# Patient Record
Sex: MALE | Race: OTHER | HISPANIC OR LATINO | URBAN - METROPOLITAN AREA
[De-identification: names, ages, dates, MRNs, and addresses within clinical notes are randomized per-mention and may not be internally consistent; named-entity substitution may affect disease eponyms.]

---

## 2020-04-18 ENCOUNTER — INPATIENT (INPATIENT)
Facility: HOSPITAL | Age: 65
LOS: 71 days | Discharge: NURSING, REHAB OR LONG-TERM CARE | DRG: 4 | End: 2020-06-29
Attending: HOSPITALIST | Admitting: INTERNAL MEDICINE
Payer: MEDICAID

## 2020-04-18 VITALS
OXYGEN SATURATION: 65 % | TEMPERATURE: 98 F | WEIGHT: 154.98 LBS | DIASTOLIC BLOOD PRESSURE: 79 MMHG | RESPIRATION RATE: 29 BRPM | HEIGHT: 63 IN | HEART RATE: 135 BPM | SYSTOLIC BLOOD PRESSURE: 111 MMHG

## 2020-04-18 DIAGNOSIS — J18.9 PNEUMONIA, UNSPECIFIED ORGANISM: ICD-10-CM

## 2020-04-18 LAB
ALBUMIN SERPL ELPH-MCNC: 2.4 G/DL — LOW (ref 3.3–5.2)
ALP SERPL-CCNC: 104 U/L — SIGNIFICANT CHANGE UP (ref 40–120)
ALT FLD-CCNC: 30 U/L — SIGNIFICANT CHANGE UP
ANION GAP SERPL CALC-SCNC: 14 MMOL/L — SIGNIFICANT CHANGE UP (ref 5–17)
ANION GAP SERPL CALC-SCNC: 25 MMOL/L — HIGH (ref 5–17)
ANISOCYTOSIS BLD QL: SLIGHT — SIGNIFICANT CHANGE UP
APTT BLD: 34 SEC — SIGNIFICANT CHANGE UP (ref 27.5–36.3)
AST SERPL-CCNC: 66 U/L — HIGH
BASOPHILS # BLD AUTO: 0 K/UL — SIGNIFICANT CHANGE UP (ref 0–0.2)
BASOPHILS NFR BLD AUTO: 0 % — SIGNIFICANT CHANGE UP (ref 0–2)
BILIRUB SERPL-MCNC: 1 MG/DL — SIGNIFICANT CHANGE UP (ref 0.4–2)
BUN SERPL-MCNC: 22 MG/DL — HIGH (ref 8–20)
BUN SERPL-MCNC: 22 MG/DL — HIGH (ref 8–20)
CALCIUM SERPL-MCNC: 8 MG/DL — LOW (ref 8.6–10.2)
CALCIUM SERPL-MCNC: 9.2 MG/DL — SIGNIFICANT CHANGE UP (ref 8.6–10.2)
CHLORIDE SERPL-SCNC: 91 MMOL/L — LOW (ref 98–107)
CHLORIDE SERPL-SCNC: 97 MMOL/L — LOW (ref 98–107)
CO2 SERPL-SCNC: 18 MMOL/L — LOW (ref 22–29)
CO2 SERPL-SCNC: 24 MMOL/L — SIGNIFICANT CHANGE UP (ref 22–29)
CREAT SERPL-MCNC: 0.81 MG/DL — SIGNIFICANT CHANGE UP (ref 0.5–1.3)
CREAT SERPL-MCNC: 1.46 MG/DL — HIGH (ref 0.5–1.3)
CRP SERPL-MCNC: 31.47 MG/DL — HIGH (ref 0–0.4)
EOSINOPHIL # BLD AUTO: 0.17 K/UL — SIGNIFICANT CHANGE UP (ref 0–0.5)
EOSINOPHIL NFR BLD AUTO: 0.9 % — SIGNIFICANT CHANGE UP (ref 0–6)
FERRITIN SERPL-MCNC: 2889 NG/ML — HIGH (ref 30–400)
FIBRINOGEN PPP-MCNC: 1142 MG/DL — CRITICAL HIGH (ref 300–520)
GIANT PLATELETS BLD QL SMEAR: PRESENT — SIGNIFICANT CHANGE UP
GLUCOSE BLDC GLUCOMTR-MCNC: 112 MG/DL — HIGH (ref 70–99)
GLUCOSE SERPL-MCNC: 145 MG/DL — HIGH (ref 70–99)
GLUCOSE SERPL-MCNC: 224 MG/DL — HIGH (ref 70–99)
HCT VFR BLD CALC: 54.7 % — HIGH (ref 39–50)
HGB BLD-MCNC: 18 G/DL — HIGH (ref 13–17)
INR BLD: 1.8 RATIO — HIGH (ref 0.88–1.16)
LACTATE BLDV-MCNC: 2.6 MMOL/L — HIGH (ref 0.5–2)
LDH SERPL L TO P-CCNC: 792 U/L — HIGH (ref 98–192)
LYMPHOCYTES # BLD AUTO: 0.67 K/UL — LOW (ref 1–3.3)
LYMPHOCYTES # BLD AUTO: 3.5 % — LOW (ref 13–44)
MANUAL SMEAR VERIFICATION: SIGNIFICANT CHANGE UP
MCHC RBC-ENTMCNC: 30.4 PG — SIGNIFICANT CHANGE UP (ref 27–34)
MCHC RBC-ENTMCNC: 32.9 GM/DL — SIGNIFICANT CHANGE UP (ref 32–36)
MCV RBC AUTO: 92.4 FL — SIGNIFICANT CHANGE UP (ref 80–100)
MONOCYTES # BLD AUTO: 1.36 K/UL — HIGH (ref 0–0.9)
MONOCYTES NFR BLD AUTO: 7.1 % — SIGNIFICANT CHANGE UP (ref 2–14)
NEUTROPHILS # BLD AUTO: 17 K/UL — HIGH (ref 1.8–7.4)
NEUTROPHILS NFR BLD AUTO: 85.8 % — HIGH (ref 43–77)
NEUTS BAND # BLD: 2.7 % — SIGNIFICANT CHANGE UP (ref 0–8)
NRBC # BLD: 2 /100 — HIGH (ref 0–0)
PLAT MORPH BLD: NORMAL — SIGNIFICANT CHANGE UP
PLATELET # BLD AUTO: 532 K/UL — HIGH (ref 150–400)
POIKILOCYTOSIS BLD QL AUTO: SLIGHT — SIGNIFICANT CHANGE UP
POTASSIUM SERPL-MCNC: 4.1 MMOL/L — SIGNIFICANT CHANGE UP (ref 3.5–5.3)
POTASSIUM SERPL-MCNC: 5.4 MMOL/L — HIGH (ref 3.5–5.3)
POTASSIUM SERPL-SCNC: 4.1 MMOL/L — SIGNIFICANT CHANGE UP (ref 3.5–5.3)
POTASSIUM SERPL-SCNC: 5.4 MMOL/L — HIGH (ref 3.5–5.3)
PROCALCITONIN SERPL-MCNC: 1.11 NG/ML — HIGH (ref 0.02–0.1)
PROT SERPL-MCNC: 9.2 G/DL — HIGH (ref 6.6–8.7)
PROTHROM AB SERPL-ACNC: 20.7 SEC — HIGH (ref 10–12.9)
RBC # BLD: 5.92 M/UL — HIGH (ref 4.2–5.8)
RBC # FLD: 13.9 % — SIGNIFICANT CHANGE UP (ref 10.3–14.5)
RBC BLD AUTO: SIGNIFICANT CHANGE UP
SARS-COV-2 RNA SPEC QL NAA+PROBE: DETECTED
SODIUM SERPL-SCNC: 134 MMOL/L — LOW (ref 135–145)
SODIUM SERPL-SCNC: 135 MMOL/L — SIGNIFICANT CHANGE UP (ref 135–145)
WBC # BLD: 19.21 K/UL — HIGH (ref 3.8–10.5)
WBC # FLD AUTO: 19.21 K/UL — HIGH (ref 3.8–10.5)

## 2020-04-18 PROCEDURE — 99291 CRITICAL CARE FIRST HOUR: CPT

## 2020-04-18 PROCEDURE — 71045 X-RAY EXAM CHEST 1 VIEW: CPT | Mod: 26

## 2020-04-18 PROCEDURE — 93010 ELECTROCARDIOGRAM REPORT: CPT

## 2020-04-18 PROCEDURE — 93010 ELECTROCARDIOGRAM REPORT: CPT | Mod: 77

## 2020-04-18 PROCEDURE — 99223 1ST HOSP IP/OBS HIGH 75: CPT

## 2020-04-18 RX ORDER — DEXTROSE 50 % IN WATER 50 %
25 SYRINGE (ML) INTRAVENOUS ONCE
Refills: 0 | Status: DISCONTINUED | OUTPATIENT
Start: 2020-04-18 | End: 2020-04-27

## 2020-04-18 RX ORDER — DEXTROSE 50 % IN WATER 50 %
15 SYRINGE (ML) INTRAVENOUS ONCE
Refills: 0 | Status: DISCONTINUED | OUTPATIENT
Start: 2020-04-18 | End: 2020-04-27

## 2020-04-18 RX ORDER — INSULIN LISPRO 100/ML
VIAL (ML) SUBCUTANEOUS
Refills: 0 | Status: DISCONTINUED | OUTPATIENT
Start: 2020-04-18 | End: 2020-04-27

## 2020-04-18 RX ORDER — CEFTRIAXONE 500 MG/1
1000 INJECTION, POWDER, FOR SOLUTION INTRAMUSCULAR; INTRAVENOUS ONCE
Refills: 0 | Status: COMPLETED | OUTPATIENT
Start: 2020-04-18 | End: 2020-04-18

## 2020-04-18 RX ORDER — DEXTROSE 50 % IN WATER 50 %
12.5 SYRINGE (ML) INTRAVENOUS ONCE
Refills: 0 | Status: DISCONTINUED | OUTPATIENT
Start: 2020-04-18 | End: 2020-04-27

## 2020-04-18 RX ORDER — SODIUM CHLORIDE 9 MG/ML
1000 INJECTION, SOLUTION INTRAVENOUS
Refills: 0 | Status: DISCONTINUED | OUTPATIENT
Start: 2020-04-18 | End: 2020-04-27

## 2020-04-18 RX ORDER — ENOXAPARIN SODIUM 100 MG/ML
40 INJECTION SUBCUTANEOUS DAILY
Refills: 0 | Status: DISCONTINUED | OUTPATIENT
Start: 2020-04-18 | End: 2020-04-21

## 2020-04-18 RX ORDER — HYDROXYCHLOROQUINE SULFATE 200 MG
800 TABLET ORAL EVERY 24 HOURS
Refills: 0 | Status: COMPLETED | OUTPATIENT
Start: 2020-04-18 | End: 2020-04-18

## 2020-04-18 RX ORDER — HYDROXYCHLOROQUINE SULFATE 200 MG
TABLET ORAL
Refills: 0 | Status: COMPLETED | OUTPATIENT
Start: 2020-04-18 | End: 2020-04-22

## 2020-04-18 RX ORDER — GLUCAGON INJECTION, SOLUTION 0.5 MG/.1ML
1 INJECTION, SOLUTION SUBCUTANEOUS ONCE
Refills: 0 | Status: DISCONTINUED | OUTPATIENT
Start: 2020-04-18 | End: 2020-04-27

## 2020-04-18 RX ORDER — SODIUM CHLORIDE 9 MG/ML
1000 INJECTION INTRAMUSCULAR; INTRAVENOUS; SUBCUTANEOUS
Refills: 0 | Status: DISCONTINUED | OUTPATIENT
Start: 2020-04-18 | End: 2020-04-20

## 2020-04-18 RX ORDER — HYDROXYCHLOROQUINE SULFATE 200 MG
400 TABLET ORAL EVERY 24 HOURS
Refills: 0 | Status: COMPLETED | OUTPATIENT
Start: 2020-04-19 | End: 2020-04-22

## 2020-04-18 RX ADMIN — Medication 70 MILLIGRAM(S): at 11:00

## 2020-04-18 RX ADMIN — Medication 70 MILLIGRAM(S): at 20:43

## 2020-04-18 RX ADMIN — SODIUM CHLORIDE 100 MILLILITER(S): 9 INJECTION INTRAMUSCULAR; INTRAVENOUS; SUBCUTANEOUS at 21:12

## 2020-04-18 RX ADMIN — ENOXAPARIN SODIUM 40 MILLIGRAM(S): 100 INJECTION SUBCUTANEOUS at 11:11

## 2020-04-18 RX ADMIN — Medication 70 MILLIGRAM(S): at 11:12

## 2020-04-18 RX ADMIN — Medication 800 MILLIGRAM(S): at 11:12

## 2020-04-18 RX ADMIN — SODIUM CHLORIDE 100 MILLILITER(S): 9 INJECTION INTRAMUSCULAR; INTRAVENOUS; SUBCUTANEOUS at 10:53

## 2020-04-18 RX ADMIN — CEFTRIAXONE 100 MILLIGRAM(S): 500 INJECTION, POWDER, FOR SOLUTION INTRAMUSCULAR; INTRAVENOUS at 11:11

## 2020-04-18 NOTE — ED ADULT NURSE REASSESSMENT NOTE - NS ED NURSE REASSESS COMMENT FT1
Assumed care of patient at this time. Patient breathing status is poor. On 6L NC and 15LNRB, saturations 99% with current interventions. Patient attempted to lie in prone position but reports spasming in upper chest. Patient appears comfortable sitting up in bed. Will continue to reassess patients needs.

## 2020-04-18 NOTE — ED PROVIDER NOTE - ATTENDING CONTRIBUTION TO CARE
HPI/ROS/PE by me. Mile Epstein DO     I performed a history and physical exam of the patient and discussed their management with the resident. I reviewed the resident's note and agree with the documented findings and plan of care. My medical decision making and observations are found above. HPI/ROS/PE/MDM by me. Mile Epstein DO     I performed a history and physical exam of the patient and discussed their management with the resident. I reviewed the resident's note and agree with the documented findings and plan of care. My medical decision making and observations are found above.

## 2020-04-18 NOTE — ED ADULT NURSE REASSESSMENT NOTE - NS ED NURSE REASSESS COMMENT FT1
Normal saline infusing as prescribed. Patient medicated 70mg Solumedrol IVP. Medicated 40mg Lovenox administered to LLQ. Rocephin 1G Infusing as ordered. Medicated with 800mg Plaquenil. Patient tolerating fluids without issues. Will continue to monitor.

## 2020-04-18 NOTE — ED ADULT NURSE NOTE - OBJECTIVE STATEMENT
Pt with 5-6 days of feeling flu like symptoms and throat pain, today pt states when he woke he was having trouble breathing with dry cough. Pt noted to have sp02 in the 60's on RA, brought to  ER and MD Epstein called to eval. Pt sp02 up to 94% on NRB and pt reports improvement.

## 2020-04-18 NOTE — CHART NOTE - NSCHARTNOTEFT_GEN_A_CORE
RRT called for hypoxia patient desaturating to high 80's despite being on %  Improved with repositioning to low 90s  CCPGUSTAVO Dugan present - recommends continuing same for now.  May transfer to [facility]  Discussed with Dr Neumann

## 2020-04-18 NOTE — ED PROVIDER NOTE - OBJECTIVE STATEMENT
65yo male no PMH p/w subjective fevers, nonproductive cough, shortness of breath x 10 days. Patient states that the owner of the house he lives in saw him and sent him to ED to be evaluated. No sick contacts.

## 2020-04-18 NOTE — H&P ADULT - CONSTITUTIONAL
[facility]     Department of Emergency Medicine   ED  Provider Note  Admit Date/RoomTime: 6/10/2019  7:40 AM  ED Room: 34/   Chief Complaint   Foot Pain (stepped on a nail with left foot. Pulled nail out)    History of Present Illness   Source of history provided by:  patient and spouse/SO. History/Exam Limitations: none. Schuyler Monreal is a 41 y.o. old male presenting to the emergency department by private vehicle, for Left foot pain which occured 1 hour(s) prior to arrival.  Cause of complaint: stepped on nail while at work. There has been a history of no prior problems with this area in the past.  Since onset the symptoms have been moderate in degree with ability to bear weight, but with some pain. His pain is aggraveated by nothing and relieved by nothing. Tetanus Status: unknown. ROS    Pertinent positives and negatives are stated within HPI, all other systems reviewed and are negative. History reviewed. No pertinent surgical history. Social History:  reports that he has never smoked. He has never used smokeless tobacco. He reports that he drank alcohol. He reports that he does not use drugs. Family History: family history is not on file. Allergies: Patient has no known allergies. Physical Exam           ED Triage Vitals   BP Temp Temp Source Pulse Resp SpO2 Height Weight   06/10/19 0740 06/10/19 0740 06/10/19 0740 06/10/19 0740 06/10/19 0740 06/10/19 0740 06/10/19 0805 06/10/19 0805   (!) 160/94 98.2 °F (36.8 °C) Oral 89 16 98 % 5' 5\" (1.651 m) 190 lb (86.2 kg)      Oxygen Saturation Interpretation: Normal.    Constitutional:  Alert, development consistent with age. Neck:  Normal ROM. Supple. Foot: Left plantar metatarsal(s). Tenderness:  moderate. Swelling: None. Deformity: no.              ROM: full range of motion. Skin:  tenderness and no erythema, rash or wounds noted. Neurovascular: Motor deficit: none. Sensory deficit:   none. Pulse deficit: none. Capillary refill: normal.  Ankle:               Tenderness:  none. Swelling: None. Deformity: no.             ROM: full range of motion. Skin:  no erythema, rash or wounds noted. Gait:  normal.  Lymphatics: No lymphangitis or adenopathy noted. Neurological:  Oriented. Motor functions intact. Lab / Imaging Results   (All laboratory and radiology results have been personally reviewed by myself)  Labs:  No results found for this visit on 06/10/19. Imaging: All Radiology results interpreted by Radiologist unless otherwise noted. No orders to display     ED Course / Medical Decision Making     Medications   Tetanus-Diphth-Acell Pertussis (BOOSTRIX) injection 0.5 mL (has no administration in time range)   ibuprofen (ADVIL;MOTRIN) tablet 800 mg (has no administration in time range)   lidocaine-prilocaine (EMLA) cream (has no administration in time range)        Consult(s):   none. Procedure(s):   none    Medical Decision Making:    Films were not obtained based on low  suspicion for bony injury as per history/physical findings . Plan is subsequently for symptom control, limited use and  immobilization with appropriate outpatient follow-up. Neha Barrett Counseling: The emergency provider has spoken with the patient and spouse/SO and discussed todays results, in addition to providing specific details for the plan of care and counseling regarding the diagnosis and prognosis. Questions are answered at this time and they are agreeable with the plan. Assessment      1. Puncture wound    2.  Left foot pain      Plan   Discharge to home  Patient condition is stable    New Medications     New Prescriptions    IBUPROFEN (ADVIL;MOTRIN) 800 MG TABLET    Take 1 tablet by mouth every 6 hours as needed for Pain     Electronically signed by Ella Gilford, APRN - CNP   DD: 6/10/19  **This report was transcribed using voice recognition software. Every effort was made to ensure accuracy; however, inadvertent computerized transcription errors may be present.   END OF ED PROVIDER NOTE      FORTINO Gunter CNP  06/10/19 0360 detailed exam

## 2020-04-18 NOTE — ED ADULT NURSE REASSESSMENT NOTE - NS ED NURSE REASSESS COMMENT FT1
Rapid response called due to patient desaturating in prone position and being unable to lie flat on stomach. Saturations 81% while lying prone. Patient sat up and repositioned in bed. Saturations to 94% on 6LNC and 15LNRB, ICU DAVID Whitten,and Dr Senior  aware no interventions at this time. instructed to transfer patient to [address] [place] [address] at this time.

## 2020-04-18 NOTE — ED ADULT TRIAGE NOTE - CHIEF COMPLAINT QUOTE
patient c/o short of breath for 5 days, patient in respiratory distress and moved to critical care. MD called.

## 2020-04-18 NOTE — H&P ADULT - HISTORY OF PRESENT ILLNESS
65 yo M w/ no reported PMHx presents to ER for worsening shortness of breath, subjective fevers and dry cough for 10 days. poor po intake.  did not take any meds at home. no sick contacts, travel.    in ER, tachycardia/hypoxic with leukocytosis, ARF and evidence of dehydration. improved with NRB mask.

## 2020-04-18 NOTE — ED PROVIDER NOTE - PHYSICAL EXAMINATION
Gen: tachypneic, breathing in 20s on NRB and NC, moderate resp distress, speaking in 4-5 word setnences  Head: NCAT  HEENT:  oral mucosa moist, normal conjunctiva, oropharynx clear without exudate or erythema  Lung: CTAB, no respiratory distress, no wheezing, rales, rhonchi  CV: normal s1/s2, tachycardic, regular rhythm, no murmurs, Normal perfusion  Abd: soft, NTND  MSK: No edema, no visible deformities, full range of motion in all 4 extremities  Neuro: No focal neurologic deficits  Skin: No rash   Psych: normal affect Gen: tachypneic, breathing in 20s on NRB and NC, moderate resp distress, speaking in 4-5 word setnences  Head: NCAT  HEENT:  oral mucosa moist, normal conjunctiva, oropharynx clear without exudate or erythema  Lung: bibasilar rales, moderate respiratory distress, tachypneic to 20s  CV: normal s1/s2, tachycardic, regular rhythm, no murmurs, Normal perfusion  Abd: soft, NTND  MSK: No edema, no visible deformities, full range of motion in all 4 extremities  Neuro: No focal neurologic deficits  Skin: No rash   Psych: normal affect

## 2020-04-18 NOTE — ED PROVIDER NOTE - CLINICAL SUMMARY MEDICAL DECISION MAKING FREE TEXT BOX
63yo male with hypoxic resp failure likely 2/2 COVID as patient with flu-like symptoms over past 10 days, satting 96% on NRB and NC, will attempt to prone, obtain labs, CXR, admission. Mile Epstein DO

## 2020-04-18 NOTE — H&P ADULT - ASSESSMENT
65 yo M w/ no reported PMHx presents to ER for worsening shortness of breath, subjective fevers and dry cough for 10 days. poor po intake.  in ER, tachycardia/hypoxic with leukocytosis, ARF and evidence of dehydration. improved with NRB mask.  COVID +    acute hypoxic resp failure due to viral pneumonia from COVID    plaquenil/steroids    02 as needed    leukocytosis/elevated procalcitonin    cover for pneumonia with ceftriaxone    JANIE with hyponatremia   IVF, trend    hyperglycemia: sliding scale, check hga1c.     dvt ppx Lovenox.

## 2020-04-19 LAB
A1C WITH ESTIMATED AVERAGE GLUCOSE RESULT: 6.7 % — HIGH (ref 4–5.6)
ANION GAP SERPL CALC-SCNC: 12 MMOL/L — SIGNIFICANT CHANGE UP (ref 5–17)
BASOPHILS # BLD AUTO: 0.02 K/UL — SIGNIFICANT CHANGE UP (ref 0–0.2)
BASOPHILS NFR BLD AUTO: 0.1 % — SIGNIFICANT CHANGE UP (ref 0–2)
BUN SERPL-MCNC: 20 MG/DL — SIGNIFICANT CHANGE UP (ref 8–20)
CALCIUM SERPL-MCNC: 8.3 MG/DL — LOW (ref 8.6–10.2)
CHLORIDE SERPL-SCNC: 102 MMOL/L — SIGNIFICANT CHANGE UP (ref 98–107)
CO2 SERPL-SCNC: 27 MMOL/L — SIGNIFICANT CHANGE UP (ref 22–29)
CREAT SERPL-MCNC: 0.7 MG/DL — SIGNIFICANT CHANGE UP (ref 0.5–1.3)
CRP SERPL-MCNC: 15.01 MG/DL — HIGH (ref 0–0.4)
EOSINOPHIL # BLD AUTO: 0 K/UL — SIGNIFICANT CHANGE UP (ref 0–0.5)
EOSINOPHIL NFR BLD AUTO: 0 % — SIGNIFICANT CHANGE UP (ref 0–6)
ESTIMATED AVERAGE GLUCOSE: 146 MG/DL — HIGH (ref 68–114)
FERRITIN SERPL-MCNC: 2231 NG/ML — HIGH (ref 30–400)
GLUCOSE BLDC GLUCOMTR-MCNC: 122 MG/DL — HIGH (ref 70–99)
GLUCOSE BLDC GLUCOMTR-MCNC: 128 MG/DL — HIGH (ref 70–99)
GLUCOSE BLDC GLUCOMTR-MCNC: 134 MG/DL — HIGH (ref 70–99)
GLUCOSE BLDC GLUCOMTR-MCNC: 152 MG/DL — HIGH (ref 70–99)
GLUCOSE SERPL-MCNC: 116 MG/DL — HIGH (ref 70–99)
HCT VFR BLD CALC: 43.1 % — SIGNIFICANT CHANGE UP (ref 39–50)
HCV AB S/CO SERPL IA: 0.89 S/CO — SIGNIFICANT CHANGE UP (ref 0–0.99)
HCV AB SERPL-IMP: SIGNIFICANT CHANGE UP
HGB BLD-MCNC: 14.1 G/DL — SIGNIFICANT CHANGE UP (ref 13–17)
IMM GRANULOCYTES NFR BLD AUTO: 1.6 % — HIGH (ref 0–1.5)
LDH SERPL L TO P-CCNC: 436 U/L — HIGH (ref 98–192)
LYMPHOCYTES # BLD AUTO: 0.8 K/UL — LOW (ref 1–3.3)
LYMPHOCYTES # BLD AUTO: 5.1 % — LOW (ref 13–44)
MCHC RBC-ENTMCNC: 30.5 PG — SIGNIFICANT CHANGE UP (ref 27–34)
MCHC RBC-ENTMCNC: 32.7 GM/DL — SIGNIFICANT CHANGE UP (ref 32–36)
MCV RBC AUTO: 93.3 FL — SIGNIFICANT CHANGE UP (ref 80–100)
MONOCYTES # BLD AUTO: 0.68 K/UL — SIGNIFICANT CHANGE UP (ref 0–0.9)
MONOCYTES NFR BLD AUTO: 4.3 % — SIGNIFICANT CHANGE UP (ref 2–14)
NEUTROPHILS # BLD AUTO: 14.03 K/UL — HIGH (ref 1.8–7.4)
NEUTROPHILS NFR BLD AUTO: 88.9 % — HIGH (ref 43–77)
PLATELET # BLD AUTO: 438 K/UL — HIGH (ref 150–400)
POTASSIUM SERPL-MCNC: 4.5 MMOL/L — SIGNIFICANT CHANGE UP (ref 3.5–5.3)
POTASSIUM SERPL-SCNC: 4.5 MMOL/L — SIGNIFICANT CHANGE UP (ref 3.5–5.3)
PROCALCITONIN SERPL-MCNC: 0.99 NG/ML — HIGH (ref 0.02–0.1)
RBC # BLD: 4.62 M/UL — SIGNIFICANT CHANGE UP (ref 4.2–5.8)
RBC # FLD: 14.1 % — SIGNIFICANT CHANGE UP (ref 10.3–14.5)
SODIUM SERPL-SCNC: 141 MMOL/L — SIGNIFICANT CHANGE UP (ref 135–145)
WBC # BLD: 15.78 K/UL — HIGH (ref 3.8–10.5)
WBC # FLD AUTO: 15.78 K/UL — HIGH (ref 3.8–10.5)

## 2020-04-19 PROCEDURE — 99232 SBSQ HOSP IP/OBS MODERATE 35: CPT

## 2020-04-19 RX ADMIN — Medication 70 MILLIGRAM(S): at 19:58

## 2020-04-19 RX ADMIN — Medication 400 MILLIGRAM(S): at 11:45

## 2020-04-19 RX ADMIN — Medication 2: at 11:45

## 2020-04-19 RX ADMIN — Medication 70 MILLIGRAM(S): at 11:45

## 2020-04-19 RX ADMIN — ENOXAPARIN SODIUM 40 MILLIGRAM(S): 100 INJECTION SUBCUTANEOUS at 11:44

## 2020-04-19 NOTE — PROGRESS NOTE ADULT - SUBJECTIVE AND OBJECTIVE BOX
seen for COVID, resp failure    complaining of diabetic diet. explained rationale  no chest pain. on NRB  ros otherwise negative     MEDICATIONS  (STANDING):  dextrose 5%. 1000 milliLiter(s) (50 mL/Hr) IV Continuous <Continuous>  dextrose 50% Injectable 12.5 Gram(s) IV Push once  dextrose 50% Injectable 25 Gram(s) IV Push once  dextrose 50% Injectable 25 Gram(s) IV Push once  enoxaparin Injectable 40 milliGRAM(s) SubCutaneous daily  hydroxychloroquine 400 milliGRAM(s) Oral every 24 hours  hydroxychloroquine   Oral   insulin lispro (HumaLOG) corrective regimen sliding scale   SubCutaneous three times a day before meals  methylPREDNISolone sodium succinate Injectable 70 milliGRAM(s) IV Push two times a day  sodium chloride 0.9%. 1000 milliLiter(s) (100 mL/Hr) IV Continuous <Continuous>    MEDICATIONS  (PRN):  dextrose 40% Gel 15 Gram(s) Oral once PRN Blood Glucose LESS THAN 70 milliGRAM(s)/deciliter  glucagon  Injectable 1 milliGRAM(s) IntraMuscular once PRN Glucose LESS THAN 70 milligrams/deciliter      Allergies    No Known Allergies    Vital Signs Last 24 Hrs  T(C): 36.4 (19 Apr 2020 08:25), Max: 36.9 (19 Apr 2020 05:49)  T(F): 97.6 (19 Apr 2020 08:25), Max: 98.4 (19 Apr 2020 05:49)  HR: 73 (19 Apr 2020 08:25) (72 - 88)  BP: 119/74 (19 Apr 2020 08:25) (106/71 - 119/74)  BP(mean): --  RR: 19 (19 Apr 2020 08:25) (19 - 34)  SpO2: 91% (19 Apr 2020 08:25) (91% - 97%)    PHYSICAL EXAM:    GENERAL: NAD  CHEST/LUNG: Clear to percussion bilaterally  HEART: Regular rate and rhythm; S1 S2  ABDOMEN: Soft, Nontender, Bowel sounds present  EXTREMITIES:  no edema   NERVOUS SYSTEM:  Alert & Oriented X3, 5/5 B/L upper and lower extremities      LABS:                        14.1   15.78 )-----------( 438      ( 19 Apr 2020 07:30 )             43.1     04-19    141  |  102  |  20.0  ----------------------------<  116<H>  4.5   |  27.0  |  0.70    Ca    8.3<L>      19 Apr 2020 07:30    TPro  9.2<H>  /  Alb  2.4<L>  /  TBili  1.0  /  DBili  x   /  AST  66<H>  /  ALT  30  /  AlkPhos  104  04-18    PT/INR - ( 18 Apr 2020 10:04 )   PT: 20.7 sec;   INR: 1.80 ratio         PTT - ( 18 Apr 2020 10:04 )  PTT:34.0 sec      CAPILLARY BLOOD GLUCOSE  126 (18 Apr 2020 17:59)      POCT Blood Glucose.: 122 mg/dL (19 Apr 2020 08:11)  POCT Blood Glucose.: 112 mg/dL (18 Apr 2020 20:29)        RADIOLOGY & ADDITIONAL TESTS:

## 2020-04-19 NOTE — PROGRESS NOTE ADULT - ASSESSMENT
65 yo M w/ no reported PMHx presents to ER for worsening shortness of breath, subjective fevers and dry cough for 10 days. poor po intake.  in ER, tachycardia/hypoxic with leukocytosis, ARF and evidence of dehydration. improved with NRB mask.  COVID +    acute hypoxic resp failure due to viral pneumonia from COVID    plaquenil/steroids    02 as needed    leukocytosis/elevated procalcitonin    cover for pneumonia with ceftriaxone    JANIE with hyponatremia--resolved post IVF     hyperglycemia: sliding scale, check hga1c- 6.7    dvt ppx Lovenox.

## 2020-04-20 LAB
ALBUMIN SERPL ELPH-MCNC: 2.5 G/DL — LOW (ref 3.3–5.2)
ALP SERPL-CCNC: 70 U/L — SIGNIFICANT CHANGE UP (ref 40–120)
ALT FLD-CCNC: 24 U/L — SIGNIFICANT CHANGE UP
ANION GAP SERPL CALC-SCNC: 11 MMOL/L — SIGNIFICANT CHANGE UP (ref 5–17)
AST SERPL-CCNC: 34 U/L — SIGNIFICANT CHANGE UP
BASOPHILS # BLD AUTO: 0.02 K/UL — SIGNIFICANT CHANGE UP (ref 0–0.2)
BASOPHILS NFR BLD AUTO: 0.1 % — SIGNIFICANT CHANGE UP (ref 0–2)
BILIRUB SERPL-MCNC: 0.4 MG/DL — SIGNIFICANT CHANGE UP (ref 0.4–2)
BUN SERPL-MCNC: 16 MG/DL — SIGNIFICANT CHANGE UP (ref 8–20)
CALCIUM SERPL-MCNC: 8.2 MG/DL — LOW (ref 8.6–10.2)
CHLORIDE SERPL-SCNC: 107 MMOL/L — SIGNIFICANT CHANGE UP (ref 98–107)
CO2 SERPL-SCNC: 26 MMOL/L — SIGNIFICANT CHANGE UP (ref 22–29)
CREAT SERPL-MCNC: 0.56 MG/DL — SIGNIFICANT CHANGE UP (ref 0.5–1.3)
CRP SERPL-MCNC: 6.75 MG/DL — HIGH (ref 0–0.4)
EOSINOPHIL # BLD AUTO: 0 K/UL — SIGNIFICANT CHANGE UP (ref 0–0.5)
EOSINOPHIL NFR BLD AUTO: 0 % — SIGNIFICANT CHANGE UP (ref 0–6)
GLUCOSE BLDC GLUCOMTR-MCNC: 123 MG/DL — HIGH (ref 70–99)
GLUCOSE BLDC GLUCOMTR-MCNC: 132 MG/DL — HIGH (ref 70–99)
GLUCOSE BLDC GLUCOMTR-MCNC: 135 MG/DL — HIGH (ref 70–99)
GLUCOSE BLDC GLUCOMTR-MCNC: 162 MG/DL — HIGH (ref 70–99)
GLUCOSE BLDC GLUCOMTR-MCNC: 181 MG/DL — HIGH (ref 70–99)
GLUCOSE SERPL-MCNC: 109 MG/DL — HIGH (ref 70–99)
HCT VFR BLD CALC: 43.9 % — SIGNIFICANT CHANGE UP (ref 39–50)
HGB BLD-MCNC: 14.2 G/DL — SIGNIFICANT CHANGE UP (ref 13–17)
IMM GRANULOCYTES NFR BLD AUTO: 1.2 % — SIGNIFICANT CHANGE UP (ref 0–1.5)
LACTATE BLDV-MCNC: 1.4 MMOL/L — SIGNIFICANT CHANGE UP (ref 0.5–2)
LYMPHOCYTES # BLD AUTO: 0.86 K/UL — LOW (ref 1–3.3)
LYMPHOCYTES # BLD AUTO: 4.6 % — LOW (ref 13–44)
MCHC RBC-ENTMCNC: 30.3 PG — SIGNIFICANT CHANGE UP (ref 27–34)
MCHC RBC-ENTMCNC: 32.3 GM/DL — SIGNIFICANT CHANGE UP (ref 32–36)
MCV RBC AUTO: 93.6 FL — SIGNIFICANT CHANGE UP (ref 80–100)
MONOCYTES # BLD AUTO: 1.16 K/UL — HIGH (ref 0–0.9)
MONOCYTES NFR BLD AUTO: 6.2 % — SIGNIFICANT CHANGE UP (ref 2–14)
NEUTROPHILS # BLD AUTO: 16.57 K/UL — HIGH (ref 1.8–7.4)
NEUTROPHILS NFR BLD AUTO: 87.9 % — HIGH (ref 43–77)
PLATELET # BLD AUTO: 427 K/UL — HIGH (ref 150–400)
POTASSIUM SERPL-MCNC: 4.5 MMOL/L — SIGNIFICANT CHANGE UP (ref 3.5–5.3)
POTASSIUM SERPL-SCNC: 4.5 MMOL/L — SIGNIFICANT CHANGE UP (ref 3.5–5.3)
PROCALCITONIN SERPL-MCNC: 0.63 NG/ML — HIGH (ref 0.02–0.1)
PROT SERPL-MCNC: 6.7 G/DL — SIGNIFICANT CHANGE UP (ref 6.6–8.7)
RBC # BLD: 4.69 M/UL — SIGNIFICANT CHANGE UP (ref 4.2–5.8)
RBC # FLD: 14.3 % — SIGNIFICANT CHANGE UP (ref 10.3–14.5)
SODIUM SERPL-SCNC: 144 MMOL/L — SIGNIFICANT CHANGE UP (ref 135–145)
WBC # BLD: 18.83 K/UL — HIGH (ref 3.8–10.5)
WBC # FLD AUTO: 18.83 K/UL — HIGH (ref 3.8–10.5)

## 2020-04-20 PROCEDURE — 99232 SBSQ HOSP IP/OBS MODERATE 35: CPT

## 2020-04-20 RX ORDER — PANTOPRAZOLE SODIUM 20 MG/1
40 TABLET, DELAYED RELEASE ORAL
Refills: 0 | Status: DISCONTINUED | OUTPATIENT
Start: 2020-04-20 | End: 2020-05-08

## 2020-04-20 RX ORDER — ACETAMINOPHEN 500 MG
650 TABLET ORAL EVERY 6 HOURS
Refills: 0 | Status: DISCONTINUED | OUTPATIENT
Start: 2020-04-20 | End: 2020-05-23

## 2020-04-20 RX ADMIN — Medication 400 MILLIGRAM(S): at 12:12

## 2020-04-20 RX ADMIN — ENOXAPARIN SODIUM 40 MILLIGRAM(S): 100 INJECTION SUBCUTANEOUS at 12:12

## 2020-04-20 RX ADMIN — Medication 70 MILLIGRAM(S): at 21:55

## 2020-04-20 RX ADMIN — Medication 70 MILLIGRAM(S): at 12:13

## 2020-04-20 NOTE — PROGRESS NOTE ADULT - ASSESSMENT
65 yo M w/ no reported PMHx presents to ER for worsening shortness of breath, subjective fevers and dry cough for 10 days. poor po intake.  in ER, tachycardia/hypoxic with leukocytosis, ARF and evidence of dehydration. improved with NRB mask.  COVID +    acute hypoxic resp failure due to viral pneumonia from COVID    plaquenil/steroids    02 as needed   dc abx as low likelihood of pneumonia.    JANIE with hyponatremia--resolved post IVF     hyperglycemia: sliding scale, check hga1c- 6.7    dvt ppx Lovenox.

## 2020-04-20 NOTE — PROGRESS NOTE ADULT - SUBJECTIVE AND OBJECTIVE BOX
seen for COVID, resp failure    easily desaturates off NRB  no acute complaints.  ros negative    MEDICATIONS  (STANDING):  dextrose 5%. 1000 milliLiter(s) (50 mL/Hr) IV Continuous <Continuous>  dextrose 50% Injectable 12.5 Gram(s) IV Push once  dextrose 50% Injectable 25 Gram(s) IV Push once  dextrose 50% Injectable 25 Gram(s) IV Push once  enoxaparin Injectable 40 milliGRAM(s) SubCutaneous daily  hydroxychloroquine 400 milliGRAM(s) Oral every 24 hours  hydroxychloroquine   Oral   insulin lispro (HumaLOG) corrective regimen sliding scale   SubCutaneous three times a day before meals  methylPREDNISolone sodium succinate Injectable 70 milliGRAM(s) IV Push two times a day    MEDICATIONS  (PRN):  dextrose 40% Gel 15 Gram(s) Oral once PRN Blood Glucose LESS THAN 70 milliGRAM(s)/deciliter  glucagon  Injectable 1 milliGRAM(s) IntraMuscular once PRN Glucose LESS THAN 70 milligrams/deciliter      Allergies    No Known Allergies    Vital Signs Last 24 Hrs  T(C): 36.4 (20 Apr 2020 04:31), Max: 36.8 (19 Apr 2020 20:01)  T(F): 97.5 (20 Apr 2020 04:31), Max: 98.2 (19 Apr 2020 20:01)  HR: 72 (20 Apr 2020 07:45) (65 - 78)  BP: 117/70 (20 Apr 2020 04:31) (117/65 - 117/70)  BP(mean): --  RR: 18 (20 Apr 2020 07:45) (18 - 19)  SpO2: 97% (20 Apr 2020 07:45) (93% - 98%)    PHYSICAL EXAM:    GENERAL: NAD  CHEST/LUNG: Clear to percussion bilaterally  HEART: Regular rate and rhythm; S1 S2  ABDOMEN: Soft, Bowel sounds present  EXTREMITIES: no edema  NERVOUS SYSTEM:  Alert & Oriented X3, Motor Strength 5/5 B/L upper and lower extremities      LABS:                        14.2   18.83 )-----------( 427      ( 20 Apr 2020 07:46 )             43.9     04-20    144  |  107  |  16.0  ----------------------------<  109<H>  4.5   |  26.0  |  0.56    Ca    8.2<L>      20 Apr 2020 07:46    TPro  6.7  /  Alb  2.5<L>  /  TBili  0.4  /  DBili  x   /  AST  34  /  ALT  24  /  AlkPhos  70  04-20          CAPILLARY BLOOD GLUCOSE      POCT Blood Glucose.: 123 mg/dL (20 Apr 2020 08:28)  POCT Blood Glucose.: 134 mg/dL (19 Apr 2020 19:58)  POCT Blood Glucose.: 128 mg/dL (19 Apr 2020 16:17)  POCT Blood Glucose.: 152 mg/dL (19 Apr 2020 11:13)        RADIOLOGY & ADDITIONAL TESTS:

## 2020-04-21 LAB
ALBUMIN SERPL ELPH-MCNC: 2.6 G/DL — LOW (ref 3.3–5.2)
ALP SERPL-CCNC: 74 U/L — SIGNIFICANT CHANGE UP (ref 40–120)
ALT FLD-CCNC: 27 U/L — SIGNIFICANT CHANGE UP
ANION GAP SERPL CALC-SCNC: 14 MMOL/L — SIGNIFICANT CHANGE UP (ref 5–17)
AST SERPL-CCNC: 33 U/L — SIGNIFICANT CHANGE UP
BASOPHILS # BLD AUTO: 0.02 K/UL — SIGNIFICANT CHANGE UP (ref 0–0.2)
BASOPHILS NFR BLD AUTO: 0.1 % — SIGNIFICANT CHANGE UP (ref 0–2)
BILIRUB SERPL-MCNC: 0.5 MG/DL — SIGNIFICANT CHANGE UP (ref 0.4–2)
BUN SERPL-MCNC: 14 MG/DL — SIGNIFICANT CHANGE UP (ref 8–20)
CALCIUM SERPL-MCNC: 8.5 MG/DL — LOW (ref 8.6–10.2)
CHLORIDE SERPL-SCNC: 101 MMOL/L — SIGNIFICANT CHANGE UP (ref 98–107)
CO2 SERPL-SCNC: 27 MMOL/L — SIGNIFICANT CHANGE UP (ref 22–29)
CREAT SERPL-MCNC: 0.51 MG/DL — SIGNIFICANT CHANGE UP (ref 0.5–1.3)
CRP SERPL-MCNC: 10.42 MG/DL — HIGH (ref 0–0.4)
EOSINOPHIL # BLD AUTO: 0 K/UL — SIGNIFICANT CHANGE UP (ref 0–0.5)
EOSINOPHIL NFR BLD AUTO: 0 % — SIGNIFICANT CHANGE UP (ref 0–6)
GLUCOSE BLDC GLUCOMTR-MCNC: 113 MG/DL — HIGH (ref 70–99)
GLUCOSE BLDC GLUCOMTR-MCNC: 122 MG/DL — HIGH (ref 70–99)
GLUCOSE BLDC GLUCOMTR-MCNC: 124 MG/DL — HIGH (ref 70–99)
GLUCOSE BLDC GLUCOMTR-MCNC: 135 MG/DL — HIGH (ref 70–99)
GLUCOSE SERPL-MCNC: 125 MG/DL — HIGH (ref 70–99)
HCT VFR BLD CALC: 45.1 % — SIGNIFICANT CHANGE UP (ref 39–50)
HGB BLD-MCNC: 14.5 G/DL — SIGNIFICANT CHANGE UP (ref 13–17)
IMM GRANULOCYTES NFR BLD AUTO: 1.1 % — SIGNIFICANT CHANGE UP (ref 0–1.5)
LYMPHOCYTES # BLD AUTO: 0.7 K/UL — LOW (ref 1–3.3)
LYMPHOCYTES # BLD AUTO: 3.5 % — LOW (ref 13–44)
MCHC RBC-ENTMCNC: 29.8 PG — SIGNIFICANT CHANGE UP (ref 27–34)
MCHC RBC-ENTMCNC: 32.2 GM/DL — SIGNIFICANT CHANGE UP (ref 32–36)
MCV RBC AUTO: 92.6 FL — SIGNIFICANT CHANGE UP (ref 80–100)
MONOCYTES # BLD AUTO: 1 K/UL — HIGH (ref 0–0.9)
MONOCYTES NFR BLD AUTO: 5 % — SIGNIFICANT CHANGE UP (ref 2–14)
NEUTROPHILS # BLD AUTO: 18.07 K/UL — HIGH (ref 1.8–7.4)
NEUTROPHILS NFR BLD AUTO: 90.3 % — HIGH (ref 43–77)
PLATELET # BLD AUTO: 428 K/UL — HIGH (ref 150–400)
POTASSIUM SERPL-MCNC: 4.5 MMOL/L — SIGNIFICANT CHANGE UP (ref 3.5–5.3)
POTASSIUM SERPL-SCNC: 4.5 MMOL/L — SIGNIFICANT CHANGE UP (ref 3.5–5.3)
PROCALCITONIN SERPL-MCNC: 0.34 NG/ML — HIGH (ref 0.02–0.1)
PROT SERPL-MCNC: 7 G/DL — SIGNIFICANT CHANGE UP (ref 6.6–8.7)
RBC # BLD: 4.87 M/UL — SIGNIFICANT CHANGE UP (ref 4.2–5.8)
RBC # FLD: 14.2 % — SIGNIFICANT CHANGE UP (ref 10.3–14.5)
SODIUM SERPL-SCNC: 141 MMOL/L — SIGNIFICANT CHANGE UP (ref 135–145)
WBC # BLD: 20.01 K/UL — HIGH (ref 3.8–10.5)
WBC # FLD AUTO: 20.01 K/UL — HIGH (ref 3.8–10.5)

## 2020-04-21 PROCEDURE — 93970 EXTREMITY STUDY: CPT | Mod: 26

## 2020-04-21 PROCEDURE — 71275 CT ANGIOGRAPHY CHEST: CPT | Mod: 26

## 2020-04-21 PROCEDURE — 99233 SBSQ HOSP IP/OBS HIGH 50: CPT

## 2020-04-21 RX ORDER — CHOLECALCIFEROL (VITAMIN D3) 125 MCG
1000 CAPSULE ORAL DAILY
Refills: 0 | Status: DISCONTINUED | OUTPATIENT
Start: 2020-04-21 | End: 2020-05-01

## 2020-04-21 RX ORDER — MULTIVIT-MIN/FERROUS GLUCONATE 9 MG/15 ML
1 LIQUID (ML) ORAL DAILY
Refills: 0 | Status: DISCONTINUED | OUTPATIENT
Start: 2020-04-21 | End: 2020-06-29

## 2020-04-21 RX ORDER — THIAMINE MONONITRATE (VIT B1) 100 MG
100 TABLET ORAL DAILY
Refills: 0 | Status: DISCONTINUED | OUTPATIENT
Start: 2020-04-21 | End: 2020-04-27

## 2020-04-21 RX ORDER — PSYLLIUM SEED (WITH DEXTROSE)
1 POWDER (GRAM) ORAL
Refills: 0 | Status: DISCONTINUED | OUTPATIENT
Start: 2020-04-21 | End: 2020-06-08

## 2020-04-21 RX ORDER — ASCORBIC ACID 60 MG
500 TABLET,CHEWABLE ORAL DAILY
Refills: 0 | Status: DISCONTINUED | OUTPATIENT
Start: 2020-04-21 | End: 2020-04-27

## 2020-04-21 RX ORDER — ENOXAPARIN SODIUM 100 MG/ML
70 INJECTION SUBCUTANEOUS EVERY 12 HOURS
Refills: 0 | Status: DISCONTINUED | OUTPATIENT
Start: 2020-04-21 | End: 2020-04-24

## 2020-04-21 RX ORDER — ZINC SULFATE TAB 220 MG (50 MG ZINC EQUIVALENT) 220 (50 ZN) MG
220 TAB ORAL DAILY
Refills: 0 | Status: DISCONTINUED | OUTPATIENT
Start: 2020-04-21 | End: 2020-04-27

## 2020-04-21 RX ADMIN — Medication 1 TABLET(S): at 12:52

## 2020-04-21 RX ADMIN — Medication 100 MILLIGRAM(S): at 12:52

## 2020-04-21 RX ADMIN — ENOXAPARIN SODIUM 70 MILLIGRAM(S): 100 INJECTION SUBCUTANEOUS at 17:36

## 2020-04-21 RX ADMIN — Medication 70 MILLIGRAM(S): at 10:49

## 2020-04-21 RX ADMIN — Medication 1 PACKET(S): at 17:28

## 2020-04-21 RX ADMIN — PANTOPRAZOLE SODIUM 40 MILLIGRAM(S): 20 TABLET, DELAYED RELEASE ORAL at 04:38

## 2020-04-21 RX ADMIN — Medication 500 MILLIGRAM(S): at 12:52

## 2020-04-21 RX ADMIN — ENOXAPARIN SODIUM 40 MILLIGRAM(S): 100 INJECTION SUBCUTANEOUS at 10:50

## 2020-04-21 RX ADMIN — Medication 1000 UNIT(S): at 12:52

## 2020-04-21 RX ADMIN — ZINC SULFATE TAB 220 MG (50 MG ZINC EQUIVALENT) 220 MILLIGRAM(S): 220 (50 ZN) TAB at 12:52

## 2020-04-21 RX ADMIN — Medication 70 MILLIGRAM(S): at 21:19

## 2020-04-21 RX ADMIN — Medication 400 MILLIGRAM(S): at 10:49

## 2020-04-21 NOTE — DIETITIAN INITIAL EVALUATION ADULT. - ADD RECOMMEND
If loose stool persists, consider Metamucil 1 pkt BID to add bulk to stool; Rx MVI, thiamine and vit C daily

## 2020-04-21 NOTE — PROGRESS NOTE ADULT - ASSESSMENT
65 yo M w/ no reported PMHx presents to ER for worsening shortness of breath, subjective fevers and dry cough for 10 days. poor po intake.  in ER, tachycardia/hypoxic with leukocytosis, ARF and evidence of dehydration. improved with NRB mask.  COVID +    #acute resp failure w/ hypoxia sec to COVID19 pna, guarded - supplemental o2, titrate to RA as tolerated, not o2 dependent @ baseline - on nonrebreather + desat if minimal activity to 86%, takes time for improvement. supportive care for symptoms. hfa only for sob. vitamin supplementation. prone positioning at least 2hrs per session x2 sessions daily, longer if tolerated. trend inflammatory markers. hydroxychloroquine - last day 4/22. iv steroid trial until 4/22. LE doppler neg, cta chest pending to r/o PE as compounding cause of severe SOB. ID cs in am to assess for additional/alternative therapies if still on nonrebreahter in am. low threshold for MICU cs if impending resp compromise suspected.     #JANIE with hyponatremia, resolved    #new onset dm2 complicated by hyperglycemia: iss  + fs achs. a1c 6.7. diet + lifestyle modification as management on dc, outpt fu w/ pmd.     dvt ppx Lovenox.    dispo. pending improvement/resolution of resp failure

## 2020-04-21 NOTE — DIETITIAN INITIAL EVALUATION ADULT. - PERTINENT LABORATORY DATA
04-21 Na141 mmol/L Glu 125 mg/dL<H> K+ 4.5 mmol/L Cr  0.51 mg/dL BUN 14.0 mg/dL Phos n/a   Alb 2.6 g/dL<L> PAB n/a

## 2020-04-21 NOTE — PROGRESS NOTE ADULT - SUBJECTIVE AND OBJECTIVE BOX
CC: resp failure    Patient seen and examined at the bedside. No acute overnight events. no events yesterday. Complaining of sob + decreased exercise tolerance today. Denies fever/chills, headache, lightheadedness, dizziness, chest pain, palpitations,abd pain, nausea/vomiting/diarrhea, muscle pain.      =========================================================================================  T(C): 36.8 (20 @ 08:52), Max: 37.1 (20 @ 17:01)  HR: 97 (20 @ 08:52) (69 - 97)  BP: 150/79 (20 @ 08:52) (118/67 - 150/79)  RR: 20 (20 @ 08:52) (17 - 20)  SpO2: 96% (20 @ 10:24) (92% - 96%)    PHYSICAL EXAM.    GEN - appears age appropriate. well nourished. pleasant. no distress.   HEENT - NCAT, EOMI, LAW  RESP - CTA BL, no wheeze/stridor/rhonchi/crackles. nonrebreather as supplemental O2. able to speak in full sentences without distress. no accessory muscle use however desat noted on nonrebreather w/ minimal activity  CARDIO - NS1S2, RRR.   ABD - Soft/Non tender/Non distended. Normal BS x4 quadrants. no guarding/rebound tenderness.  Ext - No CROW.  MSK - BL 5/5 strength on upper and lower extremities.   Neuro - AAOx3. no gross neuro deficits noted  Psych - normal affect  Skin - c/d/i. no rashes/lesions      I&O's Summary    2020 07:01  -  2020 07:00  --------------------------------------------------------  IN: 240 mL / OUT: 600 mL / NET: -360 mL      Daily     Daily Weight in k.2 (2020 10:28)    =========================================================================================  LABS.        141  |  101  |  14.0  ----------------------------<  125<H>  4.5   |  27.0  |  0.51    Ca    8.5<L>      2020 08:47    TPro  7.0  /  Alb  2.6<L>  /  TBili  0.5  /  DBili  x   /  AST  33  /  ALT  27  /  AlkPhos  74  04-21                          14.5   20.01 )-----------( 428      ( 2020 08:47 )             45.1     LIVER FUNCTIONS - ( 2020 08:47 )  Alb: 2.6 g/dL / Pro: 7.0 g/dL / ALK PHOS: 74 U/L / ALT: 27 U/L / AST: 33 U/L / GGT: x                       =========================================================================================  IMAGING.     =========================================================================================    DIET.    Diet, Regular:   Consistent Carbohydrate Evening Snacks (20 @ 10:32)      =========================================================================================    HOME MEDS.    Home Medications:      =========================================================================================    HOSPITAL MEDS.    MEDICATIONS  (STANDING):  ascorbic acid 500 milliGRAM(s) Oral daily  cholecalciferol 1000 Unit(s) Oral daily  dextrose 5%. 1000 milliLiter(s) (50 mL/Hr) IV Continuous <Continuous>  dextrose 50% Injectable 12.5 Gram(s) IV Push once  dextrose 50% Injectable 25 Gram(s) IV Push once  dextrose 50% Injectable 25 Gram(s) IV Push once  enoxaparin Injectable 40 milliGRAM(s) SubCutaneous daily  hydroxychloroquine 400 milliGRAM(s) Oral every 24 hours  hydroxychloroquine   Oral   insulin lispro (HumaLOG) corrective regimen sliding scale   SubCutaneous three times a day before meals  methylPREDNISolone sodium succinate Injectable 70 milliGRAM(s) IV Push two times a day  multivitamin/minerals 1 Tablet(s) Oral daily  pantoprazole    Tablet 40 milliGRAM(s) Oral before breakfast  psyllium Powder 1 Packet(s) Oral two times a day  thiamine 100 milliGRAM(s) Oral daily  zinc sulfate 220 milliGRAM(s) Oral daily    MEDICATIONS  (PRN):  acetaminophen   Tablet .. 650 milliGRAM(s) Oral every 6 hours PRN Temp greater or equal to 38C (100.4F), Mild Pain (1 - 3), Moderate Pain (4 - 6)  dextrose 40% Gel 15 Gram(s) Oral once PRN Blood Glucose LESS THAN 70 milliGRAM(s)/deciliter  glucagon  Injectable 1 milliGRAM(s) IntraMuscular once PRN Glucose LESS THAN 70 milligrams/deciliter

## 2020-04-21 NOTE — DIETITIAN INITIAL EVALUATION ADULT. - OTHER INFO
63yo male admitted with SOB and dry cough x 10 days, poor PO PTA 2/2 COVID-19, JANIE with hyponatremia and hyperglycemia. Noted Pt with diarrhea 4/20, no PO documented.

## 2020-04-22 LAB
APTT BLD: 41.4 SEC — HIGH (ref 27.5–36.3)
GLUCOSE BLDC GLUCOMTR-MCNC: 101 MG/DL — HIGH (ref 70–99)
GLUCOSE BLDC GLUCOMTR-MCNC: 112 MG/DL — HIGH (ref 70–99)
GLUCOSE BLDC GLUCOMTR-MCNC: 128 MG/DL — HIGH (ref 70–99)
GLUCOSE BLDC GLUCOMTR-MCNC: 147 MG/DL — HIGH (ref 70–99)
INR BLD: 1.37 RATIO — HIGH (ref 0.88–1.16)
PROTHROM AB SERPL-ACNC: 15.6 SEC — HIGH (ref 10–12.9)
TROPONIN T SERPL-MCNC: <0.01 NG/ML — SIGNIFICANT CHANGE UP (ref 0–0.06)

## 2020-04-22 PROCEDURE — 99233 SBSQ HOSP IP/OBS HIGH 50: CPT

## 2020-04-22 RX ORDER — WARFARIN SODIUM 2.5 MG/1
7.5 TABLET ORAL ONCE
Refills: 0 | Status: COMPLETED | OUTPATIENT
Start: 2020-04-22 | End: 2020-04-22

## 2020-04-22 RX ADMIN — Medication 500 MILLIGRAM(S): at 12:10

## 2020-04-22 RX ADMIN — Medication 100 MILLIGRAM(S): at 12:12

## 2020-04-22 RX ADMIN — ZINC SULFATE TAB 220 MG (50 MG ZINC EQUIVALENT) 220 MILLIGRAM(S): 220 (50 ZN) TAB at 12:12

## 2020-04-22 RX ADMIN — WARFARIN SODIUM 7.5 MILLIGRAM(S): 2.5 TABLET ORAL at 21:11

## 2020-04-22 RX ADMIN — ENOXAPARIN SODIUM 70 MILLIGRAM(S): 100 INJECTION SUBCUTANEOUS at 04:39

## 2020-04-22 RX ADMIN — Medication 1000 UNIT(S): at 12:11

## 2020-04-22 RX ADMIN — PANTOPRAZOLE SODIUM 40 MILLIGRAM(S): 20 TABLET, DELAYED RELEASE ORAL at 04:39

## 2020-04-22 RX ADMIN — Medication 400 MILLIGRAM(S): at 12:11

## 2020-04-22 RX ADMIN — Medication 1 PACKET(S): at 18:36

## 2020-04-22 RX ADMIN — Medication 1 PACKET(S): at 04:39

## 2020-04-22 RX ADMIN — Medication 1 TABLET(S): at 12:12

## 2020-04-22 NOTE — PROGRESS NOTE ADULT - ASSESSMENT
65 yo M w/ no reported PMHx presents to ER for worsening shortness of breath, subjective fevers and dry cough for 10 days. poor po intake.  in ER, tachycardia/hypoxic with leukocytosis, ARF and evidence of dehydration. improved with NRB mask.  COVID +    course complicated by PE.    #acute resp failure w/ hypoxia sec to COVID19 pna, guarded - supplemental o2, titrate to RA as tolerated, not o2 dependent @ baseline - on nonrebreather + desat if minimal activity to 86%, takes time for improvement. supportive care for symptoms. hfa only for sob. vitamin supplementation. continually reinforced to pt to lay in prone positione at least 2hrs per session x2 sessions daily, longer if tolerated. trend inflammatory markers - next 4/24. hydroxychloroquine - last day 4/22. iv steroid trial until 4/22. ID cs to assess for additional/alternative therapies if still on nonrebreahter in am despite 48hrs of effective AC for PE. low threshold for MICU cs if impending resp compromise suspected.     #RT segmental PE, acute - dx during admit. likely due to covid assoc hypercoag state + immobility. lovenox bid. pt uninsured, transition to coumadin - 7.5mg x1 tonight, goal inr 2-3. bridge until inr therapeutic at least 48-72hrs. LE doppler neg.     #JANIE with hyponatremia, resolved    #new onset dm2 complicated by hyperglycemia: iss  + fs achs. a1c 6.7. diet + lifestyle modification as management on dc, outpt fu w/ pmd.     dvt ppx Lovenox wt based, transitioning to coumadin    dispo. pending improvement/resolution of resp failure. guarded.

## 2020-04-22 NOTE — PROGRESS NOTE ADULT - SUBJECTIVE AND OBJECTIVE BOX
CC: resp failure    Patient seen and examined at the bedside. No acute overnight events. diagnosed w/ PE yesterday. Complaining of mild sob + cough today. Denies fever/chills, headache, lightheadedness, dizziness, chest pain, palpitations, abd pain, nausea/vomiting/diarrhea, muscle pain.      =========================================================================================    PHYSICAL EXAM.    GEN - appears age appropriate. well nourished. pleasant. no distress.   HEENT - NCAT, EOMI, LAW  RESP - CTA BL, no wheeze/stridor/rhonchi/crackles. nonrebreather as supplemental O2. able to speak in full sentences without distress. no accessory muscle use however sat 90-92% on nonrebreather when supine  CARDIO - NS1S2, RRR.   ABD - Soft/Non tender/Non distended. Normal BS x4 quadrants. no guarding/rebound tenderness.  Ext - No CROW.  MSK - BL 5/5 strength on upper and lower extremities.   Neuro - AAOx3. no gross neuro deficits noted  Psych - normal affect  Skin - c/d/i. no rashes/lesions      VITAL SIGNS.    Vital Signs Last 24 Hrs  T(C): 36.4 (22 Apr 2020 08:01), Max: 36.9 (21 Apr 2020 15:28)  T(F): 97.5 (22 Apr 2020 08:01), Max: 98.5 (21 Apr 2020 15:28)  HR: 73 (22 Apr 2020 08:01) (73 - 98)  BP: 123/74 (22 Apr 2020 08:01) (118/76 - 128/74)  BP(mean): --  RR: 19 (22 Apr 2020 08:01) (18 - 19)  SpO2: 93% (22 Apr 2020 08:01) (91% - 94%)        =================================================    LABS.                          14.5   20.01 )-----------( 428      ( 21 Apr 2020 08:47 )             45.1     04-21    141  |  101  |  14.0  ----------------------------<  125<H>  4.5   |  27.0  |  0.51    Ca    8.5<L>      21 Apr 2020 08:47    TPro  7.0  /  Alb  2.6<L>  /  TBili  0.5  /  DBili  x   /  AST  33  /  ALT  27  /  AlkPhos  74  04-21    LIVER FUNCTIONS - ( 21 Apr 2020 08:47 )  Alb: 2.6 g/dL / Pro: 7.0 g/dL / ALK PHOS: 74 U/L / ALT: 27 U/L / AST: 33 U/L / GGT: x           PT/INR - ( 22 Apr 2020 11:05 )   PT: 15.6 sec;   INR: 1.37 ratio         PTT - ( 22 Apr 2020 11:05 )  PTT:41.4 sec  I&O's Summary        COVID-19 PCR: Detected (18 Apr 2020 08:55)      ================================================< from: CT Angio Chest w/ IV Cont (04.21.20 @ 16:58) >    LUNGS AND AIRWAYS: Patent central airways.  Lungs are remarkable for extensive confluent infiltrates with areas of groundglass infiltrate as well.    PLEURA: Minimal left pleural effusion.    MEDIASTINUM AND LUIZ: No lymphadenopathy.    VESSELS: Segmental filling defect is seen to the right middle lobe.    HEART: Heart size is normal. No pericardial effusion.    CHEST WALL AND LOWER NECK: Within normal limits.    VISUALIZED UPPER ABDOMEN: Cholelithiasis. There is interposition of colon between the right diaphragm and the liver.    BONES: Within normal limits.    IMPRESSION:     Segmental right middle lobe pulmonary embolism.  Extensive bilateral infiltrates  Results were discussed with Dr. Jack at 5:10 PM on 4/21/2020.    < end of copied text >      IMAGING.      ================================================    DIET.    Diet, Regular:   Consistent Carbohydrate Evening Snacks (04-18-20 @ 10:32)    ================================================    HOME MEDS.    Home Medications:      ================================================    HOSPITAL MEDS.    MEDICATIONS  (STANDING):  ascorbic acid 500 milliGRAM(s) Oral daily  cholecalciferol 1000 Unit(s) Oral daily  dextrose 5%. 1000 milliLiter(s) (50 mL/Hr) IV Continuous <Continuous>  dextrose 50% Injectable 12.5 Gram(s) IV Push once  dextrose 50% Injectable 25 Gram(s) IV Push once  dextrose 50% Injectable 25 Gram(s) IV Push once  enoxaparin Injectable 70 milliGRAM(s) SubCutaneous every 12 hours  insulin lispro (HumaLOG) corrective regimen sliding scale   SubCutaneous three times a day before meals  multivitamin/minerals 1 Tablet(s) Oral daily  pantoprazole    Tablet 40 milliGRAM(s) Oral before breakfast  psyllium Powder 1 Packet(s) Oral two times a day  thiamine 100 milliGRAM(s) Oral daily  zinc sulfate 220 milliGRAM(s) Oral daily    MEDICATIONS  (PRN):  acetaminophen   Tablet .. 650 milliGRAM(s) Oral every 6 hours PRN Temp greater or equal to 38C (100.4F), Mild Pain (1 - 3), Moderate Pain (4 - 6)  dextrose 40% Gel 15 Gram(s) Oral once PRN Blood Glucose LESS THAN 70 milliGRAM(s)/deciliter  glucagon  Injectable 1 milliGRAM(s) IntraMuscular once PRN Glucose LESS THAN 70 milligrams/deciliter

## 2020-04-23 LAB
FIBRINOGEN PPP-MCNC: 1114 MG/DL — CRITICAL HIGH (ref 300–520)
GLUCOSE BLDC GLUCOMTR-MCNC: 100 MG/DL — HIGH (ref 70–99)
GLUCOSE BLDC GLUCOMTR-MCNC: 90 MG/DL — SIGNIFICANT CHANGE UP (ref 70–99)
GLUCOSE BLDC GLUCOMTR-MCNC: 91 MG/DL — SIGNIFICANT CHANGE UP (ref 70–99)
GLUCOSE BLDC GLUCOMTR-MCNC: 94 MG/DL — SIGNIFICANT CHANGE UP (ref 70–99)
GLUCOSE BLDC GLUCOMTR-MCNC: 96 MG/DL — SIGNIFICANT CHANGE UP (ref 70–99)
INR BLD: 2.44 RATIO — HIGH (ref 0.88–1.16)
PROTHROM AB SERPL-ACNC: 28.4 SEC — HIGH (ref 10–12.9)

## 2020-04-23 PROCEDURE — 99233 SBSQ HOSP IP/OBS HIGH 50: CPT

## 2020-04-23 RX ORDER — WARFARIN SODIUM 2.5 MG/1
7.5 TABLET ORAL ONCE
Refills: 0 | Status: COMPLETED | OUTPATIENT
Start: 2020-04-23 | End: 2020-04-23

## 2020-04-23 RX ADMIN — PANTOPRAZOLE SODIUM 40 MILLIGRAM(S): 20 TABLET, DELAYED RELEASE ORAL at 05:17

## 2020-04-23 RX ADMIN — ENOXAPARIN SODIUM 70 MILLIGRAM(S): 100 INJECTION SUBCUTANEOUS at 05:16

## 2020-04-23 RX ADMIN — ZINC SULFATE TAB 220 MG (50 MG ZINC EQUIVALENT) 220 MILLIGRAM(S): 220 (50 ZN) TAB at 11:45

## 2020-04-23 RX ADMIN — Medication 500 MILLIGRAM(S): at 11:45

## 2020-04-23 RX ADMIN — Medication 100 MILLIGRAM(S): at 11:45

## 2020-04-23 RX ADMIN — Medication 1000 UNIT(S): at 11:45

## 2020-04-23 RX ADMIN — Medication 1 TABLET(S): at 11:45

## 2020-04-23 RX ADMIN — WARFARIN SODIUM 7.5 MILLIGRAM(S): 2.5 TABLET ORAL at 22:13

## 2020-04-23 RX ADMIN — ENOXAPARIN SODIUM 70 MILLIGRAM(S): 100 INJECTION SUBCUTANEOUS at 16:57

## 2020-04-23 NOTE — PROGRESS NOTE ADULT - ASSESSMENT
65 yo M w/ no reported PMHx presents to ER for worsening shortness of breath, subjective fevers and dry cough for 10 days. poor po intake.  in ER, tachycardia/hypoxic with leukocytosis, ARF and evidence of dehydration. improved with NRB mask.  COVID +  CT Chest showed segmental PE.      # Viral Pneumonia,   Covid-19 infection - Airborne precautions.  Symptom management.  Tylenol prn fever.  Supplemental O2.  Monitor SaO2.  Trial of prone positioning.  Avoid nebulizers / high flow O2.  In this unprecedented setting of extremely limited ICU availability, observe hypoxia on continuous SaO2.  If pt develops SaO2 < 90% even on 100% FIO2, would consider ICU evaluation.   - Discussed with patient to refer close contacts and family to CDC guidelines regarding self-quarantine and isolation precautions.  - Plaquenil  # Acute Hypoxic Respiratory Failure - see above.   # Segmental PE - I contacted radiology to review CT.  No indication of RV strain.  The extent of pt's hypoxia better explained by b/l infiltrates.  Continue Anticoagulation, transition to Coumadin.  #JANIE with hyponatremia, resolved  #New onset dm2 complicated by hyperglycemia: iss  + fs achs. a1c 6.7. diet + lifestyle modification as management on dc, outpt fu w/ pmd.   dvt ppx Lovenox wt based, transitioning to coumadin

## 2020-04-23 NOTE — PROGRESS NOTE ADULT - SUBJECTIVE AND OBJECTIVE BOX
Patient: LISSA MALLOY [id number] 64y Male                           Internal Medicine Hospitalist Progress Note    Initial HPI:  Reports some improvement in SOB.  Remains on NRB.  SaO2 94-96% on NRB per RN.   No chest pain / palpitations.  No leg pain.  No additional complaints.   ____________________PHYSICAL EXAM:  GENERAL:  NAD Alert and Oriented x 3   HEENT: NCAT  CARDIOVASCULAR:  S1, S2  LUNGS: coarse BS b/l.   ABDOMEN:  soft, (-) tenderness, (-) distension, (+) bowel sounds, (-) guarding, (-) rebound (-) rigidity  EXTREMITIES:  no cyanosis / clubbing / edema.   ____________________     VITALS:  Vital Signs Last 24 Hrs  T(C): 37.2 (23 Apr 2020 08:16), Max: 37.7 (22 Apr 2020 23:39)  T(F): 99 (23 Apr 2020 08:16), Max: 99.9 (22 Apr 2020 23:39)  HR: 82 (23 Apr 2020 08:16) (82 - 108)  BP: 125/78 (23 Apr 2020 08:16) (109/72 - 158/84)  BP(mean): --  RR: 19 (23 Apr 2020 08:16) (19 - 22)  SpO2: 93% (23 Apr 2020 08:16) (90% - 93%) Daily     Daily   CAPILLARY BLOOD GLUCOSE      POCT Blood Glucose.: 90 mg/dL (23 Apr 2020 11:16)  POCT Blood Glucose.: 91 mg/dL (23 Apr 2020 07:58)  POCT Blood Glucose.: 96 mg/dL (23 Apr 2020 07:26)  POCT Blood Glucose.: 128 mg/dL (22 Apr 2020 23:40)  POCT Blood Glucose.: 147 mg/dL (22 Apr 2020 16:31)    I&O's Summary        BACKGROUND:  HEALTH ISSUES - PROBLEM Dx:        Allergies    No Known Allergies    Intolerances      PAST MEDICAL & SURGICAL HISTORY:  No pertinent past medical history  No significant past surgical history        LABS:          PT/INR - ( 22 Apr 2020 11:05 )   PT: 15.6 sec;   INR: 1.37 ratio         PTT - ( 22 Apr 2020 11:05 )  PTT:41.4 sec      CARDIAC MARKERS ( 22 Apr 2020 11:05 )  x     / <0.01 ng/mL / x     / x     / x              MEDICATIONS:  MEDICATIONS  (STANDING):  ascorbic acid 500 milliGRAM(s) Oral daily  cholecalciferol 1000 Unit(s) Oral daily  dextrose 5%. 1000 milliLiter(s) (50 mL/Hr) IV Continuous <Continuous>  dextrose 50% Injectable 12.5 Gram(s) IV Push once  dextrose 50% Injectable 25 Gram(s) IV Push once  dextrose 50% Injectable 25 Gram(s) IV Push once  enoxaparin Injectable 70 milliGRAM(s) SubCutaneous every 12 hours  insulin lispro (HumaLOG) corrective regimen sliding scale   SubCutaneous three times a day before meals  multivitamin/minerals 1 Tablet(s) Oral daily  pantoprazole    Tablet 40 milliGRAM(s) Oral before breakfast  psyllium Powder 1 Packet(s) Oral two times a day  thiamine 100 milliGRAM(s) Oral daily  zinc sulfate 220 milliGRAM(s) Oral daily    MEDICATIONS  (PRN):  acetaminophen   Tablet .. 650 milliGRAM(s) Oral every 6 hours PRN Temp greater or equal to 38C (100.4F), Mild Pain (1 - 3), Moderate Pain (4 - 6)  dextrose 40% Gel 15 Gram(s) Oral once PRN Blood Glucose LESS THAN 70 milliGRAM(s)/deciliter  glucagon  Injectable 1 milliGRAM(s) IntraMuscular once PRN Glucose LESS THAN 70 milligrams/deciliter

## 2020-04-24 LAB
ANION GAP SERPL CALC-SCNC: 14 MMOL/L — SIGNIFICANT CHANGE UP (ref 5–17)
APTT BLD: 54.9 SEC — HIGH (ref 27.5–36.3)
BASOPHILS # BLD AUTO: 0.02 K/UL — SIGNIFICANT CHANGE UP (ref 0–0.2)
BASOPHILS NFR BLD AUTO: 0.1 % — SIGNIFICANT CHANGE UP (ref 0–2)
BUN SERPL-MCNC: 17 MG/DL — SIGNIFICANT CHANGE UP (ref 8–20)
CALCIUM SERPL-MCNC: 8.9 MG/DL — SIGNIFICANT CHANGE UP (ref 8.6–10.2)
CHLORIDE SERPL-SCNC: 99 MMOL/L — SIGNIFICANT CHANGE UP (ref 98–107)
CO2 SERPL-SCNC: 29 MMOL/L — SIGNIFICANT CHANGE UP (ref 22–29)
CREAT SERPL-MCNC: 0.64 MG/DL — SIGNIFICANT CHANGE UP (ref 0.5–1.3)
CRP SERPL-MCNC: 20.6 MG/DL — HIGH (ref 0–0.4)
EOSINOPHIL # BLD AUTO: 0.39 K/UL — SIGNIFICANT CHANGE UP (ref 0–0.5)
EOSINOPHIL NFR BLD AUTO: 2.9 % — SIGNIFICANT CHANGE UP (ref 0–6)
FERRITIN SERPL-MCNC: 3112 NG/ML — HIGH (ref 30–400)
FIBRINOGEN PPP-MCNC: >1400 MG/DL — CRITICAL HIGH (ref 300–520)
GLUCOSE BLDC GLUCOMTR-MCNC: 91 MG/DL — SIGNIFICANT CHANGE UP (ref 70–99)
GLUCOSE BLDC GLUCOMTR-MCNC: 93 MG/DL — SIGNIFICANT CHANGE UP (ref 70–99)
GLUCOSE BLDC GLUCOMTR-MCNC: 97 MG/DL — SIGNIFICANT CHANGE UP (ref 70–99)
GLUCOSE SERPL-MCNC: 91 MG/DL — SIGNIFICANT CHANGE UP (ref 70–99)
HCT VFR BLD CALC: 47.7 % — SIGNIFICANT CHANGE UP (ref 39–50)
HGB BLD-MCNC: 15.4 G/DL — SIGNIFICANT CHANGE UP (ref 13–17)
IL6 FLD-MCNC: 79.5 PG/ML — HIGH (ref 0–15.5)
IMM GRANULOCYTES NFR BLD AUTO: 1.2 % — SIGNIFICANT CHANGE UP (ref 0–1.5)
INR BLD: 3.48 RATIO — HIGH (ref 0.88–1.16)
LYMPHOCYTES # BLD AUTO: 0.88 K/UL — LOW (ref 1–3.3)
LYMPHOCYTES # BLD AUTO: 6.5 % — LOW (ref 13–44)
MCHC RBC-ENTMCNC: 30.2 PG — SIGNIFICANT CHANGE UP (ref 27–34)
MCHC RBC-ENTMCNC: 32.3 GM/DL — SIGNIFICANT CHANGE UP (ref 32–36)
MCV RBC AUTO: 93.5 FL — SIGNIFICANT CHANGE UP (ref 80–100)
MONOCYTES # BLD AUTO: 0.7 K/UL — SIGNIFICANT CHANGE UP (ref 0–0.9)
MONOCYTES NFR BLD AUTO: 5.2 % — SIGNIFICANT CHANGE UP (ref 2–14)
NEUTROPHILS # BLD AUTO: 11.44 K/UL — HIGH (ref 1.8–7.4)
NEUTROPHILS NFR BLD AUTO: 84.1 % — HIGH (ref 43–77)
PLATELET # BLD AUTO: 345 K/UL — SIGNIFICANT CHANGE UP (ref 150–400)
POTASSIUM SERPL-MCNC: 4.9 MMOL/L — SIGNIFICANT CHANGE UP (ref 3.5–5.3)
POTASSIUM SERPL-SCNC: 4.9 MMOL/L — SIGNIFICANT CHANGE UP (ref 3.5–5.3)
PROCALCITONIN SERPL-MCNC: 0.25 NG/ML — HIGH (ref 0.02–0.1)
PROTHROM AB SERPL-ACNC: 40.9 SEC — HIGH (ref 10–12.9)
RBC # BLD: 5.1 M/UL — SIGNIFICANT CHANGE UP (ref 4.2–5.8)
RBC # FLD: 14.3 % — SIGNIFICANT CHANGE UP (ref 10.3–14.5)
SODIUM SERPL-SCNC: 142 MMOL/L — SIGNIFICANT CHANGE UP (ref 135–145)
TRIGL SERPL-MCNC: 85 MG/DL — SIGNIFICANT CHANGE UP (ref 10–200)
WBC # BLD: 13.59 K/UL — HIGH (ref 3.8–10.5)
WBC # FLD AUTO: 13.59 K/UL — HIGH (ref 3.8–10.5)

## 2020-04-24 PROCEDURE — 99233 SBSQ HOSP IP/OBS HIGH 50: CPT

## 2020-04-24 RX ORDER — TOCILIZUMAB 20 MG/ML
400 INJECTION, SOLUTION, CONCENTRATE INTRAVENOUS ONCE
Refills: 0 | Status: COMPLETED | OUTPATIENT
Start: 2020-04-24 | End: 2020-04-25

## 2020-04-24 RX ORDER — ENOXAPARIN SODIUM 100 MG/ML
70 INJECTION SUBCUTANEOUS EVERY 12 HOURS
Refills: 0 | Status: DISCONTINUED | OUTPATIENT
Start: 2020-04-25 | End: 2020-04-25

## 2020-04-24 RX ADMIN — ENOXAPARIN SODIUM 70 MILLIGRAM(S): 100 INJECTION SUBCUTANEOUS at 05:22

## 2020-04-24 RX ADMIN — PANTOPRAZOLE SODIUM 40 MILLIGRAM(S): 20 TABLET, DELAYED RELEASE ORAL at 05:22

## 2020-04-24 NOTE — CHART NOTE - NSCHARTNOTEFT_GEN_A_CORE
transfer to medicine service on , sign out given to Dr Hannah. Patient satting 98% throughout the day on NRB supine and respiratory status has significantly improved.

## 2020-04-24 NOTE — CONSULT NOTE ADULT - SUBJECTIVE AND OBJECTIVE BOX
Patient is a 64y old  Male who presents with a chief complaint of shortness of breath (23 Apr 2020 13:37)      BRIEF HOSPITAL COURSE: 63 y/o male with no pmhx admitted on 4/18 with shortness of breath fevers and cough x 10 days found to be positive for COVID-19. Also found to have right segmental PE. Admitted to floors on NRB. Patient has been on NRB + 6 liters x 48 hours. Becomes hypoxic to 60s when supine. ICU consult/RRT called for hypoxia while proned to 83%. Upon arrival patient is satting between 87 and 91% with minimal respiratory distress. Will hold off on intubating for now but transfer to ICU given tenuous respiratory status.    Events last 24 hours: as above    PAST MEDICAL & SURGICAL HISTORY:  No pertinent past medical history  No significant past surgical history    Allergies    No Known Allergies    Intolerances      FAMILY HISTORY:  FH: hypertension      Family history otherwise noncontributory.      Review of Systems:  limited due to critical illness      Social History: denies smoking, etoh, illicit drug use      Medications:        acetaminophen   Tablet .. 650 milliGRAM(s) Oral every 6 hours PRN      enoxaparin Injectable 70 milliGRAM(s) SubCutaneous every 12 hours    pantoprazole    Tablet 40 milliGRAM(s) Oral before breakfast  psyllium Powder 1 Packet(s) Oral two times a day      dextrose 40% Gel 15 Gram(s) Oral once PRN  dextrose 50% Injectable 12.5 Gram(s) IV Push once  dextrose 50% Injectable 25 Gram(s) IV Push once  dextrose 50% Injectable 25 Gram(s) IV Push once  glucagon  Injectable 1 milliGRAM(s) IntraMuscular once PRN  insulin lispro (HumaLOG) corrective regimen sliding scale   SubCutaneous three times a day before meals    ascorbic acid 500 milliGRAM(s) Oral daily  cholecalciferol 1000 Unit(s) Oral daily  dextrose 5%. 1000 milliLiter(s) IV Continuous <Continuous>  multivitamin/minerals 1 Tablet(s) Oral daily  thiamine 100 milliGRAM(s) Oral daily  zinc sulfate 220 milliGRAM(s) Oral daily    tocilizumab IVPB 400 milliGRAM(s) IV Intermittent once      ICU Vital Signs Last 24 Hrs  T(C): 36.5 (24 Apr 2020 07:55), Max: 37.3 (23 Apr 2020 23:40)  T(F): 97.7 (24 Apr 2020 07:55), Max: 99.2 (23 Apr 2020 23:40)  HR: 88 (24 Apr 2020 07:55) (81 - 89)  BP: 116/72 (24 Apr 2020 07:55) (116/72 - 120/76)  BP(mean): --  ABP: --  ABP(mean): --  RR: 18 (24 Apr 2020 07:55) (18 - 18)  SpO2: 87% (24 Apr 2020 07:55) (87% - 95%)    Vital Signs Last 24 Hrs  T(C): 36.5 (24 Apr 2020 07:55), Max: 37.3 (23 Apr 2020 23:40)  T(F): 97.7 (24 Apr 2020 07:55), Max: 99.2 (23 Apr 2020 23:40)  HR: 88 (24 Apr 2020 07:55) (81 - 89)  BP: 116/72 (24 Apr 2020 07:55) (116/72 - 120/76)  BP(mean): --  RR: 18 (24 Apr 2020 07:55) (18 - 18)  SpO2: 87% (24 Apr 2020 07:55) (87% - 95%)        I&O's Detail        LABS:                        15.4   13.59 )-----------( 345      ( 24 Apr 2020 07:03 )             47.7     04-24    142  |  99  |  17.0  ----------------------------<  91  4.9   |  29.0  |  0.64    Ca    8.9      24 Apr 2020 07:03        CARDIAC MARKERS ( 22 Apr 2020 11:05 )  x     / <0.01 ng/mL / x     / x     / x          CAPILLARY BLOOD GLUCOSE      POCT Blood Glucose.: 91 mg/dL (24 Apr 2020 08:08)    PT/INR - ( 24 Apr 2020 07:03 )   PT: 40.9 sec;   INR: 3.48 ratio         PTT - ( 24 Apr 2020 07:03 )  PTT:54.9 sec    CULTURES:      Physical Examination:    physical exam performed where clinically necessary based on current COVID-19 standards.    DEVICES: none    RADIOLOGY: reviewed.    CRITICAL CARE TIME SPENT: 38 minutes of critical care time spent providing medical care for patient's acute illness/conditions that impairs at least one vital organ system and/or poses a high risk of imminent or life threatening deterioration in the patient's condition. It includes time spent evaluating and treating the patient's acute illness as well as time spent reviewing labs, radiology, discussing goals of care with patient and/or patient's family, and discussing the case with a multidisciplinary team in an effort to prevent further life threatening deterioration or end organ damage. This time is independent of any procedures performed.

## 2020-04-24 NOTE — CONSULT NOTE ADULT - ASSESSMENT
65 y/o [place]ian speaking male with no pmhx now with acute hypoxic respiratory failure secondary to severe ARDS and COVID-19 pneumonia as well as right segmental PE.    Hospital Day: 7    NEURO: no active issues    CVS:   Pressors: maintain MAP > 65  monitor QTc.    PULM:         GI:   Diet:   Bowel Regimen as needed    ID:   COVID 19 PCR status:   Cultures:   Hydroxychlorquine:  Biologics:  trend crp, ldh, ferritin. monitor procalcitonin for possible superimposed bacterial infection.    RENAL:   Possible net neg balance   Avoid nephrotoxic agents  Strict I&O with Cr monitoring   Medications adjusted for   Close Electrolyte monitoring and correction as needed     ENDO:       HEME:     PROPH: 40 mg IV protonix while intubated. chlorhexidine wash.    Code status:   Family Discussion: 65 y/o Citizen of [place] speaking male with no pmhx now with acute hypoxic respiratory failure secondary to severe ARDS and COVID-19 pneumonia as well as right segmental PE.    Hospital Day: 7    NEURO: no active issues    CVS:   Pressors: maintain MAP > 65  monitor QTc.    PULM: continue NRB + 6 liters in prone position as tolerated. tenuous respiratory status may require intubation within next few hours if hypoxia worsens. already completed course of steroids.    GI:   Diet: NPO for now due to respiratory status.    ID:   COVID 19 PCR status: positive  Cultures: none  Hydroxychlorquine: completed 5 day course  Biologics: ferritin > 700 with > 15 change in CRP. ordered dose of tocilizumab.  trend crp, ldh, ferritin. monitor procalcitonin for possible superimposed bacterial infection.    RENAL:   Possible net neg balance   Avoid nephrotoxic agents  Strict I&O with Cr monitoring   Medications adjusted for   Close Electrolyte monitoring and correction as needed     ENDO: accuechecks q 6 hours.    HEME: received 7.5 mg dose of warfarin last night. supratherapeutic INR this morning. continue lovenox 1 mg/kg bid for now.    PROPH: 40 mg IV protonix while intubated. chlorhexidine wash.    Code status: full code  Family Discussion:

## 2020-04-24 NOTE — PROGRESS NOTE ADULT - SUBJECTIVE AND OBJECTIVE BOX
Patient: LISSA MALLOY [id number] 64y Male                           Internal Medicine Hospitalist Progress Note    Pt seen and examined at bedside early this morning  At time of exam pt desating to mid 80s on NRB and 6L NC while prone. Discussed GOC with pt for which pt verbalized he would want to be intubated if need be. RN at bedside to confrim pts wishes in American (primary language). As per pt, no family that he wanted me to contact. MICU consulted after 30-45 mins of proning/chest PT and sats still in mid to high 80s (88% max). MICU accepted pt to ICU.     PHYSICAL EXAM:  GENERAL: Mod resp distress, proned  HEENT: NCAT  CARDIOVASCULAR:  S1, S2  LUNGS: coarse BS b/l.   ABDOMEN:  soft, (-) tenderness, (-) distension, (+) bowel sounds, (-) guarding, (-) rebound (-) rigidity  EXTREMITIES:  no cyanosis / clubbing / edema.     Vital Signs Last 24 Hrs  T(C): 36.5 (24 Apr 2020 07:55), Max: 37.3 (23 Apr 2020 23:40)  T(F): 97.7 (24 Apr 2020 07:55), Max: 99.2 (23 Apr 2020 23:40)  HR: 88 (24 Apr 2020 07:55) (81 - 89)  BP: 116/72 (24 Apr 2020 07:55) (116/72 - 120/76)  BP(mean): --  RR: 18 (24 Apr 2020 07:55) (18 - 18)  SpO2: 87% (24 Apr 2020 07:55) (87% - 95%) on NRB and 6L NC

## 2020-04-24 NOTE — CHART NOTE - NSCHARTNOTEFT_GEN_A_CORE
65 y/o male with no pmhx admitted on 4/18 with shortness of breath fevers and cough x 10 days found to be positive for COVID-19. Also found to have right segmental PE on full dose Lovenox. Pt has been NRB on NRB + 6 liters since admission then RRT was called this morning for hypoxia 60s, saturation improved with prone to 83% 87 and 91%. Pt was upgraded to MICU for close monitoring, not requiring intubation. Pt is now for downgraded, spoke with ICU PA, pt has been stable on NRB satting 98%. Pt seen at bedside, denies acute complaints. Satting well on NRB, no acute respiratory distress. progress note was completed by primary attending in the am.

## 2020-04-24 NOTE — PROGRESS NOTE ADULT - ATTENDING COMMENTS
I have personally seen and examined patient.  Above note reviewed and discussed with PA, modified where appropriate.  Pt was seen with PA and CC at bedside.  SaO2 had dropped to 70s-80s while prone, on O2 via NC and NRB.  He c/o mildly worsening SOB.  PHYSICAL EXAMINATION:  As above, tachypneic, coarse BS b/l.    A/p   Hypoxic respiratory failure / COVID infection with worsening hypoxia - Transferred care to ICU team.  With Czech speaking RN,  discussed advanced directives with the patient - made aware of limited prognosis if patient were to be intubated or resuscitated, in consideration of age and comorbid conditions.  He wished for no advanced directives - that all interventions be pursued.  He did not have any family that he wished to be contacted.

## 2020-04-24 NOTE — PROGRESS NOTE ADULT - ASSESSMENT
63 yo M w/ no reported PMHx presents to ER for worsening shortness of breath. COVID + and CT Chest showed segmental PE.      1. Viral Pneumonia  - Secondary to COVID 19  - Decompensating on NRB and 6L sating mid 80s this morning. ICU consulted, accepted to ICU  - Inflammatory markers rising   - Completed course of plaquenil and steroids   - Further care per ICU team    2. Acute Hypoxic Respiratory Failure   - See above    3. Segmental PE   - CT reviewed  - Full dose AC with lonvenox   - LE duplex neg for DVT    4. JANIE with hyponatremia  - Resolved    5. New onset dm2 complicated by hyperglycemia  - iss  + fs achs. a1c 6.7. diet + lifestyle modification as management on dc, outpt fu w/ pmd    DVT PPX Lovenox wt based, transitioning to coumadin 63 yo M w/ no reported PMHx presents to ER for worsening shortness of breath. COVID + and CT Chest showed segmental PE.      1. Viral Pneumonia  - Secondary to COVID 19  - Decompensating on NRB and 6L sating mid 80s this morning. ICU consulted, accepted to ICU  - Inflammatory markers rising   - Completed course of plaquenil and steroids   - Further care per ICU team    2. Acute Hypoxic Respiratory Failure   - See above    3. Segmental PE   - CT reviewed  - LE duplex neg for DVT  - Received dose of coumadin last night. Now supratherapeutic INR. Continue with lovenox BID at this time     4. JANIE with hyponatremia  - Resolved    5. New onset dm2 complicated by hyperglycemia  - iss  + fs achs. a1c 6.7. diet + lifestyle modification as management on dc, outpt fu w/ pmd    DVT PPX Lovenox wt based, transitioning to coumadin

## 2020-04-25 LAB
ALBUMIN SERPL ELPH-MCNC: 2.3 G/DL — LOW (ref 3.3–5.2)
ALP SERPL-CCNC: 104 U/L — SIGNIFICANT CHANGE UP (ref 40–120)
ALT FLD-CCNC: 78 U/L — HIGH
ANION GAP SERPL CALC-SCNC: 12 MMOL/L — SIGNIFICANT CHANGE UP (ref 5–17)
APTT BLD: 60.4 SEC — HIGH (ref 27.5–36.3)
APTT BLD: 70 SEC — HIGH (ref 27.5–36.3)
AST SERPL-CCNC: 64 U/L — HIGH
BASOPHILS # BLD AUTO: 0.01 K/UL — SIGNIFICANT CHANGE UP (ref 0–0.2)
BASOPHILS NFR BLD AUTO: 0.1 % — SIGNIFICANT CHANGE UP (ref 0–2)
BILIRUB SERPL-MCNC: 0.4 MG/DL — SIGNIFICANT CHANGE UP (ref 0.4–2)
BUN SERPL-MCNC: 18 MG/DL — SIGNIFICANT CHANGE UP (ref 8–20)
CALCIUM SERPL-MCNC: 8.8 MG/DL — SIGNIFICANT CHANGE UP (ref 8.6–10.2)
CHLORIDE SERPL-SCNC: 99 MMOL/L — SIGNIFICANT CHANGE UP (ref 98–107)
CO2 SERPL-SCNC: 28 MMOL/L — SIGNIFICANT CHANGE UP (ref 22–29)
CREAT SERPL-MCNC: 0.53 MG/DL — SIGNIFICANT CHANGE UP (ref 0.5–1.3)
CRP SERPL-MCNC: 17.5 MG/DL — HIGH (ref 0–0.4)
D DIMER BLD IA.RAPID-MCNC: 785 NG/ML DDU — HIGH
EOSINOPHIL # BLD AUTO: 0.2 K/UL — SIGNIFICANT CHANGE UP (ref 0–0.5)
EOSINOPHIL NFR BLD AUTO: 1.4 % — SIGNIFICANT CHANGE UP (ref 0–6)
FIBRINOGEN PPP-MCNC: 1256 MG/DL — CRITICAL HIGH (ref 300–520)
GLUCOSE BLDC GLUCOMTR-MCNC: 119 MG/DL — HIGH (ref 70–99)
GLUCOSE BLDC GLUCOMTR-MCNC: 160 MG/DL — HIGH (ref 70–99)
GLUCOSE BLDC GLUCOMTR-MCNC: 82 MG/DL — SIGNIFICANT CHANGE UP (ref 70–99)
GLUCOSE SERPL-MCNC: 164 MG/DL — HIGH (ref 70–99)
HCT VFR BLD CALC: 46.3 % — SIGNIFICANT CHANGE UP (ref 39–50)
HGB BLD-MCNC: 15 G/DL — SIGNIFICANT CHANGE UP (ref 13–17)
IMM GRANULOCYTES NFR BLD AUTO: 0.7 % — SIGNIFICANT CHANGE UP (ref 0–1.5)
INR BLD: 6.05 RATIO — CRITICAL HIGH (ref 0.88–1.16)
INR BLD: 7.11 RATIO — CRITICAL HIGH (ref 0.88–1.16)
LYMPHOCYTES # BLD AUTO: 0.7 K/UL — LOW (ref 1–3.3)
LYMPHOCYTES # BLD AUTO: 4.8 % — LOW (ref 13–44)
MAGNESIUM SERPL-MCNC: 2.1 MG/DL — SIGNIFICANT CHANGE UP (ref 1.6–2.6)
MCHC RBC-ENTMCNC: 30 PG — SIGNIFICANT CHANGE UP (ref 27–34)
MCHC RBC-ENTMCNC: 32.4 GM/DL — SIGNIFICANT CHANGE UP (ref 32–36)
MCV RBC AUTO: 92.6 FL — SIGNIFICANT CHANGE UP (ref 80–100)
MONOCYTES # BLD AUTO: 0.8 K/UL — SIGNIFICANT CHANGE UP (ref 0–0.9)
MONOCYTES NFR BLD AUTO: 5.5 % — SIGNIFICANT CHANGE UP (ref 2–14)
NEUTROPHILS # BLD AUTO: 12.75 K/UL — HIGH (ref 1.8–7.4)
NEUTROPHILS NFR BLD AUTO: 87.5 % — HIGH (ref 43–77)
PHOSPHATE SERPL-MCNC: 2.4 MG/DL — SIGNIFICANT CHANGE UP (ref 2.4–4.7)
PLATELET # BLD AUTO: 336 K/UL — SIGNIFICANT CHANGE UP (ref 150–400)
POTASSIUM SERPL-MCNC: 4.3 MMOL/L — SIGNIFICANT CHANGE UP (ref 3.5–5.3)
POTASSIUM SERPL-SCNC: 4.3 MMOL/L — SIGNIFICANT CHANGE UP (ref 3.5–5.3)
PROCALCITONIN SERPL-MCNC: 0.3 NG/ML — HIGH (ref 0.02–0.1)
PROT SERPL-MCNC: 7.1 G/DL — SIGNIFICANT CHANGE UP (ref 6.6–8.7)
PROTHROM AB SERPL-ACNC: 72.3 SEC — HIGH (ref 10–12.9)
PROTHROM AB SERPL-ACNC: 85.4 SEC — HIGH (ref 10–12.9)
RBC # BLD: 5 M/UL — SIGNIFICANT CHANGE UP (ref 4.2–5.8)
RBC # FLD: 14.3 % — SIGNIFICANT CHANGE UP (ref 10.3–14.5)
SODIUM SERPL-SCNC: 139 MMOL/L — SIGNIFICANT CHANGE UP (ref 135–145)
WBC # BLD: 14.56 K/UL — HIGH (ref 3.8–10.5)
WBC # FLD AUTO: 14.56 K/UL — HIGH (ref 3.8–10.5)

## 2020-04-25 PROCEDURE — 99233 SBSQ HOSP IP/OBS HIGH 50: CPT

## 2020-04-25 RX ADMIN — PANTOPRAZOLE SODIUM 40 MILLIGRAM(S): 20 TABLET, DELAYED RELEASE ORAL at 06:41

## 2020-04-25 RX ADMIN — TOCILIZUMAB 100 MILLIGRAM(S): 20 INJECTION, SOLUTION, CONCENTRATE INTRAVENOUS at 09:24

## 2020-04-25 RX ADMIN — ENOXAPARIN SODIUM 70 MILLIGRAM(S): 100 INJECTION SUBCUTANEOUS at 06:00

## 2020-04-25 RX ADMIN — Medication 1 PACKET(S): at 06:41

## 2020-04-25 RX ADMIN — Medication 1 TABLET(S): at 10:30

## 2020-04-25 RX ADMIN — Medication 2: at 11:20

## 2020-04-25 RX ADMIN — ZINC SULFATE TAB 220 MG (50 MG ZINC EQUIVALENT) 220 MILLIGRAM(S): 220 (50 ZN) TAB at 10:29

## 2020-04-25 RX ADMIN — Medication 100 MILLIGRAM(S): at 10:30

## 2020-04-25 RX ADMIN — Medication 500 MILLIGRAM(S): at 10:29

## 2020-04-25 RX ADMIN — Medication 1 PACKET(S): at 17:28

## 2020-04-25 RX ADMIN — Medication 1000 UNIT(S): at 10:29

## 2020-04-25 NOTE — PROGRESS NOTE ADULT - SUBJECTIVE AND OBJECTIVE BOX
Patient: LISSA MALLOY [id number] 64y Male                           Internal Medicine Hospitalist Progress Note    Initial HPI:  No new complaints.  Remains on NRB.  SaO2 94-96%.  States feeling better.   No chest pain / palpitations.  No leg pain.  No additional complaints.   No bleeding.   ____________________PHYSICAL EXAM:  GENERAL:  NAD Alert and Oriented x 3   HEENT: NCAT  CARDIOVASCULAR:  S1, S2  LUNGS: coarse BS b/l.   ABDOMEN:  soft, (-) tenderness, (-) distension, (+) bowel sounds, (-) guarding, (-) rebound (-) rigidity  EXTREMITIES:  no cyanosis / clubbing / edema.   ____________________    VITALS:  Vital Signs Last 24 Hrs  T(C): 37.4 (25 Apr 2020 07:30), Max: 37.7 (24 Apr 2020 16:11)  T(F): 99.3 (25 Apr 2020 07:30), Max: 99.9 (24 Apr 2020 16:11)  HR: 95 (25 Apr 2020 08:44) (86 - 95)  BP: 121/72 (25 Apr 2020 08:44) (110/65 - 128/60)  BP(mean): 84 (25 Apr 2020 08:44) (76 - 85)  RR: 24 (25 Apr 2020 08:44) (16 - 25)  SpO2: 93% (25 Apr 2020 08:44) (92% - 97%) Daily     Daily   CAPILLARY BLOOD GLUCOSE      POCT Blood Glucose.: 82 mg/dL (25 Apr 2020 05:33)  POCT Blood Glucose.: 93 mg/dL (24 Apr 2020 23:34)  POCT Blood Glucose.: 97 mg/dL (24 Apr 2020 17:08)    I&O's Summary    24 Apr 2020 07:01  -  25 Apr 2020 07:00  --------------------------------------------------------  IN: 240 mL / OUT: 600 mL / NET: -360 mL        LABS:                        15.4   13.59 )-----------( 345      ( 24 Apr 2020 07:03 )             47.7     04-24    142  |  99  |  17.0  ----------------------------<  91  4.9   |  29.0  |  0.64    Ca    8.9      24 Apr 2020 07:03      PT/INR - ( 25 Apr 2020 06:48 )   PT: 85.4 sec;   INR: 7.11 ratio         PTT - ( 25 Apr 2020 06:48 )  PTT:60.4 sec              MEDICATIONS:  acetaminophen   Tablet .. 650 milliGRAM(s) Oral every 6 hours PRN  ascorbic acid 500 milliGRAM(s) Oral daily  cholecalciferol 1000 Unit(s) Oral daily  dextrose 40% Gel 15 Gram(s) Oral once PRN  dextrose 5%. 1000 milliLiter(s) IV Continuous <Continuous>  dextrose 50% Injectable 12.5 Gram(s) IV Push once  dextrose 50% Injectable 25 Gram(s) IV Push once  dextrose 50% Injectable 25 Gram(s) IV Push once  enoxaparin Injectable 70 milliGRAM(s) SubCutaneous every 12 hours  glucagon  Injectable 1 milliGRAM(s) IntraMuscular once PRN  insulin lispro (HumaLOG) corrective regimen sliding scale   SubCutaneous three times a day before meals  multivitamin/minerals 1 Tablet(s) Oral daily  pantoprazole    Tablet 40 milliGRAM(s) Oral before breakfast  psyllium Powder 1 Packet(s) Oral two times a day  thiamine 100 milliGRAM(s) Oral daily  zinc sulfate 220 milliGRAM(s) Oral daily

## 2020-04-25 NOTE — PROGRESS NOTE ADULT - ASSESSMENT
63 yo M w/ no reported PMHx presents to ER for worsening shortness of breath, subjective fevers and dry cough for 10 days. poor po intake.  in ER, tachycardia/hypoxic with leukocytosis, ARF and evidence of dehydration. improved with NRB mask.  COVID +  CT Chest showed segmental PE.  Per radiology, CT at the time did not show signs of RV strain.  Briefly observe in MICU due to hypoxia, however, SaO2 has remained stable and pt returned to Hospitalist service.     # Viral Pneumonia,   Covid-19 infection - Airborne precautions.  Symptom management.  Prone positioning. s/p Plaquenil.   # Acute Hypoxic Respiratory Failure - see above.   # Segmental PE - No indication of RV strain on initial CT.  Continue Anticoagulation.  INR now noted to be 7, s/p Coumadin 7.5mg on 4/22 and 4/23.  Hol Coumadin, Lovenox.  INR has been therapeutic / supratherapeutic x 3 days, however, INR may not accurately reflect degree of anticoagulation.    # JANIE with hyponatremia, resolved  # Hyperglycemia - FS are stable.  Diet modification.  Monitor FS, insulin coverage prn.  dvt ppx  - anticoagulated.

## 2020-04-26 LAB
ALBUMIN SERPL ELPH-MCNC: 2.3 G/DL — LOW (ref 3.3–5.2)
ALP SERPL-CCNC: 99 U/L — SIGNIFICANT CHANGE UP (ref 40–120)
ALT FLD-CCNC: 69 U/L — HIGH
ANION GAP SERPL CALC-SCNC: 11 MMOL/L — SIGNIFICANT CHANGE UP (ref 5–17)
APTT BLD: 62.5 SEC — HIGH (ref 27.5–36.3)
AST SERPL-CCNC: 53 U/L — HIGH
BASOPHILS # BLD AUTO: 0.01 K/UL — SIGNIFICANT CHANGE UP (ref 0–0.2)
BASOPHILS NFR BLD AUTO: 0.1 % — SIGNIFICANT CHANGE UP (ref 0–2)
BILIRUB SERPL-MCNC: 0.4 MG/DL — SIGNIFICANT CHANGE UP (ref 0.4–2)
BUN SERPL-MCNC: 16 MG/DL — SIGNIFICANT CHANGE UP (ref 8–20)
CALCIUM SERPL-MCNC: 8.4 MG/DL — LOW (ref 8.6–10.2)
CHLORIDE SERPL-SCNC: 98 MMOL/L — SIGNIFICANT CHANGE UP (ref 98–107)
CO2 SERPL-SCNC: 29 MMOL/L — SIGNIFICANT CHANGE UP (ref 22–29)
CREAT SERPL-MCNC: 0.6 MG/DL — SIGNIFICANT CHANGE UP (ref 0.5–1.3)
EOSINOPHIL # BLD AUTO: 0.82 K/UL — HIGH (ref 0–0.5)
EOSINOPHIL NFR BLD AUTO: 8.3 % — HIGH (ref 0–6)
FIBRINOGEN PPP-MCNC: 1200 MG/DL — CRITICAL HIGH (ref 300–520)
GLUCOSE BLDC GLUCOMTR-MCNC: 110 MG/DL — HIGH (ref 70–99)
GLUCOSE BLDC GLUCOMTR-MCNC: 113 MG/DL — HIGH (ref 70–99)
GLUCOSE BLDC GLUCOMTR-MCNC: 95 MG/DL — SIGNIFICANT CHANGE UP (ref 70–99)
GLUCOSE BLDC GLUCOMTR-MCNC: 98 MG/DL — SIGNIFICANT CHANGE UP (ref 70–99)
GLUCOSE SERPL-MCNC: 108 MG/DL — HIGH (ref 70–99)
HCT VFR BLD CALC: 46 % — SIGNIFICANT CHANGE UP (ref 39–50)
HGB BLD-MCNC: 14.5 G/DL — SIGNIFICANT CHANGE UP (ref 13–17)
IMM GRANULOCYTES NFR BLD AUTO: 0.5 % — SIGNIFICANT CHANGE UP (ref 0–1.5)
INR BLD: 5.24 RATIO — CRITICAL HIGH (ref 0.88–1.16)
LYMPHOCYTES # BLD AUTO: 0.95 K/UL — LOW (ref 1–3.3)
LYMPHOCYTES # BLD AUTO: 9.7 % — LOW (ref 13–44)
MAGNESIUM SERPL-MCNC: 2.1 MG/DL — SIGNIFICANT CHANGE UP (ref 1.6–2.6)
MCHC RBC-ENTMCNC: 29.6 PG — SIGNIFICANT CHANGE UP (ref 27–34)
MCHC RBC-ENTMCNC: 31.5 GM/DL — LOW (ref 32–36)
MCV RBC AUTO: 93.9 FL — SIGNIFICANT CHANGE UP (ref 80–100)
MONOCYTES # BLD AUTO: 0.52 K/UL — SIGNIFICANT CHANGE UP (ref 0–0.9)
MONOCYTES NFR BLD AUTO: 5.3 % — SIGNIFICANT CHANGE UP (ref 2–14)
NEUTROPHILS # BLD AUTO: 7.48 K/UL — HIGH (ref 1.8–7.4)
NEUTROPHILS NFR BLD AUTO: 76.1 % — SIGNIFICANT CHANGE UP (ref 43–77)
PHOSPHATE SERPL-MCNC: 2.6 MG/DL — SIGNIFICANT CHANGE UP (ref 2.4–4.7)
PLATELET # BLD AUTO: 315 K/UL — SIGNIFICANT CHANGE UP (ref 150–400)
POTASSIUM SERPL-MCNC: 4.6 MMOL/L — SIGNIFICANT CHANGE UP (ref 3.5–5.3)
POTASSIUM SERPL-SCNC: 4.6 MMOL/L — SIGNIFICANT CHANGE UP (ref 3.5–5.3)
PROT SERPL-MCNC: 6.5 G/DL — LOW (ref 6.6–8.7)
PROTHROM AB SERPL-ACNC: 62.3 SEC — HIGH (ref 10–12.9)
RBC # BLD: 4.9 M/UL — SIGNIFICANT CHANGE UP (ref 4.2–5.8)
RBC # FLD: 14.2 % — SIGNIFICANT CHANGE UP (ref 10.3–14.5)
SODIUM SERPL-SCNC: 138 MMOL/L — SIGNIFICANT CHANGE UP (ref 135–145)
WBC # BLD: 9.83 K/UL — SIGNIFICANT CHANGE UP (ref 3.8–10.5)
WBC # FLD AUTO: 9.83 K/UL — SIGNIFICANT CHANGE UP (ref 3.8–10.5)

## 2020-04-26 PROCEDURE — 99233 SBSQ HOSP IP/OBS HIGH 50: CPT

## 2020-04-26 RX ADMIN — Medication 1 PACKET(S): at 05:02

## 2020-04-26 RX ADMIN — Medication 100 MILLIGRAM(S): at 12:53

## 2020-04-26 RX ADMIN — Medication 1 TABLET(S): at 12:52

## 2020-04-26 RX ADMIN — PANTOPRAZOLE SODIUM 40 MILLIGRAM(S): 20 TABLET, DELAYED RELEASE ORAL at 05:02

## 2020-04-26 RX ADMIN — Medication 1 PACKET(S): at 16:27

## 2020-04-26 RX ADMIN — Medication 500 MILLIGRAM(S): at 12:53

## 2020-04-26 RX ADMIN — Medication 1000 UNIT(S): at 12:53

## 2020-04-26 RX ADMIN — ZINC SULFATE TAB 220 MG (50 MG ZINC EQUIVALENT) 220 MILLIGRAM(S): 220 (50 ZN) TAB at 12:53

## 2020-04-26 NOTE — PROGRESS NOTE ADULT - ASSESSMENT
63 yo M w/ no reported PMHx presents to ER for worsening shortness of breath, subjective fevers and dry cough for 10 days. poor po intake.  in ER, tachycardia/hypoxic with leukocytosis, ARF and evidence of dehydration. improved with NRB mask.  COVID +  CT Chest showed segmental PE.  Per radiology, CT at the time did not show signs of RV strain.  Briefly observe in MICU due to hypoxia, however, SaO2 has remained stable and pt returned to Hospitalist service.     Viral Pneumonia,   Covid-19 infection - Airborne precautions.  Symptom management.  Prone positioning. s/p Plaquenil.     Acute Hypoxic Respiratory Failure - Supplementary oxygen, NRB to wean down as tolerated.     Segmental PE - No indication of RV strain on initial CT.  Continue Anticoagulation.    INR 5.3 on 4/26 , Holding coumadin for supratherapeutic values.       JANIE with hyponatremia, resolved    Hyperglycemia - FS are stable.   Monitor FS, insulin coverage prn.    Disp: wean down supplementary oxygen  Supportive care.

## 2020-04-26 NOTE — PROGRESS NOTE ADULT - SUBJECTIVE AND OBJECTIVE BOX
CC: Covid pneumonia in hypoxic resp failure. Right pulm embolism .  NRB.  Afebrile.   HPI:  65 yo M w/ no reported PMHx presents to ER for worsening shortness of breath, subjective fevers and dry cough for 10 days. poor po intake.  did not take any meds at home. no sick contacts, travel.    in ER, tachycardia/hypoxic with leukocytosis, ARF and evidence of dehydration. improved with NRB mask. (18 Apr 2020 11:33)    REVIEW OF SYSTEMS:    Patient denied fever, chills, abdominal pain, nausea, vomiting, cough, shortness of breath, chest pain or palpitations    Vital Signs Last 24 Hrs  T(C): 36.7 (26 Apr 2020 08:02), Max: 36.7 (26 Apr 2020 08:02)  T(F): 98 (26 Apr 2020 08:02), Max: 98 (26 Apr 2020 08:02)  HR: 80 (26 Apr 2020 08:02) (77 - 85)  BP: 122/69 (26 Apr 2020 08:02) (114/71 - 123/72)  BP(mean): 76 (25 Apr 2020 19:52) (76 - 84)  RR: 19 (26 Apr 2020 08:02) (18 - 22)  SpO2: 94% (26 Apr 2020 08:02) (92% - 98%)I&O's Summary    PHYSICAL EXAM:  GENERAL: NAD,   HEENT: PERRL, +EOMI, anicteric, no [place]  NECK: Supple, No JVD   CHEST/LUNG: CTA bilaterally; Normal effort  HEART: S1S2 Normal intensity, no murmurs, gallops or rubs noted  ABDOMEN: Soft, BS Normoactive, NT, ND, no HSM noted  EXTREMITIES:  2+ radial and DP pulses noted, no clubbing, cyanosis, or edema noted, FROM x 4  SKIN: No rashes or lesions noted  NEURO: A&Ox3, no focal deficits noted, CN II-XII intact  PSYCH: Anxious mood and affect; insight/judgement appropriate  LABS:                        14.5   9.83  )-----------( 315      ( 26 Apr 2020 07:51 )             46.0     04-26    138  |  98  |  16.0  ----------------------------<  108<H>  4.6   |  29.0  |  0.60    Ca    8.4<L>      26 Apr 2020 07:51  Phos  2.6     04-26  Mg     2.1     04-26    TPro  6.5<L>  /  Alb  2.3<L>  /  TBili  0.4  /  DBili  x   /  AST  53<H>  /  ALT  69<H>  /  AlkPhos  99  04-26    PT/INR - ( 26 Apr 2020 07:51 )   PT: 62.3 sec;   INR: 5.24 ratio         PTT - ( 26 Apr 2020 07:51 )  PTT:62.5 sec    RADIOLOGY & ADDITIONAL TESTS:    MEDICATIONS:  MEDICATIONS  (STANDING):  ascorbic acid 500 milliGRAM(s) Oral daily  cholecalciferol 1000 Unit(s) Oral daily  dextrose 5%. 1000 milliLiter(s) (50 mL/Hr) IV Continuous <Continuous>  dextrose 50% Injectable 12.5 Gram(s) IV Push once  dextrose 50% Injectable 25 Gram(s) IV Push once  dextrose 50% Injectable 25 Gram(s) IV Push once  insulin lispro (HumaLOG) corrective regimen sliding scale   SubCutaneous three times a day before meals  multivitamin/minerals 1 Tablet(s) Oral daily  pantoprazole    Tablet 40 milliGRAM(s) Oral before breakfast  psyllium Powder 1 Packet(s) Oral two times a day  thiamine 100 milliGRAM(s) Oral daily  zinc sulfate 220 milliGRAM(s) Oral daily    MEDICATIONS  (PRN):  acetaminophen   Tablet .. 650 milliGRAM(s) Oral every 6 hours PRN Temp greater or equal to 38C (100.4F), Mild Pain (1 - 3), Moderate Pain (4 - 6)  dextrose 40% Gel 15 Gram(s) Oral once PRN Blood Glucose LESS THAN 70 milliGRAM(s)/deciliter  glucagon  Injectable 1 milliGRAM(s) IntraMuscular once PRN Glucose LESS THAN 70 milligrams/deciliter

## 2020-04-26 NOTE — CHART NOTE - NSCHARTNOTEFT_GEN_A_CORE
Source: Patient [ ]  Family [ ]   other [X] EMR    Current Diet: Diet, Consistent Carbohydrate w/Evening Snack     PO intake:  PO intake not documented at this time.    Source for PO intake [ ] Patient [ ] family [X] chart [ ] staff [ ] other    Current Weight:   4/18/2020: 154.9#  No new wt documented. No edema noted. Obtain daily weights to monitor trends.    Pertinent Medications: MEDICATIONS  (STANDING):  ascorbic acid 500 milliGRAM(s) Oral daily  cholecalciferol 1000 Unit(s) Oral daily  dextrose 5%. 1000 milliLiter(s) (50 mL/Hr) IV Continuous <Continuous>  dextrose 50% Injectable 12.5 Gram(s) IV Push once  dextrose 50% Injectable 25 Gram(s) IV Push once  dextrose 50% Injectable 25 Gram(s) IV Push once  insulin lispro (HumaLOG) corrective regimen sliding scale   SubCutaneous three times a day before meals  multivitamin/minerals 1 Tablet(s) Oral daily  pantoprazole    Tablet 40 milliGRAM(s) Oral before breakfast  psyllium Powder 1 Packet(s) Oral two times a day  thiamine 100 milliGRAM(s) Oral daily  zinc sulfate 220 milliGRAM(s) Oral daily    MEDICATIONS  (PRN):  acetaminophen   Tablet .. 650 milliGRAM(s) Oral every 6 hours PRN Temp greater or equal to 38C (100.4F), Mild Pain (1 - 3), Moderate Pain (4 - 6)  dextrose 40% Gel 15 Gram(s) Oral once PRN Blood Glucose LESS THAN 70 milliGRAM(s)/deciliter  glucagon  Injectable 1 milliGRAM(s) IntraMuscular once PRN Glucose LESS THAN 70 milligrams/deciliter    Pertinent Labs: CBC Full  -  ( 26 Apr 2020 07:51 )  WBC Count : 9.83 K/uL  RBC Count : 4.90 M/uL  Hemoglobin : 14.5 g/dL  Hematocrit : 46.0 %  Platelet Count - Automated : 315 K/uL  Mean Cell Volume : 93.9 fl  Mean Cell Hemoglobin : 29.6 pg  Mean Cell Hemoglobin Concentration : 31.5 gm/dL  Auto Neutrophil # : 7.48 K/uL  Auto Lymphocyte # : 0.95 K/uL  Auto Monocyte # : 0.52 K/uL  Auto Eosinophil # : 0.82 K/uL  Auto Basophil # : 0.01 K/uL  Auto Neutrophil % : 76.1 %  Auto Lymphocyte % : 9.7 %  Auto Monocyte % : 5.3 %  Auto Eosinophil % : 8.3 %  Auto Basophil % : 0.1 %    Labs: 04-26 Na138 mmol/L Glu 108 mg/dL<H> K+ 4.6 mmol/L Cr  0.60 mg/dL BUN 16.0 mg/dL Phos 2.6 mg/dL Alb 2.3 g/dL<L> PAB n/a       Skin: As per EMR, WDL except ecchymotic.    Nutrition focused physical exam not conducted - found signs of malnutrition [ ]absent [ ]present    Subcutaneous fat loss: [ ] Orbital fat pads region, [ ]Buccal fat region, [ ]Triceps region,  [ ]Ribs region    Muscle wasting: [ ]Temples region, [ ]Clavicle region, [ ]Shoulder region, [ ]Scapula region, [ ]Interosseous region,  [ ]thigh region, [ ]Calf region    Estimated Needs:   [X] no change since previous assessment  [ ] recalculated:     Current Nutrition Diagnosis: Pt remains at nutrition risk secondary to increased nutrient needs related to increased physiological demand to maintain nutrition status as evidenced by acute resp. failure 2/2 COVID PNA. Aware per documentation, pt had poor PO intake PTA, encourage PO intake and continue to monitor. Aware JANIE resolved per EMR. RD to remain available.    Recommendations:   1. Continue diet as tolerated.  2. If poor PO, recommend Glucerna BID to optimize PO intake and provide an additional 220kcal and 10g protein per serving.   3. Continue MVI, Vitamin C and thiamine daily.  4. Obtain daily wts and monitor labs.     Monitoring and Evaluation:   [X] PO intake [X] Tolerance to diet prescription [X] Weights  [X] Follow up per protocol [X] Labs Source: Patient [ ]  Family [ ]   other [X] EMR    Current Diet: Diet, Consistent Carbohydrate w/Evening Snack     PO intake:  PO intake not documented at this time.    Source for PO intake [ ] Patient [ ] family [X] chart [ ] staff [ ] other    Current Weight:   4/18/2020: 154.9#  No new wt documented. No edema noted. Obtain daily weights to monitor trends.    Pertinent Medications: MEDICATIONS  (STANDING):  ascorbic acid 500 milliGRAM(s) Oral daily  cholecalciferol 1000 Unit(s) Oral daily  dextrose 5%. 1000 milliLiter(s) (50 mL/Hr) IV Continuous <Continuous>  dextrose 50% Injectable 12.5 Gram(s) IV Push once  dextrose 50% Injectable 25 Gram(s) IV Push once  dextrose 50% Injectable 25 Gram(s) IV Push once  insulin lispro (HumaLOG) corrective regimen sliding scale   SubCutaneous three times a day before meals  multivitamin/minerals 1 Tablet(s) Oral daily  pantoprazole    Tablet 40 milliGRAM(s) Oral before breakfast  psyllium Powder 1 Packet(s) Oral two times a day  thiamine 100 milliGRAM(s) Oral daily  zinc sulfate 220 milliGRAM(s) Oral daily    MEDICATIONS  (PRN):  acetaminophen   Tablet .. 650 milliGRAM(s) Oral every 6 hours PRN Temp greater or equal to 38C (100.4F), Mild Pain (1 - 3), Moderate Pain (4 - 6)  dextrose 40% Gel 15 Gram(s) Oral once PRN Blood Glucose LESS THAN 70 milliGRAM(s)/deciliter  glucagon  Injectable 1 milliGRAM(s) IntraMuscular once PRN Glucose LESS THAN 70 milligrams/deciliter    Pertinent Labs: CBC Full  -  ( 26 Apr 2020 07:51 )  WBC Count : 9.83 K/uL  RBC Count : 4.90 M/uL  Hemoglobin : 14.5 g/dL  Hematocrit : 46.0 %  Platelet Count - Automated : 315 K/uL  Mean Cell Volume : 93.9 fl  Mean Cell Hemoglobin : 29.6 pg  Mean Cell Hemoglobin Concentration : 31.5 gm/dL  Auto Neutrophil # : 7.48 K/uL  Auto Lymphocyte # : 0.95 K/uL  Auto Monocyte # : 0.52 K/uL  Auto Eosinophil # : 0.82 K/uL  Auto Basophil # : 0.01 K/uL  Auto Neutrophil % : 76.1 %  Auto Lymphocyte % : 9.7 %  Auto Monocyte % : 5.3 %  Auto Eosinophil % : 8.3 %  Auto Basophil % : 0.1 %    Labs: 04-26 Na138 mmol/L Glu 108 mg/dL<H> K+ 4.6 mmol/L Cr  0.60 mg/dL BUN 16.0 mg/dL Phos 2.6 mg/dL Alb 2.3 g/dL<L> PAB n/a       Skin: As per EMR, WDL except ecchymotic.    Nutrition focused physical exam not conducted - found signs of malnutrition [ ]absent [ ]present    Subcutaneous fat loss: [ ] Orbital fat pads region, [ ]Buccal fat region, [ ]Triceps region,  [ ]Ribs region    Muscle wasting: [ ]Temples region, [ ]Clavicle region, [ ]Shoulder region, [ ]Scapula region, [ ]Interosseous region,  [ ]thigh region, [ ]Calf region    Estimated Needs:   [X] no change since previous assessment  [ ] recalculated:     Current Nutrition Diagnosis: Pt remains at nutrition risk secondary to increased nutrient needs related to increased physiological demand to maintain nutrition status as evidenced by acute resp. failure 2/2 COVID PNA. Aware per documentation, pt had poor PO intake PTA, encourage PO intake and continue to monitor. Aware JANIE resolved per EMR. RD to remain available.    Recommendations:   1. Continue diet as tolerated.  2. If poor PO, recommend Glucerna TID to optimize PO intake and provide an additional 220kcal and 10g protein per serving.   3. Continue MVI, Vitamin C and thiamine daily.  4. Obtain daily wts and monitor labs.     Monitoring and Evaluation:   [X] PO intake [X] Tolerance to diet prescription [X] Weights  [X] Follow up per protocol [X] Labs

## 2020-04-27 LAB
APTT BLD: 81.2 SEC — HIGH (ref 27.5–36.3)
GLUCOSE BLDC GLUCOMTR-MCNC: 109 MG/DL — HIGH (ref 70–99)
GLUCOSE BLDC GLUCOMTR-MCNC: 86 MG/DL — SIGNIFICANT CHANGE UP (ref 70–99)
GLUCOSE BLDC GLUCOMTR-MCNC: 95 MG/DL — SIGNIFICANT CHANGE UP (ref 70–99)
INR BLD: 4.55 RATIO — HIGH (ref 0.88–1.16)
PROTHROM AB SERPL-ACNC: 53.9 SEC — HIGH (ref 10–12.9)

## 2020-04-27 PROCEDURE — 99222 1ST HOSP IP/OBS MODERATE 55: CPT

## 2020-04-27 PROCEDURE — 99232 SBSQ HOSP IP/OBS MODERATE 35: CPT

## 2020-04-27 RX ADMIN — Medication 1 TABLET(S): at 17:36

## 2020-04-27 RX ADMIN — Medication 1000 UNIT(S): at 12:46

## 2020-04-27 RX ADMIN — PANTOPRAZOLE SODIUM 40 MILLIGRAM(S): 20 TABLET, DELAYED RELEASE ORAL at 04:45

## 2020-04-27 NOTE — CONSULT NOTE ADULT - SUBJECTIVE AND OBJECTIVE BOX
[facility]  INFECTIOUS DISEASES AND INTERNAL MEDICINE at [place]  =======================================================  Fadi Sanchez MD  Diplomates American Board of Internal Medicine and Infectious Diseases  =======================================================    [id number]  [facility]     [place]  # [id number]    CC: shortness of breath     HPI:  63 y/o man with no significant PMH was admitted on 4/18 with worsening shortness of breath, fevers and dry cough for 10 days PTA. He was tested positive for COVID19 and placed on supplemental O2.   Since then he is on NRB with acceptable So2 but since they were not able to wean him off NRB, ID was called for evaluation.   Today he is awake and alert answering questions. Feels improved, denies SOB or cough. No diarrhea or any other symptoms.     PAST MEDICAL & SURGICAL HISTORY:  No pertinent past medical history  No significant past surgical history    Social Hx: No smoking or ETOH.     FAMILY HISTORY:  FH: hypertension    Allergies  No Known Allergies    Antibiotics:  None      REVIEW OF SYSTEMS:  CONSTITUTIONAL:  No Fever or chills  HEENT:  No diplopia or blurred vision.  No sore throat or runny nose.  CARDIOVASCULAR:  No chest pain or SOB.  RESPIRATORY:  +cough, +shortness of breath which have improved.   GASTROINTESTINAL:  No nausea, vomiting or diarrhea.  GENITOURINARY:  No dysuria, frequency or urgency. No Blood in urine  MUSCULOSKELETAL:  no joint aches, no muscle pain  SKIN:  No change in skin, hair or nails.  NEUROLOGIC:  No paresthesias, fasciculations, seizures or weakness.  PSYCHIATRIC:  No disorder of thought or mood.  ENDOCRINE:  No heat or cold intolerance, polyuria or polydipsia.  HEMATOLOGICAL:  No easy bruising or bleeding.     Physical Exam:  Vital Signs Last 24 Hrs  T(C): 36.4 (27 Apr 2020 08:51), Max: 37.1 (26 Apr 2020 16:10)  T(F): 97.6 (27 Apr 2020 08:51), Max: 98.8 (26 Apr 2020 23:39)  HR: 93 (27 Apr 2020 08:51) (77 - 93)  BP: 112/73 (27 Apr 2020 08:51) (112/73 - 131/77)  RR: 19 (27 Apr 2020 08:51) (19 - 20)  SpO2: 94% (27 Apr 2020 08:04) (86% - 97%)  GEN: NAD, on NRB  HEENT: normocephalic and atraumatic.  NECK: Supple.  No lymphadenopathy   LUNGS: Clear to auscultation.  HEART: Regular rate and rhythm   ABDOMEN: Soft, nontender, and nondistended.  Positive bowel sounds.    EXTREMITIES: Without edema.  NEUROLOGIC: grossly intact.  PSYCHIATRIC: Appropriate affect .  SKIN: No rash    Labs:  04-26    138  |  98  |  16.0  ----------------------------<  108<H>  4.6   |  29.0  |  0.60    Ca    8.4<L>      26 Apr 2020 07:51  Phos  2.6     04-26  Mg     2.1     04-26    TPro  6.5<L>  /  Alb  2.3<L>  /  TBili  0.4  /  DBili  x   /  AST  53<H>  /  ALT  69<H>  /  AlkPhos  99  04-26                        14.5   9.83  )-----------( 315      ( 26 Apr 2020 07:51 )             46.0     PT/INR - ( 27 Apr 2020 08:32 )   PT: 53.9 sec;   INR: 4.55 ratio    PTT - ( 27 Apr 2020 08:32 )  PTT:81.2 sec    LIVER FUNCTIONS - ( 26 Apr 2020 07:51 )  Alb: 2.3 g/dL / Pro: 6.5 g/dL / ALK PHOS: 99 U/L / ALT: 69 U/L / AST: 53 U/L / GGT: x           RECENT CULTURES:  COVID19 +     All imaging and other data have been reviewed.  < from: CT Angio Chest w/ IV Cont (04.21.20 @ 16:58) >   EXAM:  CT ANGIO CHEST (W)AW IC                        PROCEDURE DATE:  04/21/2020    INTERPRETATION:  CLINICAL INFORMATION: COVID positive, hypoxia, elevated d-dimer, short of breath  COMPARISON: Chest x-ray of 4/18/2020  PROCEDURE:   CT Angiography of the Chest.  As before ml of Omnipaque 350 was injected intravenously. 46 ml were discarded.  Sagittal and coronal reformats were performed as well as 3D (MIP) reconstructions.  FINDINGS:  LUNGS AND AIRWAYS: Patent central airways.  Lungs are remarkable for extensive confluent infiltrates with areas of groundglass infiltrate as well.  PLEURA: Minimal left pleural effusion.  MEDIASTINUM AND LUIZ: No lymphadenopathy.  VESSELS: Segmental filling defect is seen to the right middle lobe.  HEART: Heart size is normal. No pericardial effusion.  CHEST WALL AND LOWER NECK: Within normal limits.  VISUALIZED UPPER ABDOMEN: Cholelithiasis. There is interposition of colon between the right diaphragm and the liver.  BONES: Within normal limits.  IMPRESSION:   Segmental right middle lobe pulmonary embolism.  Extensive bilateral infiltrates  Results were discussed with Dr. Jack at 5:10 PM on 4/21/2020.    Assessment and Plan:   63 y/o man with no significant PMH was admitted on 4/18 with worsening shortness of breath, fevers and dry cough for 10 days PTA. He was tested positive for COVID19 and placed on supplemental O2.   Since then he is on NRB with acceptable So2 but since they were not able to wean him off NRB, ID was called for evaluation.   CTA also showed PE that is contributing in his hypoxia. At this point interleukin inhibitors will not be very helpful since he has passed the interleukin storm phase. Inflammatory markers are trending down. No sign of secondary bacterial infection.     COVID 19 + infection  Viral pneumonia  Acute hypoxic respiratory failure   Segmental PE    - Airborne and contact isolation   - COVID 19 PCR positive   - CXR with multifocal opacities   - Procalcitonin 0.3  - CRP=31-->17.50, Ferritin and LDH are high.   - Completed Hydroxychloroquine   - Check CBC with diff, CMP with LFT's, Inflammatory markers (D-dimer, CRP, Ferritin, LDH,  procalcitonin)  q48h.    - Avoid antibiotics unless there is a concern for a bacterial infection  - s/p steroid  - Encourage self proning q2h   - Taper off O2 as tolerated- once tolerating room air would ideally monitor for 24h prior to discharge. Continue self quarantine at home x 14 days from date of positive COVID 19 PCR  - No indication for Actemra at this time.     Will follow.

## 2020-04-27 NOTE — PROGRESS NOTE ADULT - SUBJECTIVE AND OBJECTIVE BOX
CC: Covid pneumonia in hypoxic resp failure. Right pulm embolism .  NRB.  Afebrile.     No overnight events  Pt seen and examined at bedside  Pt sitting in bed, NRB & 6L NC present  Looks more comfortable today  No specific complaints  VSS    REVIEW OF SYSTEMS:    Patient denied fever, chills, abdominal pain, nausea, vomiting, cough, shortness of breath, chest pain or palpitations    Vital Signs Last 24 Hrs  T(C): 36.4 (27 Apr 2020 08:51), Max: 37.1 (26 Apr 2020 16:10)  T(F): 97.6 (27 Apr 2020 08:51), Max: 98.8 (26 Apr 2020 23:39)  HR: 93 (27 Apr 2020 08:51) (77 - 93)  BP: 112/73 (27 Apr 2020 08:51) (112/73 - 131/77)  BP(mean): --  RR: 19 (27 Apr 2020 08:51) (19 - 20)  SpO2: 94% (27 Apr 2020 08:04) (86% - 97%) on NRB and 6L NC    PHYSICAL EXAM:  GENERAL: NAD,   HEENT: PERRL, +EOMI, anicteric, no [place]  NECK: Supple, No JVD   CHEST/LUNG: CTA bilaterally; Normal effort  HEART: S1S2 Normal intensity, no murmurs, gallops or rubs noted  ABDOMEN: Soft, BS Normoactive, NT, ND, no HSM noted  EXTREMITIES:  2+ radial and DP pulses noted, no clubbing, cyanosis, or edema noted, FROM x 4  SKIN: No rashes or lesions noted  NEURO: A&Ox3, no focal deficits noted, CN II-XII intact  PSYCH: Anxious mood and affect; insight/judgement appropriate    LAB/IMAGING: REVIEWED

## 2020-04-27 NOTE — PROGRESS NOTE ADULT - ATTENDING COMMENTS
seen and agree with PA    comfortable but with high o2 requirements  denies any complaints.    s/p plaquenil/steroids  ID consult if candidate for further treatment.  hold coumadin as supratherapeutic  trend markers

## 2020-04-27 NOTE — PROGRESS NOTE ADULT - ASSESSMENT
65 yo M w/ no reported PMHx presents to ER for worsening shortness of breath, subjective fevers and dry cough for 10 days. poor po intake.  in ER, tachycardia/hypoxic with leukocytosis, ARF and evidence of dehydration. improved with NRB mask.  COVID +  CT Chest showed segmental PE.  Per radiology, CT at the time did not show signs of RV strain.  Briefly observe in MICU due to hypoxia, however, SaO2 has remained stable and pt returned to Hospitalist service.     1. Viral Pneumonia  - Secondary to Covid-19 infection   - Completed course of Plaquenil and steroids  - C/w supplemental O2. Currently requiring NRB and 6L NC, wean as able  - Supportive care, vitamin supplementation, proning    - Trend inflammatory markers  - Will consult ID    2. Acute Hypoxic Respiratory Failure   - Supplementary oxygen, C/w NRB and 6L NC, to wean down as tolerated    3. Segmental PE  - CT imaging reviewed   - No indication of RV strain on initial CT  - Continue Anticoagulation.  Was previously on lovenox and started bridge to coumadin. AC/coumadin currently on hold now due to consecutive supratherapeutic values. Continue to repeat INR daily. INR today 4.55      4. JANIE with hyponatremia  - Resolved    5. Elevated Hga1c  - Hga1c 6.7  - FS and and accu checks stable  - Will DC ISS  - O/p follow up on DC    Disp: Wean down supplementary oxygen

## 2020-04-28 LAB
ALBUMIN SERPL ELPH-MCNC: 2.6 G/DL — LOW (ref 3.3–5.2)
ALP SERPL-CCNC: 111 U/L — SIGNIFICANT CHANGE UP (ref 40–120)
ALT FLD-CCNC: 114 U/L — HIGH
ANION GAP SERPL CALC-SCNC: 15 MMOL/L — SIGNIFICANT CHANGE UP (ref 5–17)
APTT BLD: 77.9 SEC — HIGH (ref 27.5–36.3)
AST SERPL-CCNC: 130 U/L — HIGH
BILIRUB SERPL-MCNC: 0.4 MG/DL — SIGNIFICANT CHANGE UP (ref 0.4–2)
BLD GP AB SCN SERPL QL: SIGNIFICANT CHANGE UP
BUN SERPL-MCNC: 12 MG/DL — SIGNIFICANT CHANGE UP (ref 8–20)
CALCIUM SERPL-MCNC: 8.6 MG/DL — SIGNIFICANT CHANGE UP (ref 8.6–10.2)
CHLORIDE SERPL-SCNC: 93 MMOL/L — LOW (ref 98–107)
CO2 SERPL-SCNC: 27 MMOL/L — SIGNIFICANT CHANGE UP (ref 22–29)
CREAT SERPL-MCNC: 0.46 MG/DL — LOW (ref 0.5–1.3)
CRP SERPL-MCNC: 2.39 MG/DL — HIGH (ref 0–0.4)
GLUCOSE SERPL-MCNC: 106 MG/DL — HIGH (ref 70–99)
HCT VFR BLD CALC: 52.1 % — HIGH (ref 39–50)
HGB BLD-MCNC: 16.8 G/DL — SIGNIFICANT CHANGE UP (ref 13–17)
INR BLD: 3.39 RATIO — HIGH (ref 0.88–1.16)
MCHC RBC-ENTMCNC: 29.6 PG — SIGNIFICANT CHANGE UP (ref 27–34)
MCHC RBC-ENTMCNC: 32.2 GM/DL — SIGNIFICANT CHANGE UP (ref 32–36)
MCV RBC AUTO: 91.9 FL — SIGNIFICANT CHANGE UP (ref 80–100)
PLATELET # BLD AUTO: 281 K/UL — SIGNIFICANT CHANGE UP (ref 150–400)
POTASSIUM SERPL-MCNC: 4.6 MMOL/L — SIGNIFICANT CHANGE UP (ref 3.5–5.3)
POTASSIUM SERPL-SCNC: 4.6 MMOL/L — SIGNIFICANT CHANGE UP (ref 3.5–5.3)
PROCALCITONIN SERPL-MCNC: 0.14 NG/ML — HIGH (ref 0.02–0.1)
PROT SERPL-MCNC: 6.6 G/DL — SIGNIFICANT CHANGE UP (ref 6.6–8.7)
PROTHROM AB SERPL-ACNC: 39.8 SEC — HIGH (ref 10–12.9)
RBC # BLD: 5.67 M/UL — SIGNIFICANT CHANGE UP (ref 4.2–5.8)
RBC # FLD: 13.4 % — SIGNIFICANT CHANGE UP (ref 10.3–14.5)
SODIUM SERPL-SCNC: 135 MMOL/L — SIGNIFICANT CHANGE UP (ref 135–145)
WBC # BLD: 7.82 K/UL — SIGNIFICANT CHANGE UP (ref 3.8–10.5)
WBC # FLD AUTO: 7.82 K/UL — SIGNIFICANT CHANGE UP (ref 3.8–10.5)

## 2020-04-28 PROCEDURE — 99232 SBSQ HOSP IP/OBS MODERATE 35: CPT

## 2020-04-28 PROCEDURE — 99497 ADVNCD CARE PLAN 30 MIN: CPT

## 2020-04-28 PROCEDURE — 99233 SBSQ HOSP IP/OBS HIGH 50: CPT

## 2020-04-28 RX ORDER — SENNA PLUS 8.6 MG/1
2 TABLET ORAL AT BEDTIME
Refills: 0 | Status: DISCONTINUED | OUTPATIENT
Start: 2020-04-28 | End: 2020-05-01

## 2020-04-28 RX ORDER — WARFARIN SODIUM 2.5 MG/1
1 TABLET ORAL ONCE
Refills: 0 | Status: COMPLETED | OUTPATIENT
Start: 2020-04-28 | End: 2020-04-28

## 2020-04-28 RX ORDER — POLYETHYLENE GLYCOL 3350 17 G/17G
17 POWDER, FOR SOLUTION ORAL DAILY
Refills: 0 | Status: DISCONTINUED | OUTPATIENT
Start: 2020-04-28 | End: 2020-05-01

## 2020-04-28 RX ADMIN — Medication 1000 UNIT(S): at 10:15

## 2020-04-28 RX ADMIN — POLYETHYLENE GLYCOL 3350 17 GRAM(S): 17 POWDER, FOR SOLUTION ORAL at 12:17

## 2020-04-28 RX ADMIN — Medication 1 PACKET(S): at 05:38

## 2020-04-28 RX ADMIN — WARFARIN SODIUM 1 MILLIGRAM(S): 2.5 TABLET ORAL at 20:26

## 2020-04-28 RX ADMIN — PANTOPRAZOLE SODIUM 40 MILLIGRAM(S): 20 TABLET, DELAYED RELEASE ORAL at 05:38

## 2020-04-28 RX ADMIN — Medication 1 TABLET(S): at 10:15

## 2020-04-28 RX ADMIN — Medication 1 PACKET(S): at 16:42

## 2020-04-28 RX ADMIN — SENNA PLUS 2 TABLET(S): 8.6 TABLET ORAL at 20:26

## 2020-04-28 NOTE — PROGRESS NOTE ADULT - ASSESSMENT
65 yo M w/ no reported PMHx presents to ER for worsening shortness of breath, subjective fevers and dry cough for 10 days. poor po intake.  in ER, tachycardia/hypoxic with leukocytosis, ARF and evidence of dehydration. improved with NRB mask.  COVID +  CT Chest showed segmental PE.  Per radiology, CT at the time did not show signs of RV strain.  Briefly observe in MICU due to hypoxia, however, SaO2 has remained stable and pt returned to Hospitalist service.     1. Viral Pneumonia  - Secondary to Covid-19 infection   - Completed course of Plaquenil and steroids  - C/w supplemental O2. Currently requiring NRB and 6L NC sating 93%, wean as able  - Supportive care, vitamin supplementation, proning    - Trend inflammatory markers  - ID consult appreciated     2. Acute Hypoxic Respiratory Failure   - Supplementary oxygen, C/w NRB and 6L NC, to wean down as tolerated    3. Segmental PE  - CT imaging reviewed   - No indication of RV strain on initial CT  - Was previously on lovenox and started bridge to coumadin however supratherapeutic INR and therefore AC was put on hold. INR downtrending, today 3.39. Will order coumadin 1mg for tonight and repeat INR in am.       4. JANIE with hyponatremia  - Resolved    5. Elevated Hga1c  - Hga1c 6.7  - FS and and accu checks stable  - Will DC ISS  - O/p follow up on DC    Disp: Wean down supplementary oxygen 63 yo M w/ no reported PMHx presents to ER for worsening shortness of breath, subjective fevers and dry cough for 10 days. poor po intake.  in ER, tachycardia/hypoxic with leukocytosis, ARF and evidence of dehydration. improved with NRB mask.  COVID +  CT Chest showed segmental PE.  Per radiology, CT at the time did not show signs of RV strain.  Briefly observe in MICU due to hypoxia, however, SaO2 has remained stable and pt returned to Hospitalist service.     1. Viral Pneumonia  - Secondary to Covid-19 infection   - Completed course of Plaquenil and steroids  - C/w supplemental O2. Currently requiring NRB and 6L NC sating 93%, wean as able  - Supportive care, vitamin supplementation, proning    - Trend inflammatory markers, downtrending   - ID consult appreciated     2. Acute Hypoxic Respiratory Failure   - Supplementary oxygen, C/w NRB and 6L NC, to wean down as tolerated    3. Segmental PE  - CT imaging reviewed   - No indication of RV strain on initial CT  - Was previously on lovenox and started bridge to coumadin however supratherapeutic INR and therefore AC was put on hold. INR downtrending, today 3.39. Will order coumadin 1mg for tonight and repeat INR in am.       4. JANIE with hyponatremia  - Resolved    5. Elevated Hga1c  - Hga1c 6.7  - FS and and accu checks stable  - Will DC ISS  - O/p follow up on DC    Disp: Wean down supplementary oxygen

## 2020-04-28 NOTE — PROGRESS NOTE ADULT - SUBJECTIVE AND OBJECTIVE BOX
[facility]  INFECTIOUS DISEASES AND INTERNAL MEDICINE at [place]  =======================================================  Fadi Sanchez MD  Diplomates American Board of Internal Medicine and Infectious Diseases  =======================================================    [facility]-[id number]  LISSA GAMA     [place]  # [id number]    Follow up; COVID19, respiratory failure      No complaint, still on NRB, O2 sat around 93.   No fever     PAST MEDICAL & SURGICAL HISTORY:  No pertinent past medical history  No significant past surgical history    Social Hx: No smoking or ETOH.     FAMILY HISTORY:  FH: hypertension    Allergies  No Known Allergies    Antibiotics:  None      REVIEW OF SYSTEMS:  CONSTITUTIONAL:  No Fever or chills  HEENT:  No diplopia or blurred vision.  No sore throat or runny nose.  CARDIOVASCULAR:  No chest pain or SOB.  RESPIRATORY:  +cough, +shortness of breath which have improved.   GASTROINTESTINAL:  No nausea, vomiting or diarrhea.  GENITOURINARY:  No dysuria, frequency or urgency. No Blood in urine  MUSCULOSKELETAL:  no joint aches, no muscle pain  SKIN:  No change in skin, hair or nails.  NEUROLOGIC:  No paresthesias, fasciculations, seizures or weakness.  PSYCHIATRIC:  No disorder of thought or mood.  ENDOCRINE:  No heat or cold intolerance, polyuria or polydipsia.  HEMATOLOGICAL:  No easy bruising or bleeding.     Physical Exam:  Vital Signs Last 24 Hrs  T(C): 37.1 (28 Apr 2020 08:47), Max: 37.1 (27 Apr 2020 23:18)  T(F): 98.8 (28 Apr 2020 08:47), Max: 98.8 (28 Apr 2020 08:47)  HR: 98 (28 Apr 2020 08:47) (89 - 98)  BP: 113/73 (28 Apr 2020 08:47) (113/73 - 118/78)  BP(mean): --  RR: 20 (28 Apr 2020 08:47) (20 - 20)  SpO2: 91% (28 Apr 2020 12:07) (90% - 94%)  GEN: NAD, on NRB  HEENT: normocephalic and atraumatic.  NECK: Supple.  No lymphadenopathy   LUNGS: Clear to auscultation.  HEART: Regular rate and rhythm   ABDOMEN: Soft, nontender, and nondistended.  Positive bowel sounds.    EXTREMITIES: Without edema.  NEUROLOGIC: grossly intact.  PSYCHIATRIC: Appropriate affect .  SKIN: No rash    04-28    135  |  93<L>  |  12.0  ----------------------------<  106<H>  4.6   |  27.0  |  0.46<L>    Ca    8.6      28 Apr 2020 07:41    TPro  6.6  /  Alb  2.6<L>  /  TBili  0.4  /  DBili  x   /  AST  130<H>  /  ALT  114<H>  /  AlkPhos  111  04-28                        16.8   7.82  )-----------( 281      ( 28 Apr 2020 07:41 )             52.1     PT/INR - ( 28 Apr 2020 07:41 )   PT: 39.8 sec;   INR: 3.39 ratio    PTT - ( 28 Apr 2020 07:41 )  PTT:77.9 sec    LIVER FUNCTIONS - ( 28 Apr 2020 07:41 )  Alb: 2.6 g/dL / Pro: 6.6 g/dL / ALK PHOS: 111 U/L / ALT: 114 U/L / AST: 130 U/L / GGT: x           All imaging and other data have been reviewed.  < from: CT Angio Chest w/ IV Cont (04.21.20 @ 16:58) >   EXAM:  CT ANGIO CHEST (W)AW IC                        PROCEDURE DATE:  04/21/2020    INTERPRETATION:  CLINICAL INFORMATION: COVID positive, hypoxia, elevated d-dimer, short of breath  COMPARISON: Chest x-ray of 4/18/2020  PROCEDURE:   CT Angiography of the Chest.  As before ml of Omnipaque 350 was injected intravenously. 46 ml were discarded.  Sagittal and coronal reformats were performed as well as 3D (MIP) reconstructions.  FINDINGS:  LUNGS AND AIRWAYS: Patent central airways.  Lungs are remarkable for extensive confluent infiltrates with areas of groundglass infiltrate as well.  PLEURA: Minimal left pleural effusion.  MEDIASTINUM AND LUIZ: No lymphadenopathy.  VESSELS: Segmental filling defect is seen to the right middle lobe.  HEART: Heart size is normal. No pericardial effusion.  CHEST WALL AND LOWER NECK: Within normal limits.  VISUALIZED UPPER ABDOMEN: Cholelithiasis. There is interposition of colon between the right diaphragm and the liver.  BONES: Within normal limits.  IMPRESSION:   Segmental right middle lobe pulmonary embolism.  Extensive bilateral infiltrates  Results were discussed with Dr. Jack at 5:10 PM on 4/21/2020.    Assessment and Plan:   63 y/o man with no significant PMH was admitted on 4/18 with worsening shortness of breath, fevers and dry cough for 10 days PTA. He was tested positive for COVID19 and placed on supplemental O2.   Since then he is on NRB with acceptable So2 but since they were not able to wean him off NRB, ID was called for evaluation.   CTA also showed PE that is contributing in his hypoxia. At this point interleukin inhibitors will not be very helpful since he has passed the interleukin storm phase. Inflammatory markers are trending down. No sign of secondary bacterial infection.     COVID 19 + infection  Viral pneumonia  Acute hypoxic respiratory failure   Segmental PE    - Airborne and contact isolation   - COVID 19 PCR positive   - CXR with multifocal opacities   - Procalcitonin 0.3  - CRP=31-->17.50, Ferritin and LDH are high.   - Completed Hydroxychloroquine   - Check CBC with diff, CMP with LFT's, Inflammatory markers (D-dimer, CRP, Ferritin, LDH,  procalcitonin)  q48h.    - Avoid antibiotics unless there is a concern for a bacterial infection  - s/p steroid  - Encourage self proning q2h   - Taper off O2 as tolerated- once tolerating room air would ideally monitor for 24h prior to discharge. Continue self quarantine at home x 14 days from date of positive COVID 19 PCR  - No indication for Actemra at this time.   - Will send type and screen to try for plasma study.     Will follow.

## 2020-04-28 NOTE — PROGRESS NOTE ADULT - ATTENDING COMMENTS
seen and agree with PA    still tachypneic, easily desaturates.  on 100%NRB and 6 L NC, o2 sat 88%  unable to prone due to diffuse body aches.  inflammatory markers improved but resp status tenuous.    c/w current regimen.  patient declines intubation, aware that he will die if his resp status deteriorates. he does not want CPR as well.  explained that if clinical status worsens, we will provide supportive care with morphine/ativan for air hunger which he understands as well and accepting.  spoke to patient in detail with Palestinian speaking RN ~15 minutes  MOLST to be filled out. seen and agree with PA    still tachypneic, easily desaturates.  on 100%NRB and 6 L NC, o2 sat 88%  unable to prone due to diffuse body aches.  inflammatory markers improved but resp status tenuous.    c/w current regimen.  patient declines intubation, aware that he will die if his resp status deteriorates. he does not want CPR as well.  explained that if clinical status worsens, we will provide supportive care with morphine/ativan for air hunger which he understands as well and accepting.  spoke to patient in detail with Zimbabwean speaking RN ~15 minutes    he currently has not officially made up his mind.  MOLST not filled.  palliative consulted

## 2020-04-29 DIAGNOSIS — Z51.5 ENCOUNTER FOR PALLIATIVE CARE: ICD-10-CM

## 2020-04-29 DIAGNOSIS — U07.1 COVID-19: ICD-10-CM

## 2020-04-29 DIAGNOSIS — R53.81 OTHER MALAISE: ICD-10-CM

## 2020-04-29 DIAGNOSIS — R06.00 DYSPNEA, UNSPECIFIED: ICD-10-CM

## 2020-04-29 LAB
ABO RH CONFIRMATION: SIGNIFICANT CHANGE UP
APTT BLD: 52.3 SEC — HIGH (ref 27.5–36.3)
GLUCOSE BLDC GLUCOMTR-MCNC: 103 MG/DL — HIGH (ref 70–99)
GLUCOSE BLDC GLUCOMTR-MCNC: 110 MG/DL — HIGH (ref 70–99)
GLUCOSE BLDC GLUCOMTR-MCNC: 115 MG/DL — HIGH (ref 70–99)
INR BLD: 2.57 RATIO — HIGH (ref 0.88–1.16)
PROTHROM AB SERPL-ACNC: 29.9 SEC — HIGH (ref 10–12.9)

## 2020-04-29 PROCEDURE — 99233 SBSQ HOSP IP/OBS HIGH 50: CPT

## 2020-04-29 PROCEDURE — 99497 ADVNCD CARE PLAN 30 MIN: CPT

## 2020-04-29 PROCEDURE — 99497 ADVNCD CARE PLAN 30 MIN: CPT | Mod: 25

## 2020-04-29 PROCEDURE — 99221 1ST HOSP IP/OBS SF/LOW 40: CPT

## 2020-04-29 RX ORDER — MORPHINE SULFATE 50 MG/1
2 CAPSULE, EXTENDED RELEASE ORAL ONCE
Refills: 0 | Status: DISCONTINUED | OUTPATIENT
Start: 2020-04-29 | End: 2020-04-29

## 2020-04-29 RX ORDER — MORPHINE SULFATE 50 MG/1
2 CAPSULE, EXTENDED RELEASE ORAL EVERY 4 HOURS
Refills: 0 | Status: DISCONTINUED | OUTPATIENT
Start: 2020-04-29 | End: 2020-05-01

## 2020-04-29 RX ORDER — WARFARIN SODIUM 2.5 MG/1
2 TABLET ORAL AT BEDTIME
Refills: 0 | Status: COMPLETED | OUTPATIENT
Start: 2020-04-29 | End: 2020-04-29

## 2020-04-29 RX ADMIN — PANTOPRAZOLE SODIUM 40 MILLIGRAM(S): 20 TABLET, DELAYED RELEASE ORAL at 04:45

## 2020-04-29 RX ADMIN — POLYETHYLENE GLYCOL 3350 17 GRAM(S): 17 POWDER, FOR SOLUTION ORAL at 11:36

## 2020-04-29 RX ADMIN — SENNA PLUS 2 TABLET(S): 8.6 TABLET ORAL at 22:34

## 2020-04-29 RX ADMIN — Medication 1 PACKET(S): at 18:23

## 2020-04-29 RX ADMIN — Medication 1 TABLET(S): at 11:35

## 2020-04-29 RX ADMIN — Medication 1 PACKET(S): at 04:45

## 2020-04-29 RX ADMIN — MORPHINE SULFATE 2 MILLIGRAM(S): 50 CAPSULE, EXTENDED RELEASE ORAL at 11:36

## 2020-04-29 RX ADMIN — WARFARIN SODIUM 2 MILLIGRAM(S): 2.5 TABLET ORAL at 22:36

## 2020-04-29 RX ADMIN — Medication 1000 UNIT(S): at 11:35

## 2020-04-29 NOTE — CHART NOTE - NSCHARTNOTEFT_GEN_A_CORE
Patient completed Plasma transfusion. This is the 4hr Post transfusion check  No reported reactions.

## 2020-04-29 NOTE — PROGRESS NOTE ADULT - ASSESSMENT
65 yo M w/ no reported PMHx presents to ER for worsening shortness of breath, subjective fevers and dry cough for 10 days. poor po intake.  in ER, tachycardia/hypoxic with leukocytosis, ARF and evidence of dehydration. improved with NRB mask.  COVID +  CT Chest showed segmental PE.  Per radiology, CT at the time did not show signs of RV strain.  Briefly observe in MICU due to hypoxia, however, SaO2 has remained stable and pt returned to Hospitalist service.     acute hypoxic resp failure due to COVID, PE    no improvement post plaquenil and steroids    ID reconsulted and planned for plasma infusion.        Segmental PE  - CT imaging reviewed   - No indication of RV strain on initial CT  - Was previously on lovenox and started bridge to coumadin however supratherapeutic INR and therefore AC was put on hold.   restart coumadin.      JANIE with hyponatremia  - Resolved    Elevated Hga1c  - Hga1c 6.7  - FS and and accu checks stable  - Will DC ISS  - O/p follow up on DC    ICU re evaluation  Goals of care discussed with patient in detail with bedside  and palliative care.  advised persistent and worsening resp failure....intubation vs comfort care with morphine.  patient would like FULL CODE status 65 yo M w/ no reported PMHx presents to ER for worsening shortness of breath, subjective fevers and dry cough for 10 days. poor po intake.  in ER, tachycardia/hypoxic with leukocytosis, ARF and evidence of dehydration. improved with NRB mask.  COVID +  CT Chest showed segmental PE.  Per radiology, CT at the time did not show signs of RV strain.  Briefly observe in MICU due to hypoxia, however, SaO2 has remained stable and pt returned to Hospitalist service.     acute hypoxic resp failure due to COVID, PE    no improvement post plaquenil and steroids    ID reconsulted and planned for plasma infusion.   ICU reconsulted       Segmental PE  - CT imaging reviewed   - No indication of RV strain on initial CT  - Was previously on lovenox and started bridge to coumadin however supratherapeutic INR and therefore AC was put on hold.   restart coumadin.      JANIE with hyponatremia  - Resolved    Elevated Hga1c  - Hga1c 6.7  - FS and and accu checks stable  - Will DC ISS  - O/p follow up on DC    ICU re evaluation  Goals of care discussed with patient in detail with bedside  and palliative care.  advised persistent and worsening resp failure....intubation vs comfort care with morphine.  patient would like FULL CODE status    2mg IVP morphine given x1 for resp distress

## 2020-04-29 NOTE — PROGRESS NOTE ADULT - ASSESSMENT
65 y/o man with no significant PMH was admitted on 4/18 with worsening shortness of breath, fevers and dry cough for 10 days PTA. He was tested positive for COVID19 and placed on supplemental O2.   Since then he is on NRB with acceptable So2 but since they were not able to wean him off NRB, ID was called for evaluation.   CTA also showed PE that is contributing in his hypoxia. At this point interleukin inhibitors will not be very helpful since he has passed the interleukin storm phase. Inflammatory markers are trending down. No sign of secondary bacterial infection.     COVID 19 + infection  Viral pneumonia  Acute hypoxic respiratory failure   Segmental PE    - Airborne and contact isolation   - COVID 19 PCR positive 4/18/20  - CT Angio reporting PE   - CXR with multifocal opacities   - Procalcitonin 0.14  - CRP=31-->17.50 --> 2.39  - Ferritin 3112  - Completed Hydroxychloroquine   - Check CBC with diff, CMP with LFT's, Inflammatory markers (D-dimer, CRP, Ferritin, LDH,  procalcitonin)  q48h.    - Avoid antibiotics unless there is a concern for a bacterial infection  - s/p steroid  - Encourage self proning q2h   - No indication for Actemra at this time.   - Will send type and screen to try for plasma study.   - Patient consented fro Plasma study,  was present. all information for study provided and all questions answered. ([id number])      Will follow

## 2020-04-29 NOTE — CONSULT NOTE ADULT - SUBJECTIVE AND OBJECTIVE BOX
Patient is a 64y old  Male who presents with a chief complaint of shortness of breath (29 Apr 2020 11:36)      BRIEF HOSPITAL COURSE:  Pt is a 65 y/o male with no significant PMHx presented to [facility] w/ c/o SOB, fevers and cough x 10 days. Admitted to medicine with acute hypoxic respiratory failure secondary to COVID 19 Viral PNA. Course c/b worsening hypoxia requiring NRB and +CTA chest showing rt segmental middle lobe PE w/o heart strain/biomarker elevation. Pt was initially briefly observed in MICU but was eventually downgraded as improved from respiratory standpoint.    Events last 24 hours: ICU reconsulted for worsening hypoxia, tachypnea (was up in chair), remained hypoxic to high 80s on NRB. S/p 1mg Morphine by hospitalist.     On arrival to bedside, pt resting comfortably in bed, sleeping arousable, offered no complaints, says "he feels good". Denies SOB or any other acute complaints. Pt appeared non labored w/ no accessory muscle use or tachypnea. Saturation on bedside monitor 94% on NRB.     PAST MEDICAL & SURGICAL HISTORY:  No pertinent past medical history  No significant past surgical history    Allergies    No Known Allergies    Intolerances    Review of Systems:  CONSTITUTIONAL: No fever, chills, or fatigue  EYES: No eye pain, visual disturbances, or discharge  ENMT:  No difficulty hearing, tinnitus, vertigo; No sinus or throat pain  NECK: No pain or stiffness  RESPIRATORY: No cough, wheezing, chills or hemoptysis; No shortness of breath  CARDIOVASCULAR: No chest pain, palpitations, dizziness, or leg swelling  GASTROINTESTINAL: No abdominal or epigastric pain. No nausea, vomiting, or hematemesis; No diarrhea or constipation. No melena or hematochezia.  GENITOURINARY: No dysuria, frequency, hematuria, or incontinence  NEUROLOGICAL: No headaches, memory loss, loss of strength, numbness, or tremors  SKIN: No itching, burning, rashes, or lesions   MUSCULOSKELETAL: No joint pain or swelling; No muscle, back, or extremity pain  PSYCHIATRIC: No depression, anxiety, mood swings, or difficulty sleeping      Medications:  acetaminophen   Tablet .. 650 milliGRAM(s) Oral every 6 hours PRN  bisacodyl 5 milliGRAM(s) Oral every 12 hours PRN  pantoprazole    Tablet 40 milliGRAM(s) Oral before breakfast  polyethylene glycol 3350 17 Gram(s) Oral daily  psyllium Powder 1 Packet(s) Oral two times a day  senna 2 Tablet(s) Oral at bedtime  cholecalciferol 1000 Unit(s) Oral daily  multivitamin/minerals 1 Tablet(s) Oral daily      ICU Vital Signs Last 24 Hrs  T(C): 36.9 (29 Apr 2020 08:35), Max: 37.1 (28 Apr 2020 23:25)  T(F): 98.4 (29 Apr 2020 08:35), Max: 98.8 (28 Apr 2020 23:25)  HR: 110 (29 Apr 2020 08:35) (101 - 110)  BP: 127/87 (29 Apr 2020 08:35) (115/79 - 127/87)  BP(mean): --  ABP: --  ABP(mean): --  RR: 89 (29 Apr 2020 09:17) (18 - 89)  SpO2: 93% (29 Apr 2020 04:42) (91% - 93%)    Vital Signs Last 24 Hrs  T(C): 36.9 (29 Apr 2020 08:35), Max: 37.1 (28 Apr 2020 23:25)  T(F): 98.4 (29 Apr 2020 08:35), Max: 98.8 (28 Apr 2020 23:25)  HR: 110 (29 Apr 2020 08:35) (101 - 110)  BP: 127/87 (29 Apr 2020 08:35) (115/79 - 127/87)  BP(mean): --  RR: 89 (29 Apr 2020 09:17) (18 - 89)  SpO2: 93% (29 Apr 2020 04:42) (91% - 93%)    I&O's Detail        LABS:                        16.8   7.82  )-----------( 281      ( 28 Apr 2020 07:41 )             52.1     04-28    135  |  93<L>  |  12.0  ----------------------------<  106<H>  4.6   |  27.0  |  0.46<L>    Ca    8.6      28 Apr 2020 07:41    TPro  6.6  /  Alb  2.6<L>  /  TBili  0.4  /  DBili  x   /  AST  130<H>  /  ALT  114<H>  /  AlkPhos  111  04-28          CAPILLARY BLOOD GLUCOSE      POCT Blood Glucose.: 103 mg/dL (29 Apr 2020 11:23)    PT/INR - ( 29 Apr 2020 07:48 )   PT: 29.9 sec;   INR: 2.57 ratio         PTT - ( 29 Apr 2020 07:48 )  PTT:52.3 sec      Physical Examination:    VITALS AT TIME OF EXAM:  HR: 95 NSR  BP: 110/72  RR: 18  SPO2: 93% NRB    General: Well appearing, lying in bed in NAD, was sleeping but easily arousable and appropriate, pleasant    HEENT: Pupils equal, reactive to light.  Symmetric. No scleral icterus or injection    PULM: some scattered diminishment, otherwise clear, no wheezes, rales or rhonchi, no significant sputum production or increased respiratory effort    NECK: Supple, no lymphadenopathy, trachea midline    CVS: Regular rate and rhythm, no murmurs, +s1/s2    ABD: Soft, nondistended, nontender, normoactive bowel sounds    EXT: No edema, nontender    SKIN: Warm and well perfused, no rashes noted.    NEURO: Alert, oriented, interactive, nonfocal    DEVICES: N  LINES: N  CASAS: N    RADIOLOGY: < from: CT Angio Chest w/ IV Cont (04.21.20 @ 16:58) >  FINDINGS:    LUNGS AND AIRWAYS: Patent central airways.  Lungs are remarkable for extensive confluent infiltrates with areas of groundglass infiltrate as well.    PLEURA: Minimal left pleural effusion.    MEDIASTINUM AND LUIZ: No lymphadenopathy.    VESSELS: Segmental filling defect is seen to the right middle lobe.    HEART: Heart size is normal. No pericardial effusion.    CHEST WALL AND LOWER NECK: Within normal limits.    VISUALIZED UPPER ABDOMEN: Cholelithiasis. There is interposition of colon between the right diaphragm and the liver.    BONES: Within normal limits.    IMPRESSION:     Segmental right middle lobe pulmonary embolism.  Extensive bilateral infiltrates    < end of copied text >    < from: US Duplex Venous Lower Ext Complete, Bilateral (04.21.20 @ 12:18) >    IMPRESSION:     No evidence of deep venous thrombosis in either lower extremity.    < end of copied text >

## 2020-04-29 NOTE — CHART NOTE - NSCHARTNOTEFT_GEN_A_CORE
The trial, [id number]: Expanded Access to Convalescent Plasma for the Treatment of Patients with COVID-19, was discussed with [patient, LAR or patient & LAR including their name]. Consent was witnessed by Polly Malik.     During the consent process, the following was discussed:     •	The fact that the study involves research  •	The study schedule and procedures involved  •	The main risks of the study, and the fact that all risks may not be known at this time  •	New information that may affect the subject’s willingness to continue on the study will be presented as soon as it is available  •	Benefits of participating  •	Alternatives to participating  •	Confidentiality   •	Compensation for research-related injury  •	Contacts for questions about the study or their rights while on the study  •	The fact that the subject’s participation is voluntary – they can refuse or withdraw at any time without penalty or loss of benefits.    The Patient  was given ample time to ask questions. All questions were answered to their satisfaction.      Informed consent was obtained prior to any study procedures being performed. A copy of the signed consent and supplemental documentation was given to the subject.    Eligibility Criteria:   Patient has laboratory confirmed diagnosis of infection with SARS-CoV-2 on _04__/_18__/__20___ and admitted to an acute care facility for treatment of COVID-19 complications.     Patient has severe  COVID-19 with the following symptoms:    [please only include patient actual symptoms]   - Dyspnea  - Respiratory frequency = 30/ min  - Lung infiltrates >50% within 24 to 48 hours  - Respiratory failure       Patient had the following medication administered prior to enrollment:     - Antibiotic therapy  - Steroids  - Hydroxychloroquine       ABO was performed on __04_/_28____/_20_____ and patient blood type confirmed as 0+.     I have confirmed all eligibility are met for this patient to participate.     Convalescent plasma order has been completed, pending plasma from blood bank. The trial, [id number]: Expanded Access to Convalescent Plasma for the Treatment of Patients with COVID-19, was discussed with [patient, LAR or patient & LAR including their name]. Consent was witnessed by Polly Malik.     During the consent process, the following was discussed:     •	The fact that the study involves research  •	The study schedule and procedures involved  •	The main risks of the study, and the fact that all risks may not be known at this time  •	New information that may affect the subject’s willingness to continue on the study will be presented as soon as it is available  •	Benefits of participating  •	Alternatives to participating  •	Confidentiality   •	Compensation for research-related injury  •	Contacts for questions about the study or their rights while on the study  •	The fact that the subject’s participation is voluntary – they can refuse or withdraw at any time without penalty or loss of benefits.    The Patient  was given ample time to ask questions. All questions were answered to their satisfaction.      Informed consent was obtained prior to any study procedures being performed. A copy of the signed consent and supplemental documentation was given to the subject.    Eligibility Criteria:   Patient has laboratory confirmed diagnosis of infection with SARS-CoV-2 on _04__/_18__/__20___ and admitted to an acute care facility for treatment of COVID-19 complications.     Patient has severe  COVID-19 with the following symptoms:    [please only include patient actual symptoms]   - Dyspnea  - Respiratory frequency = 30/ min  - Lung infiltrates >50% within 24 to 48 hours  - Respiratory failure       Patient had the following medication administered prior to enrollment:     - Antibiotic therapy  - Steroids  - Hydroxychloroquine       ABO was performed on __04_/_28____/_20_____ and patient blood type confirmed as 0+.     I have confirmed all eligibility are met for this patient to participate.     Convalescent plasma order has been completed, pending plasma from blood bank.    Subject ID  [id number] The trial, [id number]: Expanded Access to Convalescent Plasma for the Treatment of Patients with COVID-19, was discussed with [patient]. Consent was witnessed by Polly Malik.   [place] interpretation by Polly Malik Employee ID #[id number]    During the consent process, the following was discussed:     •	The fact that the study involves research  •	The study schedule and procedures involved  •	The main risks of the study, and the fact that all risks may not be known at this time  •	New information that may affect the subject’s willingness to continue on the study will be presented as soon as it is available  •	Benefits of participating  •	Alternatives to participating  •	Confidentiality   •	Compensation for research-related injury  •	Contacts for questions about the study or their rights while on the study  •	The fact that the subject’s participation is voluntary – they can refuse or withdraw at any time without penalty or loss of benefits.    The Patient  was given ample time to ask questions. All questions were answered to their satisfaction.      Informed consent was obtained prior to any study procedures being performed. A copy of the signed consent and supplemental documentation was given to the subject.    Eligibility Criteria:   Patient has laboratory confirmed diagnosis of infection with SARS-CoV-2 on _04__/_18__/__20___ and admitted to an acute care facility for treatment of COVID-19 complications.     Patient has severe  COVID-19 with the following symptoms:    [please only include patient actual symptoms]   - Dyspnea  - Respiratory frequency = 30/ min  - Lung infiltrates >50% within 24 to 48 hours  - Respiratory failure       Patient had the following medication administered prior to enrollment:     - Antibiotic therapy  - Steroids  - Hydroxychloroquine       ABO was performed on __04_/_28____/_20_____ and patient blood type confirmed as 0+.     I have confirmed all eligibility are met for this patient to participate.     Convalescent plasma order has been completed, pending plasma from blood bank.    Subject ID  [id number]

## 2020-04-29 NOTE — CONSULT NOTE ADULT - PROBLEM SELECTOR RECOMMENDATION 2
Morphine to be initiated for dyspnea symptom management, patient agreeable to plan  Continue with NRB - discussed high risk for intubation, patient aware and agreeable Patient is tachypneic  On nonrebreather desaturating into the 70's-low 80's on exertion, mid to upper 80's in bed/rest.   Morphine to be initiated for dyspnea symptom management, patient agreeable to plan  Discussed high risk for intubation, patient aware and agreeable

## 2020-04-29 NOTE — GOALS OF CARE CONVERSATION - ADVANCED CARE PLANNING - CONVERSATION DETAILS
Discussed care/physical with Dr. Neumann  Meeting held at bedside with patient, myself, Dr. Neumann, and   Discussed goals/advance directives. Established patient desire for Full Code including CPR and intubation in the event of cardio/pulmonary compromise/arrest  Patient was explained that he is high risk for intubation given his poor pulmonary status - he is also aware he is at risk for cardiac arrest- patient agreeable to starting plasma, signed consents at bedside with Dr. Romero  - Patient provided us with a contact number for his Niece who lives in [place] her phone number is [phone] but that is not who he wants as his HCP   Patient states his Surrogate is his friend Yovani Walden telephone number [phone]  Call placed to Yovani who is agreeable to be patient's surrogate / health care decision person- he is aware patient is decompensating  At this time Morphine was ordered by Dr. Neumann for symptom management of dyspnea  Will continue to support and monitor    Total time spent 30 minutes  COUNSELING:  Face to face meeting to discuss Advanced Care Planning - Time Spent __20____Minutes.     Thank you for the opportunity to assist with the care of this patient.   [facility] Consult [facility] [phone].

## 2020-04-29 NOTE — CONSULT NOTE ADULT - ASSESSMENT
This is a 64 year old male no significant medical history admitted to [facility] for SOB, fevers, dry cough x 10 days, poor PO intake. In ED patient with symptoms of tachycardia, hypoxia, ARF, now on non rebreather mask continuing to desaturate in the 80's. No sick contacts, no reports of chest pain, n/v/d in ED. Patient found to be COVID-19 positive and right pulmonary embolism. Called for palliative care consult to discuss GOC as patient is high risk for intubation. Patient is Andorran speaking,  used to discuss goals at bedside. See palliative care encounter below for details.

## 2020-04-29 NOTE — PROGRESS NOTE ADULT - SUBJECTIVE AND OBJECTIVE BOX
seen for covid, resp failure, pe    tachypneic and dyspneic  02 sat 70s on NRB when in chair, sat 87-89% at rest in bed  minimal cough, no chest pain.  ros negative     MEDICATIONS  (STANDING):  cholecalciferol 1000 Unit(s) Oral daily  morphine  - Injectable 2 milliGRAM(s) IV Push once  multivitamin/minerals 1 Tablet(s) Oral daily  pantoprazole    Tablet 40 milliGRAM(s) Oral before breakfast  polyethylene glycol 3350 17 Gram(s) Oral daily  psyllium Powder 1 Packet(s) Oral two times a day  senna 2 Tablet(s) Oral at bedtime    MEDICATIONS  (PRN):  acetaminophen   Tablet .. 650 milliGRAM(s) Oral every 6 hours PRN Temp greater or equal to 38C (100.4F), Mild Pain (1 - 3), Moderate Pain (4 - 6)  bisacodyl 5 milliGRAM(s) Oral every 12 hours PRN Constipation      Allergies    No Known Allergies      Vital Signs Last 24 Hrs  T(C): 36.9 (29 Apr 2020 08:35), Max: 37.1 (28 Apr 2020 23:25)  T(F): 98.4 (29 Apr 2020 08:35), Max: 98.8 (28 Apr 2020 23:25)  HR: 110 (29 Apr 2020 08:35) (101 - 110)  BP: 127/87 (29 Apr 2020 08:35) (115/79 - 127/87)  BP(mean): --  RR: 89 (29 Apr 2020 09:17) (18 - 89)  SpO2: 93% (29 Apr 2020 04:42) (91% - 93%)    PHYSICAL EXAM:    GENERAL: resp distress  CHEST/LUNG: Clear to percussion bilaterally  HEART: Regular rate and rhythm; S1 S2  ABDOMEN: Soft,  Bowel sounds present  EXTREMITIES:  no edema  NERVOUS SYSTEM:  Alert & Oriented X3, Motor Strength 5/5 B/L upper and lower extremities      LABS:                        16.8   7.82  )-----------( 281      ( 28 Apr 2020 07:41 )             52.1     04-28    135  |  93<L>  |  12.0  ----------------------------<  106<H>  4.6   |  27.0  |  0.46<L>    Ca    8.6      28 Apr 2020 07:41    TPro  6.6  /  Alb  2.6<L>  /  TBili  0.4  /  DBili  x   /  AST  130<H>  /  ALT  114<H>  /  AlkPhos  111  04-28    PT/INR - ( 29 Apr 2020 07:48 )   PT: 29.9 sec;   INR: 2.57 ratio         PTT - ( 29 Apr 2020 07:48 )  PTT:52.3 sec      CAPILLARY BLOOD GLUCOSE      POCT Blood Glucose.: 103 mg/dL (29 Apr 2020 11:23)  POCT Blood Glucose.: 115 mg/dL (29 Apr 2020 08:16)        RADIOLOGY & ADDITIONAL TESTS:

## 2020-04-29 NOTE — CONSULT NOTE ADULT - PROBLEM SELECTOR RECOMMENDATION 9
Infectious Disease following  Now starting plasma for management Infectious Disease following  Now starting plasma for management  Prior to plasma patient was on   - Antibiotic therapy  - Steroids  - Hydroxychloroquine Infectious Disease following  Plan to start infusion of plasma for management by ID  Patient has been on:  - Antibiotic therapy  - Steroids  - Hydroxychloroquine

## 2020-04-29 NOTE — PROGRESS NOTE ADULT - SUBJECTIVE AND OBJECTIVE BOX
[facility]  INFECTIOUS DISEASES AND INTERNAL MEDICINE at [place]  =======================================================  Fadi Sanchez MD  Diplomates American Board of Internal Medicine and Infectious Diseases  =======================================================    [id number]  LISSA MALLOY     Seen with      Follow up: COVID19, respiratory failure      No complaint, still on NRB, O2 sat around 93.   No fever       Allergies  No Known Allergies       REVIEW OF SYSTEMS:  CONSTITUTIONAL:  No Fever or chills  HEENT:  No diplopia or blurred vision.  No sore throat or runny nose.  CARDIOVASCULAR:  No chest pain or SOB.  RESPIRATORY:  +cough, +shortness of breath   GASTROINTESTINAL:  No nausea, vomiting or diarrhea.  GENITOURINARY:  No dysuria, frequency or urgency.  MUSCULOSKELETAL:  no joint aches, no muscle pain  SKIN:  No change in skin, hair or nails.  NEUROLOGIC:  No paresthesias, fasciculations, seizures  PSYCHIATRIC:  No disorder of thought or mood.  ENDOCRINE:  No heat or cold intolerance  HEMATOLOGICAL:  No easy bruising or bleeding.       Physical Exam:  GEN: NAD, on NRB  HEENT: normocephalic and atraumatic.  NECK: Supple.  No lymphadenopathy   LUNGS: Clear to auscultation.  HEART: Regular rate and rhythm   ABDOMEN: Soft, nontender, and nondistended.  Positive bowel sounds.    EXTREMITIES: Without edema.  NEUROLOGIC: grossly intact.  PSYCHIATRIC: Appropriate affect .  SKIN: No rash      Vitals:  T(F): 98.4 (29 Apr 2020 08:35), Max: 98.8 (28 Apr 2020 23:25)  HR: 110 (29 Apr 2020 08:35)  BP: 127/87 (29 Apr 2020 08:35)  RR: 89 (29 Apr 2020 09:17)  SpO2: 93% (29 Apr 2020 04:42) (91% - 93%)  temp max in last 48H T(F): , Max: 98.8 (04-28-20 @ 08:47)      Current Antibiotics:      Other medications:  cholecalciferol 1000 Unit(s) Oral daily  multivitamin/minerals 1 Tablet(s) Oral daily  pantoprazole    Tablet 40 milliGRAM(s) Oral before breakfast  polyethylene glycol 3350 17 Gram(s) Oral daily  psyllium Powder 1 Packet(s) Oral two times a day  senna 2 Tablet(s) Oral at bedtime      Labs:                        16.8   7.82  )-----------( 281      ( 28 Apr 2020 07:41 )             52.1      04-28    135  |  93<L>  |  12.0  ----------------------------<  106<H>  4.6   |  27.0  |  0.46<L>    Ca    8.6      28 Apr 2020 07:41    TPro  6.6  /  Alb  2.6<L>  /  TBili  0.4  /  DBili  x   /  AST  130<H>  /  ALT  114<H>  /  AlkPhos  111  04-28      WBC Count: 7.82 K/uL (04-28-20 @ 07:41)  WBC Count: 9.83 K/uL (04-26-20 @ 07:51)  WBC Count: 14.56 K/uL (04-25-20 @ 12:05)    Creatinine, Serum: 0.46 mg/dL (04-28-20 @ 07:41)  Creatinine, Serum: 0.60 mg/dL (04-26-20 @ 07:51)  Creatinine, Serum: 0.53 mg/dL (04-25-20 @ 12:05)    C-Reactive Protein, Serum: 2.39 mg/dL (04-28-20 @ 07:41)  C-Reactive Protein, Serum: 17.50 mg/dL (04-25-20 @ 23:01)  C-Reactive Protein, Serum: 20.60 mg/dL (04-24-20 @ 07:03)  C-Reactive Protein, Serum: 10.42 mg/dL (04-21-20 @ 08:47)  C-Reactive Protein, Serum: 6.75 mg/dL (04-20-20 @ 07:46)  C-Reactive Protein, Serum: 15.01 mg/dL (04-19-20 @ 07:30)  C-Reactive Protein, Serum: 31.47 mg/dL (04-18-20 @ 08:44)    Ferritin, Serum: 3112 ng/mL (04-24-20 @ 07:03)  Ferritin, Serum: 2231 ng/mL (04-19-20 @ 07:30)  Ferritin, Serum: 2889 ng/mL (04-18-20 @ 08:44)    Procalcitonin, Serum: 0.14 ng/mL (04-28-20 @ 07:41)  Procalcitonin, Serum: 0.30 ng/mL (04-25-20 @ 23:01)  Procalcitonin, Serum: 0.25 ng/mL (04-24-20 @ 07:03)  Procalcitonin, Serum: 0.34 ng/mL (04-21-20 @ 08:47)  Procalcitonin, Serum: 0.63 ng/mL (04-20-20 @ 07:46)  Procalcitonin, Serum: 0.99 ng/mL (04-19-20 @ 07:30)  Procalcitonin, Serum: 1.11 ng/mL (04-18-20 @ 08:44)    COVID-19 PCR: Detected (04-18-20 @ 08:55)        < from: CT Angio Chest w/ IV Cont (04.21.20 @ 16:58) >  EXAM:  CT ANGIO CHEST (W)AW IC                          PROCEDURE DATE:  04/21/2020      INTERPRETATION:  CLINICAL INFORMATION: COVID positive, hypoxia, elevated d-dimer, short of breath    COMPARISON: Chest x-ray of 4/18/2020    PROCEDURE:   CT Angiography of the Chest.  As before ml of Omnipaque 350 was injected intravenously. 46 ml were discarded.  Sagittal and coronal reformats were performed as well as 3D (MIP) reconstructions.      FINDINGS:    LUNGS AND AIRWAYS: Patent central airways.  Lungs are remarkable for extensive confluent infiltrates with areas of groundglass infiltrate as well.    PLEURA: Minimal left pleural effusion.    MEDIASTINUM AND LUIZ: No lymphadenopathy.    VESSELS: Segmental filling defect is seen to the right middle lobe.    HEART: Heart size is normal. No pericardial effusion.    CHEST WALL AND LOWER NECK: Within normal limits.    VISUALIZED UPPER ABDOMEN: Cholelithiasis. There is interposition of colon between the right diaphragm and the liver.    BONES: Within normal limits.    IMPRESSION:     Segmental right middle lobe pulmonary embolism.  Extensive bilateral infiltrates    < end of copied text >

## 2020-04-29 NOTE — CONSULT NOTE ADULT - PROBLEM SELECTOR RECOMMENDATION 4
Discussed care/physical with Dr. Neumann  Meeting held at bedside with patient, myself, Dr. Neumann, and   Discussed goals/advance directives. Established patient desire for Full Code including CPR and intubation in the event of cardio/pulmonary compromise/arrest  Patient was explained that he is high risk for intubation given his poor pulmonary status - he is also aware he is at risk for cardiac arrest- patient agreeable to starting plasma, signed consents at bedside with Dr. Romero  - Patient provided us with a contact number for his Niece who lives in [place] her phone number is [phone] but that is not who he wants as his HCP   Patient wants his HCP to be Yovani Walden telephone number [phone]  Call placed to Yovani who is agreeable to be patient's HCP - he is aware patient is decompensating  At this time Morphine was ordered by Dr. Neumann for symptom management of dyspnea  Will continue to support and monitor    Total time spent 30 minutes  COUNSELING:  Face to face meeting to discuss Advanced Care Planning - Time Spent __20____Minutes.  See goals of care note.      Thank you for the opportunity to assist with the care of this patient.   [facility] [phone]. Discussed care/physical with Dr. Neumann  Meeting held at bedside with patient, myself, Dr. Neumann, and   Discussed goals/advance directives. Established patient desire for Full Code including CPR and intubation in the event of cardio/pulmonary compromise/arrest  Patient was explained that he is high risk for intubation given his poor pulmonary status - he is also aware he is at risk for cardiac arrest- patient agreeable to starting plasma, signed consents at bedside with Dr. Romero  - Patient provided us with a contact number for his Niece who lives in [place] her phone number is [phone] but that is not who he wants as his HCP   Patient states his Surrogate is his friend Yovani Walden telephone number [phone]  Call placed to Yovani who is agreeable to be patient's surrogate / health care decision person- he is aware patient is decompensating  At this time Morphine was ordered by Dr. Neumann for symptom management of dyspnea  Will continue to support and monitor    Total time spent 30 minutes  COUNSELING:  Face to face meeting to discuss Advanced Care Planning - Time Spent __20____Minutes.  See goals of care note.      Thank you for the opportunity to assist with the care of this patient.   [facility] Consult [facility] [phone].

## 2020-04-29 NOTE — CONSULT NOTE ADULT - ASSESSMENT
Pt is a 65 y/o male with no PMHx here with:    Assessment:  1. Acute hypoxic respiratory failure  2. COVID19  3. Rt middle lobe segmental PE- no RH strain, no biomarkers    At this time, pt comfortable, in NAD, w/ improved o2 now lying in bed. Pt offers no complaints and is stable from a respiratory standpoint. Pt does not need ICU. Given high degree of hypoxia w/ movement, pt is at high risk of decompensation. If worsens/deteriorates please consult ICU for further evaluation. See full recommendations below:    Plan:  - Continue on NRB, can trial NC under NRB as well, keep sats >90%, can likely tolerate high 80s if mentating well  - PRN proning  - Given impressive improvement w/ morphine, now saturating better with across the board improvement in hemodynamics, likely a component of anxiety  - PRN Benzo/Morphine (caution w/ depressing respiratory drive w/ opioids).   - Recheck BIO markers, would get official TTE (noted no RH strain on CTA).  - LE DVT study neg, SCDs  - Restart A/C, trend INR (noted supratherapeutic period)  - COVID 19 surveillance labs improving, trend    DISPO: As will all COVID19 pts, pt at high risk for deterioration. Please reconsult PRN. Will check up on PT later this evening. Full code. Okay to stay on medicine service.     Critical Care time: 35 mins assessing presenting problems of acute illness that poses high probability of life threatening deterioration or end organ damage/dysfunction.  Medical decision making including initiating plan of care, reviewing data, reviewing radiology, direct patient bedside evaluation and interpretation of vital signs, any necessary ventilator management, discussion with multidisciplinary team, discussing goals of care with patient/family, all non inclusive of procedures Pt is a 63 y/o male with no PMHx here with:    Assessment:  1. Acute hypoxic respiratory failure  2. COVID19  3. Rt middle lobe segmental PE- no RH strain, no biomarkers    At this time, pt comfortable, in NAD, w/ improved o2 now lying in bed. Pt offers no complaints and is stable from a respiratory standpoint. Pt does not need ICU. Given high degree of hypoxia w/ movement, pt is at high risk of decompensation. If worsens/deteriorates please consult ICU for further evaluation. See full recommendations below:    Plan:  - Continue on NRB, can trial NC under NRB as well, keep sats >90%, can likely tolerate high 80s if mentating well  - PRN proning  - Given impressive improvement w/ morphine, now saturating better with across the board improvement in hemodynamics, likely a component of anxiety  - PRN Benzo/Morphine (caution w/ depressing respiratory drive w/ opioids).   - Recheck BIO markers, would get official TTE (noted no RH strain on CTA).  - LE DVT study neg, SCDs  - Restart A/C, trend INR (noted supratherapeutic period)  - COVID 19 surveillance labs improving, trend    DISPO: Given COVID19, pt at high risk for deterioration. Please reconsult PRN. Will check up on PT later this evening. Full code. Okay to stay on medicine service.

## 2020-04-29 NOTE — CONSULT NOTE ADULT - SUBJECTIVE AND OBJECTIVE BOX
This is a Palliative Care Consult  HPI: This is a 64 year old male no significant medical history admitted to [facility] for SOB, fevers, dry cough x 10 days, poor PO intake. In ED patient with symptoms of tachycardia, hypoxia, ARF, now on non rebreather mask continuing to desaturate in the 80's. No sick contacts, no reports of chest pain, n/v/d in ED. Patient found to be COVID-19 positive and right pulmonary embolism. Called for palliative care consult to discuss GOC as patient is high risk for intubation. Patient is Portuguese speaking,  used to discuss goals at bedside. See palliative care encounter below for details.     <HPI:  65 yo M w/ no reported PMHx presents to ER for worsening shortness of breath, subjective fevers and dry cough for 10 days. poor po intake.  did not take any meds at home. no sick contacts, travel.  in ER, tachycardia/hypoxic with leukocytosis, ARF and evidence of dehydration. improved with NRB mask. (18 Apr 2020 11:33)> end of copied text    PERTINENT PMH REVIEWED: Yes     PAST MEDICAL & SURGICAL HISTORY:  No pertinent past medical history  No significant past surgical history      SOCIAL HISTORY:                                     Admitted from:  home     Surrogate/HCP/Guardian: Phone#: Yovani Walden (friend) [phone]    FAMILY HISTORY:  FH: hypertension    Baseline ADLs (prior to admission):  Independent    Allergies  No Known Allergies      Present Symptoms:   Dyspnea:  2   Nausea/Vomiting: No  Anxiety:  Yes   Depression: No  Fatigue: No  Loss of appetite: Yes     Pain: Generalized pain breathing            Character-            Duration-            Effect-            Factors-            Frequency-            Location-            Severity-    Review of Systems: Reviewed  Per HPI, all other ROS negative    MEDICATIONS  (STANDING):  cholecalciferol 1000 Unit(s) Oral daily  morphine  - Injectable 2 milliGRAM(s) IV Push once  multivitamin/minerals 1 Tablet(s) Oral daily  pantoprazole    Tablet 40 milliGRAM(s) Oral before breakfast  polyethylene glycol 3350 17 Gram(s) Oral daily  psyllium Powder 1 Packet(s) Oral two times a day  senna 2 Tablet(s) Oral at bedtime    MEDICATIONS  (PRN):  acetaminophen   Tablet .. 650 milliGRAM(s) Oral every 6 hours PRN Temp greater or equal to 38C (100.4F), Mild Pain (1 - 3), Moderate Pain (4 - 6)  bisacodyl 5 milliGRAM(s) Oral every 12 hours PRN Constipation      PHYSICAL EXAM:  Physical Exam - limited physical exam due to COVID - 19 isolation status  No Physical Exam at bedside completed in attempt to limit COVID-19   Reviewed H&P physical exam and most recent Hospitalist Physical Exam   See H&P physical exam and most recent Hospitalist Document physical exam for details    Vital Signs Last 24 Hrs  T(C): 36.9 (29 Apr 2020 08:35), Max: 37.1 (28 Apr 2020 23:25)  T(F): 98.4 (29 Apr 2020 08:35), Max: 98.8 (28 Apr 2020 23:25)  HR: 110 (29 Apr 2020 08:35) (101 - 110)  BP: 127/87 (29 Apr 2020 08:35) (115/79 - 127/87)  BP(mean): --  RR: 89 (29 Apr 2020 09:17) (18 - 89)  SpO2: 93% (29 Apr 2020 04:42) (91% - 93%)    LABS:                        16.8   7.82  )-----------( 281      ( 28 Apr 2020 07:41 )             52.1     04-28    135  |  93<L>  |  12.0  ----------------------------<  106<H>  4.6   |  27.0  |  0.46<L>    Ca    8.6      28 Apr 2020 07:41    TPro  6.6  /  Alb  2.6<L>  /  TBili  0.4  /  DBili  x   /  AST  130<H>  /  ALT  114<H>  /  AlkPhos  111  04-28    PT/INR - ( 29 Apr 2020 07:48 )   PT: 29.9 sec;   INR: 2.57 ratio         PTT - ( 29 Apr 2020 07:48 )  PTT:52.3 sec    I&O's Summary      RADIOLOGY & ADDITIONAL STUDIES:  CTA chest 04.21.20  IMPRESSION:   Segmental right middle lobe pulmonary embolism.  Extensive bilateral infiltrates  Results were discussed with Dr. Jack at 5:10 PM on 4/21/2020.    ADVANCE DIRECTIVES:   Full Code

## 2020-04-30 LAB
ALBUMIN SERPL ELPH-MCNC: 3.3 G/DL — SIGNIFICANT CHANGE UP (ref 3.3–5.2)
ALP SERPL-CCNC: 129 U/L — HIGH (ref 40–120)
ALT FLD-CCNC: 120 U/L — HIGH
ANION GAP SERPL CALC-SCNC: 16 MMOL/L — SIGNIFICANT CHANGE UP (ref 5–17)
APTT BLD: 46 SEC — HIGH (ref 27.5–36.3)
AST SERPL-CCNC: 85 U/L — HIGH
BASOPHILS # BLD AUTO: 0.04 K/UL — SIGNIFICANT CHANGE UP (ref 0–0.2)
BASOPHILS NFR BLD AUTO: 0.3 % — SIGNIFICANT CHANGE UP (ref 0–2)
BILIRUB SERPL-MCNC: 0.4 MG/DL — SIGNIFICANT CHANGE UP (ref 0.4–2)
BUN SERPL-MCNC: 20 MG/DL — SIGNIFICANT CHANGE UP (ref 8–20)
CALCIUM SERPL-MCNC: 9.1 MG/DL — SIGNIFICANT CHANGE UP (ref 8.6–10.2)
CHLORIDE SERPL-SCNC: 93 MMOL/L — LOW (ref 98–107)
CO2 SERPL-SCNC: 30 MMOL/L — HIGH (ref 22–29)
CREAT SERPL-MCNC: 0.57 MG/DL — SIGNIFICANT CHANGE UP (ref 0.5–1.3)
CRP SERPL-MCNC: 0.65 MG/DL — HIGH (ref 0–0.4)
EOSINOPHIL # BLD AUTO: 0.47 K/UL — SIGNIFICANT CHANGE UP (ref 0–0.5)
EOSINOPHIL NFR BLD AUTO: 3.8 % — SIGNIFICANT CHANGE UP (ref 0–6)
FERRITIN SERPL-MCNC: 2287 NG/ML — HIGH (ref 30–400)
GLUCOSE SERPL-MCNC: 105 MG/DL — HIGH (ref 70–99)
HCT VFR BLD CALC: 51.7 % — HIGH (ref 39–50)
HGB BLD-MCNC: 16.9 G/DL — SIGNIFICANT CHANGE UP (ref 13–17)
IMM GRANULOCYTES NFR BLD AUTO: 0.7 % — SIGNIFICANT CHANGE UP (ref 0–1.5)
INR BLD: 2.07 RATIO — HIGH (ref 0.88–1.16)
LDH SERPL L TO P-CCNC: 689 U/L — HIGH (ref 98–192)
LYMPHOCYTES # BLD AUTO: 1.36 K/UL — SIGNIFICANT CHANGE UP (ref 1–3.3)
LYMPHOCYTES # BLD AUTO: 11.1 % — LOW (ref 13–44)
MCHC RBC-ENTMCNC: 29.7 PG — SIGNIFICANT CHANGE UP (ref 27–34)
MCHC RBC-ENTMCNC: 32.7 GM/DL — SIGNIFICANT CHANGE UP (ref 32–36)
MCV RBC AUTO: 90.9 FL — SIGNIFICANT CHANGE UP (ref 80–100)
MONOCYTES # BLD AUTO: 0.81 K/UL — SIGNIFICANT CHANGE UP (ref 0–0.9)
MONOCYTES NFR BLD AUTO: 6.6 % — SIGNIFICANT CHANGE UP (ref 2–14)
NEUTROPHILS # BLD AUTO: 9.52 K/UL — HIGH (ref 1.8–7.4)
NEUTROPHILS NFR BLD AUTO: 77.5 % — HIGH (ref 43–77)
PLATELET # BLD AUTO: 250 K/UL — SIGNIFICANT CHANGE UP (ref 150–400)
POTASSIUM SERPL-MCNC: 5.1 MMOL/L — SIGNIFICANT CHANGE UP (ref 3.5–5.3)
POTASSIUM SERPL-SCNC: 5.1 MMOL/L — SIGNIFICANT CHANGE UP (ref 3.5–5.3)
PROCALCITONIN SERPL-MCNC: 0.22 NG/ML — HIGH (ref 0.02–0.1)
PROT SERPL-MCNC: 7.1 G/DL — SIGNIFICANT CHANGE UP (ref 6.6–8.7)
PROTHROM AB SERPL-ACNC: 23.9 SEC — HIGH (ref 10–12.9)
RBC # BLD: 5.69 M/UL — SIGNIFICANT CHANGE UP (ref 4.2–5.8)
RBC # FLD: 13.6 % — SIGNIFICANT CHANGE UP (ref 10.3–14.5)
SODIUM SERPL-SCNC: 138 MMOL/L — SIGNIFICANT CHANGE UP (ref 135–145)
WBC # BLD: 12.28 K/UL — HIGH (ref 3.8–10.5)
WBC # FLD AUTO: 12.28 K/UL — HIGH (ref 3.8–10.5)

## 2020-04-30 PROCEDURE — 99233 SBSQ HOSP IP/OBS HIGH 50: CPT

## 2020-04-30 RX ORDER — WARFARIN SODIUM 2.5 MG/1
2 TABLET ORAL ONCE
Refills: 0 | Status: COMPLETED | OUTPATIENT
Start: 2020-04-30 | End: 2020-04-30

## 2020-04-30 RX ADMIN — Medication 1 TABLET(S): at 11:28

## 2020-04-30 RX ADMIN — Medication 1 PACKET(S): at 17:30

## 2020-04-30 RX ADMIN — PANTOPRAZOLE SODIUM 40 MILLIGRAM(S): 20 TABLET, DELAYED RELEASE ORAL at 04:40

## 2020-04-30 RX ADMIN — Medication 1 PACKET(S): at 04:40

## 2020-04-30 RX ADMIN — Medication 1000 UNIT(S): at 11:28

## 2020-04-30 RX ADMIN — WARFARIN SODIUM 2 MILLIGRAM(S): 2.5 TABLET ORAL at 21:32

## 2020-04-30 RX ADMIN — SENNA PLUS 2 TABLET(S): 8.6 TABLET ORAL at 21:32

## 2020-04-30 RX ADMIN — POLYETHYLENE GLYCOL 3350 17 GRAM(S): 17 POWDER, FOR SOLUTION ORAL at 11:27

## 2020-04-30 NOTE — PROGRESS NOTE ADULT - ASSESSMENT
63 y/o man with no significant PMH was admitted on 4/18 with worsening shortness of breath, fevers and dry cough for 10 days PTA. He was tested positive for COVID19 and placed on supplemental O2.   Since then he is on NRB with acceptable So2 but since they were not able to wean him off NRB, ID was called for evaluation.   CTA also showed PE that is contributing in his hypoxia. At this point interleukin inhibitors will not be very helpful since he has passed the interleukin storm phase. Inflammatory markers are trending down. No sign of secondary bacterial infection.       COVID 19 + infection  Viral pneumonia  Acute hypoxic respiratory failure  Segmental PE      - Airborne and contact isolation   - COVID 19 PCR positive 4/18/20  - CT Angio reporting PE   - CXR with multifocal opacities   - Procalcitonin 0.22  - CRP=31-->17.50 --> 2.39 --> 0.65  - Ferritin 3112 --> 2287  - Completed Hydroxychloroquine   - Check CBC with diff, CMP with LFT's, Inflammatory markers (D-dimer, CRP, Ferritin, LDH,  procalcitonin)  q48h.    - Avoid antibiotics unless there is a concern for a bacterial infection  - s/p steroid  - Encourage self proning q2h   - No indication for Actemra at this time.   - Patient consented for Plasma study, and is s/p convalescent plasma 4/29/20 (6815)      Will follow

## 2020-04-30 NOTE — PROGRESS NOTE ADULT - SUBJECTIVE AND OBJECTIVE BOX
[facility]  INFECTIOUS DISEASES AND INTERNAL MEDICINE at [place]  =======================================================  Fadi Sanchez MD  Diplomates American Board of Internal Medicine and Infectious Diseases  =======================================================    [id number]  LISSA MALLOY        Follow up: COVID19, respiratory failure      No complaint, still on NRB   No fever       Allergies  No Known Allergies       REVIEW OF SYSTEMS:  CONSTITUTIONAL:  No Fever or chills  HEENT:  No diplopia or blurred vision.  No sore throat or runny nose.  CARDIOVASCULAR:  No chest pain or SOB.  RESPIRATORY:  +cough, +shortness of breath   GASTROINTESTINAL:  No nausea, vomiting or diarrhea.  GENITOURINARY:  No dysuria, frequency or urgency.  MUSCULOSKELETAL:  no joint aches, no muscle pain  SKIN:  No change in skin, hair or nails.  NEUROLOGIC:  No paresthesias, fasciculations, seizures  PSYCHIATRIC:  No disorder of thought or mood.  ENDOCRINE:  No heat or cold intolerance  HEMATOLOGICAL:  No easy bruising or bleeding.       Physical Exam:  GEN: NAD, on NRB  HEENT: normocephalic and atraumatic.  NECK: Supple.  No lymphadenopathy   LUNGS: Clear to auscultation.  HEART: Regular rate and rhythm   ABDOMEN: Soft, nontender, and nondistended.  Positive bowel sounds.    EXTREMITIES: Without edema.  NEUROLOGIC: grossly intact.  PSYCHIATRIC: Appropriate affect .  SKIN: No rash        Vitals:  T(F): 97.9 (30 Apr 2020 15:28), Max: 97.9 (30 Apr 2020 15:28)  HR: 93 (30 Apr 2020 15:28)  BP: 151/75 (30 Apr 2020 15:28)  RR: 20 (30 Apr 2020 15:28)  SpO2: 91% (30 Apr 2020 15:28) (90% - 91%)  temp max in last 48H T(F): , Max: 98.9 (04-29-20 @ 15:22)      Current Antibiotics:    Other medications:  cholecalciferol 1000 Unit(s) Oral daily  multivitamin/minerals 1 Tablet(s) Oral daily  pantoprazole    Tablet 40 milliGRAM(s) Oral before breakfast  polyethylene glycol 3350 17 Gram(s) Oral daily  psyllium Powder 1 Packet(s) Oral two times a day  senna 2 Tablet(s) Oral at bedtime  warfarin 2 milliGRAM(s) Oral once      Labs:                        16.9   12.28 )-----------( 250      ( 30 Apr 2020 08:23 )             51.7      04-30    138  |  93<L>  |  20.0  ----------------------------<  105<H>  5.1   |  30.0<H>  |  0.57    Ca    9.1      30 Apr 2020 08:23    TPro  7.1  /  Alb  3.3  /  TBili  0.4  /  DBili  x   /  AST  85<H>  /  ALT  120<H>  /  AlkPhos  129<H>  04-30      WBC Count: 12.28 K/uL (04-30-20 @ 08:23)  WBC Count: 7.82 K/uL (04-28-20 @ 07:41)  WBC Count: 9.83 K/uL (04-26-20 @ 07:51)    Creatinine, Serum: 0.57 mg/dL (04-30-20 @ 08:23)  Creatinine, Serum: 0.46 mg/dL (04-28-20 @ 07:41)  Creatinine, Serum: 0.60 mg/dL (04-26-20 @ 07:51)    C-Reactive Protein, Serum: 0.65 mg/dL (04-30-20 @ 08:23)  C-Reactive Protein, Serum: 2.39 mg/dL (04-28-20 @ 07:41)  C-Reactive Protein, Serum: 17.50 mg/dL (04-25-20 @ 23:01)  C-Reactive Protein, Serum: 20.60 mg/dL (04-24-20 @ 07:03)  C-Reactive Protein, Serum: 10.42 mg/dL (04-21-20 @ 08:47)    Ferritin, Serum: 2287 ng/mL (04-30-20 @ 08:23)  Ferritin, Serum: 3112 ng/mL (04-24-20 @ 07:03)  Ferritin, Serum: 2231 ng/mL (04-19-20 @ 07:30)  Ferritin, Serum: 2889 ng/mL (04-18-20 @ 08:44)    Procalcitonin, Serum: 0.22 ng/mL (04-30-20 @ 08:23)  Procalcitonin, Serum: 0.14 ng/mL (04-28-20 @ 07:41)  Procalcitonin, Serum: 0.30 ng/mL (04-25-20 @ 23:01)  Procalcitonin, Serum: 0.25 ng/mL (04-24-20 @ 07:03)  Procalcitonin, Serum: 0.34 ng/mL (04-21-20 @ 08:47)     COVID-19 PCR: Detected (04-18-20 @ 08:55)        < from: CT Angio Chest w/ IV Cont (04.21.20 @ 16:58) >   EXAM:  CT ANGIO CHEST (W)AW IC                          PROCEDURE DATE:  04/21/2020      INTERPRETATION:  CLINICAL INFORMATION: COVID positive, hypoxia, elevated d-dimer, short of breath    COMPARISON: Chest x-ray of 4/18/2020    PROCEDURE:   CT Angiography of the Chest.  As before ml of Omnipaque 350 was injected intravenously. 46 ml were discarded.  Sagittal and coronal reformats were performed as well as 3D (MIP) reconstructions.    FINDINGS:    LUNGS AND AIRWAYS: Patent central airways.  Lungs are remarkable for extensive confluent infiltrates with areas of groundglass infiltrate as well.    PLEURA: Minimal left pleural effusion.    MEDIASTINUM AND LUIZ: No lymphadenopathy.    VESSELS: Segmental filling defect is seen to the right middle lobe.    HEART: Heart size is normal. No pericardial effusion.    CHEST WALL AND LOWER NECK: Within normal limits.    VISUALIZED UPPER ABDOMEN: Cholelithiasis. There is interposition of colon between the right diaphragm and the liver.    BONES: Within normal limits.    IMPRESSION:     Segmental right middle lobe pulmonary embolism.  Extensive bilateral infiltrates    < end of copied text >

## 2020-04-30 NOTE — PROGRESS NOTE ADULT - SUBJECTIVE AND OBJECTIVE BOX
LISSA MALLOY    [id number]    64y      Male    INTERVAL HPI/OVERNIGHT EVENTS: patient being seen for COVID. patient seen at bedside and states feeling ok    patient is s.p plasma yesterday     REVIEW OF SYSTEMS:    CONSTITUTIONAL: fatigue  RESPIRATORY: sob  CARDIOVASCULAR: No chest pain, palpitations  GASTROINTESTINAL: No abdominal or epigastric pain. No nausea, vomiting  NEUROLOGICAL: No headaches, memory loss, loss of strength.  MISCELLANEOUS:      Vital Signs Last 24 Hrs  T(C): 36.5 (30 Apr 2020 07:55), Max: 37.2 (29 Apr 2020 15:22)  T(F): 97.7 (30 Apr 2020 07:55), Max: 98.9 (29 Apr 2020 15:22)  HR: 97 (30 Apr 2020 07:55) (68 - 109)  BP: 114/78 (30 Apr 2020 07:55) (113/78 - 165/79)  BP(mean): --  RR: 20 (30 Apr 2020 07:55) (20 - 22)  SpO2: 91% (30 Apr 2020 07:55) (90% - 92%)    PHYSICAL EXAM:    GENERAL: resp distress  CHEST/LUNG: Clear to percussion bilaterally  HEART: Regular rate and rhythm; S1 S2  ABDOMEN: Soft,  Bowel sounds present  EXTREMITIES:  no edema  NERVOUS SYSTEM:  Alert & Oriented X3, Motor Strength 5/5 B/L upper and lower extremities      LABS:                        16.9   12.28 )-----------( 250      ( 30 Apr 2020 08:23 )             51.7     04-30    138  |  93<L>  |  20.0  ----------------------------<  105<H>  5.1   |  30.0<H>  |  0.57    Ca    9.1      30 Apr 2020 08:23    TPro  7.1  /  Alb  3.3  /  TBili  0.4  /  DBili  x   /  AST  85<H>  /  ALT  120<H>  /  AlkPhos  129<H>  04-30    PT/INR - ( 30 Apr 2020 08:23 )   PT: 23.9 sec;   INR: 2.07 ratio         PTT - ( 30 Apr 2020 08:23 )  PTT:46.0 sec        MEDICATIONS  (STANDING):  cholecalciferol 1000 Unit(s) Oral daily  multivitamin/minerals 1 Tablet(s) Oral daily  pantoprazole    Tablet 40 milliGRAM(s) Oral before breakfast  polyethylene glycol 3350 17 Gram(s) Oral daily  psyllium Powder 1 Packet(s) Oral two times a day  senna 2 Tablet(s) Oral at bedtime  warfarin 2 milliGRAM(s) Oral once    MEDICATIONS  (PRN):  acetaminophen   Tablet .. 650 milliGRAM(s) Oral every 6 hours PRN Temp greater or equal to 38C (100.4F), Mild Pain (1 - 3), Moderate Pain (4 - 6)  bisacodyl 5 milliGRAM(s) Oral every 12 hours PRN Constipation  morphine  - Injectable 2 milliGRAM(s) IV Push every 4 hours PRN RR>24 or dyspnea      RADIOLOGY & ADDITIONAL TESTS:

## 2020-04-30 NOTE — PROGRESS NOTE ADULT - ASSESSMENT
65 yo M w/ no reported PMHx presents to ER for worsening shortness of breath, subjective fevers and dry cough for 10 days. poor po intake.  in ER, tachycardia/hypoxic with leukocytosis, ARF and evidence of dehydration. improved with NRB mask.  COVID +  CT Chest showed segmental PE.  Per radiology, CT at the time did not show signs of RV strain.  Briefly observe in MICU due to hypoxia, however, SaO2 has remained stable and pt returned to Hospitalist service. Patient is s.p plasma     acute hypoxic resp failure due to COVID, PE    no improvement post plaquenil and steroids    ID reconsulted  --> s./p plasma     Segmental PE  - CT imaging reviewed   - No indication of RV strain on initial CT  - inr is 2.07  --> coumadin tonight       JANIE with hyponatremia  - Resolved    Elevated Hga1c  - Hga1c 6.7  - FS and and accu checks stable    ICU reeval appreciated  -->: full code for now  --> prognosis guarded

## 2020-05-01 LAB
ALBUMIN SERPL ELPH-MCNC: 3.6 G/DL — SIGNIFICANT CHANGE UP (ref 3.3–5.2)
ALP SERPL-CCNC: 131 U/L — HIGH (ref 40–120)
ALT FLD-CCNC: 103 U/L — HIGH
ANION GAP SERPL CALC-SCNC: 16 MMOL/L — SIGNIFICANT CHANGE UP (ref 5–17)
APTT BLD: 42.8 SEC — HIGH (ref 27.5–36.3)
APTT BLD: >200 SEC — CRITICAL HIGH (ref 27.5–36.3)
APTT BLD: >200 SEC — CRITICAL HIGH (ref 27.5–36.3)
AST SERPL-CCNC: 72 U/L — HIGH
BASE EXCESS BLDA CALC-SCNC: 1.8 MMOL/L — SIGNIFICANT CHANGE UP (ref -3–3)
BASE EXCESS BLDA CALC-SCNC: 2.9 MMOL/L — SIGNIFICANT CHANGE UP (ref -3–3)
BASOPHILS # BLD AUTO: 0.04 K/UL — SIGNIFICANT CHANGE UP (ref 0–0.2)
BASOPHILS NFR BLD AUTO: 0.3 % — SIGNIFICANT CHANGE UP (ref 0–2)
BILIRUB SERPL-MCNC: 0.5 MG/DL — SIGNIFICANT CHANGE UP (ref 0.4–2)
BLOOD GAS COMMENTS ARTERIAL: SIGNIFICANT CHANGE UP
BLOOD GAS COMMENTS ARTERIAL: SIGNIFICANT CHANGE UP
BUN SERPL-MCNC: 18 MG/DL — SIGNIFICANT CHANGE UP (ref 8–20)
CALCIUM SERPL-MCNC: 9.2 MG/DL — SIGNIFICANT CHANGE UP (ref 8.6–10.2)
CHLORIDE SERPL-SCNC: 92 MMOL/L — LOW (ref 98–107)
CO2 SERPL-SCNC: 31 MMOL/L — HIGH (ref 22–29)
CREAT SERPL-MCNC: 0.5 MG/DL — SIGNIFICANT CHANGE UP (ref 0.5–1.3)
EOSINOPHIL # BLD AUTO: 0.81 K/UL — HIGH (ref 0–0.5)
EOSINOPHIL NFR BLD AUTO: 5.8 % — SIGNIFICANT CHANGE UP (ref 0–6)
GAS PNL BLDA: SIGNIFICANT CHANGE UP
GLUCOSE BLDC GLUCOMTR-MCNC: 151 MG/DL — HIGH (ref 70–99)
GLUCOSE SERPL-MCNC: 105 MG/DL — HIGH (ref 70–99)
HCO3 BLDA-SCNC: 26 MMOL/L — SIGNIFICANT CHANGE UP (ref 20–26)
HCO3 BLDA-SCNC: 27 MMOL/L — HIGH (ref 20–26)
HCT VFR BLD CALC: 47.5 % — SIGNIFICANT CHANGE UP (ref 39–50)
HCT VFR BLD CALC: 54.4 % — HIGH (ref 39–50)
HGB BLD-MCNC: 15 G/DL — SIGNIFICANT CHANGE UP (ref 13–17)
HGB BLD-MCNC: 17.6 G/DL — HIGH (ref 13–17)
HOROWITZ INDEX BLDA+IHG-RTO: 60 — SIGNIFICANT CHANGE UP
HOROWITZ INDEX BLDA+IHG-RTO: 90 — SIGNIFICANT CHANGE UP
IMM GRANULOCYTES NFR BLD AUTO: 0.6 % — SIGNIFICANT CHANGE UP (ref 0–1.5)
INR BLD: 2.2 RATIO — HIGH (ref 0.88–1.16)
LYMPHOCYTES # BLD AUTO: 1.92 K/UL — SIGNIFICANT CHANGE UP (ref 1–3.3)
LYMPHOCYTES # BLD AUTO: 13.8 % — SIGNIFICANT CHANGE UP (ref 13–44)
MCHC RBC-ENTMCNC: 30 PG — SIGNIFICANT CHANGE UP (ref 27–34)
MCHC RBC-ENTMCNC: 30 PG — SIGNIFICANT CHANGE UP (ref 27–34)
MCHC RBC-ENTMCNC: 31.6 GM/DL — LOW (ref 32–36)
MCHC RBC-ENTMCNC: 32.4 GM/DL — SIGNIFICANT CHANGE UP (ref 32–36)
MCV RBC AUTO: 92.7 FL — SIGNIFICANT CHANGE UP (ref 80–100)
MCV RBC AUTO: 95 FL — SIGNIFICANT CHANGE UP (ref 80–100)
MONOCYTES # BLD AUTO: 0.89 K/UL — SIGNIFICANT CHANGE UP (ref 0–0.9)
MONOCYTES NFR BLD AUTO: 6.4 % — SIGNIFICANT CHANGE UP (ref 2–14)
NEUTROPHILS # BLD AUTO: 10.16 K/UL — HIGH (ref 1.8–7.4)
NEUTROPHILS NFR BLD AUTO: 73.1 % — SIGNIFICANT CHANGE UP (ref 43–77)
PCO2 BLDA: 83 MMHG — CRITICAL HIGH (ref 35–45)
PCO2 BLDA: 97 MMHG — CRITICAL HIGH (ref 35–45)
PH BLDA: 7.16 — CRITICAL LOW (ref 7.35–7.45)
PH BLDA: 7.22 — LOW (ref 7.35–7.45)
PLATELET # BLD AUTO: 257 K/UL — SIGNIFICANT CHANGE UP (ref 150–400)
PLATELET # BLD AUTO: 281 K/UL — SIGNIFICANT CHANGE UP (ref 150–400)
PO2 BLDA: 129 MMHG — HIGH (ref 83–108)
PO2 BLDA: 84 MMHG — SIGNIFICANT CHANGE UP (ref 83–108)
POTASSIUM SERPL-MCNC: 5 MMOL/L — SIGNIFICANT CHANGE UP (ref 3.5–5.3)
POTASSIUM SERPL-SCNC: 5 MMOL/L — SIGNIFICANT CHANGE UP (ref 3.5–5.3)
PROT SERPL-MCNC: 7.3 G/DL — SIGNIFICANT CHANGE UP (ref 6.6–8.7)
PROTHROM AB SERPL-ACNC: 25.5 SEC — HIGH (ref 10–12.9)
RBC # BLD: 5 M/UL — SIGNIFICANT CHANGE UP (ref 4.2–5.8)
RBC # BLD: 5.87 M/UL — HIGH (ref 4.2–5.8)
RBC # FLD: 13.6 % — SIGNIFICANT CHANGE UP (ref 10.3–14.5)
RBC # FLD: 13.7 % — SIGNIFICANT CHANGE UP (ref 10.3–14.5)
SAO2 % BLDA: 96 % — SIGNIFICANT CHANGE UP (ref 95–99)
SAO2 % BLDA: 98 % — SIGNIFICANT CHANGE UP (ref 95–99)
SODIUM SERPL-SCNC: 139 MMOL/L — SIGNIFICANT CHANGE UP (ref 135–145)
WBC # BLD: 13.9 K/UL — HIGH (ref 3.8–10.5)
WBC # BLD: 22.38 K/UL — HIGH (ref 3.8–10.5)
WBC # FLD AUTO: 13.9 K/UL — HIGH (ref 3.8–10.5)
WBC # FLD AUTO: 22.38 K/UL — HIGH (ref 3.8–10.5)

## 2020-05-01 PROCEDURE — 99232 SBSQ HOSP IP/OBS MODERATE 35: CPT

## 2020-05-01 PROCEDURE — 71045 X-RAY EXAM CHEST 1 VIEW: CPT | Mod: 26,77

## 2020-05-01 PROCEDURE — 71045 X-RAY EXAM CHEST 1 VIEW: CPT | Mod: 26

## 2020-05-01 RX ORDER — SODIUM CHLORIDE 9 MG/ML
1000 INJECTION, SOLUTION INTRAVENOUS ONCE
Refills: 0 | Status: COMPLETED | OUTPATIENT
Start: 2020-05-01 | End: 2020-05-01

## 2020-05-01 RX ORDER — HYDROMORPHONE HYDROCHLORIDE 2 MG/ML
2 INJECTION INTRAMUSCULAR; INTRAVENOUS; SUBCUTANEOUS ONCE
Refills: 0 | Status: DISCONTINUED | OUTPATIENT
Start: 2020-05-01 | End: 2020-05-01

## 2020-05-01 RX ORDER — WARFARIN SODIUM 2.5 MG/1
2 TABLET ORAL ONCE
Refills: 0 | Status: DISCONTINUED | OUTPATIENT
Start: 2020-05-01 | End: 2020-05-01

## 2020-05-01 RX ORDER — ROCURONIUM BROMIDE 10 MG/ML
50 VIAL (ML) INTRAVENOUS ONCE
Refills: 0 | Status: COMPLETED | OUTPATIENT
Start: 2020-05-01 | End: 2020-05-01

## 2020-05-01 RX ORDER — CHLORHEXIDINE GLUCONATE 213 G/1000ML
15 SOLUTION TOPICAL EVERY 12 HOURS
Refills: 0 | Status: DISCONTINUED | OUTPATIENT
Start: 2020-05-01 | End: 2020-05-06

## 2020-05-01 RX ORDER — ROCURONIUM BROMIDE 10 MG/ML
8 VIAL (ML) INTRAVENOUS
Qty: 500 | Refills: 0 | Status: DISCONTINUED | OUTPATIENT
Start: 2020-05-01 | End: 2020-05-16

## 2020-05-01 RX ORDER — CHLORHEXIDINE GLUCONATE 213 G/1000ML
15 SOLUTION TOPICAL
Refills: 0 | Status: DISCONTINUED | OUTPATIENT
Start: 2020-05-01 | End: 2020-05-01

## 2020-05-01 RX ORDER — SODIUM CHLORIDE 9 MG/ML
1000 INJECTION, SOLUTION INTRAVENOUS
Refills: 0 | Status: DISCONTINUED | OUTPATIENT
Start: 2020-05-01 | End: 2020-05-07

## 2020-05-01 RX ORDER — HEPARIN SODIUM 5000 [USP'U]/ML
5500 INJECTION INTRAVENOUS; SUBCUTANEOUS EVERY 6 HOURS
Refills: 0 | Status: DISCONTINUED | OUTPATIENT
Start: 2020-05-01 | End: 2020-05-13

## 2020-05-01 RX ORDER — HEPARIN SODIUM 5000 [USP'U]/ML
INJECTION INTRAVENOUS; SUBCUTANEOUS
Qty: 25000 | Refills: 0 | Status: DISCONTINUED | OUTPATIENT
Start: 2020-05-01 | End: 2020-05-13

## 2020-05-01 RX ORDER — HEPARIN SODIUM 5000 [USP'U]/ML
2500 INJECTION INTRAVENOUS; SUBCUTANEOUS EVERY 6 HOURS
Refills: 0 | Status: DISCONTINUED | OUTPATIENT
Start: 2020-05-01 | End: 2020-05-13

## 2020-05-01 RX ORDER — HYDROMORPHONE HYDROCHLORIDE 2 MG/ML
0.5 INJECTION INTRAMUSCULAR; INTRAVENOUS; SUBCUTANEOUS
Qty: 100 | Refills: 0 | Status: DISCONTINUED | OUTPATIENT
Start: 2020-05-01 | End: 2020-05-07

## 2020-05-01 RX ORDER — PROPOFOL 10 MG/ML
20 INJECTION, EMULSION INTRAVENOUS
Qty: 1000 | Refills: 0 | Status: DISCONTINUED | OUTPATIENT
Start: 2020-05-01 | End: 2020-05-01

## 2020-05-01 RX ORDER — ROCURONIUM BROMIDE 10 MG/ML
100 VIAL (ML) INTRAVENOUS ONCE
Refills: 0 | Status: COMPLETED | OUTPATIENT
Start: 2020-05-01 | End: 2020-05-01

## 2020-05-01 RX ORDER — KETAMINE HYDROCHLORIDE 100 MG/ML
0.25 INJECTION INTRAMUSCULAR; INTRAVENOUS
Qty: 1000 | Refills: 0 | Status: DISCONTINUED | OUTPATIENT
Start: 2020-05-01 | End: 2020-05-08

## 2020-05-01 RX ORDER — NOREPINEPHRINE BITARTRATE/D5W 8 MG/250ML
0.05 PLASTIC BAG, INJECTION (ML) INTRAVENOUS
Qty: 8 | Refills: 0 | Status: DISCONTINUED | OUTPATIENT
Start: 2020-05-01 | End: 2020-05-05

## 2020-05-01 RX ORDER — HEPARIN SODIUM 5000 [USP'U]/ML
5500 INJECTION INTRAVENOUS; SUBCUTANEOUS ONCE
Refills: 0 | Status: DISCONTINUED | OUTPATIENT
Start: 2020-05-01 | End: 2020-05-01

## 2020-05-01 RX ADMIN — HEPARIN SODIUM 800 UNIT(S)/HR: 5000 INJECTION INTRAVENOUS; SUBCUTANEOUS at 23:30

## 2020-05-01 RX ADMIN — Medication 50 MILLIGRAM(S): at 11:55

## 2020-05-01 RX ADMIN — SODIUM CHLORIDE 1000 MILLILITER(S): 9 INJECTION, SOLUTION INTRAVENOUS at 23:05

## 2020-05-01 RX ADMIN — CHLORHEXIDINE GLUCONATE 15 MILLILITER(S): 213 SOLUTION TOPICAL at 16:50

## 2020-05-01 RX ADMIN — HEPARIN SODIUM 1200 UNIT(S)/HR: 5000 INJECTION INTRAVENOUS; SUBCUTANEOUS at 13:55

## 2020-05-01 RX ADMIN — HYDROMORPHONE HYDROCHLORIDE 2 MILLIGRAM(S): 2 INJECTION INTRAMUSCULAR; INTRAVENOUS; SUBCUTANEOUS at 13:54

## 2020-05-01 RX ADMIN — PANTOPRAZOLE SODIUM 40 MILLIGRAM(S): 20 TABLET, DELAYED RELEASE ORAL at 04:45

## 2020-05-01 RX ADMIN — CHLORHEXIDINE GLUCONATE 15 MILLILITER(S): 213 SOLUTION TOPICAL at 21:17

## 2020-05-01 RX ADMIN — HYDROMORPHONE HYDROCHLORIDE 0.5 MG/HR: 2 INJECTION INTRAMUSCULAR; INTRAVENOUS; SUBCUTANEOUS at 13:56

## 2020-05-01 RX ADMIN — Medication 1 PACKET(S): at 04:45

## 2020-05-01 RX ADMIN — SODIUM CHLORIDE 1000 MILLILITER(S): 9 INJECTION, SOLUTION INTRAVENOUS at 21:49

## 2020-05-01 RX ADMIN — Medication 6.75 MICROGRAM(S)/KG/MIN: at 13:38

## 2020-05-01 RX ADMIN — HEPARIN SODIUM 0 UNIT(S)/HR: 5000 INJECTION INTRAVENOUS; SUBCUTANEOUS at 18:30

## 2020-05-01 RX ADMIN — HEPARIN SODIUM 0 UNIT(S)/HR: 5000 INJECTION INTRAVENOUS; SUBCUTANEOUS at 22:30

## 2020-05-01 RX ADMIN — HEPARIN SODIUM 1000 UNIT(S)/HR: 5000 INJECTION INTRAVENOUS; SUBCUTANEOUS at 19:30

## 2020-05-01 RX ADMIN — Medication 1 PACKET(S): at 16:51

## 2020-05-01 RX ADMIN — PROPOFOL 8.44 MICROGRAM(S)/KG/MIN: 10 INJECTION, EMULSION INTRAVENOUS at 11:54

## 2020-05-01 NOTE — PROGRESS NOTE ADULT - ASSESSMENT
63 y/o man with no significant PMH was admitted on 4/18 with worsening shortness of breath, fevers and dry cough for 10 days PTA. He was tested positive for COVID19 and placed on supplemental O2.   Since then he is on NRB with acceptable So2 but since they were not able to wean him off NRB, ID was called for evaluation.   CTA also showed PE that is contributing in his hypoxia. At this point interleukin inhibitors will not be very helpful since he has passed the interleukin storm phase. Inflammatory markers are trending down. No sign of secondary bacterial infection.       COVID 19 + infection  Viral pneumonia  Acute hypoxic respiratory failure  Segmental PE      - Airborne and contact isolation   - COVID 19 PCR positive 4/18/20  - CT Angio reporting PE   - CXR with multifocal opacities   - Procalcitonin 0.22  - CRP=31-->17.50 --> 2.39 --> 0.65  - Ferritin 3112 --> 2287  - Completed Hydroxychloroquine   - Check CBC with diff, CMP with LFT's, Inflammatory markers (D-dimer, CRP, Ferritin, LDH,  procalcitonin)  q48h.    - Avoid antibiotics unless there is a concern for a bacterial infection  - s/p steroid  - Encourage self proning q2h   - No indication for Actemra at this time.   - Patient consented for Plasma study, and is s/p convalescent plasma 4/29/20 ([id number])      Will follow

## 2020-05-01 NOTE — CHART NOTE - NSCHARTNOTEFT_GEN_A_CORE
Source: Patient [ ]  Family [ ]   other [X]    Current Diet: Diet, Consistent Carbohydrate w/Evening Snack     PO intake:  < 50% [ ]   50-75%  [ ]   %  [ ]  other : 0-100%    Source for PO intake [ ] Patient [ ] family [X] chart [ ] staff [ ] other    Current Weight:   4/18/2020: 154.9#  No new wt. No edema noted. Obtain daily wts to monitor trends.    Pertinent Medications: MEDICATIONS  (STANDING):  cholecalciferol 1000 Unit(s) Oral daily  multivitamin/minerals 1 Tablet(s) Oral daily  pantoprazole    Tablet 40 milliGRAM(s) Oral before breakfast  polyethylene glycol 3350 17 Gram(s) Oral daily  psyllium Powder 1 Packet(s) Oral two times a day  senna 2 Tablet(s) Oral at bedtime  warfarin 2 milliGRAM(s) Oral once    MEDICATIONS  (PRN):  acetaminophen   Tablet .. 650 milliGRAM(s) Oral every 6 hours PRN Temp greater or equal to 38C (100.4F), Mild Pain (1 - 3), Moderate Pain (4 - 6)  bisacodyl 5 milliGRAM(s) Oral every 12 hours PRN Constipation  morphine  - Injectable 2 milliGRAM(s) IV Push every 4 hours PRN RR>24 or dyspnea    Pertinent Labs: CBC Full  -  ( 01 May 2020 06:33 )  WBC Count : 13.90 K/uL  RBC Count : 5.87 M/uL  Hemoglobin : 17.6 g/dL  Hematocrit : 54.4 %  Platelet Count - Automated : 281 K/uL  Mean Cell Volume : 92.7 fl  Mean Cell Hemoglobin : 30.0 pg  Mean Cell Hemoglobin Concentration : 32.4 gm/dL  Auto Neutrophil # : 10.16 K/uL  Auto Lymphocyte # : 1.92 K/uL  Auto Monocyte # : 0.89 K/uL  Auto Eosinophil # : 0.81 K/uL  Auto Basophil # : 0.04 K/uL  Auto Neutrophil % : 73.1 %  Auto Lymphocyte % : 13.8 %  Auto Monocyte % : 6.4 %  Auto Eosinophil % : 5.8 %  Auto Basophil % : 0.3 %    Labs: 05-01 Na139 mmol/L Glu 105 mg/dL<H> K+ 5.0 mmol/L Cr  0.50 mg/dL BUN 18.0 mg/dL Phos n/a   Alb 3.6 g/dL PAB n/a       Skin: As per EMR, WDL except ecchymotic.    Nutrition focused physical exam not conducted at this time- found signs of malnutrition [ ]absent [ ]present    Subcutaneous fat loss: [ ] Orbital fat pads region, [ ]Buccal fat region, [ ]Triceps region,  [ ]Ribs region    Muscle wasting: [ ]Temples region, [ ]Clavicle region, [ ]Shoulder region, [ ]Scapula region, [ ]Interosseous region,  [ ]thigh region, [ ]Calf region    Estimated Needs:   [X] no change since previous assessment  [ ] recalculated:     Current Nutrition Diagnosis: Pt remains at nutrition risk secondary to increased nutrient needs related to increased physiological demand to maintain nutrition status as evidenced by acute resp. failure 2/2 COVID PNA. Per documentation, pt having poor PO intake, encourage PO intake and continue to monitor. Pt experiencing constipation per documentation. Aware pt receiving senna. Pt on warfarin, recommend consistent Vitamin K. RD to remain available.    Recommendations:   1. Continue diet as tolerated.  2. Recommend Glucerna TID to optimize PO intake and provide an additional 220kcal and 10g protein per serving.   3. Continue MVI, Vitamin C and thiamine daily.  4. Obtain daily wts and monitor labs.   5. Continue bowel regimen PRN.      Monitoring and Evaluation:   [X] PO intake [X] Tolerance to diet prescription [X] Weights  [X] Follow up per protocol [X] Labs: Source: Patient [ ]  Family [ ]   other [X]    Current Diet: Diet, Consistent Carbohydrate w/Evening Snack     PO intake:  < 50% [ ]   50-75%  [ ]   %  [ ]  other : 0-100%    Source for PO intake [ ] Patient [ ] family [X] chart [ ] staff [ ] other    Current Weight:   4/18/2020: 154.9#  No new wt. No edema noted. Obtain daily wts to monitor trends.    Pertinent Medications: MEDICATIONS  (STANDING):  cholecalciferol 1000 Unit(s) Oral daily  multivitamin/minerals 1 Tablet(s) Oral daily  pantoprazole    Tablet 40 milliGRAM(s) Oral before breakfast  polyethylene glycol 3350 17 Gram(s) Oral daily  psyllium Powder 1 Packet(s) Oral two times a day  senna 2 Tablet(s) Oral at bedtime  warfarin 2 milliGRAM(s) Oral once    MEDICATIONS  (PRN):  acetaminophen   Tablet .. 650 milliGRAM(s) Oral every 6 hours PRN Temp greater or equal to 38C (100.4F), Mild Pain (1 - 3), Moderate Pain (4 - 6)  bisacodyl 5 milliGRAM(s) Oral every 12 hours PRN Constipation  morphine  - Injectable 2 milliGRAM(s) IV Push every 4 hours PRN RR>24 or dyspnea    Pertinent Labs: CBC Full  -  ( 01 May 2020 06:33 )  WBC Count : 13.90 K/uL  RBC Count : 5.87 M/uL  Hemoglobin : 17.6 g/dL  Hematocrit : 54.4 %  Platelet Count - Automated : 281 K/uL  Mean Cell Volume : 92.7 fl  Mean Cell Hemoglobin : 30.0 pg  Mean Cell Hemoglobin Concentration : 32.4 gm/dL  Auto Neutrophil # : 10.16 K/uL  Auto Lymphocyte # : 1.92 K/uL  Auto Monocyte # : 0.89 K/uL  Auto Eosinophil # : 0.81 K/uL  Auto Basophil # : 0.04 K/uL  Auto Neutrophil % : 73.1 %  Auto Lymphocyte % : 13.8 %  Auto Monocyte % : 6.4 %  Auto Eosinophil % : 5.8 %  Auto Basophil % : 0.3 %    Labs: 05-01 Na139 mmol/L Glu 105 mg/dL<H> K+ 5.0 mmol/L Cr  0.50 mg/dL BUN 18.0 mg/dL Phos n/a   Alb 3.6 g/dL PAB n/a       Skin: As per EMR, WDL except ecchymotic.    Nutrition focused physical exam not conducted at this time- found signs of malnutrition [ ]absent [ ]present    Subcutaneous fat loss: [ ] Orbital fat pads region, [ ]Buccal fat region, [ ]Triceps region,  [ ]Ribs region    Muscle wasting: [ ]Temples region, [ ]Clavicle region, [ ]Shoulder region, [ ]Scapula region, [ ]Interosseous region,  [ ]thigh region, [ ]Calf region    Estimated Needs:   [X] no change since previous assessment  [ ] recalculated:     Current Nutrition Diagnosis: Pt remains at nutrition risk secondary to increased nutrient needs related to increased physiological demand to maintain nutrition status as evidenced by acute resp. failure 2/2 COVID PNA. Per documentation, pt having poor PO intake, encourage PO intake and continue to monitor. Pt experiencing constipation per documentation. Aware pt receiving senna. Pt on warfarin, recommend consistent Vitamin K. RD to remain available.    Recommendations:   1. Continue diet as tolerated.  2. Recommend Glucerna TID to optimize PO intake and provide an additional 220kcal and 10g protein per serving.   3. Obtain daily wts and monitor labs.   4. Continue bowel regimen PRN.    Monitoring and Evaluation:   [X] PO intake [X] Tolerance to diet prescription [X] Weights  [X] Follow up per protocol [X] Labs: Source: Patient [ ]  Family [ ]   other [X]    Current Diet: Diet, Consistent Carbohydrate w/Evening Snack     PO intake:  < 50% [ ]   50-75%  [ ]   %  [ ]  other : 0-100%    Source for PO intake [ ] Patient [ ] family [X] chart [ ] staff [ ] other    Current Weight:   4/18/2020: 154.9#  No new wt. No edema noted. Obtain daily wts to monitor trends.    Pertinent Medications: MEDICATIONS  (STANDING):  cholecalciferol 1000 Unit(s) Oral daily  multivitamin/minerals 1 Tablet(s) Oral daily  pantoprazole    Tablet 40 milliGRAM(s) Oral before breakfast  polyethylene glycol 3350 17 Gram(s) Oral daily  psyllium Powder 1 Packet(s) Oral two times a day  senna 2 Tablet(s) Oral at bedtime  warfarin 2 milliGRAM(s) Oral once    MEDICATIONS  (PRN):  acetaminophen   Tablet .. 650 milliGRAM(s) Oral every 6 hours PRN Temp greater or equal to 38C (100.4F), Mild Pain (1 - 3), Moderate Pain (4 - 6)  bisacodyl 5 milliGRAM(s) Oral every 12 hours PRN Constipation  morphine  - Injectable 2 milliGRAM(s) IV Push every 4 hours PRN RR>24 or dyspnea    Pertinent Labs: CBC Full  -  ( 01 May 2020 06:33 )  WBC Count : 13.90 K/uL  RBC Count : 5.87 M/uL  Hemoglobin : 17.6 g/dL  Hematocrit : 54.4 %  Platelet Count - Automated : 281 K/uL  Mean Cell Volume : 92.7 fl  Mean Cell Hemoglobin : 30.0 pg  Mean Cell Hemoglobin Concentration : 32.4 gm/dL  Auto Neutrophil # : 10.16 K/uL  Auto Lymphocyte # : 1.92 K/uL  Auto Monocyte # : 0.89 K/uL  Auto Eosinophil # : 0.81 K/uL  Auto Basophil # : 0.04 K/uL  Auto Neutrophil % : 73.1 %  Auto Lymphocyte % : 13.8 %  Auto Monocyte % : 6.4 %  Auto Eosinophil % : 5.8 %  Auto Basophil % : 0.3 %    Labs: 05-01 Na139 mmol/L Glu 105 mg/dL<H> K+ 5.0 mmol/L Cr  0.50 mg/dL BUN 18.0 mg/dL Phos n/a   Alb 3.6 g/dL PAB n/a       Skin: As per EMR, WDL except ecchymotic.    Nutrition focused physical exam not conducted at this time- found signs of malnutrition [ ]absent [ ]present    Subcutaneous fat loss: [ ] Orbital fat pads region, [ ]Buccal fat region, [ ]Triceps region,  [ ]Ribs region    Muscle wasting: [ ]Temples region, [ ]Clavicle region, [ ]Shoulder region, [ ]Scapula region, [ ]Interosseous region,  [ ]thigh region, [ ]Calf region    Estimated Needs:   [X] no change since previous assessment  [ ] recalculated:     Current Nutrition Diagnosis: Pt remains at nutrition risk secondary to increased nutrient needs related to increased physiological demand to maintain nutrition status as evidenced by acute resp. failure 2/2 COVID PNA. Per documentation, pt having poor PO intake, encourage PO intake and continue to monitor. Pt experiencing constipation per documentation. Aware pt receiving senna. Pt on warfarin, recommend consistent Vitamin K. Aware palliative care following. [place] pt/family wishes. RD to remain available.    Recommendations:   1. Continue diet as tolerated.  2. Recommend Glucerna TID to optimize PO intake and provide an additional 220kcal and 10g protein per serving.   3. Obtain daily wts and monitor labs.   4. Continue bowel regimen PRN.    Monitoring and Evaluation:   [X] PO intake [X] Tolerance to diet prescription [X] Weights  [X] Follow up per protocol [X] Labs:

## 2020-05-01 NOTE — RAPID RESPONSE TEAM SUMMARY - NSOTHERINTERVENTIONSRRT_GEN_ALL_CORE
ICU at bedside. Patient intubated d/t rapidly declining respiratory status.   Patient transferred to CTICU service.

## 2020-05-01 NOTE — PROGRESS NOTE ADULT - ASSESSMENT
65 yo M w/ no reported PMHx presents to ER for worsening shortness of breath, subjective fevers and dry cough for 10 days. poor po intake.  in ER, tachycardia/hypoxic with leukocytosis, ARF and evidence of dehydration. improved with NRB mask.  COVID +  CT Chest showed segmental PE.  Per radiology, CT at the time did not show signs of RV strain.  Briefly observe in MICU due to hypoxia, however, SaO2 has remained stable and pt returned to Hospitalist service. Patient is s.p plasma     1) Acute hypoxic resp failure due to COVID, PE  - S/p intubation 5/1/2020  - S/p plasma     2) Segmental PE  - CT imaging reviewed   - No indication of RV strain on initial CT  - S/p 6 doses coumadin       3) JANIE with hyponatremia  - Resolved    4) Elevated Hga1c  - Hga1c 6.7  - FS and accu checks stable    Patient intubated and transferred to CTICU.

## 2020-05-01 NOTE — CHART NOTE - NSCHARTNOTEFT_GEN_A_CORE
Spoke w/ pt's friend Yovani, designated HCP via  [phone], updated on pt's intubation status and transfer to ICU.  Updated on medical status for the day.  Questions answered.  Support provided.

## 2020-05-01 NOTE — PROGRESS NOTE ADULT - ASSESSMENT
Patient's respiratory status declined, RRT called , patient intubated, sent to ICU  now on ventilator, propofol , zemuron , heparin drip

## 2020-05-01 NOTE — PROGRESS NOTE ADULT - SUBJECTIVE AND OBJECTIVE BOX
[facility]  INFECTIOUS DISEASES AND INTERNAL MEDICINE at [place]  =======================================================  Fadi Sanchez MD  Diplomates American Board of Internal Medicine and Infectious Diseases  =======================================================    [id number]  LISSA MALLOY        Follow up: COVID19, respiratory failure      No complaint, still on NRB   No fever       Allergies  No Known Allergies       REVIEW OF SYSTEMS:  CONSTITUTIONAL:  No Fever or chills  HEENT:  No diplopia or blurred vision.  No sore throat or runny nose.  CARDIOVASCULAR:  No chest pain or SOB.  RESPIRATORY:  +cough, +shortness of breath   GASTROINTESTINAL:  No nausea, vomiting or diarrhea.  GENITOURINARY:  No dysuria, frequency or urgency.  MUSCULOSKELETAL:  no joint aches, no muscle pain  SKIN:  No change in skin, hair or nails.  NEUROLOGIC:  No paresthesias, fasciculations, seizures  PSYCHIATRIC:  No disorder of thought or mood.  ENDOCRINE:  No heat or cold intolerance  HEMATOLOGICAL:  No easy bruising or bleeding.       Physical Exam:  GEN: NAD, on NRB  HEENT: normocephalic and atraumatic.  NECK: Supple.  No lymphadenopathy   LUNGS: Clear to auscultation.  HEART: Regular rate and rhythm   ABDOMEN: Soft, nontender, and nondistended.  Positive bowel sounds.    EXTREMITIES: Without edema.  NEUROLOGIC: grossly intact.  PSYCHIATRIC: Appropriate affect .  SKIN: No rash      Vitals:    T(F): 97.9 (01 May 2020 07:58), Max: 97.9 (30 Apr 2020 15:28)  HR: 89 (01 May 2020 07:58)  BP: 114/74 (01 May 2020 07:58)  RR: 20 (01 May 2020 07:58)  SpO2: 96% (01 May 2020 07:58) (91% - 96%)  temp max in last 48H T(F): , Max: 98.9 (04-29-20 @ 15:22)      Current Antibiotics:    Other medications:  cholecalciferol 1000 Unit(s) Oral daily  multivitamin/minerals 1 Tablet(s) Oral daily  pantoprazole    Tablet 40 milliGRAM(s) Oral before breakfast  polyethylene glycol 3350 17 Gram(s) Oral daily  psyllium Powder 1 Packet(s) Oral two times a day  senna 2 Tablet(s) Oral at bedtime  warfarin 2 milliGRAM(s) Oral once        Labs:                        17.6   13.90 )-----------( 281      ( 01 May 2020 06:33 )             54.4      05-01    139  |  92<L>  |  18.0  ----------------------------<  105<H>  5.0   |  31.0<H>  |  0.50    Ca    9.2      01 May 2020 06:33    TPro  7.3  /  Alb  3.6  /  TBili  0.5  /  DBili  x   /  AST  72<H>  /  ALT  103<H>  /  AlkPhos  131<H>  05-01      WBC Count: 13.90 K/uL (05-01-20 @ 06:33)  WBC Count: 12.28 K/uL (04-30-20 @ 08:23)  WBC Count: 7.82 K/uL (04-28-20 @ 07:41)    Creatinine, Serum: 0.50 mg/dL (05-01-20 @ 06:33)  Creatinine, Serum: 0.57 mg/dL (04-30-20 @ 08:23)  Creatinine, Serum: 0.46 mg/dL (04-28-20 @ 07:41)    C-Reactive Protein, Serum: 0.65 mg/dL (04-30-20 @ 08:23)  C-Reactive Protein, Serum: 2.39 mg/dL (04-28-20 @ 07:41)  C-Reactive Protein, Serum: 17.50 mg/dL (04-25-20 @ 23:01)  C-Reactive Protein, Serum: 20.60 mg/dL (04-24-20 @ 07:03)  C-Reactive Protein, Serum: 10.42 mg/dL (04-21-20 @ 08:47)  C-Reactive Protein, Serum: 6.75 mg/dL (04-20-20 @ 07:46)  C-Reactive Protein, Serum: 15.01 mg/dL (04-19-20 @ 07:30)  C-Reactive Protein, Serum: 31.47 mg/dL (04-18-20 @ 08:44)    Ferritin, Serum: 2287 ng/mL (04-30-20 @ 08:23)  Ferritin, Serum: 3112 ng/mL (04-24-20 @ 07:03)  Ferritin, Serum: 2231 ng/mL (04-19-20 @ 07:30)  Ferritin, Serum: 2889 ng/mL (04-18-20 @ 08:44)    Procalcitonin, Serum: 0.22 ng/mL (04-30-20 @ 08:23)  Procalcitonin, Serum: 0.14 ng/mL (04-28-20 @ 07:41)  Procalcitonin, Serum: 0.30 ng/mL (04-25-20 @ 23:01)  Procalcitonin, Serum: 0.25 ng/mL (04-24-20 @ 07:03)  Procalcitonin, Serum: 0.34 ng/mL (04-21-20 @ 08:47)  Procalcitonin, Serum: 0.63 ng/mL (04-20-20 @ 07:46)  Procalcitonin, Serum: 0.99 ng/mL (04-19-20 @ 07:30)  Procalcitonin, Serum: 1.11 ng/mL (04-18-20 @ 08:44)    COVID-19 PCR: Detected (04-18-20 @ 08:55)        < from: CT Angio Chest w/ IV Cont (04.21.20 @ 16:58) >   EXAM:  CT ANGIO CHEST (W)AW IC                          PROCEDURE DATE:  04/21/2020      INTERPRETATION:  CLINICAL INFORMATION: COVID positive, hypoxia, elevated d-dimer, short of breath    COMPARISON: Chest x-ray of 4/18/2020    PROCEDURE:   CT Angiography of the Chest.  As before ml of Omnipaque 350 was injected intravenously. 46 ml were discarded.  Sagittal and coronal reformats were performed as well as 3D (MIP) reconstructions.    FINDINGS:    LUNGS AND AIRWAYS: Patent central airways.  Lungs are remarkable for extensive confluent infiltrates with areas of groundglass infiltrate as well.    PLEURA: Minimal left pleural effusion.    MEDIASTINUM AND LUIZ: No lymphadenopathy.    VESSELS: Segmental filling defect is seen to the right middle lobe.    HEART: Heart size is normal. No pericardial effusion.    CHEST WALL AND LOWER NECK: Within normal limits.    VISUALIZED UPPER ABDOMEN: Cholelithiasis. There is interposition of colon between the right diaphragm and the liver.    BONES: Within normal limits.    IMPRESSION:     Segmental right middle lobe pulmonary embolism.  Extensive bilateral infiltrates    < end of copied text >

## 2020-05-01 NOTE — RAPID RESPONSE TEAM SUMMARY - NSSITUATIONBACKGROUNDRRT_GEN_ALL_CORE
65 yo M w/ no reported PMHx presents to ER for worsening SOB, subjective fevers and dry cough x10 days. COVID+, CT Chest showed segmental PE. Patient is s/p plasma.    Rapid response called for hypoxia. Patient was weaned this AM from NRB to VM. Patient then was unable to tolerate O2 saturation and was placed back on NRB and 6L NC and not maintaining SpO2. Upon arrival to bedside patient on NRB, 6L NC, and in prone position. Chest PT performed and patient still unable to maintain O2 saturation. Patient also noted with increased WOB and tachypnea.

## 2020-05-01 NOTE — CONSULT NOTE ADULT - SUBJECTIVE AND OBJECTIVE BOX
65 y/o male with no pmhx admitted on 4/18 with acute hypoxic respiatory failure secondary to COVID-19 pna with hospital course complicated by right segmental middle lobe PE. Initally admitted to MICU for observation but was eventually downgraded. RRT called for worsening hypoxia and respiratory distress. Upon arrival patient is satting 81% in prone position on 100% NRB and 6 liters NC with use of abdominal muscles and unable to speak in full sentences. Emergently intubated. Post intubation with high peak and plateau pressures up to 54/48 respectively. Decreased PEEP to 8 and tidal volume to 350 with improvement to 43/37. Sedated on propofol infusion. transferred to [facility] for further care with CXR pending.       CCT: 32 minutes of critical care time spent evaluating and managing this patients acute illnesses that pose high risk to end organ damage.

## 2020-05-01 NOTE — PROGRESS NOTE ADULT - SUBJECTIVE AND OBJECTIVE BOX
CC: COVID    INTERVAL HPI/OVERNIGHT EVENTS:  Patient seen and examined at bedside this AM. At time of examination patient was on NRB and 6L NC with SpO2 94% and without complaints. Patient was then weaned to VM and subsequently unable to maintain O2 saturation. Rapid response was later called for hypoxia and patient was intubated due to rapidly declining respiratory status.    Vital Signs Last 24 Hrs  T(C): 36.6 (01 May 2020 07:58), Max: 36.6 (30 Apr 2020 15:28)  T(F): 97.9 (01 May 2020 07:58), Max: 97.9 (30 Apr 2020 15:28)  HR: 89 (01 May 2020 07:58) (89 - 95)  BP: 114/74 (01 May 2020 07:58) (114/74 - 151/75)  BP(mean): --  RR: 20 (01 May 2020 07:58) (20 - 20)  SpO2: 96% (01 May 2020 07:58) (91% - 96%)    PHYSICAL EXAM (at time of initial examination):  GENERAL: NAD, lying comfortably in bed  HEAD: Atraumatic, Normocephalic  EYES: EOMI, PERRLA, conjunctiva and sclera clear  ENMT: Moist mucous membranes  NECK: Supple, No JVD  NERVOUS SYSTEM: A&OX3, Good concentration  CHEST/LUNG: Clear to auscultation b/l; unlabored respirations  HEART: S1S2+, Regular rate and rhythm  ABDOMEN: Soft, Nontender, Nondistended; BS+  EXTREMITIES:  2+ Peripheral Pulses, No LE edema  SKIN: Warm and dry      LABS:                        17.6   13.90 )-----------( 281      ( 01 May 2020 06:33 )             54.4     05-01    139  |  92<L>  |  18.0  ----------------------------<  105<H>  5.0   |  31.0<H>  |  0.50    Ca    9.2      01 May 2020 06:33    TPro  7.3  /  Alb  3.6  /  TBili  0.5  /  DBili  x   /  AST  72<H>  /  ALT  103<H>  /  AlkPhos  131<H>  05-01    PT/INR - ( 01 May 2020 06:33 )   PT: 25.5 sec;   INR: 2.20 ratio

## 2020-05-01 NOTE — PROGRESS NOTE ADULT - PROBLEM SELECTOR PLAN 4
Discussed care with Dr. Tinajero earlier today as well as PA on 6 tower and RN caring for patient  Patient went into respiratory distress, RRT called and patient was intubated - vented - sent to ICU for monitoring  Patient in ICU on ventilator and sedated - appears comfortable - benjamin APARICIO  Discussed during previous encounter with  code status - at that time patient made clear he would remain FULL CODE including CPR and intubation - patient also was explained his high risk of intubation and cardiac decline   At that time patient also designated a surrogate as his friend Yovani Walden telephone number [phone]  - Patient provided us with a contact number for his Niece who lives in [place] her phone number is [phone] but that is not who he wants as his surrogate   - At this time will continue to support and monitor    Total time spent 25 minutes    Thank you for the opportunity to assist with the care of this patient.   [facility] [phone].

## 2020-05-01 NOTE — RAPID RESPONSE TEAM SUMMARY - NSADDTLFINDINGSRRT_GEN_ALL_CORE
Vital Signs Last 24 Hrs  T(C): 36.6 (01 May 2020 07:58), Max: 36.6 (30 Apr 2020 15:28)  T(F): 97.9 (01 May 2020 07:58), Max: 97.9 (30 Apr 2020 15:28)  HR: 89 (01 May 2020 07:58) (89 - 95)  BP: 114/74 (01 May 2020 07:58) (114/74 - 151/75)  BP(mean): --  RR: 20 (01 May 2020 07:58) (20 - 20)  SpO2: 96% (01 May 2020 07:58) (91% - 96%)    PHYSICAL EXAM:  GENERAL: Fatigued, increased WOB, prone  HEAD: Atraumatic, Normocephalic  EYES: EOMI, PERRLA, conjunctiva and sclera clear  ENMT: Moist mucous membranes  NECK: Supple, No JVD  NERVOUS SYSTEM: A&OX3  CHEST/LUNG: Clear to auscultation b/l; increased WOB  HEART: S1S2+, Regular rate and rhythm  ABDOMEN: Soft, Nontender, Nondistended; BS+  EXTREMITIES: 2+ Peripheral Pulses, No LE edema  SKIN: Diaphoretic

## 2020-05-02 LAB
ANION GAP SERPL CALC-SCNC: 11 MMOL/L — SIGNIFICANT CHANGE UP (ref 5–17)
APTT BLD: 120.4 SEC — CRITICAL HIGH (ref 27.5–36.3)
APTT BLD: 70.2 SEC — HIGH (ref 27.5–36.3)
APTT BLD: 85.7 SEC — HIGH (ref 27.5–36.3)
APTT BLD: 89.9 SEC — HIGH (ref 27.5–36.3)
BASE EXCESS BLDA CALC-SCNC: 2.7 MMOL/L — SIGNIFICANT CHANGE UP (ref -3–3)
BLOOD GAS COMMENTS ARTERIAL: SIGNIFICANT CHANGE UP
BUN SERPL-MCNC: 15 MG/DL — SIGNIFICANT CHANGE UP (ref 8–20)
CALCIUM SERPL-MCNC: 7.9 MG/DL — LOW (ref 8.6–10.2)
CHLORIDE SERPL-SCNC: 98 MMOL/L — SIGNIFICANT CHANGE UP (ref 98–107)
CK SERPL-CCNC: 139 U/L — SIGNIFICANT CHANGE UP (ref 30–200)
CO2 SERPL-SCNC: 31 MMOL/L — HIGH (ref 22–29)
CREAT SERPL-MCNC: 0.6 MG/DL — SIGNIFICANT CHANGE UP (ref 0.5–1.3)
CRP SERPL-MCNC: <0.4 MG/DL — SIGNIFICANT CHANGE UP (ref 0–0.4)
D DIMER BLD IA.RAPID-MCNC: 2398 NG/ML DDU — HIGH
FERRITIN SERPL-MCNC: 1736 NG/ML — HIGH (ref 30–400)
GAS PNL BLDA: SIGNIFICANT CHANGE UP
GAS PNL BLDA: SIGNIFICANT CHANGE UP
GLUCOSE BLDC GLUCOMTR-MCNC: 110 MG/DL — HIGH (ref 70–99)
GLUCOSE BLDC GLUCOMTR-MCNC: 91 MG/DL — SIGNIFICANT CHANGE UP (ref 70–99)
GLUCOSE SERPL-MCNC: 122 MG/DL — HIGH (ref 70–99)
HCO3 BLDA-SCNC: 27 MMOL/L — HIGH (ref 20–26)
HCT VFR BLD CALC: 45.3 % — SIGNIFICANT CHANGE UP (ref 39–50)
HGB BLD-MCNC: 14.5 G/DL — SIGNIFICANT CHANGE UP (ref 13–17)
HOROWITZ INDEX BLDA+IHG-RTO: 60 — SIGNIFICANT CHANGE UP
INR BLD: 2.44 RATIO — HIGH (ref 0.88–1.16)
LDH SERPL L TO P-CCNC: 487 U/L — HIGH (ref 98–192)
MCHC RBC-ENTMCNC: 30.3 PG — SIGNIFICANT CHANGE UP (ref 27–34)
MCHC RBC-ENTMCNC: 32 GM/DL — SIGNIFICANT CHANGE UP (ref 32–36)
MCV RBC AUTO: 94.8 FL — SIGNIFICANT CHANGE UP (ref 80–100)
NT-PROBNP SERPL-SCNC: 914 PG/ML — HIGH (ref 0–300)
PCO2 BLDA: 83 MMHG — CRITICAL HIGH (ref 35–45)
PH BLDA: 7.21 — LOW (ref 7.35–7.45)
PLATELET # BLD AUTO: 232 K/UL — SIGNIFICANT CHANGE UP (ref 150–400)
PO2 BLDA: 82 MMHG — LOW (ref 83–108)
POTASSIUM SERPL-MCNC: 4.6 MMOL/L — SIGNIFICANT CHANGE UP (ref 3.5–5.3)
POTASSIUM SERPL-SCNC: 4.6 MMOL/L — SIGNIFICANT CHANGE UP (ref 3.5–5.3)
PROCALCITONIN SERPL-MCNC: 0.27 NG/ML — HIGH (ref 0.02–0.1)
PROTHROM AB SERPL-ACNC: 28.4 SEC — HIGH (ref 10–12.9)
RBC # BLD: 4.78 M/UL — SIGNIFICANT CHANGE UP (ref 4.2–5.8)
RBC # FLD: 13.8 % — SIGNIFICANT CHANGE UP (ref 10.3–14.5)
SAO2 % BLDA: 96 % — SIGNIFICANT CHANGE UP (ref 95–99)
SODIUM SERPL-SCNC: 140 MMOL/L — SIGNIFICANT CHANGE UP (ref 135–145)
TROPONIN T SERPL-MCNC: 0.02 NG/ML — SIGNIFICANT CHANGE UP (ref 0–0.06)
TROPONIN T SERPL-MCNC: <0.01 NG/ML — SIGNIFICANT CHANGE UP (ref 0–0.06)
WBC # BLD: 19.3 K/UL — HIGH (ref 3.8–10.5)
WBC # FLD AUTO: 19.3 K/UL — HIGH (ref 3.8–10.5)

## 2020-05-02 PROCEDURE — 99233 SBSQ HOSP IP/OBS HIGH 50: CPT

## 2020-05-02 PROCEDURE — 99232 SBSQ HOSP IP/OBS MODERATE 35: CPT

## 2020-05-02 RX ORDER — SODIUM CHLORIDE 9 MG/ML
1000 INJECTION, SOLUTION INTRAVENOUS ONCE
Refills: 0 | Status: COMPLETED | OUTPATIENT
Start: 2020-05-02 | End: 2020-05-02

## 2020-05-02 RX ADMIN — Medication 6.75 MICROGRAM(S)/KG/MIN: at 05:57

## 2020-05-02 RX ADMIN — HEPARIN SODIUM 700 UNIT(S)/HR: 5000 INJECTION INTRAVENOUS; SUBCUTANEOUS at 12:54

## 2020-05-02 RX ADMIN — KETAMINE HYDROCHLORIDE 1.76 MG/KG/HR: 100 INJECTION INTRAMUSCULAR; INTRAVENOUS at 22:31

## 2020-05-02 RX ADMIN — Medication 6.75 MICROGRAM(S)/KG/MIN: at 05:55

## 2020-05-02 RX ADMIN — SODIUM CHLORIDE 1000 MILLILITER(S): 9 INJECTION, SOLUTION INTRAVENOUS at 22:30

## 2020-05-02 RX ADMIN — HEPARIN SODIUM 800 UNIT(S)/HR: 5000 INJECTION INTRAVENOUS; SUBCUTANEOUS at 05:54

## 2020-05-02 RX ADMIN — HEPARIN SODIUM 700 UNIT(S)/HR: 5000 INJECTION INTRAVENOUS; SUBCUTANEOUS at 19:19

## 2020-05-02 RX ADMIN — HYDROMORPHONE HYDROCHLORIDE 0.5 MG/HR: 2 INJECTION INTRAMUSCULAR; INTRAVENOUS; SUBCUTANEOUS at 22:30

## 2020-05-02 RX ADMIN — CHLORHEXIDINE GLUCONATE 15 MILLILITER(S): 213 SOLUTION TOPICAL at 17:53

## 2020-05-02 RX ADMIN — KETAMINE HYDROCHLORIDE 1.76 MG/KG/HR: 100 INJECTION INTRAMUSCULAR; INTRAVENOUS at 05:57

## 2020-05-02 RX ADMIN — CHLORHEXIDINE GLUCONATE 15 MILLILITER(S): 213 SOLUTION TOPICAL at 05:58

## 2020-05-02 NOTE — PROGRESS NOTE ADULT - ATTENDING COMMENTS
I have seen and examined the patient during rounds 8-10 hrs  Sedated, paralyzed vented, on the prone decubitus  ACPC 34/32/8/.5  Plan:  Neuro: sedoanalgesia, goal rass -2  CV: cont volume resuscitation, perfusion adequate  Pulm: Prone decubiti p:F 136, cont supportive care  GI: TEN to start  : function preserved  ID: no signs of superinfection  Hem: hep drip

## 2020-05-02 NOTE — PROGRESS NOTE ADULT - SUBJECTIVE AND OBJECTIVE BOX
[facility]  INFECTIOUS DISEASES AND INTERNAL MEDICINE at [place]  =======================================================  Fadi Sanchez MD  Diplomates American Board of Internal Medicine and Infectious Diseases  =======================================================    [facility]-[id number]  LISSA MALLOY        Follow up: COVID19, respiratory failure    s/p intubation 5/1    Sedated on vent     Allergies  No Known Allergies       REVIEW OF SYSTEMS:  Unable to obtain due to medical condition      Physical Exam:  GEN: sedated  HEENT: normocephalic and atraumatic.  Anicteric   NECK: Supple.   LUNGS: diffuse rhonchi   HEART: Regular rate and rhythm   ABDOMEN: Soft, nontender, and nondistended.  Positive bowel sounds.    : [place]   EXTREMITIES: trace edema.  MSK: no joint swelling  NEUROLOGIC: Sedated   PSYCHIATRIC: sedated   SKIN: No Rash      Vitals:  T(F): 97.7 (02 May 2020 12:00), Max: 99 (02 May 2020 01:00)  HR: 88 (02 May 2020 08:30)  BP: 94/55 (02 May 2020 01:00)  RR: 34 (02 May 2020 08:00)  SpO2: 95% (02 May 2020 08:30) (93% - 99%)  temp max in last 48H T(F): , Max: 99 (05-02-20 @ 01:00)      Current Antibiotics:    Other medications:  chlorhexidine 0.12% Liquid 15 milliLiter(s) Oral Mucosa every 12 hours  heparin  Infusion.  Unit(s)/Hr IV Continuous <Continuous>  HYDROmorphone Infusion. 0.5 mG/Hr IV Continuous <Continuous>  ketamine Infusion. 0.25 mG/kG/Hr IV Continuous <Continuous>  multiple electrolytes Injection Type 1 1000 milliLiter(s) IV Continuous <Continuous>  multivitamin/minerals 1 Tablet(s) Oral daily  norepinephrine Infusion 0.05 MICROgram(s)/kG/Min IV Continuous <Continuous>  pantoprazole    Tablet 40 milliGRAM(s) Oral before breakfast  psyllium Powder 1 Packet(s) Oral two times a day  rocuronium Infusion 8 MICROgram(s)/kG/Min IV Continuous <Continuous>  rocuronium Injectable 100 milliGRAM(s) IV Push once        Labs:             14.5   19.30 )-----------( 232      ( 02 May 2020 05:22 )             45.3      05-02    140  |  98  |  15.0  ----------------------------<  122<H>  4.6   |  31.0<H>  |  0.60    Ca    7.9<L>      02 May 2020 11:52    TPro  7.3  /  Alb  3.6  /  TBili  0.5  /  DBili  x   /  AST  72<H>  /  ALT  103<H>  /  AlkPhos  131<H>  05-01      WBC Count: 19.30 K/uL (05-02-20 @ 05:22)  WBC Count: 22.38 K/uL (05-01-20 @ 21:29)  WBC Count: 13.90 K/uL (05-01-20 @ 06:33)  WBC Count: 12.28 K/uL (04-30-20 @ 08:23)  WBC Count: 7.82 K/uL (04-28-20 @ 07:41)    Creatinine, Serum: 0.60 mg/dL (05-02-20 @ 11:52)  Creatinine, Serum: 0.50 mg/dL (05-01-20 @ 06:33)  Creatinine, Serum: 0.57 mg/dL (04-30-20 @ 08:23)  Creatinine, Serum: 0.46 mg/dL (04-28-20 @ 07:41)    C-Reactive Protein, Serum: <0.40 mg/dL (05-02-20 @ 05:22)  C-Reactive Protein, Serum: 0.65 mg/dL (04-30-20 @ 08:23)  C-Reactive Protein, Serum: 2.39 mg/dL (04-28-20 @ 07:41)  C-Reactive Protein, Serum: 17.50 mg/dL (04-25-20 @ 23:01)  C-Reactive Protein, Serum: 20.60 mg/dL (04-24-20 @ 07:03)  C-Reactive Protein, Serum: 10.42 mg/dL (04-21-20 @ 08:47)  C-Reactive Protein, Serum: 6.75 mg/dL (04-20-20 @ 07:46)  C-Reactive Protein, Serum: 15.01 mg/dL (04-19-20 @ 07:30)    Ferritin, Serum: 1736 ng/mL (05-02-20 @ 05:22)  Ferritin, Serum: 2287 ng/mL (04-30-20 @ 08:23)  Ferritin, Serum: 3112 ng/mL (04-24-20 @ 07:03)  Ferritin, Serum: 2231 ng/mL (04-19-20 @ 07:30)  Ferritin, Serum: 2889 ng/mL (04-18-20 @ 08:44)    Procalcitonin, Serum: 0.27 ng/mL (05-02-20 @ 05:22)  Procalcitonin, Serum: 0.22 ng/mL (04-30-20 @ 08:23)  Procalcitonin, Serum: 0.14 ng/mL (04-28-20 @ 07:41)  Procalcitonin, Serum: 0.30 ng/mL (04-25-20 @ 23:01)  Procalcitonin, Serum: 0.25 ng/mL (04-24-20 @ 07:03)  Procalcitonin, Serum: 0.34 ng/mL (04-21-20 @ 08:47)  Procalcitonin, Serum: 0.63 ng/mL (04-20-20 @ 07:46)  Procalcitonin, Serum: 0.99 ng/mL (04-19-20 @ 07:30)     COVID-19 PCR: Detected (04-18-20 @ 08:55)        < from: Xray Chest 1 View- PORTABLE-Urgent (05.01.20 @ 17:50) >  EXAM:  XR CHEST PORTABLE URGENT 1V                          PROCEDURE DATE:  05/01/2020        INTERPRETATION:    Examination: XR CHEST URGENT    History: ADMDIAG1: J18.9 PNEUMONIA, UNSPECIFIED ORGANISM/, ett adjustment patient is COVID positive.    Findings:  The ET tube has been repositioned. The tip is now located at the inferior margin of the clavicular heads. No change in position of right IJ central line or NG tube. The heart is not enlarged. No hilar or mediastinal abnormality. Diffuse bilateral interstitial infiltrates, unchanged since earlier the same day.    Impression:  1.  Repositioning of ET tube as described, otherwise stable exam.      DISCRETE X-RAY DATA:  Percent of LEFT lung opacification: %  Percent of RIGHT lungopacification: %  Change in lung opacification from most recent x-ray (<=3 days): Stable  Change from prior dated 3 or more days (same admission): Stable    < end of copied text >

## 2020-05-02 NOTE — PROGRESS NOTE ADULT - SUBJECTIVE AND OBJECTIVE BOX
INTERVAL HPI/OVERNIGHT EVENTS/SUBJECTIVE: ETT Adjusted multiple times yesterday.  Increased PS and RR several times last night in order to improve ventilation and oxygenation.  Able to wean FiO2 to 60%.  Mild worsening PF. Some hypotension last night.  Responded temporarily to 2L IVF.  Started on low dose Levo.    Bedside US:  IVC poorly visulized  Heart: No effusion.  Good gross systolic fxn.  No large effusion.  No R Sided strain.  Lung: + Blines throughout.  + Lung sliding.      ICU Vital Signs Last 24 Hrs  T(C): 37.2 (02 May 2020 01:00), Max: 37.2 (02 May 2020 01:00)  T(F): 99 (02 May 2020 01:00), Max: 99 (02 May 2020 01:00)  HR: 103 (02 May 2020 01:00) (83 - 113)  BP: 94/55 (02 May 2020 01:00) (68/46 - 119/69)  BP(mean): 70 (02 May 2020 01:00) (53 - 89)  ABP: 97/51 (02 May 2020 01:00) (82/46 - 107/52)  ABP(mean): 67 (02 May 2020 01:00) (59 - 70)  RR: 34 (02 May 2020 01:00) (0 - 39)  SpO2: 93% (02 May 2020 01:00) (93% - 99%)      I&O's Detail    01 May 2020 07:01  -  02 May 2020 02:56  --------------------------------------------------------  IN:    heparin  Infusion.: 134 mL    HYDROmorphone Infusion.: 10.5 mL    ketamine Infusion.: 35 mL    multiple electrolytes Injection Type 1: 600 mL    multiple electrolytes Injection Type 1 Bolus: 2000 mL    norepinephrine Infusion: 39 mL    propofol Infusion: 73.5 mL    rocuronium Infusion: 94.1 mL  Total IN: 2986.1 mL    OUT:    Indwelling Catheter - Urethral: 575 mL  Total OUT: 575 mL    Total NET: 2411.1 mL          Mode: AC/ CMV (Assist Control/ Continuous Mandatory Ventilation)  RR (machine): 34  TV (machine): 350  FiO2: 60  PEEP: 6  ITime: 0.6  MAP: 17  PC: 32  PIP: 34    ABG - ( 01 May 2020 23:16 )  pH, Arterial: 7.22  pH, Blood: x     /  pCO2: 83    /  pO2: 84    / HCO3: 27    / Base Excess: 2.9   /  SaO2: 96                  MEDICATIONS  (STANDING):  chlorhexidine 0.12% Liquid 15 milliLiter(s) Oral Mucosa every 12 hours  heparin  Infusion.  Unit(s)/Hr (12 mL/Hr) IV Continuous <Continuous>  HYDROmorphone Infusion. 0.5 mG/Hr (0.5 mL/Hr) IV Continuous <Continuous>  ketamine Infusion. 0.25 mG/kG/Hr (1.76 mL/Hr) IV Continuous <Continuous>  multiple electrolytes Injection Type 1 1000 milliLiter(s) (100 mL/Hr) IV Continuous <Continuous>  multivitamin/minerals 1 Tablet(s) Oral daily  norepinephrine Infusion 0.05 MICROgram(s)/kG/Min (6.59 mL/Hr) IV Continuous <Continuous>  pantoprazole    Tablet 40 milliGRAM(s) Oral before breakfast  psyllium Powder 1 Packet(s) Oral two times a day  rocuronium Infusion 8 MICROgram(s)/kG/Min (6.75 mL/Hr) IV Continuous <Continuous>  rocuronium Injectable 100 milliGRAM(s) IV Push once    MEDICATIONS  (PRN):  acetaminophen   Tablet .. 650 milliGRAM(s) Oral every 6 hours PRN Temp greater or equal to 38C (100.4F), Mild Pain (1 - 3), Moderate Pain (4 - 6)  heparin   Injectable 5500 Unit(s) IV Push every 6 hours PRN For aPTT less than 40  heparin   Injectable 2500 Unit(s) IV Push every 6 hours PRN For aPTT between 40 - 57      NUTRITION/IVF: NPO/ P-lyte @ 100    PHYSICAL EXAM:     Gen: NAD, ill appearing, No cyanosis, Pallor.    Eyes: PERRL ~ 3mm,      Neurological: RASS -4.     Neck: Supple. NT AT, FROM no pain.  No JVD. No meningeal signs    Pulmonary: NAD, CTA, = BL .      Cardiovascular: RRR, S1, S2, No Murmurs, rubs or gallops noted.    Gastrointestinal:ND, Soft, NT.    Extremities: NT, AT, no edema, erythema or palpable cord noted.  FROM, = 2+ pulses throughout.    LABS:  CBC Full  -  ( 01 May 2020 21:29 )  WBC Count : 22.38 K/uL  RBC Count : 5.00 M/uL  Hemoglobin : 15.0 g/dL  Hematocrit : 47.5 %  Platelet Count - Automated : 257 K/uL  Mean Cell Volume : 95.0 fl  Mean Cell Hemoglobin : 30.0 pg  Mean Cell Hemoglobin Concentration : 31.6 gm/dL  Auto Neutrophil # : x  Auto Lymphocyte # : x  Auto Monocyte # : x  Auto Eosinophil # : x  Auto Basophil # : x  Auto Neutrophil % : x  Auto Lymphocyte % : x  Auto Monocyte % : x  Auto Eosinophil % : x  Auto Basophil % : x    05-01    139  |  92<L>  |  18.0  ----------------------------<  105<H>  5.0   |  31.0<H>  |  0.50    Ca    9.2      01 May 2020 06:33    TPro  7.3  /  Alb  3.6  /  TBili  0.5  /  DBili  x   /  AST  72<H>  /  ALT  103<H>  /  AlkPhos  131<H>  05-01    PT/INR - ( 01 May 2020 06:33 )   PT: 25.5 sec;   INR: 2.20 ratio         PTT - ( 01 May 2020 21:29 )  PTT:>200.0 sec    RECENT CULTURES:      LIVER FUNCTIONS - ( 01 May 2020 06:33 )  Alb: 3.6 g/dL / Pro: 7.3 g/dL / ALK PHOS: 131 U/L / ALT: 103 U/L / AST: 72 U/L / GGT: x               CAPILLARY BLOOD GLUCOSE      RADIOLOGY & ADDITIONAL STUDIES:    ASSESSMENT/PLAN:  64yMale presenting with: Acute hypoxic resp failure from COVID-19.     Neurological: Transitioned off of Propofol and onto Ketamine due to hypotension.  Given elevated peak and Pplat and vent dysynchrony will keep Deeply sedated and Paralyzed for ~ 24-48 hr.      Pulmonary: Will keep on AC/PC and attempt to keep PH >7.2.  Pplat currently 30.  Will attempt to keep here or <30.  FiO2 at 60%.  Could not increase PEEP last night without sacrificing Vt.  Will keep same if Sats >91%.  If can not maintain >91 then would try to increase PEEP again.  May benefit from Prone or Nitrous oxide if can not oxygenate.     Cardiovascular: Given 2L overnight by me. Low dose levo keeping MAPs >65.  If increasing Levo, would consider additional IVF but would try and keep away from much more fluid at this time    Gastrointestinal:     Genitourinary:     Heme: Hold Heparin and repeat PTT Until measurable number. Try to keep therapeutic.  Does not appear to be massive PE Based on bedside Echo, no DVTs.  However, will check official BNP and Troponins to stratify if massive PE may be contributing to hypotension. If Abnormal, will get TTE.    ID: Will follow trend of WBC.  No fever, NL PCT.  Would not start Abx just for leukocytosis.  FU ID Recs.     Lines/ Tubes: Keep tubes and lines since acutely critically ill.     Dispo: CC As above.            CRITICAL CARE TIME SPENT: Critical care time spent: 60 minutes of critical care time spent providing medical care for patient's acute illness/conditions that impairs at least one vital organ system and/or poses a high risk of imminent or life threatening deterioration in the patient's condition. It includes time spent evaluating and treating the patient's acute illness as well as time spent reviewing labs, radiology, discussing goals of care with patient and/or patient's family, and discussing the case with a multidisciplinary team in an effort to prevent further life threatening deterioration or end organ damage. This time is independent of any procedures performed. INTERVAL HPI/OVERNIGHT EVENTS/SUBJECTIVE: ETT Adjusted multiple times yesterday.  Increased PS and RR several times last night in order to improve ventilation and oxygenation.  Able to wean FiO2 to 60%.  Mild worsening PF. Some hypotension last night.  Responded temporarily to 2L IVF.  Started on low dose Levo.    Bedside US:  IVC poorly visulized  Heart: No effusion.  Good gross systolic fxn.  No large effusion.  No R Sided strain.  Lung: + Blines throughout.  + Lung sliding.      ICU Vital Signs Last 24 Hrs  T(C): 37.2 (02 May 2020 01:00), Max: 37.2 (02 May 2020 01:00)  T(F): 99 (02 May 2020 01:00), Max: 99 (02 May 2020 01:00)  HR: 103 (02 May 2020 01:00) (83 - 113)  BP: 94/55 (02 May 2020 01:00) (68/46 - 119/69)  BP(mean): 70 (02 May 2020 01:00) (53 - 89)  ABP: 97/51 (02 May 2020 01:00) (82/46 - 107/52)  ABP(mean): 67 (02 May 2020 01:00) (59 - 70)  RR: 34 (02 May 2020 01:00) (0 - 39)  SpO2: 93% (02 May 2020 01:00) (93% - 99%)      I&O's Detail    01 May 2020 07:01  -  02 May 2020 02:56  --------------------------------------------------------  IN:    heparin  Infusion.: 134 mL    HYDROmorphone Infusion.: 10.5 mL    ketamine Infusion.: 35 mL    multiple electrolytes Injection Type 1: 600 mL    multiple electrolytes Injection Type 1 Bolus: 2000 mL    norepinephrine Infusion: 39 mL    propofol Infusion: 73.5 mL    rocuronium Infusion: 94.1 mL  Total IN: 2986.1 mL    OUT:    Indwelling Catheter - Urethral: 575 mL  Total OUT: 575 mL    Total NET: 2411.1 mL          Mode: AC/ CMV (Assist Control/ Continuous Mandatory Ventilation)  RR (machine): 34  TV (machine): 350  FiO2: 60  PEEP: 6  ITime: 0.6  MAP: 17  PC: 32  PIP: 34    ABG - ( 01 May 2020 23:16 )  pH, Arterial: 7.22  pH, Blood: x     /  pCO2: 83    /  pO2: 84    / HCO3: 27    / Base Excess: 2.9   /  SaO2: 96                  MEDICATIONS  (STANDING):  chlorhexidine 0.12% Liquid 15 milliLiter(s) Oral Mucosa every 12 hours  heparin  Infusion.  Unit(s)/Hr (12 mL/Hr) IV Continuous <Continuous>  HYDROmorphone Infusion. 0.5 mG/Hr (0.5 mL/Hr) IV Continuous <Continuous>  ketamine Infusion. 0.25 mG/kG/Hr (1.76 mL/Hr) IV Continuous <Continuous>  multiple electrolytes Injection Type 1 1000 milliLiter(s) (100 mL/Hr) IV Continuous <Continuous>  multivitamin/minerals 1 Tablet(s) Oral daily  norepinephrine Infusion 0.05 MICROgram(s)/kG/Min (6.59 mL/Hr) IV Continuous <Continuous>  pantoprazole    Tablet 40 milliGRAM(s) Oral before breakfast  psyllium Powder 1 Packet(s) Oral two times a day  rocuronium Infusion 8 MICROgram(s)/kG/Min (6.75 mL/Hr) IV Continuous <Continuous>  rocuronium Injectable 100 milliGRAM(s) IV Push once    MEDICATIONS  (PRN):  acetaminophen   Tablet .. 650 milliGRAM(s) Oral every 6 hours PRN Temp greater or equal to 38C (100.4F), Mild Pain (1 - 3), Moderate Pain (4 - 6)  heparin   Injectable 5500 Unit(s) IV Push every 6 hours PRN For aPTT less than 40  heparin   Injectable 2500 Unit(s) IV Push every 6 hours PRN For aPTT between 40 - 57      NUTRITION/IVF: NPO/ P-lyte @ 100    PHYSICAL EXAM:     Gen: NAD, ill appearing, No cyanosis, Pallor.    Eyes: PERRL ~ 3mm,      Neurological: RASS -4.     Neck: Supple. NT AT, FROM no pain.  No JVD. No meningeal signs    Pulmonary: NAD, CTA, = BL .      Cardiovascular: RRR, S1, S2, No Murmurs, rubs or gallops noted.    Gastrointestinal:ND, Soft, NT.    Extremities: NT, AT, no edema, erythema or palpable cord noted.  FROM, = 2+ pulses throughout.    LABS:  CBC Full  -  ( 01 May 2020 21:29 )  WBC Count : 22.38 K/uL  RBC Count : 5.00 M/uL  Hemoglobin : 15.0 g/dL  Hematocrit : 47.5 %  Platelet Count - Automated : 257 K/uL  Mean Cell Volume : 95.0 fl  Mean Cell Hemoglobin : 30.0 pg  Mean Cell Hemoglobin Concentration : 31.6 gm/dL  Auto Neutrophil # : x  Auto Lymphocyte # : x  Auto Monocyte # : x  Auto Eosinophil # : x  Auto Basophil # : x  Auto Neutrophil % : x  Auto Lymphocyte % : x  Auto Monocyte % : x  Auto Eosinophil % : x  Auto Basophil % : x    05-01    139  |  92<L>  |  18.0  ----------------------------<  105<H>  5.0   |  31.0<H>  |  0.50    Ca    9.2      01 May 2020 06:33    TPro  7.3  /  Alb  3.6  /  TBili  0.5  /  DBili  x   /  AST  72<H>  /  ALT  103<H>  /  AlkPhos  131<H>  05-01    PT/INR - ( 01 May 2020 06:33 )   PT: 25.5 sec;   INR: 2.20 ratio         PTT - ( 01 May 2020 21:29 )  PTT:>200.0 sec    RECENT CULTURES:      LIVER FUNCTIONS - ( 01 May 2020 06:33 )  Alb: 3.6 g/dL / Pro: 7.3 g/dL / ALK PHOS: 131 U/L / ALT: 103 U/L / AST: 72 U/L / GGT: x               CAPILLARY BLOOD GLUCOSE      RADIOLOGY & ADDITIONAL STUDIES:    ASSESSMENT/PLAN:  64yMale presenting with: Acute hypoxic resp failure from COVID-19.     Neurological: Transitioned off of Propofol and onto Ketamine due to hypotension.  Given elevated peak and Pplat and vent dysynchrony will keep Deeply sedated and Paralyzed for ~ 24-48 hr.      Pulmonary: Will keep on AC/PC and attempt to keep PH >7.2.  Pplat currently 30.  Will attempt to keep here or <30.  FiO2 at 60%.  Could not increase PEEP last night without sacrificing Vt.  Will keep same if Sats >91%.  If can not maintain >91 then would try to increase PEEP again.  May benefit from Prone or Nitrous oxide if can not oxygenate.     Cardiovascular: Given 2L overnight by me. Low dose levo keeping MAPs >65.  If increasing Levo, would consider additional IVF but would try and keep away from much more fluid at this time    Gastrointestinal: Will restart TF if La cleared and Levo stable    Genitourinary: FU Cr and U/O    Heme: Hold Heparin and repeat PTT Until measurable number. Try to keep therapeutic.  Does not appear to be massive PE Based on bedside Echo, no DVTs.  However, will check official BNP and Troponins to stratify if massive PE may be contributing to hypotension. If Abnormal, will get TTE.    ID: Will follow trend of WBC.  No fever, NL PCT.  Would not start Abx just for leukocytosis.  FU ID Recs.     Lines/ Tubes: Keep tubes and lines since acutely critically ill.     Dispo: CC As above.            CRITICAL CARE TIME SPENT: Critical care time spent: 60 minutes of critical care time spent providing medical care for patient's acute illness/conditions that impairs at least one vital organ system and/or poses a high risk of imminent or life threatening deterioration in the patient's condition. It includes time spent evaluating and treating the patient's acute illness as well as time spent reviewing labs, radiology, discussing goals of care with patient and/or patient's family, and discussing the case with a multidisciplinary team in an effort to prevent further life threatening deterioration or end organ damage. This time is independent of any procedures performed.

## 2020-05-02 NOTE — PROGRESS NOTE ADULT - ASSESSMENT
65 y/o man with no significant PMH was admitted on 4/18 with worsening shortness of breath, fevers and dry cough for 10 days PTA. He was tested positive for COVID19 and placed on supplemental O2.   Since then he is on NRB with acceptable So2 but since they were not able to wean him off NRB, ID was called for evaluation.   CTA also showed PE that is contributing in his hypoxia. At this point interleukin inhibitors will not be very helpful since he has passed the interleukin storm phase. Inflammatory markers are trending down. No sign of secondary bacterial infection.       COVID 19 + infection  Viral pneumonia  Acute hypoxic respiratory failure  Segmental PE      - Airborne and contact isolation   - COVID 19 PCR positive 4/18/20  - CT Angio reporting PE   - CXR with multifocal opacities   - Procalcitonin 0.27  - CRP=31-->17.50 --> 2.39 --> <0.4  - Ferritin 3112 --> 2287 --> 1736  - Completed Hydroxychloroquine   - Check CBC with diff, CMP with LFT's, Inflammatory markers (D-dimer, CRP, Ferritin, LDH,  procalcitonin)  q48h.    - Avoid antibiotics unless there is a concern for a bacterial infection  - s/p steroid  - Encourage proning   - No indication for Actemra at this time.   - Patient consented for Plasma study, and is s/p convalescent plasma 4/29/20 (8053)      Will sign off. Please call PRN.

## 2020-05-03 LAB
ANION GAP SERPL CALC-SCNC: 14 MMOL/L — SIGNIFICANT CHANGE UP (ref 5–17)
ANISOCYTOSIS BLD QL: SLIGHT — SIGNIFICANT CHANGE UP
APPEARANCE UR: CLEAR — SIGNIFICANT CHANGE UP
APTT BLD: 90.5 SEC — HIGH (ref 27.5–36.3)
BACTERIA # UR AUTO: NEGATIVE — SIGNIFICANT CHANGE UP
BASE EXCESS BLDA CALC-SCNC: 10.1 MMOL/L — HIGH (ref -2–2)
BASOPHILS # BLD AUTO: 0 K/UL — SIGNIFICANT CHANGE UP (ref 0–0.2)
BASOPHILS NFR BLD AUTO: 0 % — SIGNIFICANT CHANGE UP (ref 0–2)
BILIRUB UR-MCNC: NEGATIVE — SIGNIFICANT CHANGE UP
BUN SERPL-MCNC: 11 MG/DL — SIGNIFICANT CHANGE UP (ref 8–20)
CALCIUM SERPL-MCNC: 7.7 MG/DL — LOW (ref 8.6–10.2)
CHLORIDE SERPL-SCNC: 94 MMOL/L — LOW (ref 98–107)
CO2 SERPL-SCNC: 32 MMOL/L — HIGH (ref 22–29)
COLOR SPEC: YELLOW — SIGNIFICANT CHANGE UP
CREAT SERPL-MCNC: 0.53 MG/DL — SIGNIFICANT CHANGE UP (ref 0.5–1.3)
DIFF PNL FLD: ABNORMAL
EOSINOPHIL # BLD AUTO: 0 K/UL — SIGNIFICANT CHANGE UP (ref 0–0.5)
EOSINOPHIL NFR BLD AUTO: 0 % — SIGNIFICANT CHANGE UP (ref 0–6)
EPI CELLS # UR: NEGATIVE — SIGNIFICANT CHANGE UP
GAS PNL BLDA: SIGNIFICANT CHANGE UP
GLUCOSE SERPL-MCNC: 80 MG/DL — SIGNIFICANT CHANGE UP (ref 70–99)
GLUCOSE UR QL: NEGATIVE MG/DL — SIGNIFICANT CHANGE UP
HCO3 BLDA-SCNC: 34 MMOL/L — HIGH (ref 20–26)
HCT VFR BLD CALC: 46.1 % — SIGNIFICANT CHANGE UP (ref 39–50)
HGB BLD-MCNC: 14 G/DL — SIGNIFICANT CHANGE UP (ref 13–17)
INR BLD: 2.03 RATIO — HIGH (ref 0.88–1.16)
KETONES UR-MCNC: ABNORMAL
LEUKOCYTE ESTERASE UR-ACNC: NEGATIVE — SIGNIFICANT CHANGE UP
LYMPHOCYTES # BLD AUTO: 0 % — LOW (ref 13–44)
LYMPHOCYTES # BLD AUTO: 0 K/UL — LOW (ref 1–3.3)
MAGNESIUM SERPL-MCNC: 2.2 MG/DL — SIGNIFICANT CHANGE UP (ref 1.6–2.6)
MANUAL SMEAR VERIFICATION: SIGNIFICANT CHANGE UP
MCHC RBC-ENTMCNC: 29.7 PG — SIGNIFICANT CHANGE UP (ref 27–34)
MCHC RBC-ENTMCNC: 30.4 GM/DL — LOW (ref 32–36)
MCV RBC AUTO: 97.7 FL — SIGNIFICANT CHANGE UP (ref 80–100)
MONOCYTES # BLD AUTO: 0.36 K/UL — SIGNIFICANT CHANGE UP (ref 0–0.9)
MONOCYTES NFR BLD AUTO: 0.9 % — LOW (ref 2–14)
MYELOCYTES NFR BLD: 3.4 % — HIGH (ref 0–0)
NEUTROPHILS # BLD AUTO: 37.49 K/UL — HIGH (ref 1.8–7.4)
NEUTROPHILS NFR BLD AUTO: 69.5 % — SIGNIFICANT CHANGE UP (ref 43–77)
NEUTS BAND # BLD: 25.4 % — HIGH (ref 0–8)
NITRITE UR-MCNC: NEGATIVE — SIGNIFICANT CHANGE UP
PCO2 BLDA: 62 MMHG — HIGH (ref 35–45)
PH BLDA: 7.39 — SIGNIFICANT CHANGE UP (ref 7.35–7.45)
PH UR: 5 — SIGNIFICANT CHANGE UP (ref 5–8)
PHOSPHATE SERPL-MCNC: 2.6 MG/DL — SIGNIFICANT CHANGE UP (ref 2.4–4.7)
PLAT MORPH BLD: NORMAL — SIGNIFICANT CHANGE UP
PLATELET # BLD AUTO: 223 K/UL — SIGNIFICANT CHANGE UP (ref 150–400)
PO2 BLDA: 86 MMHG — SIGNIFICANT CHANGE UP (ref 83–108)
POIKILOCYTOSIS BLD QL AUTO: SLIGHT — SIGNIFICANT CHANGE UP
POLYCHROMASIA BLD QL SMEAR: SLIGHT — SIGNIFICANT CHANGE UP
POTASSIUM SERPL-MCNC: 4 MMOL/L — SIGNIFICANT CHANGE UP (ref 3.5–5.3)
POTASSIUM SERPL-SCNC: 4 MMOL/L — SIGNIFICANT CHANGE UP (ref 3.5–5.3)
PROT UR-MCNC: 15 MG/DL
PROTHROM AB SERPL-ACNC: 23.4 SEC — HIGH (ref 10–12.9)
RBC # BLD: 4.72 M/UL — SIGNIFICANT CHANGE UP (ref 4.2–5.8)
RBC # FLD: 14 % — SIGNIFICANT CHANGE UP (ref 10.3–14.5)
RBC BLD AUTO: SIGNIFICANT CHANGE UP
RBC CASTS # UR COMP ASSIST: SIGNIFICANT CHANGE UP /HPF (ref 0–4)
SAO2 % BLDA: 98 % — SIGNIFICANT CHANGE UP (ref 95–99)
SCHISTOCYTES BLD QL AUTO: SLIGHT — SIGNIFICANT CHANGE UP
SODIUM SERPL-SCNC: 140 MMOL/L — SIGNIFICANT CHANGE UP (ref 135–145)
SP GR SPEC: 1.02 — SIGNIFICANT CHANGE UP (ref 1.01–1.02)
UROBILINOGEN FLD QL: NEGATIVE MG/DL — SIGNIFICANT CHANGE UP
VARIANT LYMPHS # BLD: 0.8 % — SIGNIFICANT CHANGE UP (ref 0–6)
WBC # BLD: 39.5 K/UL — HIGH (ref 3.8–10.5)
WBC # FLD AUTO: 39.5 K/UL — HIGH (ref 3.8–10.5)
WBC UR QL: SIGNIFICANT CHANGE UP

## 2020-05-03 PROCEDURE — 93306 TTE W/DOPPLER COMPLETE: CPT | Mod: 26

## 2020-05-03 RX ORDER — CEFEPIME 1 G/1
INJECTION, POWDER, FOR SOLUTION INTRAMUSCULAR; INTRAVENOUS
Refills: 0 | Status: DISCONTINUED | OUTPATIENT
Start: 2020-05-03 | End: 2020-05-07

## 2020-05-03 RX ORDER — CEFEPIME 1 G/1
2000 INJECTION, POWDER, FOR SOLUTION INTRAMUSCULAR; INTRAVENOUS EVERY 8 HOURS
Refills: 0 | Status: DISCONTINUED | OUTPATIENT
Start: 2020-05-03 | End: 2020-05-07

## 2020-05-03 RX ORDER — VANCOMYCIN HCL 1 G
1000 VIAL (EA) INTRAVENOUS ONCE
Refills: 0 | Status: COMPLETED | OUTPATIENT
Start: 2020-05-03 | End: 2020-05-03

## 2020-05-03 RX ORDER — VANCOMYCIN HCL 1 G
1000 VIAL (EA) INTRAVENOUS EVERY 12 HOURS
Refills: 0 | Status: DISCONTINUED | OUTPATIENT
Start: 2020-05-03 | End: 2020-05-06

## 2020-05-03 RX ORDER — CEFEPIME 1 G/1
2000 INJECTION, POWDER, FOR SOLUTION INTRAMUSCULAR; INTRAVENOUS ONCE
Refills: 0 | Status: COMPLETED | OUTPATIENT
Start: 2020-05-03 | End: 2020-05-03

## 2020-05-03 RX ORDER — VANCOMYCIN HCL 1 G
VIAL (EA) INTRAVENOUS
Refills: 0 | Status: DISCONTINUED | OUTPATIENT
Start: 2020-05-03 | End: 2020-05-06

## 2020-05-03 RX ADMIN — CEFEPIME 100 MILLIGRAM(S): 1 INJECTION, POWDER, FOR SOLUTION INTRAMUSCULAR; INTRAVENOUS at 07:36

## 2020-05-03 RX ADMIN — Medication 1 TABLET(S): at 12:59

## 2020-05-03 RX ADMIN — Medication 250 MILLIGRAM(S): at 19:05

## 2020-05-03 RX ADMIN — CEFEPIME 100 MILLIGRAM(S): 1 INJECTION, POWDER, FOR SOLUTION INTRAMUSCULAR; INTRAVENOUS at 15:00

## 2020-05-03 RX ADMIN — PANTOPRAZOLE SODIUM 40 MILLIGRAM(S): 20 TABLET, DELAYED RELEASE ORAL at 05:43

## 2020-05-03 RX ADMIN — CHLORHEXIDINE GLUCONATE 15 MILLILITER(S): 213 SOLUTION TOPICAL at 16:55

## 2020-05-03 RX ADMIN — Medication 6.59 MICROGRAM(S)/KG/MIN: at 05:42

## 2020-05-03 RX ADMIN — Medication 250 MILLIGRAM(S): at 08:03

## 2020-05-03 RX ADMIN — Medication 1 PACKET(S): at 16:55

## 2020-05-03 RX ADMIN — HEPARIN SODIUM 700 UNIT(S)/HR: 5000 INJECTION INTRAVENOUS; SUBCUTANEOUS at 05:39

## 2020-05-03 RX ADMIN — CHLORHEXIDINE GLUCONATE 15 MILLILITER(S): 213 SOLUTION TOPICAL at 05:47

## 2020-05-03 NOTE — PROVIDER CONTACT NOTE (OTHER) - ACTION/TREATMENT ORDERED:
EKG & troponin done  Nitric oxide started by RT  1 liter Plasmalyte bolus administered  aPTT therapeutic, now checked daily

## 2020-05-03 NOTE — PROGRESS NOTE ADULT - SUBJECTIVE AND OBJECTIVE BOX
24h Events:  rising leukocytosis, started on cefepime and vanco  Proned yesterday, now supine. was started on NOS yesterday. minimal improvemnt in resp status with added NOS and proning. p/f 148 from 135      ICU Vital Signs Last 24 Hrs  T(C): 36.7 (03 May 2020 03:54), Max: 36.9 (02 May 2020 20:00)  T(F): 98.1 (03 May 2020 03:54), Max: 98.4 (02 May 2020 20:00)  HR: 84 (03 May 2020 05:35) (70 - 102)  BP: --  BP(mean): --  ABP: 128/59 (03 May 2020 05:00) (87/47 - 130/59)  ABP(mean): 81 (03 May 2020 05:00) (62 - 84)  RR: 14 (03 May 2020 05:00) (11 - 34)  SpO2: 95% (03 May 2020 05:35) (92% - 97%)      I&O's Detail    02 May 2020 07:01  -  03 May 2020 07:00  --------------------------------------------------------  IN:    heparin  Infusion.: 89 mL    HYDROmorphone Infusion.: 12 mL    ketamine Infusion.: 84 mL    multiple electrolytes Injection Type 1: 1200 mL    norepinephrine Infusion: 42.8 mL    rocuronium Infusion: 80.4 mL  Total IN: 1508.2 mL    OUT:    Indwelling Catheter - Urethral: 1060 mL  Total OUT: 1060 mL    Total NET: 448.2 mL          ABG - ( 03 May 2020 04:24 )  pH, Arterial: 7.23  pH, Blood: x     /  pCO2: 88    /  pO2: 81    / HCO3: 29    / Base Excess: 5.5   /  SaO2: 96                  MEDICATIONS  (STANDING):  cefepime   IVPB 2000 milliGRAM(s) IV Intermittent once  cefepime   IVPB 2000 milliGRAM(s) IV Intermittent every 8 hours  cefepime   IVPB      chlorhexidine 0.12% Liquid 15 milliLiter(s) Oral Mucosa every 12 hours  heparin  Infusion.  Unit(s)/Hr (12 mL/Hr) IV Continuous <Continuous>  HYDROmorphone Infusion. 0.5 mG/Hr (0.5 mL/Hr) IV Continuous <Continuous>  ketamine Infusion. 0.25 mG/kG/Hr (1.76 mL/Hr) IV Continuous <Continuous>  multiple electrolytes Injection Type 1 1000 milliLiter(s) (100 mL/Hr) IV Continuous <Continuous>  multivitamin/minerals 1 Tablet(s) Oral daily  norepinephrine Infusion 0.05 MICROgram(s)/kG/Min (6.59 mL/Hr) IV Continuous <Continuous>  pantoprazole    Tablet 40 milliGRAM(s) Oral before breakfast  psyllium Powder 1 Packet(s) Oral two times a day  rocuronium Infusion 8 MICROgram(s)/kG/Min (6.75 mL/Hr) IV Continuous <Continuous>  rocuronium Injectable 100 milliGRAM(s) IV Push once  vancomycin  IVPB      vancomycin  IVPB 1000 milliGRAM(s) IV Intermittent every 12 hours  vancomycin  IVPB 1000 milliGRAM(s) IV Intermittent once    MEDICATIONS  (PRN):  acetaminophen   Tablet .. 650 milliGRAM(s) Oral every 6 hours PRN Temp greater or equal to 38C (100.4F), Mild Pain (1 - 3), Moderate Pain (4 - 6)  heparin   Injectable 5500 Unit(s) IV Push every 6 hours PRN For aPTT less than 40  heparin   Injectable 2500 Unit(s) IV Push every 6 hours PRN For aPTT between 40 - 57        Physical Exam:    Neurological: damari and ketamine gtt for sedation / analgesia. RASS -4    Pulmonary: mechanical ventilation; 34/30/+8/60%    Cardiovascular: S1, S2, regular rate & rhythm    Gastrointestinal: soft, non-tender, non-distended, no rebound / guarding    : alexander in place with outputs as recorded    Skin: warm, dry, no diaphoresis, no pallor, cyanosis, or jaundice    LABS:  CBC Full  -  ( 03 May 2020 04:45 )  WBC Count : 39.50 K/uL  RBC Count : 4.72 M/uL  Hemoglobin : 14.0 g/dL  Hematocrit : 46.1 %  Platelet Count - Automated : 223 K/uL  Mean Cell Volume : 97.7 fl  Mean Cell Hemoglobin : 29.7 pg  Mean Cell Hemoglobin Concentration : 30.4 gm/dL  Auto Neutrophil # : 37.49 K/uL  Auto Lymphocyte # : 0.00 K/uL  Auto Monocyte # : 0.36 K/uL  Auto Eosinophil # : 0.00 K/uL  Auto Basophil # : 0.00 K/uL  Auto Neutrophil % : 69.5 %  Auto Lymphocyte % : 0.0 %  Auto Monocyte % : 0.9 %  Auto Eosinophil % : 0.0 %  Auto Basophil % : 0.0 %    05-03    140  |  94<L>  |  11.0  ----------------------------<  80  4.0   |  32.0<H>  |  0.53    Ca    7.7<L>      03 May 2020 04:45  Phos  2.6     05-03  Mg     2.2     05-03      PT/INR - ( 03 May 2020 04:45 )   PT: 23.4 sec;   INR: 2.03 ratio         PTT - ( 03 May 2020 04:45 )  PTT:90.5 sec    RECENT CULTURES:        CARDIAC MARKERS ( 02 May 2020 22:47 )  x     / <0.01 ng/mL / 139 U/L / x     / x      CARDIAC MARKERS ( 02 May 2020 05:22 )  x     / 0.02 ng/mL / x     / x     / x              ASSESSMENT/PLAN:  64y Male with acute respiratory failure 2/2 COVID-19    Neuro: sedation / analgesia with damari, ketamine, delirium precautions, daily sedation holiday    CV: continue invasive monitoring with arterial line    Pulm: wean FiO2 / PEEP aas tolerated. wean nitric when able     GI/Nutrition: NPO with tube feeds     /Renal: alexander for strict I&O, monitor kidney fxn    ID: cefepime and vanco for rising WBC.remains afebrile and procal not impressive     Endo: monitor blood glucose    Skin: repositioning for DTI prevention while in bed    Heme/DVT Prophylaxis: SCDs, hep gtt - monitor gtt

## 2020-05-03 NOTE — PROVIDER CONTACT NOTE (OTHER) - ASSESSMENT
#8 ETT 26 at [facility]. OGT. Escalera adequate urine output. Patient placed supine 5/2/2020 at 2200, HR dropped to 30 sustaining, Atropine pushed per PA at bedside. Nitric oxide started.  Gtts:  Heparin at 700 units/hr  Dilaudid at 1mg/hr  Ketamine at 1mg/hr  Plasmalyte at 100ml/hr  Levophed titrated to maintain MAP >65  Rocuronium at 8mg/hr

## 2020-05-04 LAB
ANION GAP SERPL CALC-SCNC: 8 MMOL/L — SIGNIFICANT CHANGE UP (ref 5–17)
APTT BLD: 71.5 SEC — HIGH (ref 27.5–36.3)
BUN SERPL-MCNC: 11 MG/DL — SIGNIFICANT CHANGE UP (ref 8–20)
CALCIUM SERPL-MCNC: 6.9 MG/DL — LOW (ref 8.6–10.2)
CHLORIDE SERPL-SCNC: 95 MMOL/L — LOW (ref 98–107)
CO2 SERPL-SCNC: 35 MMOL/L — HIGH (ref 22–29)
CREAT SERPL-MCNC: 0.35 MG/DL — LOW (ref 0.5–1.3)
D DIMER BLD IA.RAPID-MCNC: 1009 NG/ML DDU — HIGH
GAS PNL BLDA: SIGNIFICANT CHANGE UP
GAS PNL BLDA: SIGNIFICANT CHANGE UP
GLUCOSE BLDC GLUCOMTR-MCNC: 181 MG/DL — HIGH (ref 70–99)
GLUCOSE SERPL-MCNC: 158 MG/DL — HIGH (ref 70–99)
GRAM STN FLD: SIGNIFICANT CHANGE UP
HCT VFR BLD CALC: 38.6 % — LOW (ref 39–50)
HGB BLD-MCNC: 11.9 G/DL — LOW (ref 13–17)
INR BLD: 1.61 RATIO — HIGH (ref 0.88–1.16)
MAGNESIUM SERPL-MCNC: 2.1 MG/DL — SIGNIFICANT CHANGE UP (ref 1.8–2.6)
MCHC RBC-ENTMCNC: 29.9 PG — SIGNIFICANT CHANGE UP (ref 27–34)
MCHC RBC-ENTMCNC: 30.8 GM/DL — LOW (ref 32–36)
MCV RBC AUTO: 97 FL — SIGNIFICANT CHANGE UP (ref 80–100)
PHOSPHATE SERPL-MCNC: 2.1 MG/DL — LOW (ref 2.4–4.7)
PLATELET # BLD AUTO: 203 K/UL — SIGNIFICANT CHANGE UP (ref 150–400)
POTASSIUM SERPL-MCNC: 3.5 MMOL/L — SIGNIFICANT CHANGE UP (ref 3.5–5.3)
POTASSIUM SERPL-SCNC: 3.5 MMOL/L — SIGNIFICANT CHANGE UP (ref 3.5–5.3)
PROTHROM AB SERPL-ACNC: 18.5 SEC — HIGH (ref 10–12.9)
RBC # BLD: 3.98 M/UL — LOW (ref 4.2–5.8)
RBC # FLD: 14.2 % — SIGNIFICANT CHANGE UP (ref 10.3–14.5)
SODIUM SERPL-SCNC: 138 MMOL/L — SIGNIFICANT CHANGE UP (ref 135–145)
SPECIMEN SOURCE: SIGNIFICANT CHANGE UP
VANCOMYCIN TROUGH SERPL-MCNC: 11 UG/ML — SIGNIFICANT CHANGE UP (ref 10–20)
WBC # BLD: 26.01 K/UL — HIGH (ref 3.8–10.5)
WBC # FLD AUTO: 26.01 K/UL — HIGH (ref 3.8–10.5)

## 2020-05-04 PROCEDURE — 99233 SBSQ HOSP IP/OBS HIGH 50: CPT

## 2020-05-04 PROCEDURE — 93010 ELECTROCARDIOGRAM REPORT: CPT

## 2020-05-04 RX ORDER — POTASSIUM CHLORIDE 20 MEQ
40 PACKET (EA) ORAL ONCE
Refills: 0 | Status: COMPLETED | OUTPATIENT
Start: 2020-05-04 | End: 2020-05-04

## 2020-05-04 RX ADMIN — PANTOPRAZOLE SODIUM 40 MILLIGRAM(S): 20 TABLET, DELAYED RELEASE ORAL at 06:23

## 2020-05-04 RX ADMIN — SODIUM CHLORIDE 100 MILLILITER(S): 9 INJECTION, SOLUTION INTRAVENOUS at 21:41

## 2020-05-04 RX ADMIN — CEFEPIME 100 MILLIGRAM(S): 1 INJECTION, POWDER, FOR SOLUTION INTRAMUSCULAR; INTRAVENOUS at 06:19

## 2020-05-04 RX ADMIN — HEPARIN SODIUM 700 UNIT(S)/HR: 5000 INJECTION INTRAVENOUS; SUBCUTANEOUS at 06:23

## 2020-05-04 RX ADMIN — CHLORHEXIDINE GLUCONATE 15 MILLILITER(S): 213 SOLUTION TOPICAL at 06:19

## 2020-05-04 RX ADMIN — CEFEPIME 100 MILLIGRAM(S): 1 INJECTION, POWDER, FOR SOLUTION INTRAMUSCULAR; INTRAVENOUS at 21:41

## 2020-05-04 RX ADMIN — KETAMINE HYDROCHLORIDE 1.76 MG/KG/HR: 100 INJECTION INTRAMUSCULAR; INTRAVENOUS at 21:41

## 2020-05-04 RX ADMIN — Medication 250 MILLIGRAM(S): at 19:14

## 2020-05-04 RX ADMIN — CEFEPIME 100 MILLIGRAM(S): 1 INJECTION, POWDER, FOR SOLUTION INTRAMUSCULAR; INTRAVENOUS at 01:07

## 2020-05-04 RX ADMIN — Medication 250 MILLIGRAM(S): at 07:08

## 2020-05-04 RX ADMIN — CHLORHEXIDINE GLUCONATE 15 MILLILITER(S): 213 SOLUTION TOPICAL at 16:29

## 2020-05-04 RX ADMIN — Medication 40 MILLIEQUIVALENT(S): at 07:12

## 2020-05-04 RX ADMIN — CEFEPIME 100 MILLIGRAM(S): 1 INJECTION, POWDER, FOR SOLUTION INTRAMUSCULAR; INTRAVENOUS at 14:55

## 2020-05-04 RX ADMIN — Medication 1 TABLET(S): at 11:28

## 2020-05-04 RX ADMIN — KETAMINE HYDROCHLORIDE 1.76 MG/KG/HR: 100 INJECTION INTRAMUSCULAR; INTRAVENOUS at 06:23

## 2020-05-04 NOTE — PROGRESS NOTE ADULT - SUBJECTIVE AND OBJECTIVE BOX
INTERVAL HPI/OVERNIGHT EVENTS/SUBJECTIVE: Pt having very elevated Peak and Pplat pressures.  Able to wean Inspiratory pressure to bring down Pplat to under 30.  Increased PEEP to 9.      ICU Vital Signs Last 24 Hrs  T(C): 35.9 (04 May 2020 00:13), Max: 36.9 (03 May 2020 16:03)  T(F): 96.6 (04 May 2020 00:13), Max: 98.4 (03 May 2020 16:03)  HR: 83 (04 May 2020 04:32) (69 - 89)  BP: 113/56 (04 May 2020 01:00) (103/53 - 124/61)  BP(mean): 79 (04 May 2020 01:00) (73 - 86)  ABP: 137/54 (04 May 2020 01:00) (101/48 - 144/60)  ABP(mean): 77 (04 May 2020 01:00) (65 - 81)  RR: 12 (04 May 2020 01:00) (5 - 34)  SpO2: 98% (04 May 2020 04:32) (93% - 98%)      I&O's Detail    02 May 2020 07:01  -  03 May 2020 07:00  --------------------------------------------------------  IN:    heparin  Infusion.: 138 mL    HYDROmorphone Infusion.: 19 mL    ketamine Infusion.: 133 mL    multiple electrolytes Injection Type 1: 1900 mL    norepinephrine Infusion: 61.7 mL    rocuronium Infusion: 127.3 mL  Total IN: 2379 mL    OUT:    Indwelling Catheter - Urethral: 1060 mL  Total OUT: 1060 mL    Total NET: 1319 mL      03 May 2020 07:01  -  04 May 2020 06:09  --------------------------------------------------------  IN:    Free Water: 500 mL    heparin  Infusion.: 126 mL    HYDROmorphone Infusion.: 18 mL    ketamine Infusion.: 126 mL    multiple electrolytes Injection Type 1: 1900 mL    norepinephrine Infusion: 104 mL    Pivot 1.5: 358 mL    rocuronium Infusion: 127.3 mL    Solution: 500 mL    Solution: 150 mL  Total IN: 3909.3 mL    OUT:    Indwelling Catheter - Urethral: 1420 mL  Total OUT: 1420 mL    Total NET: 2489.3 mL          Mode: AC/ CMV (Assist Control/ Continuous Mandatory Ventilation)  RR (machine): 34  FiO2: 60  PEEP: 8  ITime: 0.55  MAP: 17  PC: 26  PIP: 39    ABG - ( 04 May 2020 04:38 )  pH, Arterial: 7.30  pH, Blood: x     /  pCO2: 78    /  pO2: 244   / HCO3: 33    / Base Excess: 9.3   /  SaO2: 100                 MEDICATIONS  (STANDING):  cefepime   IVPB 2000 milliGRAM(s) IV Intermittent every 8 hours  cefepime   IVPB      chlorhexidine 0.12% Liquid 15 milliLiter(s) Oral Mucosa every 12 hours  heparin  Infusion.  Unit(s)/Hr (12 mL/Hr) IV Continuous <Continuous>  HYDROmorphone Infusion. 0.5 mG/Hr (0.5 mL/Hr) IV Continuous <Continuous>  ketamine Infusion. 0.25 mG/kG/Hr (1.76 mL/Hr) IV Continuous <Continuous>  multiple electrolytes Injection Type 1 1000 milliLiter(s) (100 mL/Hr) IV Continuous <Continuous>  multivitamin/minerals 1 Tablet(s) Oral daily  norepinephrine Infusion 0.05 MICROgram(s)/kG/Min (6.59 mL/Hr) IV Continuous <Continuous>  pantoprazole    Tablet 40 milliGRAM(s) Oral before breakfast  psyllium Powder 1 Packet(s) Oral two times a day  rocuronium Infusion 8 MICROgram(s)/kG/Min (6.75 mL/Hr) IV Continuous <Continuous>  vancomycin  IVPB      vancomycin  IVPB 1000 milliGRAM(s) IV Intermittent every 12 hours    MEDICATIONS  (PRN):  acetaminophen   Tablet .. 650 milliGRAM(s) Oral every 6 hours PRN Temp greater or equal to 38C (100.4F), Mild Pain (1 - 3), Moderate Pain (4 - 6)  heparin   Injectable 5500 Unit(s) IV Push every 6 hours PRN For aPTT less than 40  heparin   Injectable 2500 Unit(s) IV Push every 6 hours PRN For aPTT between 40 - 57      NUTRITION/IVF: Pivot @ 24./ IVL    PHYSICAL EXAM:     Gen: NAD, Intubated. No cyanosis, Pallor.    Eyes: PERRL ~ 3mm, EOMI,     Neurological: RASS -4 GCS 3,      Pulmonary: NAD, CTA, = BL .      Cardiovascular: RRR, S1, S2, No Murmurs, rubs or gallops noted.    Gastrointestinal: ND, Soft, NT.    Extremities: NT, AT, no edema, erythema or palpable cord noted.  FROM, = 2+ pulses throughout.      LABS:  CBC Full  -  ( 04 May 2020 04:48 )  WBC Count : 26.01 K/uL  RBC Count : 3.98 M/uL  Hemoglobin : 11.9 g/dL  Hematocrit : 38.6 %  Platelet Count - Automated : 203 K/uL  Mean Cell Volume : 97.0 fl  Mean Cell Hemoglobin : 29.9 pg  Mean Cell Hemoglobin Concentration : 30.8 gm/dL  Auto Neutrophil # : x  Auto Lymphocyte # : x  Auto Monocyte # : x  Auto Eosinophil # : x  Auto Basophil # : x  Auto Neutrophil % : x  Auto Lymphocyte % : x  Auto Monocyte % : x  Auto Eosinophil % : x  Auto Basophil % : x    05-    138  |  95<L>  |  11.0  ----------------------------<  158<H>  3.5   |  35.0<H>  |  0.35<L>    Ca    6.9<L>      04 May 2020 04:48  Phos  2.1     05-04  Mg     2.1     05-04      PT/INR - ( 04 May 2020 04:48 )   PT: 18.5 sec;   INR: 1.61 ratio         PTT - ( 04 May 2020 04:48 )  PTT:71.5 sec  Urinalysis Basic - ( 03 May 2020 11:14 )    Color: Yellow / Appearance: Clear / S.025 / pH: x  Gluc: x / Ketone: Small  / Bili: Negative / Urobili: Negative mg/dL   Blood: x / Protein: 15 mg/dL / Nitrite: Negative   Leuk Esterase: Negative / RBC: 0-2 /HPF / WBC 0-2   Sq Epi: x / Non Sq Epi: Negative / Bacteria: Negative      RECENT CULTURES:   .Sputum Sputum XXXX   Few polymorphonuclear leukocytes per low power field  Few Squamous epithelial cells per low power field  Few Gram Positive Cocci in Clusters seen per oil power field  Few Gram Positive Cocci in Pairs and Chains seen per oil power field  Numerous Gram Variable Rods seen per oil power field XXXX          CARDIAC MARKERS ( 02 May 2020 22:47 )  x     / <0.01 ng/mL / 139 U/L / x     / x          CAPILLARY BLOOD GLUCOSE      RADIOLOGY & ADDITIONAL STUDIES:    ASSESSMENT/PLAN:  64yMale presenting with: Acute hypoxic respiratory failure, R Segmental PE.  Severe ARDS    Neurological: Pt well sedated.  Will keep deeply sedated while poor compliance.  If Pulmonary pressures improve then we will begin to lighten sedation after removal of paralysis.    Pulmonary: AM ABG with significant improvement of PF ratio.  Will keep Current Vent settings.  No need to Prone at this time.  Would Send repeat ABG later in day.  Wean FiO2 to 40 % if Sat stays >90    Cardiovascular: Low dose Levo as needed to Keep MAPs > 65    Gastrointestinal: CW TF at goal.     Genitourinary: Making good urine.  Keep Escalera while critically     Heme: Hep drip for increased thrombotic risk     ID: Vanco Cefepime for suspected PNA.      Lines/ Tubes: Keep Invasive lines and tubes.    Dispo: Critical care as above.      CRITICAL CARE TIME SPENT: Critical care time spent: 60 minutes of critical care time spent providing medical care for patient's acute illness/conditions that impairs at least one vital organ system and/or poses a high risk of imminent or life threatening deterioration in the patient's condition. It includes time spent evaluating and treating the patient's acute illness as well as time spent reviewing labs, radiology, discussing goals of care with patient and/or patient's family, and discussing the case with a multidisciplinary team in an effort to prevent further life threatening deterioration or end organ damage. This time is independent of any procedures performed.

## 2020-05-04 NOTE — PROGRESS NOTE ADULT - ATTENDING COMMENTS
The patient was seen and examined  Events noted  No new problems    Neurologic:  NMB  Respiratory:  PC (1:2) 60%, PEEP+9; NO2=20 ppm; EEC=424  Hemodynamic:  Normal  Renal:  Urine flow okay  Hematologic:  Hgb okay  ID:  Empiric antibiotics with cefepime and vancomycin    Plan:  Nutritional support  Anticoagulation

## 2020-05-04 NOTE — CHART NOTE - NSCHARTNOTEFT_GEN_A_CORE
Source: Patient [ ]  Family [ ]   other [x ] EMR     Enteral /Parenteral Nutrition: Diet, NPO with Tube Feed:   Tube Feeding Modality: Nasogastric  Pivot 1.5 Raj  Total Volume for 24 Hours (mL): 576  Continuous  Starting Tube Feed Rate {mL per Hour}: 10  Increase Tube Feed Rate by (mL): 10     Every hour  Until Goal Tube Feed Rate (mL per Hour): 24  Tube Feed Duration (in Hours): 24  Tube Feed Start Time: 16:45 (05-04-20 @ 04:35)      Current Weight:   4/118: 70.3kg     % Weight Change (N/A) No new wt to assess at this time.     Pertinent Medications: MEDICATIONS  (STANDING):  cefepime   IVPB 2000 milliGRAM(s) IV Intermittent every 8 hours  cefepime   IVPB      chlorhexidine 0.12% Liquid 15 milliLiter(s) Oral Mucosa every 12 hours  heparin  Infusion.  Unit(s)/Hr (12 mL/Hr) IV Continuous <Continuous>  HYDROmorphone Infusion. 0.5 mG/Hr (0.5 mL/Hr) IV Continuous <Continuous>  ketamine Infusion. 0.25 mG/kG/Hr (1.76 mL/Hr) IV Continuous <Continuous>  multiple electrolytes Injection Type 1 1000 milliLiter(s) (100 mL/Hr) IV Continuous <Continuous>  multivitamin/minerals 1 Tablet(s) Oral daily  norepinephrine Infusion 0.05 MICROgram(s)/kG/Min (6.59 mL/Hr) IV Continuous <Continuous>  pantoprazole    Tablet 40 milliGRAM(s) Oral before breakfast  psyllium Powder 1 Packet(s) Oral two times a day  rocuronium Infusion 8 MICROgram(s)/kG/Min (6.75 mL/Hr) IV Continuous <Continuous>  vancomycin  IVPB      vancomycin  IVPB 1000 milliGRAM(s) IV Intermittent every 12 hours    MEDICATIONS  (PRN):  acetaminophen   Tablet .. 650 milliGRAM(s) Oral every 6 hours PRN Temp greater or equal to 38C (100.4F), Mild Pain (1 - 3), Moderate Pain (4 - 6)  heparin   Injectable 5500 Unit(s) IV Push every 6 hours PRN For aPTT less than 40  heparin   Injectable 2500 Unit(s) IV Push every 6 hours PRN For aPTT between 40 - 57    Pertinent Labs: CBC Full  -  ( 04 May 2020 04:48 )  WBC Count : 26.01 K/uL  RBC Count : 3.98 M/uL  Hemoglobin : 11.9 g/dL  Hematocrit : 38.6 %  Platelet Count - Automated : 203 K/uL  Mean Cell Volume : 97.0 fl  Mean Cell Hemoglobin : 29.9 pg  Mean Cell Hemoglobin Concentration : 30.8 gm/dL  Auto Neutrophil # : x  Auto Lymphocyte # : x  Auto Monocyte # : x  Auto Eosinophil # : x  Auto Basophil # : x  Auto Neutrophil % : x  Auto Lymphocyte % : x  Auto Monocyte % : x  Auto Eosinophil % : x  Auto Basophil % : x        Skin: no breakdown noted; Mild generalized edema noted, 2+ edema to B/L hands       Estimated Needs:   [x ] no change since previous assessment  [ ] recalculated:     Current Nutrition Diagnosis:  Pt remains at high nutrition risk secondary moderate-acute protein calorie malnutrition related to inability to meet sufficient protein-energy requirements in setting of Acute hypoxic respiratory failure, R Segmental PE, Severe ARDS secondary to COVID 19, (Rapid response, requiring intubation on 5/1) as evidenced by meeting less then 75% estimated energy requirements > 5 days and + fluid accumulation. Pt has been NPO since intubation, Trickle feeds of Pivot @ 10ml/hr (x20 hours) 200ml/day; 19gm protein) to be initiated today noted, Aware of need for heavy sedation/paralytics noted. Recommendations below:      Recommendations:   1. RX: MVI, Thiamine and Vit. C daily   2. Obtain new wt as feasible   3. Continue with Trickle feeds of Pivot @ 10ml/hr at this time  4. Monitor tolerance closely     Monitoring and Evaluation:   [ ] PO intake [x ] Tolerance to diet prescription [X] Weights  [X] Follow up per protocol [X] Labs:

## 2020-05-05 LAB
ANION GAP SERPL CALC-SCNC: 6 MMOL/L — SIGNIFICANT CHANGE UP (ref 5–17)
APTT BLD: 58.1 SEC — HIGH (ref 27.5–36.3)
BASOPHILS # BLD AUTO: 0.06 K/UL — SIGNIFICANT CHANGE UP (ref 0–0.2)
BASOPHILS NFR BLD AUTO: 0.3 % — SIGNIFICANT CHANGE UP (ref 0–2)
BUN SERPL-MCNC: 8 MG/DL — SIGNIFICANT CHANGE UP (ref 8–20)
CALCIUM SERPL-MCNC: 7 MG/DL — LOW (ref 8.6–10.2)
CHLORIDE SERPL-SCNC: 91 MMOL/L — LOW (ref 98–107)
CHLORIDE UR-SCNC: 90 MMOL/L — SIGNIFICANT CHANGE UP
CO2 SERPL-SCNC: 40 MMOL/L — HIGH (ref 22–29)
CREAT SERPL-MCNC: 0.24 MG/DL — LOW (ref 0.5–1.3)
D DIMER BLD IA.RAPID-MCNC: 957 NG/ML DDU — HIGH
EOSINOPHIL # BLD AUTO: 0.64 K/UL — HIGH (ref 0–0.5)
EOSINOPHIL NFR BLD AUTO: 3.5 % — SIGNIFICANT CHANGE UP (ref 0–6)
GAS PNL BLDA: SIGNIFICANT CHANGE UP
GLUCOSE SERPL-MCNC: 127 MG/DL — HIGH (ref 70–99)
HCT VFR BLD CALC: 37.3 % — LOW (ref 39–50)
HGB BLD-MCNC: 11.4 G/DL — LOW (ref 13–17)
IMM GRANULOCYTES NFR BLD AUTO: 1.6 % — HIGH (ref 0–1.5)
LYMPHOCYTES # BLD AUTO: 1.02 K/UL — SIGNIFICANT CHANGE UP (ref 1–3.3)
LYMPHOCYTES # BLD AUTO: 5.5 % — LOW (ref 13–44)
MAGNESIUM SERPL-MCNC: 2.1 MG/DL — SIGNIFICANT CHANGE UP (ref 1.6–2.6)
MCHC RBC-ENTMCNC: 29.5 PG — SIGNIFICANT CHANGE UP (ref 27–34)
MCHC RBC-ENTMCNC: 30.6 GM/DL — LOW (ref 32–36)
MCV RBC AUTO: 96.6 FL — SIGNIFICANT CHANGE UP (ref 80–100)
MONOCYTES # BLD AUTO: 0.65 K/UL — SIGNIFICANT CHANGE UP (ref 0–0.9)
MONOCYTES NFR BLD AUTO: 3.5 % — SIGNIFICANT CHANGE UP (ref 2–14)
NEUTROPHILS # BLD AUTO: 15.89 K/UL — HIGH (ref 1.8–7.4)
NEUTROPHILS NFR BLD AUTO: 85.6 % — HIGH (ref 43–77)
PHOSPHATE SERPL-MCNC: 1.4 MG/DL — LOW (ref 2.4–4.7)
PLATELET # BLD AUTO: 187 K/UL — SIGNIFICANT CHANGE UP (ref 150–400)
POTASSIUM SERPL-MCNC: 3.3 MMOL/L — LOW (ref 3.5–5.3)
POTASSIUM SERPL-SCNC: 3.3 MMOL/L — LOW (ref 3.5–5.3)
RBC # BLD: 3.86 M/UL — LOW (ref 4.2–5.8)
RBC # FLD: 14.3 % — SIGNIFICANT CHANGE UP (ref 10.3–14.5)
SODIUM SERPL-SCNC: 137 MMOL/L — SIGNIFICANT CHANGE UP (ref 135–145)
WBC # BLD: 18.55 K/UL — HIGH (ref 3.8–10.5)
WBC # FLD AUTO: 18.55 K/UL — HIGH (ref 3.8–10.5)

## 2020-05-05 PROCEDURE — 99232 SBSQ HOSP IP/OBS MODERATE 35: CPT

## 2020-05-05 RX ORDER — POTASSIUM CHLORIDE 20 MEQ
20 PACKET (EA) ORAL
Refills: 0 | Status: COMPLETED | OUTPATIENT
Start: 2020-05-05 | End: 2020-05-05

## 2020-05-05 RX ORDER — POTASSIUM PHOSPHATE, MONOBASIC POTASSIUM PHOSPHATE, DIBASIC 236; 224 MG/ML; MG/ML
30 INJECTION, SOLUTION INTRAVENOUS ONCE
Refills: 0 | Status: COMPLETED | OUTPATIENT
Start: 2020-05-05 | End: 2020-05-05

## 2020-05-05 RX ADMIN — Medication 100 MILLIEQUIVALENT(S): at 09:05

## 2020-05-05 RX ADMIN — Medication 1 PACKET(S): at 06:16

## 2020-05-05 RX ADMIN — CEFEPIME 100 MILLIGRAM(S): 1 INJECTION, POWDER, FOR SOLUTION INTRAMUSCULAR; INTRAVENOUS at 13:14

## 2020-05-05 RX ADMIN — POTASSIUM PHOSPHATE, MONOBASIC POTASSIUM PHOSPHATE, DIBASIC 83.33 MILLIMOLE(S): 236; 224 INJECTION, SOLUTION INTRAVENOUS at 10:03

## 2020-05-05 RX ADMIN — CEFEPIME 100 MILLIGRAM(S): 1 INJECTION, POWDER, FOR SOLUTION INTRAMUSCULAR; INTRAVENOUS at 06:14

## 2020-05-05 RX ADMIN — CHLORHEXIDINE GLUCONATE 15 MILLILITER(S): 213 SOLUTION TOPICAL at 17:20

## 2020-05-05 RX ADMIN — KETAMINE HYDROCHLORIDE 1.76 MG/KG/HR: 100 INJECTION INTRAMUSCULAR; INTRAVENOUS at 09:05

## 2020-05-05 RX ADMIN — Medication 1 TABLET(S): at 11:26

## 2020-05-05 RX ADMIN — PANTOPRAZOLE SODIUM 40 MILLIGRAM(S): 20 TABLET, DELAYED RELEASE ORAL at 06:14

## 2020-05-05 RX ADMIN — Medication 250 MILLIGRAM(S): at 07:57

## 2020-05-05 RX ADMIN — CHLORHEXIDINE GLUCONATE 15 MILLILITER(S): 213 SOLUTION TOPICAL at 06:14

## 2020-05-05 RX ADMIN — Medication 250 MILLIGRAM(S): at 21:30

## 2020-05-05 RX ADMIN — HEPARIN SODIUM 700 UNIT(S)/HR: 5000 INJECTION INTRAVENOUS; SUBCUTANEOUS at 06:32

## 2020-05-05 RX ADMIN — Medication 6.75 MICROGRAM(S)/KG/MIN: at 00:28

## 2020-05-05 RX ADMIN — CEFEPIME 100 MILLIGRAM(S): 1 INJECTION, POWDER, FOR SOLUTION INTRAMUSCULAR; INTRAVENOUS at 22:30

## 2020-05-05 RX ADMIN — Medication 6.75 MICROGRAM(S)/KG/MIN: at 11:53

## 2020-05-05 RX ADMIN — Medication 100 MILLIEQUIVALENT(S): at 08:01

## 2020-05-05 RX ADMIN — Medication 1 PACKET(S): at 17:18

## 2020-05-05 NOTE — PROGRESS NOTE ADULT - SUBJECTIVE AND OBJECTIVE BOX
24h Events:  no overnight events. p/f 188 today  levo off today  weaning NOS    ICU Vital Signs Last 24 Hrs  T(C): 36.4 (05 May 2020 15:00), Max: 36.9 (05 May 2020 00:00)  T(F): 97.5 (05 May 2020 15:00), Max: 98.4 (05 May 2020 00:00)  HR: 66 (05 May 2020 15:00) (63 - 76)  BP: 112/60 (05 May 2020 15:00) (112/60 - 135/71)  BP(mean): 81 (05 May 2020 15:00) (81 - 97)  ABP: 122/49 (05 May 2020 15:00) (122/49 - 151/53)  ABP(mean): 72 (05 May 2020 15:00) (67 - 81)  RR: 34 (05 May 2020 15:00) (14 - 34)  SpO2: 98% (05 May 2020 15:00) (97% - 99%)      I&O's Detail    04 May 2020 07:01  -  05 May 2020 07:00  --------------------------------------------------------  IN:    Enteral Tube Flush: 100 mL    Free Water: 500 mL    heparin  Infusion.: 161 mL    HYDROmorphone Infusion.: 23 mL    ketamine Infusion.: 161 mL    multiple electrolytes Injection Type 1: 2300 mL    Pivot 1.5: 552 mL    rocuronium Infusion: 154.1 mL    Solution: 100 mL    Solution: 500 mL  Total IN: 4551.1 mL    OUT:    Indwelling Catheter - Urethral: 1755 mL  Total OUT: 1755 mL    Total NET: 2796.1 mL      05 May 2020 07:01  -  05 May 2020 15:32  --------------------------------------------------------  IN:    Free Water: 250 mL    heparin  Infusion.: 56 mL    HYDROmorphone Infusion.: 8 mL    ketamine Infusion.: 8 mL    multiple electrolytes Injection Type 1: 800 mL    Pivot 1.5: 192 mL    rocuronium Infusion: 64 mL    Solution: 250 mL    Solution: 200 mL    Solution: 50 mL    Solution: 417.5 mL  Total IN: 2295.5 mL    OUT:    Indwelling Catheter - Urethral: 1350 mL  Total OUT: 1350 mL    Total NET: 945.5 mL          ABG - ( 05 May 2020 03:53 )  pH, Arterial: 7.29  pH, Blood: x     /  pCO2: 92    /  pO2: 94    / HCO3: 38    / Base Excess: 14.1  /  SaO2: 98                  MEDICATIONS  (STANDING):  cefepime   IVPB 2000 milliGRAM(s) IV Intermittent every 8 hours  cefepime   IVPB      chlorhexidine 0.12% Liquid 15 milliLiter(s) Oral Mucosa every 12 hours  heparin  Infusion.  Unit(s)/Hr (12 mL/Hr) IV Continuous <Continuous>  HYDROmorphone Infusion. 0.5 mG/Hr (0.5 mL/Hr) IV Continuous <Continuous>  ketamine Infusion. 0.25 mG/kG/Hr (1.76 mL/Hr) IV Continuous <Continuous>  multiple electrolytes Injection Type 1 1000 milliLiter(s) (100 mL/Hr) IV Continuous <Continuous>  multivitamin/minerals 1 Tablet(s) Oral daily  pantoprazole    Tablet 40 milliGRAM(s) Oral before breakfast  psyllium Powder 1 Packet(s) Oral two times a day  rocuronium Infusion 8 MICROgram(s)/kG/Min (6.75 mL/Hr) IV Continuous <Continuous>  vancomycin  IVPB      vancomycin  IVPB 1000 milliGRAM(s) IV Intermittent every 12 hours    MEDICATIONS  (PRN):  acetaminophen   Tablet .. 650 milliGRAM(s) Oral every 6 hours PRN Temp greater or equal to 38C (100.4F), Mild Pain (1 - 3), Moderate Pain (4 - 6)  heparin   Injectable 5500 Unit(s) IV Push every 6 hours PRN For aPTT less than 40  heparin   Injectable 2500 Unit(s) IV Push every 6 hours PRN For aPTT between 40 - 57        Physical Exam:    Neurological: ketamine, dilaudid, damari gtt for sedation / analgesia    Pulmonary: mechanical ventilation; AC,     Cardiovascular: S1, S2, regular rate & rhythm    Gastrointestinal: soft, non-tender, non-distended, no rebound / guarding    : alexander in place    Skin: warm, dry, no diaphoresis, no pallor, cyanosis, or jaundice  UE fingers, all digits cool, + radial pulses. cap refill < 3 seconds.     LABS:  CBC Full  -  ( 05 May 2020 05:48 )  WBC Count : 18.55 K/uL  RBC Count : 3.86 M/uL  Hemoglobin : 11.4 g/dL  Hematocrit : 37.3 %  Platelet Count - Automated : 187 K/uL  Mean Cell Volume : 96.6 fl  Mean Cell Hemoglobin : 29.5 pg  Mean Cell Hemoglobin Concentration : 30.6 gm/dL  Auto Neutrophil # : 15.89 K/uL  Auto Lymphocyte # : 1.02 K/uL  Auto Monocyte # : 0.65 K/uL  Auto Eosinophil # : 0.64 K/uL  Auto Basophil # : 0.06 K/uL  Auto Neutrophil % : 85.6 %  Auto Lymphocyte % : 5.5 %  Auto Monocyte % : 3.5 %  Auto Eosinophil % : 3.5 %  Auto Basophil % : 0.3 %    05-05    137  |  91<L>  |  8.0  ----------------------------<  127<H>  3.3<L>   |  40.0<H>  |  0.24<L>    Ca    7.0<L>      05 May 2020 05:48  Phos  1.4     05-05  Mg     2.1     05-05      PT/INR - ( 04 May 2020 04:48 )   PT: 18.5 sec;   INR: 1.61 ratio         PTT - ( 05 May 2020 05:48 )  PTT:58.1 sec    RECENT CULTURES:  05-04 .Sputum Sputum XXXX   Few polymorphonuclear leukocytes per low power field  Few Squamous epithelial cells per low power field  Few Gram Positive Cocci in Clusters seen per oil power field  Few Gram Positive Cocci in Pairs and Chains seen per oil power field  Numerous Gram Variable Rods seen per oil power field   Moderate Staphylococcus aureus  Moderate Acinetobacter baumannii nosocomialis group  Normal Respiratory Christine present    05-03 .Blood Blood XXXX XXXX   No growth at 48 hours        ASSESSMENT/PLAN:  64y Male Acute hypoxic respiratory failure, R Segmental PE.  Severe ARDS    Neuro: sedation / analgesia with damari, ketamine, dilaudid, delirium precautions, daily sedation holiday    CV: continue invasive monitoring with arterial line    Pulm: wean FiO2 / PEEP aas tolerated    GI/Nutrition: NPO with tube feeds     /Renal: alexander for strict I&O, monitor kidney fxn    ID: cefepime, vanco    Endo: monitor blood glucose    Skin: repositioning for DTI prevention while in bed    Heme/DVT Prophylaxis: SCDs, hep gtt

## 2020-05-05 NOTE — PROGRESS NOTE ADULT - PROBLEM SELECTOR PLAN 2
RRT called today for worsening respiratory distress 05/01/20  Intubated - vented  Sent to ICU for monitoring    Update 05/05/20  Patient remains on ventilator support closely monitored by ICU team

## 2020-05-05 NOTE — PROGRESS NOTE ADULT - PROBLEM SELECTOR PLAN 4
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   At this time Yovani reported that he is hopeful and anticipating a call from the ICU team for an update - Yovani states that he appreciates all the calls he has been getting from the hospital up to this point  GOC originally established with patient using  on 04.29.20 for FULL code, see GOC on 04.29.20 for details  At this time will continue to support and monitor    Total time spent 20 minutes    Thank you for the opportunity to assist with the care of this patient.   [facility] [phone].

## 2020-05-05 NOTE — PROGRESS NOTE ADULT - SUBJECTIVE AND OBJECTIVE BOX
This is a Palliative Care Followup  24 hour events: Patient remains in ICU closely monitored - Full Code- Reached out to HCP for emotional support see palliative care encounter below for details    HPI: This is a 64 year old male no significant medical history admitted to [facility] for SOB, fevers, dry cough x 10 days, poor PO intake. In ED patient with symptoms of tachycardia, hypoxia, ARF, now on non rebreather mask continuing to desaturate in the 80's. No sick contacts, no reports of chest pain, n/v/d in ED. Patient found to be COVID-19 positive and right pulmonary embolism. Called for palliative care consult to discuss GOC as patient is high risk for intubation. Patient is Kenyan speaking,  used to discuss goals at bedside. On 04.29.20 Meeting held at bedside with patient, myself, Dr. Neumann, and   Discussed goals/advance directives. Established patient desire for Full Code including CPR and intubation in the event of cardio/pulmonary compromise/arrest  Patient was explained that he is high risk for intubation given his poor pulmonary status - he is also aware he is at risk for cardiac arrest- patient was  agreeable to starting plasma, Patient started Plasma was maintaining saturation wiht non rebreather - then today on 05.01.20 patient's respiratory status decline and he was intubated at bedside after a RRT was called. Patient sent to ICU for closer monitoring, vent support.     Present Symptoms: Unable to tell due to poor mental status  Dyspnea:  vented  Nausea/Vomiting: Unable to tell due to poor mental status  Anxiety:  Unable to tell due to poor mental status   Depression: Unable to tell due to poor mental status  Fatigue: yes sedated  Loss of appetite: Unable to tell due to poor mental status    Pain: Unable to tell due to poor mental status - on dilaudid infusion            Character-            Duration-            Effect-            Factors-            Frequency-            Location-            Severity-    Review of Systems: Reviewed  Per HPI, all other ROS negative  Unable to tell due to poor mental status    MEDICATIONS  (STANDING):  cefepime   IVPB 2000 milliGRAM(s) IV Intermittent every 8 hours  cefepime   IVPB      chlorhexidine 0.12% Liquid 15 milliLiter(s) Oral Mucosa every 12 hours  heparin  Infusion.  Unit(s)/Hr (12 mL/Hr) IV Continuous <Continuous>  HYDROmorphone Infusion. 0.5 mG/Hr (0.5 mL/Hr) IV Continuous <Continuous>  ketamine Infusion. 0.25 mG/kG/Hr (1.76 mL/Hr) IV Continuous <Continuous>  multiple electrolytes Injection Type 1 1000 milliLiter(s) (100 mL/Hr) IV Continuous <Continuous>  multivitamin/minerals 1 Tablet(s) Oral daily  pantoprazole    Tablet 40 milliGRAM(s) Oral before breakfast  psyllium Powder 1 Packet(s) Oral two times a day  rocuronium Infusion 8 MICROgram(s)/kG/Min (6.75 mL/Hr) IV Continuous <Continuous>  vancomycin  IVPB      vancomycin  IVPB 1000 milliGRAM(s) IV Intermittent every 12 hours    MEDICATIONS  (PRN):  acetaminophen   Tablet .. 650 milliGRAM(s) Oral every 6 hours PRN Temp greater or equal to 38C (100.4F), Mild Pain (1 - 3), Moderate Pain (4 - 6)  heparin   Injectable 5500 Unit(s) IV Push every 6 hours PRN For aPTT less than 40  heparin   Injectable 2500 Unit(s) IV Push every 6 hours PRN For aPTT between 40 - 57    PHYSICAL EXAM:  Physical Exam - limited physical exam due to COVID - 19 isolation status  No Physical Exam at bedside completed in attempt to limit COVID-19   Reviewed H&P physical exam and most recent Hospitalist Physical Exam   See H&P physical exam and most recent Hospitalist Document /PA exam    ICU Vital Signs Last 24 Hrs  T(C): 36.4 (05 May 2020 14:00), Max: 36.9 (05 May 2020 00:00)  T(F): 97.5 (05 May 2020 14:00), Max: 98.4 (05 May 2020 00:00)  HR: 69 (05 May 2020 14:00) (63 - 76)  BP: 120/65 (05 May 2020 14:00) (115/61 - 135/71)  BP(mean): 88 (05 May 2020 14:00) (82 - 97)  ABP: 134/52 (05 May 2020 14:00) (128/47 - 151/53)  ABP(mean): 77 (05 May 2020 14:00) (67 - 81)  RR: 34 (05 May 2020 14:00) (14 - 34)  SpO2: 98% (05 May 2020 14:00) (97% - 99%)  Basic Metabolic Panel in AM (05.05.20 @ 05:48)    Sodium, Serum: 137 mmol/L    Potassium, Serum: 3.3 mmol/L    Chloride, Serum: 91 mmol/L    Carbon Dioxide, Serum: 40.0 mmol/L    Anion Gap, Serum: 6 mmol/L    Blood Urea Nitrogen, Serum: 8.0 mg/dL    Creatinine, Serum: 0.24 mg/dL    Glucose, Serum: 127: Reference Range for Glucose has been amended as of 1/21/2020 mg/dL    Calcium, Total Serum: 7.0 mg/dL    eGFR if Non : 157: Interpretative comment    eGFR if : 182 mL/min/1.73M2       I&O's Summary      RADIOLOGY & ADDITIONAL STUDIES:  CXR 05/01/20  Findings:  The ET tube has been repositioned. The tip is now located at the inferior margin of the clavicular heads. No change in position of right IJ central line or NG tube. The heart is not enlarged. No hilar or mediastinal abnormality. Diffuse bilateral interstitial infiltrates, unchanged since earlier the same day.  Impression:  1.  Repositioning of ET tube as described, otherwise stable exam.    CTA chest 04.21.20  IMPRESSION:   Segmental right middle lobe pulmonary embolism.  Extensive bilateral infiltrates  Results were discussed with Dr. Jack at 5:10 PM on 4/21/2020.    ADVANCE DIRECTIVES:   Full Code

## 2020-05-06 LAB
-  AMIKACIN: SIGNIFICANT CHANGE UP
-  AMPICILLIN/SULBACTAM: SIGNIFICANT CHANGE UP
-  AMPICILLIN/SULBACTAM: SIGNIFICANT CHANGE UP
-  CEFAZOLIN: SIGNIFICANT CHANGE UP
-  CEFEPIME: SIGNIFICANT CHANGE UP
-  CEFTAZIDIME: SIGNIFICANT CHANGE UP
-  CIPROFLOXACIN: SIGNIFICANT CHANGE UP
-  CLINDAMYCIN: SIGNIFICANT CHANGE UP
-  ERYTHROMYCIN: SIGNIFICANT CHANGE UP
-  GENTAMICIN: SIGNIFICANT CHANGE UP
-  GENTAMICIN: SIGNIFICANT CHANGE UP
-  LEVOFLOXACIN: SIGNIFICANT CHANGE UP
-  MEROPENEM: SIGNIFICANT CHANGE UP
-  OXACILLIN: SIGNIFICANT CHANGE UP
-  RIFAMPIN: SIGNIFICANT CHANGE UP
-  TETRACYCLINE: SIGNIFICANT CHANGE UP
-  TOBRAMYCIN: SIGNIFICANT CHANGE UP
-  TRIMETHOPRIM/SULFAMETHOXAZOLE: SIGNIFICANT CHANGE UP
-  TRIMETHOPRIM/SULFAMETHOXAZOLE: SIGNIFICANT CHANGE UP
-  VANCOMYCIN: SIGNIFICANT CHANGE UP
ALBUMIN SERPL ELPH-MCNC: 2.7 G/DL — LOW (ref 3.3–5.2)
ALP SERPL-CCNC: 110 U/L — SIGNIFICANT CHANGE UP (ref 40–120)
ALT FLD-CCNC: 30 U/L — SIGNIFICANT CHANGE UP
ANION GAP SERPL CALC-SCNC: 6 MMOL/L — SIGNIFICANT CHANGE UP (ref 5–17)
APTT BLD: 54.4 SEC — HIGH (ref 27.5–36.3)
APTT BLD: 93.4 SEC — HIGH (ref 27.5–36.3)
APTT BLD: 94.8 SEC — HIGH (ref 27.5–36.3)
AST SERPL-CCNC: 29 U/L — SIGNIFICANT CHANGE UP
BASOPHILS # BLD AUTO: 0.06 K/UL — SIGNIFICANT CHANGE UP (ref 0–0.2)
BASOPHILS NFR BLD AUTO: 0.3 % — SIGNIFICANT CHANGE UP (ref 0–2)
BILIRUB SERPL-MCNC: <0.2 MG/DL — LOW (ref 0.4–2)
BUN SERPL-MCNC: 9 MG/DL — SIGNIFICANT CHANGE UP (ref 8–20)
CALCIUM SERPL-MCNC: 7.4 MG/DL — LOW (ref 8.6–10.2)
CHLORIDE SERPL-SCNC: 90 MMOL/L — LOW (ref 98–107)
CO2 SERPL-SCNC: 42 MMOL/L — HIGH (ref 22–29)
CREAT SERPL-MCNC: 0.23 MG/DL — LOW (ref 0.5–1.3)
EOSINOPHIL # BLD AUTO: 0.26 K/UL — SIGNIFICANT CHANGE UP (ref 0–0.5)
EOSINOPHIL NFR BLD AUTO: 1.4 % — SIGNIFICANT CHANGE UP (ref 0–6)
GAS PNL BLDA: SIGNIFICANT CHANGE UP
GAS PNL BLDA: SIGNIFICANT CHANGE UP
GLUCOSE SERPL-MCNC: 157 MG/DL — HIGH (ref 70–99)
HCT VFR BLD CALC: 38.3 % — LOW (ref 39–50)
HGB BLD-MCNC: 11.5 G/DL — LOW (ref 13–17)
IMM GRANULOCYTES NFR BLD AUTO: 1.5 % — SIGNIFICANT CHANGE UP (ref 0–1.5)
LYMPHOCYTES # BLD AUTO: 0.95 K/UL — LOW (ref 1–3.3)
LYMPHOCYTES # BLD AUTO: 5.2 % — LOW (ref 13–44)
MAGNESIUM SERPL-MCNC: 1.9 MG/DL — SIGNIFICANT CHANGE UP (ref 1.6–2.6)
MCHC RBC-ENTMCNC: 29.7 PG — SIGNIFICANT CHANGE UP (ref 27–34)
MCHC RBC-ENTMCNC: 30 GM/DL — LOW (ref 32–36)
MCV RBC AUTO: 99 FL — SIGNIFICANT CHANGE UP (ref 80–100)
METHOD TYPE: SIGNIFICANT CHANGE UP
METHOD TYPE: SIGNIFICANT CHANGE UP
MONOCYTES # BLD AUTO: 0.78 K/UL — SIGNIFICANT CHANGE UP (ref 0–0.9)
MONOCYTES NFR BLD AUTO: 4.3 % — SIGNIFICANT CHANGE UP (ref 2–14)
NEUTROPHILS # BLD AUTO: 16 K/UL — HIGH (ref 1.8–7.4)
NEUTROPHILS NFR BLD AUTO: 87.3 % — HIGH (ref 43–77)
PHOSPHATE SERPL-MCNC: 1.7 MG/DL — LOW (ref 2.4–4.7)
PLATELET # BLD AUTO: 221 K/UL — SIGNIFICANT CHANGE UP (ref 150–400)
POTASSIUM SERPL-MCNC: 3.6 MMOL/L — SIGNIFICANT CHANGE UP (ref 3.5–5.3)
POTASSIUM SERPL-SCNC: 3.6 MMOL/L — SIGNIFICANT CHANGE UP (ref 3.5–5.3)
PROT SERPL-MCNC: 5.2 G/DL — LOW (ref 6.6–8.7)
RBC # BLD: 3.87 M/UL — LOW (ref 4.2–5.8)
RBC # FLD: 14.4 % — SIGNIFICANT CHANGE UP (ref 10.3–14.5)
SODIUM SERPL-SCNC: 138 MMOL/L — SIGNIFICANT CHANGE UP (ref 135–145)
WBC # BLD: 18.32 K/UL — HIGH (ref 3.8–10.5)
WBC # FLD AUTO: 18.32 K/UL — HIGH (ref 3.8–10.5)

## 2020-05-06 PROCEDURE — 99231 SBSQ HOSP IP/OBS SF/LOW 25: CPT

## 2020-05-06 PROCEDURE — 99233 SBSQ HOSP IP/OBS HIGH 50: CPT

## 2020-05-06 RX ORDER — CALCIUM GLUCONATE 100 MG/ML
1 VIAL (ML) INTRAVENOUS ONCE
Refills: 0 | Status: COMPLETED | OUTPATIENT
Start: 2020-05-06 | End: 2020-05-06

## 2020-05-06 RX ORDER — CHLORHEXIDINE GLUCONATE 213 G/1000ML
15 SOLUTION TOPICAL EVERY 12 HOURS
Refills: 0 | Status: DISCONTINUED | OUTPATIENT
Start: 2020-05-06 | End: 2020-05-08

## 2020-05-06 RX ADMIN — Medication 250 MILLIGRAM(S): at 09:48

## 2020-05-06 RX ADMIN — Medication 100 GRAM(S): at 12:34

## 2020-05-06 RX ADMIN — CEFEPIME 100 MILLIGRAM(S): 1 INJECTION, POWDER, FOR SOLUTION INTRAMUSCULAR; INTRAVENOUS at 13:33

## 2020-05-06 RX ADMIN — KETAMINE HYDROCHLORIDE 1.76 MG/KG/HR: 100 INJECTION INTRAMUSCULAR; INTRAVENOUS at 06:18

## 2020-05-06 RX ADMIN — CHLORHEXIDINE GLUCONATE 15 MILLILITER(S): 213 SOLUTION TOPICAL at 05:59

## 2020-05-06 RX ADMIN — SODIUM CHLORIDE 100 MILLILITER(S): 9 INJECTION, SOLUTION INTRAVENOUS at 06:18

## 2020-05-06 RX ADMIN — Medication 6.75 MICROGRAM(S)/KG/MIN: at 06:17

## 2020-05-06 RX ADMIN — HEPARIN SODIUM 2500 UNIT(S): 5000 INJECTION INTRAVENOUS; SUBCUTANEOUS at 06:24

## 2020-05-06 RX ADMIN — HEPARIN SODIUM 800 UNIT(S)/HR: 5000 INJECTION INTRAVENOUS; SUBCUTANEOUS at 06:21

## 2020-05-06 RX ADMIN — SODIUM CHLORIDE 100 MILLILITER(S): 9 INJECTION, SOLUTION INTRAVENOUS at 09:48

## 2020-05-06 RX ADMIN — Medication 85 MILLIMOLE(S): at 13:33

## 2020-05-06 RX ADMIN — CEFEPIME 100 MILLIGRAM(S): 1 INJECTION, POWDER, FOR SOLUTION INTRAMUSCULAR; INTRAVENOUS at 23:01

## 2020-05-06 RX ADMIN — Medication 1 PACKET(S): at 17:32

## 2020-05-06 RX ADMIN — PANTOPRAZOLE SODIUM 40 MILLIGRAM(S): 20 TABLET, DELAYED RELEASE ORAL at 06:00

## 2020-05-06 RX ADMIN — CEFEPIME 100 MILLIGRAM(S): 1 INJECTION, POWDER, FOR SOLUTION INTRAMUSCULAR; INTRAVENOUS at 05:59

## 2020-05-06 RX ADMIN — CHLORHEXIDINE GLUCONATE 15 MILLILITER(S): 213 SOLUTION TOPICAL at 17:32

## 2020-05-06 RX ADMIN — Medication 6.75 MICROGRAM(S)/KG/MIN: at 17:43

## 2020-05-06 RX ADMIN — Medication 1 PACKET(S): at 05:59

## 2020-05-06 RX ADMIN — SODIUM CHLORIDE 100 MILLILITER(S): 9 INJECTION, SOLUTION INTRAVENOUS at 18:43

## 2020-05-06 RX ADMIN — Medication 1 TABLET(S): at 12:01

## 2020-05-06 RX ADMIN — KETAMINE HYDROCHLORIDE 1.76 MG/KG/HR: 100 INJECTION INTRAMUSCULAR; INTRAVENOUS at 16:10

## 2020-05-06 RX ADMIN — HEPARIN SODIUM 800 UNIT(S)/HR: 5000 INJECTION INTRAVENOUS; SUBCUTANEOUS at 13:31

## 2020-05-06 NOTE — PROGRESS NOTE ADULT - ASSESSMENT
ASSESSMENT/PLAN:  64y Male Acute hypoxic respiratory failure, R Segmental PE.  Severe ARDS    Neuro: sedation / analgesia with damari, ketamine, dilaudid.  Lift rocurinum     CV: continue invasive monitoring with arterial line, remains hemodynamically well.    Pulm: Severe ARDS secondary to covid. Now with respiratory acidosis and metabolic alkalosis, not ventilating likely not enough I time, will transition to AC and attempt 6cc/kg (330cc tv). Also give diamox to assist with hypercapnia     GI/Nutrition: NPO with tube feeds     /Renal: alexander for strict I&O, monitor kidney fxn    ID: cefepime, vanco    Endo: monitor blood glucose    Skin: repositioning for DTI prevention while in bed    Heme/DVT Prophylaxis: SCDs, hep gtt     Dispo: ICU

## 2020-05-06 NOTE — PROGRESS NOTE ADULT - SUBJECTIVE AND OBJECTIVE BOX
This is a Palliative Care Followup  24 hour events: Patient remains in ICU closely monitored - Full Code- Reached out to HCP for emotional support yesterday, today discussed care with ICU team patient is slowly making improvements    HPI: This is a 64 year old male no significant medical history admitted to [facility] for SOB, fevers, dry cough x 10 days, poor PO intake. In ED patient with symptoms of tachycardia, hypoxia, ARF, now on non rebreather mask continuing to desaturate in the 80's. No sick contacts, no reports of chest pain, n/v/d in ED. Patient found to be COVID-19 positive and right pulmonary embolism. Called for palliative care consult to discuss GOC as patient is high risk for intubation. Patient is Persian speaking,  used to discuss goals at bedside. On 04.29.20 Meeting held at bedside with patient, myself, Dr. Neumann, and   Discussed goals/advance directives. Established patient desire for Full Code including CPR and intubation in the event of cardio/pulmonary compromise/arrest  Patient was explained that he is high risk for intubation given his poor pulmonary status - he is also aware he is at risk for cardiac arrest- patient was  agreeable to starting plasma, Patient started Plasma was maintaining saturation wiht non rebreather - then today on 05.01.20 patient's respiratory status decline and he was intubated at bedside after a RRT was called. Patient sent to ICU for closer monitoring, vent support.     Present Symptoms: Unable to tell due to poor mental status  Dyspnea:  vented  Nausea/Vomiting: Unable to tell due to poor mental status  Anxiety:  Unable to tell due to poor mental status   Depression: Unable to tell due to poor mental status  Fatigue: yes sedated  Loss of appetite: Unable to tell due to poor mental status    Pain: Unable to tell due to poor mental status - on dilaudid infusion            Character-            Duration-            Effect-            Factors-            Frequency-            Location-            Severity-    Review of Systems: Reviewed  Per HPI, all other ROS negative  Unable to tell due to poor mental status    MEDICATIONS  (STANDING):  cefepime   IVPB 2000 milliGRAM(s) IV Intermittent every 8 hours  cefepime   IVPB      chlorhexidine 0.12% Liquid 15 milliLiter(s) Oral Mucosa every 12 hours  heparin  Infusion.  Unit(s)/Hr (12 mL/Hr) IV Continuous <Continuous>  HYDROmorphone Infusion. 0.5 mG/Hr (0.5 mL/Hr) IV Continuous <Continuous>  ketamine Infusion. 0.25 mG/kG/Hr (1.76 mL/Hr) IV Continuous <Continuous>  multiple electrolytes Injection Type 1 1000 milliLiter(s) (100 mL/Hr) IV Continuous <Continuous>  multivitamin/minerals 1 Tablet(s) Oral daily  pantoprazole    Tablet 40 milliGRAM(s) Oral before breakfast  psyllium Powder 1 Packet(s) Oral two times a day  rocuronium Infusion 8 MICROgram(s)/kG/Min (6.75 mL/Hr) IV Continuous <Continuous>  sodium phosphate IVPB 30 milliMole(s) IV Intermittent once  vancomycin  IVPB      vancomycin  IVPB 1000 milliGRAM(s) IV Intermittent every 12 hours    MEDICATIONS  (PRN):  acetaminophen   Tablet .. 650 milliGRAM(s) Oral every 6 hours PRN Temp greater or equal to 38C (100.4F), Mild Pain (1 - 3), Moderate Pain (4 - 6)  heparin   Injectable 5500 Unit(s) IV Push every 6 hours PRN For aPTT less than 40  heparin   Injectable 2500 Unit(s) IV Push every 6 hours PRN For aPTT between 40 - 57    PHYSICAL EXAM:  Physical Exam - limited physical exam due to COVID - 19 isolation status  No Physical Exam at bedside completed in attempt to limit COVID-19   Reviewed H&P physical exam and most recent Hospitalist Physical Exam   See H&P physical exam and most recent Hospitalist Document /PA exam    ICU Vital Signs Last 24 Hrs  T(C): 36.4 (05 May 2020 14:00), Max: 36.9 (05 May 2020 00:00)  T(F): 97.5 (05 May 2020 14:00), Max: 98.4 (05 May 2020 00:00)  HR: 69 (05 May 2020 14:00) (63 - 76)  BP: 120/65 (05 May 2020 14:00) (115/61 - 135/71)  BP(mean): 88 (05 May 2020 14:00) (82 - 97)  ABP: 134/52 (05 May 2020 14:00) (128/47 - 151/53)  ABP(mean): 77 (05 May 2020 14:00) (67 - 81)  RR: 34 (05 May 2020 14:00) (14 - 34)  SpO2: 98% (05 May 2020 14:00) (97% - 99%)  Basic Metabolic Panel in AM (05.05.20 @ 05:48)    Sodium, Serum: 137 mmol/L    Potassium, Serum: 3.3 mmol/L    Chloride, Serum: 91 mmol/L    Carbon Dioxide, Serum: 40.0 mmol/L    Anion Gap, Serum: 6 mmol/L    Blood Urea Nitrogen, Serum: 8.0 mg/dL    Creatinine, Serum: 0.24 mg/dL    Glucose, Serum: 127: Reference Range for Glucose has been amended as of 1/21/2020 mg/dL    Calcium, Total Serum: 7.0 mg/dL    eGFR if Non : 157: Interpretative comment    eGFR if : 182 mL/min/1.73M2       I&O's Summary      RADIOLOGY & ADDITIONAL STUDIES:  CXR 05/01/20  Findings:  The ET tube has been repositioned. The tip is now located at the inferior margin of the clavicular heads. No change in position of right IJ central line or NG tube. The heart is not enlarged. No hilar or mediastinal abnormality. Diffuse bilateral interstitial infiltrates, unchanged since earlier the same day.  Impression:  1.  Repositioning of ET tube as described, otherwise stable exam.    CTA chest 04.21.20  IMPRESSION:   Segmental right middle lobe pulmonary embolism.  Extensive bilateral infiltrates  Results were discussed with Dr. Jack at 5:10 PM on 4/21/2020.    ADVANCE DIRECTIVES:   Full Code

## 2020-05-06 NOTE — PROGRESS NOTE ADULT - ATTENDING COMMENTS
The patient was seen and examined  Events noted  No new problems    Neurologic:  NMB  Respiratory:  VC 50%, PEEP+5; BWN=221; iNOs 10 PPM  Hemodynamic:  Normal  Renal:  urine flow okay  GI:  TEN  Hematologic:  Hgb okay  ID:  WBC 18K, cefepime and vancomycin    Plan:  Wean iNOs  Nutritional support  Anticoagulation

## 2020-05-06 NOTE — PROGRESS NOTE ADULT - PROBLEM SELECTOR PLAN 4
Patient remains in ICU monitored closely by ICU Team  Discussed care with ICU team today - patient is slowly improving  Patient appears comfortable, sedated, on vent support   Call placed to patient's HCP Yovani yesterday to provide emotional support  Patient to remain FULL CODE at this time  Will continue to support and monitor  Total time spent 15 minutes    Thank you for the opportunity to assist with the care of this patient.   [facility] [phone].

## 2020-05-06 NOTE — PROGRESS NOTE ADULT - PROBLEM SELECTOR PLAN 2
RRT called today for worsening respiratory distress 05/01/20  Intubated - vented  Sent to ICU for monitoring    Update 05/06/20  Patient remains on ventilator support closely monitored by ICU team, slowly improving

## 2020-05-07 LAB
-  IMIPENEM: SIGNIFICANT CHANGE UP
-  PIPERACILLIN/TAZOBACTAM: SIGNIFICANT CHANGE UP
ALBUMIN SERPL ELPH-MCNC: 2.7 G/DL — LOW (ref 3.3–5.2)
ALP SERPL-CCNC: 110 U/L — SIGNIFICANT CHANGE UP (ref 40–120)
ALT FLD-CCNC: 58 U/L — HIGH
ANION GAP SERPL CALC-SCNC: 4 MMOL/L — LOW (ref 5–17)
APTT BLD: 79.5 SEC — HIGH (ref 27.5–36.3)
AST SERPL-CCNC: 66 U/L — HIGH
BASOPHILS # BLD AUTO: 0.07 K/UL — SIGNIFICANT CHANGE UP (ref 0–0.2)
BASOPHILS NFR BLD AUTO: 0.5 % — SIGNIFICANT CHANGE UP (ref 0–2)
BILIRUB SERPL-MCNC: <0.2 MG/DL — LOW (ref 0.4–2)
BUN SERPL-MCNC: 11 MG/DL — SIGNIFICANT CHANGE UP (ref 8–20)
CALCIUM SERPL-MCNC: 7.6 MG/DL — LOW (ref 8.6–10.2)
CHLORIDE SERPL-SCNC: 89 MMOL/L — LOW (ref 98–107)
CO2 SERPL-SCNC: 45 MMOL/L — CRITICAL HIGH (ref 22–29)
CREAT SERPL-MCNC: 0.24 MG/DL — LOW (ref 0.5–1.3)
CULTURE RESULTS: SIGNIFICANT CHANGE UP
EOSINOPHIL # BLD AUTO: 0.31 K/UL — SIGNIFICANT CHANGE UP (ref 0–0.5)
EOSINOPHIL NFR BLD AUTO: 2.1 % — SIGNIFICANT CHANGE UP (ref 0–6)
GAS PNL BLDA: SIGNIFICANT CHANGE UP
GLUCOSE SERPL-MCNC: 140 MG/DL — HIGH (ref 70–99)
HCT VFR BLD CALC: 36.9 % — LOW (ref 39–50)
HGB BLD-MCNC: 11.1 G/DL — LOW (ref 13–17)
IMM GRANULOCYTES NFR BLD AUTO: 1.3 % — SIGNIFICANT CHANGE UP (ref 0–1.5)
LYMPHOCYTES # BLD AUTO: 0.87 K/UL — LOW (ref 1–3.3)
LYMPHOCYTES # BLD AUTO: 5.8 % — LOW (ref 13–44)
MAGNESIUM SERPL-MCNC: 1.9 MG/DL — SIGNIFICANT CHANGE UP (ref 1.6–2.6)
MCHC RBC-ENTMCNC: 29.9 PG — SIGNIFICANT CHANGE UP (ref 27–34)
MCHC RBC-ENTMCNC: 30.1 GM/DL — LOW (ref 32–36)
MCV RBC AUTO: 99.5 FL — SIGNIFICANT CHANGE UP (ref 80–100)
METHOD TYPE: SIGNIFICANT CHANGE UP
MONOCYTES # BLD AUTO: 0.73 K/UL — SIGNIFICANT CHANGE UP (ref 0–0.9)
MONOCYTES NFR BLD AUTO: 4.9 % — SIGNIFICANT CHANGE UP (ref 2–14)
NEUTROPHILS # BLD AUTO: 12.81 K/UL — HIGH (ref 1.8–7.4)
NEUTROPHILS NFR BLD AUTO: 85.4 % — HIGH (ref 43–77)
ORGANISM # SPEC MICROSCOPIC CNT: SIGNIFICANT CHANGE UP
PHOSPHATE SERPL-MCNC: 2.2 MG/DL — LOW (ref 2.4–4.7)
PLATELET # BLD AUTO: 229 K/UL — SIGNIFICANT CHANGE UP (ref 150–400)
POTASSIUM SERPL-MCNC: 3.7 MMOL/L — SIGNIFICANT CHANGE UP (ref 3.5–5.3)
POTASSIUM SERPL-SCNC: 3.7 MMOL/L — SIGNIFICANT CHANGE UP (ref 3.5–5.3)
PROT SERPL-MCNC: 5.2 G/DL — LOW (ref 6.6–8.7)
RBC # BLD: 3.71 M/UL — LOW (ref 4.2–5.8)
RBC # FLD: 14.5 % — SIGNIFICANT CHANGE UP (ref 10.3–14.5)
SODIUM SERPL-SCNC: 138 MMOL/L — SIGNIFICANT CHANGE UP (ref 135–145)
SPECIMEN SOURCE: SIGNIFICANT CHANGE UP
WBC # BLD: 14.98 K/UL — HIGH (ref 3.8–10.5)
WBC # FLD AUTO: 14.98 K/UL — HIGH (ref 3.8–10.5)

## 2020-05-07 PROCEDURE — 99233 SBSQ HOSP IP/OBS HIGH 50: CPT

## 2020-05-07 RX ORDER — CEFEPIME 1 G/1
2000 INJECTION, POWDER, FOR SOLUTION INTRAMUSCULAR; INTRAVENOUS EVERY 8 HOURS
Refills: 0 | Status: COMPLETED | OUTPATIENT
Start: 2020-05-07 | End: 2020-05-09

## 2020-05-07 RX ORDER — POTASSIUM PHOSPHATE, MONOBASIC POTASSIUM PHOSPHATE, DIBASIC 236; 224 MG/ML; MG/ML
30 INJECTION, SOLUTION INTRAVENOUS ONCE
Refills: 0 | Status: COMPLETED | OUTPATIENT
Start: 2020-05-07 | End: 2020-05-07

## 2020-05-07 RX ORDER — POTASSIUM CHLORIDE 20 MEQ
40 PACKET (EA) ORAL ONCE
Refills: 0 | Status: COMPLETED | OUTPATIENT
Start: 2020-05-07 | End: 2020-05-07

## 2020-05-07 RX ORDER — CEFEPIME 1 G/1
2000 INJECTION, POWDER, FOR SOLUTION INTRAMUSCULAR; INTRAVENOUS EVERY 8 HOURS
Refills: 0 | Status: DISCONTINUED | OUTPATIENT
Start: 2020-05-07 | End: 2020-05-07

## 2020-05-07 RX ADMIN — PANTOPRAZOLE SODIUM 40 MILLIGRAM(S): 20 TABLET, DELAYED RELEASE ORAL at 06:25

## 2020-05-07 RX ADMIN — Medication 40 MILLIEQUIVALENT(S): at 08:04

## 2020-05-07 RX ADMIN — Medication 1 TABLET(S): at 11:15

## 2020-05-07 RX ADMIN — KETAMINE HYDROCHLORIDE 1.76 MG/KG/HR: 100 INJECTION INTRAMUSCULAR; INTRAVENOUS at 08:03

## 2020-05-07 RX ADMIN — HEPARIN SODIUM 800 UNIT(S)/HR: 5000 INJECTION INTRAVENOUS; SUBCUTANEOUS at 06:51

## 2020-05-07 RX ADMIN — CHLORHEXIDINE GLUCONATE 15 MILLILITER(S): 213 SOLUTION TOPICAL at 06:25

## 2020-05-07 RX ADMIN — CEFEPIME 100 MILLIGRAM(S): 1 INJECTION, POWDER, FOR SOLUTION INTRAMUSCULAR; INTRAVENOUS at 13:27

## 2020-05-07 RX ADMIN — CEFEPIME 100 MILLIGRAM(S): 1 INJECTION, POWDER, FOR SOLUTION INTRAMUSCULAR; INTRAVENOUS at 22:16

## 2020-05-07 RX ADMIN — Medication 6.75 MICROGRAM(S)/KG/MIN: at 23:14

## 2020-05-07 RX ADMIN — CHLORHEXIDINE GLUCONATE 15 MILLILITER(S): 213 SOLUTION TOPICAL at 17:28

## 2020-05-07 RX ADMIN — CEFEPIME 100 MILLIGRAM(S): 1 INJECTION, POWDER, FOR SOLUTION INTRAMUSCULAR; INTRAVENOUS at 06:24

## 2020-05-07 RX ADMIN — HYDROMORPHONE HYDROCHLORIDE 0.5 MG/HR: 2 INJECTION INTRAMUSCULAR; INTRAVENOUS; SUBCUTANEOUS at 16:57

## 2020-05-07 RX ADMIN — Medication 1 PACKET(S): at 06:24

## 2020-05-07 RX ADMIN — Medication 1 PACKET(S): at 17:28

## 2020-05-07 RX ADMIN — Medication 6.75 MICROGRAM(S)/KG/MIN: at 11:02

## 2020-05-07 RX ADMIN — POTASSIUM PHOSPHATE, MONOBASIC POTASSIUM PHOSPHATE, DIBASIC 83.33 MILLIMOLE(S): 236; 224 INJECTION, SOLUTION INTRAVENOUS at 08:04

## 2020-05-07 RX ADMIN — KETAMINE HYDROCHLORIDE 1.76 MG/KG/HR: 100 INJECTION INTRAMUSCULAR; INTRAVENOUS at 16:57

## 2020-05-07 NOTE — CHART NOTE - NSCHARTNOTEFT_GEN_A_CORE
Source: Patient [ ]  Family [ ]   other [x ] EMR     Enteral /Parenteral Nutrition: Diet, NPO with Tube Feed:   Tube Feeding Modality: Nasogastric  Pivot 1.5 Raj  Total Volume for 24 Hours (mL): 576  Continuous  Starting Tube Feed Rate {mL per Hour}: 10  Increase Tube Feed Rate by (mL): 10     Every hour  Until Goal Tube Feed Rate (mL per Hour): 24  Tube Feed Duration (in Hours): 24  Tube Feed Start Time: 16:45 (05-04-20 @ 04:35)      Current Weight:   4/18: 70.3kg     % Weight Change (N/A) No new wt to assess at this time.     Pertinent Medications: MEDICATIONS  (STANDING):  cefepime   IVPB 2000 milliGRAM(s) IV Intermittent every 8 hours  cefepime   IVPB      chlorhexidine 0.12% Liquid 15 milliLiter(s) Oral Mucosa every 12 hours  heparin  Infusion.  Unit(s)/Hr (12 mL/Hr) IV Continuous <Continuous>  HYDROmorphone Infusion. 0.5 mG/Hr (0.5 mL/Hr) IV Continuous <Continuous>  ketamine Infusion. 0.25 mG/kG/Hr (1.76 mL/Hr) IV Continuous <Continuous>  multiple electrolytes Injection Type 1 1000 milliLiter(s) (100 mL/Hr) IV Continuous <Continuous>  multivitamin/minerals 1 Tablet(s) Oral daily  pantoprazole    Tablet 40 milliGRAM(s) Oral before breakfast  potassium chloride   Powder 40 milliEquivalent(s) Oral once  potassium phosphate IVPB 30 milliMole(s) IV Intermittent once  psyllium Powder 1 Packet(s) Oral two times a day  rocuronium Infusion 8 MICROgram(s)/kG/Min (6.75 mL/Hr) IV Continuous <Continuous>    MEDICATIONS  (PRN):  acetaminophen   Tablet .. 650 milliGRAM(s) Oral every 6 hours PRN Temp greater or equal to 38C (100.4F), Mild Pain (1 - 3), Moderate Pain (4 - 6)  heparin   Injectable 5500 Unit(s) IV Push every 6 hours PRN For aPTT less than 40  heparin   Injectable 2500 Unit(s) IV Push every 6 hours PRN For aPTT between 40 - 57    Pertinent Labs: CBC Full  -  ( 07 May 2020 05:17 )  WBC Count : 14.98 K/uL  RBC Count : 3.71 M/uL  Hemoglobin : 11.1 g/dL  Hematocrit : 36.9 %  Platelet Count - Automated : 229 K/uL  Mean Cell Volume : 99.5 fl  Mean Cell Hemoglobin : 29.9 pg  Mean Cell Hemoglobin Concentration : 30.1 gm/dL  Auto Neutrophil # : 12.81 K/uL  Auto Lymphocyte # : 0.87 K/uL  Auto Monocyte # : 0.73 K/uL  Auto Eosinophil # : 0.31 K/uL  Auto Basophil # : 0.07 K/uL  Auto Neutrophil % : 85.4 %  Auto Lymphocyte % : 5.8 %  Auto Monocyte % : 4.9 %  Auto Eosinophil % : 2.1 %  Auto Basophil % : 0.5 %      Skin: No breakdown noted (2+ generalized edema in all 4 extremities)     Estimated Needs:   [x ] no change since previous assessment  [ ] recalculated:     Current Nutrition Diagnosis:  Pt remains at high nutrition risk secondary moderate-acute protein calorie malnutrition related to inability to meet sufficient protein-energy requirements in setting of Acute hypoxic respiratory failure, R Segmental PE, Severe ARDS secondary to COVID 19, (Rapid response, requiring intubation on 5/1), as evidenced by meeting less then 75% estimated energy requirements > 5 days and + fluid accumulation. Pt was prone over night, now supine. Pt receiving enteral feeds of Pivot @24ml/hr (x20 hours) 720 kcal, 45gm protein; not yet meeting estimated nutrition needs at this time. Recommendations below:     Recommendations:    days and + fluid accumulation. Pt has been NPO since intubation, Trickle feeds of Pivot @ 10ml/hr (x20 hours) 200ml/day; 19gm protein) to be initiated today noted, Aware of need for heavy sedation/paralytics noted. Recommendations below:      Recommendations:   1. RX: MVI, Thiamine and Vit. C daily   2. Obtain new wt as feasible   3. Consider increasing enteral feeds of Pivot 1.5 to 40ml/hr (x20 hours) 800ml/day; 1200kcal, 75gm protein to better meet pt's estimated energy requirements.   4. Monitor tolerance closely       Monitoring and Evaluation:   [ ] PO intake [ x] Tolerance to diet prescription [X] Weights  [X] Follow up per protocol [X] Labs:

## 2020-05-07 NOTE — PROGRESS NOTE ADULT - ATTENDING COMMENTS
The patient was seen and examined  Events noted  No new problems    Neurologic:  NMB  Respiratory:  VC 50%, PEEP +8; UHL=616  Hemodynamic:  Normal  Renal:  Urine flow okay  GI:  TEN  Hematologic:  Hgb okay  ID:  Afebrile, WBC 14,9; Cefepime    Plan:  Nutritional support  Anticoagulation  Seven day course abx for PNA

## 2020-05-07 NOTE — PROGRESS NOTE ADULT - SUBJECTIVE AND OBJECTIVE BOX
24h Events: s/p proning, now supine no other major events    ICU Vital Signs Last 24 Hrs  T(C): 37.3 (07 May 2020 04:00), Max: 37.3 (07 May 2020 04:00)  T(F): 99.1 (07 May 2020 04:00), Max: 99.1 (07 May 2020 04:00)  HR: 109 (07 May 2020 04:00) (67 - 109)  BP: 118/64 (07 May 2020 04:00) (118/64 - 118/64)  BP(mean): 85 (07 May 2020 04:00) (85 - 85)  ABP: 107/52 (07 May 2020 04:00) (107/52 - 167/62)  ABP(mean): 69 (07 May 2020 04:00) (69 - 93)  RR: 28 (07 May 2020 04:00) (28 - 28)  SpO2: 90% (07 May 2020 04:00) (90% - 100%)      I&O's Detail    05 May 2020 07:01  -  06 May 2020 07:00  --------------------------------------------------------  IN:    Enteral Tube Flush: 150 mL    Free Water: 500 mL    heparin  Infusion.: 168 mL    HYDROmorphone Infusion.: 24 mL    ketamine Infusion.: 96 mL    multiple electrolytes Injection Type 1: 2400 mL    Pivot 1.5: 576 mL    rocuronium Infusion: 176.4 mL    Solution: 150 mL    Solution: 200 mL    Solution: 501 mL    Solution: 500 mL  Total IN: 5441.4 mL    OUT:    Indwelling Catheter - Urethral: 3175 mL  Total OUT: 3175 mL    Total NET: 2266.4 mL      06 May 2020 07:01  -  07 May 2020 05:56  --------------------------------------------------------  IN:    heparin  Infusion.: 138 mL    HYDROmorphone Infusion.: 17 mL    ketamine Infusion.: 120 mL    multiple electrolytes Injection Type 1: 1720 mL    Pivot 1.5: 412 mL    rocuronium Infusion: 115.1 mL    Solution: 250 mL    Solution: 498 mL    Solution: 100 mL  Total IN: 3370.1 mL    OUT:    Indwelling Catheter - Urethral: 2365 mL  Total OUT: 2365 mL    Total NET: 1005.1 mL          ABG - ( 07 May 2020 03:27 )  pH, Arterial: 7.27  pH, Blood: x     /  pCO2: x     /  pO2: 56    / HCO3: 44    / Base Excess: 19.7  /  SaO2: 90                  MEDICATIONS  (STANDING):  cefepime   IVPB 2000 milliGRAM(s) IV Intermittent every 8 hours  cefepime   IVPB      chlorhexidine 0.12% Liquid 15 milliLiter(s) Oral Mucosa every 12 hours  heparin  Infusion.  Unit(s)/Hr (12 mL/Hr) IV Continuous <Continuous>  HYDROmorphone Infusion. 0.5 mG/Hr (0.5 mL/Hr) IV Continuous <Continuous>  ketamine Infusion. 0.25 mG/kG/Hr (1.76 mL/Hr) IV Continuous <Continuous>  multiple electrolytes Injection Type 1 1000 milliLiter(s) (100 mL/Hr) IV Continuous <Continuous>  multivitamin/minerals 1 Tablet(s) Oral daily  pantoprazole    Tablet 40 milliGRAM(s) Oral before breakfast  psyllium Powder 1 Packet(s) Oral two times a day  rocuronium Infusion 8 MICROgram(s)/kG/Min (6.75 mL/Hr) IV Continuous <Continuous>    MEDICATIONS  (PRN):  acetaminophen   Tablet .. 650 milliGRAM(s) Oral every 6 hours PRN Temp greater or equal to 38C (100.4F), Mild Pain (1 - 3), Moderate Pain (4 - 6)  heparin   Injectable 5500 Unit(s) IV Push every 6 hours PRN For aPTT less than 40  heparin   Injectable 2500 Unit(s) IV Push every 6 hours PRN For aPTT between 40 - 57        Physical Exam:    Neurological: ketamine, dilaudid and rocuronium gtt for sedation / analgesia    Pulmonary: mechanical ventilation; AC 28/300/8/50%    Cardiovascular: S1, S2, regular rate & rhythm no pressors    Gastrointestinal: soft, non-tender, non-distended, no rebound / guarding limited exam due to sedation    : alexander in place draining clear, yellow urine    Skin: warm, dry, no diaphoresis, no pallor, cyanosis, or jaundice    LABS:  CBC Full  -  ( 07 May 2020 05:17 )  WBC Count : 14.98 K/uL  RBC Count : 3.71 M/uL  Hemoglobin : 11.1 g/dL  Hematocrit : 36.9 %  Platelet Count - Automated : 229 K/uL  Mean Cell Volume : 99.5 fl  Mean Cell Hemoglobin : 29.9 pg  Mean Cell Hemoglobin Concentration : 30.1 gm/dL  Auto Neutrophil # : 12.81 K/uL  Auto Lymphocyte # : 0.87 K/uL  Auto Monocyte # : 0.73 K/uL  Auto Eosinophil # : 0.31 K/uL  Auto Basophil # : 0.07 K/uL  Auto Neutrophil % : 85.4 %  Auto Lymphocyte % : 5.8 %  Auto Monocyte % : 4.9 %  Auto Eosinophil % : 2.1 %  Auto Basophil % : 0.5 %    05-06    138  |  90<L>  |  9.0  ----------------------------<  157<H>  3.6   |  42.0<H>  |  0.23<L>    Ca    7.4<L>      06 May 2020 05:31  Phos  1.7     05-06  Mg     1.9     05-06    TPro  5.2<L>  /  Alb  2.7<L>  /  TBili  <0.2<L>  /  DBili  x   /  AST  29  /  ALT  30  /  AlkPhos  110  05-06    PTT - ( 07 May 2020 05:17 )  PTT:79.5 sec    RECENT CULTURES:  05-04 .Sputum Sputum Staphylococcus aureus  Acinetobacter baumannii   Few polymorphonuclear leukocytes per low power field  Few Squamous epithelial cells per low power field  Few Gram Positive Cocci in Clusters seen per oil power field  Few Gram Positive Cocci in Pairs and Chains seen per oil power field  Numerous Gram Variable Rods seen per oil power field   Moderate Staphylococcus aureus  Moderate Acinetobacter baumannii nosocomialis group  Normal Respiratory Christine present    05-03 .Blood Blood XXXX XXXX   No growth at 48 hours        LIVER FUNCTIONS - ( 06 May 2020 05:31 )  Alb: 2.7 g/dL / Pro: 5.2 g/dL / ALK PHOS: 110 U/L / ALT: 30 U/L / AST: 29 U/L / GGT: x                   ASSESSMENT/PLAN:  64M with no PMHx admit with 10 days fevers and SOB, intubated on HD# 13    Neuro: sedation / analgesia with ketamine, dilaudid and rocuronium gtt, delirium precautions, daily sedation holiday    CV: continue invasive monitoring with arterial line    Pulm: wean FiO2 / PEEP as tolerated    GI/Nutrition: NPO with tube feeds     /Renal: alexander for strict I&O, monitor kidney fxn    ID: on Cefepime day#4    Endo: monitor blood glucose    Skin: repositioning for DTI prevention while in bed    Heme/DVT Prophylaxis: Heparin infusion

## 2020-05-08 LAB
ALBUMIN SERPL ELPH-MCNC: 2.7 G/DL — LOW (ref 3.3–5.2)
ALP SERPL-CCNC: 103 U/L — SIGNIFICANT CHANGE UP (ref 40–120)
ALT FLD-CCNC: 93 U/L — HIGH
ANION GAP SERPL CALC-SCNC: 0 MMOL/L — LOW (ref 5–17)
APTT BLD: 73.5 SEC — HIGH (ref 27.5–36.3)
AST SERPL-CCNC: 93 U/L — HIGH
BASOPHILS # BLD AUTO: 0.07 K/UL — SIGNIFICANT CHANGE UP (ref 0–0.2)
BASOPHILS NFR BLD AUTO: 0.5 % — SIGNIFICANT CHANGE UP (ref 0–2)
BILIRUB SERPL-MCNC: <0.2 MG/DL — LOW (ref 0.4–2)
BUN SERPL-MCNC: 15 MG/DL — SIGNIFICANT CHANGE UP (ref 8–20)
CALCIUM SERPL-MCNC: 7.8 MG/DL — LOW (ref 8.6–10.2)
CHLORIDE SERPL-SCNC: 88 MMOL/L — LOW (ref 98–107)
CO2 SERPL-SCNC: 46 MMOL/L — CRITICAL HIGH (ref 22–29)
CREAT SERPL-MCNC: 0.24 MG/DL — LOW (ref 0.5–1.3)
CULTURE RESULTS: SIGNIFICANT CHANGE UP
EOSINOPHIL # BLD AUTO: 0.4 K/UL — SIGNIFICANT CHANGE UP (ref 0–0.5)
EOSINOPHIL NFR BLD AUTO: 2.9 % — SIGNIFICANT CHANGE UP (ref 0–6)
GAS PNL BLDA: SIGNIFICANT CHANGE UP
GLUCOSE SERPL-MCNC: 166 MG/DL — HIGH (ref 70–99)
HCT VFR BLD CALC: 35 % — LOW (ref 39–50)
HGB BLD-MCNC: 10.4 G/DL — LOW (ref 13–17)
IMM GRANULOCYTES NFR BLD AUTO: 2.4 % — HIGH (ref 0–1.5)
LYMPHOCYTES # BLD AUTO: 1.17 K/UL — SIGNIFICANT CHANGE UP (ref 1–3.3)
LYMPHOCYTES # BLD AUTO: 8.4 % — LOW (ref 13–44)
MAGNESIUM SERPL-MCNC: 1.9 MG/DL — SIGNIFICANT CHANGE UP (ref 1.6–2.6)
MCHC RBC-ENTMCNC: 29.7 GM/DL — LOW (ref 32–36)
MCHC RBC-ENTMCNC: 30.1 PG — SIGNIFICANT CHANGE UP (ref 27–34)
MCV RBC AUTO: 101.4 FL — HIGH (ref 80–100)
MONOCYTES # BLD AUTO: 1.13 K/UL — HIGH (ref 0–0.9)
MONOCYTES NFR BLD AUTO: 8.1 % — SIGNIFICANT CHANGE UP (ref 2–14)
NEUTROPHILS # BLD AUTO: 10.86 K/UL — HIGH (ref 1.8–7.4)
NEUTROPHILS NFR BLD AUTO: 77.7 % — HIGH (ref 43–77)
PHOSPHATE SERPL-MCNC: 2.1 MG/DL — LOW (ref 2.4–4.7)
PLATELET # BLD AUTO: 231 K/UL — SIGNIFICANT CHANGE UP (ref 150–400)
POTASSIUM SERPL-MCNC: 4.4 MMOL/L — SIGNIFICANT CHANGE UP (ref 3.5–5.3)
POTASSIUM SERPL-SCNC: 4.4 MMOL/L — SIGNIFICANT CHANGE UP (ref 3.5–5.3)
PROT SERPL-MCNC: 5.1 G/DL — LOW (ref 6.6–8.7)
RBC # BLD: 3.45 M/UL — LOW (ref 4.2–5.8)
RBC # FLD: 14.6 % — HIGH (ref 10.3–14.5)
SODIUM SERPL-SCNC: 134 MMOL/L — LOW (ref 135–145)
SPECIMEN SOURCE: SIGNIFICANT CHANGE UP
WBC # BLD: 13.96 K/UL — HIGH (ref 3.8–10.5)
WBC # FLD AUTO: 13.96 K/UL — HIGH (ref 3.8–10.5)

## 2020-05-08 PROCEDURE — 99291 CRITICAL CARE FIRST HOUR: CPT

## 2020-05-08 RX ORDER — POTASSIUM PHOSPHATE, MONOBASIC POTASSIUM PHOSPHATE, DIBASIC 236; 224 MG/ML; MG/ML
30 INJECTION, SOLUTION INTRAVENOUS ONCE
Refills: 0 | Status: COMPLETED | OUTPATIENT
Start: 2020-05-08 | End: 2020-05-08

## 2020-05-08 RX ORDER — PANTOPRAZOLE SODIUM 20 MG/1
40 TABLET, DELAYED RELEASE ORAL DAILY
Refills: 0 | Status: DISCONTINUED | OUTPATIENT
Start: 2020-05-08 | End: 2020-05-21

## 2020-05-08 RX ORDER — POLYETHYLENE GLYCOL 3350 17 G/17G
17 POWDER, FOR SOLUTION ORAL
Refills: 0 | Status: DISCONTINUED | OUTPATIENT
Start: 2020-05-08 | End: 2020-05-19

## 2020-05-08 RX ORDER — LACTULOSE 10 G/15ML
20 SOLUTION ORAL DAILY
Refills: 0 | Status: DISCONTINUED | OUTPATIENT
Start: 2020-05-08 | End: 2020-05-15

## 2020-05-08 RX ORDER — CHLORHEXIDINE GLUCONATE 213 G/1000ML
15 SOLUTION TOPICAL EVERY 12 HOURS
Refills: 0 | Status: DISCONTINUED | OUTPATIENT
Start: 2020-05-08 | End: 2020-06-09

## 2020-05-08 RX ADMIN — KETAMINE HYDROCHLORIDE 1.76 MG/KG/HR: 100 INJECTION INTRAMUSCULAR; INTRAVENOUS at 06:10

## 2020-05-08 RX ADMIN — Medication 6.75 MICROGRAM(S)/KG/MIN: at 12:28

## 2020-05-08 RX ADMIN — POTASSIUM PHOSPHATE, MONOBASIC POTASSIUM PHOSPHATE, DIBASIC 83.33 MILLIMOLE(S): 236; 224 INJECTION, SOLUTION INTRAVENOUS at 06:40

## 2020-05-08 RX ADMIN — PANTOPRAZOLE SODIUM 40 MILLIGRAM(S): 20 TABLET, DELAYED RELEASE ORAL at 23:04

## 2020-05-08 RX ADMIN — CEFEPIME 100 MILLIGRAM(S): 1 INJECTION, POWDER, FOR SOLUTION INTRAMUSCULAR; INTRAVENOUS at 14:46

## 2020-05-08 RX ADMIN — LACTULOSE 20 GRAM(S): 10 SOLUTION ORAL at 12:03

## 2020-05-08 RX ADMIN — CEFEPIME 100 MILLIGRAM(S): 1 INJECTION, POWDER, FOR SOLUTION INTRAMUSCULAR; INTRAVENOUS at 23:04

## 2020-05-08 RX ADMIN — Medication 1 TABLET(S): at 12:03

## 2020-05-08 RX ADMIN — CHLORHEXIDINE GLUCONATE 15 MILLILITER(S): 213 SOLUTION TOPICAL at 05:22

## 2020-05-08 RX ADMIN — Medication 1 PACKET(S): at 05:22

## 2020-05-08 RX ADMIN — Medication 1 PACKET(S): at 18:53

## 2020-05-08 RX ADMIN — POLYETHYLENE GLYCOL 3350 17 GRAM(S): 17 POWDER, FOR SOLUTION ORAL at 18:53

## 2020-05-08 RX ADMIN — CHLORHEXIDINE GLUCONATE 15 MILLILITER(S): 213 SOLUTION TOPICAL at 18:53

## 2020-05-08 RX ADMIN — CEFEPIME 100 MILLIGRAM(S): 1 INJECTION, POWDER, FOR SOLUTION INTRAMUSCULAR; INTRAVENOUS at 05:22

## 2020-05-08 RX ADMIN — HEPARIN SODIUM 800 UNIT(S)/HR: 5000 INJECTION INTRAVENOUS; SUBCUTANEOUS at 06:22

## 2020-05-08 NOTE — PROGRESS NOTE ADULT - SUBJECTIVE AND OBJECTIVE BOX
24h Events: no new problems, NOS off    ICU Vital Signs Last 24 Hrs  T(C): 36.7 (08 May 2020 00:35), Max: 36.9 (07 May 2020 15:53)  T(F): 98.1 (08 May 2020 00:35), Max: 98.4 (07 May 2020 15:53)  HR: 80 (08 May 2020 03:00) (80 - 106)  BP: --  BP(mean): --  ABP: 112/51 (08 May 2020 03:00) (104/51 - 136/59)  ABP(mean): 72 (08 May 2020 03:00) (68 - 83)  RR: 28 (08 May 2020 03:00) (23 - 34)  SpO2: 94% (08 May 2020 03:00) (89% - 99%)      I&O's Detail    06 May 2020 07:01  -  07 May 2020 07:00  --------------------------------------------------------  IN:    heparin  Infusion.: 184 mL    HYDROmorphone Infusion.: 26 mL    ketamine Infusion.: 165 mL    multiple electrolytes Injection Type 1multiple electrolytes Injection Type 1: 2297 mL    Pivot 1.5: 551 mL    rocuronium Infusion: 154.1 mL    Solution: 150 mL    Solution: 250 mL    Solution: 498 mL  Total IN: 4275.1 mL    OUT:    Indwelling Catheter - Urethral: 2465 mL  Total OUT: 2465 mL    Total NET: 1810.1 mL      07 May 2020 07:01  -  08 May 2020 04:05  --------------------------------------------------------  IN:    heparin  Infusion.: 152 mL    HYDROmorphone Infusion.: 38 mL    ketamine Infusion.: 174 mL    multiple electrolytes Injection Type 1multiple electrolytes Injection Type 1: 325 mL    Pivot 1.5: 456 mL    rocuronium Infusion: 131.8 mL    Solution: 499.8 mL    Solution: 100 mL  Total IN: 1876.6 mL    OUT:    Indwelling Catheter - Urethral: 1895 mL  Total OUT: 1895 mL    Total NET: -18.4 mL          ABG - ( 07 May 2020 03:27 )  pH, Arterial: 7.27  pH, Blood: x     /  pCO2: x     /  pO2: 56    / HCO3: 44    / Base Excess: 19.7  /  SaO2: 90                  MEDICATIONS  (STANDING):  cefepime   IVPB 2000 milliGRAM(s) IV Intermittent every 8 hours  chlorhexidine 0.12% Liquid 15 milliLiter(s) Oral Mucosa every 12 hours  heparin  Infusion.  Unit(s)/Hr (12 mL/Hr) IV Continuous <Continuous>  HYDROmorphone Infusion. 0.5 mG/Hr (0.5 mL/Hr) IV Continuous <Continuous>  ketamine Infusion. 0.25 mG/kG/Hr (1.76 mL/Hr) IV Continuous <Continuous>  multivitamin/minerals 1 Tablet(s) Oral daily  pantoprazole    Tablet 40 milliGRAM(s) Oral before breakfast  psyllium Powder 1 Packet(s) Oral two times a day  rocuronium Infusion 8 MICROgram(s)/kG/Min (6.75 mL/Hr) IV Continuous <Continuous>    MEDICATIONS  (PRN):  acetaminophen   Tablet .. 650 milliGRAM(s) Oral every 6 hours PRN Temp greater or equal to 38C (100.4F), Mild Pain (1 - 3), Moderate Pain (4 - 6)  heparin   Injectable 5500 Unit(s) IV Push every 6 hours PRN For aPTT less than 40  heparin   Injectable 2500 Unit(s) IV Push every 6 hours PRN For aPTT between 40 - 57        Physical Exam:    Neurological: Ketamine/Rocuronium gtt for sedation / analgesia    Pulmonary: mechanical ventilation; AC 28/300/8/50%    Cardiovascular: S1, S2, regular rate & rhythm    Gastrointestinal: soft, non-distended, exam limited due to sedation    : alexander in place draining yellow urine    Skin: warm, dry, no diaphoresis, no pallor, cyanosis, or jaundice    LABS:  CBC Full  -  ( 07 May 2020 05:17 )  WBC Count : 14.98 K/uL  RBC Count : 3.71 M/uL  Hemoglobin : 11.1 g/dL  Hematocrit : 36.9 %  Platelet Count - Automated : 229 K/uL  Mean Cell Volume : 99.5 fl  Mean Cell Hemoglobin : 29.9 pg  Mean Cell Hemoglobin Concentration : 30.1 gm/dL  Auto Neutrophil # : 12.81 K/uL  Auto Lymphocyte # : 0.87 K/uL  Auto Monocyte # : 0.73 K/uL  Auto Eosinophil # : 0.31 K/uL  Auto Basophil # : 0.07 K/uL  Auto Neutrophil % : 85.4 %  Auto Lymphocyte % : 5.8 %  Auto Monocyte % : 4.9 %  Auto Eosinophil % : 2.1 %  Auto Basophil % : 0.5 %    05-07    138  |  89<L>  |  11.0  ----------------------------<  140<H>  3.7   |  45.0<HH>  |  0.24<L>    Ca    7.6<L>      07 May 2020 05:17  Phos  2.2     05-07  Mg     1.9     05-07    TPro  5.2<L>  /  Alb  2.7<L>  /  TBili  <0.2<L>  /  DBili  x   /  AST  66<H>  /  ALT  58<H>  /  AlkPhos  110  05-07    PTT - ( 07 May 2020 05:17 )  PTT:79.5 sec    RECENT CULTURES:  05-04 .Sputum Sputum Staphylococcus aureus  Acinetobacter baumannii   Few polymorphonuclear leukocytes per low power field  Few Squamous epithelial cells per low power field  Few Gram Positive Cocci in Clusters seen per oil power field  Few Gram Positive Cocci in Pairs and Chains seen per oil power field  Numerous Gram Variable Rods seen per oil power field   Moderate Staphylococcus aureus  Moderate Acinetobacter baumannii nosocomialis group  Normal Respiratory Christine present    05-03 .Blood Blood XXXX XXXX   No growth at 48 hours        LIVER FUNCTIONS - ( 07 May 2020 05:17 )  Alb: 2.7 g/dL / Pro: 5.2 g/dL / ALK PHOS: 110 U/L / ALT: 58 U/L / AST: 66 U/L / GGT: x                   ASSESSMENT/PLAN:  64M with no PMHx admit with 10 days fevers and SOB, intubated on HD# 13    Neuro: sedation / analgesia with Ketamine/Rocuronium, delirium precautions, daily sedation holiday    CV: continue invasive monitoring with arterial line    Pulm: wean FiO2 / PEEP as tolerated    GI/Nutrition: NPO with tube feeds     /Renal: alexander for strict I&O, monitor kidney fxn    ID: to complete 7 day course of abx for pneumonia, ends 5/9     Endo: monitor blood glucose    Skin: repositioning for DTI prevention while in bed    Heme/DVT Prophylaxis: heparin gtt

## 2020-05-08 NOTE — PROGRESS NOTE ADULT - ATTENDING COMMENTS
65 yo M with no PMH presented with acute hypoxic respiratory failure and ARDS due to Covid  Intubated, weaned, sedate and not benefiting from proning. Ppeak 47 and Pplat 42.   Continue sedation / analgesia to help with vent weaning  Continue cardiac monitoring   Continue vent weaning as tolerated   Continue to monitor renal function  Increase LFT and will obtain US  Pt on cefepime for pneumonia--WBC 13.9   VTE ppx heparin gtt  Continue ICU for vent management      code [id number]    CCT  40 min

## 2020-05-09 LAB
ANION GAP SERPL CALC-SCNC: 3 MMOL/L — LOW (ref 5–17)
APTT BLD: 66.4 SEC — HIGH (ref 27.5–36.3)
BASOPHILS # BLD AUTO: 0.09 K/UL — SIGNIFICANT CHANGE UP (ref 0–0.2)
BASOPHILS NFR BLD AUTO: 0.6 % — SIGNIFICANT CHANGE UP (ref 0–2)
BUN SERPL-MCNC: 16 MG/DL — SIGNIFICANT CHANGE UP (ref 8–20)
CALCIUM SERPL-MCNC: 7.8 MG/DL — LOW (ref 8.6–10.2)
CHLORIDE SERPL-SCNC: 89 MMOL/L — LOW (ref 98–107)
CO2 SERPL-SCNC: 45 MMOL/L — CRITICAL HIGH (ref 22–29)
CREAT SERPL-MCNC: 0.28 MG/DL — LOW (ref 0.5–1.3)
EOSINOPHIL # BLD AUTO: 0.39 K/UL — SIGNIFICANT CHANGE UP (ref 0–0.5)
EOSINOPHIL NFR BLD AUTO: 2.6 % — SIGNIFICANT CHANGE UP (ref 0–6)
GAS PNL BLDA: SIGNIFICANT CHANGE UP
GLUCOSE SERPL-MCNC: 119 MG/DL — HIGH (ref 70–99)
HCT VFR BLD CALC: 31.9 % — LOW (ref 39–50)
HGB BLD-MCNC: 9.5 G/DL — LOW (ref 13–17)
IMM GRANULOCYTES NFR BLD AUTO: 4.5 % — HIGH (ref 0–1.5)
LYMPHOCYTES # BLD AUTO: 13.8 % — SIGNIFICANT CHANGE UP (ref 13–44)
LYMPHOCYTES # BLD AUTO: 2.04 K/UL — SIGNIFICANT CHANGE UP (ref 1–3.3)
MAGNESIUM SERPL-MCNC: 2 MG/DL — SIGNIFICANT CHANGE UP (ref 1.6–2.6)
MCHC RBC-ENTMCNC: 29.8 GM/DL — LOW (ref 32–36)
MCHC RBC-ENTMCNC: 29.9 PG — SIGNIFICANT CHANGE UP (ref 27–34)
MCV RBC AUTO: 100.3 FL — HIGH (ref 80–100)
MONOCYTES # BLD AUTO: 1.76 K/UL — HIGH (ref 0–0.9)
MONOCYTES NFR BLD AUTO: 11.9 % — SIGNIFICANT CHANGE UP (ref 2–14)
NEUTROPHILS # BLD AUTO: 9.79 K/UL — HIGH (ref 1.8–7.4)
NEUTROPHILS NFR BLD AUTO: 66.6 % — SIGNIFICANT CHANGE UP (ref 43–77)
PHOSPHATE SERPL-MCNC: 1.7 MG/DL — LOW (ref 2.4–4.7)
PHOSPHATE SERPL-MCNC: 2 MG/DL — LOW (ref 2.4–4.7)
PLATELET # BLD AUTO: 232 K/UL — SIGNIFICANT CHANGE UP (ref 150–400)
POTASSIUM SERPL-MCNC: 4.7 MMOL/L — SIGNIFICANT CHANGE UP (ref 3.5–5.3)
POTASSIUM SERPL-SCNC: 4.7 MMOL/L — SIGNIFICANT CHANGE UP (ref 3.5–5.3)
RBC # BLD: 3.18 M/UL — LOW (ref 4.2–5.8)
RBC # FLD: 14.7 % — HIGH (ref 10.3–14.5)
SODIUM SERPL-SCNC: 137 MMOL/L — SIGNIFICANT CHANGE UP (ref 135–145)
WBC # BLD: 14.73 K/UL — HIGH (ref 3.8–10.5)
WBC # FLD AUTO: 14.73 K/UL — HIGH (ref 3.8–10.5)

## 2020-05-09 PROCEDURE — 99233 SBSQ HOSP IP/OBS HIGH 50: CPT

## 2020-05-09 RX ORDER — SODIUM,POTASSIUM PHOSPHATES 278-250MG
2 POWDER IN PACKET (EA) ORAL EVERY 4 HOURS
Refills: 0 | Status: COMPLETED | OUTPATIENT
Start: 2020-05-09 | End: 2020-05-09

## 2020-05-09 RX ORDER — HYDROMORPHONE HYDROCHLORIDE 2 MG/ML
2 INJECTION INTRAMUSCULAR; INTRAVENOUS; SUBCUTANEOUS
Qty: 100 | Refills: 0 | Status: DISCONTINUED | OUTPATIENT
Start: 2020-05-09 | End: 2020-05-14

## 2020-05-09 RX ORDER — KETAMINE HYDROCHLORIDE 100 MG/ML
1.15 INJECTION INTRAMUSCULAR; INTRAVENOUS
Qty: 1000 | Refills: 0 | Status: DISCONTINUED | OUTPATIENT
Start: 2020-05-09 | End: 2020-05-16

## 2020-05-09 RX ORDER — SODIUM,POTASSIUM PHOSPHATES 278-250MG
1 POWDER IN PACKET (EA) ORAL EVERY 4 HOURS
Refills: 0 | Status: COMPLETED | OUTPATIENT
Start: 2020-05-09 | End: 2020-05-09

## 2020-05-09 RX ADMIN — CHLORHEXIDINE GLUCONATE 15 MILLILITER(S): 213 SOLUTION TOPICAL at 15:56

## 2020-05-09 RX ADMIN — Medication 6.75 MICROGRAM(S)/KG/MIN: at 01:18

## 2020-05-09 RX ADMIN — Medication 6.75 MICROGRAM(S)/KG/MIN: at 11:41

## 2020-05-09 RX ADMIN — Medication 1 PACKET(S): at 05:36

## 2020-05-09 RX ADMIN — HEPARIN SODIUM 800 UNIT(S)/HR: 5000 INJECTION INTRAVENOUS; SUBCUTANEOUS at 06:00

## 2020-05-09 RX ADMIN — CHLORHEXIDINE GLUCONATE 15 MILLILITER(S): 213 SOLUTION TOPICAL at 05:08

## 2020-05-09 RX ADMIN — LACTULOSE 20 GRAM(S): 10 SOLUTION ORAL at 10:23

## 2020-05-09 RX ADMIN — CEFEPIME 100 MILLIGRAM(S): 1 INJECTION, POWDER, FOR SOLUTION INTRAMUSCULAR; INTRAVENOUS at 05:08

## 2020-05-09 RX ADMIN — Medication 1 TABLET(S): at 10:23

## 2020-05-09 RX ADMIN — Medication 6.75 MICROGRAM(S)/KG/MIN: at 23:55

## 2020-05-09 RX ADMIN — POLYETHYLENE GLYCOL 3350 17 GRAM(S): 17 POWDER, FOR SOLUTION ORAL at 16:06

## 2020-05-09 RX ADMIN — POLYETHYLENE GLYCOL 3350 17 GRAM(S): 17 POWDER, FOR SOLUTION ORAL at 05:08

## 2020-05-09 RX ADMIN — Medication 62.5 MILLIMOLE(S): at 16:15

## 2020-05-09 RX ADMIN — KETAMINE HYDROCHLORIDE 8.08 MG/KG/HR: 100 INJECTION INTRAMUSCULAR; INTRAVENOUS at 15:21

## 2020-05-09 RX ADMIN — Medication 1 PACKET(S): at 10:23

## 2020-05-09 RX ADMIN — Medication 1 PACKET(S): at 16:06

## 2020-05-09 RX ADMIN — PANTOPRAZOLE SODIUM 40 MILLIGRAM(S): 20 TABLET, DELAYED RELEASE ORAL at 10:23

## 2020-05-09 RX ADMIN — KETAMINE HYDROCHLORIDE 8.08 MG/KG/HR: 100 INJECTION INTRAMUSCULAR; INTRAVENOUS at 04:55

## 2020-05-09 RX ADMIN — Medication 85 MILLIMOLE(S): at 22:05

## 2020-05-09 RX ADMIN — Medication 2 PACKET(S): at 21:31

## 2020-05-09 RX ADMIN — Medication 1 PACKET(S): at 05:09

## 2020-05-09 RX ADMIN — HYDROMORPHONE HYDROCHLORIDE 2 MG/HR: 2 INJECTION INTRAMUSCULAR; INTRAVENOUS; SUBCUTANEOUS at 11:40

## 2020-05-09 NOTE — CHART NOTE - NSCHARTNOTEFT_GEN_A_CORE
Spoke with patient's HCP Yovani with help of [facility]  (ID# [id number]). Updates provided on pt's current status, all questions answered, and emotional support provided.

## 2020-05-09 NOTE — PROGRESS NOTE ADULT - ATTENDING COMMENTS
covid 19 positive developing H type ards but able to oxygenate pf 144 currently on hep drip for pe and f/u d dimer . sedation rass -5 ketamine/dilaudid/damari. feeding ngt tube. cont with current care.

## 2020-05-09 NOTE — PROGRESS NOTE ADULT - SUBJECTIVE AND OBJECTIVE BOX
24h Events: no new events    ICU Vital Signs Last 24 Hrs  T(C): 36.8 (09 May 2020 00:00), Max: 36.9 (08 May 2020 16:08)  T(F): 98.2 (09 May 2020 00:00), Max: 98.4 (08 May 2020 16:08)  HR: 80 (09 May 2020 03:34) (74 - 114)  BP: 143/77 (09 May 2020 03:00) (103/60 - 143/77)  BP(mean): 104 (09 May 2020 03:00) (75 - 104)  ABP: 148/63 (09 May 2020 03:00) (88/41 - 148/63)  ABP(mean): 94 (09 May 2020 03:00) (57 - 94)  RR: 30 (09 May 2020 03:00) (15 - 30)  SpO2: 93% (09 May 2020 03:34) (92% - 97%)      I&O's Detail    07 May 2020 07:01  -  08 May 2020 07:00  --------------------------------------------------------  IN:    heparin  Infusion.: 192 mL    HYDROmorphone Infusion.: 48 mL    ketamine Infusion.: 226.5 mL    multiple electrolytes Injection Type 1multiple electrolytes Injection Type 1: 325 mL    Pivot 1.5: 576 mL    rocuronium Infusion: 169.7 mL    Solution: 499.8 mL    Solution: 100 mL  Total IN: 2137 mL    OUT:    Indwelling Catheter - Urethral: 2090 mL  Total OUT: 2090 mL    Total NET: 47 mL      08 May 2020 07:01  -  09 May 2020 03:51  --------------------------------------------------------  IN:    heparin  Infusion.: 144 mL    HYDROmorphone Infusion.: 36 mL    ketamine Infusion.: 189 mL    Pivot 1.5: 432 mL    rocuronium Infusion: 143.2 mL  Total IN: 944.2 mL    OUT:    Indwelling Catheter - Urethral: 1645 mL  Total OUT: 1645 mL    Total NET: -700.8 mL          ABG - ( 08 May 2020 04:08 )  pH, Arterial: 7.28  pH, Blood: x     /  pCO2: >108  /  pO2: 71    / HCO3: 48    / Base Excess: 23.3  /  SaO2: 96                  MEDICATIONS  (STANDING):  cefepime   IVPB 2000 milliGRAM(s) IV Intermittent every 8 hours  chlorhexidine 0.12% Liquid 15 milliLiter(s) Oral Mucosa every 12 hours  heparin  Infusion.  Unit(s)/Hr (12 mL/Hr) IV Continuous <Continuous>  lactulose Syrup 20 Gram(s) Oral daily  multivitamin/minerals 1 Tablet(s) Oral daily  pantoprazole   Suspension 40 milliGRAM(s) Oral daily  polyethylene glycol 3350 17 Gram(s) Oral two times a day  psyllium Powder 1 Packet(s) Oral two times a day  rocuronium Infusion 8 MICROgram(s)/kG/Min (6.75 mL/Hr) IV Continuous <Continuous>    MEDICATIONS  (PRN):  acetaminophen   Tablet .. 650 milliGRAM(s) Oral every 6 hours PRN Temp greater or equal to 38C (100.4F), Mild Pain (1 - 3), Moderate Pain (4 - 6)  bisacodyl Suppository 10 milliGRAM(s) Rectal daily PRN Constipation  heparin   Injectable 5500 Unit(s) IV Push every 6 hours PRN For aPTT less than 40  heparin   Injectable 2500 Unit(s) IV Push every 6 hours PRN For aPTT between 40 - 57        Physical Exam:    Neurological: Ketamine/Rocuronium/Dilaudid gtt for sedation / analgesia    Pulmonary: mechanical ventilation; AC 28/300/8/50%    Cardiovascular: S1, S2, regular rate & rhythm    Gastrointestinal: soft, non-distended, exam limited by sedation    : alexander in place draining yellow urine    Skin: warm, dry, no diaphoresis, no pallor, cyanosis, or jaundice    LABS:  CBC Full  -  ( 08 May 2020 04:40 )  WBC Count : 13.96 K/uL  RBC Count : 3.45 M/uL  Hemoglobin : 10.4 g/dL  Hematocrit : 35.0 %  Platelet Count - Automated : 231 K/uL  Mean Cell Volume : 101.4 fl  Mean Cell Hemoglobin : 30.1 pg  Mean Cell Hemoglobin Concentration : 29.7 gm/dL  Auto Neutrophil # : 10.86 K/uL  Auto Lymphocyte # : 1.17 K/uL  Auto Monocyte # : 1.13 K/uL  Auto Eosinophil # : 0.40 K/uL  Auto Basophil # : 0.07 K/uL  Auto Neutrophil % : 77.7 %  Auto Lymphocyte % : 8.4 %  Auto Monocyte % : 8.1 %  Auto Eosinophil % : 2.9 %  Auto Basophil % : 0.5 %    05-08    134<L>  |  88<L>  |  15.0  ----------------------------<  166<H>  4.4   |  46.0<HH>  |  0.24<L>    Ca    7.8<L>      08 May 2020 04:40  Phos  2.1     05-08  Mg     1.9     05-08    TPro  5.1<L>  /  Alb  2.7<L>  /  TBili  <0.2<L>  /  DBili  x   /  AST  93<H>  /  ALT  93<H>  /  AlkPhos  103  05-08    PTT - ( 08 May 2020 04:40 )  PTT:73.5 sec    RECENT CULTURES:  05-04 .Sputum Sputum Staphylococcus aureus  Acinetobacter baumannii   Few polymorphonuclear leukocytes per low power field  Few Squamous epithelial cells per low power field  Few Gram Positive Cocci in Clusters seen per oil power field  Few Gram Positive Cocci in Pairs and Chains seen per oil power field  Numerous Gram Variable Rods seen per oil power field   Moderate Staphylococcus aureus  Moderate Acinetobacter baumannii nosocomialis group  Normal Respiratory Christine present    05-03 .Blood Blood XXXX XXXX   No growth at 5 days.        LIVER FUNCTIONS - ( 08 May 2020 04:40 )  Alb: 2.7 g/dL / Pro: 5.1 g/dL / ALK PHOS: 103 U/L / ALT: 93 U/L / AST: 93 U/L / GGT: x                   ASSESSMENT/PLAN:  64M with no PMHx admit with 10 days fevers and SOB, intubated on HD# 13, remains intubated doing poorly    Neuro: sedation / analgesia with Ketamine/Rocuronium/Dilaudid gtt, delirium precautions, daily sedation holiday    CV: continue invasive monitoring with arterial line    Pulm: wean FiO2 / PEEP as tolerated    GI/Nutrition: NPO with tube feeds     /Renal: alexander for strict I&O, monitor kidney fxn    ID: Afeb, leukocytosis continue to improve slowly, to complete 7 day course of abx for pneumonia, ends 5/9    Endo: monitor blood glucose    Skin: repositioning for DTI prevention while in bed    Heme/DVT Prophylaxis: Heparin infusion

## 2020-05-10 LAB
APTT BLD: 60.4 SEC — HIGH (ref 27.5–36.3)
BASOPHILS # BLD AUTO: 0.06 K/UL — SIGNIFICANT CHANGE UP (ref 0–0.2)
BASOPHILS NFR BLD AUTO: 0.4 % — SIGNIFICANT CHANGE UP (ref 0–2)
BUN SERPL-MCNC: 17 MG/DL — SIGNIFICANT CHANGE UP (ref 8–20)
CALCIUM SERPL-MCNC: 7.9 MG/DL — LOW (ref 8.6–10.2)
CHLORIDE SERPL-SCNC: 86 MMOL/L — LOW (ref 98–107)
CO2 SERPL-SCNC: 46 MMOL/L — CRITICAL HIGH (ref 22–29)
CREAT SERPL-MCNC: 0.27 MG/DL — LOW (ref 0.5–1.3)
EOSINOPHIL # BLD AUTO: 0.13 K/UL — SIGNIFICANT CHANGE UP (ref 0–0.5)
EOSINOPHIL NFR BLD AUTO: 1 % — SIGNIFICANT CHANGE UP (ref 0–6)
GAS PNL BLDA: SIGNIFICANT CHANGE UP
GAS PNL BLDA: SIGNIFICANT CHANGE UP
GLUCOSE BLDC GLUCOMTR-MCNC: 166 MG/DL — HIGH (ref 70–99)
GLUCOSE BLDC GLUCOMTR-MCNC: 169 MG/DL — HIGH (ref 70–99)
GLUCOSE SERPL-MCNC: 194 MG/DL — HIGH (ref 70–99)
HCT VFR BLD CALC: 29.5 % — LOW (ref 39–50)
HGB BLD-MCNC: 8.9 G/DL — LOW (ref 13–17)
IMM GRANULOCYTES NFR BLD AUTO: 3 % — HIGH (ref 0–1.5)
LYMPHOCYTES # BLD AUTO: 0.97 K/UL — LOW (ref 1–3.3)
LYMPHOCYTES # BLD AUTO: 7.2 % — LOW (ref 13–44)
MAGNESIUM SERPL-MCNC: 1.9 MG/DL — SIGNIFICANT CHANGE UP (ref 1.6–2.6)
MCHC RBC-ENTMCNC: 30 PG — SIGNIFICANT CHANGE UP (ref 27–34)
MCHC RBC-ENTMCNC: 30.2 GM/DL — LOW (ref 32–36)
MCV RBC AUTO: 99.3 FL — SIGNIFICANT CHANGE UP (ref 80–100)
MONOCYTES # BLD AUTO: 1.27 K/UL — HIGH (ref 0–0.9)
MONOCYTES NFR BLD AUTO: 9.4 % — SIGNIFICANT CHANGE UP (ref 2–14)
NEUTROPHILS # BLD AUTO: 10.66 K/UL — HIGH (ref 1.8–7.4)
NEUTROPHILS NFR BLD AUTO: 79 % — HIGH (ref 43–77)
PHOSPHATE SERPL-MCNC: 4.3 MG/DL — SIGNIFICANT CHANGE UP (ref 2.4–4.7)
PLATELET # BLD AUTO: 233 K/UL — SIGNIFICANT CHANGE UP (ref 150–400)
POTASSIUM SERPL-MCNC: 4.9 MMOL/L — SIGNIFICANT CHANGE UP (ref 3.5–5.3)
POTASSIUM SERPL-SCNC: 4.9 MMOL/L — SIGNIFICANT CHANGE UP (ref 3.5–5.3)
RBC # BLD: 2.97 M/UL — LOW (ref 4.2–5.8)
RBC # FLD: 14.9 % — HIGH (ref 10.3–14.5)
SODIUM SERPL-SCNC: 132 MMOL/L — LOW (ref 135–145)
WBC # BLD: 13.5 K/UL — HIGH (ref 3.8–10.5)
WBC # FLD AUTO: 13.5 K/UL — HIGH (ref 3.8–10.5)

## 2020-05-10 PROCEDURE — 99233 SBSQ HOSP IP/OBS HIGH 50: CPT

## 2020-05-10 RX ORDER — MAGNESIUM SULFATE 500 MG/ML
2 VIAL (ML) INJECTION ONCE
Refills: 0 | Status: COMPLETED | OUTPATIENT
Start: 2020-05-10 | End: 2020-05-10

## 2020-05-10 RX ADMIN — HEPARIN SODIUM 800 UNIT(S)/HR: 5000 INJECTION INTRAVENOUS; SUBCUTANEOUS at 05:09

## 2020-05-10 RX ADMIN — Medication 50 GRAM(S): at 06:19

## 2020-05-10 RX ADMIN — Medication 2 PACKET(S): at 01:40

## 2020-05-10 RX ADMIN — Medication 1 TABLET(S): at 11:35

## 2020-05-10 RX ADMIN — KETAMINE HYDROCHLORIDE 8.08 MG/KG/HR: 100 INJECTION INTRAMUSCULAR; INTRAVENOUS at 04:41

## 2020-05-10 RX ADMIN — PANTOPRAZOLE SODIUM 40 MILLIGRAM(S): 20 TABLET, DELAYED RELEASE ORAL at 11:35

## 2020-05-10 RX ADMIN — Medication 1 PACKET(S): at 18:32

## 2020-05-10 RX ADMIN — LACTULOSE 20 GRAM(S): 10 SOLUTION ORAL at 11:35

## 2020-05-10 RX ADMIN — Medication 1 PACKET(S): at 05:01

## 2020-05-10 RX ADMIN — POLYETHYLENE GLYCOL 3350 17 GRAM(S): 17 POWDER, FOR SOLUTION ORAL at 05:01

## 2020-05-10 RX ADMIN — Medication 6.75 MICROGRAM(S)/KG/MIN: at 11:08

## 2020-05-10 RX ADMIN — KETAMINE HYDROCHLORIDE 8.08 MG/KG/HR: 100 INJECTION INTRAMUSCULAR; INTRAVENOUS at 18:33

## 2020-05-10 RX ADMIN — POLYETHYLENE GLYCOL 3350 17 GRAM(S): 17 POWDER, FOR SOLUTION ORAL at 18:32

## 2020-05-10 RX ADMIN — CHLORHEXIDINE GLUCONATE 15 MILLILITER(S): 213 SOLUTION TOPICAL at 05:01

## 2020-05-10 RX ADMIN — CHLORHEXIDINE GLUCONATE 15 MILLILITER(S): 213 SOLUTION TOPICAL at 18:32

## 2020-05-10 NOTE — PROGRESS NOTE ADULT - SUBJECTIVE AND OBJECTIVE BOX
24h Events: no new events    ICU Vital Signs Last 24 Hrs  T(C): 36.2 (10 May 2020 00:00), Max: 37.2 (09 May 2020 19:28)  T(F): 97.2 (10 May 2020 00:00), Max: 99 (09 May 2020 19:28)  HR: 89 (10 May 2020 00:00) (73 - 90)  BP: 115/60 (10 May 2020 00:00) (93/55 - 143/77)  BP(mean): 82 (10 May 2020 00:00) (68 - 104)  ABP: 126/54 (10 May 2020 00:00) (99/46 - 148/63)  ABP(mean): 78 (10 May 2020 00:00) (64 - 94)  RR: 20 (10 May 2020 00:00) (0 - 35)  SpO2: 94% (10 May 2020 00:00) (92% - 96%)      I&O's Detail    08 May 2020 07:01  -  09 May 2020 07:00  --------------------------------------------------------  IN:    heparin  Infusion.: 192 mL    HYDROmorphone  Infusion: 6 mL    HYDROmorphone Infusion.: 42 mL    ketamine Infusion.: 220.5 mL    ketamine Infusion.: 31.5 mL    Pivot 1.5: 576 mL    rocuronium Infusion: 194.4 mL    Solution: 100 mL  Total IN: 1362.4 mL    OUT:    Indwelling Catheter - Urethral: 2290 mL  Total OUT: 2290 mL    Total NET: -927.6 mL      09 May 2020 07:01  -  10 May 2020 02:24  --------------------------------------------------------  IN:    Enteral Tube Flush: 60 mL    heparin  Infusion.: 112 mL    HYDROmorphone  Infusion: 28 mL    ketamine Infusion.: 147 mL    Pivot 1.5: 288 mL    rocuronium Infusion: 128.8 mL    Solution: 395.7 mL  Total IN: 1159.5 mL    OUT:    Indwelling Catheter - Urethral: 1675 mL  Total OUT: 1675 mL    Total NET: -515.5 mL          ABG - ( 09 May 2020 03:51 )  pH, Arterial: 7.34  pH, Blood: x     /  pCO2: 98    /  pO2: 72    / HCO3: 48    / Base Excess: 23.3  /  SaO2: 96                  MEDICATIONS  (STANDING):  chlorhexidine 0.12% Liquid 15 milliLiter(s) Oral Mucosa every 12 hours  heparin  Infusion.  Unit(s)/Hr (12 mL/Hr) IV Continuous <Continuous>  HYDROmorphone Infusion 2 mG/Hr (2 mL/Hr) IV Continuous <Continuous>  ketamine Infusion. 1.15 mG/kG/Hr (8.08 mL/Hr) IV Continuous <Continuous>  lactulose Syrup 20 Gram(s) Oral daily  multivitamin/minerals 1 Tablet(s) Oral daily  pantoprazole   Suspension 40 milliGRAM(s) Oral daily  polyethylene glycol 3350 17 Gram(s) Oral two times a day  psyllium Powder 1 Packet(s) Oral two times a day  rocuronium Infusion 8 MICROgram(s)/kG/Min (6.75 mL/Hr) IV Continuous <Continuous>    MEDICATIONS  (PRN):  acetaminophen   Tablet .. 650 milliGRAM(s) Oral every 6 hours PRN Temp greater or equal to 38C (100.4F), Mild Pain (1 - 3), Moderate Pain (4 - 6)  bisacodyl Suppository 10 milliGRAM(s) Rectal daily PRN Constipation  heparin   Injectable 5500 Unit(s) IV Push every 6 hours PRN For aPTT less than 40  heparin   Injectable 2500 Unit(s) IV Push every 6 hours PRN For aPTT between 40 - 57        Physical Exam:    Neurological: Dilaudid/Ketamine.Rocuronium gtt for sedation / analgesia    Pulmonary: mechanical ventilation; AC 28/300/+8/50%    Cardiovascular: S1, S2, regular rate & rhythm    Gastrointestinal: soft, non-distended, exam limited by sedation    : alexander in place draining yellow urine    Skin: warm, dry, no diaphoresis, no pallor, cyanosis, or jaundice    LABS:  CBC Full  -  ( 09 May 2020 04:25 )  WBC Count : 14.73 K/uL  RBC Count : 3.18 M/uL  Hemoglobin : 9.5 g/dL  Hematocrit : 31.9 %  Platelet Count - Automated : 232 K/uL  Mean Cell Volume : 100.3 fl  Mean Cell Hemoglobin : 29.9 pg  Mean Cell Hemoglobin Concentration : 29.8 gm/dL  Auto Neutrophil # : 9.79 K/uL  Auto Lymphocyte # : 2.04 K/uL  Auto Monocyte # : 1.76 K/uL  Auto Eosinophil # : 0.39 K/uL  Auto Basophil # : 0.09 K/uL  Auto Neutrophil % : 66.6 %  Auto Lymphocyte % : 13.8 %  Auto Monocyte % : 11.9 %  Auto Eosinophil % : 2.6 %  Auto Basophil % : 0.6 %    05-09    137  |  89<L>  |  16.0  ----------------------------<  119<H>  4.7   |  45.0<HH>  |  0.28<L>    Ca    7.8<L>      09 May 2020 04:25  Phos  2.0     05-09  Mg     2.0     05-09    TPro  5.1<L>  /  Alb  2.7<L>  /  TBili  <0.2<L>  /  DBili  x   /  AST  93<H>  /  ALT  93<H>  /  AlkPhos  103  05-08    PTT - ( 09 May 2020 04:25 )  PTT:66.4 sec    RECENT CULTURES:  05-04 .Sputum Sputum Staphylococcus aureus  Acinetobacter baumannii   Few polymorphonuclear leukocytes per low power field  Few Squamous epithelial cells per low power field  Few Gram Positive Cocci in Clusters seen per oil power field  Few Gram Positive Cocci in Pairs and Chains seen per oil power field  Numerous Gram Variable Rods seen per oil power field   Moderate Staphylococcus aureus  Moderate Acinetobacter baumannii nosocomialis group  Normal Respiratory Christine present    05-03 .Blood Blood XXXX XXXX   No growth at 5 days.        LIVER FUNCTIONS - ( 08 May 2020 04:40 )  Alb: 2.7 g/dL / Pro: 5.1 g/dL / ALK PHOS: 103 U/L / ALT: 93 U/L / AST: 93 U/L / GGT: x                   ASSESSMENT/PLAN:  64M with no PMHx admit with 10 days fevers and SOB, intubated on HD# 13, remains intubated doing poorly    Neuro: sedation / analgesia with Dilaudid/Ketamine.Rocuronium, delirium precautions, daily sedation holiday    CV: continue invasive monitoring with arterial line    Pulm: wean FiO2 / PEEP aas tolerated    GI/Nutrition: NPO with tube feeds - hold feeds if prone    /Renal: alexander for strict I&O, monitor kidney fxn    ID: Afebrile, WBC elevated but steady, no indication for abx at this time    Endo: monitor blood glucose, SSC    Skin: repositioning for DTI prevention while in bed    Heme/DVT Prophylaxis: Heparin infusion

## 2020-05-10 NOTE — PROGRESS NOTE ADULT - ATTENDING COMMENTS
I have seen and examined the patient during rounds 8-10 a  Seated, paralyzed vented.  Worsening gas exchange over 48 hrs  A/P Covid infection resp failure  Neuro: cont sedation ARss_2  Pulm: compliance worsened, resp hypercarbia, being compensated by kidney, P:F 168  CV: perfusion is adequate  GI: TEN  : function preserved  Heme: h/h stable heparin drip   ID: no signs of superinfection at this time  Dispo SICU  poor overall [prognosis

## 2020-05-11 LAB
4/8 RATIO: 1.86 RATIO — SIGNIFICANT CHANGE UP (ref 0.9–3.6)
ABS CD8: 165 /UL — SIGNIFICANT CHANGE UP (ref 142–740)
ALBUMIN SERPL ELPH-MCNC: 3 G/DL — LOW (ref 3.3–5.2)
ALP SERPL-CCNC: 102 U/L — SIGNIFICANT CHANGE UP (ref 40–120)
ALT FLD-CCNC: 72 U/L — HIGH
APTT BLD: 76 SEC — HIGH (ref 27.5–36.3)
AST SERPL-CCNC: 44 U/L — HIGH
BASOPHILS # BLD AUTO: 0.04 K/UL — SIGNIFICANT CHANGE UP (ref 0–0.2)
BASOPHILS NFR BLD AUTO: 0.3 % — SIGNIFICANT CHANGE UP (ref 0–2)
BILIRUB SERPL-MCNC: <0.2 MG/DL — LOW (ref 0.4–2)
BUN SERPL-MCNC: 18 MG/DL — SIGNIFICANT CHANGE UP (ref 8–20)
CALCIUM SERPL-MCNC: 8.1 MG/DL — LOW (ref 8.6–10.2)
CD3 BLASTS SPEC-ACNC: 510 /UL — LOW (ref 672–1870)
CD3 BLASTS SPEC-ACNC: 55 % — LOW (ref 59–83)
CD4 %: 33 % — SIGNIFICANT CHANGE UP (ref 30–62)
CD8 %: 18 % — SIGNIFICANT CHANGE UP (ref 12–36)
CHLORIDE SERPL-SCNC: 88 MMOL/L — LOW (ref 98–107)
CK SERPL-CCNC: 36 U/L — SIGNIFICANT CHANGE UP (ref 30–200)
CO2 SERPL-SCNC: 47 MMOL/L — CRITICAL HIGH (ref 22–29)
CREAT SERPL-MCNC: 0.26 MG/DL — LOW (ref 0.5–1.3)
CRP SERPL-MCNC: 1.72 MG/DL — HIGH (ref 0–0.4)
D DIMER BLD IA.RAPID-MCNC: 432 NG/ML DDU — HIGH
EOSINOPHIL # BLD AUTO: 0.06 K/UL — SIGNIFICANT CHANGE UP (ref 0–0.5)
EOSINOPHIL NFR BLD AUTO: 0.5 % — SIGNIFICANT CHANGE UP (ref 0–6)
ERYTHROCYTE [SEDIMENTATION RATE] IN BLOOD: 55 MM/HR — HIGH (ref 0–20)
FERRITIN SERPL-MCNC: 954 NG/ML — HIGH (ref 30–400)
GAS PNL BLDA: SIGNIFICANT CHANGE UP
GAS PNL BLDA: SIGNIFICANT CHANGE UP
GLUCOSE BLDC GLUCOMTR-MCNC: 134 MG/DL — HIGH (ref 70–99)
GLUCOSE BLDC GLUCOMTR-MCNC: 149 MG/DL — HIGH (ref 70–99)
GLUCOSE BLDC GLUCOMTR-MCNC: 162 MG/DL — HIGH (ref 70–99)
GLUCOSE BLDC GLUCOMTR-MCNC: 173 MG/DL — HIGH (ref 70–99)
GLUCOSE SERPL-MCNC: 139 MG/DL — HIGH (ref 70–99)
HCT VFR BLD CALC: 31.8 % — LOW (ref 39–50)
HGB BLD-MCNC: 9.4 G/DL — LOW (ref 13–17)
IMM GRANULOCYTES NFR BLD AUTO: 2.8 % — HIGH (ref 0–1.5)
INR BLD: 0.97 RATIO — SIGNIFICANT CHANGE UP (ref 0.88–1.16)
LDH SERPL L TO P-CCNC: 316 U/L — HIGH (ref 98–192)
LYMPHOCYTES # BLD AUTO: 0.98 K/UL — LOW (ref 1–3.3)
LYMPHOCYTES # BLD AUTO: 8.2 % — LOW (ref 13–44)
MAGNESIUM SERPL-MCNC: 2.4 MG/DL — SIGNIFICANT CHANGE UP (ref 1.6–2.6)
MCHC RBC-ENTMCNC: 29.6 GM/DL — LOW (ref 32–36)
MCHC RBC-ENTMCNC: 30.2 PG — SIGNIFICANT CHANGE UP (ref 27–34)
MCV RBC AUTO: 102.3 FL — HIGH (ref 80–100)
MONOCYTES # BLD AUTO: 1.23 K/UL — HIGH (ref 0–0.9)
MONOCYTES NFR BLD AUTO: 10.3 % — SIGNIFICANT CHANGE UP (ref 2–14)
NEUTROPHILS # BLD AUTO: 9.28 K/UL — HIGH (ref 1.8–7.4)
NEUTROPHILS NFR BLD AUTO: 77.9 % — HIGH (ref 43–77)
PHOSPHATE SERPL-MCNC: 3.6 MG/DL — SIGNIFICANT CHANGE UP (ref 2.4–4.7)
PLATELET # BLD AUTO: 254 K/UL — SIGNIFICANT CHANGE UP (ref 150–400)
POTASSIUM SERPL-MCNC: 4.7 MMOL/L — SIGNIFICANT CHANGE UP (ref 3.5–5.3)
POTASSIUM SERPL-SCNC: 4.7 MMOL/L — SIGNIFICANT CHANGE UP (ref 3.5–5.3)
PROT SERPL-MCNC: 5.8 G/DL — LOW (ref 6.6–8.7)
PROTHROM AB SERPL-ACNC: 11 SEC — SIGNIFICANT CHANGE UP (ref 10–12.9)
RBC # BLD: 3.11 M/UL — LOW (ref 4.2–5.8)
RBC # FLD: 15.1 % — HIGH (ref 10.3–14.5)
SODIUM SERPL-SCNC: 135 MMOL/L — SIGNIFICANT CHANGE UP (ref 135–145)
T-CELL CD4 SUBSET PNL BLD: 307 /UL — LOW (ref 489–1457)
TROPONIN T SERPL-MCNC: <0.01 NG/ML — SIGNIFICANT CHANGE UP (ref 0–0.06)
WBC # BLD: 11.92 K/UL — HIGH (ref 3.8–10.5)
WBC # FLD AUTO: 11.92 K/UL — HIGH (ref 3.8–10.5)

## 2020-05-11 PROCEDURE — 99233 SBSQ HOSP IP/OBS HIGH 50: CPT

## 2020-05-11 RX ORDER — DEXTROSE 50 % IN WATER 50 %
25 SYRINGE (ML) INTRAVENOUS ONCE
Refills: 0 | Status: DISCONTINUED | OUTPATIENT
Start: 2020-05-11 | End: 2020-05-12

## 2020-05-11 RX ORDER — INSULIN LISPRO 100/ML
VIAL (ML) SUBCUTANEOUS EVERY 6 HOURS
Refills: 0 | Status: DISCONTINUED | OUTPATIENT
Start: 2020-05-11 | End: 2020-05-14

## 2020-05-11 RX ORDER — MINERAL OIL
133 OIL (ML) MISCELLANEOUS DAILY
Refills: 0 | Status: DISCONTINUED | OUTPATIENT
Start: 2020-05-11 | End: 2020-05-19

## 2020-05-11 RX ORDER — GLUCAGON INJECTION, SOLUTION 0.5 MG/.1ML
1 INJECTION, SOLUTION SUBCUTANEOUS ONCE
Refills: 0 | Status: DISCONTINUED | OUTPATIENT
Start: 2020-05-11 | End: 2020-05-24

## 2020-05-11 RX ORDER — DEXTROSE 50 % IN WATER 50 %
25 SYRINGE (ML) INTRAVENOUS ONCE
Refills: 0 | Status: DISCONTINUED | OUTPATIENT
Start: 2020-05-11 | End: 2020-05-24

## 2020-05-11 RX ORDER — DEXTROSE 50 % IN WATER 50 %
15 SYRINGE (ML) INTRAVENOUS ONCE
Refills: 0 | Status: DISCONTINUED | OUTPATIENT
Start: 2020-05-11 | End: 2020-05-24

## 2020-05-11 RX ORDER — SODIUM CHLORIDE 9 MG/ML
1000 INJECTION, SOLUTION INTRAVENOUS
Refills: 0 | Status: DISCONTINUED | OUTPATIENT
Start: 2020-05-11 | End: 2020-05-12

## 2020-05-11 RX ORDER — DEXTROSE 50 % IN WATER 50 %
12.5 SYRINGE (ML) INTRAVENOUS ONCE
Refills: 0 | Status: DISCONTINUED | OUTPATIENT
Start: 2020-05-11 | End: 2020-05-12

## 2020-05-11 RX ADMIN — HYDROMORPHONE HYDROCHLORIDE 2 MG/HR: 2 INJECTION INTRAMUSCULAR; INTRAVENOUS; SUBCUTANEOUS at 10:49

## 2020-05-11 RX ADMIN — Medication 0: at 05:08

## 2020-05-11 RX ADMIN — Medication 35 MILLIGRAM(S): at 17:01

## 2020-05-11 RX ADMIN — PANTOPRAZOLE SODIUM 40 MILLIGRAM(S): 20 TABLET, DELAYED RELEASE ORAL at 10:49

## 2020-05-11 RX ADMIN — KETAMINE HYDROCHLORIDE 8.08 MG/KG/HR: 100 INJECTION INTRAMUSCULAR; INTRAVENOUS at 17:25

## 2020-05-11 RX ADMIN — Medication 1: at 10:59

## 2020-05-11 RX ADMIN — HEPARIN SODIUM 800 UNIT(S)/HR: 5000 INJECTION INTRAVENOUS; SUBCUTANEOUS at 05:55

## 2020-05-11 RX ADMIN — Medication 133 MILLILITER(S): at 14:36

## 2020-05-11 RX ADMIN — Medication 6.75 MICROGRAM(S)/KG/MIN: at 17:02

## 2020-05-11 RX ADMIN — Medication 1 TABLET(S): at 10:49

## 2020-05-11 RX ADMIN — LACTULOSE 20 GRAM(S): 10 SOLUTION ORAL at 10:49

## 2020-05-11 RX ADMIN — CHLORHEXIDINE GLUCONATE 15 MILLILITER(S): 213 SOLUTION TOPICAL at 17:02

## 2020-05-11 RX ADMIN — POLYETHYLENE GLYCOL 3350 17 GRAM(S): 17 POWDER, FOR SOLUTION ORAL at 05:19

## 2020-05-11 RX ADMIN — Medication 1 PACKET(S): at 23:16

## 2020-05-11 RX ADMIN — CHLORHEXIDINE GLUCONATE 15 MILLILITER(S): 213 SOLUTION TOPICAL at 05:08

## 2020-05-11 RX ADMIN — Medication 1 PACKET(S): at 05:19

## 2020-05-11 RX ADMIN — POLYETHYLENE GLYCOL 3350 17 GRAM(S): 17 POWDER, FOR SOLUTION ORAL at 17:02

## 2020-05-11 RX ADMIN — Medication 1: at 23:16

## 2020-05-11 NOTE — PROGRESS NOTE ADULT - ATTENDING COMMENTS
The patient was seen and examined  Events noted  No new problems    Neurologic:  NMB  Respiratory:  VC 50%, PEEP+8; AB.22/>108/79; XLM=154  Hemodynamic:  Normal  Renal:  Urine flow okay  GI:  TEN  Hgb okay  ID:  WBC=11    Plan:  Likely in fibroproliferative phase of ARDS (H type)--will start meduri protocol  Nutritional support  Anticoagulation

## 2020-05-11 NOTE — PROGRESS NOTE ADULT - ASSESSMENT
ASSESSMENT/PLAN: 64M with no PMHx admit with 10 days fevers and SOB, intubated on HD# 13, remains intubated doing poorly.    Neuro: sedation / analgesia with Ketamine and Dilaudid, Paralysis with Rocuronium, delirium precautions    CV: continue invasive monitoring with arterial line    Pulm: Worsening hypercarbia, Titrate vent settings based of ABG findings. Maintain PPlat <30.    GI/Nutrition: NPO with tube feeds    /Renal: Escalera for strict I&O, monitor kidney fxn    ID: Afebrile, Leukocytosis stable, no evidence of superinfection or need for abx.     Endo: monitor blood glucose, ISS    Skin: repositioning for DTI prevention while in bed    Heme/DVT Prophylaxis: SCDs / Heparin gtt. Goal ptt 60-90.

## 2020-05-11 NOTE — PROGRESS NOTE ADULT - SUBJECTIVE AND OBJECTIVE BOX
24h Events: No acute events overnight. Patient continues to be paralyzed. TV decreased from 300 to 240 (4 CC/kg) which lowered PPlat from 39 to 30. Continued to have resp. hypercarbia and RR increased from 28 to 30.     ICU Vital Signs Last 24 Hrs  T(C): 36.9 (10 May 2020 20:00), Max: 37.1 (10 May 2020 04:00)  T(F): 98.4 (10 May 2020 20:00), Max: 98.8 (10 May 2020 04:00)  HR: 82 (11 May 2020 03:03) (71 - 120)  BP: 110/64 (11 May 2020 03:03) (105/56 - 155/79)  BP(mean): 82 (11 May 2020 03:03) (75 - 110)  ABP: 116/52 (11 May 2020 03:03) (107/44 - 161/64)  ABP(mean): 75 (11 May 2020 03:03) (63 - 98)  RR: 30 (11 May 2020 03:03) (28 - 30)  SpO2: 97% (11 May 2020 03:03) (94% - 100%)      I&O's Detail    09 May 2020 07:01  -  10 May 2020 07:00  --------------------------------------------------------  IN:    Enteral Tube Flush: 240 mL    heparin  Infusion.: 160 mL    HYDROmorphone  Infusion: 40 mL    ketamine Infusion.: 210 mL    Pivot 1.5: 432 mL    rocuronium Infusion: 184 mL    Solution: 50 mL    Solution: 562.3 mL  Total IN: 1878.3 mL    OUT:    Indwelling Catheter - Urethral: 2060 mL  Total OUT: 2060 mL    Total NET: -181.7 mL      10 May 2020 07:01  -  11 May 2020 03:57  --------------------------------------------------------  IN:    Free Water: 250 mL    heparin  Infusion.: 168 mL    HYDROmorphone  Infusion: 42 mL    ketamine Infusion.: 199.5 mL    Pivot 1.5: 504 mL    rocuronium Infusion: 147.2 mL  Total IN: 1310.7 mL    OUT:    Indwelling Catheter - Urethral: 1825 mL  Total OUT: 1825 mL    Total NET: -514.3 mL    ABG - ( 10 May 2020 22:19 )  pH, Arterial: 7.23  pH, Blood: x     /  pCO2: >108  /  pO2: 91    / HCO3: 47    / Base Excess: 22.5  /  SaO2: 96          MEDICATIONS  (STANDING):  chlorhexidine 0.12% Liquid 15 milliLiter(s) Oral Mucosa every 12 hours  heparin  Infusion.  Unit(s)/Hr (12 mL/Hr) IV Continuous <Continuous>  HYDROmorphone Infusion 2 mG/Hr (2 mL/Hr) IV Continuous <Continuous>  ketamine Infusion. 1.15 mG/kG/Hr (8.08 mL/Hr) IV Continuous <Continuous>  lactulose Syrup 20 Gram(s) Oral daily  multivitamin/minerals 1 Tablet(s) Oral daily  pantoprazole   Suspension 40 milliGRAM(s) Oral daily  polyethylene glycol 3350 17 Gram(s) Oral two times a day  psyllium Powder 1 Packet(s) Oral two times a day  rocuronium Infusion 8 MICROgram(s)/kG/Min (6.75 mL/Hr) IV Continuous <Continuous>    MEDICATIONS  (PRN):  acetaminophen   Tablet .. 650 milliGRAM(s) Oral every 6 hours PRN Temp greater or equal to 38C (100.4F), Mild Pain (1 - 3), Moderate Pain (4 - 6)  bisacodyl Suppository 10 milliGRAM(s) Rectal daily PRN Constipation  heparin   Injectable 5500 Unit(s) IV Push every 6 hours PRN For aPTT less than 40  heparin   Injectable 2500 Unit(s) IV Push every 6 hours PRN For aPTT between 40 - 57      Physical Exam:    Neurological: Ketamine and Dilaudid gtt for sedation / analgesia. Rocuronium gtt for paralysis.    Pulmonary: mechanical ventilation; AC 30/240/8/50, PPlat 30.    Cardiovascular: S1, S2, regular rate & rhythm    Gastrointestinal: soft, non-distended    : Escalera in place draining clear, yellow urine    Skin: warm, dry, no diaphoresis, no pallor, cyanosis, or jaundice    LABS:  CBC Full  -  ( 10 May 2020 04:42 )  WBC Count : 13.50 K/uL  RBC Count : 2.97 M/uL  Hemoglobin : 8.9 g/dL  Hematocrit : 29.5 %  Platelet Count - Automated : 233 K/uL  Mean Cell Volume : 99.3 fl  Mean Cell Hemoglobin : 30.0 pg  Mean Cell Hemoglobin Concentration : 30.2 gm/dL  Auto Neutrophil # : 10.66 K/uL  Auto Lymphocyte # : 0.97 K/uL  Auto Monocyte # : 1.27 K/uL  Auto Eosinophil # : 0.13 K/uL  Auto Basophil # : 0.06 K/uL  Auto Neutrophil % : 79.0 %  Auto Lymphocyte % : 7.2 %  Auto Monocyte % : 9.4 %  Auto Eosinophil % : 1.0 %  Auto Basophil % : 0.4 %    05-10    132<L>  |  86<L>  |  17.0  ----------------------------<  194<H>  4.9   |  46.0<HH>  |  0.27<L>    Ca    7.9<L>      10 May 2020 04:42  Phos  4.3     05-10  Mg     1.9     05-10      PTT - ( 10 May 2020 04:42 )  PTT:60.4 sec    RECENT CULTURES:

## 2020-05-12 LAB
ALBUMIN SERPL ELPH-MCNC: 3 G/DL — LOW (ref 3.3–5.2)
ALP SERPL-CCNC: 118 U/L — SIGNIFICANT CHANGE UP (ref 40–120)
ALT FLD-CCNC: 67 U/L — HIGH
ANION GAP SERPL CALC-SCNC: 1 MMOL/L — LOW (ref 5–17)
APTT BLD: 68.1 SEC — HIGH (ref 27.5–36.3)
AST SERPL-CCNC: 42 U/L — HIGH
BASOPHILS # BLD AUTO: 0.03 K/UL — SIGNIFICANT CHANGE UP (ref 0–0.2)
BASOPHILS NFR BLD AUTO: 0.2 % — SIGNIFICANT CHANGE UP (ref 0–2)
BILIRUB SERPL-MCNC: <0.2 MG/DL — LOW (ref 0.4–2)
BUN SERPL-MCNC: 21 MG/DL — HIGH (ref 8–20)
CALCIUM SERPL-MCNC: 8.3 MG/DL — LOW (ref 8.6–10.2)
CHLORIDE SERPL-SCNC: 88 MMOL/L — LOW (ref 98–107)
CO2 SERPL-SCNC: 49 MMOL/L — CRITICAL HIGH (ref 22–29)
CREAT SERPL-MCNC: 0.32 MG/DL — LOW (ref 0.5–1.3)
EOSINOPHIL # BLD AUTO: 0 K/UL — SIGNIFICANT CHANGE UP (ref 0–0.5)
EOSINOPHIL NFR BLD AUTO: 0 % — SIGNIFICANT CHANGE UP (ref 0–6)
GAS PNL BLDA: SIGNIFICANT CHANGE UP
GAS PNL BLDA: SIGNIFICANT CHANGE UP
GLUCOSE BLDC GLUCOMTR-MCNC: 172 MG/DL — HIGH (ref 70–99)
GLUCOSE BLDC GLUCOMTR-MCNC: 207 MG/DL — HIGH (ref 70–99)
GLUCOSE SERPL-MCNC: 151 MG/DL — HIGH (ref 70–99)
HCT VFR BLD CALC: 32.4 % — LOW (ref 39–50)
HGB BLD-MCNC: 9.2 G/DL — LOW (ref 13–17)
IMM GRANULOCYTES NFR BLD AUTO: 2.7 % — HIGH (ref 0–1.5)
LYMPHOCYTES # BLD AUTO: 0.85 K/UL — LOW (ref 1–3.3)
LYMPHOCYTES # BLD AUTO: 6.8 % — LOW (ref 13–44)
MAGNESIUM SERPL-MCNC: 2.5 MG/DL — SIGNIFICANT CHANGE UP (ref 1.6–2.6)
MCHC RBC-ENTMCNC: 28.4 GM/DL — LOW (ref 32–36)
MCHC RBC-ENTMCNC: 30 PG — SIGNIFICANT CHANGE UP (ref 27–34)
MCV RBC AUTO: 105.5 FL — HIGH (ref 80–100)
MONOCYTES # BLD AUTO: 1.15 K/UL — HIGH (ref 0–0.9)
MONOCYTES NFR BLD AUTO: 9.1 % — SIGNIFICANT CHANGE UP (ref 2–14)
NEUTROPHILS # BLD AUTO: 10.22 K/UL — HIGH (ref 1.8–7.4)
NEUTROPHILS NFR BLD AUTO: 81.2 % — HIGH (ref 43–77)
NRBC # BLD: 1 /100 WBCS — HIGH (ref 0–0)
PHOSPHATE SERPL-MCNC: 2.9 MG/DL — SIGNIFICANT CHANGE UP (ref 2.4–4.7)
PLATELET # BLD AUTO: 265 K/UL — SIGNIFICANT CHANGE UP (ref 150–400)
POTASSIUM SERPL-MCNC: 5 MMOL/L — SIGNIFICANT CHANGE UP (ref 3.5–5.3)
POTASSIUM SERPL-SCNC: 5 MMOL/L — SIGNIFICANT CHANGE UP (ref 3.5–5.3)
PROT SERPL-MCNC: 6 G/DL — LOW (ref 6.6–8.7)
RBC # BLD: 3.07 M/UL — LOW (ref 4.2–5.8)
RBC # FLD: 15.2 % — HIGH (ref 10.3–14.5)
SODIUM SERPL-SCNC: 137 MMOL/L — SIGNIFICANT CHANGE UP (ref 135–145)
WBC # BLD: 12.59 K/UL — HIGH (ref 3.8–10.5)
WBC # FLD AUTO: 12.59 K/UL — HIGH (ref 3.8–10.5)

## 2020-05-12 PROCEDURE — 99291 CRITICAL CARE FIRST HOUR: CPT

## 2020-05-12 RX ORDER — LANOLIN ALCOHOL/MO/W.PET/CERES
5 CREAM (GRAM) TOPICAL AT BEDTIME
Refills: 0 | Status: DISCONTINUED | OUTPATIENT
Start: 2020-05-12 | End: 2020-06-29

## 2020-05-12 RX ADMIN — CHLORHEXIDINE GLUCONATE 15 MILLILITER(S): 213 SOLUTION TOPICAL at 04:30

## 2020-05-12 RX ADMIN — POLYETHYLENE GLYCOL 3350 17 GRAM(S): 17 POWDER, FOR SOLUTION ORAL at 04:31

## 2020-05-12 RX ADMIN — PANTOPRAZOLE SODIUM 40 MILLIGRAM(S): 20 TABLET, DELAYED RELEASE ORAL at 11:10

## 2020-05-12 RX ADMIN — POLYETHYLENE GLYCOL 3350 17 GRAM(S): 17 POWDER, FOR SOLUTION ORAL at 17:03

## 2020-05-12 RX ADMIN — CHLORHEXIDINE GLUCONATE 15 MILLILITER(S): 213 SOLUTION TOPICAL at 17:03

## 2020-05-12 RX ADMIN — Medication 1 PACKET(S): at 17:03

## 2020-05-12 RX ADMIN — Medication 1: at 05:32

## 2020-05-12 RX ADMIN — Medication 35 MILLIGRAM(S): at 17:03

## 2020-05-12 RX ADMIN — Medication 5 MILLIGRAM(S): at 21:26

## 2020-05-12 RX ADMIN — Medication 35 MILLIGRAM(S): at 04:30

## 2020-05-12 RX ADMIN — Medication 2: at 11:34

## 2020-05-12 RX ADMIN — Medication 1 TABLET(S): at 11:10

## 2020-05-12 RX ADMIN — LACTULOSE 20 GRAM(S): 10 SOLUTION ORAL at 11:02

## 2020-05-12 RX ADMIN — Medication 133 MILLILITER(S): at 11:10

## 2020-05-12 RX ADMIN — Medication 1 PACKET(S): at 11:02

## 2020-05-12 NOTE — PROGRESS NOTE ADULT - ATTENDING COMMENTS
63 yo M with no PMH presented with acute hypoxic respiratory failure and ARDS due to Covid  Intubated, weaned, sedate and not benefiting from proning. Ppeak 36 and Pplat 31.   Continue sedation / analgesia to help with vent weaning  Continue cardiac monitoring   Continue vent weaning as tolerated   Continue to monitor renal function  Continue High Dose Steroid theray [place] 3 (Meduri Protocol)  Pt on cefepime for pneumonia--WBC 13.9   VTE ppx heparin gtt  Continue ICU for vent management      code 21880    CCT  40 min .

## 2020-05-12 NOTE — CHART NOTE - NSCHARTNOTEFT_GEN_A_CORE
Source: Patient [ ]  Family [ ]   other [xx ]    Current Diet: Diet, NPO with Tube Feed:   Tube Feeding Modality: Nasogastric  Pivot 1.5 Raj  Total Volume for 24 Hours (mL): 576  Continuous  Starting Tube Feed Rate {mL per Hour}: 10  Increase Tube Feed Rate by (mL): 10     Every hour  Until Goal Tube Feed Rate (mL per Hour): 24  Tube Feed Duration (in Hours): 24  Tube Feed Start Time: 16:45 (05-04-20 @ 04:35)    Enteral /Parenteral Nutrition: Trickle feeds continue at 24 ml/hr.     Current Weight:   (5/10) 183.4 lbs  (5/8) 182.3 lbs  (4/18) 154.9 lbs  ? accuracy of weights, noted with 1+ generalized and 2+ b/l hand edema, continue to trend and maintain strict Is&Os     Pertinent Medications: MEDICATIONS  (STANDING):  chlorhexidine 0.12% Liquid 15 milliLiter(s) Oral Mucosa every 12 hours  dextrose 50% Injectable 25 Gram(s) IV Push once  heparin  Infusion.  Unit(s)/Hr (12 mL/Hr) IV Continuous <Continuous>  HYDROmorphone Infusion 2 mG/Hr (2 mL/Hr) IV Continuous <Continuous>  insulin lispro (HumaLOG) corrective regimen sliding scale   SubCutaneous every 6 hours  ketamine Infusion. 1.15 mG/kG/Hr (8.08 mL/Hr) IV Continuous <Continuous>  lactulose Syrup 20 Gram(s) Oral daily  melatonin 5 milliGRAM(s) Oral at bedtime  methylPREDNISolone sodium succinate Injectable 35 milliGRAM(s) IV Push every 12 hours  mineral oil enema 133 milliLiter(s) Rectal daily  multivitamin/minerals 1 Tablet(s) Oral daily  pantoprazole   Suspension 40 milliGRAM(s) Oral daily  polyethylene glycol 3350 17 Gram(s) Oral two times a day  psyllium Powder 1 Packet(s) Oral two times a day  rocuronium Infusion 8 MICROgram(s)/kG/Min (6.75 mL/Hr) IV Continuous <Continuous>    MEDICATIONS  (PRN):  acetaminophen   Tablet .. 650 milliGRAM(s) Oral every 6 hours PRN Temp greater or equal to 38C (100.4F), Mild Pain (1 - 3), Moderate Pain (4 - 6)  bisacodyl Suppository 10 milliGRAM(s) Rectal daily PRN Constipation  dextrose 40% Gel 15 Gram(s) Oral once PRN Blood Glucose LESS THAN 70 milliGRAM(s)/deciliter  glucagon  Injectable 1 milliGRAM(s) IntraMuscular once PRN Glucose LESS THAN 70 milligrams/deciliter  heparin   Injectable 5500 Unit(s) IV Push every 6 hours PRN For aPTT less than 40  heparin   Injectable 2500 Unit(s) IV Push every 6 hours PRN For aPTT between 40 - 57    Pertinent Labs: CBC Full  -  ( 12 May 2020 04:49 )  WBC Count : 12.59 K/uL  RBC Count : 3.07 M/uL  Hemoglobin : 9.2 g/dL  Hematocrit : 32.4 %  Platelet Count - Automated : 265 K/uL  Mean Cell Volume : 105.5 fl  Mean Cell Hemoglobin : 30.0 pg  Mean Cell Hemoglobin Concentration : 28.4 gm/dL  Auto Neutrophil # : 10.22 K/uL  Auto Lymphocyte # : 0.85 K/uL  Auto Monocyte # : 1.15 K/uL  Auto Eosinophil # : 0.00 K/uL  Auto Basophil # : 0.03 K/uL  Auto Neutrophil % : 81.2 %  Auto Lymphocyte % : 6.8 %  Auto Monocyte % : 9.1 %  Auto Eosinophil % : 0.0 %  Auto Basophil % : 0.2 %    05-12 Na137 mmol/L Glu 151 mg/dL<H> K+ 5.0 mmol/L Cr  0.32 mg/dL<L> BUN 21.0 mg/dL<H> Phos 2.9 mg/dL Alb 3.0 g/dL<L> PAB n/a       Skin: Intact per documentation     Nutrition focused physical exam not conducted at this time - found signs of malnutrition [ ]absent [ ]present    Subcutaneous fat loss: [ ] Orbital fat pads region, [ ]Buccal fat region, [ ]Triceps region,  [ ]Ribs region    Muscle wasting: [ ]Temples region, [ ]Clavicle region, [ ]Shoulder region, [ ]Scapula region, [ ]Interosseous region,  [ ]thigh region, [ ]Calf region    Estimated Needs:   [x ] no change since previous assessment  [ ] recalculated:     Current Diagnosis: Pt remains at high nutrition risk secondary malnutrition (severe, acute) related to inability to meet sufficient protein-energy requirements in setting of acute hypoxic respiratory failure, R Segmental PE, Severe ARDS secondary to COVID 19, rapid response requiring intubation on 5/1 and remaining intubated, sedated, paralyzed as evidenced by meeting <50% nutrient needs >5 days and +fluid accumulation. Pt remains intubated, sedated, paralyzed. Pivot 1.5 remains ordered for 24 ml/hr, not yet meeting estimated needs.     Recommendations:   1) Increase tube feeds to 40 ml/hr (x20 hrs) at this time to better meet estimated needs: 800 ml/day, 1200 kcal, 75g protein, 607 ml free water. Additional free water per MD discretion.  2) Monitor tube feed tolerance closely.  3) Continue bowel regimen PRN.  4) Continue MVI, rec vit C 500mg daily.  5) Obtain daily weights to monitor trends.       Monitoring and Evaluation:   [ ] PO intake [ x] Tolerance to diet prescription [X] Weights  [X] Follow up per protocol [X] Labs

## 2020-05-12 NOTE — PROGRESS NOTE ADULT - SUBJECTIVE AND OBJECTIVE BOX
HPI: 63 yo M w/ no reported PMHx presented to ER for worsening shortness of breath, fevers and dry cough for 10 days at home, found to be positive for COVID-19 as well as a R segmental midlobe PE. He was admitted to the floor on NRB, and then transferred to the ICU for worsening respiratory distress on 4/18 where he was observed with stable SaO2, and then transferred back to the floor on 4/24. On 5/1 RRT was called for acute hypoxic respiratory failure, requiring intubation. He was then transferred to the ICU for management of hypoxic respiratory failure 2/2 COVID-19.    24h Events: started on Meduri protocol (to be continued for 21 days)    ICU Vital Signs Last 24 Hrs  T(C): 37.1 (12 May 2020 00:00), Max: 37.1 (11 May 2020 20:00)  T(F): 98.8 (12 May 2020 00:00), Max: 98.8 (11 May 2020 20:00)  HR: 91 (12 May 2020 01:07) (79 - 94)  BP: 113/66 (12 May 2020 01:00) (102/60 - 113/67)  BP(mean): 83 (12 May 2020 01:00) (76 - 85)  ABP: 112/52 (12 May 2020 01:00) (110/48 - 136/55)  ABP(mean): 75 (12 May 2020 01:00) (69 - 80)  RR: 30 (12 May 2020 01:00) (29 - 30)  SpO2: 94% (12 May 2020 01:07) (93% - 97%)    I&O's Detail    10 May 2020 07:01  -  11 May 2020 07:00  --------------------------------------------------------  IN:    heparin  Infusion.: 192 mL    HYDROmorphone  Infusion: 48 mL    ketamine Infusion.: 252 mL    Pivot 1.5: 576 mL    rocuronium Infusion: 156.4 mL  Total IN: 1224.4 mL    OUT:    Indwelling Catheter - Urethral: 1965 mL  Total OUT: 1965 mL    Total NET: -740.6 mL    11 May 2020 07:01  -  12 May 2020 02:17  --------------------------------------------------------  IN:    heparin  Infusion.: 144 mL    HYDROmorphone  Infusion: 36 mL    ketamine Infusion.: 189 mL    Pivot 1.5: 432 mL    rocuronium Infusion: 172.8 mL  Total IN: 973.8 mL    OUT:    Indwelling Catheter - Urethral: 1300 mL  Total OUT: 1300 mL    Total NET: -326.2 mL    ABG - ( 11 May 2020 09:13 )  pH, Arterial: 7.22  pH, Blood: x     /  pCO2: >108  /  pO2: 79    / HCO3: 47    / Base Excess: 22.2  /  SaO2: 96        MEDICATIONS  (STANDING):  chlorhexidine 0.12% Liquid 15 milliLiter(s) Oral Mucosa every 12 hours  dextrose 5%. 1000 milliLiter(s) (50 mL/Hr) IV Continuous <Continuous>  dextrose 50% Injectable 12.5 Gram(s) IV Push once  dextrose 50% Injectable 25 Gram(s) IV Push once  dextrose 50% Injectable 25 Gram(s) IV Push once  heparin  Infusion.  Unit(s)/Hr (12 mL/Hr) IV Continuous <Continuous>  HYDROmorphone Infusion 2 mG/Hr (2 mL/Hr) IV Continuous <Continuous>  insulin lispro (HumaLOG) corrective regimen sliding scale   SubCutaneous every 6 hours  ketamine Infusion. 1.15 mG/kG/Hr (8.08 mL/Hr) IV Continuous <Continuous>  lactulose Syrup 20 Gram(s) Oral daily  methylPREDNISolone sodium succinate Injectable 35 milliGRAM(s) IV Push every 12 hours  mineral oil enema 133 milliLiter(s) Rectal daily  multivitamin/minerals 1 Tablet(s) Oral daily  pantoprazole   Suspension 40 milliGRAM(s) Oral daily  polyethylene glycol 3350 17 Gram(s) Oral two times a day  psyllium Powder 1 Packet(s) Oral two times a day  rocuronium Infusion 8 MICROgram(s)/kG/Min (6.75 mL/Hr) IV Continuous <Continuous>    MEDICATIONS  (PRN):  acetaminophen   Tablet .. 650 milliGRAM(s) Oral every 6 hours PRN Temp greater or equal to 38C (100.4F), Mild Pain (1 - 3), Moderate Pain (4 - 6)  bisacodyl Suppository 10 milliGRAM(s) Rectal daily PRN Constipation  dextrose 40% Gel 15 Gram(s) Oral once PRN Blood Glucose LESS THAN 70 milliGRAM(s)/deciliter  glucagon  Injectable 1 milliGRAM(s) IntraMuscular once PRN Glucose LESS THAN 70 milligrams/deciliter  heparin   Injectable 5500 Unit(s) IV Push every 6 hours PRN For aPTT less than 40  heparin   Injectable 2500 Unit(s) IV Push every 6 hours PRN For aPTT between 40 - 57    Physical Exam:    Neurological: sedated with ketamine and Dilaudid    Pulmonary: mechanical ventilation; breath sounds b/l    Cardiovascular: S1, S2, regular rate & rhythm    Gastrointestinal: soft, non-tender, non-distended, no rebound / guarding    : alexander in place draining yellow urine    Skin: warm, dry, no diaphoresis, no pallor, cyanosis, or jaundice    LABS:  CBC Full  -  ( 11 May 2020 04:24 )  WBC Count : 11.92 K/uL  RBC Count : 3.11 M/uL  Hemoglobin : 9.4 g/dL  Hematocrit : 31.8 %  Platelet Count - Automated : 254 K/uL  Mean Cell Volume : 102.3 fl  Mean Cell Hemoglobin : 30.2 pg  Mean Cell Hemoglobin Concentration : 29.6 gm/dL  Auto Neutrophil # : 9.28 K/uL  Auto Lymphocyte # : 0.98 K/uL  Auto Monocyte # : 1.23 K/uL  Auto Eosinophil # : 0.06 K/uL  Auto Basophil # : 0.04 K/uL  Auto Neutrophil % : 77.9 %  Auto Lymphocyte % : 8.2 %  Auto Monocyte % : 10.3 %  Auto Eosinophil % : 0.5 %  Auto Basophil % : 0.3 %    05-11    135  |  88<L>  |  18.0  ----------------------------<  139<H>  4.7   |  47.0<HH>  |  0.26<L>    Ca    8.1<L>      11 May 2020 04:24  Phos  3.6     05-11  Mg     2.4     05-11    TPro  5.8<L>  /  Alb  3.0<L>  /  TBili  <0.2<L>  /  DBili  x   /  AST  44<H>  /  ALT  72<H>  /  AlkPhos  102  05-11    PT/INR - ( 11 May 2020 04:24 )   PT: 11.0 sec;   INR: 0.97 ratio      PTT - ( 11 May 2020 04:24 )  PTT:76.0 sec    RECENT CULTURES:    LIVER FUNCTIONS - ( 11 May 2020 04:24 )  Alb: 3.0 g/dL / Pro: 5.8 g/dL / ALK PHOS: 102 U/L / ALT: 72 U/L / AST: 44 U/L / GGT: x           CARDIAC MARKERS ( 11 May 2020 04:24 )  x     / <0.01 ng/mL / 36 U/L / x     / x        ASSESSMENT/PLAN:  63 yo M w/ no reported PMHx presented to ER for worsening shortness of breath, fevers and dry cough for 10 days at home, found to be positive for COVID-19 as well as a R segmental midlobe PE. He was admitted to the floor on NRB, and then transferred to the ICU for worsening respiratory distress on 4/18 where he was observed with stable SaO2, and then transferred back to the floor on 4/24. On 5/1 RRT was called for acute hypoxic respiratory failure, requiring intubation. He was then transferred to the ICU for management of hypoxic respiratory failure 2/2 COVID-19.    Neuro:   - continue ketamine for sedation  - continue Dilaudid for pain control  - continue rocuronium gtt   - regulate sleep wake cycles, melatonin at bedtime    CV:   - continue invasive monitoring with arterial line    Pulm:   - lungs with very poor compliance  - no response to prone positioning  - remains hypercapnic despite RR 30 and TV 3.5 ml/kg    GI/Nutrition:   - NPO with tube feeds   - continue bowel reg  - protonix ppx    /Renal:   - Alexander for strict I&O  - monitor kidney fxn    ID:   - no current antimicrobials  - supportive therapy for COVID    Endo:   - methylpred 35 mg BID for 21 days (meduri protocol)  - monitor blood glucose q6  - ISS    Skin:   - repositioning for DTI prevention while in bed    Heme/DVT Prophylaxis:   - heparin gtt    Dispo:  - remains critically ill  - care per SICU

## 2020-05-12 NOTE — CHART NOTE - NSCHARTNOTEFT_GEN_A_CORE
Upon Nutritional Assessment by the Registered Dietitian your patient was determined to meet criteria / has evidence of the following diagnosis/diagnoses:          [ ]  Mild Protein Calorie Malnutrition        [ ]  Moderate Protein Calorie Malnutrition        [x ] Severe Protein Calorie Malnutrition        [ ] Unspecified Protein Calorie Malnutrition        [ ] Underweight / BMI <19        [ ] Morbid Obesity / BMI > 40    Pt remains at high nutrition risk secondary malnutrition (severe, acute) related to inability to meet sufficient protein-energy requirements in setting of acute hypoxic respiratory failure, R Segmental PE, Severe ARDS secondary to COVID 19, rapid response requiring intubation on 5/1 and remaining intubated, sedated, paralyzed as evidenced by meeting <50% nutrient needs >5 days and +fluid accumulation.     Findings as based on:  •  Comprehensive nutrition assessment and consultation  •  Calorie counts (nutrient intake analysis)  •  Food acceptance and intake status from observations by staff  •  Follow up  •  Patient education  •  Intervention secondary to interdisciplinary rounds  •   concerns      Treatment:    The following has been recommended:  1) Increase tube feeds to 40 ml/hr (x20 hrs) at this time to better meet estimated needs: 800 ml/day, 1200 kcal, 75g protein, 607 ml free water. Additional free water per MD discretion.  2) Monitor tube feed tolerance closely.  3) Continue bowel regimen PRN.  4) Continue MVI, rec vit C 500mg daily.  5) Obtain daily weights to monitor trends.     PROVIDER Section:     By signing this assessment you are acknowledging and agree with the diagnosis/diagnoses assigned by the Registered Dietitian    Comments:

## 2020-05-13 LAB
ALBUMIN SERPL ELPH-MCNC: 3 G/DL — LOW (ref 3.3–5.2)
ALP SERPL-CCNC: 184 U/L — HIGH (ref 40–120)
ALT FLD-CCNC: 116 U/L — HIGH
ANION GAP SERPL CALC-SCNC: SIGNIFICANT CHANGE UP MMOL/L (ref 5–17)
APTT BLD: 55.4 SEC — HIGH (ref 27.5–36.3)
AST SERPL-CCNC: 98 U/L — HIGH
BASOPHILS # BLD AUTO: 0.03 K/UL — SIGNIFICANT CHANGE UP (ref 0–0.2)
BASOPHILS NFR BLD AUTO: 0.3 % — SIGNIFICANT CHANGE UP (ref 0–2)
BILIRUB SERPL-MCNC: <0.2 MG/DL — LOW (ref 0.4–2)
BUN SERPL-MCNC: 21 MG/DL — HIGH (ref 8–20)
CALCIUM SERPL-MCNC: 8.1 MG/DL — LOW (ref 8.6–10.2)
CHLORIDE SERPL-SCNC: 88 MMOL/L — LOW (ref 98–107)
CO2 SERPL-SCNC: >50 MMOL/L — CRITICAL HIGH (ref 22–29)
CREAT SERPL-MCNC: 0.24 MG/DL — LOW (ref 0.5–1.3)
EOSINOPHIL # BLD AUTO: 0 K/UL — SIGNIFICANT CHANGE UP (ref 0–0.5)
EOSINOPHIL NFR BLD AUTO: 0 % — SIGNIFICANT CHANGE UP (ref 0–6)
GAS PNL BLDA: SIGNIFICANT CHANGE UP
GLUCOSE BLDC GLUCOMTR-MCNC: 133 MG/DL — HIGH (ref 70–99)
GLUCOSE BLDC GLUCOMTR-MCNC: 139 MG/DL — HIGH (ref 70–99)
GLUCOSE BLDC GLUCOMTR-MCNC: 140 MG/DL — HIGH (ref 70–99)
GLUCOSE BLDC GLUCOMTR-MCNC: 180 MG/DL — HIGH (ref 70–99)
GLUCOSE BLDC GLUCOMTR-MCNC: 206 MG/DL — HIGH (ref 70–99)
GLUCOSE BLDC GLUCOMTR-MCNC: 207 MG/DL — HIGH (ref 70–99)
GLUCOSE SERPL-MCNC: 139 MG/DL — HIGH (ref 70–99)
HCT VFR BLD CALC: 29.2 % — LOW (ref 39–50)
HGB BLD-MCNC: 8.4 G/DL — LOW (ref 13–17)
IMM GRANULOCYTES NFR BLD AUTO: 2 % — HIGH (ref 0–1.5)
LYMPHOCYTES # BLD AUTO: 1.03 K/UL — SIGNIFICANT CHANGE UP (ref 1–3.3)
LYMPHOCYTES # BLD AUTO: 8.8 % — LOW (ref 13–44)
MAGNESIUM SERPL-MCNC: 2.3 MG/DL — SIGNIFICANT CHANGE UP (ref 1.6–2.6)
MCHC RBC-ENTMCNC: 28.8 GM/DL — LOW (ref 32–36)
MCHC RBC-ENTMCNC: 30.3 PG — SIGNIFICANT CHANGE UP (ref 27–34)
MCV RBC AUTO: 105.4 FL — HIGH (ref 80–100)
MONOCYTES # BLD AUTO: 1.09 K/UL — HIGH (ref 0–0.9)
MONOCYTES NFR BLD AUTO: 9.3 % — SIGNIFICANT CHANGE UP (ref 2–14)
NEUTROPHILS # BLD AUTO: 9.36 K/UL — HIGH (ref 1.8–7.4)
NEUTROPHILS NFR BLD AUTO: 79.6 % — HIGH (ref 43–77)
PHOSPHATE SERPL-MCNC: 1.5 MG/DL — LOW (ref 2.4–4.7)
PLATELET # BLD AUTO: 255 K/UL — SIGNIFICANT CHANGE UP (ref 150–400)
POTASSIUM SERPL-MCNC: 5.1 MMOL/L — SIGNIFICANT CHANGE UP (ref 3.5–5.3)
POTASSIUM SERPL-SCNC: 5.1 MMOL/L — SIGNIFICANT CHANGE UP (ref 3.5–5.3)
PROCALCITONIN SERPL-MCNC: 0.12 NG/ML — HIGH (ref 0.02–0.1)
PROT SERPL-MCNC: 5.6 G/DL — LOW (ref 6.6–8.7)
RBC # BLD: 2.77 M/UL — LOW (ref 4.2–5.8)
RBC # FLD: 15 % — HIGH (ref 10.3–14.5)
SODIUM SERPL-SCNC: 140 MMOL/L — SIGNIFICANT CHANGE UP (ref 135–145)
WBC # BLD: 11.74 K/UL — HIGH (ref 3.8–10.5)
WBC # FLD AUTO: 11.74 K/UL — HIGH (ref 3.8–10.5)

## 2020-05-13 PROCEDURE — 36620 INSERTION CATHETER ARTERY: CPT

## 2020-05-13 PROCEDURE — 99232 SBSQ HOSP IP/OBS MODERATE 35: CPT

## 2020-05-13 PROCEDURE — 99233 SBSQ HOSP IP/OBS HIGH 50: CPT

## 2020-05-13 PROCEDURE — 99497 ADVNCD CARE PLAN 30 MIN: CPT | Mod: 25

## 2020-05-13 RX ORDER — MULTIVIT WITH MIN/MFOLATE/K2 340-15/3 G
1 POWDER (GRAM) ORAL ONCE
Refills: 0 | Status: COMPLETED | OUTPATIENT
Start: 2020-05-13 | End: 2020-05-13

## 2020-05-13 RX ORDER — HEPARIN SODIUM 5000 [USP'U]/ML
800 INJECTION INTRAVENOUS; SUBCUTANEOUS
Qty: 25000 | Refills: 0 | Status: DISCONTINUED | OUTPATIENT
Start: 2020-05-13 | End: 2020-05-17

## 2020-05-13 RX ORDER — HEPARIN SODIUM 5000 [USP'U]/ML
800 INJECTION INTRAVENOUS; SUBCUTANEOUS
Qty: 25000 | Refills: 0 | Status: DISCONTINUED | OUTPATIENT
Start: 2020-05-13 | End: 2020-05-13

## 2020-05-13 RX ADMIN — LACTULOSE 20 GRAM(S): 10 SOLUTION ORAL at 11:34

## 2020-05-13 RX ADMIN — Medication 1 TABLET(S): at 11:34

## 2020-05-13 RX ADMIN — Medication 5 MILLIGRAM(S): at 22:14

## 2020-05-13 RX ADMIN — Medication 1 BOTTLE: at 09:10

## 2020-05-13 RX ADMIN — CHLORHEXIDINE GLUCONATE 15 MILLILITER(S): 213 SOLUTION TOPICAL at 17:24

## 2020-05-13 RX ADMIN — PANTOPRAZOLE SODIUM 40 MILLIGRAM(S): 20 TABLET, DELAYED RELEASE ORAL at 11:34

## 2020-05-13 RX ADMIN — Medication 1 PACKET(S): at 06:47

## 2020-05-13 RX ADMIN — Medication 35 MILLIGRAM(S): at 06:07

## 2020-05-13 RX ADMIN — CHLORHEXIDINE GLUCONATE 15 MILLILITER(S): 213 SOLUTION TOPICAL at 06:07

## 2020-05-13 RX ADMIN — Medication 133 MILLILITER(S): at 13:58

## 2020-05-13 RX ADMIN — Medication 85 MILLIMOLE(S): at 08:26

## 2020-05-13 RX ADMIN — POLYETHYLENE GLYCOL 3350 17 GRAM(S): 17 POWDER, FOR SOLUTION ORAL at 06:07

## 2020-05-13 RX ADMIN — Medication 1: at 00:07

## 2020-05-13 RX ADMIN — Medication 2: at 11:35

## 2020-05-13 RX ADMIN — Medication 2: at 17:25

## 2020-05-13 RX ADMIN — Medication 6.75 MICROGRAM(S)/KG/MIN: at 11:34

## 2020-05-13 RX ADMIN — Medication 35 MILLIGRAM(S): at 17:24

## 2020-05-13 RX ADMIN — HEPARIN SODIUM 800 UNIT(S)/HR: 5000 INJECTION INTRAVENOUS; SUBCUTANEOUS at 23:12

## 2020-05-13 RX ADMIN — KETAMINE HYDROCHLORIDE 8.08 MG/KG/HR: 100 INJECTION INTRAMUSCULAR; INTRAVENOUS at 04:08

## 2020-05-13 RX ADMIN — Medication 6.75 MICROGRAM(S)/KG/MIN: at 00:32

## 2020-05-13 NOTE — PROGRESS NOTE ADULT - SUBJECTIVE AND OBJECTIVE BOX
This is a Palliative Care Followup  24 hour events: Patient remains in ICU closely monitored - Full Code- Reached out to HCP for emotional support today    HPI: This is a 64 year old male no significant medical history admitted to [facility] for SOB, fevers, dry cough x 10 days, poor PO intake. In ED patient with symptoms of tachycardia, hypoxia, ARF, now on non rebreather mask continuing to desaturate in the 80's. No sick contacts, no reports of chest pain, n/v/d in ED. Patient found to be COVID-19 positive and right pulmonary embolism. Called for palliative care consult to discuss GOC as patient is high risk for intubation. Patient is Indonesian speaking,  used to discuss goals at bedside. On 04.29.20 Meeting held at bedside with patient, myself, Dr. Neumann, and   Discussed goals/advance directives. Established patient desire for Full Code including CPR and intubation in the event of cardio/pulmonary compromise/arrest  Patient was explained that he is high risk for intubation given his poor pulmonary status - he is also aware he is at risk for cardiac arrest- patient was  agreeable to starting plasma, Patient started Plasma was maintaining saturation wiht non rebreather - then today on 05.01.20 patient's respiratory status decline and he was intubated at bedside after a RRT was called. Patient sent to ICU for closer monitoring, vent support.     Present Symptoms: Unable to tell due to poor mental status  Dyspnea:  vented  Nausea/Vomiting: Unable to tell due to poor mental status  Anxiety:  Unable to tell due to poor mental status   Depression: Unable to tell due to poor mental status  Fatigue: yes sedated  Loss of appetite: Unable to tell due to poor mental status    Pain: Unable to tell due to poor mental status - on dilaudid infusion            Character-            Duration-            Effect-            Factors-            Frequency-            Location-            Severity-    Review of Systems: Reviewed  Per HPI, all other ROS negative  Unable to tell due to poor mental status    MEDICATIONS  (STANDING):  chlorhexidine 0.12% Liquid 15 milliLiter(s) Oral Mucosa every 12 hours  dextrose 50% Injectable 25 Gram(s) IV Push once  heparin  Infusion.  Unit(s)/Hr (12 mL/Hr) IV Continuous <Continuous>  HYDROmorphone Infusion 2 mG/Hr (2 mL/Hr) IV Continuous <Continuous>  insulin lispro (HumaLOG) corrective regimen sliding scale   SubCutaneous every 6 hours  ketamine Infusion. 1.15 mG/kG/Hr (8.08 mL/Hr) IV Continuous <Continuous>  lactulose Syrup 20 Gram(s) Oral daily  melatonin 5 milliGRAM(s) Oral at bedtime  methylPREDNISolone sodium succinate Injectable 35 milliGRAM(s) IV Push every 12 hours  mineral oil enema 133 milliLiter(s) Rectal daily  multivitamin/minerals 1 Tablet(s) Oral daily  pantoprazole   Suspension 40 milliGRAM(s) Oral daily  polyethylene glycol 3350 17 Gram(s) Oral two times a day  psyllium Powder 1 Packet(s) Oral two times a day  rocuronium Infusion 8 MICROgram(s)/kG/Min (6.75 mL/Hr) IV Continuous <Continuous>    MEDICATIONS  (PRN):  acetaminophen   Tablet .. 650 milliGRAM(s) Oral every 6 hours PRN Temp greater or equal to 38C (100.4F), Mild Pain (1 - 3), Moderate Pain (4 - 6)  bisacodyl Suppository 10 milliGRAM(s) Rectal daily PRN Constipation  dextrose 40% Gel 15 Gram(s) Oral once PRN Blood Glucose LESS THAN 70 milliGRAM(s)/deciliter  glucagon  Injectable 1 milliGRAM(s) IntraMuscular once PRN Glucose LESS THAN 70 milligrams/deciliter  heparin   Injectable 5500 Unit(s) IV Push every 6 hours PRN For aPTT less than 40  heparin   Injectable 2500 Unit(s) IV Push every 6 hours PRN For aPTT between 40 - 57    PHYSICAL EXAM:  Physical Exam - limited physical exam due to COVID - 19 isolation status  No Physical Exam at bedside completed in attempt to limit COVID-19   Reviewed H&P physical exam and most recent Hospitalist Physical Exam   See H&P physical exam and most recent ICU Document /PA exam    Differential (05.13.20 @ 04:50)    Auto Immature Granulocyte %: 2.0 %    Auto Lymphocyte %: 8.8 %    Auto Lymphocyte #: 1.03 K/uL    Auto Basophil %: 0.3 %    Auto Basophil #: 0.03 K/uL    Auto Eosinophil %: 0.0 %    Auto Eosinophil #: 0.00 K/uL    Auto Neutrophil #: 9.36 K/uL    Auto Neutrophil %: 79.6: Differential percentages must be correlated with absolute numbers for  clinical significance. %    Auto Monocyte %: 9.3 %    Auto Monocyte #: 1.09 K/uL    Complete Blood Count + Automated Diff (05.13.20 @ 04:50)    WBC Count: 11.74 K/uL    RBC Count: 2.77 M/uL    Hemoglobin: 8.4 g/dL    Hematocrit: 29.2 %    Mean Cell Volume: 105.4 fl    Mean Cell Hemoglobin: 30.3 pg    Mean Cell Hemoglobin Conc: 28.8 gm/dL    Red Cell Distrib Width: 15.0 %    Platelet Count - Automated: 255 K/uL      RADIOLOGY & ADDITIONAL STUDIES:  CXR 05.01.20  Impression:  1.  Repositioning of ET tube as described, otherwise stable exam.  DISCRETE X-RAY DATA:  Percent of LEFT lung opacification: %  Percent of RIGHT lungopacification: %  Change in lung opacification from most recent x-ray (<=3 days): Stable  Change from prior dated 3 or more days (same admission): Stable    ADVANCE DIRECTIVES:   Full Code This is a Palliative Care Followup  24 hour events: Patient remains in ICU closely monitored - Full Code- Reached out to HCP for emotional support today    HPI: This is a 64 year old male no significant medical history admitted to [facility] for SOB, fevers, dry cough x 10 days, poor PO intake. In ED patient with symptoms of tachycardia, hypoxia, ARF, now on non rebreather mask continuing to desaturate in the 80's. No sick contacts, no reports of chest pain, n/v/d in ED. Patient found to be COVID-19 positive and right pulmonary embolism. Called for palliative care consult to discuss GOC as patient is high risk for intubation. Patient is Armenian speaking,  used to discuss goals at bedside. On 04.29.20 Meeting held at bedside with patient, myself, Dr. Neumann, and   Discussed goals/advance directives. Established patient desire for Full Code including CPR and intubation in the event of cardio/pulmonary compromise/arrest  Patient was explained that he is high risk for intubation given his poor pulmonary status - he is also aware he is at risk for cardiac arrest- patient was  agreeable to starting plasma, Patient started Plasma was maintaining saturation wiht non rebreather - then today on 05.01.20 patient's respiratory status decline and he was intubated at bedside after a RRT was called. Patient sent to ICU for closer monitoring, vent support.     Present Symptoms: Unable to tell due to poor mental status  Dyspnea:  vented  Nausea/Vomiting: Unable to tell due to poor mental status  Anxiety:  Unable to tell due to poor mental status   Depression: Unable to tell due to poor mental status  Fatigue: yes sedated  Loss of appetite: Unable to tell due to poor mental status    Pain: Unable to tell due to poor mental status - on dilaudid infusion            Character-            Duration-            Effect-            Factors-            Frequency-            Location-            Severity-    Review of Systems: Reviewed  Per HPI, all other ROS negative  Unable to tell due to poor mental status  MEDICATIONS  (STANDING):  chlorhexidine 0.12% Liquid 15 milliLiter(s) Oral Mucosa every 12 hours  dextrose 50% Injectable 25 Gram(s) IV Push once  heparin  Infusion.  Unit(s)/Hr (12 mL/Hr) IV Continuous <Continuous>  HYDROmorphone Infusion 2 mG/Hr (2 mL/Hr) IV Continuous <Continuous>  insulin lispro (HumaLOG) corrective regimen sliding scale   SubCutaneous every 6 hours  ketamine Infusion. 1.15 mG/kG/Hr (8.08 mL/Hr) IV Continuous <Continuous>  lactulose Syrup 20 Gram(s) Oral daily  melatonin 5 milliGRAM(s) Oral at bedtime  methylPREDNISolone sodium succinate Injectable 35 milliGRAM(s) IV Push every 12 hours  mineral oil enema 133 milliLiter(s) Rectal daily  multivitamin/minerals 1 Tablet(s) Oral daily  pantoprazole   Suspension 40 milliGRAM(s) Oral daily  polyethylene glycol 3350 17 Gram(s) Oral two times a day  psyllium Powder 1 Packet(s) Oral two times a day  rocuronium Infusion 8 MICROgram(s)/kG/Min (6.75 mL/Hr) IV Continuous <Continuous>    MEDICATIONS  (PRN):  acetaminophen   Tablet .. 650 milliGRAM(s) Oral every 6 hours PRN Temp greater or equal to 38C (100.4F), Mild Pain (1 - 3), Moderate Pain (4 - 6)  bisacodyl Suppository 10 milliGRAM(s) Rectal daily PRN Constipation  dextrose 40% Gel 15 Gram(s) Oral once PRN Blood Glucose LESS THAN 70 milliGRAM(s)/deciliter  glucagon  Injectable 1 milliGRAM(s) IntraMuscular once PRN Glucose LESS THAN 70 milligrams/deciliter  heparin   Injectable 5500 Unit(s) IV Push every 6 hours PRN For aPTT less than 40  heparin   Injectable 2500 Unit(s) IV Push every 6 hours PRN For aPTT between 40 - 57  PHYSICAL EXAM:  Physical Exam - limited physical exam due to COVID - 19 isolation status  No Physical Exam at bedside completed in attempt to limit COVID-19   Reviewed H&P physical exam and most recent Hospitalist Physical Exam   See H&P physical exam and most recent ICU Document / NP exam    Differential (05.13.20 @ 04:50)    Auto Immature Granulocyte %: 2.0 %    Auto Lymphocyte %: 8.8 %    Auto Lymphocyte #: 1.03 K/uL    Auto Basophil %: 0.3 %    Auto Basophil #: 0.03 K/uL    Auto Eosinophil %: 0.0 %    Auto Eosinophil #: 0.00 K/uL    Auto Neutrophil #: 9.36 K/uL    Auto Neutrophil %: 79.6: Differential percentages must be correlated with absolute numbers for  clinical significance. %    Auto Monocyte %: 9.3 %    Auto Monocyte #: 1.09 K/uL    Complete Blood Count + Automated Diff (05.13.20 @ 04:50)    WBC Count: 11.74 K/uL    RBC Count: 2.77 M/uL    Hemoglobin: 8.4 g/dL    Hematocrit: 29.2 %    Mean Cell Volume: 105.4 fl    Mean Cell Hemoglobin: 30.3 pg    Mean Cell Hemoglobin Conc: 28.8 gm/dL    Red Cell Distrib Width: 15.0 %    Platelet Count - Automated: 255 K/uL      RADIOLOGY & ADDITIONAL STUDIES:  CXR 05.01.20  Impression:  1.  Repositioning of ET tube as described, otherwise stable exam.  DISCRETE X-RAY DATA:  Percent of LEFT lung opacification: %  Percent of RIGHT lungopacification: %  Change in lung opacification from most recent x-ray (<=3 days): Stable  Change from prior dated 3 or more days (same admission): Stable    ADVANCE DIRECTIVES:   Full Code

## 2020-05-13 NOTE — PROGRESS NOTE ADULT - PROBLEM SELECTOR PLAN 2
RRT called today for worsening respiratory distress 05/01/20  Intubated - vented  Sent to ICU for monitoring    Update 05/13/20  Patient remains on ventilator support closely monitored by ICU team, slowly trying to weane vent as per ICU team discretion RRT called for worsening respiratory distress 05/01/20  Intubated - vented  Sent to ICU for monitoring    Update 05/13/20  Patient remains on ventilator support closely monitored by ICU team, slowly trying to weane vent as per ICU team discretion

## 2020-05-13 NOTE — PROGRESS NOTE ADULT - ASSESSMENT
Patient's respiratory status declined, RRT called , patient intubated, sent to ICU on 05.01.20  Remains vented and sedated - call placed to HCP as another followup conversation to provide emotional support and reconfirm no changes have been made to advance directives at this time

## 2020-05-13 NOTE — PROGRESS NOTE ADULT - PROBLEM SELECTOR PLAN 4
-Discussed at length with patient's HCP Yovani regarding patient still being in ICU and on ventilator support using  # [phone]  -Yovani reconfirms code status as Full Code - CPR in the event of cardiac arrest (currently intubated)   -Prepared Yovani for positive outcomes as well as poor outcomes . Explained to Yovani the option of keeping the patient comfortable in the event the patient was to decline despite intervention - Yovani appears open to the concept but at this time is still wanting to see how he can progress on ventilator support.  - Yovani very appreciative of ICU team with followup calls and updates.  - Emotional support provided.  - Will continue to support and monitor.    Total time spent 30 minutes  GOC/Advanced Care Planning over the phone due to visitation limitations related to Covid19- Time Spent _25_____Minutes.      Thank you for the opportunity to assist with the care of this patient.   [facility] [phone]. -Discussed at length with patient's HCP Yovani regarding patient still being in ICU and on ventilator support using  # [phone]  -Yovani reconfirms code status as Full Code - CPR in the event of cardiac arrest (currently intubated)   -Prepared Yovani for positive outcomes as well as poor outcomes . Explained to Yovani the option of keeping the patient comfortable in the event the patient was to decline despite intervention - Yovani appears open to the concept but at this time is still wanting to see how he can progress on ventilator support.  - Yovani very appreciative of ICU team with followup calls and updates.  - Emotional support provided.  - Will continue to support and monitor.  Addendum 310pm: I attempted to call Yovani (HCP) twice using  ID [id number] and using ID [id number] - no response from Yovani on both attempts    Total time spent 25 minutes  GOC/Advanced Care Planning over the phone due to visitation limitations related to Covid19- Time Spent _20_____Minutes.      Thank you for the opportunity to assist with the care of this patient.   [place] [facility] [phone]. -Discussed at length with patient's HCP Yovani regarding patient still being in ICU and on ventilator support using  # [phone]  -Yovani reconfirms code status as Full Code - CPR in the event of cardiac arrest (currently intubated)   -Prepared Yovani for positive outcomes as well as poor outcomes . Explained to Yovani the option of keeping the patient comfortable in the event the patient was to decline despite intervention - Yovani appears open to the concept but at this time is still wanting to see how he can progress on ventilator support.  - Yovani very appreciative of ICU team with followup calls and updates.  - Emotional support provided.  - Will continue to support and monitor.      Total time spent 30 minutes  GOC/Advanced Care Planning over the phone due to visitation limitations related to Covid19- Time Spent _25_____Minutes.      Thank you for the opportunity to assist with the care of this patient.   [place] [facility] Consult [facility] [phone].

## 2020-05-13 NOTE — PROGRESS NOTE ADULT - ATTENDING COMMENTS
I have seen and examined patient during SICU rounds from 8-10 am  sedated intubated VC [id number] 8/.5  A/P H COVU ARDS  Neuro: cont sedation, wean paralysis at this time  Pulm: vont H type supportive care, wean Fio2 as able, P:F 220 gas echange slowly improving, ventilation reamins critical  CV: perfusion is adequate  GI: increase KCal/k on TEN  : no issues  Heme: h/h stable hep dri[  ID: no signs of superinfection  DIpso SICU I have seen and examined patient during SICU rounds from 8-10 am  sedated intubated VC [id number] 8/.5  A/P H COVU ARDS  Neuro: cont sedation, wean paralysis at this time  Pulm: vont H type supportive care, wean Fio2 as able, P:F 220 gas exhange slowly improving, ventilation remains critical  CV: perfusion is adequate  GI: increase KCal/k on TEN  : no issues  Heme: h/h stable hep dri[  ID: no signs of superinfection  DIpso SICU

## 2020-05-13 NOTE — PROGRESS NOTE ADULT - SUBJECTIVE AND OBJECTIVE BOX
HPI: 63 yo M w/ no reported PMHx presented to ER for worsening shortness of breath, fevers and dry cough for 10 days at home, found to be positive for COVID-19 as well as a R segmental midlobe PE. He was admitted to the floor on NRB, and then transferred to the ICU for worsening respiratory distress on 4/18 where he was observed with stable SaO2, and then transferred back to the floor on 4/24. On 5/1 RRT was called for acute hypoxic respiratory failure, requiring intubation. He was then transferred to the ICU for management of hypoxic respiratory failure 2/2 COVID-19.    24h Events: respiratory rate increased from 30 to 34 for CO2 > 108, though Ph remains 7.26. Remains sedated and paralyzed. Noted to have hematuria, heparin gtt held.    ICU Vital Signs Last 24 Hrs  T(C): 37.2 (12 May 2020 23:57), Max: 37.2 (12 May 2020 23:57)  T(F): 99 (12 May 2020 23:57), Max: 99 (12 May 2020 23:57)  HR: 80 (13 May 2020 02:14) (74 - 89)  BP: 92/55 (12 May 2020 11:00) (92/55 - 123/69)  BP(mean): 71 (12 May 2020 11:00) (71 - 91)  ABP: 129/49 (13 May 2020 02:00) (104/49 - 141/56)  ABP(mean): 72 (13 May 2020 02:00) (67 - 82)  RR: 34 (13 May 2020 02:00) (30 - 34)  SpO2: 98% (13 May 2020 02:14) (92% - 99%)    I&O's Detail    11 May 2020 07:01  -  12 May 2020 07:00  --------------------------------------------------------  IN:    heparin  Infusion.: 192 mL    HYDROmorphone  Infusion: 48 mL    ketamine Infusion.: 252 mL    Pivot 1.5: 576 mL    rocuronium Infusion: 233.4 mL  Total IN: 1301.4 mL    OUT:    Indwelling Catheter - Urethral: 1540 mL  Total OUT: 1540 mL    Total NET: -238.6 mL    12 May 2020 07:01  -  13 May 2020 02:49  --------------------------------------------------------  IN:    Enteral Tube Flush: 430 mL    heparin  Infusion.: 144 mL    HYDROmorphone  Infusion: 36 mL    ketamine Infusion.: 192.5 mL    Pivot 1.5: 456 mL    rocuronium Infusion: 181.1 mL  Total IN: 1439.6 mL    OUT:    Indwelling Catheter - Urethral: 1580 mL  Total OUT: 1580 mL    Total NET: -140.4 mL    ABG - ( 12 May 2020 12:58 )  pH, Arterial: 7.26  pH, Blood: x     /  pCO2: x     /  pO2: 94    / HCO3: 52    / Base Excess: 26.4  /  SaO2: 98        MEDICATIONS  (STANDING):  chlorhexidine 0.12% Liquid 15 milliLiter(s) Oral Mucosa every 12 hours  dextrose 50% Injectable 25 Gram(s) IV Push once  heparin  Infusion.  Unit(s)/Hr (12 mL/Hr) IV Continuous <Continuous>  HYDROmorphone Infusion 2 mG/Hr (2 mL/Hr) IV Continuous <Continuous>  insulin lispro (HumaLOG) corrective regimen sliding scale   SubCutaneous every 6 hours  ketamine Infusion. 1.15 mG/kG/Hr (8.08 mL/Hr) IV Continuous <Continuous>  lactulose Syrup 20 Gram(s) Oral daily  melatonin 5 milliGRAM(s) Oral at bedtime  methylPREDNISolone sodium succinate Injectable 35 milliGRAM(s) IV Push every 12 hours  mineral oil enema 133 milliLiter(s) Rectal daily  multivitamin/minerals 1 Tablet(s) Oral daily  pantoprazole   Suspension 40 milliGRAM(s) Oral daily  polyethylene glycol 3350 17 Gram(s) Oral two times a day  psyllium Powder 1 Packet(s) Oral two times a day  rocuronium Infusion 8 MICROgram(s)/kG/Min (6.75 mL/Hr) IV Continuous <Continuous>    MEDICATIONS  (PRN):  acetaminophen   Tablet .. 650 milliGRAM(s) Oral every 6 hours PRN Temp greater or equal to 38C (100.4F), Mild Pain (1 - 3), Moderate Pain (4 - 6)  bisacodyl Suppository 10 milliGRAM(s) Rectal daily PRN Constipation  dextrose 40% Gel 15 Gram(s) Oral once PRN Blood Glucose LESS THAN 70 milliGRAM(s)/deciliter  glucagon  Injectable 1 milliGRAM(s) IntraMuscular once PRN Glucose LESS THAN 70 milligrams/deciliter  heparin   Injectable 5500 Unit(s) IV Push every 6 hours PRN For aPTT less than 40  heparin   Injectable 2500 Unit(s) IV Push every 6 hours PRN For aPTT between 40 - 57    Physical Exam:    Neurological: sedated with ketamine and Dilaudid, paralyzed     Pulmonary: mechanical ventilation; breath sounds b/l    Cardiovascular: S1, S2, regular rate & rhythm    Gastrointestinal: soft, non-tender, non-distended, no rebound / guarding    : alexander in place with hematuria    Skin: warm, dry, no diaphoresis, no pallor, cyanosis, or jaundice    LABS:  CBC Full  -  ( 12 May 2020 04:49 )  WBC Count : 12.59 K/uL  RBC Count : 3.07 M/uL  Hemoglobin : 9.2 g/dL  Hematocrit : 32.4 %  Platelet Count - Automated : 265 K/uL  Mean Cell Volume : 105.5 fl  Mean Cell Hemoglobin : 30.0 pg  Mean Cell Hemoglobin Concentration : 28.4 gm/dL  Auto Neutrophil # : 10.22 K/uL  Auto Lymphocyte # : 0.85 K/uL  Auto Monocyte # : 1.15 K/uL  Auto Eosinophil # : 0.00 K/uL  Auto Basophil # : 0.03 K/uL  Auto Neutrophil % : 81.2 %  Auto Lymphocyte % : 6.8 %  Auto Monocyte % : 9.1 %  Auto Eosinophil % : 0.0 %  Auto Basophil % : 0.2 %    05-12    137  |  88<L>  |  21.0<H>  ----------------------------<  151<H>  5.0   |  49.0<HH>  |  0.32<L>    Ca    8.3<L>      12 May 2020 04:49  Phos  2.9     05-12  Mg     2.5     05-12    TPro  6.0<L>  /  Alb  3.0<L>  /  TBili  <0.2<L>  /  DBili  x   /  AST  42<H>  /  ALT  67<H>  /  AlkPhos  118  05-12    PT/INR - ( 11 May 2020 04:24 )   PT: 11.0 sec;   INR: 0.97 ratio      PTT - ( 12 May 2020 04:49 )  PTT:68.1 sec    RECENT CULTURES:    LIVER FUNCTIONS - ( 12 May 2020 04:49 )  Alb: 3.0 g/dL / Pro: 6.0 g/dL / ALK PHOS: 118 U/L / ALT: 67 U/L / AST: 42 U/L / GGT: x           CARDIAC MARKERS ( 11 May 2020 04:24 )  x     / <0.01 ng/mL / 36 U/L / x     / x        ASSESSMENT/PLAN:  63 yo M w/ no reported PMHx presented to ER for worsening shortness of breath, fevers and dry cough for 10 days at home, found to be positive for COVID-19 as well as a R segmental midlobe PE. He was admitted to the floor on NRB, and then transferred to the ICU for worsening respiratory distress on 4/18 where he was observed with stable SaO2, and then transferred back to the floor on 4/24. On 5/1 RRT was called for acute hypoxic respiratory failure, requiring intubation. He was then transferred to the ICU for management of hypoxic respiratory failure 2/2 COVID-19.    Neuro:   - continue ketamine for sedation  - continue Dilaudid for pain control  - continue rocuronium gtt   - regulate sleep wake cycles, melatonin at bedtime    CV:   - continue invasive monitoring with arterial line    Pulm:   - lungs with very poor compliance  - no response to prone positioning  - remains hypercapnic despite RR increase 34     GI/Nutrition:   - NPO with tube feeds   - continue bowel reg  - protonix ppx    /Renal:   - Alexander for strict I&O  - monitor hematuria  - monitor kidney fxn    ID:   - no current antimicrobials  - supportive therapy for COVID    Endo:   - methylpred 35 mg BID for 21 days (meduri protocol)  - monitor blood glucose q6  - ISS    Skin:   - repositioning for DTI prevention while in bed    Heme/DVT Prophylaxis:   - hold heparin gtt for hematuria  - last PTT therapeutic    Dispo:  - remains critically ill  - care per SICU

## 2020-05-14 LAB
ALBUMIN SERPL ELPH-MCNC: 3.3 G/DL — SIGNIFICANT CHANGE UP (ref 3.3–5.2)
ALP SERPL-CCNC: 424 U/L — HIGH (ref 40–120)
ALT FLD-CCNC: 263 U/L — HIGH
ANION GAP SERPL CALC-SCNC: SIGNIFICANT CHANGE UP MMOL/L (ref 5–17)
APTT BLD: 49.9 SEC — HIGH (ref 27.5–36.3)
APTT BLD: 63 SEC — HIGH (ref 27.5–36.3)
APTT BLD: 68.3 SEC — HIGH (ref 27.5–36.3)
AST SERPL-CCNC: 157 U/L — HIGH
BASOPHILS # BLD AUTO: 0.01 K/UL — SIGNIFICANT CHANGE UP (ref 0–0.2)
BASOPHILS NFR BLD AUTO: 0.1 % — SIGNIFICANT CHANGE UP (ref 0–2)
BILIRUB SERPL-MCNC: <0.2 MG/DL — LOW (ref 0.4–2)
BUN SERPL-MCNC: 19 MG/DL — SIGNIFICANT CHANGE UP (ref 8–20)
CALCIUM SERPL-MCNC: 8.2 MG/DL — LOW (ref 8.6–10.2)
CHLORIDE SERPL-SCNC: 88 MMOL/L — LOW (ref 98–107)
CO2 SERPL-SCNC: >50 MMOL/L — CRITICAL HIGH (ref 22–29)
CREAT SERPL-MCNC: 0.21 MG/DL — LOW (ref 0.5–1.3)
EOSINOPHIL # BLD AUTO: 0 K/UL — SIGNIFICANT CHANGE UP (ref 0–0.5)
EOSINOPHIL NFR BLD AUTO: 0 % — SIGNIFICANT CHANGE UP (ref 0–6)
GAS PNL BLDA: SIGNIFICANT CHANGE UP
GLUCOSE BLDC GLUCOMTR-MCNC: 153 MG/DL — HIGH (ref 70–99)
GLUCOSE BLDC GLUCOMTR-MCNC: 183 MG/DL — HIGH (ref 70–99)
GLUCOSE BLDC GLUCOMTR-MCNC: 212 MG/DL — HIGH (ref 70–99)
GLUCOSE BLDC GLUCOMTR-MCNC: 303 MG/DL — HIGH (ref 70–99)
GLUCOSE SERPL-MCNC: 171 MG/DL — HIGH (ref 70–99)
HCT VFR BLD CALC: 30.3 % — LOW (ref 39–50)
HCT VFR BLD CALC: 30.6 % — LOW (ref 39–50)
HGB BLD-MCNC: 8.6 G/DL — LOW (ref 13–17)
HGB BLD-MCNC: 8.6 G/DL — LOW (ref 13–17)
IMM GRANULOCYTES NFR BLD AUTO: 1.7 % — HIGH (ref 0–1.5)
LYMPHOCYTES # BLD AUTO: 1 K/UL — SIGNIFICANT CHANGE UP (ref 1–3.3)
LYMPHOCYTES # BLD AUTO: 6.7 % — LOW (ref 13–44)
MAGNESIUM SERPL-MCNC: 2.7 MG/DL — HIGH (ref 1.6–2.6)
MCHC RBC-ENTMCNC: 28.1 GM/DL — LOW (ref 32–36)
MCHC RBC-ENTMCNC: 28.4 GM/DL — LOW (ref 32–36)
MCHC RBC-ENTMCNC: 30.4 PG — SIGNIFICANT CHANGE UP (ref 27–34)
MCHC RBC-ENTMCNC: 30.5 PG — SIGNIFICANT CHANGE UP (ref 27–34)
MCV RBC AUTO: 107.1 FL — HIGH (ref 80–100)
MCV RBC AUTO: 108.5 FL — HIGH (ref 80–100)
MONOCYTES # BLD AUTO: 1.06 K/UL — HIGH (ref 0–0.9)
MONOCYTES NFR BLD AUTO: 7.1 % — SIGNIFICANT CHANGE UP (ref 2–14)
NEUTROPHILS # BLD AUTO: 12.53 K/UL — HIGH (ref 1.8–7.4)
NEUTROPHILS NFR BLD AUTO: 84.4 % — HIGH (ref 43–77)
PHOSPHATE SERPL-MCNC: 2.2 MG/DL — LOW (ref 2.4–4.7)
PLATELET # BLD AUTO: 272 K/UL — SIGNIFICANT CHANGE UP (ref 150–400)
PLATELET # BLD AUTO: 272 K/UL — SIGNIFICANT CHANGE UP (ref 150–400)
POTASSIUM SERPL-MCNC: 5.2 MMOL/L — SIGNIFICANT CHANGE UP (ref 3.5–5.3)
POTASSIUM SERPL-SCNC: 5.2 MMOL/L — SIGNIFICANT CHANGE UP (ref 3.5–5.3)
PROT SERPL-MCNC: 6 G/DL — LOW (ref 6.6–8.7)
RBC # BLD: 2.82 M/UL — LOW (ref 4.2–5.8)
RBC # BLD: 2.83 M/UL — LOW (ref 4.2–5.8)
RBC # FLD: 15.7 % — HIGH (ref 10.3–14.5)
RBC # FLD: 15.7 % — HIGH (ref 10.3–14.5)
SODIUM SERPL-SCNC: 140 MMOL/L — SIGNIFICANT CHANGE UP (ref 135–145)
WBC # BLD: 14.85 K/UL — HIGH (ref 3.8–10.5)
WBC # BLD: 15.34 K/UL — HIGH (ref 3.8–10.5)
WBC # FLD AUTO: 14.85 K/UL — HIGH (ref 3.8–10.5)
WBC # FLD AUTO: 15.34 K/UL — HIGH (ref 3.8–10.5)

## 2020-05-14 PROCEDURE — 99233 SBSQ HOSP IP/OBS HIGH 50: CPT

## 2020-05-14 RX ORDER — MEROPENEM 1 G/30ML
1000 INJECTION INTRAVENOUS EVERY 8 HOURS
Refills: 0 | Status: COMPLETED | OUTPATIENT
Start: 2020-05-14 | End: 2020-05-21

## 2020-05-14 RX ORDER — INSULIN LISPRO 100/ML
VIAL (ML) SUBCUTANEOUS EVERY 4 HOURS
Refills: 0 | Status: DISCONTINUED | OUTPATIENT
Start: 2020-05-14 | End: 2020-05-24

## 2020-05-14 RX ADMIN — POLYETHYLENE GLYCOL 3350 17 GRAM(S): 17 POWDER, FOR SOLUTION ORAL at 17:20

## 2020-05-14 RX ADMIN — Medication 35 MILLIGRAM(S): at 17:23

## 2020-05-14 RX ADMIN — Medication 4: at 22:04

## 2020-05-14 RX ADMIN — PANTOPRAZOLE SODIUM 40 MILLIGRAM(S): 20 TABLET, DELAYED RELEASE ORAL at 12:14

## 2020-05-14 RX ADMIN — Medication 1: at 05:41

## 2020-05-14 RX ADMIN — Medication 6.75 MICROGRAM(S)/KG/MIN: at 02:00

## 2020-05-14 RX ADMIN — Medication 4: at 12:49

## 2020-05-14 RX ADMIN — Medication 1 TABLET(S): at 12:14

## 2020-05-14 RX ADMIN — Medication 1 PACKET(S): at 17:20

## 2020-05-14 RX ADMIN — KETAMINE HYDROCHLORIDE 8.08 MG/KG/HR: 100 INJECTION INTRAMUSCULAR; INTRAVENOUS at 10:15

## 2020-05-14 RX ADMIN — HEPARIN SODIUM 900 UNIT(S)/HR: 5000 INJECTION INTRAVENOUS; SUBCUTANEOUS at 04:43

## 2020-05-14 RX ADMIN — LACTULOSE 20 GRAM(S): 10 SOLUTION ORAL at 12:14

## 2020-05-14 RX ADMIN — Medication 133 MILLILITER(S): at 12:49

## 2020-05-14 RX ADMIN — Medication 2: at 17:22

## 2020-05-14 RX ADMIN — HEPARIN SODIUM 900 UNIT(S)/HR: 5000 INJECTION INTRAVENOUS; SUBCUTANEOUS at 20:18

## 2020-05-14 RX ADMIN — KETAMINE HYDROCHLORIDE 8.08 MG/KG/HR: 100 INJECTION INTRAMUSCULAR; INTRAVENOUS at 17:21

## 2020-05-14 RX ADMIN — CHLORHEXIDINE GLUCONATE 15 MILLILITER(S): 213 SOLUTION TOPICAL at 17:23

## 2020-05-14 RX ADMIN — Medication 62.5 MILLIMOLE(S): at 08:11

## 2020-05-14 RX ADMIN — MEROPENEM 100 MILLIGRAM(S): 1 INJECTION INTRAVENOUS at 22:05

## 2020-05-14 RX ADMIN — KETAMINE HYDROCHLORIDE 8.08 MG/KG/HR: 100 INJECTION INTRAMUSCULAR; INTRAVENOUS at 00:39

## 2020-05-14 RX ADMIN — CHLORHEXIDINE GLUCONATE 15 MILLILITER(S): 213 SOLUTION TOPICAL at 06:45

## 2020-05-14 RX ADMIN — HYDROMORPHONE HYDROCHLORIDE 2 MG/HR: 2 INJECTION INTRAMUSCULAR; INTRAVENOUS; SUBCUTANEOUS at 18:22

## 2020-05-14 RX ADMIN — Medication 35 MILLIGRAM(S): at 05:41

## 2020-05-14 RX ADMIN — Medication 5 MILLIGRAM(S): at 22:04

## 2020-05-14 RX ADMIN — Medication 6.75 MICROGRAM(S)/KG/MIN: at 18:22

## 2020-05-14 NOTE — PROGRESS NOTE ADULT - ATTENDING COMMENTS
The patient was seen and examined  Events noted  No new problems    Neurologic:  NMB  Respiratory:  VC 50%, PEEP+8; ABX=856  Hemodynamic: marginally acceptable  Renal:  Urine flow noted, BUN/Cr 19/0.21; K=5.2 mEq/L  GI:  TEN  Hematologic:  Hgb 8.6  ID:  Afebrile, WBC 14.8 (rise)    Plan:  Concern for unchecked infection  Nutritional support  Send cultures  Anticoagulation

## 2020-05-14 NOTE — CHART NOTE - NSCHARTNOTEFT_GEN_A_CORE
Source: Patient [ ]  Family [ ]   other [x ] EMR     Enteral /Parenteral Nutrition:   Diet, NPO with Tube Feed:   Tube Feeding Modality: Orogastric  Pivot 1.5 Raj  Total Volume for 24 Hours (mL): 960  Continuous  Starting Tube Feed Rate {mL per Hour}: 40  Until Goal Tube Feed Rate (mL per Hour): 40  Tube Feed Duration (in Hours): 24  Tube Feed Start Time: 09:40 (05-13-20 @ 09:42)      Current Weight:   5/14: 84.1kg  5/10: 83.2kg  4/18: 70.3kg     % Weight Change: 30# wt change since admission, unsure of accuracy of wt's will continue to monitor wt's for trends. (2+Generalized edema noted)     Pertinent Medications: MEDICATIONS  (STANDING):  chlorhexidine 0.12% Liquid 15 milliLiter(s) Oral Mucosa every 12 hours  dextrose 50% Injectable 25 Gram(s) IV Push once  heparin  Infusion. 800 Unit(s)/Hr (8 mL/Hr) IV Continuous <Continuous>  HYDROmorphone Infusion 2 mG/Hr (2 mL/Hr) IV Continuous <Continuous>  insulin lispro (HumaLOG) corrective regimen sliding scale   SubCutaneous every 6 hours  ketamine Infusion. 1.15 mG/kG/Hr (8.08 mL/Hr) IV Continuous <Continuous>  lactulose Syrup 20 Gram(s) Oral daily  melatonin 5 milliGRAM(s) Oral at bedtime  methylPREDNISolone sodium succinate Injectable 35 milliGRAM(s) IV Push every 12 hours  mineral oil enema 133 milliLiter(s) Rectal daily  multivitamin/minerals 1 Tablet(s) Oral daily  pantoprazole   Suspension 40 milliGRAM(s) Oral daily  polyethylene glycol 3350 17 Gram(s) Oral two times a day  psyllium Powder 1 Packet(s) Oral two times a day  rocuronium Infusion 8 MICROgram(s)/kG/Min (6.75 mL/Hr) IV Continuous <Continuous>  sodium phosphate IVPB 15 milliMole(s) IV Intermittent once    MEDICATIONS  (PRN):  acetaminophen   Tablet .. 650 milliGRAM(s) Oral every 6 hours PRN Temp greater or equal to 38C (100.4F), Mild Pain (1 - 3), Moderate Pain (4 - 6)  bisacodyl Suppository 10 milliGRAM(s) Rectal daily PRN Constipation  dextrose 40% Gel 15 Gram(s) Oral once PRN Blood Glucose LESS THAN 70 milliGRAM(s)/deciliter  glucagon  Injectable 1 milliGRAM(s) IntraMuscular once PRN Glucose LESS THAN 70 milligrams/deciliter    Pertinent Labs: CBC Full  -  ( 14 May 2020 04:16 )  WBC Count : 14.85 K/uL  RBC Count : 2.83 M/uL  Hemoglobin : 8.6 g/dL  Hematocrit : 30.3 %  Platelet Count - Automated : 272 K/uL  Mean Cell Volume : 107.1 fl  Mean Cell Hemoglobin : 30.4 pg  Mean Cell Hemoglobin Concentration : 28.4 gm/dL  Auto Neutrophil # : 12.53 K/uL  Auto Lymphocyte # : 1.00 K/uL  Auto Monocyte # : 1.06 K/uL  Auto Eosinophil # : 0.00 K/uL  Auto Basophil # : 0.01 K/uL  Auto Neutrophil % : 84.4 %  Auto Lymphocyte % : 6.7 %  Auto Monocyte % : 7.1 %  Auto Eosinophil % : 0.0 %  Auto Basophil % : 0.1 %        Skin: No Breakdown noted per documentation     Estimated Needs:   [x ] no change since previous assessment  [ ] recalculated:     Current Nutrition Diagnosis:  Pt remains at high nutrition risk secondary malnutrition (severe, acute) related to inability to meet sufficient protein-energy requirements in setting of acute hypoxic respiratory failure, R Segmental PE, Severe ARDS secondary to COVID 19, rapid response requiring intubation on 5/1 and remaining intubated, sedated, paralyzed as evidenced by meeting <50% nutrient needs >5 days and +fluid accumulation. Pt remains intubated, sedated, paralyzed. Pt receiving Pivot 1.5 @ 40ml/hr (x20 hours) 800ml/day; 1200kcal, 75gm protein; 607 ml free water. not yet meeting estimated nutrition needs. Recommendations below:     Recommendations:   1) Add Prostat TID (300kcal., 45gm protein)   2) Monitor tube feed tolerance closely.  3) Continue bowel regimen PRN.  4) Continue MVI, rec vit C 500mg daily.  5) Obtain daily weights to monitor trends.     Monitoring and Evaluation:   [ ] PO intake [x ] Tolerance to diet prescription [X] Weights  [X] Follow up per protocol [X] Labs:

## 2020-05-14 NOTE — PROGRESS NOTE ADULT - SUBJECTIVE AND OBJECTIVE BOX
HPI: 63 yo M w/ no reported PMHx presented to ER for worsening shortness of breath, fevers and dry cough for 10 days at home, found to be positive for COVID-19 as well as a R segmental midlobe PE. He was admitted to the floor on NRB, and then transferred to the ICU for worsening respiratory distress on 4/18 where he was observed with stable SaO2, and then transferred back to the floor on 4/24. On 5/1 RRT was called for acute hypoxic respiratory failure, requiring intubation. He was then transferred to the ICU for management of hypoxic respiratory failure 2/2 COVID-19. He remains sedated with very poor compliance, oxygenation slowly improving though still with poor ventilation.    24h Events: Remains sedated, [place] gtt slowly weaning to reduce paralysis, oxygenation improving, however still with difficulty ventilating. Hematuria and bleeding from mouth resolved, heparin gtt restarted.     ICU Vital Signs Last 24 Hrs  T(C): 36.2 (14 May 2020 00:00), Max: 37 (13 May 2020 16:05)  T(F): 97.2 (14 May 2020 00:00), Max: 98.6 (13 May 2020 16:05)  HR: 80 (14 May 2020 02:00) (75 - 86)  BP: --  BP(mean): --  ABP: 130/52 (14 May 2020 02:00) (104/46 - 151/68)  ABP(mean): 79 (14 May 2020 02:00) (68 - 103)  RR: 34 (14 May 2020 02:00) (34 - 34)  SpO2: 98% (14 May 2020 02:00) (90% - 100%)    I&O's Detail    12 May 2020 07:01  -  13 May 2020 07:00  --------------------------------------------------------  IN:    Enteral Tube Flush: 430 mL    heparin  Infusion.: 144 mL    HYDROmorphone  Infusion: 46 mL    ketamine Infusion.: 247 mL    Pivot 1.5: 576 mL    rocuronium Infusion: 231.2 mL  Total IN: 1674.2 mL    OUT:    Indwelling Catheter - Urethral: 1900 mL  Total OUT: 1900 mL    Total NET: -225.8 mL    13 May 2020 07:01  -  14 May 2020 02:58  --------------------------------------------------------  IN:    HYDROmorphone  Infusion: 38 mL    ketamine Infusion.: 203 mL    Pivot 1.5: 712 mL    rocuronium Infusion: 139.6 mL    Solution: 499.8 mL  Total IN: 1592.4 mL    OUT:    Indwelling Catheter - Urethral: 1515 mL  Total OUT: 1515 mL    Total NET: 77.4 mL    ABG - ( 13 May 2020 04:38 )  pH, Arterial: 7.31  pH, Blood: x     /  pCO2: >108  /  pO2: 110   / HCO3: 54    / Base Excess: 28.0  /  SaO2: 99        MEDICATIONS  (STANDING):  chlorhexidine 0.12% Liquid 15 milliLiter(s) Oral Mucosa every 12 hours  dextrose 50% Injectable 25 Gram(s) IV Push once  heparin  Infusion. 800 Unit(s)/Hr (8 mL/Hr) IV Continuous <Continuous>  HYDROmorphone Infusion 2 mG/Hr (2 mL/Hr) IV Continuous <Continuous>  insulin lispro (HumaLOG) corrective regimen sliding scale   SubCutaneous every 6 hours  ketamine Infusion. 1.15 mG/kG/Hr (8.08 mL/Hr) IV Continuous <Continuous>  lactulose Syrup 20 Gram(s) Oral daily  melatonin 5 milliGRAM(s) Oral at bedtime  methylPREDNISolone sodium succinate Injectable 35 milliGRAM(s) IV Push every 12 hours  mineral oil enema 133 milliLiter(s) Rectal daily  multivitamin/minerals 1 Tablet(s) Oral daily  pantoprazole   Suspension 40 milliGRAM(s) Oral daily  polyethylene glycol 3350 17 Gram(s) Oral two times a day  psyllium Powder 1 Packet(s) Oral two times a day  rocuronium Infusion 8 MICROgram(s)/kG/Min (6.75 mL/Hr) IV Continuous <Continuous>    MEDICATIONS  (PRN):  acetaminophen   Tablet .. 650 milliGRAM(s) Oral every 6 hours PRN Temp greater or equal to 38C (100.4F), Mild Pain (1 - 3), Moderate Pain (4 - 6)  bisacodyl Suppository 10 milliGRAM(s) Rectal daily PRN Constipation  dextrose 40% Gel 15 Gram(s) Oral once PRN Blood Glucose LESS THAN 70 milliGRAM(s)/deciliter  glucagon  Injectable 1 milliGRAM(s) IntraMuscular once PRN Glucose LESS THAN 70 milligrams/deciliter    Physical Exam:    Neurological: sedated with ketamine and Dilaudid, paralyzed     Pulmonary: mechanical ventilation; breath sounds b/l    Cardiovascular: S1, S2, regular rate & rhythm    Gastrointestinal: soft, non-tender, non-distended, no rebound / guarding    : Escalera in place with hematuria    Skin: warm, dry, no diaphoresis, no pallor, cyanosis, or jaundice    LABS:  CBC Full  -  ( 13 May 2020 04:50 )  WBC Count : 11.74 K/uL  RBC Count : 2.77 M/uL  Hemoglobin : 8.4 g/dL  Hematocrit : 29.2 %  Platelet Count - Automated : 255 K/uL  Mean Cell Volume : 105.4 fl  Mean Cell Hemoglobin : 30.3 pg  Mean Cell Hemoglobin Concentration : 28.8 gm/dL  Auto Neutrophil # : 9.36 K/uL  Auto Lymphocyte # : 1.03 K/uL  Auto Monocyte # : 1.09 K/uL  Auto Eosinophil # : 0.00 K/uL  Auto Basophil # : 0.03 K/uL  Auto Neutrophil % : 79.6 %  Auto Lymphocyte % : 8.8 %  Auto Monocyte % : 9.3 %  Auto Eosinophil % : 0.0 %  Auto Basophil % : 0.3 %    05-13    140  |  88<L>  |  21.0<H>  ----------------------------<  139<H>  5.1   |  >50.0<HH>  |  0.24<L>    Ca    8.1<L>      13 May 2020 04:50  Phos  1.5     05-13  Mg     2.3     05-13    TPro  5.6<L>  /  Alb  3.0<L>  /  TBili  <0.2<L>  /  DBili  x   /  AST  98<H>  /  ALT  116<H>  /  AlkPhos  184<H>  05-13    PTT - ( 13 May 2020 04:50 )  PTT:55.4 sec    RECENT CULTURES:    LIVER FUNCTIONS - ( 13 May 2020 04:50 )  Alb: 3.0 g/dL / Pro: 5.6 g/dL / ALK PHOS: 184 U/L / ALT: 116 U/L / AST: 98 U/L / GGT: x           ASSESSMENT/PLAN:  63 yo M w/ no reported PMHx presented to ER for worsening shortness of breath, fevers and dry cough for 10 days at home, found to be positive for COVID-19 as well as a R segmental midlobe PE. He was admitted to the floor on NRB, and then transferred to the ICU for worsening respiratory distress on 4/18 where he was observed with stable SaO2, and then transferred back to the floor on 4/24. On 5/1 RRT was called for acute hypoxic respiratory failure, requiring intubation. He was then transferred to the ICU for management of hypoxic respiratory failure 2/2 COVID-19. He remains sedated with very poor compliance, oxygenation slowly improving though still with poor ventilation.    Neuro:   - continue ketamine for sedation  - continue Dilaudid for pain control  - slowly weaning Selwyn gtt as tolerated   - regulate sleep wake cycles, melatonin at bedtime    CV:   - continue invasive monitoring with arterial line    Pulm:   - oxygenation slowly improving  - still with difficulty ventilating  - no response to prone positioning  - remains hypercapnic despite RR increase 34     GI/Nutrition:   - NPO with tube feeds   - continue bowel reg  - protonix ppx    /Renal:   - [place] for strict I&O  - monitor hematuria  - monitor kidney fxn    ID:   - no current antimicrobials  - supportive therapy for COVID    Endo:   - methylpred 35 mg BID for 21 days (meduri protocol)  - monitor blood glucose q6  - ISS    Skin:   - repositioning for DTI prevention while in bed    Heme/DVT Prophylaxis:   - heparin gtt restarted    Dispo:  - remains critically ill  - care per SICU

## 2020-05-15 LAB
ANION GAP SERPL CALC-SCNC: SIGNIFICANT CHANGE UP MMOL/L (ref 5–17)
APTT BLD: 69.5 SEC — HIGH (ref 27.5–36.3)
BASE EXCESS BLDA CALC-SCNC: 32.7 MMOL/L — SIGNIFICANT CHANGE UP (ref -3–3)
BASOPHILS # BLD AUTO: 0.01 K/UL — SIGNIFICANT CHANGE UP (ref 0–0.2)
BASOPHILS NFR BLD AUTO: 0.1 % — SIGNIFICANT CHANGE UP (ref 0–2)
BLOOD GAS COMMENTS ARTERIAL: SIGNIFICANT CHANGE UP
BUN SERPL-MCNC: 22 MG/DL — HIGH (ref 8–20)
CALCIUM SERPL-MCNC: 7.8 MG/DL — LOW (ref 8.6–10.2)
CHLORIDE SERPL-SCNC: 86 MMOL/L — LOW (ref 98–107)
CO2 SERPL-SCNC: >50 MMOL/L — CRITICAL HIGH (ref 22–29)
CREAT SERPL-MCNC: 0.25 MG/DL — LOW (ref 0.5–1.3)
EOSINOPHIL # BLD AUTO: 0 K/UL — SIGNIFICANT CHANGE UP (ref 0–0.5)
EOSINOPHIL NFR BLD AUTO: 0 % — SIGNIFICANT CHANGE UP (ref 0–6)
GAS PNL BLDA: SIGNIFICANT CHANGE UP
GLUCOSE BLDC GLUCOMTR-MCNC: 119 MG/DL — HIGH (ref 70–99)
GLUCOSE BLDC GLUCOMTR-MCNC: 139 MG/DL — HIGH (ref 70–99)
GLUCOSE BLDC GLUCOMTR-MCNC: 153 MG/DL — HIGH (ref 70–99)
GLUCOSE BLDC GLUCOMTR-MCNC: 166 MG/DL — HIGH (ref 70–99)
GLUCOSE BLDC GLUCOMTR-MCNC: 191 MG/DL — HIGH (ref 70–99)
GLUCOSE SERPL-MCNC: 157 MG/DL — HIGH (ref 70–99)
GRAM STN FLD: SIGNIFICANT CHANGE UP
HCO3 BLDA-SCNC: 59.3 MMOL/L — SIGNIFICANT CHANGE UP (ref 20–26)
HCT VFR BLD CALC: 28.7 % — LOW (ref 39–50)
HGB BLD-MCNC: 8.1 G/DL — LOW (ref 13–17)
HOROWITZ INDEX BLDA+IHG-RTO: SIGNIFICANT CHANGE UP
IMM GRANULOCYTES NFR BLD AUTO: 1.5 % — SIGNIFICANT CHANGE UP (ref 0–1.5)
LYMPHOCYTES # BLD AUTO: 0.85 K/UL — LOW (ref 1–3.3)
LYMPHOCYTES # BLD AUTO: 5.5 % — LOW (ref 13–44)
MAGNESIUM SERPL-MCNC: 2.4 MG/DL — SIGNIFICANT CHANGE UP (ref 1.6–2.6)
MCHC RBC-ENTMCNC: 28.2 GM/DL — LOW (ref 32–36)
MCHC RBC-ENTMCNC: 30.7 PG — SIGNIFICANT CHANGE UP (ref 27–34)
MCV RBC AUTO: 108.7 FL — HIGH (ref 80–100)
MONOCYTES # BLD AUTO: 1.03 K/UL — HIGH (ref 0–0.9)
MONOCYTES NFR BLD AUTO: 6.6 % — SIGNIFICANT CHANGE UP (ref 2–14)
NEUTROPHILS # BLD AUTO: 13.38 K/UL — HIGH (ref 1.8–7.4)
NEUTROPHILS NFR BLD AUTO: 86.3 % — HIGH (ref 43–77)
PCO2 BLDA: 96 MMHG — CRITICAL HIGH (ref 35–45)
PHOSPHATE SERPL-MCNC: 1.4 MG/DL — LOW (ref 2.4–4.7)
PLATELET # BLD AUTO: 256 K/UL — SIGNIFICANT CHANGE UP (ref 150–400)
POTASSIUM SERPL-MCNC: 5.4 MMOL/L — HIGH (ref 3.5–5.3)
POTASSIUM SERPL-SCNC: 5.4 MMOL/L — HIGH (ref 3.5–5.3)
RBC # BLD: 2.64 M/UL — LOW (ref 4.2–5.8)
RBC # FLD: 15.9 % — HIGH (ref 10.3–14.5)
SODIUM SERPL-SCNC: 141 MMOL/L — SIGNIFICANT CHANGE UP (ref 135–145)
SPECIMEN SOURCE: SIGNIFICANT CHANGE UP
WBC # BLD: 15.5 K/UL — HIGH (ref 3.8–10.5)
WBC # FLD AUTO: 15.5 K/UL — HIGH (ref 3.8–10.5)

## 2020-05-15 RX ORDER — VANCOMYCIN HCL 1 G
1250 VIAL (EA) INTRAVENOUS EVERY 12 HOURS
Refills: 0 | Status: DISCONTINUED | OUTPATIENT
Start: 2020-05-15 | End: 2020-05-16

## 2020-05-15 RX ORDER — SODIUM,POTASSIUM PHOSPHATES 278-250MG
2 POWDER IN PACKET (EA) ORAL EVERY 4 HOURS
Refills: 0 | Status: COMPLETED | OUTPATIENT
Start: 2020-05-15 | End: 2020-05-15

## 2020-05-15 RX ADMIN — Medication 2 TABLET(S): at 05:46

## 2020-05-15 RX ADMIN — Medication 2 TABLET(S): at 09:37

## 2020-05-15 RX ADMIN — Medication 1 PACKET(S): at 17:00

## 2020-05-15 RX ADMIN — Medication 4: at 14:52

## 2020-05-15 RX ADMIN — Medication 1 TABLET(S): at 11:36

## 2020-05-15 RX ADMIN — Medication 1 PACKET(S): at 05:25

## 2020-05-15 RX ADMIN — Medication 166.67 MILLIGRAM(S): at 17:01

## 2020-05-15 RX ADMIN — HEPARIN SODIUM 900 UNIT(S)/HR: 5000 INJECTION INTRAVENOUS; SUBCUTANEOUS at 03:26

## 2020-05-15 RX ADMIN — Medication 5 MILLIGRAM(S): at 21:57

## 2020-05-15 RX ADMIN — MEROPENEM 100 MILLIGRAM(S): 1 INJECTION INTRAVENOUS at 13:50

## 2020-05-15 RX ADMIN — Medication 85 MILLIMOLE(S): at 06:16

## 2020-05-15 RX ADMIN — MEROPENEM 100 MILLIGRAM(S): 1 INJECTION INTRAVENOUS at 05:16

## 2020-05-15 RX ADMIN — MEROPENEM 100 MILLIGRAM(S): 1 INJECTION INTRAVENOUS at 21:57

## 2020-05-15 RX ADMIN — Medication 35 MILLIGRAM(S): at 05:24

## 2020-05-15 RX ADMIN — CHLORHEXIDINE GLUCONATE 15 MILLILITER(S): 213 SOLUTION TOPICAL at 05:17

## 2020-05-15 RX ADMIN — Medication 4: at 21:57

## 2020-05-15 RX ADMIN — KETAMINE HYDROCHLORIDE 8.08 MG/KG/HR: 100 INJECTION INTRAMUSCULAR; INTRAVENOUS at 06:52

## 2020-05-15 RX ADMIN — Medication 4: at 09:36

## 2020-05-15 RX ADMIN — KETAMINE HYDROCHLORIDE 8.08 MG/KG/HR: 100 INJECTION INTRAMUSCULAR; INTRAVENOUS at 16:25

## 2020-05-15 RX ADMIN — Medication 4: at 01:19

## 2020-05-15 RX ADMIN — PANTOPRAZOLE SODIUM 40 MILLIGRAM(S): 20 TABLET, DELAYED RELEASE ORAL at 11:36

## 2020-05-15 RX ADMIN — CHLORHEXIDINE GLUCONATE 15 MILLILITER(S): 213 SOLUTION TOPICAL at 17:01

## 2020-05-15 RX ADMIN — Medication 35 MILLIGRAM(S): at 17:01

## 2020-05-15 NOTE — PROGRESS NOTE ADULT - SUBJECTIVE AND OBJECTIVE BOX
24h Events:    ICU Vital Signs Last 24 Hrs  T(C): 36.6 (14 May 2020 23:39), Max: 36.9 (14 May 2020 04:00)  T(F): 97.8 (14 May 2020 23:39), Max: 98.4 (14 May 2020 04:00)  HR: 80 (15 May 2020 03:19) (78 - 96)  BP: --  BP(mean): --  ABP: 141/53 (15 May 2020 02:00) (102/46 - 158/60)  ABP(mean): 77 (15 May 2020 02:00) (66 - 90)  RR: 34 (15 May 2020 02:00) (8 - 69)  SpO2: 98% (15 May 2020 03:19) (94% - 99%)      I&O's Detail    13 May 2020 07:01  -  14 May 2020 07:00  --------------------------------------------------------  IN:    heparin  Infusion.: 72 mL    HYDROmorphone  Infusion: 48 mL    ketamine Infusion.: 258 mL    Pivot 1.5: 912 mL    rocuronium Infusion: 174.6 mL    Solution: 499.8 mL  Total IN: 1964.4 mL    OUT:    Indwelling Catheter - Urethral: 1890 mL  Total OUT: 1890 mL    Total NET: 74.4 mL      14 May 2020 07:01  -  15 May 2020 03:42  --------------------------------------------------------  IN:    heparin  Infusion.: 115 mL    HYDROmorphone  Infusion: 26 mL    ketamine Infusion.: 136.5 mL    Pivot 1.5: 480 mL    rocuronium Infusion: 87.2 mL  Total IN: 844.7 mL    OUT:    Indwelling Catheter - Urethral: 1815 mL  Total OUT: 1815 mL    Total NET: -970.3 mL          ABG - ( 15 May 2020 03:13 )  pH, Arterial: 7.33  pH, Blood: x     /  pCO2: >108  /  pO2: 82    / HCO3: ?57.4 / Base Excess: x     /  SaO2: 98                  MEDICATIONS  (STANDING):  chlorhexidine 0.12% Liquid 15 milliLiter(s) Oral Mucosa every 12 hours  dextrose 50% Injectable 25 Gram(s) IV Push once  heparin  Infusion. 800 Unit(s)/Hr (8 mL/Hr) IV Continuous <Continuous>  HYDROmorphone Infusion 2 mG/Hr (2 mL/Hr) IV Continuous <Continuous>  insulin lispro (HumaLOG) corrective regimen sliding scale   SubCutaneous every 4 hours  ketamine Infusion. 1.15 mG/kG/Hr (8.08 mL/Hr) IV Continuous <Continuous>  lactulose Syrup 20 Gram(s) Oral daily  melatonin 5 milliGRAM(s) Oral at bedtime  meropenem  IVPB 1000 milliGRAM(s) IV Intermittent every 8 hours  methylPREDNISolone sodium succinate Injectable 35 milliGRAM(s) IV Push every 12 hours  mineral oil enema 133 milliLiter(s) Rectal daily  multivitamin/minerals 1 Tablet(s) Oral daily  pantoprazole   Suspension 40 milliGRAM(s) Oral daily  polyethylene glycol 3350 17 Gram(s) Oral two times a day  psyllium Powder 1 Packet(s) Oral two times a day  rocuronium Infusion 8 MICROgram(s)/kG/Min (6.75 mL/Hr) IV Continuous <Continuous>    MEDICATIONS  (PRN):  acetaminophen   Tablet .. 650 milliGRAM(s) Oral every 6 hours PRN Temp greater or equal to 38C (100.4F), Mild Pain (1 - 3), Moderate Pain (4 - 6)  bisacodyl Suppository 10 milliGRAM(s) Rectal daily PRN Constipation  dextrose 40% Gel 15 Gram(s) Oral once PRN Blood Glucose LESS THAN 70 milliGRAM(s)/deciliter  glucagon  Injectable 1 milliGRAM(s) IntraMuscular once PRN Glucose LESS THAN 70 milligrams/deciliter        Physical Exam:    Neurological: Ketamine/Rocuronium/Dilaudid gtt for sedation / analgesia    Pulmonary: mechanical ventilation; AC 34/260/+8/50%    Cardiovascular: S1, S2, regular rate & rhythm    Gastrointestinal: soft,  non-distended, exam limited by sedation    : alexander in place draining yellow urine    Skin: warm, dry, no diaphoresis, no pallor, cyanosis, or jaundice    LABS:  CBC Full  -  ( 15 May 2020 02:38 )  WBC Count : 15.50 K/uL  RBC Count : 2.64 M/uL  Hemoglobin : 8.1 g/dL  Hematocrit : 28.7 %  Platelet Count - Automated : 256 K/uL  Mean Cell Volume : 108.7 fl  Mean Cell Hemoglobin : 30.7 pg  Mean Cell Hemoglobin Concentration : 28.2 gm/dL  Auto Neutrophil # : 13.38 K/uL  Auto Lymphocyte # : 0.85 K/uL  Auto Monocyte # : 1.03 K/uL  Auto Eosinophil # : 0.00 K/uL  Auto Basophil # : 0.01 K/uL  Auto Neutrophil % : 86.3 %  Auto Lymphocyte % : 5.5 %  Auto Monocyte % : 6.6 %  Auto Eosinophil % : 0.0 %  Auto Basophil % : 0.1 %    05-15    141  |  86<L>  |  22.0<H>  ----------------------------<  157<H>  5.4<H>   |  >50.0<HH>  |  0.25<L>    Ca    7.8<L>      15 May 2020 02:38  Phos  1.4     05-15  Mg     2.4     05-15    TPro  6.0<L>  /  Alb  3.3  /  TBili  <0.2<L>  /  DBili  x   /  AST  157<H>  /  ALT  263<H>  /  AlkPhos  424<H>  05-14    PTT - ( 15 May 2020 02:38 )  PTT:69.5 sec    RECENT CULTURES:      LIVER FUNCTIONS - ( 14 May 2020 04:16 )  Alb: 3.3 g/dL / Pro: 6.0 g/dL / ALK PHOS: 424 U/L / ALT: 263 U/L / AST: 157 U/L / GGT: x                   ASSESSMENT/PLAN:  64M with no PMHx admit with 10 days fevers and SOB, intubated on HD# 13, remains intubated doing poorly    Neuro: sedation / analgesia with Ketamine/Rocuronium/Dilaudid gtt, delirium precautions, daily sedation holiday    CV: continue invasive monitoring with arterial line    Pulm: wean FiO2 / PEEP as tolerated    GI/Nutrition: NPO with tube feeds     /Renal: alexander for strict I&O, monitor kidney fxn    ID: Afebrile, + leukocytosis, Meropenem started, sputum culture sent    Endo: monitor blood glucose, SSC    Skin: repositioning for DTI prevention while in bed    Heme/DVT Prophylaxis: SCDs / Heparin gtt

## 2020-05-16 LAB
-  AMIKACIN: SIGNIFICANT CHANGE UP
-  AMPICILLIN/SULBACTAM: SIGNIFICANT CHANGE UP
-  CEFEPIME: SIGNIFICANT CHANGE UP
-  CEFTAZIDIME: SIGNIFICANT CHANGE UP
-  CIPROFLOXACIN: SIGNIFICANT CHANGE UP
-  GENTAMICIN: SIGNIFICANT CHANGE UP
-  IMIPENEM: SIGNIFICANT CHANGE UP
-  LEVOFLOXACIN: SIGNIFICANT CHANGE UP
-  MEROPENEM: SIGNIFICANT CHANGE UP
-  TOBRAMYCIN: SIGNIFICANT CHANGE UP
-  TRIMETHOPRIM/SULFAMETHOXAZOLE: SIGNIFICANT CHANGE UP
ANION GAP SERPL CALC-SCNC: SIGNIFICANT CHANGE UP MMOL/L (ref 5–17)
APTT BLD: 70.4 SEC — HIGH (ref 27.5–36.3)
BASOPHILS # BLD AUTO: 0.01 K/UL — SIGNIFICANT CHANGE UP (ref 0–0.2)
BASOPHILS NFR BLD AUTO: 0.1 % — SIGNIFICANT CHANGE UP (ref 0–2)
BUN SERPL-MCNC: 21 MG/DL — HIGH (ref 8–20)
CALCIUM SERPL-MCNC: 8.1 MG/DL — LOW (ref 8.6–10.2)
CHLORIDE SERPL-SCNC: 87 MMOL/L — LOW (ref 98–107)
CO2 SERPL-SCNC: 49 MMOL/L — CRITICAL HIGH (ref 22–29)
CREAT SERPL-MCNC: 0.2 MG/DL — LOW (ref 0.5–1.3)
EOSINOPHIL # BLD AUTO: 0 K/UL — SIGNIFICANT CHANGE UP (ref 0–0.5)
EOSINOPHIL NFR BLD AUTO: 0 % — SIGNIFICANT CHANGE UP (ref 0–6)
GAS PNL BLDA: SIGNIFICANT CHANGE UP
GLUCOSE BLDC GLUCOMTR-MCNC: 142 MG/DL — HIGH (ref 70–99)
GLUCOSE BLDC GLUCOMTR-MCNC: 148 MG/DL — HIGH (ref 70–99)
GLUCOSE BLDC GLUCOMTR-MCNC: 151 MG/DL — HIGH (ref 70–99)
GLUCOSE BLDC GLUCOMTR-MCNC: 167 MG/DL — HIGH (ref 70–99)
GLUCOSE BLDC GLUCOMTR-MCNC: 175 MG/DL — HIGH (ref 70–99)
GLUCOSE BLDC GLUCOMTR-MCNC: 220 MG/DL — HIGH (ref 70–99)
GLUCOSE SERPL-MCNC: 150 MG/DL — HIGH (ref 70–99)
HCT VFR BLD CALC: 25.9 % — LOW (ref 39–50)
HGB BLD-MCNC: 7.7 G/DL — LOW (ref 13–17)
IMM GRANULOCYTES NFR BLD AUTO: 1.1 % — SIGNIFICANT CHANGE UP (ref 0–1.5)
LYMPHOCYTES # BLD AUTO: 0.92 K/UL — LOW (ref 1–3.3)
LYMPHOCYTES # BLD AUTO: 6.2 % — LOW (ref 13–44)
MAGNESIUM SERPL-MCNC: 2.2 MG/DL — SIGNIFICANT CHANGE UP (ref 1.6–2.6)
MCHC RBC-ENTMCNC: 29.7 GM/DL — LOW (ref 32–36)
MCHC RBC-ENTMCNC: 30.6 PG — SIGNIFICANT CHANGE UP (ref 27–34)
MCV RBC AUTO: 102.8 FL — HIGH (ref 80–100)
METHOD TYPE: SIGNIFICANT CHANGE UP
METHOD TYPE: SIGNIFICANT CHANGE UP
MONOCYTES # BLD AUTO: 0.76 K/UL — SIGNIFICANT CHANGE UP (ref 0–0.9)
MONOCYTES NFR BLD AUTO: 5.1 % — SIGNIFICANT CHANGE UP (ref 2–14)
NEUTROPHILS # BLD AUTO: 13 K/UL — HIGH (ref 1.8–7.4)
NEUTROPHILS NFR BLD AUTO: 87.5 % — HIGH (ref 43–77)
PHOSPHATE SERPL-MCNC: 2.1 MG/DL — LOW (ref 2.4–4.7)
PLATELET # BLD AUTO: 253 K/UL — SIGNIFICANT CHANGE UP (ref 150–400)
POTASSIUM SERPL-MCNC: 5.2 MMOL/L — SIGNIFICANT CHANGE UP (ref 3.5–5.3)
POTASSIUM SERPL-SCNC: 5.2 MMOL/L — SIGNIFICANT CHANGE UP (ref 3.5–5.3)
RBC # BLD: 2.52 M/UL — LOW (ref 4.2–5.8)
RBC # FLD: 16.7 % — HIGH (ref 10.3–14.5)
SODIUM SERPL-SCNC: 136 MMOL/L — SIGNIFICANT CHANGE UP (ref 135–145)
WBC # BLD: 14.86 K/UL — HIGH (ref 3.8–10.5)
WBC # FLD AUTO: 14.86 K/UL — HIGH (ref 3.8–10.5)

## 2020-05-16 PROCEDURE — 99291 CRITICAL CARE FIRST HOUR: CPT

## 2020-05-16 RX ORDER — MIDAZOLAM HYDROCHLORIDE 1 MG/ML
4 INJECTION, SOLUTION INTRAMUSCULAR; INTRAVENOUS ONCE
Refills: 0 | Status: DISCONTINUED | OUTPATIENT
Start: 2020-05-16 | End: 2020-05-16

## 2020-05-16 RX ORDER — CHLORHEXIDINE GLUCONATE 213 G/1000ML
1 SOLUTION TOPICAL DAILY
Refills: 0 | Status: DISCONTINUED | OUTPATIENT
Start: 2020-05-16 | End: 2020-06-14

## 2020-05-16 RX ORDER — SODIUM,POTASSIUM PHOSPHATES 278-250MG
2 POWDER IN PACKET (EA) ORAL ONCE
Refills: 0 | Status: COMPLETED | OUTPATIENT
Start: 2020-05-16 | End: 2020-05-16

## 2020-05-16 RX ADMIN — MIDAZOLAM HYDROCHLORIDE 4 MILLIGRAM(S): 1 INJECTION, SOLUTION INTRAMUSCULAR; INTRAVENOUS at 21:08

## 2020-05-16 RX ADMIN — MEROPENEM 100 MILLIGRAM(S): 1 INJECTION INTRAVENOUS at 21:14

## 2020-05-16 RX ADMIN — Medication 1 TABLET(S): at 13:56

## 2020-05-16 RX ADMIN — MEROPENEM 100 MILLIGRAM(S): 1 INJECTION INTRAVENOUS at 05:50

## 2020-05-16 RX ADMIN — Medication 4: at 03:31

## 2020-05-16 RX ADMIN — Medication 5 MILLIGRAM(S): at 22:36

## 2020-05-16 RX ADMIN — KETAMINE HYDROCHLORIDE 8.08 MG/KG/HR: 100 INJECTION INTRAMUSCULAR; INTRAVENOUS at 04:14

## 2020-05-16 RX ADMIN — Medication 6: at 22:36

## 2020-05-16 RX ADMIN — Medication 166.67 MILLIGRAM(S): at 05:14

## 2020-05-16 RX ADMIN — KETAMINE HYDROCHLORIDE 8.08 MG/KG/HR: 100 INJECTION INTRAMUSCULAR; INTRAVENOUS at 14:14

## 2020-05-16 RX ADMIN — CHLORHEXIDINE GLUCONATE 15 MILLILITER(S): 213 SOLUTION TOPICAL at 04:52

## 2020-05-16 RX ADMIN — Medication 4: at 10:06

## 2020-05-16 RX ADMIN — Medication 2 TABLET(S): at 06:22

## 2020-05-16 RX ADMIN — Medication 35 MILLIGRAM(S): at 17:13

## 2020-05-16 RX ADMIN — HEPARIN SODIUM 900 UNIT(S)/HR: 5000 INJECTION INTRAVENOUS; SUBCUTANEOUS at 04:10

## 2020-05-16 RX ADMIN — Medication 133 MILLILITER(S): at 12:08

## 2020-05-16 RX ADMIN — CHLORHEXIDINE GLUCONATE 15 MILLILITER(S): 213 SOLUTION TOPICAL at 17:18

## 2020-05-16 RX ADMIN — Medication 6.75 MICROGRAM(S)/KG/MIN: at 04:14

## 2020-05-16 RX ADMIN — PANTOPRAZOLE SODIUM 40 MILLIGRAM(S): 20 TABLET, DELAYED RELEASE ORAL at 11:39

## 2020-05-16 RX ADMIN — Medication 85 MILLIMOLE(S): at 08:10

## 2020-05-16 RX ADMIN — Medication 4: at 14:13

## 2020-05-16 RX ADMIN — MEROPENEM 100 MILLIGRAM(S): 1 INJECTION INTRAVENOUS at 14:14

## 2020-05-16 RX ADMIN — Medication 35 MILLIGRAM(S): at 05:53

## 2020-05-16 NOTE — PROGRESS NOTE ADULT - SUBJECTIVE AND OBJECTIVE BOX
24h Events: Started Vanco for G+ coverage, Lactulose d/c'd, minor vent improvements    ICU Vital Signs Last 24 Hrs  T(C): 36.6 (16 May 2020 00:00), Max: 37.5 (15 May 2020 12:00)  T(F): 97.9 (16 May 2020 00:00), Max: 99.5 (15 May 2020 12:00)  HR: 73 (16 May 2020 01:09) (67 - 84)  BP: --  BP(mean): --  ABP: 136/51 (16 May 2020 00:00) (117/48 - 158/56)  ABP(mean): 76 (16 May 2020 00:00) (69 - 90)  RR: 34 (16 May 2020 00:00) (20 - 46)  SpO2: 94% (16 May 2020 01:09) (79% - 100%)      I&O's Detail    14 May 2020 07:01  -  15 May 2020 07:00  --------------------------------------------------------  IN:    Enteral Tube Flush: 500 mL    heparin  Infusion.: 218 mL    HYDROmorphone  Infusion: 49 mL    ketamine Infusion.: 257.1 mL    Pivot 1.5: 883 mL    rocuronium Infusion: 163.9 mL    Solution: 160 mL  Total IN: 2231 mL    OUT:    Indwelling Catheter - Urethral: 2840 mL  Total OUT: 2840 mL    Total NET: -609.1 mL      15 May 2020 07:01  -  16 May 2020 02:17  --------------------------------------------------------  IN:    heparin  Infusion.: 108 mL    HYDROmorphone  Infusion: 24 mL    ketamine Infusion.: 127.2 mL    Pivot 1.5: 480 mL    rocuronium Infusion: 80.4 mL  Total IN: 819.6 mL    OUT:    Indwelling Catheter - Urethral: 2665 mL  Total OUT: 2665 mL    Total NET: -1845.4 mL          ABG - ( 15 May 2020 06:26 )  pH, Arterial: x     pH, Blood: x     /  pCO2: 96    /  pO2: x     / HCO3: 59.3  / Base Excess: 32.7  /  SaO2: x                   MEDICATIONS  (STANDING):  chlorhexidine 0.12% Liquid 15 milliLiter(s) Oral Mucosa every 12 hours  dextrose 50% Injectable 25 Gram(s) IV Push once  heparin  Infusion. 800 Unit(s)/Hr (8 mL/Hr) IV Continuous <Continuous>  HYDROmorphone Infusion 2 mG/Hr (2 mL/Hr) IV Continuous <Continuous>  insulin lispro (HumaLOG) corrective regimen sliding scale   SubCutaneous every 4 hours  ketamine Infusion. 1.15 mG/kG/Hr (8.08 mL/Hr) IV Continuous <Continuous>  melatonin 5 milliGRAM(s) Oral at bedtime  meropenem  IVPB 1000 milliGRAM(s) IV Intermittent every 8 hours  methylPREDNISolone sodium succinate Injectable 35 milliGRAM(s) IV Push every 12 hours  mineral oil enema 133 milliLiter(s) Rectal daily  multivitamin/minerals 1 Tablet(s) Oral daily  pantoprazole   Suspension 40 milliGRAM(s) Oral daily  polyethylene glycol 3350 17 Gram(s) Oral two times a day  psyllium Powder 1 Packet(s) Oral two times a day  rocuronium Infusion 8 MICROgram(s)/kG/Min (6.75 mL/Hr) IV Continuous <Continuous>  vancomycin  IVPB 1250 milliGRAM(s) IV Intermittent every 12 hours    MEDICATIONS  (PRN):  acetaminophen   Tablet .. 650 milliGRAM(s) Oral every 6 hours PRN Temp greater or equal to 38C (100.4F), Mild Pain (1 - 3), Moderate Pain (4 - 6)  bisacodyl Suppository 10 milliGRAM(s) Rectal daily PRN Constipation  dextrose 40% Gel 15 Gram(s) Oral once PRN Blood Glucose LESS THAN 70 milliGRAM(s)/deciliter  glucagon  Injectable 1 milliGRAM(s) IntraMuscular once PRN Glucose LESS THAN 70 milligrams/deciliter        Physical Exam:    Neurological: Rocuronium/Ketamine/Dilaudid gtt for sedation / analgesia    Pulmonary: mechanical ventilation; AC 34/280/+6/40%    Cardiovascular: S1, S2, regular rate & rhythm    Gastrointestinal: soft,  non-distended, exam limited by sedation    : alexander in place draining yellow urine    Skin: warm, dry, no diaphoresis, no pallor, cyanosis, or jaundice    LABS:  CBC Full  -  ( 15 May 2020 02:38 )  WBC Count : 15.50 K/uL  RBC Count : 2.64 M/uL  Hemoglobin : 8.1 g/dL  Hematocrit : 28.7 %  Platelet Count - Automated : 256 K/uL  Mean Cell Volume : 108.7 fl  Mean Cell Hemoglobin : 30.7 pg  Mean Cell Hemoglobin Concentration : 28.2 gm/dL  Auto Neutrophil # : 13.38 K/uL  Auto Lymphocyte # : 0.85 K/uL  Auto Monocyte # : 1.03 K/uL  Auto Eosinophil # : 0.00 K/uL  Auto Basophil # : 0.01 K/uL  Auto Neutrophil % : 86.3 %  Auto Lymphocyte % : 5.5 %  Auto Monocyte % : 6.6 %  Auto Eosinophil % : 0.0 %  Auto Basophil % : 0.1 %    05-15    141  |  86<L>  |  22.0<H>  ----------------------------<  157<H>  5.4<H>   |  >50.0<HH>  |  0.25<L>    Ca    7.8<L>      15 May 2020 02:38  Phos  1.4     05-15  Mg     2.4     05-15    TPro  6.0<L>  /  Alb  3.3  /  TBili  <0.2<L>  /  DBili  x   /  AST  157<H>  /  ALT  263<H>  /  AlkPhos  424<H>  05-14    PTT - ( 15 May 2020 02:38 )  PTT:69.5 sec    RECENT CULTURES:  05-14 .Sputum Sputum XXXX   Few polymorphonuclear leukocytes per low power field  Moderate Squamous epithelial cells per low power field  Numerous Gram positive cocci in pairs seen per oil power field   Moderate Acinetobacter baumannii /nosocomialis group        LIVER FUNCTIONS - ( 14 May 2020 04:16 )  Alb: 3.3 g/dL / Pro: 6.0 g/dL / ALK PHOS: 424 U/L / ALT: 263 U/L / AST: 157 U/L / GGT: x                   ASSESSMENT/PLAN:  64M with no PMHx admit with 10 days fevers and SOB, intubated on HD# 13, remains intubated doing poorly    Neuro: sedation / analgesia with Rocuronium/Ketamine/Dilaudid gtt, delirium precautions    CV: HD stable, continue invasive monitoring with arterial line    Pulm: wean FiO2 / PEEP as tolerated    GI/Nutrition: NPO with tube feeds    /Renal: alexander for strict I&O, monitor kidney fxn    ID: Afebrile, WBC remains elevated, Ranjit started 5/14, Vanco added today    Endo: monitor blood glucose, SSC    Skin: repositioning for DTI prevention while in bed    Heme/DVT Prophylaxis: SCDs / Heparin gtt, H/H stable

## 2020-05-17 LAB
-  PIPERACILLIN/TAZOBACTAM: SIGNIFICANT CHANGE UP
ANION GAP SERPL CALC-SCNC: 3 MMOL/L — LOW (ref 5–17)
APTT BLD: 60.4 SEC — HIGH (ref 27.5–36.3)
BASOPHILS # BLD AUTO: 0.01 K/UL — SIGNIFICANT CHANGE UP (ref 0–0.2)
BASOPHILS NFR BLD AUTO: 0.1 % — SIGNIFICANT CHANGE UP (ref 0–2)
BUN SERPL-MCNC: 20 MG/DL — SIGNIFICANT CHANGE UP (ref 8–20)
CALCIUM SERPL-MCNC: 8.2 MG/DL — LOW (ref 8.6–10.2)
CHLORIDE SERPL-SCNC: 90 MMOL/L — LOW (ref 98–107)
CO2 SERPL-SCNC: 45 MMOL/L — CRITICAL HIGH (ref 22–29)
CREAT SERPL-MCNC: <0.2 MG/DL — LOW (ref 0.5–1.3)
CULTURE RESULTS: SIGNIFICANT CHANGE UP
EOSINOPHIL # BLD AUTO: 0.03 K/UL — SIGNIFICANT CHANGE UP (ref 0–0.5)
EOSINOPHIL NFR BLD AUTO: 0.2 % — SIGNIFICANT CHANGE UP (ref 0–6)
GAS PNL BLDA: SIGNIFICANT CHANGE UP
GLUCOSE BLDC GLUCOMTR-MCNC: 114 MG/DL — HIGH (ref 70–99)
GLUCOSE BLDC GLUCOMTR-MCNC: 127 MG/DL — HIGH (ref 70–99)
GLUCOSE BLDC GLUCOMTR-MCNC: 147 MG/DL — HIGH (ref 70–99)
GLUCOSE BLDC GLUCOMTR-MCNC: 166 MG/DL — HIGH (ref 70–99)
GLUCOSE BLDC GLUCOMTR-MCNC: 179 MG/DL — HIGH (ref 70–99)
GLUCOSE BLDC GLUCOMTR-MCNC: 206 MG/DL — HIGH (ref 70–99)
GLUCOSE SERPL-MCNC: 101 MG/DL — HIGH (ref 70–99)
HCT VFR BLD CALC: 25.3 % — LOW (ref 39–50)
HGB BLD-MCNC: 7.7 G/DL — LOW (ref 13–17)
IMM GRANULOCYTES NFR BLD AUTO: 1.2 % — SIGNIFICANT CHANGE UP (ref 0–1.5)
LYMPHOCYTES # BLD AUTO: 1.74 K/UL — SIGNIFICANT CHANGE UP (ref 1–3.3)
LYMPHOCYTES # BLD AUTO: 11.4 % — LOW (ref 13–44)
MAGNESIUM SERPL-MCNC: 2.1 MG/DL — SIGNIFICANT CHANGE UP (ref 1.6–2.6)
MCHC RBC-ENTMCNC: 30.4 GM/DL — LOW (ref 32–36)
MCHC RBC-ENTMCNC: 30.4 PG — SIGNIFICANT CHANGE UP (ref 27–34)
MCV RBC AUTO: 100 FL — SIGNIFICANT CHANGE UP (ref 80–100)
MONOCYTES # BLD AUTO: 1.12 K/UL — HIGH (ref 0–0.9)
MONOCYTES NFR BLD AUTO: 7.3 % — SIGNIFICANT CHANGE UP (ref 2–14)
NEUTROPHILS # BLD AUTO: 12.24 K/UL — HIGH (ref 1.8–7.4)
NEUTROPHILS NFR BLD AUTO: 79.8 % — HIGH (ref 43–77)
ORGANISM # SPEC MICROSCOPIC CNT: SIGNIFICANT CHANGE UP
PHOSPHATE SERPL-MCNC: 2.4 MG/DL — SIGNIFICANT CHANGE UP (ref 2.4–4.7)
PLATELET # BLD AUTO: 265 K/UL — SIGNIFICANT CHANGE UP (ref 150–400)
POTASSIUM SERPL-MCNC: 5 MMOL/L — SIGNIFICANT CHANGE UP (ref 3.5–5.3)
POTASSIUM SERPL-SCNC: 5 MMOL/L — SIGNIFICANT CHANGE UP (ref 3.5–5.3)
RBC # BLD: 2.53 M/UL — LOW (ref 4.2–5.8)
RBC # FLD: 17.3 % — HIGH (ref 10.3–14.5)
SODIUM SERPL-SCNC: 138 MMOL/L — SIGNIFICANT CHANGE UP (ref 135–145)
SPECIMEN SOURCE: SIGNIFICANT CHANGE UP
WBC # BLD: 15.32 K/UL — HIGH (ref 3.8–10.5)
WBC # FLD AUTO: 15.32 K/UL — HIGH (ref 3.8–10.5)

## 2020-05-17 PROCEDURE — 99291 CRITICAL CARE FIRST HOUR: CPT

## 2020-05-17 RX ORDER — KETAMINE HYDROCHLORIDE 100 MG/ML
1.15 INJECTION INTRAMUSCULAR; INTRAVENOUS
Qty: 1000 | Refills: 0 | Status: DISCONTINUED | OUTPATIENT
Start: 2020-05-17 | End: 2020-05-24

## 2020-05-17 RX ORDER — HEPARIN SODIUM 5000 [USP'U]/ML
5000 INJECTION INTRAVENOUS; SUBCUTANEOUS EVERY 8 HOURS
Refills: 0 | Status: DISCONTINUED | OUTPATIENT
Start: 2020-05-17 | End: 2020-05-18

## 2020-05-17 RX ORDER — MIDAZOLAM HYDROCHLORIDE 1 MG/ML
4 INJECTION, SOLUTION INTRAMUSCULAR; INTRAVENOUS ONCE
Refills: 0 | Status: DISCONTINUED | OUTPATIENT
Start: 2020-05-17 | End: 2020-05-17

## 2020-05-17 RX ORDER — HYDROMORPHONE HYDROCHLORIDE 2 MG/ML
2 INJECTION INTRAMUSCULAR; INTRAVENOUS; SUBCUTANEOUS ONCE
Refills: 0 | Status: DISCONTINUED | OUTPATIENT
Start: 2020-05-17 | End: 2020-05-17

## 2020-05-17 RX ORDER — HYDROMORPHONE HYDROCHLORIDE 2 MG/ML
0.5 INJECTION INTRAMUSCULAR; INTRAVENOUS; SUBCUTANEOUS
Qty: 100 | Refills: 0 | Status: DISCONTINUED | OUTPATIENT
Start: 2020-05-17 | End: 2020-05-23

## 2020-05-17 RX ADMIN — Medication 5 MILLIGRAM(S): at 21:41

## 2020-05-17 RX ADMIN — HEPARIN SODIUM 5000 UNIT(S): 5000 INJECTION INTRAVENOUS; SUBCUTANEOUS at 13:38

## 2020-05-17 RX ADMIN — Medication 4: at 21:46

## 2020-05-17 RX ADMIN — KETAMINE HYDROCHLORIDE 8.08 MG/KG/HR: 100 INJECTION INTRAMUSCULAR; INTRAVENOUS at 10:34

## 2020-05-17 RX ADMIN — CHLORHEXIDINE GLUCONATE 15 MILLILITER(S): 213 SOLUTION TOPICAL at 17:22

## 2020-05-17 RX ADMIN — KETAMINE HYDROCHLORIDE 8.08 MG/KG/HR: 100 INJECTION INTRAMUSCULAR; INTRAVENOUS at 22:05

## 2020-05-17 RX ADMIN — MIDAZOLAM HYDROCHLORIDE 4 MILLIGRAM(S): 1 INJECTION, SOLUTION INTRAMUSCULAR; INTRAVENOUS at 22:31

## 2020-05-17 RX ADMIN — MIDAZOLAM HYDROCHLORIDE 4 MILLIGRAM(S): 1 INJECTION, SOLUTION INTRAMUSCULAR; INTRAVENOUS at 00:52

## 2020-05-17 RX ADMIN — Medication 6: at 13:38

## 2020-05-17 RX ADMIN — Medication 1 TABLET(S): at 11:50

## 2020-05-17 RX ADMIN — PANTOPRAZOLE SODIUM 40 MILLIGRAM(S): 20 TABLET, DELAYED RELEASE ORAL at 11:50

## 2020-05-17 RX ADMIN — Medication 4: at 10:34

## 2020-05-17 RX ADMIN — CHLORHEXIDINE GLUCONATE 15 MILLILITER(S): 213 SOLUTION TOPICAL at 05:02

## 2020-05-17 RX ADMIN — Medication 35 MILLIGRAM(S): at 05:02

## 2020-05-17 RX ADMIN — CHLORHEXIDINE GLUCONATE 1 APPLICATION(S): 213 SOLUTION TOPICAL at 11:51

## 2020-05-17 RX ADMIN — MEROPENEM 100 MILLIGRAM(S): 1 INJECTION INTRAVENOUS at 15:09

## 2020-05-17 RX ADMIN — HEPARIN SODIUM 5000 UNIT(S): 5000 INJECTION INTRAVENOUS; SUBCUTANEOUS at 21:41

## 2020-05-17 RX ADMIN — MEROPENEM 100 MILLIGRAM(S): 1 INJECTION INTRAVENOUS at 21:41

## 2020-05-17 RX ADMIN — HEPARIN SODIUM 900 UNIT(S)/HR: 5000 INJECTION INTRAVENOUS; SUBCUTANEOUS at 07:06

## 2020-05-17 RX ADMIN — MEROPENEM 100 MILLIGRAM(S): 1 INJECTION INTRAVENOUS at 05:02

## 2020-05-17 RX ADMIN — Medication 35 MILLIGRAM(S): at 17:22

## 2020-05-17 NOTE — PROGRESS NOTE ADULT - ASSESSMENT
63yo male presenting with:     1. Covid-19  2. R segmental midlobe PE  3. Acinetobacter PNA    Plan:   Continue with full vent support  enteral feeds at goal  continue to maintain patient off paralysis  alexander for strict I&Os  Continue with meropenem for acinetobacter  Strict I&Os  Continue with heparin gtt for PE  Hemodynamically stable    Patient remains critically ill with guarded prognosis.

## 2020-05-17 NOTE — PROGRESS NOTE ADULT - ATTENDING COMMENTS
63 yo M with no PMH presented with acute hypoxic respiratory failure and ARDS due to Covid  Intubated, weaned, sedate and . Resp acidosis elevated renal compensation.  Continue sedation / analgesia to help with vent weaning  Continue cardiac monitoring   Continue vent weaning as tolerated   Continue to monitor renal function  Continue High Dose Steroid theray [place] 3 (Meduri Protocol)--WBC 15  Continue Meropenam until 5/21 for acinetobacter PNA  VTE ppx change heparin gtt to SC  Continue ICU for vent management --prognosis poor      code [id number]    CCT  40 min .

## 2020-05-17 NOTE — PROGRESS NOTE ADULT - SUBJECTIVE AND OBJECTIVE BOX
HISTORY  64y Male    24 HOUR EVENTS: paralysis d/c during day. Versed 4mg x 1 given overnight for tachypnea/dyschrony with vent with good response. Sputum with positive acinetobacter.     SUBJECTIVE/ROS:  [ ] A ten-point review of systems was otherwise negative except as noted.  [x ] Due to altered mental status/intubation, subjective information were not able to be obtained from the patient. History was obtained, to the extent possible, from review of the chart and collateral sources of information.      NEURO    Exam: intubated and sedated  Meds: acetaminophen   Tablet .. 650 milliGRAM(s) Oral every 6 hours PRN Temp greater or equal to 38C (100.4F), Mild Pain (1 - 3), Moderate Pain (4 - 6)  HYDROmorphone Infusion 2 mG/Hr IV Continuous <Continuous>  ketamine Infusion. 1.15 mG/kG/Hr IV Continuous <Continuous>  melatonin 5 milliGRAM(s) Oral at bedtime    [x] Adequacy of sedation and pain control has been assessed and adjusted      RESPIRATORY  RR: 37 (05-17-20 @ 00:00) (28 - 37)  SpO2: 95% (05-17-20 @ 00:00) (92% - 98%)  Wt(kg): --  Mechanical Ventilation: Mode: AC/ CMV (Assist Control/ Continuous Mandatory Ventilation), RR (machine): 34, RR (patient): 38, TV (machine): 280, FiO2: 40, PEEP: 6, ITime: 0.6, MAP: 15, PIP: 30  ABG - ( 16 May 2020 03:47 )  pH: 7.38  /  pCO2: 88    /  pO2: 80    / HCO3: 50    / Base Excess: 25.7  /  SaO2: 98      Lactate: x        [x ] Extubation Readiness Assessed  Meds:       CARDIOVASCULAR  HR: 71 (05-17-20 @ 00:00) (62 - 78)  BP: --  BP(mean): --  ABP: 137/53 (05-17-20 @ 00:00) (69/69 - 147/58)  ABP(mean): 82 (05-17-20 @ 00:00) (69 - 87)  Wt(kg): --  CVP(cm H2O): --      Exam:  Cardiac Rhythm: s1s2, RRR  Perfusion     [x ]Adequate   [ ]Inadequate  Mentation   [ ]Normal       [ x]Reduced  Extremities  [x ]Warm         [ ]Cool  Volume Status [ ]Hypervolemic [x ]Euvolemic [ ]Hypovolemic  Meds:       GI/NUTRITION  Exam: soft nd  Diet: enteral feeds at goal  Meds: bisacodyl Suppository 10 milliGRAM(s) Rectal daily PRN Constipation  mineral oil enema 133 milliLiter(s) Rectal daily  pantoprazole   Suspension 40 milliGRAM(s) Oral daily  polyethylene glycol 3350 17 Gram(s) Oral two times a day  psyllium Powder 1 Packet(s) Oral two times a day      GENITOURINARY  I&O's Detail    05-15 @ 07:01  -  05-16 @ 07:00  --------------------------------------------------------  IN:    Enteral Tube Flush: 240 mL    heparin  Infusion.: 215 mL    HYDROmorphone  Infusion: 48 mL    ketamine Infusion.: 252.8 mL    Pivot 1.5: 942 mL    rocuronium Infusion: 160.1 mL    Solution: 360 mL  Total IN: 2217.9 mL    OUT:    Indwelling Catheter - Urethral: 3540 mL  Total OUT: 3540 mL    Total NET: -1322.1 mL      05-16 @ 07:01 - 05-17 @ 00:41  --------------------------------------------------------  IN:    Enteral Tube Flush: 240 mL    heparin  Infusion.: 153 mL    HYDROmorphone  Infusion: 34 mL    ketamine Infusion.: 182.2 mL    Pivot 1.5: 680 mL    rocuronium Infusion: 35.3 mL    Solution: 510 mL    Solution: 50 mL  Total IN: 1884.5 mL    OUT:    Indwelling Catheter - Urethral: 2780 mL  Total OUT: 2780 mL    Total NET: -895.5 mL          05-16    136  |  87<L>  |  21.0<H>  ----------------------------<  150<H>  5.2   |  49.0<HH>  |  0.20<L>    Ca    8.1<L>      16 May 2020 03:49  Phos  2.1     05-16  Mg     2.2     05-16      [ x] Escalera catheter, indication: N/A  Meds: multivitamin/minerals 1 Tablet(s) Oral daily        HEMATOLOGIC  Meds: heparin  Infusion. 800 Unit(s)/Hr IV Continuous <Continuous>    [x] VTE Prophylaxis                        7.7    14.86 )-----------( 253      ( 16 May 2020 03:49 )             25.9     PTT - ( 16 May 2020 03:49 )  PTT:70.4 sec  Transfusion     [ ] PRBC   [ ] Platelets   [ ] FFP   [ ] Cryoprecipitate      INFECTIOUS DISEASES  T(C): 37.1 (05-16-20 @ 23:30), Max: 37.1 (05-16-20 @ 16:05)  Wt(kg): --  WBC Count: 14.86 K/uL (05-16 @ 03:49)    Recent Cultures:  Specimen Source: .Sputum Sputum, 05-14 @ 22:41; Results   Moderate Acinetobacter baumannii /nosocomialis group  Normal Respiratory Christine present; Gram Stain:   Few polymorphonuclear leukocytes per low power field  Moderate Squamous epithelial cells per low power field  Numerous Gram positive cocci in pairs seen per oil power field; Organism: Acinetobacter baumannii    Meds: meropenem  IVPB 1000 milliGRAM(s) IV Intermittent every 8 hours        ENDOCRINE  CAPILLARY BLOOD GLUCOSE      POCT Blood Glucose.: 220 mg/dL (16 May 2020 22:20)  POCT Blood Glucose.: 142 mg/dL (16 May 2020 18:17)  POCT Blood Glucose.: 175 mg/dL (16 May 2020 14:11)  POCT Blood Glucose.: 151 mg/dL (16 May 2020 10:05)  POCT Blood Glucose.: 148 mg/dL (16 May 2020 05:47)  POCT Blood Glucose.: 167 mg/dL (16 May 2020 03:04)      Meds: dextrose 40% Gel 15 Gram(s) Oral once PRN  dextrose 50% Injectable 25 Gram(s) IV Push once  glucagon  Injectable 1 milliGRAM(s) IntraMuscular once PRN  insulin lispro (HumaLOG) corrective regimen sliding scale   SubCutaneous every 4 hours  methylPREDNISolone sodium succinate Injectable 35 milliGRAM(s) IV Push every 12 hours        ACCESS DEVICES:  [ ] Peripheral IV  [x ] Central Venous Line	[ ] R	[ ] L	[ ] IJ	[ ] Fem	[ ] SC	Placed:   [ x] Arterial Line		[ ] R	[ ] L	[ ] Fem	[ ] Rad	[ ] Ax	Placed:   [ ] PICC:					[ ] Mediport  [x ] Urinary Catheter, Date Placed:   [ x] Necessity of urinary, arterial, and venous catheters discussed    OTHER MEDICATIONS:  chlorhexidine 0.12% Liquid 15 milliLiter(s) Oral Mucosa every 12 hours  chlorhexidine 2% Cloths 1 Application(s) Topical daily      CODE STATUS: Full    IMAGING:

## 2020-05-17 NOTE — CHART NOTE - NSCHARTNOTEFT_GEN_A_CORE
Attempted to call patient's next of kin to give update on pt's current condition.  No answer - left voice message to call back.

## 2020-05-18 LAB
ANION GAP SERPL CALC-SCNC: 3 MMOL/L — LOW (ref 5–17)
APTT BLD: 32.2 SEC — SIGNIFICANT CHANGE UP (ref 27.5–36.3)
APTT BLD: 79.4 SEC — HIGH (ref 27.5–36.3)
BASOPHILS # BLD AUTO: 0.02 K/UL — SIGNIFICANT CHANGE UP (ref 0–0.2)
BASOPHILS NFR BLD AUTO: 0.1 % — SIGNIFICANT CHANGE UP (ref 0–2)
BUN SERPL-MCNC: 22 MG/DL — HIGH (ref 8–20)
CALCIUM SERPL-MCNC: 8.4 MG/DL — LOW (ref 8.6–10.2)
CHLORIDE SERPL-SCNC: 88 MMOL/L — LOW (ref 98–107)
CO2 SERPL-SCNC: 47 MMOL/L — CRITICAL HIGH (ref 22–29)
CREAT SERPL-MCNC: <0.2 MG/DL — LOW (ref 0.5–1.3)
EOSINOPHIL # BLD AUTO: 0.02 K/UL — SIGNIFICANT CHANGE UP (ref 0–0.5)
EOSINOPHIL NFR BLD AUTO: 0.1 % — SIGNIFICANT CHANGE UP (ref 0–6)
GAS PNL BLDA: SIGNIFICANT CHANGE UP
GLUCOSE BLDC GLUCOMTR-MCNC: 118 MG/DL — HIGH (ref 70–99)
GLUCOSE BLDC GLUCOMTR-MCNC: 123 MG/DL — HIGH (ref 70–99)
GLUCOSE BLDC GLUCOMTR-MCNC: 132 MG/DL — HIGH (ref 70–99)
GLUCOSE BLDC GLUCOMTR-MCNC: 151 MG/DL — HIGH (ref 70–99)
GLUCOSE BLDC GLUCOMTR-MCNC: 156 MG/DL — HIGH (ref 70–99)
GLUCOSE BLDC GLUCOMTR-MCNC: 182 MG/DL — HIGH (ref 70–99)
GLUCOSE SERPL-MCNC: 116 MG/DL — HIGH (ref 70–99)
HCT VFR BLD CALC: 23.5 % — LOW (ref 39–50)
HCT VFR BLD CALC: 25.4 % — LOW (ref 39–50)
HGB BLD-MCNC: 7.3 G/DL — LOW (ref 13–17)
HGB BLD-MCNC: 7.8 G/DL — LOW (ref 13–17)
IMM GRANULOCYTES NFR BLD AUTO: 1.8 % — HIGH (ref 0–1.5)
LYMPHOCYTES # BLD AUTO: 1.66 K/UL — SIGNIFICANT CHANGE UP (ref 1–3.3)
LYMPHOCYTES # BLD AUTO: 11.4 % — LOW (ref 13–44)
MAGNESIUM SERPL-MCNC: 2 MG/DL — SIGNIFICANT CHANGE UP (ref 1.6–2.6)
MCHC RBC-ENTMCNC: 30.7 GM/DL — LOW (ref 32–36)
MCHC RBC-ENTMCNC: 31 PG — SIGNIFICANT CHANGE UP (ref 27–34)
MCHC RBC-ENTMCNC: 31.1 GM/DL — LOW (ref 32–36)
MCHC RBC-ENTMCNC: 31.3 PG — SIGNIFICANT CHANGE UP (ref 27–34)
MCV RBC AUTO: 100.8 FL — HIGH (ref 80–100)
MCV RBC AUTO: 100.9 FL — HIGH (ref 80–100)
MONOCYTES # BLD AUTO: 1.22 K/UL — HIGH (ref 0–0.9)
MONOCYTES NFR BLD AUTO: 8.4 % — SIGNIFICANT CHANGE UP (ref 2–14)
NEUTROPHILS # BLD AUTO: 11.42 K/UL — HIGH (ref 1.8–7.4)
NEUTROPHILS NFR BLD AUTO: 78.2 % — HIGH (ref 43–77)
NRBC # BLD: 1 /100 WBCS — HIGH (ref 0–0)
NRBC # BLD: 1 /100 WBCS — HIGH (ref 0–0)
PHOSPHATE SERPL-MCNC: 2.1 MG/DL — LOW (ref 2.4–4.7)
PLATELET # BLD AUTO: 246 K/UL — SIGNIFICANT CHANGE UP (ref 150–400)
PLATELET # BLD AUTO: 278 K/UL — SIGNIFICANT CHANGE UP (ref 150–400)
POTASSIUM SERPL-MCNC: 5.5 MMOL/L — HIGH (ref 3.5–5.3)
POTASSIUM SERPL-SCNC: 5.5 MMOL/L — HIGH (ref 3.5–5.3)
RBC # BLD: 2.33 M/UL — LOW (ref 4.2–5.8)
RBC # BLD: 2.52 M/UL — LOW (ref 4.2–5.8)
RBC # FLD: 17.4 % — HIGH (ref 10.3–14.5)
RBC # FLD: 17.9 % — HIGH (ref 10.3–14.5)
SODIUM SERPL-SCNC: 137 MMOL/L — SIGNIFICANT CHANGE UP (ref 135–145)
WBC # BLD: 12.13 K/UL — HIGH (ref 3.8–10.5)
WBC # BLD: 14.61 K/UL — HIGH (ref 3.8–10.5)
WBC # FLD AUTO: 12.13 K/UL — HIGH (ref 3.8–10.5)
WBC # FLD AUTO: 14.61 K/UL — HIGH (ref 3.8–10.5)

## 2020-05-18 PROCEDURE — 99233 SBSQ HOSP IP/OBS HIGH 50: CPT

## 2020-05-18 RX ORDER — HYDROMORPHONE HYDROCHLORIDE 2 MG/ML
1 INJECTION INTRAMUSCULAR; INTRAVENOUS; SUBCUTANEOUS ONCE
Refills: 0 | Status: DISCONTINUED | OUTPATIENT
Start: 2020-05-18 | End: 2020-05-18

## 2020-05-18 RX ORDER — HEPARIN SODIUM 5000 [USP'U]/ML
5500 INJECTION INTRAVENOUS; SUBCUTANEOUS EVERY 6 HOURS
Refills: 0 | Status: DISCONTINUED | OUTPATIENT
Start: 2020-05-18 | End: 2020-05-20

## 2020-05-18 RX ORDER — HEPARIN SODIUM 5000 [USP'U]/ML
INJECTION INTRAVENOUS; SUBCUTANEOUS
Qty: 25000 | Refills: 0 | Status: DISCONTINUED | OUTPATIENT
Start: 2020-05-18 | End: 2020-05-20

## 2020-05-18 RX ORDER — HEPARIN SODIUM 5000 [USP'U]/ML
2500 INJECTION INTRAVENOUS; SUBCUTANEOUS EVERY 6 HOURS
Refills: 0 | Status: DISCONTINUED | OUTPATIENT
Start: 2020-05-18 | End: 2020-05-20

## 2020-05-18 RX ADMIN — KETAMINE HYDROCHLORIDE 8.08 MG/KG/HR: 100 INJECTION INTRAMUSCULAR; INTRAVENOUS at 22:33

## 2020-05-18 RX ADMIN — CHLORHEXIDINE GLUCONATE 15 MILLILITER(S): 213 SOLUTION TOPICAL at 17:41

## 2020-05-18 RX ADMIN — POLYETHYLENE GLYCOL 3350 17 GRAM(S): 17 POWDER, FOR SOLUTION ORAL at 17:41

## 2020-05-18 RX ADMIN — Medication 4: at 13:59

## 2020-05-18 RX ADMIN — Medication 4: at 05:19

## 2020-05-18 RX ADMIN — Medication 1 PACKET(S): at 17:41

## 2020-05-18 RX ADMIN — CHLORHEXIDINE GLUCONATE 1 APPLICATION(S): 213 SOLUTION TOPICAL at 11:30

## 2020-05-18 RX ADMIN — HEPARIN SODIUM 5000 UNIT(S): 5000 INJECTION INTRAVENOUS; SUBCUTANEOUS at 05:13

## 2020-05-18 RX ADMIN — HEPARIN SODIUM 1200 UNIT(S)/HR: 5000 INJECTION INTRAVENOUS; SUBCUTANEOUS at 18:47

## 2020-05-18 RX ADMIN — Medication 85 MILLIMOLE(S): at 06:32

## 2020-05-18 RX ADMIN — CHLORHEXIDINE GLUCONATE 15 MILLILITER(S): 213 SOLUTION TOPICAL at 05:14

## 2020-05-18 RX ADMIN — Medication 35 MILLIGRAM(S): at 17:40

## 2020-05-18 RX ADMIN — PANTOPRAZOLE SODIUM 40 MILLIGRAM(S): 20 TABLET, DELAYED RELEASE ORAL at 11:29

## 2020-05-18 RX ADMIN — HYDROMORPHONE HYDROCHLORIDE 1 MILLIGRAM(S): 2 INJECTION INTRAMUSCULAR; INTRAVENOUS; SUBCUTANEOUS at 07:29

## 2020-05-18 RX ADMIN — MEROPENEM 100 MILLIGRAM(S): 1 INJECTION INTRAVENOUS at 22:34

## 2020-05-18 RX ADMIN — Medication 1 TABLET(S): at 11:29

## 2020-05-18 RX ADMIN — HYDROMORPHONE HYDROCHLORIDE 2 MG/HR: 2 INJECTION INTRAMUSCULAR; INTRAVENOUS; SUBCUTANEOUS at 22:42

## 2020-05-18 RX ADMIN — HEPARIN SODIUM 1200 UNIT(S)/HR: 5000 INJECTION INTRAVENOUS; SUBCUTANEOUS at 12:12

## 2020-05-18 RX ADMIN — Medication 5 MILLIGRAM(S): at 22:34

## 2020-05-18 RX ADMIN — Medication 4: at 22:37

## 2020-05-18 RX ADMIN — Medication 35 MILLIGRAM(S): at 05:12

## 2020-05-18 RX ADMIN — MEROPENEM 100 MILLIGRAM(S): 1 INJECTION INTRAVENOUS at 15:21

## 2020-05-18 RX ADMIN — MEROPENEM 100 MILLIGRAM(S): 1 INJECTION INTRAVENOUS at 05:13

## 2020-05-18 RX ADMIN — HYDROMORPHONE HYDROCHLORIDE 1 MILLIGRAM(S): 2 INJECTION INTRAMUSCULAR; INTRAVENOUS; SUBCUTANEOUS at 07:09

## 2020-05-18 RX ADMIN — KETAMINE HYDROCHLORIDE 8.08 MG/KG/HR: 100 INJECTION INTRAMUSCULAR; INTRAVENOUS at 09:01

## 2020-05-18 NOTE — PROGRESS NOTE ADULT - SUBJECTIVE AND OBJECTIVE BOX
24h Events: No acute events overnight. Heparin gtt discontinued 2/2 large volume of bloody secretions and repeat d-dimer was 400. Attempting to wean dilaudid gtt down but remains with poor mental status. Oxygenation and ventilation remains an issues, has poor plateaus. Remains on [facility] protocol     ICU Vital Signs Last 24 Hrs  T(C): 37.3 (17 May 2020 23:34), Max: 37.3 (17 May 2020 23:34)  T(F): 99.1 (17 May 2020 23:34), Max: 99.1 (17 May 2020 23:34)  HR: 75 (18 May 2020 03:00) (68 - 113)  BP: --  BP(mean): --  ABP: 135/54 (18 May 2020 03:00) (98/41 - 170/75)  ABP(mean): 81 (18 May 2020 03:00) (60 - 113)  RR: 42 (18 May 2020 03:00) (25 - 50)  SpO2: 94% (18 May 2020 03:00) (89% - 96%)      I&O's Detail    16 May 2020 07:01  -  17 May 2020 07:00  --------------------------------------------------------  IN:    Enteral Tube Flush: 240 mL    heparin  Infusion.: 216 mL    HYDROmorphone  Infusion: 10 mL    HYDROmorphone  Infusion: 38 mL    ketamine Infusion.: 55 mL    ketamine Infusion.: 204.2 mL    Pivot 1.5: 960 mL    rocuronium Infusion: 35.3 mL    Solution: 510 mL    Solution: 50 mL  Total IN: 2318.5 mL    OUT:    Indwelling Catheter - Urethral: 3705 mL  Total OUT: 3705 mL    Total NET: -1386.5 mL      17 May 2020 07:01  -  18 May 2020 04:07  --------------------------------------------------------  IN:    Enteral Tube Flush: 160 mL    heparin  Infusion.: 18 mL    HYDROmorphone  Infusion: 33.5 mL    ketamine Infusion.: 216.8 mL    Pivot 1.5: 672 mL    Solution: 100 mL  Total IN: 1200.3 mL    OUT:    Indwelling Catheter - Urethral: 2175 mL  Total OUT: 2175 mL    Total NET: -974.7 mL          ABG - ( 17 May 2020 04:51 )  pH, Arterial: 7.35  pH, Blood: x     /  pCO2: 92    /  pO2: 89    / HCO3: 47    / Base Excess: 22.1  /  SaO2: 98                  MEDICATIONS  (STANDING):  chlorhexidine 0.12% Liquid 15 milliLiter(s) Oral Mucosa every 12 hours  chlorhexidine 2% Cloths 1 Application(s) Topical daily  dextrose 50% Injectable 25 Gram(s) IV Push once  heparin   Injectable 5000 Unit(s) SubCutaneous every 8 hours  HYDROmorphone Infusion 2 mG/Hr (2 mL/Hr) IV Continuous <Continuous>  insulin lispro (HumaLOG) corrective regimen sliding scale   SubCutaneous every 4 hours  ketamine Infusion. 1.149 mG/kG/Hr (8.08 mL/Hr) IV Continuous <Continuous>  melatonin 5 milliGRAM(s) Oral at bedtime  meropenem  IVPB 1000 milliGRAM(s) IV Intermittent every 8 hours  methylPREDNISolone sodium succinate Injectable 35 milliGRAM(s) IV Push every 12 hours  mineral oil enema 133 milliLiter(s) Rectal daily  multivitamin/minerals 1 Tablet(s) Oral daily  pantoprazole   Suspension 40 milliGRAM(s) Oral daily  polyethylene glycol 3350 17 Gram(s) Oral two times a day  psyllium Powder 1 Packet(s) Oral two times a day    MEDICATIONS  (PRN):  acetaminophen   Tablet .. 650 milliGRAM(s) Oral every 6 hours PRN Temp greater or equal to 38C (100.4F), Mild Pain (1 - 3), Moderate Pain (4 - 6)  bisacodyl Suppository 10 milliGRAM(s) Rectal daily PRN Constipation  dextrose 40% Gel 15 Gram(s) Oral once PRN Blood Glucose LESS THAN 70 milliGRAM(s)/deciliter  glucagon  Injectable 1 milliGRAM(s) IntraMuscular once PRN Glucose LESS THAN 70 milligrams/deciliter        Physical Exam:    Neurological: ketamine and dilaudid gtt sedation / analgesia, opens eyes spontaneously     Pulmonary: mechanical ventilation; AC    Cardiovascular: S1, S2, regular rate & rhythm    Gastrointestinal: softly distended, tolerating tube feeds, having bowel movements     : alexander in place, hematuria present     Skin: warm, dry, no diaphoresis, no pallor, cyanosis, or jaundice    LABS:  CBC Full  -  ( 17 May 2020 05:17 )  WBC Count : 15.32 K/uL  RBC Count : 2.53 M/uL  Hemoglobin : 7.7 g/dL  Hematocrit : 25.3 %  Platelet Count - Automated : 265 K/uL  Mean Cell Volume : 100.0 fl  Mean Cell Hemoglobin : 30.4 pg  Mean Cell Hemoglobin Concentration : 30.4 gm/dL  Auto Neutrophil # : 12.24 K/uL  Auto Lymphocyte # : 1.74 K/uL  Auto Monocyte # : 1.12 K/uL  Auto Eosinophil # : 0.03 K/uL  Auto Basophil # : 0.01 K/uL  Auto Neutrophil % : 79.8 %  Auto Lymphocyte % : 11.4 %  Auto Monocyte % : 7.3 %  Auto Eosinophil % : 0.2 %  Auto Basophil % : 0.1 %    05-17    138  |  90<L>  |  20.0  ----------------------------<  101<H>  5.0   |  45.0<HH>  |  <0.20<L>    Ca    8.2<L>      17 May 2020 05:17  Phos  2.4     05-17  Mg     2.1     05-17      PTT - ( 17 May 2020 05:17 )  PTT:60.4 sec    RECENT CULTURES:  05-14 .Sputum Sputum Acinetobacter baumannii   Few polymorphonuclear leukocytes per low power field  Moderate Squamous epithelial cells per low power field  Numerous Gram positive cocci in pairs seen per oil power field   Moderate Acinetobacter baumannii /nosocomialis group  Normal Respiratory Christine present                  ASSESSMENT/PLAN:  64y Male    Neuro: sedation / analgesia with dilaudid and ketamine. Mental status remains poor despite efforts to wean off sedation. Palliative initially following, last note 5/13, family still wants patient full code, is hopeful to see if progress is made on the ventilator  but is aware of how critical patient is and that prognosis is poor. Care is nearing futile     CV: continue invasive monitoring with arterial line, off pressors     Pulm: wean FiO2 / PEEP as tolerated, continue miduri protocol     GI/Nutrition: NPO with tube feeds, tolerating, continue bowel regimen, pantoprazole     /Renal: alexander for strict I&O, monitor kidney fxn, hematuria, no signs of clot retention, heparin gtt off for now     ID: meropenum for PNA, has been afebrile, lactate normal     Endo: monitor blood glucose, good control with current regimen     Skin: repositioning for DTI prevention while in bed    Heme/DVT Prophylaxis: SCDs / SQH / off the heparin gtt 2/2 increased bloody secretions, h/h stable x2, will transfuse if necessary

## 2020-05-18 NOTE — PROGRESS NOTE ADULT - ATTENDING COMMENTS
sedation with dilaudid / ketamine rass -3,-4. vent AC H type with good Oxygenation. ID on Merrem for Acinetobacter pna. will restart heparin drip today considering PE on previous cta chest this month. watch for sx of bleeding. will cont with current care. maintain with sicu.

## 2020-05-18 NOTE — CHART NOTE - NSCHARTNOTEFT_GEN_A_CORE
Spoke with patient's HCP Yovani (via [facility]  ID# [id number]). Updates provided and all questions answered.

## 2020-05-19 LAB
ANION GAP SERPL CALC-SCNC: 2 MMOL/L — LOW (ref 5–17)
ANION GAP SERPL CALC-SCNC: 3 MMOL/L — LOW (ref 5–17)
ANISOCYTOSIS BLD QL: SLIGHT — SIGNIFICANT CHANGE UP
APTT BLD: 102.2 SEC — HIGH (ref 27.5–36.3)
APTT BLD: 30.9 SEC — SIGNIFICANT CHANGE UP (ref 27.5–36.3)
APTT BLD: 95.9 SEC — HIGH (ref 27.5–36.3)
BASOPHILS # BLD AUTO: 0 K/UL — SIGNIFICANT CHANGE UP (ref 0–0.2)
BASOPHILS # BLD AUTO: 0.01 K/UL — SIGNIFICANT CHANGE UP (ref 0–0.2)
BASOPHILS # BLD AUTO: 0.02 K/UL — SIGNIFICANT CHANGE UP (ref 0–0.2)
BASOPHILS NFR BLD AUTO: 0 % — SIGNIFICANT CHANGE UP (ref 0–2)
BASOPHILS NFR BLD AUTO: 0.1 % — SIGNIFICANT CHANGE UP (ref 0–2)
BASOPHILS NFR BLD AUTO: 0.2 % — SIGNIFICANT CHANGE UP (ref 0–2)
BLD GP AB SCN SERPL QL: SIGNIFICANT CHANGE UP
BUN SERPL-MCNC: 21 MG/DL — HIGH (ref 8–20)
BUN SERPL-MCNC: 23 MG/DL — HIGH (ref 8–20)
CALCIUM SERPL-MCNC: 8.3 MG/DL — LOW (ref 8.6–10.2)
CALCIUM SERPL-MCNC: 8.4 MG/DL — LOW (ref 8.6–10.2)
CHLORIDE SERPL-SCNC: 86 MMOL/L — LOW (ref 98–107)
CHLORIDE SERPL-SCNC: 88 MMOL/L — LOW (ref 98–107)
CO2 SERPL-SCNC: 45 MMOL/L — CRITICAL HIGH (ref 22–29)
CO2 SERPL-SCNC: 46 MMOL/L — CRITICAL HIGH (ref 22–29)
CREAT SERPL-MCNC: 0.21 MG/DL — LOW (ref 0.5–1.3)
CREAT SERPL-MCNC: 0.22 MG/DL — LOW (ref 0.5–1.3)
EOSINOPHIL # BLD AUTO: 0 K/UL — SIGNIFICANT CHANGE UP (ref 0–0.5)
EOSINOPHIL # BLD AUTO: 0.08 K/UL — SIGNIFICANT CHANGE UP (ref 0–0.5)
EOSINOPHIL # BLD AUTO: 0.13 K/UL — SIGNIFICANT CHANGE UP (ref 0–0.5)
EOSINOPHIL NFR BLD AUTO: 0 % — SIGNIFICANT CHANGE UP (ref 0–6)
EOSINOPHIL NFR BLD AUTO: 1.2 % — SIGNIFICANT CHANGE UP (ref 0–6)
EOSINOPHIL NFR BLD AUTO: 1.2 % — SIGNIFICANT CHANGE UP (ref 0–6)
GAS PNL BLDA: SIGNIFICANT CHANGE UP
GIANT PLATELETS BLD QL SMEAR: PRESENT — SIGNIFICANT CHANGE UP
GLUCOSE BLDC GLUCOMTR-MCNC: 117 MG/DL — HIGH (ref 70–99)
GLUCOSE BLDC GLUCOMTR-MCNC: 151 MG/DL — HIGH (ref 70–99)
GLUCOSE BLDC GLUCOMTR-MCNC: 157 MG/DL — HIGH (ref 70–99)
GLUCOSE BLDC GLUCOMTR-MCNC: 165 MG/DL — HIGH (ref 70–99)
GLUCOSE BLDC GLUCOMTR-MCNC: 211 MG/DL — HIGH (ref 70–99)
GLUCOSE SERPL-MCNC: 139 MG/DL — HIGH (ref 70–99)
GLUCOSE SERPL-MCNC: 162 MG/DL — HIGH (ref 70–99)
HCT VFR BLD CALC: 17.1 % — CRITICAL LOW (ref 39–50)
HCT VFR BLD CALC: 18.8 % — CRITICAL LOW (ref 39–50)
HCT VFR BLD CALC: 21.7 % — LOW (ref 39–50)
HCT VFR BLD CALC: 25.7 % — LOW (ref 39–50)
HGB BLD-MCNC: 5.2 G/DL — CRITICAL LOW (ref 13–17)
HGB BLD-MCNC: 5.6 G/DL — CRITICAL LOW (ref 13–17)
HGB BLD-MCNC: 6.7 G/DL — CRITICAL LOW (ref 13–17)
HGB BLD-MCNC: 8 G/DL — LOW (ref 13–17)
HYPOCHROMIA BLD QL: SLIGHT — SIGNIFICANT CHANGE UP
IMM GRANULOCYTES NFR BLD AUTO: 1 % — SIGNIFICANT CHANGE UP (ref 0–1.5)
IMM GRANULOCYTES NFR BLD AUTO: 1.3 % — SIGNIFICANT CHANGE UP (ref 0–1.5)
INR BLD: 1.05 RATIO — SIGNIFICANT CHANGE UP (ref 0.88–1.16)
LYMPHOCYTES # BLD AUTO: 0.57 K/UL — LOW (ref 1–3.3)
LYMPHOCYTES # BLD AUTO: 0.91 K/UL — LOW (ref 1–3.3)
LYMPHOCYTES # BLD AUTO: 1.37 K/UL — SIGNIFICANT CHANGE UP (ref 1–3.3)
LYMPHOCYTES # BLD AUTO: 13 % — SIGNIFICANT CHANGE UP (ref 13–44)
LYMPHOCYTES # BLD AUTO: 8.5 % — LOW (ref 13–44)
LYMPHOCYTES # BLD AUTO: 8.7 % — LOW (ref 13–44)
MACROCYTES BLD QL: SLIGHT — SIGNIFICANT CHANGE UP
MAGNESIUM SERPL-MCNC: 1.9 MG/DL — SIGNIFICANT CHANGE UP (ref 1.6–2.6)
MANUAL SMEAR VERIFICATION: SIGNIFICANT CHANGE UP
MCHC RBC-ENTMCNC: 29.8 GM/DL — LOW (ref 32–36)
MCHC RBC-ENTMCNC: 30.4 GM/DL — LOW (ref 32–36)
MCHC RBC-ENTMCNC: 30.6 PG — SIGNIFICANT CHANGE UP (ref 27–34)
MCHC RBC-ENTMCNC: 30.9 GM/DL — LOW (ref 32–36)
MCHC RBC-ENTMCNC: 31.1 GM/DL — LOW (ref 32–36)
MCHC RBC-ENTMCNC: 31.1 PG — SIGNIFICANT CHANGE UP (ref 27–34)
MCHC RBC-ENTMCNC: 31.2 PG — SIGNIFICANT CHANGE UP (ref 27–34)
MCHC RBC-ENTMCNC: 31.4 PG — SIGNIFICANT CHANGE UP (ref 27–34)
MCV RBC AUTO: 100.8 FL — HIGH (ref 80–100)
MCV RBC AUTO: 100.9 FL — HIGH (ref 80–100)
MCV RBC AUTO: 102.4 FL — HIGH (ref 80–100)
MCV RBC AUTO: 102.7 FL — HIGH (ref 80–100)
MICROCYTES BLD QL: SLIGHT — SIGNIFICANT CHANGE UP
MONOCYTES # BLD AUTO: 0.27 K/UL — SIGNIFICANT CHANGE UP (ref 0–0.9)
MONOCYTES # BLD AUTO: 0.62 K/UL — SIGNIFICANT CHANGE UP (ref 0–0.9)
MONOCYTES # BLD AUTO: 0.93 K/UL — HIGH (ref 0–0.9)
MONOCYTES NFR BLD AUTO: 2.6 % — SIGNIFICANT CHANGE UP (ref 2–14)
MONOCYTES NFR BLD AUTO: 8.8 % — SIGNIFICANT CHANGE UP (ref 2–14)
MONOCYTES NFR BLD AUTO: 9.2 % — SIGNIFICANT CHANGE UP (ref 2–14)
NEUTROPHILS # BLD AUTO: 5.38 K/UL — SIGNIFICANT CHANGE UP (ref 1.8–7.4)
NEUTROPHILS # BLD AUTO: 7.96 K/UL — HIGH (ref 1.8–7.4)
NEUTROPHILS # BLD AUTO: 9.3 K/UL — HIGH (ref 1.8–7.4)
NEUTROPHILS NFR BLD AUTO: 75.5 % — SIGNIFICANT CHANGE UP (ref 43–77)
NEUTROPHILS NFR BLD AUTO: 80 % — HIGH (ref 43–77)
NEUTROPHILS NFR BLD AUTO: 87.8 % — HIGH (ref 43–77)
NEUTS BAND # BLD: 0.9 % — SIGNIFICANT CHANGE UP (ref 0–8)
OVALOCYTES BLD QL SMEAR: SLIGHT — SIGNIFICANT CHANGE UP
PHOSPHATE SERPL-MCNC: 2.5 MG/DL — SIGNIFICANT CHANGE UP (ref 2.4–4.7)
PLAT MORPH BLD: NORMAL — SIGNIFICANT CHANGE UP
PLATELET # BLD AUTO: 153 K/UL — SIGNIFICANT CHANGE UP (ref 150–400)
PLATELET # BLD AUTO: 186 K/UL — SIGNIFICANT CHANGE UP (ref 150–400)
PLATELET # BLD AUTO: 240 K/UL — SIGNIFICANT CHANGE UP (ref 150–400)
PLATELET # BLD AUTO: 245 K/UL — SIGNIFICANT CHANGE UP (ref 150–400)
PLATELET COUNT - ESTIMATE: NORMAL — SIGNIFICANT CHANGE UP
POIKILOCYTOSIS BLD QL AUTO: SLIGHT — SIGNIFICANT CHANGE UP
POTASSIUM SERPL-MCNC: 5.2 MMOL/L — SIGNIFICANT CHANGE UP (ref 3.5–5.3)
POTASSIUM SERPL-MCNC: 5.5 MMOL/L — HIGH (ref 3.5–5.3)
POTASSIUM SERPL-SCNC: 5.2 MMOL/L — SIGNIFICANT CHANGE UP (ref 3.5–5.3)
POTASSIUM SERPL-SCNC: 5.5 MMOL/L — HIGH (ref 3.5–5.3)
PROTHROM AB SERPL-ACNC: 11.9 SEC — SIGNIFICANT CHANGE UP (ref 10–12.9)
RBC # BLD: 1.67 M/UL — LOW (ref 4.2–5.8)
RBC # BLD: 1.83 M/UL — LOW (ref 4.2–5.8)
RBC # BLD: 2.15 M/UL — LOW (ref 4.2–5.8)
RBC # BLD: 2.55 M/UL — LOW (ref 4.2–5.8)
RBC # FLD: 17.8 % — HIGH (ref 10.3–14.5)
RBC # FLD: 18.2 % — HIGH (ref 10.3–14.5)
RBC BLD AUTO: NORMAL — SIGNIFICANT CHANGE UP
SODIUM SERPL-SCNC: 134 MMOL/L — LOW (ref 135–145)
SODIUM SERPL-SCNC: 136 MMOL/L — SIGNIFICANT CHANGE UP (ref 135–145)
WBC # BLD: 10.48 K/UL — SIGNIFICANT CHANGE UP (ref 3.8–10.5)
WBC # BLD: 10.55 K/UL — HIGH (ref 3.8–10.5)
WBC # BLD: 6.73 K/UL — SIGNIFICANT CHANGE UP (ref 3.8–10.5)
WBC # BLD: 9.41 K/UL — SIGNIFICANT CHANGE UP (ref 3.8–10.5)
WBC # FLD AUTO: 10.48 K/UL — SIGNIFICANT CHANGE UP (ref 3.8–10.5)
WBC # FLD AUTO: 10.55 K/UL — HIGH (ref 3.8–10.5)
WBC # FLD AUTO: 6.73 K/UL — SIGNIFICANT CHANGE UP (ref 3.8–10.5)
WBC # FLD AUTO: 9.41 K/UL — SIGNIFICANT CHANGE UP (ref 3.8–10.5)

## 2020-05-19 PROCEDURE — 99232 SBSQ HOSP IP/OBS MODERATE 35: CPT

## 2020-05-19 PROCEDURE — 99233 SBSQ HOSP IP/OBS HIGH 50: CPT

## 2020-05-19 RX ORDER — MAGNESIUM SULFATE 500 MG/ML
2 VIAL (ML) INJECTION ONCE
Refills: 0 | Status: COMPLETED | OUTPATIENT
Start: 2020-05-19 | End: 2020-05-19

## 2020-05-19 RX ADMIN — HEPARIN SODIUM 1100 UNIT(S)/HR: 5000 INJECTION INTRAVENOUS; SUBCUTANEOUS at 09:20

## 2020-05-19 RX ADMIN — POLYETHYLENE GLYCOL 3350 17 GRAM(S): 17 POWDER, FOR SOLUTION ORAL at 06:39

## 2020-05-19 RX ADMIN — Medication 1 PACKET(S): at 17:03

## 2020-05-19 RX ADMIN — Medication 1 PACKET(S): at 06:39

## 2020-05-19 RX ADMIN — Medication 4: at 21:04

## 2020-05-19 RX ADMIN — Medication 1 TABLET(S): at 11:22

## 2020-05-19 RX ADMIN — CHLORHEXIDINE GLUCONATE 1 APPLICATION(S): 213 SOLUTION TOPICAL at 10:04

## 2020-05-19 RX ADMIN — Medication 4: at 10:03

## 2020-05-19 RX ADMIN — Medication 5 MILLIGRAM(S): at 21:02

## 2020-05-19 RX ADMIN — MEROPENEM 100 MILLIGRAM(S): 1 INJECTION INTRAVENOUS at 06:39

## 2020-05-19 RX ADMIN — PANTOPRAZOLE SODIUM 40 MILLIGRAM(S): 20 TABLET, DELAYED RELEASE ORAL at 11:22

## 2020-05-19 RX ADMIN — MEROPENEM 100 MILLIGRAM(S): 1 INJECTION INTRAVENOUS at 21:03

## 2020-05-19 RX ADMIN — MEROPENEM 100 MILLIGRAM(S): 1 INJECTION INTRAVENOUS at 15:48

## 2020-05-19 RX ADMIN — Medication 4: at 15:48

## 2020-05-19 RX ADMIN — HEPARIN SODIUM 1400 UNIT(S)/HR: 5000 INJECTION INTRAVENOUS; SUBCUTANEOUS at 22:00

## 2020-05-19 RX ADMIN — Medication 35 MILLIGRAM(S): at 06:39

## 2020-05-19 RX ADMIN — CHLORHEXIDINE GLUCONATE 15 MILLILITER(S): 213 SOLUTION TOPICAL at 17:03

## 2020-05-19 RX ADMIN — HEPARIN SODIUM 1100 UNIT(S)/HR: 5000 INJECTION INTRAVENOUS; SUBCUTANEOUS at 01:48

## 2020-05-19 RX ADMIN — Medication 6: at 17:45

## 2020-05-19 RX ADMIN — Medication 35 MILLIGRAM(S): at 17:45

## 2020-05-19 RX ADMIN — KETAMINE HYDROCHLORIDE 8.08 MG/KG/HR: 100 INJECTION INTRAMUSCULAR; INTRAVENOUS at 21:12

## 2020-05-19 RX ADMIN — Medication 50 GRAM(S): at 07:26

## 2020-05-19 RX ADMIN — CHLORHEXIDINE GLUCONATE 15 MILLILITER(S): 213 SOLUTION TOPICAL at 06:39

## 2020-05-19 NOTE — PROGRESS NOTE ADULT - ASSESSMENT
Patient's respiratory status declined, RRT called , patient intubated, sent to ICU on 05.01.20  Remains vented and sedated   Call placed today to HCP Eliu for emotional support - during our last conversation no changes were made to GOC from Eliu - if that changes, I will update this document and reach out to ICU for an update

## 2020-05-19 NOTE — PROGRESS NOTE ADULT - PROBLEM SELECTOR PLAN 4
Call placed to ICU team to speak with ICU DAVID Moreno to discuss care / patients current status   Plan to reach out to Yovani Walden (HCP) for support  Call placed to Yovani using [employer]  #[phone]  Per Yovani he is very appreciative for the updates he has been provided from the ICU team and and palliative care  During previous conversations and this conversation as well I prepared Yovani for outcomes of improvement and in the event he decompensates despite efforts to medically optimize him - Yovani is receptive to hearing the information and remains hopeful - He informed me that him and his friends are praying for a positive outcome- emotionally supported Yovani  Will continue to support and monitor    Total time spent 25 minutes this includes chart review, speaking with ICU provider, and patient's HCP       Thank you for the opportunity to assist with the care of this patient.   [facility] [phone].

## 2020-05-19 NOTE — PROGRESS NOTE ADULT - SUBJECTIVE AND OBJECTIVE BOX
Palliative Care Followup  OVERNIGHT EVENTS:Patient remains in ICU closely monitored - Full Code- Reached out to HCP for emotional support today    CC: SOB     Present Symptoms: vented and sedated  symptom management by ICU team    Pain: On dilaudid drip            Character-            Duration-            Effect-            Factors-            Frequency-            Location-            Severity-    Review of Systems: Reviewed  Per HPI all other ROS negative  Unable to obtain due to poor mentation     MEDICATIONS  (STANDING):  chlorhexidine 0.12% Liquid 15 milliLiter(s) Oral Mucosa every 12 hours  chlorhexidine 2% Cloths 1 Application(s) Topical daily  dextrose 50% Injectable 25 Gram(s) IV Push once  heparin  Infusion.  Unit(s)/Hr (12 mL/Hr) IV Continuous <Continuous>  HYDROmorphone Infusion 2 mG/Hr (2 mL/Hr) IV Continuous <Continuous>  insulin lispro (HumaLOG) corrective regimen sliding scale   SubCutaneous every 4 hours  ketamine Infusion. 1.149 mG/kG/Hr (8.08 mL/Hr) IV Continuous <Continuous>  melatonin 5 milliGRAM(s) Oral at bedtime  meropenem  IVPB 1000 milliGRAM(s) IV Intermittent every 8 hours  methylPREDNISolone sodium succinate Injectable 35 milliGRAM(s) IV Push every 12 hours  multivitamin/minerals 1 Tablet(s) Oral daily  pantoprazole   Suspension 40 milliGRAM(s) Oral daily  psyllium Powder 1 Packet(s) Oral two times a day    MEDICATIONS  (PRN):  acetaminophen   Tablet .. 650 milliGRAM(s) Oral every 6 hours PRN Temp greater or equal to 38C (100.4F), Mild Pain (1 - 3), Moderate Pain (4 - 6)  dextrose 40% Gel 15 Gram(s) Oral once PRN Blood Glucose LESS THAN 70 milliGRAM(s)/deciliter  glucagon  Injectable 1 milliGRAM(s) IntraMuscular once PRN Glucose LESS THAN 70 milligrams/deciliter  heparin   Injectable 5500 Unit(s) IV Push every 6 hours PRN For aPTT less than 40  heparin   Injectable 2500 Unit(s) IV Push every 6 hours PRN For aPTT between 40 - 57      PHYSICAL EXAM:  Physical Exam - limited physical exam due to COVID - 19 isolation status  No Physical Exam at bedside completed in attempt to limit COVID-19   Reviewed H&P physical exam (most recent physical exam noted) and Reviewed latest ICU documentation for reference    Vital Signs Last 24 Hrs  T(C): 36.9 (19 May 2020 11:39), Max: 36.9 (19 May 2020 11:39)  T(F): 98.4 (19 May 2020 11:39), Max: 98.4 (19 May 2020 11:39)  HR: 69 (19 May 2020 12:40) (66 - 80)  BP: 91/44 (19 May 2020 12:00) (91/44 - 92/47)  BP(mean): 55 (19 May 2020 12:00) (55 - 57)  RR: 16 (19 May 2020 12:00) (15 - 34)  SpO2: 100% (19 May 2020 12:40) (91% - 100%)      LABS:                          6.7    10.55 )-----------( 240      ( 19 May 2020 10:23 )             21.7     05-19    136  |  88<L>  |  23.0<H>  ----------------------------<  139<H>  5.2   |  46.0<HH>  |  0.21<L>    Ca    8.3<L>      19 May 2020 05:15  Phos  2.5     05-19  Mg     1.9     05-19      PTT - ( 19 May 2020 07:57 )  PTT:95.9 sec    I&O's Summary    18 May 2020 07:01  -  19 May 2020 07:00  --------------------------------------------------------  IN: 1879.8 mL / OUT: 3710 mL / NET: -1830.2 mL    19 May 2020 07:01  -  19 May 2020 13:36  --------------------------------------------------------  IN: 449.5 mL / OUT: 900 mL / NET: -450.5 mL        RADIOLOGY & ADDITIONAL STUDIES:    ADVANCE DIRECTIVES:   DNR YES NO  Completed on:                     MOLST  YES NO   Completed on:  Living Will  YES NO   Completed on: Palliative Care Followup  24 hour EVENTS: heparin gtt restarted given R segmental mid lobe PE on admission to ICU. Patient remains in ICU closely monitored - Reached out to HCP for emotional support today    CC: SOB     Present Symptoms: vented and sedated  symptom management by ICU team    Pain: On dilaudid drip            Character-            Duration-            Effect-            Factors-            Frequency-            Location-            Severity-    Review of Systems: Reviewed  Per HPI all other ROS negative  Unable to obtain due to poor mentation     MEDICATIONS  (STANDING):  chlorhexidine 0.12% Liquid 15 milliLiter(s) Oral Mucosa every 12 hours  chlorhexidine 2% Cloths 1 Application(s) Topical daily  dextrose 50% Injectable 25 Gram(s) IV Push once  heparin  Infusion.  Unit(s)/Hr (12 mL/Hr) IV Continuous <Continuous>  HYDROmorphone Infusion 2 mG/Hr (2 mL/Hr) IV Continuous <Continuous>  insulin lispro (HumaLOG) corrective regimen sliding scale   SubCutaneous every 4 hours  ketamine Infusion. 1.149 mG/kG/Hr (8.08 mL/Hr) IV Continuous <Continuous>  melatonin 5 milliGRAM(s) Oral at bedtime  meropenem  IVPB 1000 milliGRAM(s) IV Intermittent every 8 hours  methylPREDNISolone sodium succinate Injectable 35 milliGRAM(s) IV Push every 12 hours  multivitamin/minerals 1 Tablet(s) Oral daily  pantoprazole   Suspension 40 milliGRAM(s) Oral daily  psyllium Powder 1 Packet(s) Oral two times a day    MEDICATIONS  (PRN):  acetaminophen   Tablet .. 650 milliGRAM(s) Oral every 6 hours PRN Temp greater or equal to 38C (100.4F), Mild Pain (1 - 3), Moderate Pain (4 - 6)  dextrose 40% Gel 15 Gram(s) Oral once PRN Blood Glucose LESS THAN 70 milliGRAM(s)/deciliter  glucagon  Injectable 1 milliGRAM(s) IntraMuscular once PRN Glucose LESS THAN 70 milligrams/deciliter  heparin   Injectable 5500 Unit(s) IV Push every 6 hours PRN For aPTT less than 40  heparin   Injectable 2500 Unit(s) IV Push every 6 hours PRN For aPTT between 40 - 57      PHYSICAL EXAM:  Physical Exam - limited physical exam due to COVID - 19 isolation status  No Physical Exam at bedside completed in attempt to limit COVID-19   Reviewed H&P physical exam (most recent physical exam noted) and Reviewed latest ICU documentation for reference    Vital Signs Last 24 Hrs  T(C): 36.9 (19 May 2020 11:39), Max: 36.9 (19 May 2020 11:39)  T(F): 98.4 (19 May 2020 11:39), Max: 98.4 (19 May 2020 11:39)  HR: 69 (19 May 2020 12:40) (66 - 80)  BP: 91/44 (19 May 2020 12:00) (91/44 - 92/47)  BP(mean): 55 (19 May 2020 12:00) (55 - 57)  RR: 16 (19 May 2020 12:00) (15 - 34)  SpO2: 100% (19 May 2020 12:40) (91% - 100%)      LABS:                          6.7    10.55 )-----------( 240      ( 19 May 2020 10:23 )             21.7     05-19    136  |  88<L>  |  23.0<H>  ----------------------------<  139<H>  5.2   |  46.0<HH>  |  0.21<L>    Ca    8.3<L>      19 May 2020 05:15  Phos  2.5     05-19  Mg     1.9     05-19      PTT - ( 19 May 2020 07:57 )  PTT:95.9 sec    I&O's Summary    18 May 2020 07:01  -  19 May 2020 07:00  --------------------------------------------------------  IN: 1879.8 mL / OUT: 3710 mL / NET: -1830.2 mL    19 May 2020 07:01  -  19 May 2020 13:36  --------------------------------------------------------  IN: 449.5 mL / OUT: 900 mL / NET: -450.5 mL        RADIOLOGY & ADDITIONAL STUDIES:    ADVANCE DIRECTIVES:   DNR YES NO  Completed on:                     MOLST  YES NO   Completed on:  Living Will  YES NO   Completed on: Palliative Care Followup  24 hour EVENTS: heparin gtt restarted given R segmental mid lobe PE on admission to ICU. Patient remains in ICU closely monitored - Reached out to HCP for emotional support today    CC: SOB     Present Symptoms: vented and sedated  symptom management by ICU team    Pain: On dilaudid drip            Character-            Duration-            Effect-            Factors-            Frequency-            Location-            Severity-    Review of Systems: Reviewed  Per HPI all other ROS negative  Unable to obtain due to poor mentation     MEDICATIONS  (STANDING):  chlorhexidine 0.12% Liquid 15 milliLiter(s) Oral Mucosa every 12 hours  chlorhexidine 2% Cloths 1 Application(s) Topical daily  dextrose 50% Injectable 25 Gram(s) IV Push once  heparin  Infusion.  Unit(s)/Hr (12 mL/Hr) IV Continuous <Continuous>  HYDROmorphone Infusion 2 mG/Hr (2 mL/Hr) IV Continuous <Continuous>  insulin lispro (HumaLOG) corrective regimen sliding scale   SubCutaneous every 4 hours  ketamine Infusion. 1.149 mG/kG/Hr (8.08 mL/Hr) IV Continuous <Continuous>  melatonin 5 milliGRAM(s) Oral at bedtime  meropenem  IVPB 1000 milliGRAM(s) IV Intermittent every 8 hours  methylPREDNISolone sodium succinate Injectable 35 milliGRAM(s) IV Push every 12 hours  multivitamin/minerals 1 Tablet(s) Oral daily  pantoprazole   Suspension 40 milliGRAM(s) Oral daily  psyllium Powder 1 Packet(s) Oral two times a day    MEDICATIONS  (PRN):  acetaminophen   Tablet .. 650 milliGRAM(s) Oral every 6 hours PRN Temp greater or equal to 38C (100.4F), Mild Pain (1 - 3), Moderate Pain (4 - 6)  dextrose 40% Gel 15 Gram(s) Oral once PRN Blood Glucose LESS THAN 70 milliGRAM(s)/deciliter  glucagon  Injectable 1 milliGRAM(s) IntraMuscular once PRN Glucose LESS THAN 70 milligrams/deciliter  heparin   Injectable 5500 Unit(s) IV Push every 6 hours PRN For aPTT less than 40  heparin   Injectable 2500 Unit(s) IV Push every 6 hours PRN For aPTT between 40 - 57      PHYSICAL EXAM:  Physical Exam - limited physical exam due to COVID - 19 isolation status  No Physical Exam at bedside completed in attempt to limit COVID-19   Reviewed H&P physical exam (most recent physical exam noted) and Reviewed latest ICU documentation for reference    Vital Signs Last 24 Hrs  T(C): 36.9 (19 May 2020 11:39), Max: 36.9 (19 May 2020 11:39)  T(F): 98.4 (19 May 2020 11:39), Max: 98.4 (19 May 2020 11:39)  HR: 69 (19 May 2020 12:40) (66 - 80)  BP: 91/44 (19 May 2020 12:00) (91/44 - 92/47)  BP(mean): 55 (19 May 2020 12:00) (55 - 57)  RR: 16 (19 May 2020 12:00) (15 - 34)  SpO2: 100% (19 May 2020 12:40) (91% - 100%)      LABS:                          6.7    10.55 )-----------( 240      ( 19 May 2020 10:23 )             21.7     05-19    136  |  88<L>  |  23.0<H>  ----------------------------<  139<H>  5.2   |  46.0<HH>  |  0.21<L>    Ca    8.3<L>      19 May 2020 05:15  Phos  2.5     05-19  Mg     1.9     05-19      PTT - ( 19 May 2020 07:57 )  PTT:95.9 sec    I&O's Summary    18 May 2020 07:01  -  19 May 2020 07:00  --------------------------------------------------------  IN: 1879.8 mL / OUT: 3710 mL / NET: -1830.2 mL    19 May 2020 07:01  -  19 May 2020 13:36  --------------------------------------------------------  IN: 449.5 mL / OUT: 900 mL / NET: -450.5 mL    Advance Directives  Full Code

## 2020-05-19 NOTE — PROGRESS NOTE ADULT - ATTENDING COMMENTS
I have seen and examined the patient during SICU rounds from 9-11a  hematuria with blood loss anemia this am  A/P COVID resp  failure, hematuria on hep drip.  Neuro: cpnt wean, sleep hygiene  CV: perfusion is adequate HD normal  Pul: P:F 162 transition to PSV. will discuss trach in the near future.  GI; TEN, increase to 25Kcla/k  : gross hematuria, PTT upper limit, hold he[raphael for now  Heme: blood loss anemia and chronic diseases, 1 PRBC today.  ID: ABX to en tomorrof for PNA  Endo: glucemia at target  DIpso SICU

## 2020-05-19 NOTE — PROGRESS NOTE ADULT - PROBLEM SELECTOR PLAN 1
ID following currently on meropenem (5/14-5/21) for Acinetobacter PNA  - supportive treatment for COVID-19

## 2020-05-19 NOTE — CHART NOTE - NSCHARTNOTEFT_GEN_A_CORE
I gave updates to family.  All questions answered. I also performed informed consent for blood transfusion with friend HCP Yovani

## 2020-05-20 LAB
ALBUMIN SERPL ELPH-MCNC: 2.6 G/DL — LOW (ref 3.3–5.2)
ALP SERPL-CCNC: 579 U/L — HIGH (ref 40–120)
ALT FLD-CCNC: 180 U/L — HIGH
ANION GAP SERPL CALC-SCNC: 2 MMOL/L — LOW (ref 5–17)
APTT BLD: 29.6 SEC — SIGNIFICANT CHANGE UP (ref 27.5–36.3)
APTT BLD: 91.2 SEC — HIGH (ref 27.5–36.3)
AST SERPL-CCNC: 57 U/L — HIGH
BASOPHILS # BLD AUTO: 0.01 K/UL — SIGNIFICANT CHANGE UP (ref 0–0.2)
BASOPHILS NFR BLD AUTO: 0.1 % — SIGNIFICANT CHANGE UP (ref 0–2)
BILIRUB SERPL-MCNC: 0.2 MG/DL — LOW (ref 0.4–2)
BUN SERPL-MCNC: 23 MG/DL — HIGH (ref 8–20)
CALCIUM SERPL-MCNC: 7.2 MG/DL — LOW (ref 8.6–10.2)
CHLORIDE SERPL-SCNC: 96 MMOL/L — LOW (ref 98–107)
CO2 SERPL-SCNC: 43 MMOL/L — HIGH (ref 22–29)
CREAT SERPL-MCNC: <0.2 MG/DL — LOW (ref 0.5–1.3)
EOSINOPHIL # BLD AUTO: 0.01 K/UL — SIGNIFICANT CHANGE UP (ref 0–0.5)
EOSINOPHIL NFR BLD AUTO: 0.1 % — SIGNIFICANT CHANGE UP (ref 0–6)
GAS PNL BLDA: SIGNIFICANT CHANGE UP
GAS PNL BLDA: SIGNIFICANT CHANGE UP
GLUCOSE BLDC GLUCOMTR-MCNC: 121 MG/DL — HIGH (ref 70–99)
GLUCOSE BLDC GLUCOMTR-MCNC: 176 MG/DL — HIGH (ref 70–99)
GLUCOSE BLDC GLUCOMTR-MCNC: 218 MG/DL — HIGH (ref 70–99)
GLUCOSE BLDC GLUCOMTR-MCNC: 237 MG/DL — HIGH (ref 70–99)
GLUCOSE SERPL-MCNC: 139 MG/DL — HIGH (ref 70–99)
HCT VFR BLD CALC: 26.7 % — LOW (ref 39–50)
HCT VFR BLD CALC: 27.7 % — LOW (ref 39–50)
HGB BLD-MCNC: 8.2 G/DL — LOW (ref 13–17)
HGB BLD-MCNC: 8.6 G/DL — LOW (ref 13–17)
IMM GRANULOCYTES NFR BLD AUTO: 1.2 % — SIGNIFICANT CHANGE UP (ref 0–1.5)
LYMPHOCYTES # BLD AUTO: 1.08 K/UL — SIGNIFICANT CHANGE UP (ref 1–3.3)
LYMPHOCYTES # BLD AUTO: 12.1 % — LOW (ref 13–44)
MAGNESIUM SERPL-MCNC: 2 MG/DL — SIGNIFICANT CHANGE UP (ref 1.6–2.6)
MCHC RBC-ENTMCNC: 30.5 PG — SIGNIFICANT CHANGE UP (ref 27–34)
MCHC RBC-ENTMCNC: 30.7 GM/DL — LOW (ref 32–36)
MCHC RBC-ENTMCNC: 30.9 PG — SIGNIFICANT CHANGE UP (ref 27–34)
MCHC RBC-ENTMCNC: 31 GM/DL — LOW (ref 32–36)
MCV RBC AUTO: 100.8 FL — HIGH (ref 80–100)
MCV RBC AUTO: 98.2 FL — SIGNIFICANT CHANGE UP (ref 80–100)
MONOCYTES # BLD AUTO: 0.82 K/UL — SIGNIFICANT CHANGE UP (ref 0–0.9)
MONOCYTES NFR BLD AUTO: 9.2 % — SIGNIFICANT CHANGE UP (ref 2–14)
NEUTROPHILS # BLD AUTO: 6.86 K/UL — SIGNIFICANT CHANGE UP (ref 1.8–7.4)
NEUTROPHILS NFR BLD AUTO: 77.3 % — HIGH (ref 43–77)
PHOSPHATE SERPL-MCNC: 2.4 MG/DL — SIGNIFICANT CHANGE UP (ref 2.4–4.7)
PLATELET # BLD AUTO: 281 K/UL — SIGNIFICANT CHANGE UP (ref 150–400)
PLATELET # BLD AUTO: 303 K/UL — SIGNIFICANT CHANGE UP (ref 150–400)
POTASSIUM SERPL-MCNC: 4.5 MMOL/L — SIGNIFICANT CHANGE UP (ref 3.5–5.3)
POTASSIUM SERPL-SCNC: 4.5 MMOL/L — SIGNIFICANT CHANGE UP (ref 3.5–5.3)
PROT SERPL-MCNC: 5.1 G/DL — LOW (ref 6.6–8.7)
RBC # BLD: 2.65 M/UL — LOW (ref 4.2–5.8)
RBC # BLD: 2.82 M/UL — LOW (ref 4.2–5.8)
RBC # FLD: 18.1 % — HIGH (ref 10.3–14.5)
RBC # FLD: 18.5 % — HIGH (ref 10.3–14.5)
SODIUM SERPL-SCNC: 141 MMOL/L — SIGNIFICANT CHANGE UP (ref 135–145)
WBC # BLD: 11.19 K/UL — HIGH (ref 3.8–10.5)
WBC # BLD: 8.89 K/UL — SIGNIFICANT CHANGE UP (ref 3.8–10.5)
WBC # FLD AUTO: 11.19 K/UL — HIGH (ref 3.8–10.5)
WBC # FLD AUTO: 8.89 K/UL — SIGNIFICANT CHANGE UP (ref 3.8–10.5)

## 2020-05-20 PROCEDURE — 99291 CRITICAL CARE FIRST HOUR: CPT

## 2020-05-20 RX ORDER — HEPARIN SODIUM 5000 [USP'U]/ML
5500 INJECTION INTRAVENOUS; SUBCUTANEOUS EVERY 6 HOURS
Refills: 0 | Status: DISCONTINUED | OUTPATIENT
Start: 2020-05-20 | End: 2020-05-28

## 2020-05-20 RX ORDER — SODIUM CHLORIDE 9 MG/ML
1000 INJECTION, SOLUTION INTRAVENOUS
Refills: 0 | Status: DISCONTINUED | OUTPATIENT
Start: 2020-05-20 | End: 2020-05-22

## 2020-05-20 RX ORDER — HEPARIN SODIUM 5000 [USP'U]/ML
5000 INJECTION INTRAVENOUS; SUBCUTANEOUS EVERY 8 HOURS
Refills: 0 | Status: DISCONTINUED | OUTPATIENT
Start: 2020-05-20 | End: 2020-05-20

## 2020-05-20 RX ORDER — HEPARIN SODIUM 5000 [USP'U]/ML
INJECTION INTRAVENOUS; SUBCUTANEOUS
Qty: 25000 | Refills: 0 | Status: DISCONTINUED | OUTPATIENT
Start: 2020-05-20 | End: 2020-05-28

## 2020-05-20 RX ORDER — PREDNISOLONE 5 MG
20 TABLET ORAL DAILY
Refills: 0 | Status: DISCONTINUED | OUTPATIENT
Start: 2020-05-21 | End: 2020-05-21

## 2020-05-20 RX ADMIN — MEROPENEM 100 MILLIGRAM(S): 1 INJECTION INTRAVENOUS at 06:47

## 2020-05-20 RX ADMIN — KETAMINE HYDROCHLORIDE 8.08 MG/KG/HR: 100 INJECTION INTRAMUSCULAR; INTRAVENOUS at 09:58

## 2020-05-20 RX ADMIN — MEROPENEM 100 MILLIGRAM(S): 1 INJECTION INTRAVENOUS at 20:42

## 2020-05-20 RX ADMIN — MEROPENEM 100 MILLIGRAM(S): 1 INJECTION INTRAVENOUS at 15:38

## 2020-05-20 RX ADMIN — PANTOPRAZOLE SODIUM 40 MILLIGRAM(S): 20 TABLET, DELAYED RELEASE ORAL at 14:03

## 2020-05-20 RX ADMIN — HEPARIN SODIUM 1700 UNIT(S)/HR: 5000 INJECTION INTRAVENOUS; SUBCUTANEOUS at 06:17

## 2020-05-20 RX ADMIN — Medication 62.5 MILLIMOLE(S): at 07:28

## 2020-05-20 RX ADMIN — HEPARIN SODIUM 5500 UNIT(S): 5000 INJECTION INTRAVENOUS; SUBCUTANEOUS at 06:33

## 2020-05-20 RX ADMIN — Medication 5 MILLIGRAM(S): at 20:42

## 2020-05-20 RX ADMIN — CHLORHEXIDINE GLUCONATE 15 MILLILITER(S): 213 SOLUTION TOPICAL at 06:47

## 2020-05-20 RX ADMIN — Medication 1 PACKET(S): at 17:38

## 2020-05-20 RX ADMIN — HEPARIN SODIUM 1200 UNIT(S)/HR: 5000 INJECTION INTRAVENOUS; SUBCUTANEOUS at 17:46

## 2020-05-20 RX ADMIN — CHLORHEXIDINE GLUCONATE 15 MILLILITER(S): 213 SOLUTION TOPICAL at 17:37

## 2020-05-20 RX ADMIN — CHLORHEXIDINE GLUCONATE 1 APPLICATION(S): 213 SOLUTION TOPICAL at 10:01

## 2020-05-20 RX ADMIN — Medication 4: at 10:50

## 2020-05-20 RX ADMIN — Medication 6: at 17:37

## 2020-05-20 RX ADMIN — Medication 6: at 15:41

## 2020-05-20 RX ADMIN — HEPARIN SODIUM 1200 UNIT(S)/HR: 5000 INJECTION INTRAVENOUS; SUBCUTANEOUS at 11:08

## 2020-05-20 RX ADMIN — Medication 1 PACKET(S): at 06:47

## 2020-05-20 RX ADMIN — Medication 35 MILLIGRAM(S): at 06:47

## 2020-05-20 RX ADMIN — Medication 1 TABLET(S): at 14:03

## 2020-05-20 NOTE — PROGRESS NOTE ADULT - ATTENDING COMMENTS
65 yo M with no PMH presented with acute hypoxic respiratory failure and ARDS due to Covid  Hematuria on hep gtt which was held and subsequently restarted. If Hematuria restarts than will place IVC filter.  Intubated, vented, sedate and .  Continue sedation / analgesia to help with vent weaning  Continue cardiac monitoring   Continue vent weaning as tolerated   Continue to monitor renal function  Continue High Dose Steroid theray [place] 3 (Med[facility] Protocol)--WBC 15  Continue Meropenam until 5/21 for acinetobacter PNA  VTE ppx heparin gtt--if patient has hematuria then will consider IVC filter  Continue ICU for vent management --prognosis poor      code [id number]    CCT  40 min 63 yo M with no PMH presented with acute hypoxic respiratory failure and ARDS due to Covid  Hematuria on hep gtt which was held and subsequently restarted. If Hematuria restarts than will place IVC filter.  Intubated, vented, sedate and .  Continue sedation / analgesia to help with vent weaning  Continue cardiac monitoring   Continue vent weaning as tolerated   Continue to monitor renal function  Continue High Dose Steroid theray [place] 3 (Meduri Protocol)--WBC 15  Continue Meropenam until 5/21 for acinetobacter PNA  PE treatment --heparin gtt, if patient has hematuria then will consider IVC filter and eliquis  Continue ICU for vent management --prognosis poor      code [id number]    CCT  40 min

## 2020-05-20 NOTE — CHART NOTE - NSCHARTNOTEFT_GEN_A_CORE
Source: Patient [ ]  Family [ ]   other [x ]    Current Diet: Diet, NPO with Tube Feed:   Tube Feeding Modality: Orogastric  Pivot 1.5 Raj  Total Volume for 24 Hours (mL): 1080  Continuous  Starting Tube Feed Rate {mL per Hour}: 45  Until Goal Tube Feed Rate (mL per Hour): 45  Tube Feed Duration (in Hours): 24  Tube Feed Start Time: 11:30 (05-19-20 @ 10:40)    Enteral /Parenteral Nutrition: Pivot 1.5 at 45ml/hr (x20 hrs) to provide 900 ml, 1350 kcal, 84g protein, 683 ml free water. Additional free water per MD discretion.     Current Weight:   5/18: 77.5kg  5/14: 84.1kg  5/10: 83.2kg  4/18: 70.3kg   -11.5# wt loss noted. Obtain current wt, continue to monitor. Generalized 2+ edema noted.     Pertinent Medications: MEDICATIONS  (STANDING):  chlorhexidine 0.12% Liquid 15 milliLiter(s) Oral Mucosa every 12 hours  chlorhexidine 2% Cloths 1 Application(s) Topical daily  dextrose 50% Injectable 25 Gram(s) IV Push once  heparin  Infusion.  Unit(s)/Hr (12 mL/Hr) IV Continuous <Continuous>  HYDROmorphone Infusion 1.5 mG/Hr (1.5 mL/Hr) IV Continuous <Continuous>  insulin lispro (HumaLOG) corrective regimen sliding scale   SubCutaneous every 4 hours  ketamine Infusion. 1.149 mG/kG/Hr (8.08 mL/Hr) IV Continuous <Continuous>  melatonin 5 milliGRAM(s) Oral at bedtime  meropenem  IVPB 1000 milliGRAM(s) IV Intermittent every 8 hours  methylPREDNISolone sodium succinate Injectable 35 milliGRAM(s) IV Push every 12 hours  multiple electrolytes Injection Type 1 1000 milliLiter(s) (1000 mL/Hr) IV Continuous <Continuous>  multivitamin/minerals 1 Tablet(s) Oral daily  pantoprazole   Suspension 40 milliGRAM(s) Oral daily  psyllium Powder 1 Packet(s) Oral two times a day    MEDICATIONS  (PRN):  acetaminophen   Tablet .. 650 milliGRAM(s) Oral every 6 hours PRN Temp greater or equal to 38C (100.4F), Mild Pain (1 - 3), Moderate Pain (4 - 6)  dextrose 40% Gel 15 Gram(s) Oral once PRN Blood Glucose LESS THAN 70 milliGRAM(s)/deciliter  glucagon  Injectable 1 milliGRAM(s) IntraMuscular once PRN Glucose LESS THAN 70 milligrams/deciliter  heparin   Injectable 5500 Unit(s) IV Push every 6 hours PRN For aPTT less than 40  heparin   Injectable 2500 Unit(s) IV Push every 6 hours PRN For aPTT between 40 - 57    Pertinent Labs: CBC Full  -  ( 20 May 2020 05:08 )  WBC Count : 8.89 K/uL  RBC Count : 2.65 M/uL  Hemoglobin : 8.2 g/dL  Hematocrit : 26.7 %  Platelet Count - Automated : 281 K/uL  Mean Cell Volume : 100.8 fl  Mean Cell Hemoglobin : 30.9 pg  Mean Cell Hemoglobin Concentration : 30.7 gm/dL  Auto Neutrophil # : 6.86 K/uL  Auto Lymphocyte # : 1.08 K/uL  Auto Monocyte # : 0.82 K/uL  Auto Eosinophil # : 0.01 K/uL  Auto Basophil # : 0.01 K/uL  Auto Neutrophil % : 77.3 %  Auto Lymphocyte % : 12.1 %  Auto Monocyte % : 9.2 %  Auto Eosinophil % : 0.1 %  Auto Basophil % : 0.1 %  05-20 Na141 mmol/L Glu 139 mg/dL<H> K+ 4.5 mmol/L Cr  <0.20 mg/dL<L> BUN 23.0 mg/dL<H> Phos 2.4 mg/dL Alb 2.6 g/dL<L> PAB n/a       Skin: No Breakdown noted per documentation     Estimated Needs:   [x ] no change since previous assessment  [ ] recalculated:     Current Nutrition Diagnosis:  Pt remains at high nutrition risk secondary malnutrition (severe, acute) related to inability to meet sufficient protein-energy requirements in setting of acute hypoxic respiratory failure, R Segmental PE, Severe ARDS secondary to COVID 19, rapid response requiring intubation on 5/1 and remaining intubated, sedated, paralyzed as evidenced by meeting <50% nutrient needs >5 days and +fluid accumulation. Pt remains intubated, sedated, paralyzed.     Recommendations:   1) Add Prostat TID (300kcal., 45gm protein)   2) Monitor tube feed tolerance closely.  3) Continue bowel regimen PRN.  4) Continue MVI, rec vit C 500mg daily.  5) Obtain daily weights to monitor trends.     Monitoring and Evaluation:   [ ] PO intake [x ] Tolerance to diet prescription [X] Weights  [X] Follow up per protocol [X] Labs: Source: Patient [ ]  Family [ ]   other [x ]    Current Diet: Diet, NPO with Tube Feed:   Tube Feeding Modality: Orogastric  Pivot 1.5 Raj  Total Volume for 24 Hours (mL): 1080  Continuous  Starting Tube Feed Rate {mL per Hour}: 45  Until Goal Tube Feed Rate (mL per Hour): 45  Tube Feed Duration (in Hours): 24  Tube Feed Start Time: 11:30 (05-19-20 @ 10:40)    Enteral /Parenteral Nutrition: Pivot 1.5 at 45ml/hr (x20 hrs) to provide 900 ml, 1350 kcal, 84g protein, 683 ml free water. Additional free water per MD discretion.     Current Weight:   5/18: 77.5kg  5/14: 84.1kg  5/10: 83.2kg  4/18: 70.3kg   -11.5# wt loss noted. Obtain current wt, continue to monitor. Generalized 2+ edema noted.     Pertinent Medications: MEDICATIONS  (STANDING):  chlorhexidine 0.12% Liquid 15 milliLiter(s) Oral Mucosa every 12 hours  chlorhexidine 2% Cloths 1 Application(s) Topical daily  dextrose 50% Injectable 25 Gram(s) IV Push once  heparin  Infusion.  Unit(s)/Hr (12 mL/Hr) IV Continuous <Continuous>  HYDROmorphone Infusion 1.5 mG/Hr (1.5 mL/Hr) IV Continuous <Continuous>  insulin lispro (HumaLOG) corrective regimen sliding scale   SubCutaneous every 4 hours  ketamine Infusion. 1.149 mG/kG/Hr (8.08 mL/Hr) IV Continuous <Continuous>  melatonin 5 milliGRAM(s) Oral at bedtime  meropenem  IVPB 1000 milliGRAM(s) IV Intermittent every 8 hours  methylPREDNISolone sodium succinate Injectable 35 milliGRAM(s) IV Push every 12 hours  multiple electrolytes Injection Type 1 1000 milliLiter(s) (1000 mL/Hr) IV Continuous <Continuous>  multivitamin/minerals 1 Tablet(s) Oral daily  pantoprazole   Suspension 40 milliGRAM(s) Oral daily  psyllium Powder 1 Packet(s) Oral two times a day    MEDICATIONS  (PRN):  acetaminophen   Tablet .. 650 milliGRAM(s) Oral every 6 hours PRN Temp greater or equal to 38C (100.4F), Mild Pain (1 - 3), Moderate Pain (4 - 6)  dextrose 40% Gel 15 Gram(s) Oral once PRN Blood Glucose LESS THAN 70 milliGRAM(s)/deciliter  glucagon  Injectable 1 milliGRAM(s) IntraMuscular once PRN Glucose LESS THAN 70 milligrams/deciliter  heparin   Injectable 5500 Unit(s) IV Push every 6 hours PRN For aPTT less than 40  heparin   Injectable 2500 Unit(s) IV Push every 6 hours PRN For aPTT between 40 - 57    Pertinent Labs: CBC Full  -  ( 20 May 2020 05:08 )  WBC Count : 8.89 K/uL  RBC Count : 2.65 M/uL  Hemoglobin : 8.2 g/dL  Hematocrit : 26.7 %  Platelet Count - Automated : 281 K/uL  Mean Cell Volume : 100.8 fl  Mean Cell Hemoglobin : 30.9 pg  Mean Cell Hemoglobin Concentration : 30.7 gm/dL  Auto Neutrophil # : 6.86 K/uL  Auto Lymphocyte # : 1.08 K/uL  Auto Monocyte # : 0.82 K/uL  Auto Eosinophil # : 0.01 K/uL  Auto Basophil # : 0.01 K/uL  Auto Neutrophil % : 77.3 %  Auto Lymphocyte % : 12.1 %  Auto Monocyte % : 9.2 %  Auto Eosinophil % : 0.1 %  Auto Basophil % : 0.1 %  05-20 Na141 mmol/L Glu 139 mg/dL<H> K+ 4.5 mmol/L Cr  <0.20 mg/dL<L> BUN 23.0 mg/dL<H> Phos 2.4 mg/dL Alb 2.6 g/dL<L> PAB n/a       Skin: No Breakdown noted per documentation     Estimated Needs:   [x ] no change since previous assessment  [ ] recalculated:     Current Nutrition Diagnosis: Pt remains at high nutrition risk secondary malnutrition (severe, acute) related to inability to meet sufficient protein-energy requirements in setting of acute hypoxic respiratory failure, R Segmental PE, Severe ARDS secondary to COVID 19, rapid response requiring intubation on 5/1 and remaining intubated, sedated, paralyzed as evidenced by meeting <50% nutrient needs >5 days and +fluid accumulation. Pt remains intubated, sedated. Pivot 1.5 running at 24ml/hr per I&Os- tolerating per nursing documentation. Last documented BM 5/20-liquid noted. Aware palliative following. RD to remain available.     Recommendations:   1) Add Prostat TID (300kcal, 45g protein)   2) Increase TF to goal rate as medically feasible to better meet pt needs- Monitor tube feed tolerance closely.  3) Continue bowel regimen PRN- hold if liquid stool continues  4) Continue MVI, rec vit C 500mg daily.  5) Monitor wts and labs    Monitoring and Evaluation:   [ ] PO intake [x ] Tolerance to diet prescription [X] Weights  [X] Follow up per protocol [X] Labs:

## 2020-05-20 NOTE — PROGRESS NOTE ADULT - SUBJECTIVE AND OBJECTIVE BOX
HPI: 64y Male admitted 4/18 for hypoxic respiratory failure 2/2 COVID-19 with hospital course complicated by PE, Acinetobacter PNA and encephalopathy. Pt remains vent dependant, slowly weaning Dilaudid gtt, still without improvement in mental status.    24h Events: noted to have hematuria and anemia last night, heparin gtt held and received 1 U PRBC for a hgb of 6.7, follow up CBC demonstrated Hgb increased to 8 post transfusion. Hematuria resolved, and heparin gtt restarted. Slowly weaning Dilaudid gtt weaned from a rate of 2 to a rate of 1.5.     ICU Vital Signs Last 24 Hrs  T(C): 37.5 (19 May 2020 23:42), Max: 37.5 (19 May 2020 23:42)  T(F): 99.5 (19 May 2020 23:42), Max: 99.5 (19 May 2020 23:42)  HR: 67 (20 May 2020 00:24) (66 - 85)  BP: 119/53 (19 May 2020 16:05) (91/44 - 119/53)  BP(mean): 69 (19 May 2020 16:05) (55 - 69)  ABP: 103/60 (19 May 2020 16:05) (95/55 - 116/62)  ABP(mean): 78 (19 May 2020 16:05) (62 - 85)  RR: 25 (19 May 2020 16:05) (16 - 34)  SpO2: 100% (20 May 2020 00:24) (93% - 100%)    I&O's Detail    18 May 2020 07:01  -  19 May 2020 07:00  --------------------------------------------------------  IN:    Enteral Tube Flush: 300 mL    heparin  Infusion.: 223 mL    HYDROmorphone  Infusion: 50.8 mL    ketamine Infusion.: 264 mL    Pivot 1.5: 432 mL    Solution: 510 mL    Solution: 100 mL  Total IN: 1879.8 mL    OUT:    Indwelling Catheter - Urethral: 3710 mL  Total OUT: 3710 mL    Total NET: -1830.2 mL    19 May 2020 07:01  -  20 May 2020 01:11  --------------------------------------------------------  IN:    heparin  Infusion.: 33 mL    HYDROmorphone  Infusion: 16.5 mL    ketamine Infusion.: 132 mL    Packed Red Blood Cells: 290 mL    Solution: 50 mL  Total IN: 521.5 mL    OUT:    Indwelling Catheter - Urethral: 1700 mL  Total OUT: 1700 mL    Total NET: -1178.5 mL    ABG - ( 19 May 2020 10:51 )  pH, Arterial: 7.41  pH, Blood: x     /  pCO2: 85    /  pO2: 82    / HCO3: 52    / Base Excess: 27.4  /  SaO2: 98        MEDICATIONS  (STANDING):  chlorhexidine 0.12% Liquid 15 milliLiter(s) Oral Mucosa every 12 hours  chlorhexidine 2% Cloths 1 Application(s) Topical daily  dextrose 50% Injectable 25 Gram(s) IV Push once  heparin  Infusion.  Unit(s)/Hr (12 mL/Hr) IV Continuous <Continuous>  HYDROmorphone Infusion 1.5 mG/Hr (1.5 mL/Hr) IV Continuous <Continuous>  insulin lispro (HumaLOG) corrective regimen sliding scale   SubCutaneous every 4 hours  ketamine Infusion. 1.149 mG/kG/Hr (8.08 mL/Hr) IV Continuous <Continuous>  melatonin 5 milliGRAM(s) Oral at bedtime  meropenem  IVPB 1000 milliGRAM(s) IV Intermittent every 8 hours  methylPREDNISolone sodium succinate Injectable 35 milliGRAM(s) IV Push every 12 hours  multivitamin/minerals 1 Tablet(s) Oral daily  pantoprazole   Suspension 40 milliGRAM(s) Oral daily  psyllium Powder 1 Packet(s) Oral two times a day    MEDICATIONS  (PRN):  acetaminophen   Tablet .. 650 milliGRAM(s) Oral every 6 hours PRN Temp greater or equal to 38C (100.4F), Mild Pain (1 - 3), Moderate Pain (4 - 6)  dextrose 40% Gel 15 Gram(s) Oral once PRN Blood Glucose LESS THAN 70 milliGRAM(s)/deciliter  glucagon  Injectable 1 milliGRAM(s) IntraMuscular once PRN Glucose LESS THAN 70 milligrams/deciliter  heparin   Injectable 5500 Unit(s) IV Push every 6 hours PRN For aPTT less than 40  heparin   Injectable 2500 Unit(s) IV Push every 6 hours PRN For aPTT between 40 - 57    Physical Exam:    Neurological: eyes closed, no response to sternal rub    Pulmonary: ETT in place, currently on VC, breath sounds b/l    Cardiovascular: S1, S2, regular rate & rhythm    Gastrointestinal: soft, non-tender, non-distended, no rebound / guarding    : alexander in place draining dark yellow urine    Skin: warm, dry, no diaphoresis, no pallor, cyanosis, or jaundice    LABS:  CBC Full  -  ( 19 May 2020 18:54 )  WBC Count : 9.41 K/uL  RBC Count : 2.55 M/uL  Hemoglobin : 8.0 g/dL  Hematocrit : 25.7 %  Platelet Count - Automated : 245 K/uL  Mean Cell Volume : 100.8 fl  Mean Cell Hemoglobin : 31.4 pg  Mean Cell Hemoglobin Concentration : 31.1 gm/dL  Auto Neutrophil # : x  Auto Lymphocyte # : x  Auto Monocyte # : x  Auto Eosinophil # : x  Auto Basophil # : x  Auto Neutrophil % : x  Auto Lymphocyte % : x  Auto Monocyte % : x  Auto Eosinophil % : x  Auto Basophil % : x    05-19    136  |  88<L>  |  23.0<H>  ----------------------------<  139<H>  5.2   |  46.0<HH>  |  0.21<L>    Ca    8.3<L>      19 May 2020 05:15  Phos  2.5     05-19  Mg     1.9     05-19    PT/INR - ( 19 May 2020 18:17 )   PT: 11.9 sec;   INR: 1.05 ratio       PTT - ( 19 May 2020 18:17 )  PTT:30.9 sec    RECENT CULTURES:  05-14 .Sputum Sputum Acinetobacter baumannii   Few polymorphonuclear leukocytes per low power field  Moderate Squamous epithelial cells per low power field  Numerous Gram positive cocci in pairs seen per oil power field   Moderate Acinetobacter baumannii /nosocomialis group  Normal Respiratory Christine present    ASSESSMENT/PLAN:  HPI: 64y Male admitted 4/18 for hypoxic respiratory failure 2/2 COVID-19 with hospital course complicated by PE, Acinetobacter PNA and encephalopathy. Pt remains vent dependant, slowly weaning Dilaudid gtt, still without improvement in mental status.    Neuro:   - ketamine  - Dilaudid gtt weaned to 1.5  - added oxy IR to supplement Dilaudid wean  - delirium ppx: regulate sleep wake cycles, melatonin at bedtime    CV:   - continue invasive monitoring with arterial line    Pulm:   - oxygenating adequately  - still with impaired ventilation, however CO2 downtrending  - wean vent as tolerated  - continue meduri protocol through June 1    GI/Nutrition:   - NPO with tube feeds     /Renal:   - alexander for strict I&O  - monitor kidney fxn  - hematuria currently resolved, monitor for re-occurence    ID:   - continue meropenem (5/14-5/21) for Acinetobacter PNA  - supportive treatment for COVID-19    Endo:  - monitor blood glucose    Skin:   - repositioning for DTI prevention while in bed    Heme/DVT Prophylaxis:   - heparin gtt restarted     Dispo:  - care per SICU

## 2020-05-21 LAB
ALBUMIN SERPL ELPH-MCNC: 2.7 G/DL — LOW (ref 3.3–5.2)
ALP SERPL-CCNC: 960 U/L — HIGH (ref 40–120)
ALT FLD-CCNC: 461 U/L — HIGH
ANION GAP SERPL CALC-SCNC: 5 MMOL/L — SIGNIFICANT CHANGE UP (ref 5–17)
APTT BLD: 83.4 SEC — HIGH (ref 27.5–36.3)
AST SERPL-CCNC: 422 U/L — HIGH
BASOPHILS # BLD AUTO: 0.03 K/UL — SIGNIFICANT CHANGE UP (ref 0–0.2)
BASOPHILS NFR BLD AUTO: 0.3 % — SIGNIFICANT CHANGE UP (ref 0–2)
BILIRUB SERPL-MCNC: 0.5 MG/DL — SIGNIFICANT CHANGE UP (ref 0.4–2)
BUN SERPL-MCNC: 25 MG/DL — HIGH (ref 8–20)
CALCIUM SERPL-MCNC: 8 MG/DL — LOW (ref 8.6–10.2)
CHLORIDE SERPL-SCNC: 94 MMOL/L — LOW (ref 98–107)
CO2 SERPL-SCNC: 41 MMOL/L — HIGH (ref 22–29)
CREAT SERPL-MCNC: <0.2 MG/DL — LOW (ref 0.5–1.3)
EOSINOPHIL # BLD AUTO: 0.36 K/UL — SIGNIFICANT CHANGE UP (ref 0–0.5)
EOSINOPHIL NFR BLD AUTO: 4.1 % — SIGNIFICANT CHANGE UP (ref 0–6)
GAS PNL BLDA: SIGNIFICANT CHANGE UP
GLUCOSE BLDC GLUCOMTR-MCNC: 134 MG/DL — HIGH (ref 70–99)
GLUCOSE BLDC GLUCOMTR-MCNC: 154 MG/DL — HIGH (ref 70–99)
GLUCOSE BLDC GLUCOMTR-MCNC: 206 MG/DL — HIGH (ref 70–99)
GLUCOSE BLDC GLUCOMTR-MCNC: 233 MG/DL — HIGH (ref 70–99)
GLUCOSE SERPL-MCNC: 123 MG/DL — HIGH (ref 70–99)
HCT VFR BLD CALC: 24.7 % — LOW (ref 39–50)
HGB BLD-MCNC: 7.8 G/DL — LOW (ref 13–17)
IMM GRANULOCYTES NFR BLD AUTO: 1 % — SIGNIFICANT CHANGE UP (ref 0–1.5)
LYMPHOCYTES # BLD AUTO: 0.95 K/UL — LOW (ref 1–3.3)
LYMPHOCYTES # BLD AUTO: 10.9 % — LOW (ref 13–44)
MAGNESIUM SERPL-MCNC: 2.1 MG/DL — SIGNIFICANT CHANGE UP (ref 1.6–2.6)
MCHC RBC-ENTMCNC: 31.1 PG — SIGNIFICANT CHANGE UP (ref 27–34)
MCHC RBC-ENTMCNC: 31.6 GM/DL — LOW (ref 32–36)
MCV RBC AUTO: 98.4 FL — SIGNIFICANT CHANGE UP (ref 80–100)
MONOCYTES # BLD AUTO: 0.81 K/UL — SIGNIFICANT CHANGE UP (ref 0–0.9)
MONOCYTES NFR BLD AUTO: 9.3 % — SIGNIFICANT CHANGE UP (ref 2–14)
NEUTROPHILS # BLD AUTO: 6.48 K/UL — SIGNIFICANT CHANGE UP (ref 1.8–7.4)
NEUTROPHILS NFR BLD AUTO: 74.4 % — SIGNIFICANT CHANGE UP (ref 43–77)
PHOSPHATE SERPL-MCNC: 1.5 MG/DL — LOW (ref 2.4–4.7)
PLATELET # BLD AUTO: 289 K/UL — SIGNIFICANT CHANGE UP (ref 150–400)
POTASSIUM SERPL-MCNC: 4.4 MMOL/L — SIGNIFICANT CHANGE UP (ref 3.5–5.3)
POTASSIUM SERPL-SCNC: 4.4 MMOL/L — SIGNIFICANT CHANGE UP (ref 3.5–5.3)
PROT SERPL-MCNC: 5.3 G/DL — LOW (ref 6.6–8.7)
RBC # BLD: 2.51 M/UL — LOW (ref 4.2–5.8)
RBC # FLD: 18.5 % — HIGH (ref 10.3–14.5)
SODIUM SERPL-SCNC: 140 MMOL/L — SIGNIFICANT CHANGE UP (ref 135–145)
WBC # BLD: 8.72 K/UL — SIGNIFICANT CHANGE UP (ref 3.8–10.5)
WBC # FLD AUTO: 8.72 K/UL — SIGNIFICANT CHANGE UP (ref 3.8–10.5)

## 2020-05-21 PROCEDURE — 99233 SBSQ HOSP IP/OBS HIGH 50: CPT

## 2020-05-21 RX ORDER — PREDNISOLONE 5 MG
20 TABLET ORAL ONCE
Refills: 0 | Status: COMPLETED | OUTPATIENT
Start: 2020-05-21 | End: 2020-05-21

## 2020-05-21 RX ORDER — PREDNISOLONE 5 MG
5 TABLET ORAL ONCE
Refills: 0 | Status: COMPLETED | OUTPATIENT
Start: 2020-05-23 | End: 2020-05-23

## 2020-05-21 RX ORDER — PREDNISOLONE 5 MG
TABLET ORAL
Refills: 0 | Status: DISCONTINUED | OUTPATIENT
Start: 2020-05-21 | End: 2020-05-21

## 2020-05-21 RX ORDER — PREDNISOLONE 5 MG
20 TABLET ORAL DAILY
Refills: 0 | Status: DISCONTINUED | OUTPATIENT
Start: 2020-05-21 | End: 2020-05-21

## 2020-05-21 RX ORDER — PREDNISOLONE 5 MG
20 TABLET ORAL ONCE
Refills: 0 | Status: DISCONTINUED | OUTPATIENT
Start: 2020-05-21 | End: 2020-05-21

## 2020-05-21 RX ORDER — PREDNISOLONE 5 MG
10 TABLET ORAL ONCE
Refills: 0 | Status: COMPLETED | OUTPATIENT
Start: 2020-05-22 | End: 2020-05-22

## 2020-05-21 RX ADMIN — Medication 1 TABLET(S): at 12:08

## 2020-05-21 RX ADMIN — Medication 1 PACKET(S): at 18:00

## 2020-05-21 RX ADMIN — MEROPENEM 100 MILLIGRAM(S): 1 INJECTION INTRAVENOUS at 13:13

## 2020-05-21 RX ADMIN — CHLORHEXIDINE GLUCONATE 1 APPLICATION(S): 213 SOLUTION TOPICAL at 12:08

## 2020-05-21 RX ADMIN — Medication 85 MILLIMOLE(S): at 08:42

## 2020-05-21 RX ADMIN — Medication 20 MILLIGRAM(S): at 13:13

## 2020-05-21 RX ADMIN — HEPARIN SODIUM 1200 UNIT(S)/HR: 5000 INJECTION INTRAVENOUS; SUBCUTANEOUS at 02:20

## 2020-05-21 RX ADMIN — CHLORHEXIDINE GLUCONATE 15 MILLILITER(S): 213 SOLUTION TOPICAL at 06:44

## 2020-05-21 RX ADMIN — MEROPENEM 100 MILLIGRAM(S): 1 INJECTION INTRAVENOUS at 06:44

## 2020-05-21 RX ADMIN — Medication 6: at 10:30

## 2020-05-21 RX ADMIN — CHLORHEXIDINE GLUCONATE 15 MILLILITER(S): 213 SOLUTION TOPICAL at 17:48

## 2020-05-21 RX ADMIN — Medication 4: at 15:13

## 2020-05-21 RX ADMIN — Medication 1 PACKET(S): at 06:45

## 2020-05-21 RX ADMIN — MEROPENEM 100 MILLIGRAM(S): 1 INJECTION INTRAVENOUS at 22:04

## 2020-05-21 RX ADMIN — Medication 6: at 22:03

## 2020-05-21 RX ADMIN — Medication 5 MILLIGRAM(S): at 22:03

## 2020-05-21 NOTE — CHART NOTE - NSCHARTNOTEFT_GEN_A_CORE
Dr. London spoke to patient's HCP, Yovani, and updated him on the patient's status and treatment plan and discussed trach plan.  No consent acquired do to poor connection on phone and loss of phone call.

## 2020-05-21 NOTE — PROGRESS NOTE ADULT - SUBJECTIVE AND OBJECTIVE BOX
24h Events:  Pt's TV increased during day shift to assist with ventilation. Good response with no change in plateau pressures.  Pt continues with hematuria, which correlates with Hep gtt being on.  No gross hematuria noted overnight, no clots.  Hemodynamics remain stable.      ICU Vital Signs Last 24 Hrs  T(C): 37.1 (21 May 2020 00:37), Max: 37.1 (20 May 2020 11:25)  T(F): 98.7 (21 May 2020 00:37), Max: 98.8 (20 May 2020 11:25)  HR: 85 (20 May 2020 20:25) (61 - 85)  BP: 113/55 (20 May 2020 16:01) (94/47 - 124/74)  BP(mean): 69 (20 May 2020 16:01) (58 - 98)  ABP: 153/69 (20 May 2020 16:01) (87/65 - 153/69)  ABP(mean): 99 (20 May 2020 16:01) (63 - 99)  RR: 30 (20 May 2020 16:01) (24 - 30)  SpO2: 99% (20 May 2020 20:25) (96% - 100%)      I&O's Detail    19 May 2020 07:01  -  20 May 2020 07:00  --------------------------------------------------------  IN:    Enteral Tube Flush: 60 mL    heparin  Infusion.: 187 mL    HYDROmorphone  Infusion: 18 mL    HYDROmorphone  Infusion: 16.5 mL    ketamine Infusion.: 264 mL    Packed Red Blood Cells: 290 mL    Pivot 1.5: 192 mL    Solution: 50 mL    Solution: 50 mL    Solution: 1000 mL  Total IN: 2127.5 mL    OUT:    Indwelling Catheter - Urethral: 3100 mL  Total OUT: 3100 mL    Total NET: -972.5 mL      20 May 2020 07:01  -  21 May 2020 00:42  --------------------------------------------------------  IN:    heparin  Infusion.: 132 mL    HYDROmorphone  Infusion: 13 mL    ketamine Infusion.: 121 mL    Solution: 50 mL  Total IN: 316 mL    OUT:    Indwelling Catheter - Urethral: 1050 mL  Total OUT: 1050 mL    Total NET: -734 mL          ABG - ( 20 May 2020 12:35 )  pH, Arterial: 7.44  pH, Blood: x     /  pCO2: 74    /  pO2: 76    / HCO3: 48    / Base Excess: 22.9  /  SaO2: 97                  MEDICATIONS  (STANDING):  chlorhexidine 0.12% Liquid 15 milliLiter(s) Oral Mucosa every 12 hours  chlorhexidine 2% Cloths 1 Application(s) Topical daily  dextrose 50% Injectable 25 Gram(s) IV Push once  heparin  Infusion.  Unit(s)/Hr (12 mL/Hr) IV Continuous <Continuous>  HYDROmorphone Infusion 1.5 mG/Hr (1.5 mL/Hr) IV Continuous <Continuous>  insulin lispro (HumaLOG) corrective regimen sliding scale   SubCutaneous every 4 hours  ketamine Infusion. 1.149 mG/kG/Hr (8.08 mL/Hr) IV Continuous <Continuous>  melatonin 5 milliGRAM(s) Oral at bedtime  meropenem  IVPB 1000 milliGRAM(s) IV Intermittent every 8 hours  multiple electrolytes Injection Type 1 1000 milliLiter(s) (1000 mL/Hr) IV Continuous <Continuous>  multivitamin/minerals 1 Tablet(s) Oral daily  pantoprazole   Suspension 40 milliGRAM(s) Oral daily  prednisoLONE    3 mG/mL Solution (ORAPRED) 20 milliGRAM(s) Oral daily  psyllium Powder 1 Packet(s) Oral two times a day    MEDICATIONS  (PRN):  acetaminophen   Tablet .. 650 milliGRAM(s) Oral every 6 hours PRN Temp greater or equal to 38C (100.4F), Mild Pain (1 - 3), Moderate Pain (4 - 6)  dextrose 40% Gel 15 Gram(s) Oral once PRN Blood Glucose LESS THAN 70 milliGRAM(s)/deciliter  glucagon  Injectable 1 milliGRAM(s) IntraMuscular once PRN Glucose LESS THAN 70 milligrams/deciliter  heparin   Injectable 5500 Unit(s) IV Push every 6 hours PRN For aPTT less than 40        Physical Exam:    Neurological: RASS -2, on Dilaudid/Ketamine gtt for sedation / analgesia    Pulmonary: mechanical ventilation;   Mode: AC/ CMV (Assist Control/ Continuous Mandatory Ventilation)  RR (machine): 30  TV (machine): 300  FiO2: 40  PEEP: 5  ITime: 0.6  MAP: 14  PIP: 33      Cardiovascular: S1, S2, regular rate & rhythm    Gastrointestinal: soft, non-tender, non-distended, no rebound / guarding    : alexander in place draining pink urine    Skin: warm & dry, no diaphoresis, no pallor, cyanosis, or jaundice    LABS:  CBC Full  -  ( 20 May 2020 17:14 )  WBC Count : 11.19 K/uL  RBC Count : 2.82 M/uL  Hemoglobin : 8.6 g/dL  Hematocrit : 27.7 %  Platelet Count - Automated : 303 K/uL  Mean Cell Volume : 98.2 fl  Mean Cell Hemoglobin : 30.5 pg  Mean Cell Hemoglobin Concentration : 31.0 gm/dL  Auto Neutrophil # : x  Auto Lymphocyte # : x  Auto Monocyte # : x  Auto Eosinophil # : x  Auto Basophil # : x  Auto Neutrophil % : x  Auto Lymphocyte % : x  Auto Monocyte % : x  Auto Eosinophil % : x  Auto Basophil % : x    05-20    141  |  96<L>  |  23.0<H>  ----------------------------<  139<H>  4.5   |  43.0<H>  |  <0.20<L>    Ca    7.2<L>      20 May 2020 05:08  Phos  2.4     05-20  Mg     2.0     05-20    TPro  5.1<L>  /  Alb  2.6<L>  /  TBili  0.2<L>  /  DBili  x   /  AST  57<H>  /  ALT  180<H>  /  AlkPhos  579<H>  05-20    PT/INR - ( 19 May 2020 18:17 )   PT: 11.9 sec;   INR: 1.05 ratio         PTT - ( 20 May 2020 17:11 )  PTT:91.2 sec    RECENT CULTURES:  05-14 .Sputum Sputum Acinetobacter baumannii   Few polymorphonuclear leukocytes per low power field  Moderate Squamous epithelial cells per low power field  Numerous Gram positive cocci in pairs seen per oil power field   Moderate Acinetobacter baumannii /nosocomialis group  Normal Respiratory Christine present        LIVER FUNCTIONS - ( 20 May 2020 05:08 )  Alb: 2.6 g/dL / Pro: 5.1 g/dL / ALK PHOS: 579 U/L / ALT: 180 U/L / AST: 57 U/L / GGT: x                   ASSESSMENT/PLAN:  64y Male with acute hypoxic vent dependent respiratory failure, Covid Sepsis, Acinetobacter PNA, PE    Neuro: sedation / analgesia with Ketamine/Dilaudid gtt, delirium precautions, daily sedation holiday    CV: maintain MAP > 65, continue invasive monitoring with arterial line    Pulm: continue mechanical ventilation,  wean FiO2 / PEEP as tolerated    GI/Nutrition: NPO with tube feeds - if prone while receiving TF keep in reverse trendelenberg     /Renal: alexander for strict I&O, monitor kidney fxn    ID: Merrem 5/21    Endo: monitor blood glucose    Skin: repositioning for DTI prevention while in bed    Heme/DVT Prophylaxis: SCDs / Heparin gtt for hypercoagulable state.  Should urine turn to solitario hematuria, will d/c Heparing gtt and add Lovenox daily.  IR would need consultation for placement of IVC filter should the Hep gtt be stopped.

## 2020-05-21 NOTE — PROGRESS NOTE ADULT - ATTENDING COMMENTS
The patient was seen and examined  Events noted  No new problems    Neurologic:  Sedated  Respiratory:  VC 50%, PEEP+5, ZPI=[id number]  Hemodynamic:  Normal  Renal:  Urine flow noted, hematuria noted.  GI:  TEN, increase in ALT/AST, Alk Phos; bilirubin is normal  Hematologic:  Hgb 7.8  ID:  WBC=8.7, afebrile.  Meropenem for acinetobacter PNA    Plan:  Will decrease the dilaudid  Unclear cause of hepatotoxicity--cellular and canalicular, but bilirubin is normal.  Will review medications as possible cause  Complete antibiotic course  Anticoagulation

## 2020-05-22 LAB
ALBUMIN SERPL ELPH-MCNC: 3 G/DL — LOW (ref 3.3–5.2)
ALP SERPL-CCNC: 1274 U/L — HIGH (ref 40–120)
ALT FLD-CCNC: 476 U/L — HIGH
ANION GAP SERPL CALC-SCNC: 5 MMOL/L — SIGNIFICANT CHANGE UP (ref 5–17)
APTT BLD: 79.4 SEC — HIGH (ref 27.5–36.3)
AST SERPL-CCNC: 307 U/L — HIGH
BASOPHILS # BLD AUTO: 0.05 K/UL — SIGNIFICANT CHANGE UP (ref 0–0.2)
BASOPHILS NFR BLD AUTO: 0.4 % — SIGNIFICANT CHANGE UP (ref 0–2)
BILIRUB DIRECT SERPL-MCNC: 0.3 MG/DL — SIGNIFICANT CHANGE UP (ref 0–0.3)
BILIRUB INDIRECT FLD-MCNC: 0.2 MG/DL — SIGNIFICANT CHANGE UP (ref 0.2–1)
BILIRUB SERPL-MCNC: 0.5 MG/DL — SIGNIFICANT CHANGE UP (ref 0.4–2)
BUN SERPL-MCNC: 22 MG/DL — HIGH (ref 8–20)
CALCIUM SERPL-MCNC: 8.2 MG/DL — LOW (ref 8.6–10.2)
CHLORIDE SERPL-SCNC: 93 MMOL/L — LOW (ref 98–107)
CK SERPL-CCNC: 89 U/L — SIGNIFICANT CHANGE UP (ref 30–200)
CO2 SERPL-SCNC: 41 MMOL/L — HIGH (ref 22–29)
CREAT SERPL-MCNC: <0.2 MG/DL — LOW (ref 0.5–1.3)
EOSINOPHIL # BLD AUTO: 0.35 K/UL — SIGNIFICANT CHANGE UP (ref 0–0.5)
EOSINOPHIL NFR BLD AUTO: 2.9 % — SIGNIFICANT CHANGE UP (ref 0–6)
GAS PNL BLDA: SIGNIFICANT CHANGE UP
GLUCOSE BLDC GLUCOMTR-MCNC: 127 MG/DL — HIGH (ref 70–99)
GLUCOSE BLDC GLUCOMTR-MCNC: 137 MG/DL — HIGH (ref 70–99)
GLUCOSE BLDC GLUCOMTR-MCNC: 158 MG/DL — HIGH (ref 70–99)
GLUCOSE BLDC GLUCOMTR-MCNC: 168 MG/DL — HIGH (ref 70–99)
GLUCOSE BLDC GLUCOMTR-MCNC: 169 MG/DL — HIGH (ref 70–99)
GLUCOSE BLDC GLUCOMTR-MCNC: 186 MG/DL — HIGH (ref 70–99)
GLUCOSE SERPL-MCNC: 139 MG/DL — HIGH (ref 70–99)
HCT VFR BLD CALC: 27.8 % — LOW (ref 39–50)
HGB BLD-MCNC: 8.6 G/DL — LOW (ref 13–17)
IMM GRANULOCYTES NFR BLD AUTO: 0.7 % — SIGNIFICANT CHANGE UP (ref 0–1.5)
LYMPHOCYTES # BLD AUTO: 1.34 K/UL — SIGNIFICANT CHANGE UP (ref 1–3.3)
LYMPHOCYTES # BLD AUTO: 11.1 % — LOW (ref 13–44)
MAGNESIUM SERPL-MCNC: 2.1 MG/DL — SIGNIFICANT CHANGE UP (ref 1.6–2.6)
MCHC RBC-ENTMCNC: 30.6 PG — SIGNIFICANT CHANGE UP (ref 27–34)
MCHC RBC-ENTMCNC: 30.9 GM/DL — LOW (ref 32–36)
MCV RBC AUTO: 98.9 FL — SIGNIFICANT CHANGE UP (ref 80–100)
MONOCYTES # BLD AUTO: 1.02 K/UL — HIGH (ref 0–0.9)
MONOCYTES NFR BLD AUTO: 8.4 % — SIGNIFICANT CHANGE UP (ref 2–14)
NEUTROPHILS # BLD AUTO: 9.24 K/UL — HIGH (ref 1.8–7.4)
NEUTROPHILS NFR BLD AUTO: 76.5 % — SIGNIFICANT CHANGE UP (ref 43–77)
PHOSPHATE SERPL-MCNC: 2.4 MG/DL — SIGNIFICANT CHANGE UP (ref 2.4–4.7)
PLATELET # BLD AUTO: 321 K/UL — SIGNIFICANT CHANGE UP (ref 150–400)
POTASSIUM SERPL-MCNC: 4.7 MMOL/L — SIGNIFICANT CHANGE UP (ref 3.5–5.3)
POTASSIUM SERPL-SCNC: 4.7 MMOL/L — SIGNIFICANT CHANGE UP (ref 3.5–5.3)
PROT SERPL-MCNC: 6.1 G/DL — LOW (ref 6.6–8.7)
RBC # BLD: 2.81 M/UL — LOW (ref 4.2–5.8)
RBC # FLD: 18.4 % — HIGH (ref 10.3–14.5)
SODIUM SERPL-SCNC: 139 MMOL/L — SIGNIFICANT CHANGE UP (ref 135–145)
WBC # BLD: 12.08 K/UL — HIGH (ref 3.8–10.5)
WBC # FLD AUTO: 12.08 K/UL — HIGH (ref 3.8–10.5)

## 2020-05-22 PROCEDURE — 99291 CRITICAL CARE FIRST HOUR: CPT

## 2020-05-22 RX ORDER — SODIUM,POTASSIUM PHOSPHATES 278-250MG
1 POWDER IN PACKET (EA) ORAL
Refills: 0 | Status: COMPLETED | OUTPATIENT
Start: 2020-05-22 | End: 2020-05-22

## 2020-05-22 RX ADMIN — Medication 1 PACKET(S): at 11:11

## 2020-05-22 RX ADMIN — Medication 4: at 11:21

## 2020-05-22 RX ADMIN — CHLORHEXIDINE GLUCONATE 1 APPLICATION(S): 213 SOLUTION TOPICAL at 11:14

## 2020-05-22 RX ADMIN — Medication 0: at 02:40

## 2020-05-22 RX ADMIN — Medication 4: at 18:22

## 2020-05-22 RX ADMIN — KETAMINE HYDROCHLORIDE 8.08 MG/KG/HR: 100 INJECTION INTRAMUSCULAR; INTRAVENOUS at 03:46

## 2020-05-22 RX ADMIN — Medication 650 MILLIGRAM(S): at 05:11

## 2020-05-22 RX ADMIN — Medication 1 PACKET(S): at 05:12

## 2020-05-22 RX ADMIN — Medication 1 TABLET(S): at 11:14

## 2020-05-22 RX ADMIN — HEPARIN SODIUM 1200 UNIT(S)/HR: 5000 INJECTION INTRAVENOUS; SUBCUTANEOUS at 06:38

## 2020-05-22 RX ADMIN — CHLORHEXIDINE GLUCONATE 15 MILLILITER(S): 213 SOLUTION TOPICAL at 05:12

## 2020-05-22 RX ADMIN — Medication 4: at 21:59

## 2020-05-22 RX ADMIN — Medication 650 MILLIGRAM(S): at 15:41

## 2020-05-22 RX ADMIN — Medication 10 MILLIGRAM(S): at 05:12

## 2020-05-22 RX ADMIN — Medication 1 PACKET(S): at 17:16

## 2020-05-22 RX ADMIN — Medication 4: at 06:24

## 2020-05-22 RX ADMIN — Medication 5 MILLIGRAM(S): at 21:59

## 2020-05-22 RX ADMIN — Medication 1 PACKET(S): at 08:19

## 2020-05-22 RX ADMIN — CHLORHEXIDINE GLUCONATE 15 MILLILITER(S): 213 SOLUTION TOPICAL at 17:16

## 2020-05-22 RX ADMIN — KETAMINE HYDROCHLORIDE 8.08 MG/KG/HR: 100 INJECTION INTRAMUSCULAR; INTRAVENOUS at 15:41

## 2020-05-22 NOTE — PROGRESS NOTE ADULT - SUBJECTIVE AND OBJECTIVE BOX
24h Events: Continues on dilaudid and ketamine infusion- decreasing doses without issues. Awaiting discussion with family for possible tracheostomy and gastrostomy tube placement.  Vitals stable.      ICU Vital Signs Last 24 Hrs  T(C): 37.8 (22 May 2020 00:23), Max: 37.9 (21 May 2020 11:45)  T(F): 100 (22 May 2020 00:23), Max: 100.2 (21 May 2020 11:45)  HR: 98 (22 May 2020 01:09) (78 - 98)  BP: 105/50 (21 May 2020 18:00) (101/44 - 120/48)  BP(mean): 61 (21 May 2020 18:00) (61 - 87)  ABP: 151/64 (22 May 2020 00:00) (93/70 - 151/64)  ABP(mean): 97 (22 May 2020 00:00) (66 - 98)  RR: 25 (22 May 2020 00:00) (20 - 30)  SpO2: 95% (22 May 2020 01:09) (92% - 100%)      I&O's Detail    20 May 2020 07:01  -  21 May 2020 07:00  --------------------------------------------------------  IN:    Enteral Tube Flush: 60 mL    heparin  Infusion.: 276 mL    HYDROmorphone  Infusion: 31 mL    ketamine Infusion.: 353 mL    Pivot 1.5: 540 mL    Solution: 100 mL  Total IN: 1360 mL    OUT:    Indwelling Catheter - Urethral: 2820 mL  Total OUT: 2820 mL    Total NET: -1460 mL      21 May 2020 07:01  -  22 May 2020 02:02  --------------------------------------------------------  IN:    heparin  Infusion.: 204 mL    HYDROmorphone  Infusion: 5.9 mL    HYDROmorphone  Infusion: 13 mL    ketamine Infusion.: 180.5 mL    Pivot 1.5: 225 mL    Solution: 795 mL  Total IN: 1423.4 mL    OUT:    Indwelling Catheter - Urethral: 1835 mL  Total OUT: 1835 mL    Total NET: -411.6 mL          ABG - ( 21 May 2020 03:48 )  pH, Arterial: 7.43  pH, Blood: x     /  pCO2: 72    /  pO2: 78    / HCO3: 45    / Base Excess: 20.4  /  SaO2: 97                  MEDICATIONS  (STANDING):  chlorhexidine 0.12% Liquid 15 milliLiter(s) Oral Mucosa every 12 hours  chlorhexidine 2% Cloths 1 Application(s) Topical daily  dextrose 50% Injectable 25 Gram(s) IV Push once  heparin  Infusion.  Unit(s)/Hr (12 mL/Hr) IV Continuous <Continuous>  HYDROmorphone Infusion 1 mG/Hr (1 mL/Hr) IV Continuous <Continuous>  insulin lispro (HumaLOG) corrective regimen sliding scale   SubCutaneous every 4 hours  ketamine Infusion. 1.149 mG/kG/Hr (8.08 mL/Hr) IV Continuous <Continuous>  melatonin 5 milliGRAM(s) Oral at bedtime  multivitamin/minerals 1 Tablet(s) Oral daily  prednisoLONE    3 mG/mL Solution (ORAPRED) 10 milliGRAM(s) Oral once  psyllium Powder 1 Packet(s) Oral two times a day    MEDICATIONS  (PRN):  acetaminophen   Tablet .. 650 milliGRAM(s) Oral every 6 hours PRN Temp greater or equal to 38C (100.4F), Mild Pain (1 - 3), Moderate Pain (4 - 6)  dextrose 40% Gel 15 Gram(s) Oral once PRN Blood Glucose LESS THAN 70 milliGRAM(s)/deciliter  glucagon  Injectable 1 milliGRAM(s) IntraMuscular once PRN Glucose LESS THAN 70 milligrams/deciliter  heparin   Injectable 5500 Unit(s) IV Push every 6 hours PRN For aPTT less than 40        Physical Exam:    Neurological: sedated    Pulmonary: unlabored, trachea midline    Cardiovascular: nsr    Gastrointestinal: soft, non-tender, non-distended, no rebound / guarding    : alexander in place     Skin: warm, dry, no diaphoresis, no pallor, cyanosis, or jaundice    LABS:  CBC Full  -  ( 21 May 2020 06:37 )  WBC Count : 8.72 K/uL  RBC Count : 2.51 M/uL  Hemoglobin : 7.8 g/dL  Hematocrit : 24.7 %  Platelet Count - Automated : 289 K/uL  Mean Cell Volume : 98.4 fl  Mean Cell Hemoglobin : 31.1 pg  Mean Cell Hemoglobin Concentration : 31.6 gm/dL  Auto Neutrophil # : 6.48 K/uL  Auto Lymphocyte # : 0.95 K/uL  Auto Monocyte # : 0.81 K/uL  Auto Eosinophil # : 0.36 K/uL  Auto Basophil # : 0.03 K/uL  Auto Neutrophil % : 74.4 %  Auto Lymphocyte % : 10.9 %  Auto Monocyte % : 9.3 %  Auto Eosinophil % : 4.1 %  Auto Basophil % : 0.3 %    05-21    140  |  94<L>  |  25.0<H>  ----------------------------<  123<H>  4.4   |  41.0<H>  |  <0.20<L>    Ca    8.0<L>      21 May 2020 06:37  Phos  1.5     05-21  Mg     2.1     05-21    TPro  5.3<L>  /  Alb  2.7<L>  /  TBili  0.5  /  DBili  x   /  AST  422<H>  /  ALT  461<H>  /  AlkPhos  960<H>  05-21    PTT - ( 21 May 2020 00:58 )  PTT:83.4 sec    RECENT CULTURES:      LIVER FUNCTIONS - ( 21 May 2020 06:37 )  Alb: 2.7 g/dL / Pro: 5.3 g/dL / ALK PHOS: 960 U/L / ALT: 461 U/L / AST: 422 U/L / GGT: x

## 2020-05-22 NOTE — PROGRESS NOTE ADULT - ASSESSMENT
ASSESSMENT/PLAN:  64y Male with acute hypoxic vent dependent respiratory failure, Covid Sepsis, Acinetobacter PNA, PE    Neuro: sedation / analgesia with Ketamine/Dilaudid gtt, would suggest holding sedation due to decreased mental status, delirium precautions, daily sedation holiday    CV: maintain MAP > 65, continue invasive monitoring with arterial line    Pulm: COVID - noncompliant lungs however continue to wean vent as tolerated     GI/Nutrition: NPO with tube feeds.  Elevated transaminases, however, unclear etiology does not appear biliary due to normal Tbili.  If continues to rise this morning, d/c all hepatotoxic meds     /Renal: alexander for strict I&O, monitor kidney fxn. Hematuria improving.     ID: S/p Merrem 5/21 for Acinetobacter PNA.  Monitor fever curve and leukocytosis     Endo: monitor blood glucose    Skin: repositioning for DTI prevention while in bed    Heme/DVT Prophylaxis: SCDs / Heparin gtt for hypercoagulable state.      Dispo: ICU

## 2020-05-22 NOTE — PROGRESS NOTE ADULT - ATTENDING COMMENTS
65 yo M with no PMH presented with acute hypoxic respiratory failure and ARDS due to Covid  Hematuria resolving, Intubated, vented, sedate and   Continue sedation / analgesia to help with vent weaning  Continue cardiac monitoring   Continue vent weaning as tolerated   Continue to monitor renal function  Continue High Dose Steroid theray [place] 3 (Meduri Protocol0  PE treatment --heparin gtt, if patient has hematuria then will consider IVC filter and eliquis  Continue ICU for vent management. Plan for trache and PEG      code [id number]    CCT  40 min .

## 2020-05-23 LAB
ALBUMIN SERPL ELPH-MCNC: 2.8 G/DL — LOW (ref 3.3–5.2)
ALP SERPL-CCNC: 1217 U/L — HIGH (ref 40–120)
ALT FLD-CCNC: 730 U/L — HIGH
ANION GAP SERPL CALC-SCNC: 7 MMOL/L — SIGNIFICANT CHANGE UP (ref 5–17)
APTT BLD: 67.4 SEC — HIGH (ref 27.5–36.3)
AST SERPL-CCNC: 541 U/L — HIGH
BASOPHILS # BLD AUTO: 0.04 K/UL — SIGNIFICANT CHANGE UP (ref 0–0.2)
BASOPHILS NFR BLD AUTO: 0.3 % — SIGNIFICANT CHANGE UP (ref 0–2)
BILIRUB DIRECT SERPL-MCNC: 0.1 MG/DL — SIGNIFICANT CHANGE UP (ref 0–0.3)
BILIRUB INDIRECT FLD-MCNC: 0.2 MG/DL — SIGNIFICANT CHANGE UP (ref 0.2–1)
BILIRUB SERPL-MCNC: 0.3 MG/DL — LOW (ref 0.4–2)
BUN SERPL-MCNC: 24 MG/DL — HIGH (ref 8–20)
CALCIUM SERPL-MCNC: 8.2 MG/DL — LOW (ref 8.6–10.2)
CHLORIDE SERPL-SCNC: 94 MMOL/L — LOW (ref 98–107)
CO2 SERPL-SCNC: 38 MMOL/L — HIGH (ref 22–29)
CREAT SERPL-MCNC: <0.2 MG/DL — LOW (ref 0.5–1.3)
EOSINOPHIL # BLD AUTO: 0.35 K/UL — SIGNIFICANT CHANGE UP (ref 0–0.5)
EOSINOPHIL NFR BLD AUTO: 3 % — SIGNIFICANT CHANGE UP (ref 0–6)
GAS PNL BLDA: SIGNIFICANT CHANGE UP
GAS PNL BLDA: SIGNIFICANT CHANGE UP
GLUCOSE BLDC GLUCOMTR-MCNC: 136 MG/DL — HIGH (ref 70–99)
GLUCOSE BLDC GLUCOMTR-MCNC: 137 MG/DL — HIGH (ref 70–99)
GLUCOSE BLDC GLUCOMTR-MCNC: 141 MG/DL — HIGH (ref 70–99)
GLUCOSE BLDC GLUCOMTR-MCNC: 152 MG/DL — HIGH (ref 70–99)
GLUCOSE BLDC GLUCOMTR-MCNC: 156 MG/DL — HIGH (ref 70–99)
GLUCOSE BLDC GLUCOMTR-MCNC: 174 MG/DL — HIGH (ref 70–99)
GLUCOSE SERPL-MCNC: 144 MG/DL — HIGH (ref 70–99)
HCT VFR BLD CALC: 26.4 % — LOW (ref 39–50)
HGB BLD-MCNC: 8.4 G/DL — LOW (ref 13–17)
IMM GRANULOCYTES NFR BLD AUTO: 0.6 % — SIGNIFICANT CHANGE UP (ref 0–1.5)
LYMPHOCYTES # BLD AUTO: 1.1 K/UL — SIGNIFICANT CHANGE UP (ref 1–3.3)
LYMPHOCYTES # BLD AUTO: 9.5 % — LOW (ref 13–44)
MAGNESIUM SERPL-MCNC: 2 MG/DL — SIGNIFICANT CHANGE UP (ref 1.6–2.6)
MCHC RBC-ENTMCNC: 31 PG — SIGNIFICANT CHANGE UP (ref 27–34)
MCHC RBC-ENTMCNC: 31.8 GM/DL — LOW (ref 32–36)
MCV RBC AUTO: 97.4 FL — SIGNIFICANT CHANGE UP (ref 80–100)
MONOCYTES # BLD AUTO: 0.9 K/UL — SIGNIFICANT CHANGE UP (ref 0–0.9)
MONOCYTES NFR BLD AUTO: 7.8 % — SIGNIFICANT CHANGE UP (ref 2–14)
NEUTROPHILS # BLD AUTO: 9.06 K/UL — HIGH (ref 1.8–7.4)
NEUTROPHILS NFR BLD AUTO: 78.8 % — HIGH (ref 43–77)
PHOSPHATE SERPL-MCNC: 2.4 MG/DL — SIGNIFICANT CHANGE UP (ref 2.4–4.7)
PLATELET # BLD AUTO: 347 K/UL — SIGNIFICANT CHANGE UP (ref 150–400)
POTASSIUM SERPL-MCNC: 4.5 MMOL/L — SIGNIFICANT CHANGE UP (ref 3.5–5.3)
POTASSIUM SERPL-SCNC: 4.5 MMOL/L — SIGNIFICANT CHANGE UP (ref 3.5–5.3)
PROT SERPL-MCNC: 5.8 G/DL — LOW (ref 6.6–8.7)
RBC # BLD: 2.71 M/UL — LOW (ref 4.2–5.8)
RBC # FLD: 18.2 % — HIGH (ref 10.3–14.5)
SODIUM SERPL-SCNC: 139 MMOL/L — SIGNIFICANT CHANGE UP (ref 135–145)
WBC # BLD: 11.52 K/UL — HIGH (ref 3.8–10.5)
WBC # FLD AUTO: 11.52 K/UL — HIGH (ref 3.8–10.5)

## 2020-05-23 PROCEDURE — 76705 ECHO EXAM OF ABDOMEN: CPT | Mod: 26

## 2020-05-23 RX ORDER — MORPHINE SULFATE 50 MG/1
4 CAPSULE, EXTENDED RELEASE ORAL
Qty: 100 | Refills: 0 | Status: DISCONTINUED | OUTPATIENT
Start: 2020-05-23 | End: 2020-05-27

## 2020-05-23 RX ORDER — ACETAMINOPHEN 500 MG
650 TABLET ORAL EVERY 6 HOURS
Refills: 0 | Status: DISCONTINUED | OUTPATIENT
Start: 2020-05-23 | End: 2020-06-29

## 2020-05-23 RX ORDER — QUETIAPINE FUMARATE 200 MG/1
50 TABLET, FILM COATED ORAL EVERY 12 HOURS
Refills: 0 | Status: DISCONTINUED | OUTPATIENT
Start: 2020-05-23 | End: 2020-05-25

## 2020-05-23 RX ORDER — QUETIAPINE FUMARATE 200 MG/1
50 TABLET, FILM COATED ORAL ONCE
Refills: 0 | Status: COMPLETED | OUTPATIENT
Start: 2020-05-23 | End: 2020-05-23

## 2020-05-23 RX ADMIN — Medication 5 MILLIGRAM(S): at 05:25

## 2020-05-23 RX ADMIN — Medication 0: at 00:53

## 2020-05-23 RX ADMIN — QUETIAPINE FUMARATE 50 MILLIGRAM(S): 200 TABLET, FILM COATED ORAL at 12:52

## 2020-05-23 RX ADMIN — Medication 4: at 06:25

## 2020-05-23 RX ADMIN — CHLORHEXIDINE GLUCONATE 15 MILLILITER(S): 213 SOLUTION TOPICAL at 16:20

## 2020-05-23 RX ADMIN — Medication 1 PACKET(S): at 05:26

## 2020-05-23 RX ADMIN — CHLORHEXIDINE GLUCONATE 15 MILLILITER(S): 213 SOLUTION TOPICAL at 04:47

## 2020-05-23 RX ADMIN — Medication 650 MILLIGRAM(S): at 20:41

## 2020-05-23 RX ADMIN — KETAMINE HYDROCHLORIDE 8.08 MG/KG/HR: 100 INJECTION INTRAMUSCULAR; INTRAVENOUS at 16:29

## 2020-05-23 RX ADMIN — Medication 4: at 10:08

## 2020-05-23 RX ADMIN — CHLORHEXIDINE GLUCONATE 1 APPLICATION(S): 213 SOLUTION TOPICAL at 10:09

## 2020-05-23 RX ADMIN — QUETIAPINE FUMARATE 50 MILLIGRAM(S): 200 TABLET, FILM COATED ORAL at 21:02

## 2020-05-23 RX ADMIN — Medication 5 MILLIGRAM(S): at 21:02

## 2020-05-23 RX ADMIN — Medication 1 PACKET(S): at 16:20

## 2020-05-23 RX ADMIN — MORPHINE SULFATE 4 MG/HR: 50 CAPSULE, EXTENDED RELEASE ORAL at 12:51

## 2020-05-23 RX ADMIN — Medication 4: at 22:06

## 2020-05-23 RX ADMIN — Medication 1 TABLET(S): at 10:09

## 2020-05-23 RX ADMIN — HEPARIN SODIUM 1200 UNIT(S)/HR: 5000 INJECTION INTRAVENOUS; SUBCUTANEOUS at 09:03

## 2020-05-23 NOTE — PROGRESS NOTE ADULT - SUBJECTIVE AND OBJECTIVE BOX
24h Events: No issues yesterday.  Decreased analgesia during the day shift, doing well overnight. However, still encephlopathic, does not follow commands.  No episodes of hypoxia p/f yesterday >200.  Vitals stable.     ICU Vital Signs Last 24 Hrs  T(C): 36.7 (23 May 2020 00:02), Max: 38.1 (22 May 2020 04:00)  T(F): 98 (23 May 2020 00:02), Max: 100.6 (22 May 2020 04:00)  HR: 87 (23 May 2020 00:54) (75 - 91)  BP: 109/49 (22 May 2020 18:00) (106/48 - 159/70)  BP(mean): 63 (22 May 2020 18:00) (62 - 94)  ABP: 142/68 (23 May 2020 00:54) (113/53 - 159/72)  ABP(mean): 86 (22 May 2020 22:00) (69 - 107)  RR: 23 (23 May 2020 00:54) (20 - 30)  SpO2: 94% (23 May 2020 00:54) (94% - 96%)      I&O's Detail    21 May 2020 07:01  -  22 May 2020 07:00  --------------------------------------------------------  IN:    heparin  Infusion.: 252 mL    HYDROmorphone  Infusion: 5.9 mL    HYDROmorphone  Infusion: 20 mL    ketamine Infusion.: 254 mL    Pivot 1.5: 585 mL    Solution: 795 mL  Total IN: 1911.9 mL    OUT:    Indwelling Catheter - Urethral: 2795 mL  Total OUT: 2795 mL    Total NET: -883.1 mL      22 May 2020 07:01  -  23 May 2020 02:54  --------------------------------------------------------  IN:    heparin  Infusion.: 144 mL    HYDROmorphone  Infusion: 4 mL    HYDROmorphone  Infusion: 6.8 mL    ketamine Infusion.: 105.5 mL    Pivot 1.5: 495 mL  Total IN: 755.3 mL    OUT:    Indwelling Catheter - Urethral: 1725 mL  Total OUT: 1725 mL    Total NET: -969.7 mL          ABG - ( 22 May 2020 04:47 )  pH, Arterial: 7.37  pH, Blood: x     /  pCO2: 78    /  pO2: 87    / HCO3: 41    / Base Excess: 17.1  /  SaO2: 98                  MEDICATIONS  (STANDING):  chlorhexidine 0.12% Liquid 15 milliLiter(s) Oral Mucosa every 12 hours  chlorhexidine 2% Cloths 1 Application(s) Topical daily  dextrose 50% Injectable 25 Gram(s) IV Push once  heparin  Infusion.  Unit(s)/Hr (12 mL/Hr) IV Continuous <Continuous>  HYDROmorphone Infusion 0.5 mG/Hr (0.5 mL/Hr) IV Continuous <Continuous>  insulin lispro (HumaLOG) corrective regimen sliding scale   SubCutaneous every 4 hours  ketamine Infusion. 1.149 mG/kG/Hr (8.08 mL/Hr) IV Continuous <Continuous>  melatonin 5 milliGRAM(s) Oral at bedtime  multivitamin/minerals 1 Tablet(s) Oral daily  prednisoLONE    3 mG/mL Solution (ORAPRED) 5 milliGRAM(s) Oral once  psyllium Powder 1 Packet(s) Oral two times a day    MEDICATIONS  (PRN):  acetaminophen   Tablet .. 650 milliGRAM(s) Oral every 6 hours PRN Temp greater or equal to 38C (100.4F), Mild Pain (1 - 3), Moderate Pain (4 - 6)  dextrose 40% Gel 15 Gram(s) Oral once PRN Blood Glucose LESS THAN 70 milliGRAM(s)/deciliter  glucagon  Injectable 1 milliGRAM(s) IntraMuscular once PRN Glucose LESS THAN 70 milligrams/deciliter  heparin   Injectable 5500 Unit(s) IV Push every 6 hours PRN For aPTT less than 40        Physical Exam:    Neurological: sedated    Pulmonary: unlabored, trachea midline    Cardiovascular: nsr    Gastrointestinal: soft, non-tender, non-distended, no rebound / guarding    : alexander in place     Skin: warm, dry, no diaphoresis, no pallor, cyanosis, or jaundice    LABS:  CBC Full  -  ( 22 May 2020 05:39 )  WBC Count : 12.08 K/uL  RBC Count : 2.81 M/uL  Hemoglobin : 8.6 g/dL  Hematocrit : 27.8 %  Platelet Count - Automated : 321 K/uL  Mean Cell Volume : 98.9 fl  Mean Cell Hemoglobin : 30.6 pg  Mean Cell Hemoglobin Concentration : 30.9 gm/dL  Auto Neutrophil # : 9.24 K/uL  Auto Lymphocyte # : 1.34 K/uL  Auto Monocyte # : 1.02 K/uL  Auto Eosinophil # : 0.35 K/uL  Auto Basophil # : 0.05 K/uL  Auto Neutrophil % : 76.5 %  Auto Lymphocyte % : 11.1 %  Auto Monocyte % : 8.4 %  Auto Eosinophil % : 2.9 %  Auto Basophil % : 0.4 %    05-22    139  |  93<L>  |  22.0<H>  ----------------------------<  139<H>  4.7   |  41.0<H>  |  <0.20<L>    Ca    8.2<L>      22 May 2020 05:39  Phos  2.4     05-22  Mg     2.1     05-22    TPro  6.1<L>  /  Alb  3.0<L>  /  TBili  0.5  /  DBili  0.3  /  AST  307<H>  /  ALT  476<H>  /  AlkPhos  1274<H>  05-22    PTT - ( 22 May 2020 05:39 )  PTT:79.4 sec    RECENT CULTURES:      LIVER FUNCTIONS - ( 22 May 2020 05:39 )  Alb: 3.0 g/dL / Pro: 6.1 g/dL / ALK PHOS: 1274 U/L / ALT: 476 U/L / AST: 307 U/L / GGT: x           CARDIAC MARKERS ( 22 May 2020 06:49 )  x     / x     / 89 U/L / x     / x

## 2020-05-23 NOTE — PROGRESS NOTE ADULT - ASSESSMENT
ASSESSMENT/PLAN:  64y Male with acute hypoxic vent dependent respiratory failure, Covid Sepsis, Acinetobacter PNA, PE    Neuro: sedation / analgesia with Ketamine/Dilaudid gtt continue to decrease amounts - would suggest d/cing when able due to continued encephelopathy delirium precautions, daily sedation holiday    CV: maintain MAP > 65, continue invasive monitoring with arterial line    Pulm: COVID - noncompliant lungs however continue to wean vent as tolerated     GI/Nutrition: NPO with tube feeds.  Elevated transaminases, however, unclear etiology does not appear biliary due to normal Tbili and CPK wnl, no obvious source     /Renal: alexander for strict I&O, monitor kidney fxn. Hematuria resolved.    ID: onitor fever curve and leukocytosis     Endo: monitor blood glucose    Skin: repositioning for DTI prevention while in bed    Heme/DVT Prophylaxis: SCDs / Heparin gtt for hypercoagulable state.      Dispo: ICU

## 2020-05-24 LAB
ALBUMIN SERPL ELPH-MCNC: 2.9 G/DL — LOW (ref 3.3–5.2)
ALP SERPL-CCNC: 861 U/L — HIGH (ref 40–120)
ALT FLD-CCNC: 409 U/L — HIGH
ANION GAP SERPL CALC-SCNC: 6 MMOL/L — SIGNIFICANT CHANGE UP (ref 5–17)
APTT BLD: 62.8 SEC — HIGH (ref 27.5–36.3)
AST SERPL-CCNC: 134 U/L — HIGH
BILIRUB DIRECT SERPL-MCNC: 0.1 MG/DL — SIGNIFICANT CHANGE UP (ref 0–0.3)
BILIRUB INDIRECT FLD-MCNC: 0.2 MG/DL — SIGNIFICANT CHANGE UP (ref 0.2–1)
BILIRUB SERPL-MCNC: 0.3 MG/DL — LOW (ref 0.4–2)
BUN SERPL-MCNC: 28 MG/DL — HIGH (ref 8–20)
CALCIUM SERPL-MCNC: 8.6 MG/DL — SIGNIFICANT CHANGE UP (ref 8.6–10.2)
CHLORIDE SERPL-SCNC: 97 MMOL/L — LOW (ref 98–107)
CO2 SERPL-SCNC: 37 MMOL/L — HIGH (ref 22–29)
CREAT SERPL-MCNC: <0.2 MG/DL — LOW (ref 0.5–1.3)
GAS PNL BLDA: SIGNIFICANT CHANGE UP
GLUCOSE BLDC GLUCOMTR-MCNC: 150 MG/DL — HIGH (ref 70–99)
GLUCOSE BLDC GLUCOMTR-MCNC: 152 MG/DL — HIGH (ref 70–99)
GLUCOSE BLDC GLUCOMTR-MCNC: 168 MG/DL — HIGH (ref 70–99)
GLUCOSE BLDC GLUCOMTR-MCNC: 168 MG/DL — HIGH (ref 70–99)
GLUCOSE BLDC GLUCOMTR-MCNC: 196 MG/DL — HIGH (ref 70–99)
GLUCOSE SERPL-MCNC: 140 MG/DL — HIGH (ref 70–99)
HAV IGM SER-ACNC: SIGNIFICANT CHANGE UP
HBV CORE IGM SER-ACNC: SIGNIFICANT CHANGE UP
HBV SURFACE AG SER-ACNC: SIGNIFICANT CHANGE UP
HCT VFR BLD CALC: 24.8 % — LOW (ref 39–50)
HCV AB S/CO SERPL IA: 0.37 S/CO — SIGNIFICANT CHANGE UP (ref 0–0.99)
HCV AB SERPL-IMP: SIGNIFICANT CHANGE UP
HGB BLD-MCNC: 8 G/DL — LOW (ref 13–17)
MAGNESIUM SERPL-MCNC: 2.1 MG/DL — SIGNIFICANT CHANGE UP (ref 1.6–2.6)
MCHC RBC-ENTMCNC: 30.9 PG — SIGNIFICANT CHANGE UP (ref 27–34)
MCHC RBC-ENTMCNC: 32.3 GM/DL — SIGNIFICANT CHANGE UP (ref 32–36)
MCV RBC AUTO: 95.8 FL — SIGNIFICANT CHANGE UP (ref 80–100)
PHOSPHATE SERPL-MCNC: 2.1 MG/DL — LOW (ref 2.4–4.7)
PLATELET # BLD AUTO: 393 K/UL — SIGNIFICANT CHANGE UP (ref 150–400)
POTASSIUM SERPL-MCNC: 3.9 MMOL/L — SIGNIFICANT CHANGE UP (ref 3.5–5.3)
POTASSIUM SERPL-SCNC: 3.9 MMOL/L — SIGNIFICANT CHANGE UP (ref 3.5–5.3)
PROT SERPL-MCNC: 6.1 G/DL — LOW (ref 6.6–8.7)
RBC # BLD: 2.59 M/UL — LOW (ref 4.2–5.8)
RBC # FLD: 18.3 % — HIGH (ref 10.3–14.5)
SODIUM SERPL-SCNC: 140 MMOL/L — SIGNIFICANT CHANGE UP (ref 135–145)
WBC # BLD: 11.52 K/UL — HIGH (ref 3.8–10.5)
WBC # FLD AUTO: 11.52 K/UL — HIGH (ref 3.8–10.5)

## 2020-05-24 RX ORDER — POTASSIUM PHOSPHATE, MONOBASIC POTASSIUM PHOSPHATE, DIBASIC 236; 224 MG/ML; MG/ML
15 INJECTION, SOLUTION INTRAVENOUS ONCE
Refills: 0 | Status: COMPLETED | OUTPATIENT
Start: 2020-05-24 | End: 2020-05-24

## 2020-05-24 RX ORDER — INSULIN LISPRO 100/ML
VIAL (ML) SUBCUTANEOUS EVERY 6 HOURS
Refills: 0 | Status: DISCONTINUED | OUTPATIENT
Start: 2020-05-24 | End: 2020-06-16

## 2020-05-24 RX ADMIN — QUETIAPINE FUMARATE 50 MILLIGRAM(S): 200 TABLET, FILM COATED ORAL at 22:51

## 2020-05-24 RX ADMIN — Medication 4: at 17:53

## 2020-05-24 RX ADMIN — Medication 1 PACKET(S): at 06:47

## 2020-05-24 RX ADMIN — QUETIAPINE FUMARATE 50 MILLIGRAM(S): 200 TABLET, FILM COATED ORAL at 11:48

## 2020-05-24 RX ADMIN — Medication 650 MILLIGRAM(S): at 06:29

## 2020-05-24 RX ADMIN — Medication 4: at 23:52

## 2020-05-24 RX ADMIN — POTASSIUM PHOSPHATE, MONOBASIC POTASSIUM PHOSPHATE, DIBASIC 62.5 MILLIMOLE(S): 236; 224 INJECTION, SOLUTION INTRAVENOUS at 11:12

## 2020-05-24 RX ADMIN — Medication 4: at 06:29

## 2020-05-24 RX ADMIN — KETAMINE HYDROCHLORIDE 8.08 MG/KG/HR: 100 INJECTION INTRAMUSCULAR; INTRAVENOUS at 06:47

## 2020-05-24 RX ADMIN — Medication 1 TABLET(S): at 11:48

## 2020-05-24 RX ADMIN — Medication 4: at 12:20

## 2020-05-24 RX ADMIN — CHLORHEXIDINE GLUCONATE 15 MILLILITER(S): 213 SOLUTION TOPICAL at 17:42

## 2020-05-24 RX ADMIN — Medication 5 MILLIGRAM(S): at 22:51

## 2020-05-24 RX ADMIN — HEPARIN SODIUM 5500 UNIT(S): 5000 INJECTION INTRAVENOUS; SUBCUTANEOUS at 07:06

## 2020-05-24 RX ADMIN — CHLORHEXIDINE GLUCONATE 1 APPLICATION(S): 213 SOLUTION TOPICAL at 11:48

## 2020-05-24 RX ADMIN — CHLORHEXIDINE GLUCONATE 15 MILLILITER(S): 213 SOLUTION TOPICAL at 06:28

## 2020-05-24 NOTE — PROGRESS NOTE ADULT - SUBJECTIVE AND OBJECTIVE BOX
24h Events: Yesterday patient transitioned to morphine gtt. Added seroquel in anticipation of lifted ketamine.  RUQ to r/o biliary pathology negative.  Decreased patient's resp rate.  Awaiting morning abg.  Vitals stable. -200/hr, electrolytes wnl, will f/u this morning. Isolated fever overnight to 101, however without leukocytosis, no increase in oxygen requirements      ICU Vital Signs Last 24 Hrs  T(C): 38.3 (24 May 2020 00:01), Max: 38.6 (23 May 2020 20:09)  T(F): 100.9 (24 May 2020 00:01), Max: 101.5 (23 May 2020 20:09)  HR: 101 (24 May 2020 00:00) (70 - 111)  BP: --  BP(mean): --  ABP: 99/54 (24 May 2020 00:00) (89/45 - 180/80)  ABP(mean): 68 (24 May 2020 00:00) (62 - 120)  RR: 24 (24 May 2020 00:00) (18 - 30)  SpO2: 93% (24 May 2020 00:00) (91% - 99%)      I&O's Detail    22 May 2020 07:01  -  23 May 2020 07:00  --------------------------------------------------------  IN:    heparin  Infusion.: 252 mL    HYDROmorphone  Infusion: 4 mL    HYDROmorphone  Infusion: 15.9 mL    ketamine Infusion.: 187.5 mL    Pivot 1.5: 945 mL  Total IN: 1404.4 mL    OUT:    Indwelling Catheter - Urethral: 2725 mL    Rectal Tube: 100 mL  Total OUT: 2825 mL    Total NET: -1420.6 mL      23 May 2020 07:01  -  24 May 2020 01:13  --------------------------------------------------------  IN:    Enteral Tube Flush: 400 mL    heparin  Infusion.: 204 mL    HYDROmorphone  Infusion: 2 mL    ketamine Infusion.: 132 mL    morphine  Infusion.: 29 mL    Pivot 1.5: 765 mL  Total IN: 1532 mL    OUT:    Indwelling Catheter - Urethral: 2005 mL    Rectal Tube: 400 mL  Total OUT: 2405 mL    Total NET: -873 mL          ABG - ( 23 May 2020 15:48 )  pH, Arterial: 7.47  pH, Blood: x     /  pCO2: 58    /  pO2: 84    / HCO3: 41    / Base Excess: 16.7  /  SaO2: 98                  MEDICATIONS  (STANDING):  chlorhexidine 0.12% Liquid 15 milliLiter(s) Oral Mucosa every 12 hours  chlorhexidine 2% Cloths 1 Application(s) Topical daily  dextrose 50% Injectable 25 Gram(s) IV Push once  heparin  Infusion.  Unit(s)/Hr (12 mL/Hr) IV Continuous <Continuous>  insulin lispro (HumaLOG) corrective regimen sliding scale   SubCutaneous every 4 hours  ketamine Infusion. 1.149 mG/kG/Hr (8.08 mL/Hr) IV Continuous <Continuous>  melatonin 5 milliGRAM(s) Oral at bedtime  morphine  Infusion. 4 mG/Hr (4 mL/Hr) IV Continuous <Continuous>  multivitamin/minerals 1 Tablet(s) Oral daily  psyllium Powder 1 Packet(s) Oral two times a day  QUEtiapine 50 milliGRAM(s) Oral every 12 hours    MEDICATIONS  (PRN):  acetaminophen    Suspension .. 650 milliGRAM(s) Oral every 6 hours PRN Temp greater or equal to 38.5C (101.3F)  dextrose 40% Gel 15 Gram(s) Oral once PRN Blood Glucose LESS THAN 70 milliGRAM(s)/deciliter  glucagon  Injectable 1 milliGRAM(s) IntraMuscular once PRN Glucose LESS THAN 70 milligrams/deciliter  heparin   Injectable 5500 Unit(s) IV Push every 6 hours PRN For aPTT less than 40        Physical Exam:    Neurological: sedated    Pulmonary: unlabored, trachea midline    Cardiovascular: nsr    Gastrointestinal: soft, non-tender, non-distended, no rebound / guarding    : alexander in place     Skin: warm, dry, no diaphoresis, no pallor, cyanosis, or jaundice    LABS:  CBC Full  -  ( 23 May 2020 04:15 )  WBC Count : 11.52 K/uL  RBC Count : 2.71 M/uL  Hemoglobin : 8.4 g/dL  Hematocrit : 26.4 %  Platelet Count - Automated : 347 K/uL  Mean Cell Volume : 97.4 fl  Mean Cell Hemoglobin : 31.0 pg  Mean Cell Hemoglobin Concentration : 31.8 gm/dL  Auto Neutrophil # : 9.06 K/uL  Auto Lymphocyte # : 1.10 K/uL  Auto Monocyte # : 0.90 K/uL  Auto Eosinophil # : 0.35 K/uL  Auto Basophil # : 0.04 K/uL  Auto Neutrophil % : 78.8 %  Auto Lymphocyte % : 9.5 %  Auto Monocyte % : 7.8 %  Auto Eosinophil % : 3.0 %  Auto Basophil % : 0.3 %    05-23    139  |  94<L>  |  24.0<H>  ----------------------------<  144<H>  4.5   |  38.0<H>  |  <0.20<L>    Ca    8.2<L>      23 May 2020 04:15  Phos  2.4     05-23  Mg     2.0     05-23    TPro  5.8<L>  /  Alb  2.8<L>  /  TBili  0.3<L>  /  DBili  0.1  /  AST  541<H>  /  ALT  730<H>  /  AlkPhos  1217<H>  05-23    PTT - ( 23 May 2020 04:15 )  PTT:67.4 sec    RECENT CULTURES:      LIVER FUNCTIONS - ( 23 May 2020 04:15 )  Alb: 2.8 g/dL / Pro: 5.8 g/dL / ALK PHOS: 1217 U/L / ALT: 730 U/L / AST: 541 U/L / GGT: x           CARDIAC MARKERS ( 22 May 2020 06:49 )  x     / x     / 89 U/L / x     / x

## 2020-05-24 NOTE — PROGRESS NOTE ADULT - ASSESSMENT
ASSESSMENT/PLAN:  64y Male with acute hypoxic vent dependent respiratory failure, Covid Sepsis, Acinetobacter PNA, PE    Neuro: sedation / analgesia with Ketamine/morphine gtt continue to decrease amounts - plan to d/c ketamine today with seroquel on board due to continued encephelopathy delirium precautions, daily sedation holiday    CV: maintain MAP > 65, continue invasive monitoring with arterial line    Pulm: COVID - noncompliant lungs however continue to wean vent as tolerated, decrease rr for resp alkalosis     GI/Nutrition: NPO with tube feeds.  Elevated transaminases, however, unclear etiology does not appear biliary due to normal Tbili and normal RUQ US and CPK wnl, no obvious source     /Renal: alexander for strict I&O, monitor kidney fxn. Hematuria resolved.    ID: monitor fever curve and leukocytosis     Endo: monitor blood glucose    Skin: repositioning for DTI prevention while in bed    Heme/DVT Prophylaxis: SCDs / Heparin gtt for hypercoagulable state.      Dispo: ICU

## 2020-05-24 NOTE — CHART NOTE - NSCHARTNOTEFT_GEN_A_CORE
Patient's family was updated regarding patient's current condition. All questions were answered and concerns were addressed. [employer] 's services were used to translate the phone call. 's ID number is [id number].

## 2020-05-25 LAB
ALBUMIN SERPL ELPH-MCNC: 3.2 G/DL — LOW (ref 3.3–5.2)
ALP SERPL-CCNC: 821 U/L — HIGH (ref 40–120)
ALT FLD-CCNC: 409 U/L — HIGH
ANION GAP SERPL CALC-SCNC: 9 MMOL/L — SIGNIFICANT CHANGE UP (ref 5–17)
APTT BLD: 67 SEC — HIGH (ref 27.5–36.3)
AST SERPL-CCNC: 198 U/L — HIGH
BASOPHILS # BLD AUTO: 0.04 K/UL — SIGNIFICANT CHANGE UP (ref 0–0.2)
BASOPHILS NFR BLD AUTO: 0.2 % — SIGNIFICANT CHANGE UP (ref 0–2)
BILIRUB DIRECT SERPL-MCNC: 0.2 MG/DL — SIGNIFICANT CHANGE UP (ref 0–0.3)
BILIRUB INDIRECT FLD-MCNC: 0.2 MG/DL — SIGNIFICANT CHANGE UP (ref 0.2–1)
BILIRUB SERPL-MCNC: 0.4 MG/DL — SIGNIFICANT CHANGE UP (ref 0.4–2)
BUN SERPL-MCNC: 31 MG/DL — HIGH (ref 8–20)
CALCIUM SERPL-MCNC: 8.8 MG/DL — SIGNIFICANT CHANGE UP (ref 8.6–10.2)
CHLORIDE SERPL-SCNC: 99 MMOL/L — SIGNIFICANT CHANGE UP (ref 98–107)
CO2 SERPL-SCNC: 36 MMOL/L — HIGH (ref 22–29)
CREAT SERPL-MCNC: 0.23 MG/DL — LOW (ref 0.5–1.3)
D DIMER BLD IA.RAPID-MCNC: 884 NG/ML DDU — HIGH
EOSINOPHIL # BLD AUTO: 0.15 K/UL — SIGNIFICANT CHANGE UP (ref 0–0.5)
EOSINOPHIL NFR BLD AUTO: 0.9 % — SIGNIFICANT CHANGE UP (ref 0–6)
GAS PNL BLDA: SIGNIFICANT CHANGE UP
GLUCOSE BLDC GLUCOMTR-MCNC: 174 MG/DL — HIGH (ref 70–99)
GLUCOSE BLDC GLUCOMTR-MCNC: 195 MG/DL — HIGH (ref 70–99)
GLUCOSE SERPL-MCNC: 152 MG/DL — HIGH (ref 70–99)
GRAM STN FLD: SIGNIFICANT CHANGE UP
HCT VFR BLD CALC: 28.2 % — LOW (ref 39–50)
HGB BLD-MCNC: 8.8 G/DL — LOW (ref 13–17)
IMM GRANULOCYTES NFR BLD AUTO: 0.8 % — SIGNIFICANT CHANGE UP (ref 0–1.5)
LYMPHOCYTES # BLD AUTO: 1.51 K/UL — SIGNIFICANT CHANGE UP (ref 1–3.3)
LYMPHOCYTES # BLD AUTO: 9.1 % — LOW (ref 13–44)
MAGNESIUM SERPL-MCNC: 2.3 MG/DL — SIGNIFICANT CHANGE UP (ref 1.8–2.6)
MCHC RBC-ENTMCNC: 31 PG — SIGNIFICANT CHANGE UP (ref 27–34)
MCHC RBC-ENTMCNC: 31.2 GM/DL — LOW (ref 32–36)
MCV RBC AUTO: 99.3 FL — SIGNIFICANT CHANGE UP (ref 80–100)
MONOCYTES # BLD AUTO: 1.47 K/UL — HIGH (ref 0–0.9)
MONOCYTES NFR BLD AUTO: 8.8 % — SIGNIFICANT CHANGE UP (ref 2–14)
NEUTROPHILS # BLD AUTO: 13.34 K/UL — HIGH (ref 1.8–7.4)
NEUTROPHILS NFR BLD AUTO: 80.2 % — HIGH (ref 43–77)
PHOSPHATE SERPL-MCNC: 3.3 MG/DL — SIGNIFICANT CHANGE UP (ref 2.4–4.7)
PLATELET # BLD AUTO: 456 K/UL — HIGH (ref 150–400)
POTASSIUM SERPL-MCNC: 4.6 MMOL/L — SIGNIFICANT CHANGE UP (ref 3.5–5.3)
POTASSIUM SERPL-SCNC: 4.6 MMOL/L — SIGNIFICANT CHANGE UP (ref 3.5–5.3)
PROT SERPL-MCNC: 6.7 G/DL — SIGNIFICANT CHANGE UP (ref 6.6–8.7)
RBC # BLD: 2.84 M/UL — LOW (ref 4.2–5.8)
RBC # FLD: 18.3 % — HIGH (ref 10.3–14.5)
SODIUM SERPL-SCNC: 144 MMOL/L — SIGNIFICANT CHANGE UP (ref 135–145)
SPECIMEN SOURCE: SIGNIFICANT CHANGE UP
WBC # BLD: 16.65 K/UL — HIGH (ref 3.8–10.5)
WBC # FLD AUTO: 16.65 K/UL — HIGH (ref 3.8–10.5)

## 2020-05-25 PROCEDURE — 99233 SBSQ HOSP IP/OBS HIGH 50: CPT

## 2020-05-25 RX ORDER — QUETIAPINE FUMARATE 200 MG/1
50 TABLET, FILM COATED ORAL AT BEDTIME
Refills: 0 | Status: DISCONTINUED | OUTPATIENT
Start: 2020-05-25 | End: 2020-06-09

## 2020-05-25 RX ORDER — KETAMINE HYDROCHLORIDE 100 MG/ML
1.15 INJECTION INTRAMUSCULAR; INTRAVENOUS
Qty: 1000 | Refills: 0 | Status: DISCONTINUED | OUTPATIENT
Start: 2020-05-25 | End: 2020-05-27

## 2020-05-25 RX ADMIN — CHLORHEXIDINE GLUCONATE 15 MILLILITER(S): 213 SOLUTION TOPICAL at 04:33

## 2020-05-25 RX ADMIN — KETAMINE HYDROCHLORIDE 8.08 MG/KG/HR: 100 INJECTION INTRAMUSCULAR; INTRAVENOUS at 14:52

## 2020-05-25 RX ADMIN — Medication 5 MILLIGRAM(S): at 22:04

## 2020-05-25 RX ADMIN — MORPHINE SULFATE 4 MG/HR: 50 CAPSULE, EXTENDED RELEASE ORAL at 04:32

## 2020-05-25 RX ADMIN — QUETIAPINE FUMARATE 50 MILLIGRAM(S): 200 TABLET, FILM COATED ORAL at 13:39

## 2020-05-25 RX ADMIN — CHLORHEXIDINE GLUCONATE 1 APPLICATION(S): 213 SOLUTION TOPICAL at 12:38

## 2020-05-25 RX ADMIN — Medication 4: at 06:08

## 2020-05-25 RX ADMIN — Medication 4: at 11:20

## 2020-05-25 RX ADMIN — HEPARIN SODIUM 1200 UNIT(S)/HR: 5000 INJECTION INTRAVENOUS; SUBCUTANEOUS at 10:57

## 2020-05-25 RX ADMIN — QUETIAPINE FUMARATE 50 MILLIGRAM(S): 200 TABLET, FILM COATED ORAL at 22:04

## 2020-05-25 RX ADMIN — Medication 1 TABLET(S): at 13:38

## 2020-05-25 RX ADMIN — Medication 4: at 17:45

## 2020-05-25 RX ADMIN — CHLORHEXIDINE GLUCONATE 15 MILLILITER(S): 213 SOLUTION TOPICAL at 19:07

## 2020-05-25 NOTE — PROGRESS NOTE ADULT - ASSESSMENT
ASSESSMENT/PLAN:  64y Male with acute hypoxic vent dependent respiratory failure, Covid Sepsis, Acinetobacter PNA, PE    Neuro: sedation / analgesia with Ketamine/morphine gtt continue to decrease amounts -seroquel.  Improving mental status, resolving encephelopathy  Delirium precautions, daily sedation holiday    CV: maintain MAP > 65, continue invasive monitoring with arterial line    Pulm: COVID - noncompliant lungs however continue to wean vent as tolerated    GI/Nutrition: NPO with tube feeds.      /Renal: alexander for strict I&O, monitor kidney fxn. Hematuria resolved.    ID: monitor fever curve and leukocytosis     Endo: monitor blood glucose    Skin: repositioning for DTI prevention while in bed    Heme/DVT Prophylaxis: SCDs / Heparin gtt for hypercoagulable state.      Dispo: ICU

## 2020-05-25 NOTE — CHART NOTE - NSCHARTNOTEFT_GEN_A_CORE
Spoke to patient's friend Yovani to provide daily update on patient's condition. Yovani states that he lives with Meño, he states that Meño does not have any family in the [place] and that the family has asked him to make decisions regarding Meño's care. I discussed with Yovani the possibility that Meño may require a tracheostomy. Yovani agrees that the tracheostomy would be in line with Meño's wishes. All additional questions were answered and all other concerns were addressed. Spoke to patient's friend Yovani to provide daily update on patient's condition. Yovani states that he lives with Meño, he states that Meño does not have any family in the [place] and that the family has asked him to make decisions regarding Meño's care. I discussed with Yovani the possibility that Meño may require a tracheostomy. Yovani agrees that the tracheostomy would be in line with Meño's wishes. All additional questions were answered and all other concerns were addressed. [employer]  ID number [id number] was used to translate the conversation.

## 2020-05-25 NOTE — CHART NOTE - NSCHARTNOTEFT_GEN_A_CORE
Source: Patient [ ]  Family [ ]   other [ x] EMR  64y Male with acute hypoxic vent dependent respiratory failure, Covid Sepsis, Acinetobacter PNA, PE  24h Events: Patient able to follow simple commands yesterday.  Awakening eyes to noxious stimuli.  Vitals stable.  LFT's downtrending.     Current Diet: Diet, NPO with Tube Feed:   Tube Feeding Modality: Nasogastric  Pivot 1.5 Raj  Total Volume for 24 Hours (mL): 960  Continuous  Starting Tube Feed Rate {mL per Hour}: 40  Increase Tube Feed Rate by (mL): 0     Every hour  Until Goal Tube Feed Rate (mL per Hour): 40  Tube Feed Duration (in Hours): 24  Tube Feed Start Time: 11:45 (05-25-20 @ 11:37)      Enteral /Parenteral Nutrition: Pivot @ 40 ml/hr (x 20 hrs) 800 ml, 1200 kcal,  75 g pro, 608 ml free water    Current Weight:   5/18: 77.5kg  5/14: 84.1kg  5/10: 83.2kg  4/18: 70.3kg   -11.5# wt loss noted.  % Weight Change     Pertinent Medications: MEDICATIONS  (STANDING):  chlorhexidine 0.12% Liquid 15 milliLiter(s) Oral Mucosa every 12 hours  chlorhexidine 2% Cloths 1 Application(s) Topical daily  heparin  Infusion.  Unit(s)/Hr (12 mL/Hr) IV Continuous <Continuous>  insulin lispro (HumaLOG) corrective regimen sliding scale   SubCutaneous every 6 hours  ketamine Infusion. 1.149 mG/kG/Hr (8.08 mL/Hr) IV Continuous <Continuous>  melatonin 5 milliGRAM(s) Oral at bedtime  morphine  Infusion. 4 mG/Hr (4 mL/Hr) IV Continuous <Continuous>  multivitamin/minerals 1 Tablet(s) Oral daily  psyllium Powder 1 Packet(s) Oral two times a day  QUEtiapine 50 milliGRAM(s) Oral every 12 hours    MEDICATIONS  (PRN):  acetaminophen    Suspension .. 650 milliGRAM(s) Oral every 6 hours PRN Temp greater or equal to 38.5C (101.3F)  heparin   Injectable 5500 Unit(s) IV Push every 6 hours PRN For aPTT less than 40    Pertinent Labs: CBC Full  -  ( 25 May 2020 05:24 )  WBC Count : 16.65 K/uL  RBC Count : 2.84 M/uL  Hemoglobin : 8.8 g/dL  Hematocrit : 28.2 %  Platelet Count - Automated : 456 K/uL  Mean Cell Volume : 99.3 fl  Mean Cell Hemoglobin : 31.0 pg  Mean Cell Hemoglobin Concentration : 31.2 gm/dL  Auto Neutrophil # : 13.34 K/uL  Auto Lymphocyte # : 1.51 K/uL  Auto Monocyte # : 1.47 K/uL  Auto Eosinophil # : 0.15 K/uL  Auto Basophil # : 0.04 K/uL  Auto Neutrophil % : 80.2 %  Auto Lymphocyte % : 9.1 %  Auto Monocyte % : 8.8 %  Auto Eosinophil % : 0.9 %  Auto Basophil % : 0.2 %  05-25 Na144 mmol/L Glu 152 mg/dL<H> K+ 4.6 mmol/L Cr  0.23 mg/dL<L> BUN 31.0 mg/dL<H> Phos 3.3 mg/dL Alb 3.2 g/dL<L> PAB n/a         Skin: intact    Estimated Needs:   [x ] no change since previous assessment  [ ] recalculated:     Current Nutrition Diagnosis:  Pt remains at high nutrition risk secondary malnutrition (severe, acute) related to inability to meet sufficient protein-energy requirements in setting of acute hypoxic respiratory failure, R Segmental PE, Severe ARDS secondary to COVID 19, rapid response requiring intubation on 5/1 and remaining intubated, sedated, paralyzed as evidenced by meeting <50% nutrient needs >5 days and +fluid accumulation. Pt remains intubated, sedated.    Recommendations:   1) increase Pivot by 10 ml q 8 hrs to goal of 65 ml/hr to provide 1300ml, 1950 kcal, 127 g pro 988 ml free water   2) obtain current weight  3) monitor Chem 8 inflammatory markers   4) continue MVI    Monitoring and Evaluation:   [ ] PO intake [ ] Tolerance to diet prescription [X] Weights  [X] Follow up per protocol [X] Labs:

## 2020-05-25 NOTE — PROGRESS NOTE ADULT - ATTENDING COMMENTS
I have seen and examined  the patient on sicu rounds.  no new issues   More awake, surveillance cultures sent.  A/P; COVID resp failure,   neuro: improving multifactorial encephalopathy.  Pulm: P:F 152, gas exhange adequate, tentative tracheostomy tomorrow. will discuss with family.  CV: perfusion adequate  GI: TEN plan for pEG  : no issues  ID: surveillance cultures send for low grade fever, off abx.  Endo: glycemia at target

## 2020-05-25 NOTE — PROGRESS NOTE ADULT - SUBJECTIVE AND OBJECTIVE BOX
24h Events: Patient able to follow simple commands yesterday.  Awakening eyes to noxious stimuli.  Vitals stable.  LFT's downtrending.     ICU Vital Signs Last 24 Hrs  T(C): 38.2 (25 May 2020 00:36), Max: 38.2 (25 May 2020 00:00)  T(F): 100.8 (25 May 2020 00:36), Max: 100.8 (25 May 2020 00:00)  HR: 95 (25 May 2020 00:20) (81 - 111)  BP: --  BP(mean): --  ABP: 90/45 (25 May 2020 00:00) (90/45 - 126/55)  ABP(mean): 60 (25 May 2020 00:00) (60 - 79)  RR: 28 (25 May 2020 00:00) (24 - 40)  SpO2: 95% (25 May 2020 00:20) (90% - 97%)      I&O's Detail    23 May 2020 07:01  -  24 May 2020 07:00  --------------------------------------------------------  IN:    Enteral Tube Flush: 400 mL    heparin  Infusion.: 288 mL    HYDROmorphone  Infusion: 2 mL    ketamine Infusion.: 181 mL    morphine  Infusion.: 43 mL    Pivot 1.5: 1080 mL  Total IN: 1994 mL    OUT:    Indwelling Catheter - Urethral: 2820 mL    Rectal Tube: 500 mL  Total OUT: 3320 mL    Total NET: -1326 mL      24 May 2020 07:01  -  25 May 2020 01:32  --------------------------------------------------------  IN:    Enteral Tube Flush: 170 mL    heparin  Infusion.: 204 mL    ketamine Infusion.: 119 mL    morphine  Infusion.: 34 mL    Pivot 1.5: 765 mL    Solution: 250 mL  Total IN: 1542 mL    OUT:    Indwelling Catheter - Urethral: 1475 mL    Rectal Tube: 45 mL  Total OUT: 1520 mL    Total NET: 22 mL          ABG - ( 24 May 2020 03:41 )  pH, Arterial: 7.47  pH, Blood: x     /  pCO2: 58    /  pO2: 76    / HCO3: 41    / Base Excess: 16.9  /  SaO2: 97                  MEDICATIONS  (STANDING):  chlorhexidine 0.12% Liquid 15 milliLiter(s) Oral Mucosa every 12 hours  chlorhexidine 2% Cloths 1 Application(s) Topical daily  heparin  Infusion.  Unit(s)/Hr (12 mL/Hr) IV Continuous <Continuous>  insulin lispro (HumaLOG) corrective regimen sliding scale   SubCutaneous every 6 hours  ketamine Infusion. 1.149 mG/kG/Hr (8.08 mL/Hr) IV Continuous <Continuous>  melatonin 5 milliGRAM(s) Oral at bedtime  morphine  Infusion. 4 mG/Hr (4 mL/Hr) IV Continuous <Continuous>  multivitamin/minerals 1 Tablet(s) Oral daily  psyllium Powder 1 Packet(s) Oral two times a day  QUEtiapine 50 milliGRAM(s) Oral every 12 hours    MEDICATIONS  (PRN):  acetaminophen    Suspension .. 650 milliGRAM(s) Oral every 6 hours PRN Temp greater or equal to 38.5C (101.3F)  heparin   Injectable 5500 Unit(s) IV Push every 6 hours PRN For aPTT less than 40      Physical Exam:    Neurological: sedated    Pulmonary: unlabored, trachea midline    Cardiovascular: nsr    Gastrointestinal: soft, non-tender, non-distended, no rebound / guarding    : alexander in place     Skin: warm, dry, no diaphoresis, no pallor, cyanosis, or jaundice    LABS:  CBC Full  -  ( 24 May 2020 06:04 )  WBC Count : 11.52 K/uL  RBC Count : 2.59 M/uL  Hemoglobin : 8.0 g/dL  Hematocrit : 24.8 %  Platelet Count - Automated : 393 K/uL  Mean Cell Volume : 95.8 fl  Mean Cell Hemoglobin : 30.9 pg  Mean Cell Hemoglobin Concentration : 32.3 gm/dL  Auto Neutrophil # : x  Auto Lymphocyte # : x  Auto Monocyte # : x  Auto Eosinophil # : x  Auto Basophil # : x  Auto Neutrophil % : x  Auto Lymphocyte % : x  Auto Monocyte % : x  Auto Eosinophil % : x  Auto Basophil % : x    05-24    140  |  97<L>  |  28.0<H>  ----------------------------<  140<H>  3.9   |  37.0<H>  |  <0.20<L>    Ca    8.6      24 May 2020 06:04  Phos  2.1     05-24  Mg     2.1     05-24    TPro  6.1<L>  /  Alb  2.9<L>  /  TBili  0.3<L>  /  DBili  0.1  /  AST  134<H>  /  ALT  409<H>  /  AlkPhos  861<H>  05-24    PTT - ( 24 May 2020 06:04 )  PTT:62.8 sec    RECENT CULTURES:      LIVER FUNCTIONS - ( 24 May 2020 06:04 )  Alb: 2.9 g/dL / Pro: 6.1 g/dL / ALK PHOS: 861 U/L / ALT: 409 U/L / AST: 134 U/L / GGT: x

## 2020-05-26 DIAGNOSIS — F05 DELIRIUM DUE TO KNOWN PHYSIOLOGICAL CONDITION: ICD-10-CM

## 2020-05-26 LAB
ANION GAP SERPL CALC-SCNC: 8 MMOL/L — SIGNIFICANT CHANGE UP (ref 5–17)
APTT BLD: 67 SEC — HIGH (ref 27.5–36.3)
BASOPHILS # BLD AUTO: 0.05 K/UL — SIGNIFICANT CHANGE UP (ref 0–0.2)
BASOPHILS NFR BLD AUTO: 0.3 % — SIGNIFICANT CHANGE UP (ref 0–2)
BLD GP AB SCN SERPL QL: SIGNIFICANT CHANGE UP
BUN SERPL-MCNC: 49 MG/DL — HIGH (ref 8–20)
CALCIUM SERPL-MCNC: 9.4 MG/DL — SIGNIFICANT CHANGE UP (ref 8.6–10.2)
CHLORIDE SERPL-SCNC: 101 MMOL/L — SIGNIFICANT CHANGE UP (ref 98–107)
CO2 SERPL-SCNC: 40 MMOL/L — HIGH (ref 22–29)
CREAT SERPL-MCNC: 0.41 MG/DL — LOW (ref 0.5–1.3)
EOSINOPHIL # BLD AUTO: 0.04 K/UL — SIGNIFICANT CHANGE UP (ref 0–0.5)
EOSINOPHIL NFR BLD AUTO: 0.3 % — SIGNIFICANT CHANGE UP (ref 0–6)
GAS PNL BLDA: SIGNIFICANT CHANGE UP
GLUCOSE BLDC GLUCOMTR-MCNC: 124 MG/DL — HIGH (ref 70–99)
GLUCOSE BLDC GLUCOMTR-MCNC: 126 MG/DL — HIGH (ref 70–99)
GLUCOSE BLDC GLUCOMTR-MCNC: 183 MG/DL — HIGH (ref 70–99)
GLUCOSE BLDC GLUCOMTR-MCNC: 194 MG/DL — HIGH (ref 70–99)
GLUCOSE BLDC GLUCOMTR-MCNC: 234 MG/DL — HIGH (ref 70–99)
GLUCOSE BLDC GLUCOMTR-MCNC: 245 MG/DL — HIGH (ref 70–99)
GLUCOSE SERPL-MCNC: 126 MG/DL — HIGH (ref 70–99)
HCT VFR BLD CALC: 25.3 % — LOW (ref 39–50)
HGB BLD-MCNC: 7.8 G/DL — LOW (ref 13–17)
IMM GRANULOCYTES NFR BLD AUTO: 0.7 % — SIGNIFICANT CHANGE UP (ref 0–1.5)
LYMPHOCYTES # BLD AUTO: 0.81 K/UL — LOW (ref 1–3.3)
LYMPHOCYTES # BLD AUTO: 5.3 % — LOW (ref 13–44)
MAGNESIUM SERPL-MCNC: 2.7 MG/DL — HIGH (ref 1.6–2.6)
MCHC RBC-ENTMCNC: 30.6 PG — SIGNIFICANT CHANGE UP (ref 27–34)
MCHC RBC-ENTMCNC: 30.8 GM/DL — LOW (ref 32–36)
MCV RBC AUTO: 99.2 FL — SIGNIFICANT CHANGE UP (ref 80–100)
MONOCYTES # BLD AUTO: 0.96 K/UL — HIGH (ref 0–0.9)
MONOCYTES NFR BLD AUTO: 6.3 % — SIGNIFICANT CHANGE UP (ref 2–14)
NEUTROPHILS # BLD AUTO: 13.28 K/UL — HIGH (ref 1.8–7.4)
NEUTROPHILS NFR BLD AUTO: 87.1 % — HIGH (ref 43–77)
PHOSPHATE SERPL-MCNC: 3.1 MG/DL — SIGNIFICANT CHANGE UP (ref 2.4–4.7)
PLATELET # BLD AUTO: 413 K/UL — HIGH (ref 150–400)
POTASSIUM SERPL-MCNC: 4.7 MMOL/L — SIGNIFICANT CHANGE UP (ref 3.5–5.3)
POTASSIUM SERPL-SCNC: 4.7 MMOL/L — SIGNIFICANT CHANGE UP (ref 3.5–5.3)
RBC # BLD: 2.55 M/UL — LOW (ref 4.2–5.8)
RBC # FLD: 18.1 % — HIGH (ref 10.3–14.5)
SODIUM SERPL-SCNC: 149 MMOL/L — HIGH (ref 135–145)
WBC # BLD: 15.25 K/UL — HIGH (ref 3.8–10.5)
WBC # FLD AUTO: 15.25 K/UL — HIGH (ref 3.8–10.5)

## 2020-05-26 PROCEDURE — 99223 1ST HOSP IP/OBS HIGH 75: CPT

## 2020-05-26 PROCEDURE — 43246 EGD PLACE GASTROSTOMY TUBE: CPT

## 2020-05-26 PROCEDURE — 31600 PLANNED TRACHEOSTOMY: CPT

## 2020-05-26 PROCEDURE — 71045 X-RAY EXAM CHEST 1 VIEW: CPT | Mod: 26

## 2020-05-26 RX ORDER — LIDOCAINE HYDROCHLORIDE AND EPINEPHRINE 10; 10 MG/ML; UG/ML
10 INJECTION, SOLUTION INFILTRATION; PERINEURAL ONCE
Refills: 0 | Status: COMPLETED | OUTPATIENT
Start: 2020-05-26 | End: 2020-05-26

## 2020-05-26 RX ORDER — ACETAMINOPHEN 500 MG
650 TABLET ORAL ONCE
Refills: 0 | Status: COMPLETED | OUTPATIENT
Start: 2020-05-26 | End: 2020-05-26

## 2020-05-26 RX ORDER — FENTANYL CITRATE 50 UG/ML
100 INJECTION INTRAVENOUS ONCE
Refills: 0 | Status: DISCONTINUED | OUTPATIENT
Start: 2020-05-26 | End: 2020-05-26

## 2020-05-26 RX ORDER — KETAMINE HYDROCHLORIDE 100 MG/ML
100 INJECTION INTRAMUSCULAR; INTRAVENOUS ONCE
Refills: 0 | Status: DISCONTINUED | OUTPATIENT
Start: 2020-05-26 | End: 2020-05-26

## 2020-05-26 RX ORDER — CEFAZOLIN SODIUM 1 G
2000 VIAL (EA) INJECTION ONCE
Refills: 0 | Status: COMPLETED | OUTPATIENT
Start: 2020-05-26 | End: 2020-05-26

## 2020-05-26 RX ORDER — SODIUM CHLORIDE 9 MG/ML
1000 INJECTION, SOLUTION INTRAVENOUS
Refills: 0 | Status: DISCONTINUED | OUTPATIENT
Start: 2020-05-26 | End: 2020-05-26

## 2020-05-26 RX ORDER — SODIUM CHLORIDE 9 MG/ML
1000 INJECTION, SOLUTION INTRAVENOUS ONCE
Refills: 0 | Status: COMPLETED | OUTPATIENT
Start: 2020-05-26 | End: 2020-05-26

## 2020-05-26 RX ORDER — ROCURONIUM BROMIDE 10 MG/ML
100 VIAL (ML) INTRAVENOUS ONCE
Refills: 0 | Status: COMPLETED | OUTPATIENT
Start: 2020-05-26 | End: 2020-05-26

## 2020-05-26 RX ADMIN — SODIUM CHLORIDE 1000 MILLILITER(S): 9 INJECTION, SOLUTION INTRAVENOUS at 12:13

## 2020-05-26 RX ADMIN — Medication 1 PACKET(S): at 17:51

## 2020-05-26 RX ADMIN — Medication 6: at 23:31

## 2020-05-26 RX ADMIN — CHLORHEXIDINE GLUCONATE 15 MILLILITER(S): 213 SOLUTION TOPICAL at 16:07

## 2020-05-26 RX ADMIN — Medication 4: at 06:05

## 2020-05-26 RX ADMIN — Medication 100 MILLIGRAM(S): at 12:04

## 2020-05-26 RX ADMIN — FENTANYL CITRATE 100 MICROGRAM(S): 50 INJECTION INTRAVENOUS at 12:03

## 2020-05-26 RX ADMIN — CHLORHEXIDINE GLUCONATE 15 MILLILITER(S): 213 SOLUTION TOPICAL at 05:19

## 2020-05-26 RX ADMIN — KETAMINE HYDROCHLORIDE 8.08 MG/KG/HR: 100 INJECTION INTRAMUSCULAR; INTRAVENOUS at 09:52

## 2020-05-26 RX ADMIN — HEPARIN SODIUM 1200 UNIT(S)/HR: 5000 INJECTION INTRAVENOUS; SUBCUTANEOUS at 15:30

## 2020-05-26 RX ADMIN — SODIUM CHLORIDE 1000 MILLILITER(S): 9 INJECTION, SOLUTION INTRAVENOUS at 06:05

## 2020-05-26 RX ADMIN — Medication 4: at 00:10

## 2020-05-26 RX ADMIN — Medication 5 MILLIGRAM(S): at 22:35

## 2020-05-26 RX ADMIN — Medication 100 MILLIGRAM(S): at 13:04

## 2020-05-26 RX ADMIN — CHLORHEXIDINE GLUCONATE 1 APPLICATION(S): 213 SOLUTION TOPICAL at 10:44

## 2020-05-26 RX ADMIN — LIDOCAINE HYDROCHLORIDE AND EPINEPHRINE 10 MILLILITER(S): 10; 10 INJECTION, SOLUTION INFILTRATION; PERINEURAL at 12:03

## 2020-05-26 RX ADMIN — Medication 650 MILLIGRAM(S): at 22:00

## 2020-05-26 RX ADMIN — Medication 1 PACKET(S): at 06:06

## 2020-05-26 RX ADMIN — QUETIAPINE FUMARATE 50 MILLIGRAM(S): 200 TABLET, FILM COATED ORAL at 22:39

## 2020-05-26 NOTE — PROGRESS NOTE ADULT - ATTENDING COMMENTS
Patient5 seen and examined during  SICU rounds   No new events.  vent support for trach today  PEG today  no id, renal of heme issues.  hep drip to resume after 4 hrs

## 2020-05-26 NOTE — BEHAVIORAL HEALTH ASSESSMENT NOTE - HPI (INCLUDE ILLNESS QUALITY, SEVERITY, DURATION, TIMING, CONTEXT, MODIFYING FACTORS, ASSOCIATED SIGNS AND SYMPTOMS)
Patient is a  64 year old,   male; reportedly of [place] descent, had been working in construction, with no known immediate family; with no known past psychiatric history of; no known psychiatric  hospitalizations; no known suicide attempts; no known active substance abuse or known history of complicated withdrawal; patient with acute hypoxic vent dependent respiratory failure, Covid Sepsis, Acinetobacter PNA, PE.  Asked to evaluate for guardianship application.       Patient currently has no known family that have been involved. Reviewed chart, spoke with SW and PA.  Patient was admitted on 4/18/20 for Covid pneumonia and rapidly progressed to ventilator dependence.  He was reportedly paralzyed and intubated for two weeks.  He has not been exhibiting awareness to outside stimululis until the last 48 hours when he has been responding to verbal commands. He has been confused w/delirium. He has not been reportedly agitated or aggressive. Trach and PEG tube was placed today.  There is no known psychiatric history.          On interview, patient currently sedated and did not respond to verbal stimuli.  He does not currently has the capacity to make medical decision secondary to delirium and with no surrogate decision makers availablewould start application for guardianship

## 2020-05-26 NOTE — PROGRESS NOTE ADULT - ASSESSMENT
64y Male with acute hypoxic vent dependent respiratory failure, Covid Sepsis, Acinetobacter PNA, PE    Neuro: sedation / analgesia with Ketamine/morphine gtt continue to decrease amounts -seroquel.  Improving mental status, resolving encephelopathy  Delirium precautions, daily sedation holiday    CV: maintain MAP > 65, continue invasive monitoring with arterial line    Pulm: COVID - Type H resp failure with gradually improving lung compliance.  Plan for Trach today for continued vent management however continue to wean vent as tolerated    GI/Nutrition: NPO with tube feeds.  PEG planned for today.      /Renal: alexander for strict I&O, monitor kidney fxn. Hematuria resolved.    ID: F/up surveillance blood cultures.  Rising leukocytosis however Tmax only 100.8 with no hemodynamic instability.  Continue to monitor.      Endo: ISS.  Monitor blood glucose    Skin: repositioning for DTI prevention while in bed    Heme/DVT Prophylaxis: SCDs / Heparin gtt for hypercoagulable state.  Consider to transitioning to Eliquis once Trach/PEG performed.      Dispo: ICU

## 2020-05-26 NOTE — PROGRESS NOTE ADULT - SUBJECTIVE AND OBJECTIVE BOX
INTERVAL HPI/OVERNIGHT EVENTS:    Surveillance cultures obtained due to rising leukocytosis with Tmax 100.8 only.  Pt has remained hemodynamically stable.  Continues with gradually improving lung compliance and improved ventilation.  AM Seroquel DC'd due to somnolence.      MEDICATIONS  (STANDING):  chlorhexidine 0.12% Liquid 15 milliLiter(s) Oral Mucosa every 12 hours  chlorhexidine 2% Cloths 1 Application(s) Topical daily  heparin  Infusion.  Unit(s)/Hr (12 mL/Hr) IV Continuous <Continuous>  insulin lispro (HumaLOG) corrective regimen sliding scale   SubCutaneous every 6 hours  ketamine Infusion. 1.149 mG/kG/Hr (8.08 mL/Hr) IV Continuous <Continuous>  melatonin 5 milliGRAM(s) Oral at bedtime  morphine  Infusion. 4 mG/Hr (4 mL/Hr) IV Continuous <Continuous>  multivitamin/minerals 1 Tablet(s) Oral daily  psyllium Powder 1 Packet(s) Oral two times a day  QUEtiapine 50 milliGRAM(s) Oral at bedtime    MEDICATIONS  (PRN):  acetaminophen    Suspension .. 650 milliGRAM(s) Oral every 6 hours PRN Temp greater or equal to 38.5C (101.3F)  heparin   Injectable 5500 Unit(s) IV Push every 6 hours PRN For aPTT less than 40      Drug Dosing Weight  Height (cm): 160.02 (18 Apr 2020 08:20)  Weight (kg): 70.3 (18 Apr 2020 08:20)  BMI (kg/m2): 27.5 (18 Apr 2020 08:20)  BSA (m2): 1.74 (18 Apr 2020 08:20)      PAST MEDICAL & SURGICAL HISTORY:  No pertinent past medical history  No significant past surgical history      ICU Vital Signs Last 24 Hrs  T(C): 37.6 (25 May 2020 23:57), Max: 38.2 (25 May 2020 07:00)  T(F): 99.6 (25 May 2020 23:57), Max: 100.7 (25 May 2020 07:00)  HR: 105 (26 May 2020 00:13) (88 - 123)  BP: --  BP(mean): --  ABP: 123/64 (26 May 2020 00:00) (95/51 - 135/62)  ABP(mean): 86 (26 May 2020 00:00) (65 - 88)  RR: 31 (26 May 2020 00:00) (30 - 38)  SpO2: 94% (26 May 2020 00:13) (91% - 97%)      ABG - ( 25 May 2020 03:31 )  pH, Arterial: 7.34  pH, Blood: x     /  pCO2: 76    /  pO2: 76    / HCO3: 37    / Base Excess: 13.6  /  SaO2: 95                  I&O's Detail    24 May 2020 07:01  -  25 May 2020 07:00  --------------------------------------------------------  IN:    Enteral Tube Flush: 170 mL    heparin  Infusion.: 288 mL    ketamine Infusion.: 35 mL    ketamine Infusion.: 133 mL    morphine  Infusion.: 48 mL    Pivot 1.5: 1080 mL    Solution: 250 mL  Total IN: 2004 mL    OUT:    Indwelling Catheter - Urethral: 1920 mL    Rectal Tube: 95 mL  Total OUT: 2015 mL    Total NET: -11 mL      25 May 2020 07:01  -  26 May 2020 01:09  --------------------------------------------------------  IN:    Enteral Tube Flush: 120 mL    heparin  Infusion.: 216 mL    ketamine Infusion.: 115.8 mL    morphine  Infusion.: 27 mL    Pivot 1.5: 745 mL  Total IN: 1223.8 mL    OUT:    Indwelling Catheter - Urethral: 935 mL  Total OUT: 935 mL    Total NET: 288.8 mL          Mode: AC/ CMV (Assist Control/ Continuous Mandatory Ventilation)  RR (machine): 28  TV (machine): 350  FiO2: 50  PEEP: 5  ITime: 0.6  MAP: 10  PIP: 18      Physical Exam:    Neurological:  Intubated and sedated.  Non-focal.  Moving all extremities with sedation lifted.  Intermittently follows commands.  No appreciable motor deficits    HEENT: PERRLA, no drainage or redness.     Neck: Neck supple, No JVD    Respiratory:  Mech vented.  Trachea midline, equal chest rise.  Breath Sounds equal bilateral    Cardiovascular: Regular rate & rhythm, normal S1, S2    Gastrointestinal: Soft, non-tender, normal bowel sounds    Extremities: No peripheral edema, No cyanosis, clubbing     Vascular: Equal and normal pulses: 2+ peripheral pulses throughout        LABS:  CBC Full  -  ( 25 May 2020 05:24 )  WBC Count : 16.65 K/uL  RBC Count : 2.84 M/uL  Hemoglobin : 8.8 g/dL  Hematocrit : 28.2 %  Platelet Count - Automated : 456 K/uL  Mean Cell Volume : 99.3 fl  Mean Cell Hemoglobin : 31.0 pg  Mean Cell Hemoglobin Concentration : 31.2 gm/dL  Auto Neutrophil # : 13.34 K/uL  Auto Lymphocyte # : 1.51 K/uL  Auto Monocyte # : 1.47 K/uL  Auto Eosinophil # : 0.15 K/uL  Auto Basophil # : 0.04 K/uL  Auto Neutrophil % : 80.2 %  Auto Lymphocyte % : 9.1 %  Auto Monocyte % : 8.8 %  Auto Eosinophil % : 0.9 %  Auto Basophil % : 0.2 %    05-25    144  |  99  |  31.0<H>  ----------------------------<  152<H>  4.6   |  36.0<H>  |  0.23<L>    Ca    8.8      25 May 2020 05:24  Phos  3.3     05-25  Mg     2.3     05-25    TPro  6.7  /  Alb  3.2<L>  /  TBili  0.4  /  DBili  0.2  /  AST  198<H>  /  ALT  409<H>  /  AlkPhos  821<H>  05-25    PTT - ( 25 May 2020 08:30 )  PTT:67.0 sec      Assessment and Plan:    Neuro:   - sedation / analgesia with ketamine and Dilaudid  - delirium precautions  - daily sedation holidays    CV:   - continue invasive monitoring with arterial line    Pulm:   - Wean PEEP as tolerated  - Favor low PEEP over weaning FIO2    GI/Nutrition:   - NPO with tube feeds   - continue bowel reg  - continue protonix for ppx    /Renal:   - alexander for strict I&O  - monitor kidney fxn    ID:  - continue zosyn through 4/30     Endo:  - monitor blood glucose q6  - continue lantus 25 BID  - continue ISS    Skin:   - repositioning for DTI prevention while in bed    Heme/DVT Prophylaxis:  - continue SQ heparin     Dispo:  - patient remains critically ill  - care per SICU INTERVAL HPI/OVERNIGHT EVENTS:    Surveillance cultures obtained due to rising leukocytosis with Tmax 100.8 only.  Pt has remained hemodynamically stable.  Continues with gradually improving lung compliance and improved ventilation.  AM Seroquel DC'd due to somnolence.      MEDICATIONS  (STANDING):  chlorhexidine 0.12% Liquid 15 milliLiter(s) Oral Mucosa every 12 hours  chlorhexidine 2% Cloths 1 Application(s) Topical daily  heparin  Infusion.  Unit(s)/Hr (12 mL/Hr) IV Continuous <Continuous>  insulin lispro (HumaLOG) corrective regimen sliding scale   SubCutaneous every 6 hours  ketamine Infusion. 1.149 mG/kG/Hr (8.08 mL/Hr) IV Continuous <Continuous>  melatonin 5 milliGRAM(s) Oral at bedtime  morphine  Infusion. 4 mG/Hr (4 mL/Hr) IV Continuous <Continuous>  multivitamin/minerals 1 Tablet(s) Oral daily  psyllium Powder 1 Packet(s) Oral two times a day  QUEtiapine 50 milliGRAM(s) Oral at bedtime    MEDICATIONS  (PRN):  acetaminophen    Suspension .. 650 milliGRAM(s) Oral every 6 hours PRN Temp greater or equal to 38.5C (101.3F)  heparin   Injectable 5500 Unit(s) IV Push every 6 hours PRN For aPTT less than 40      Drug Dosing Weight  Height (cm): 160.02 (18 Apr 2020 08:20)  Weight (kg): 70.3 (18 Apr 2020 08:20)  BMI (kg/m2): 27.5 (18 Apr 2020 08:20)  BSA (m2): 1.74 (18 Apr 2020 08:20)      PAST MEDICAL & SURGICAL HISTORY:  No pertinent past medical history  No significant past surgical history      ICU Vital Signs Last 24 Hrs  T(C): 37.6 (25 May 2020 23:57), Max: 38.2 (25 May 2020 07:00)  T(F): 99.6 (25 May 2020 23:57), Max: 100.7 (25 May 2020 07:00)  HR: 105 (26 May 2020 00:13) (88 - 123)  BP: --  BP(mean): --  ABP: 123/64 (26 May 2020 00:00) (95/51 - 135/62)  ABP(mean): 86 (26 May 2020 00:00) (65 - 88)  RR: 31 (26 May 2020 00:00) (30 - 38)  SpO2: 94% (26 May 2020 00:13) (91% - 97%)      ABG - ( 25 May 2020 03:31 )  pH, Arterial: 7.34  pH, Blood: x     /  pCO2: 76    /  pO2: 76    / HCO3: 37    / Base Excess: 13.6  /  SaO2: 95                  I&O's Detail    24 May 2020 07:01  -  25 May 2020 07:00  --------------------------------------------------------  IN:    Enteral Tube Flush: 170 mL    heparin  Infusion.: 288 mL    ketamine Infusion.: 35 mL    ketamine Infusion.: 133 mL    morphine  Infusion.: 48 mL    Pivot 1.5: 1080 mL    Solution: 250 mL  Total IN: 2004 mL    OUT:    Indwelling Catheter - Urethral: 1920 mL    Rectal Tube: 95 mL  Total OUT: 2015 mL    Total NET: -11 mL      25 May 2020 07:01  -  26 May 2020 01:09  --------------------------------------------------------  IN:    Enteral Tube Flush: 120 mL    heparin  Infusion.: 216 mL    ketamine Infusion.: 115.8 mL    morphine  Infusion.: 27 mL    Pivot 1.5: 745 mL  Total IN: 1223.8 mL    OUT:    Indwelling Catheter - Urethral: 935 mL  Total OUT: 935 mL    Total NET: 288.8 mL          Mode: AC/ CMV (Assist Control/ Continuous Mandatory Ventilation)  RR (machine): 28  TV (machine): 350  FiO2: 50  PEEP: 5  ITime: 0.6  MAP: 10  PIP: 18      Physical Exam:    Neurological:  Intubated and sedated.  Non-focal.  Moving all extremities with sedation lifted.  Intermittently follows commands.  No appreciable motor deficits    HEENT: PERRLA, no drainage or redness.     Neck: Neck supple, No JVD    Respiratory:  Mech vented.  Trachea midline, equal chest rise.  Breath Sounds equal bilateral    Cardiovascular: Regular rate & rhythm, normal S1, S2    Gastrointestinal: Soft, non-tender, normal bowel sounds    Extremities: No peripheral edema, No cyanosis, clubbing     Vascular: Equal and normal pulses: 2+ peripheral pulses throughout        LABS:  CBC Full  -  ( 25 May 2020 05:24 )  WBC Count : 16.65 K/uL  RBC Count : 2.84 M/uL  Hemoglobin : 8.8 g/dL  Hematocrit : 28.2 %  Platelet Count - Automated : 456 K/uL  Mean Cell Volume : 99.3 fl  Mean Cell Hemoglobin : 31.0 pg  Mean Cell Hemoglobin Concentration : 31.2 gm/dL  Auto Neutrophil # : 13.34 K/uL  Auto Lymphocyte # : 1.51 K/uL  Auto Monocyte # : 1.47 K/uL  Auto Eosinophil # : 0.15 K/uL  Auto Basophil # : 0.04 K/uL  Auto Neutrophil % : 80.2 %  Auto Lymphocyte % : 9.1 %  Auto Monocyte % : 8.8 %  Auto Eosinophil % : 0.9 %  Auto Basophil % : 0.2 %    05-25    144  |  99  |  31.0<H>  ----------------------------<  152<H>  4.6   |  36.0<H>  |  0.23<L>    Ca    8.8      25 May 2020 05:24  Phos  3.3     05-25  Mg     2.3     05-25    TPro  6.7  /  Alb  3.2<L>  /  TBili  0.4  /  DBili  0.2  /  AST  198<H>  /  ALT  409<H>  /  AlkPhos  821<H>  05-25    PTT - ( 25 May 2020 08:30 )  PTT:67.0 sec

## 2020-05-26 NOTE — BEHAVIORAL HEALTH ASSESSMENT NOTE - SUMMARY
Patient is a  64 year old,   male; reportedly of Citizen of [place] descent, had been working in construction, with no known immediate family; with no known past psychiatric history of; no known psychiatric  hospitalizations; no known suicide attempts; no known active substance abuse or known history of complicated withdrawal; patient with acute hypoxic vent dependent respiratory failure, Covid Sepsis, Acinetobacter PNA, PE. Based upon my evaluation, the pt does not have factual understanding of current situation and does not appreciates the current situation and its consequences. Pt is not able to rationally manipulate information to make decisions as evidenced by and is not able to  express a clear, consistent  choice secondary to protracted delirium  Would begin application for guardianship.  No other acute psychiatric history is known.

## 2020-05-26 NOTE — BEHAVIORAL HEALTH ASSESSMENT NOTE - NSBHCHARTREVIEWVS_PSY_A_CORE FT
Vital Signs Last 24 Hrs  T(C): 38.1 (26 May 2020 16:00), Max: 38.1 (25 May 2020 20:00)  T(F): 100.5 (26 May 2020 16:00), Max: 100.5 (25 May 2020 20:00)  HR: 91 (26 May 2020 16:00) (85 - 115)  BP: --  BP(mean): --  RR: 28 (26 May 2020 16:00) (28 - 35)  SpO2: 96% (26 May 2020 16:00) (93% - 98%)

## 2020-05-26 NOTE — BEHAVIORAL HEALTH ASSESSMENT NOTE - NSBHCHARTREVIEWLAB_PSY_A_CORE FT
7.8    15.25 )-----------( 413      ( 26 May 2020 04:37 )             25.3     05-26    149<H>  |  101  |  49.0<H>  ----------------------------<  126<H>  4.7   |  40.0<H>  |  0.41<L>    Ca    9.4      26 May 2020 04:37  Phos  3.1     05-26  Mg     2.7     05-26    TPro  6.7  /  Alb  3.2<L>  /  TBili  0.4  /  DBili  0.2  /  AST  198<H>  /  ALT  409<H>  /  AlkPhos  821<H>  05-25    LIVER FUNCTIONS - ( 25 May 2020 05:24 )  Alb: 3.2 g/dL / Pro: 6.7 g/dL / ALK PHOS: 821 U/L / ALT: 409 U/L / AST: 198 U/L / GGT: x           PTT - ( 26 May 2020 04:37 )  PTT:67.0 sec

## 2020-05-27 LAB
-  AMIKACIN: SIGNIFICANT CHANGE UP
-  AMPICILLIN/SULBACTAM: SIGNIFICANT CHANGE UP
-  CEFEPIME: SIGNIFICANT CHANGE UP
-  CEFTAZIDIME: SIGNIFICANT CHANGE UP
-  CIPROFLOXACIN: SIGNIFICANT CHANGE UP
-  GENTAMICIN: SIGNIFICANT CHANGE UP
-  LEVOFLOXACIN: SIGNIFICANT CHANGE UP
-  MEROPENEM: SIGNIFICANT CHANGE UP
-  TOBRAMYCIN: SIGNIFICANT CHANGE UP
-  TRIMETHOPRIM/SULFAMETHOXAZOLE: SIGNIFICANT CHANGE UP
ANION GAP SERPL CALC-SCNC: 8 MMOL/L — SIGNIFICANT CHANGE UP (ref 5–17)
ANISOCYTOSIS BLD QL: SIGNIFICANT CHANGE UP
APTT BLD: 57.3 SEC — HIGH (ref 27.5–36.3)
APTT BLD: 70.8 SEC — HIGH (ref 27.5–36.3)
APTT BLD: 72.7 SEC — HIGH (ref 27.5–36.3)
BASOPHILS # BLD AUTO: 0.1 K/UL — SIGNIFICANT CHANGE UP (ref 0–0.2)
BASOPHILS NFR BLD AUTO: 0.9 % — SIGNIFICANT CHANGE UP (ref 0–2)
BUN SERPL-MCNC: 56 MG/DL — HIGH (ref 8–20)
CALCIUM SERPL-MCNC: 9.1 MG/DL — SIGNIFICANT CHANGE UP (ref 8.6–10.2)
CHLORIDE SERPL-SCNC: 105 MMOL/L — SIGNIFICANT CHANGE UP (ref 98–107)
CO2 SERPL-SCNC: 41 MMOL/L — HIGH (ref 22–29)
CREAT SERPL-MCNC: 0.72 MG/DL — SIGNIFICANT CHANGE UP (ref 0.5–1.3)
CULTURE RESULTS: SIGNIFICANT CHANGE UP
EOSINOPHIL # BLD AUTO: 0.1 K/UL — SIGNIFICANT CHANGE UP (ref 0–0.5)
EOSINOPHIL NFR BLD AUTO: 0.9 % — SIGNIFICANT CHANGE UP (ref 0–6)
GAS PNL BLDA: SIGNIFICANT CHANGE UP
GAS PNL BLDA: SIGNIFICANT CHANGE UP
GIANT PLATELETS BLD QL SMEAR: PRESENT — SIGNIFICANT CHANGE UP
GLUCOSE BLDC GLUCOMTR-MCNC: 178 MG/DL — HIGH (ref 70–99)
GLUCOSE BLDC GLUCOMTR-MCNC: 190 MG/DL — HIGH (ref 70–99)
GLUCOSE BLDC GLUCOMTR-MCNC: 198 MG/DL — HIGH (ref 70–99)
GLUCOSE BLDC GLUCOMTR-MCNC: 204 MG/DL — HIGH (ref 70–99)
GLUCOSE SERPL-MCNC: 157 MG/DL — HIGH (ref 70–99)
HCT VFR BLD CALC: 25 % — LOW (ref 39–50)
HGB BLD-MCNC: 7.5 G/DL — LOW (ref 13–17)
LYMPHOCYTES # BLD AUTO: 1.37 K/UL — SIGNIFICANT CHANGE UP (ref 1–3.3)
LYMPHOCYTES # BLD AUTO: 12.2 % — LOW (ref 13–44)
MACROCYTES BLD QL: SIGNIFICANT CHANGE UP
MAGNESIUM SERPL-MCNC: 2.9 MG/DL — HIGH (ref 1.6–2.6)
MANUAL SMEAR VERIFICATION: SIGNIFICANT CHANGE UP
MCHC RBC-ENTMCNC: 30 GM/DL — LOW (ref 32–36)
MCHC RBC-ENTMCNC: 30.5 PG — SIGNIFICANT CHANGE UP (ref 27–34)
MCV RBC AUTO: 101.6 FL — HIGH (ref 80–100)
METHOD TYPE: SIGNIFICANT CHANGE UP
MONOCYTES # BLD AUTO: 0.48 K/UL — SIGNIFICANT CHANGE UP (ref 0–0.9)
MONOCYTES NFR BLD AUTO: 4.3 % — SIGNIFICANT CHANGE UP (ref 2–14)
NEUTROPHILS # BLD AUTO: 9.21 K/UL — HIGH (ref 1.8–7.4)
NEUTROPHILS NFR BLD AUTO: 61.7 % — SIGNIFICANT CHANGE UP (ref 43–77)
NEUTS BAND # BLD: 20 % — HIGH (ref 0–8)
ORGANISM # SPEC MICROSCOPIC CNT: SIGNIFICANT CHANGE UP
ORGANISM # SPEC MICROSCOPIC CNT: SIGNIFICANT CHANGE UP
PHOSPHATE SERPL-MCNC: 3.4 MG/DL — SIGNIFICANT CHANGE UP (ref 2.4–4.7)
PLAT MORPH BLD: NORMAL — SIGNIFICANT CHANGE UP
PLATELET # BLD AUTO: 398 K/UL — SIGNIFICANT CHANGE UP (ref 150–400)
POLYCHROMASIA BLD QL SMEAR: SIGNIFICANT CHANGE UP
POTASSIUM SERPL-MCNC: 4.2 MMOL/L — SIGNIFICANT CHANGE UP (ref 3.5–5.3)
POTASSIUM SERPL-SCNC: 4.2 MMOL/L — SIGNIFICANT CHANGE UP (ref 3.5–5.3)
RBC # BLD: 2.46 M/UL — LOW (ref 4.2–5.8)
RBC # FLD: 18.1 % — HIGH (ref 10.3–14.5)
RBC BLD AUTO: ABNORMAL
SODIUM SERPL-SCNC: 154 MMOL/L — HIGH (ref 135–145)
SPECIMEN SOURCE: SIGNIFICANT CHANGE UP
WBC # BLD: 11.27 K/UL — HIGH (ref 3.8–10.5)
WBC # FLD AUTO: 11.27 K/UL — HIGH (ref 3.8–10.5)

## 2020-05-27 PROCEDURE — 99291 CRITICAL CARE FIRST HOUR: CPT

## 2020-05-27 PROCEDURE — 71045 X-RAY EXAM CHEST 1 VIEW: CPT | Mod: 26

## 2020-05-27 PROCEDURE — 99231 SBSQ HOSP IP/OBS SF/LOW 25: CPT

## 2020-05-27 RX ORDER — METHADONE HYDROCHLORIDE 40 MG/1
5 TABLET ORAL EVERY 12 HOURS
Refills: 0 | Status: DISCONTINUED | OUTPATIENT
Start: 2020-05-27 | End: 2020-05-28

## 2020-05-27 RX ADMIN — Medication 650 MILLIGRAM(S): at 20:48

## 2020-05-27 RX ADMIN — Medication 4: at 18:26

## 2020-05-27 RX ADMIN — Medication 4: at 05:29

## 2020-05-27 RX ADMIN — QUETIAPINE FUMARATE 50 MILLIGRAM(S): 200 TABLET, FILM COATED ORAL at 23:17

## 2020-05-27 RX ADMIN — Medication 4: at 12:00

## 2020-05-27 RX ADMIN — Medication 6: at 23:41

## 2020-05-27 RX ADMIN — CHLORHEXIDINE GLUCONATE 1 APPLICATION(S): 213 SOLUTION TOPICAL at 12:33

## 2020-05-27 RX ADMIN — CHLORHEXIDINE GLUCONATE 15 MILLILITER(S): 213 SOLUTION TOPICAL at 05:29

## 2020-05-27 RX ADMIN — Medication 1 PACKET(S): at 05:29

## 2020-05-27 RX ADMIN — Medication 650 MILLIGRAM(S): at 16:47

## 2020-05-27 RX ADMIN — HEPARIN SODIUM 1300 UNIT(S)/HR: 5000 INJECTION INTRAVENOUS; SUBCUTANEOUS at 02:27

## 2020-05-27 RX ADMIN — Medication 1 PACKET(S): at 18:26

## 2020-05-27 RX ADMIN — Medication 1 TABLET(S): at 18:26

## 2020-05-27 RX ADMIN — CHLORHEXIDINE GLUCONATE 15 MILLILITER(S): 213 SOLUTION TOPICAL at 19:17

## 2020-05-27 RX ADMIN — Medication 5 MILLIGRAM(S): at 23:17

## 2020-05-27 NOTE — PROGRESS NOTE ADULT - PROBLEM SELECTOR PLAN 4
Patient remains in ICU - now s/p trach and peg  Sedation and vent settings slowly weaned as per ICU discretion   Placed a call to Yovani Walden (HCP) called for support using [facility]  ID #[id number]  Yovani remains hopeful in the recovery of his friend Meño  He is also very appreciative of daily phone calls from ICU and the support from palliative care services  Will continue to support and monitor    Total time spent 15 minutes this includes chart review, and discussion with patient's HCP       Thank you for the opportunity to assist with the care of this patient.   [facility] [phone].

## 2020-05-27 NOTE — PROGRESS NOTE ADULT - ASSESSMENT
64y Male with acute hypoxic vent dependent respiratory failure, Covid Sepsis, Acinetobacter PNA, PE    Neuro: Sedation / analgesia with Ketamine/morphine gtt continue to decrease amounts -seroquel.  Plan to DC Ketamine and slow Morphine wean.  Improving mental status, resolving encephelopathy  Delirium precautions, daily sedation holiday    CV: maintain MAP > 65, continue invasive monitoring with arterial line    Pulm: COVID - Type H resp failure with gradually improving lung compliance. Transition to PSV when able and continue to wean vent as tolerated     GI/Nutrition:  S/p PEG placement.  Continue tube feeds at goal    /Renal: alexander for strict I&O, monitor kidney fxn. Hematuria resolved.    ID: Acinetobacter again noted in sputum culture.  F/up surveillance blood cultures.  Rising leukocytosis however Tmax only 100.5 with no hemodynamic instability.  Continue to monitor.      Endo: ISS.  Monitor blood glucose    Skin: repositioning for DTI prevention while in bed    Heme/DVT Prophylaxis: SCDs / Heparin gtt for hypercoagulable state.  Consider to transitioning to Eliquis s/p Trach/PEG.      Dispo: ICU

## 2020-05-27 NOTE — PROGRESS NOTE ADULT - SUBJECTIVE AND OBJECTIVE BOX
Palliative Care Followup  INTERVAL HPI/OVERNIGHT EVENTS:: patient s/p peg and trach - ketamine infusion discontinued - still on morphine infusion - restarted on tube feeds- tolerating well    CC: SOB     Present Symptoms: vented and sedated - continue to lighten sedation  symptom management by ICU team    Pain: On morphine drip            Character-            Duration-            Effect-            Factors-            Frequency-            Location-            Severity-    Review of Systems: Reviewed  Per HPI all other ROS negative  Unable to obtain due to poor mentation     MEDICATIONS  (STANDING):  chlorhexidine 0.12% Liquid 15 milliLiter(s) Oral Mucosa every 12 hours  chlorhexidine 2% Cloths 1 Application(s) Topical daily  dextrose 50% Injectable 25 Gram(s) IV Push once  heparin  Infusion.  Unit(s)/Hr (12 mL/Hr) IV Continuous <Continuous>  HYDROmorphone Infusion 2 mG/Hr (2 mL/Hr) IV Continuous <Continuous>  insulin lispro (HumaLOG) corrective regimen sliding scale   SubCutaneous every 4 hours  ketamine Infusion. 1.149 mG/kG/Hr (8.08 mL/Hr) IV Continuous <Continuous>  melatonin 5 milliGRAM(s) Oral at bedtime  meropenem  IVPB 1000 milliGRAM(s) IV Intermittent every 8 hours  methylPREDNISolone sodium succinate Injectable 35 milliGRAM(s) IV Push every 12 hours  multivitamin/minerals 1 Tablet(s) Oral daily  pantoprazole   Suspension 40 milliGRAM(s) Oral daily  psyllium Powder 1 Packet(s) Oral two times a day    MEDICATIONS  (PRN):  acetaminophen   Tablet .. 650 milliGRAM(s) Oral every 6 hours PRN Temp greater or equal to 38C (100.4F), Mild Pain (1 - 3), Moderate Pain (4 - 6)  dextrose 40% Gel 15 Gram(s) Oral once PRN Blood Glucose LESS THAN 70 milliGRAM(s)/deciliter  glucagon  Injectable 1 milliGRAM(s) IntraMuscular once PRN Glucose LESS THAN 70 milligrams/deciliter  heparin   Injectable 5500 Unit(s) IV Push every 6 hours PRN For aPTT less than 40  heparin   Injectable 2500 Unit(s) IV Push every 6 hours PRN For aPTT between 40 - 57    PHYSICAL EXAM:  Physical Exam - limited physical exam due to COVID - 19 isolation status  No Physical Exam at bedside completed in attempt to limit COVID-19   Reviewed H&P physical exam (most recent physical exam noted) and Reviewed latest ICU documentation for reference    Vital Signs Last 24 Hrs  T(C): 36.9 (19 May 2020 11:39), Max: 36.9 (19 May 2020 11:39)  T(F): 98.4 (19 May 2020 11:39), Max: 98.4 (19 May 2020 11:39)  HR: 69 (19 May 2020 12:40) (66 - 80)  BP: 91/44 (19 May 2020 12:00) (91/44 - 92/47)  BP(mean): 55 (19 May 2020 12:00) (55 - 57)  RR: 16 (19 May 2020 12:00) (15 - 34)  SpO2: 100% (19 May 2020 12:40) (91% - 100%)      LABS:                          6.7    10.55 )-----------( 240      ( 19 May 2020 10:23 )             21.7     05-19    136  |  88<L>  |  23.0<H>  ----------------------------<  139<H>  5.2   |  46.0<HH>  |  0.21<L>    Ca    8.3<L>      19 May 2020 05:15  Phos  2.5     05-19  Mg     1.9     05-19      PTT - ( 19 May 2020 07:57 )  PTT:95.9 sec    I&O's Summary    18 May 2020 07:01  -  19 May 2020 07:00  --------------------------------------------------------  IN: 1879.8 mL / OUT: 3710 mL / NET: -1830.2 mL    19 May 2020 07:01  -  19 May 2020 13:36  --------------------------------------------------------  IN: 449.5 mL / OUT: 900 mL / NET: -450.5 mL    Advance Directives  Full Code

## 2020-05-27 NOTE — PROGRESS NOTE ADULT - PROBLEM SELECTOR PLAN 1
ID following currently on meropenem (5/14-5/21) for Acinetobacter PNA  - supportive treatment for COVID-19  Continue to weane vent as tolerated   Improving mental status, resolving encephelopathy

## 2020-05-27 NOTE — PROGRESS NOTE ADULT - SUBJECTIVE AND OBJECTIVE BOX
INTERVAL HPI/OVERNIGHT EVENTS:    Pt s/p bedside trach and PEG without complications.  Continuing to lighten sedation post trach.  Restarted on tube feeds and tolerating.  No significant fever with Tmax 100.5, awaiting blood cultures.  Acinetobacter again noted in sputum culture.      MEDICATIONS  (STANDING):  chlorhexidine 0.12% Liquid 15 milliLiter(s) Oral Mucosa every 12 hours  chlorhexidine 2% Cloths 1 Application(s) Topical daily  heparin  Infusion.  Unit(s)/Hr (12 mL/Hr) IV Continuous <Continuous>  insulin lispro (HumaLOG) corrective regimen sliding scale   SubCutaneous every 6 hours  ketamine Infusion. 1.149 mG/kG/Hr (8.08 mL/Hr) IV Continuous <Continuous>  melatonin 5 milliGRAM(s) Oral at bedtime  morphine  Infusion. 4 mG/Hr (4 mL/Hr) IV Continuous <Continuous>  multivitamin/minerals 1 Tablet(s) Oral daily  psyllium Powder 1 Packet(s) Oral two times a day  QUEtiapine 50 milliGRAM(s) Oral at bedtime    MEDICATIONS  (PRN):  acetaminophen    Suspension .. 650 milliGRAM(s) Oral every 6 hours PRN Temp greater or equal to 38.5C (101.3F)  heparin   Injectable 5500 Unit(s) IV Push every 6 hours PRN For aPTT less than 40      Drug Dosing Weight  Height (cm): 160.02 (18 Apr 2020 08:20)  Weight (kg): 70.3 (18 Apr 2020 08:20)  BMI (kg/m2): 27.5 (18 Apr 2020 08:20)  BSA (m2): 1.74 (18 Apr 2020 08:20)      PAST MEDICAL & SURGICAL HISTORY:  No pertinent past medical history  No significant past surgical history      ICU Vital Signs Last 24 Hrs  T(C): 37.6 (27 May 2020 00:21), Max: 38.1 (26 May 2020 16:00)  T(F): 99.7 (27 May 2020 00:21), Max: 100.5 (26 May 2020 16:00)  HR: 104 (27 May 2020 00:27) (85 - 120)  BP: --  BP(mean): --  ABP: 110/51 (26 May 2020 23:00) (91/47 - 133/51)  ABP(mean): 71 (26 May 2020 23:00) (61 - 85)  RR: 30 (26 May 2020 23:00) (28 - 35)  SpO2: 95% (27 May 2020 00:27) (93% - 98%)      ABG - ( 26 May 2020 05:26 )  pH, Arterial: 7.35  pH, Blood: x     /  pCO2: 77    /  pO2: 100   / HCO3: 38    / Base Excess: 14.6  /  SaO2: 98                  I&O's Detail    25 May 2020 07:01  -  26 May 2020 07:00  --------------------------------------------------------  IN:    Enteral Tube Flush: 120 mL    heparin  Infusion.: 276 mL    ketamine Infusion.: 160.4 mL    morphine  Infusion.: 36 mL    multiple electrolytes Injection Type 1 Bolus: 1000 mL    Pivot 1.5: 945 mL  Total IN: 2537.4 mL    OUT:    Indwelling Catheter - Urethral: 1400 mL  Total OUT: 1400 mL    Total NET: 1137.4 mL      26 May 2020 07:01  -  27 May 2020 00:57  --------------------------------------------------------  IN:    Enteral Tube Flush: 180 mL    heparin  Infusion.: 72 mL    ketamine Infusion.: 75.5 mL    morphine  Infusion.: 18.5 mL    multiple electrolytes Injection Type 1 Bolus: 1000 mL    Pivot 1.5: 300 mL    Solution: 50 mL  Total IN: 1696 mL    OUT:    Indwelling Catheter - Urethral: 1365 mL  Total OUT: 1365 mL    Total NET: 331 mL          Mode: AC/ CMV (Assist Control/ Continuous Mandatory Ventilation)  RR (machine): 28  TV (machine): 350  FiO2: 60  PEEP: 5  MAP: 14  PIP: 29      Physical Exam:    Neurological:  Intubated and sedated.  Non-focal.  Moving all extremities with sedation lifted.  Intermittently follows commands.  No appreciable motor deficits    HEENT: PERRLA, no drainage or redness.     Neck: Trach in place.  Site C/D/I.  Neck supple, No JVD    Respiratory:  Mech vented.  Trachea midline, equal chest rise.  Breath Sounds equal bilateral    Cardiovascular: Regular rate & rhythm, normal S1, S2    Gastrointestinal: PEG site intact.  C/D/I.  Soft, non-tender, normal bowel sounds    Extremities: No peripheral edema, No cyanosis, clubbing     Vascular: Equal and normal pulses: 2+ peripheral pulses throughout      LABS:  CBC Full  -  ( 26 May 2020 04:37 )  WBC Count : 15.25 K/uL  RBC Count : 2.55 M/uL  Hemoglobin : 7.8 g/dL  Hematocrit : 25.3 %  Platelet Count - Automated : 413 K/uL  Mean Cell Volume : 99.2 fl  Mean Cell Hemoglobin : 30.6 pg  Mean Cell Hemoglobin Concentration : 30.8 gm/dL  Auto Neutrophil # : 13.28 K/uL  Auto Lymphocyte # : 0.81 K/uL  Auto Monocyte # : 0.96 K/uL  Auto Eosinophil # : 0.04 K/uL  Auto Basophil # : 0.05 K/uL  Auto Neutrophil % : 87.1 %  Auto Lymphocyte % : 5.3 %  Auto Monocyte % : 6.3 %  Auto Eosinophil % : 0.3 %  Auto Basophil % : 0.3 %    05-26    149<H>  |  101  |  49.0<H>  ----------------------------<  126<H>  4.7   |  40.0<H>  |  0.41<L>    Ca    9.4      26 May 2020 04:37  Phos  3.1     05-26  Mg     2.7     05-26    TPro  6.7  /  Alb  3.2<L>  /  TBili  0.4  /  DBili  0.2  /  AST  198<H>  /  ALT  409<H>  /  AlkPhos  821<H>  05-25    PTT - ( 27 May 2020 00:14 )  PTT:57.3 sec

## 2020-05-27 NOTE — PROGRESS NOTE ADULT - ASSESSMENT
Patient is now s/p peg and trach  ICU is continuing to decrease sedation  Ketamine discontinued  Still on morphine infusion  Started back on tube feeds - tolerating well  Call placed today to HCP Yovani for emotional support

## 2020-05-28 LAB
ANION GAP SERPL CALC-SCNC: 12 MMOL/L — SIGNIFICANT CHANGE UP (ref 5–17)
APTT BLD: 56.9 SEC — HIGH (ref 27.5–36.3)
BASOPHILS # BLD AUTO: 0.04 K/UL — SIGNIFICANT CHANGE UP (ref 0–0.2)
BASOPHILS NFR BLD AUTO: 0.3 % — SIGNIFICANT CHANGE UP (ref 0–2)
BUN SERPL-MCNC: 61 MG/DL — HIGH (ref 8–20)
CALCIUM SERPL-MCNC: 9.4 MG/DL — SIGNIFICANT CHANGE UP (ref 8.6–10.2)
CHLORIDE SERPL-SCNC: 105 MMOL/L — SIGNIFICANT CHANGE UP (ref 98–107)
CO2 SERPL-SCNC: 40 MMOL/L — HIGH (ref 22–29)
CREAT SERPL-MCNC: 0.78 MG/DL — SIGNIFICANT CHANGE UP (ref 0.5–1.3)
CULTURE RESULTS: SIGNIFICANT CHANGE UP
EOSINOPHIL # BLD AUTO: 0.1 K/UL — SIGNIFICANT CHANGE UP (ref 0–0.5)
EOSINOPHIL NFR BLD AUTO: 0.8 % — SIGNIFICANT CHANGE UP (ref 0–6)
GAS PNL BLDA: SIGNIFICANT CHANGE UP
GLUCOSE BLDC GLUCOMTR-MCNC: 102 MG/DL — HIGH (ref 70–99)
GLUCOSE BLDC GLUCOMTR-MCNC: 181 MG/DL — HIGH (ref 70–99)
GLUCOSE BLDC GLUCOMTR-MCNC: 219 MG/DL — HIGH (ref 70–99)
GLUCOSE BLDC GLUCOMTR-MCNC: 222 MG/DL — HIGH (ref 70–99)
GLUCOSE SERPL-MCNC: 134 MG/DL — HIGH (ref 70–99)
HCT VFR BLD CALC: 27.5 % — LOW (ref 39–50)
HGB BLD-MCNC: 8.2 G/DL — LOW (ref 13–17)
IMM GRANULOCYTES NFR BLD AUTO: 0.8 % — SIGNIFICANT CHANGE UP (ref 0–1.5)
LYMPHOCYTES # BLD AUTO: 1.65 K/UL — SIGNIFICANT CHANGE UP (ref 1–3.3)
LYMPHOCYTES # BLD AUTO: 12.7 % — LOW (ref 13–44)
MAGNESIUM SERPL-MCNC: 2.9 MG/DL — HIGH (ref 1.6–2.6)
MCHC RBC-ENTMCNC: 29.8 GM/DL — LOW (ref 32–36)
MCHC RBC-ENTMCNC: 30.1 PG — SIGNIFICANT CHANGE UP (ref 27–34)
MCV RBC AUTO: 101.1 FL — HIGH (ref 80–100)
MONOCYTES # BLD AUTO: 0.89 K/UL — SIGNIFICANT CHANGE UP (ref 0–0.9)
MONOCYTES NFR BLD AUTO: 6.8 % — SIGNIFICANT CHANGE UP (ref 2–14)
NEUTROPHILS # BLD AUTO: 10.23 K/UL — HIGH (ref 1.8–7.4)
NEUTROPHILS NFR BLD AUTO: 78.6 % — HIGH (ref 43–77)
ORGANISM # SPEC MICROSCOPIC CNT: SIGNIFICANT CHANGE UP
ORGANISM # SPEC MICROSCOPIC CNT: SIGNIFICANT CHANGE UP
PHOSPHATE SERPL-MCNC: 3 MG/DL — SIGNIFICANT CHANGE UP (ref 2.4–4.7)
PLATELET # BLD AUTO: 446 K/UL — HIGH (ref 150–400)
POTASSIUM SERPL-MCNC: 3.5 MMOL/L — SIGNIFICANT CHANGE UP (ref 3.5–5.3)
POTASSIUM SERPL-SCNC: 3.5 MMOL/L — SIGNIFICANT CHANGE UP (ref 3.5–5.3)
RBC # BLD: 2.72 M/UL — LOW (ref 4.2–5.8)
RBC # FLD: 18.2 % — HIGH (ref 10.3–14.5)
SODIUM SERPL-SCNC: 157 MMOL/L — HIGH (ref 135–145)
WBC # BLD: 13.01 K/UL — HIGH (ref 3.8–10.5)
WBC # FLD AUTO: 13.01 K/UL — HIGH (ref 3.8–10.5)

## 2020-05-28 PROCEDURE — 99291 CRITICAL CARE FIRST HOUR: CPT

## 2020-05-28 RX ORDER — MEROPENEM 1 G/30ML
1000 INJECTION INTRAVENOUS EVERY 8 HOURS
Refills: 0 | Status: COMPLETED | OUTPATIENT
Start: 2020-05-28 | End: 2020-06-03

## 2020-05-28 RX ORDER — APIXABAN 2.5 MG/1
5 TABLET, FILM COATED ORAL
Refills: 0 | Status: DISCONTINUED | OUTPATIENT
Start: 2020-05-28 | End: 2020-06-01

## 2020-05-28 RX ORDER — METHADONE HYDROCHLORIDE 40 MG/1
5 TABLET ORAL EVERY 12 HOURS
Refills: 0 | Status: DISCONTINUED | OUTPATIENT
Start: 2020-05-28 | End: 2020-05-30

## 2020-05-28 RX ADMIN — MEROPENEM 100 MILLIGRAM(S): 1 INJECTION INTRAVENOUS at 22:15

## 2020-05-28 RX ADMIN — MEROPENEM 100 MILLIGRAM(S): 1 INJECTION INTRAVENOUS at 12:08

## 2020-05-28 RX ADMIN — MEROPENEM 100 MILLIGRAM(S): 1 INJECTION INTRAVENOUS at 02:41

## 2020-05-28 RX ADMIN — Medication 650 MILLIGRAM(S): at 10:43

## 2020-05-28 RX ADMIN — Medication 1 PACKET(S): at 06:40

## 2020-05-28 RX ADMIN — Medication 1 PACKET(S): at 15:16

## 2020-05-28 RX ADMIN — Medication 5 MILLIGRAM(S): at 22:15

## 2020-05-28 RX ADMIN — APIXABAN 5 MILLIGRAM(S): 2.5 TABLET, FILM COATED ORAL at 15:15

## 2020-05-28 RX ADMIN — Medication 1 TABLET(S): at 15:16

## 2020-05-28 RX ADMIN — CHLORHEXIDINE GLUCONATE 15 MILLILITER(S): 213 SOLUTION TOPICAL at 15:15

## 2020-05-28 RX ADMIN — Medication 6: at 18:57

## 2020-05-28 RX ADMIN — Medication 4: at 23:47

## 2020-05-28 RX ADMIN — QUETIAPINE FUMARATE 50 MILLIGRAM(S): 200 TABLET, FILM COATED ORAL at 22:15

## 2020-05-28 RX ADMIN — METHADONE HYDROCHLORIDE 5 MILLIGRAM(S): 40 TABLET ORAL at 18:58

## 2020-05-28 RX ADMIN — METHADONE HYDROCHLORIDE 5 MILLIGRAM(S): 40 TABLET ORAL at 10:41

## 2020-05-28 RX ADMIN — CHLORHEXIDINE GLUCONATE 1 APPLICATION(S): 213 SOLUTION TOPICAL at 10:41

## 2020-05-28 RX ADMIN — CHLORHEXIDINE GLUCONATE 15 MILLILITER(S): 213 SOLUTION TOPICAL at 06:40

## 2020-05-28 RX ADMIN — Medication 6: at 06:41

## 2020-05-28 NOTE — PROGRESS NOTE ADULT - SUBJECTIVE AND OBJECTIVE BOX
INTERVAL HPI/OVERNIGHT EVENTS:        MEDICATIONS  (STANDING):  chlorhexidine 0.12% Liquid 15 milliLiter(s) Oral Mucosa every 12 hours  chlorhexidine 2% Cloths 1 Application(s) Topical daily  heparin  Infusion.  Unit(s)/Hr (12 mL/Hr) IV Continuous <Continuous>  insulin lispro (HumaLOG) corrective regimen sliding scale   SubCutaneous every 6 hours  melatonin 5 milliGRAM(s) Oral at bedtime  methadone   Solution 5 milliGRAM(s) Oral every 12 hours  multivitamin/minerals 1 Tablet(s) Oral daily  psyllium Powder 1 Packet(s) Oral two times a day  QUEtiapine 50 milliGRAM(s) Oral at bedtime    MEDICATIONS  (PRN):  acetaminophen    Suspension .. 650 milliGRAM(s) Oral every 6 hours PRN Temp greater or equal to 38.5C (101.3F)  heparin   Injectable 5500 Unit(s) IV Push every 6 hours PRN For aPTT less than 40      Drug Dosing Weight  Height (cm): 160.02 (18 Apr 2020 08:20)  Weight (kg): 70.3 (18 Apr 2020 08:20)  BMI (kg/m2): 27.5 (18 Apr 2020 08:20)  BSA (m2): 1.74 (18 Apr 2020 08:20)      PAST MEDICAL & SURGICAL HISTORY:  No pertinent past medical history  No significant past surgical history      ICU Vital Signs Last 24 Hrs  T(C): 0 (28 May 2020 01:20), Max: 38.8 (28 May 2020 00:10)  T(F): 32 (28 May 2020 01:20), Max: 101.9 (28 May 2020 00:10)  HR: 116 (28 May 2020 00:36) (89 - 116)  BP: --  BP(mean): --  ABP: 120/57 (28 May 2020 00:00) (103/47 - 149/69)  ABP(mean): 75 (28 May 2020 00:00) (66 - 95)  RR: 28 (28 May 2020 00:00) (28 - 33)  SpO2: 93% (28 May 2020 00:36) (93% - 100%)      ABG - ( 27 May 2020 20:19 )  pH, Arterial: 7.33  pH, Blood: x     /  pCO2: 86    /  pO2: 68    / HCO3: 41    / Base Excess: 17.1  /  SaO2: 94                  I&O's Detail    26 May 2020 07:01  -  27 May 2020 07:00  --------------------------------------------------------  IN:    Enteral Tube Flush: 180 mL    heparin  Infusion.: 148 mL    ketamine Infusion.: 89.7 mL    morphine  Infusion.: 21.5 mL    multiple electrolytes Injection Type 1 Bolus: 1000 mL    Pivot 1.5: 500 mL    Solution: 50 mL  Total IN: 1989.2 mL    OUT:    Indwelling Catheter - Urethral: 1795 mL  Total OUT: 1795 mL    Total NET: 194.2 mL      27 May 2020 07:01  -  28 May 2020 01:50  --------------------------------------------------------  IN:    Enteral Tube Flush: 50 mL    Pivot 1.5: 520 mL  Total IN: 570 mL    OUT:    Indwelling Catheter - Urethral: 1110 mL  Total OUT: 1110 mL    Total NET: -540 mL          Mode: AC/ CMV (Assist Control/ Continuous Mandatory Ventilation)  RR (machine): 28  TV (machine): 350  FiO2: 40  PEEP: 5  MAP: 12  PIP: 25        Physical Exam:    Neurological:  Intubated and sedated.  Non-focal.  Moving all extremities with sedation lifted.  Not following commands.  No appreciable motor deficits    HEENT: PERRLA, no drainage or redness.     Neck: Trach in place.  Site C/D/I.  Neck supple, No JVD    Respiratory:  Mech vented.  Trachea midline, equal chest rise.  Breath Sounds equal bilateral    Cardiovascular: Regular rate & rhythm, normal S1, S2    Gastrointestinal: PEG site intact.  C/D/I.  Soft, non-tender, normal bowel sounds    Extremities: No peripheral edema, No cyanosis, clubbing     Vascular: Equal and normal pulses: 2+ peripheral pulses throughout        LABS:  CBC Full  -  ( 27 May 2020 05:19 )  WBC Count : 11.27 K/uL  RBC Count : 2.46 M/uL  Hemoglobin : 7.5 g/dL  Hematocrit : 25.0 %  Platelet Count - Automated : 398 K/uL  Mean Cell Volume : 101.6 fl  Mean Cell Hemoglobin : 30.5 pg  Mean Cell Hemoglobin Concentration : 30.0 gm/dL  Auto Neutrophil # : 9.21 K/uL  Auto Lymphocyte # : 1.37 K/uL  Auto Monocyte # : 0.48 K/uL  Auto Eosinophil # : 0.10 K/uL  Auto Basophil # : 0.10 K/uL  Auto Neutrophil % : 61.7 %  Auto Lymphocyte % : 12.2 %  Auto Monocyte % : 4.3 %  Auto Eosinophil % : 0.9 %  Auto Basophil % : 0.9 %    05-27    154<H>  |  105  |  56.0<H>  ----------------------------<  157<H>  4.2   |  41.0<H>  |  0.72    Ca    9.1      27 May 2020 05:19  Phos  3.4     05-27  Mg     2.9     05-27      PTT - ( 27 May 2020 18:21 )  PTT:72.7 sec INTERVAL HPI/OVERNIGHT EVENTS:    Pt transitioned to PSV but had significant drop in PF so switched back to AC.  Morphine gtt was discontinued and patient noted to be diaphoretic with tachycardia.  Started on low dose methadone.  Pt remains encephalopathic and not following commands.  Pt with fever spikes overnight with Tmax 101.9.  Sputum culture again with Acinetobacter and started on Meropenem based on sensitivities. TLC removed.        MEDICATIONS  (STANDING):  chlorhexidine 0.12% Liquid 15 milliLiter(s) Oral Mucosa every 12 hours  chlorhexidine 2% Cloths 1 Application(s) Topical daily  heparin  Infusion.  Unit(s)/Hr (12 mL/Hr) IV Continuous <Continuous>  insulin lispro (HumaLOG) corrective regimen sliding scale   SubCutaneous every 6 hours  melatonin 5 milliGRAM(s) Oral at bedtime  methadone   Solution 5 milliGRAM(s) Oral every 12 hours  multivitamin/minerals 1 Tablet(s) Oral daily  psyllium Powder 1 Packet(s) Oral two times a day  QUEtiapine 50 milliGRAM(s) Oral at bedtime    MEDICATIONS  (PRN):  acetaminophen    Suspension .. 650 milliGRAM(s) Oral every 6 hours PRN Temp greater or equal to 38.5C (101.3F)  heparin   Injectable 5500 Unit(s) IV Push every 6 hours PRN For aPTT less than 40      Drug Dosing Weight  Height (cm): 160.02 (18 Apr 2020 08:20)  Weight (kg): 70.3 (18 Apr 2020 08:20)  BMI (kg/m2): 27.5 (18 Apr 2020 08:20)  BSA (m2): 1.74 (18 Apr 2020 08:20)      PAST MEDICAL & SURGICAL HISTORY:  No pertinent past medical history  No significant past surgical history      ICU Vital Signs Last 24 Hrs  T(C): 0 (28 May 2020 01:20), Max: 38.8 (28 May 2020 00:10)  T(F): 32 (28 May 2020 01:20), Max: 101.9 (28 May 2020 00:10)  HR: 116 (28 May 2020 00:36) (89 - 116)  BP: --  BP(mean): --  ABP: 120/57 (28 May 2020 00:00) (103/47 - 149/69)  ABP(mean): 75 (28 May 2020 00:00) (66 - 95)  RR: 28 (28 May 2020 00:00) (28 - 33)  SpO2: 93% (28 May 2020 00:36) (93% - 100%)      ABG - ( 27 May 2020 20:19 )  pH, Arterial: 7.33  pH, Blood: x     /  pCO2: 86    /  pO2: 68    / HCO3: 41    / Base Excess: 17.1  /  SaO2: 94                  I&O's Detail    26 May 2020 07:01  -  27 May 2020 07:00  --------------------------------------------------------  IN:    Enteral Tube Flush: 180 mL    heparin  Infusion.: 148 mL    ketamine Infusion.: 89.7 mL    morphine  Infusion.: 21.5 mL    multiple electrolytes Injection Type 1 Bolus: 1000 mL    Pivot 1.5: 500 mL    Solution: 50 mL  Total IN: 1989.2 mL    OUT:    Indwelling Catheter - Urethral: 1795 mL  Total OUT: 1795 mL    Total NET: 194.2 mL      27 May 2020 07:01  -  28 May 2020 01:50  --------------------------------------------------------  IN:    Enteral Tube Flush: 50 mL    Pivot 1.5: 520 mL  Total IN: 570 mL    OUT:    Indwelling Catheter - Urethral: 1110 mL  Total OUT: 1110 mL    Total NET: -540 mL          Mode: AC/ CMV (Assist Control/ Continuous Mandatory Ventilation)  RR (machine): 28  TV (machine): 350  FiO2: 40  PEEP: 5  MAP: 12  PIP: 25        Physical Exam:    Neurological:  Intubated and sedated.  Non-focal.  Moving all extremities with sedation lifted.  Not following commands.  No appreciable motor deficits    HEENT: PERRLA, no drainage or redness.     Neck: Trach in place.  Site C/D/I.  Neck supple, No JVD    Respiratory:  Mech vented.  Trachea midline, equal chest rise.  Breath Sounds equal bilateral    Cardiovascular: Regular rate & rhythm, normal S1, S2    Gastrointestinal: PEG site intact.  C/D/I.  Soft, non-tender, normal bowel sounds    Extremities: No peripheral edema, No cyanosis, clubbing     Vascular: Equal and normal pulses: 2+ peripheral pulses throughout        LABS:  CBC Full  -  ( 27 May 2020 05:19 )  WBC Count : 11.27 K/uL  RBC Count : 2.46 M/uL  Hemoglobin : 7.5 g/dL  Hematocrit : 25.0 %  Platelet Count - Automated : 398 K/uL  Mean Cell Volume : 101.6 fl  Mean Cell Hemoglobin : 30.5 pg  Mean Cell Hemoglobin Concentration : 30.0 gm/dL  Auto Neutrophil # : 9.21 K/uL  Auto Lymphocyte # : 1.37 K/uL  Auto Monocyte # : 0.48 K/uL  Auto Eosinophil # : 0.10 K/uL  Auto Basophil # : 0.10 K/uL  Auto Neutrophil % : 61.7 %  Auto Lymphocyte % : 12.2 %  Auto Monocyte % : 4.3 %  Auto Eosinophil % : 0.9 %  Auto Basophil % : 0.9 %    05-27    154<H>  |  105  |  56.0<H>  ----------------------------<  157<H>  4.2   |  41.0<H>  |  0.72    Ca    9.1      27 May 2020 05:19  Phos  3.4     05-27  Mg     2.9     05-27      PTT - ( 27 May 2020 18:21 )  PTT:72.7 sec

## 2020-05-28 NOTE — PROGRESS NOTE ADULT - ASSESSMENT
Respiratory failure secondary to Covid 19 Pneumonitis/ ARDS stabilized on vent  Complicated by PE and Acinetobacter Pna  Worsening hypernatremia and azotemia    Plan:  no change vent  increase free water  f/u serum Na  continue ABX

## 2020-05-28 NOTE — PROGRESS NOTE ADULT - ASSESSMENT
64y Male with acute hypoxic vent dependent respiratory failure, Covid Sepsis, Acinetobacter PNA, PE    Neuro:  Encephalopathic.  Weaned off Morphine gtt and noted to be diaphoretic with tachycardia.  Started on Methadone.  Continue Seroquel. Delirium precautions, daily sedation holiday    CV: maintain MAP > 65, continue invasive monitoring with arterial line    Pulm: COVID - Type H resp failure with gradually improving lung compliance. Transition to PSV but significant decrease in PF.  Placed back on AC.  Receiving Ranjit for acinetobacter sputum.     GI/Nutrition:  S/p PEG placement.  Continue tube feeds at goal    /Renal: [place]alera for strict I&O, monitor kidney fxn. Hematuria resolved.  Free water started with rising sodium.      ID: Acinetobacter again noted in sputum culture.  Leukocytosis improved but now persistently febrile.  Tmax only 101.9 with tachycardia and decreased PF.  Started on Meropenem based on sensitivities.        Endo: ISS.  Monitor blood glucose    Skin: repositioning for DTI prevention while in bed    Heme/DVT Prophylaxis: SCDs / Heparin gtt for hypercoagulable state.  Consider to transitioning to Eliquis s/p Trach/PEG.      Dispo: ICU

## 2020-05-28 NOTE — PROGRESS NOTE ADULT - SUBJECTIVE AND OBJECTIVE BOX
Patient is a 64y old  Male who presents with a chief complaint of shortness of breath (28 May 2020 01:50)        Events last 24 hours: Awake alert but not following commands. TLC removed due to temps with a drop thereafter. No visible distress    PAST MEDICAL & SURGICAL HISTORY:  No pertinent past medical history  No significant past surgical history        Medications:  meropenem  IVPB 1000 milliGRAM(s) IV Intermittent every 8 hours        acetaminophen    Suspension .. 650 milliGRAM(s) Oral every 6 hours PRN  melatonin 5 milliGRAM(s) Oral at bedtime  methadone    Tablet 5 milliGRAM(s) Oral every 12 hours  QUEtiapine 50 milliGRAM(s) Oral at bedtime      heparin   Injectable 5500 Unit(s) IV Push every 6 hours PRN  heparin  Infusion.  Unit(s)/Hr IV Continuous <Continuous>    psyllium Powder 1 Packet(s) Oral two times a day      insulin lispro (HumaLOG) corrective regimen sliding scale   SubCutaneous every 6 hours    multivitamin/minerals 1 Tablet(s) Oral daily      chlorhexidine 0.12% Liquid 15 milliLiter(s) Oral Mucosa every 12 hours  chlorhexidine 2% Cloths 1 Application(s) Topical daily        Mode: AC/ CMV (Assist Control/ Continuous Mandatory Ventilation)  RR (machine): 28  TV (machine): 350  FiO2: 40  PEEP: 5  MAP: 11  PIP: 29      ICU Vital Signs Last 24 Hrs  T(C): 36.9 (28 May 2020 11:56), Max: 38.8 (28 May 2020 00:10)  T(F): 98.4 (28 May 2020 11:56), Max: 101.9 (28 May 2020 00:10)  HR: 104 (28 May 2020 11:00) (103 - 708)  BP: --  BP(mean): --  ABP: 132/58 (28 May 2020 11:00) (120/57 - 154/63)  ABP(mean): 77 (28 May 2020 11:00) (75 - 95)  RR: 28 (28 May 2020 11:00) (28 - 30)  SpO2: 97% (28 May 2020 11:00) (93% - 98%)      ABG - ( 28 May 2020 11:47 )  pH, Arterial: 7.10  pH, Blood: x     /  pCO2: x     /  pO2: 107   / HCO3: 35    / Base Excess: 11.2  /  SaO2: 97    (Wrong patient)              I&O's Detail    27 May 2020 07:01  -  28 May 2020 07:00  --------------------------------------------------------  IN:    Enteral Tube Flush: 170 mL    Free Water: 600 mL    heparin  Infusion.: 156 mL    Pivot 1.5: 1000 mL    Solution: 50 mL  Total IN: 1976 mL    OUT:    Indwelling Catheter - Urethral: 1860 mL  Total OUT: 1860 mL    Total NET: 116 mL      28 May 2020 07:01  -  28 May 2020 12:49  --------------------------------------------------------  IN:    Enteral Tube Flush: 50 mL    heparin  Infusion.: 78 mL    Pivot 1.5: 240 mL  Total IN: 368 mL    OUT:    Indwelling Catheter - Urethral: 405 mL  Total OUT: 405 mL    Total NET: -37 mL            LABS:                        8.2    13.01 )-----------( 446      ( 28 May 2020 05:24 )             27.5     05-28    157<H>  |  105  |  61.0<H>  ----------------------------<  134<H>  3.5   |  40.0<H>  |  0.78    Ca    9.4      28 May 2020 05:24  Phos  3.0     05-28  Mg     2.9     05-28            CAPILLARY BLOOD GLUCOSE      POCT Blood Glucose.: 102 mg/dL (28 May 2020 11:15)    PTT - ( 28 May 2020 05:24 )  PTT:56.9 sec    CULTURES:  Culture Results:   **Please Note**: This is a Corrected Report**  Numerous Acinetobacter baumannii  / nosocomialis group  Previously reported as:  Numerous Acinetobacter baumannii (Carbapenem Resistant)  / nosocomialis  group  Normal Respiratory Christine present (05-25 @ 14:46)  Culture Results:   No growth at 48 hours (05-25 @ 08:31)      Physical Examination:    General: No acute distress.      HEENT: Pupils equal, reactive to light.  Symmetric.    PULM: Clear to auscultation bilaterally, no significant sputum production    NECK: Supple, no lymphadenopathy, trachea midline, trach    CVS: Regular rate and rhythm, no murmurs, rubs, or gallops    ABD: Soft, nondistended, nontender, normoactive bowel sounds, no masses, peg    EXT: No edema, nontender    SKIN: Warm and well perfused, no rashes noted.    NEURO: Alert, oriented, interactive, nonfocal    DEVICES:     RADIOLOGY: < from: Xray Chest 1 View- PORTABLE-Urgent (05.27.20 @ 21:16) >     EXAM:  XR CHEST PORTABLE URGENT 1V                          PROCEDURE DATE:  05/27/2020          INTERPRETATION:  AP chest on May 27, 2020 at 9:07 PM. Patient requires tracheostomy. Patient tested positive for the COVID virus in April.    Heart ismagnified by technique. Tracheostomy and right jugular line remain.    There are persistent moderately advanced diffuse infiltrates in the lungs.    Chest is similar to May 26.    IMPRESSION: Persistent fairly advanced bilateral infiltrates.                NICHOLE ARRINGTON M.D., ATTENDING RADIOLOGIST  This document has been electronically signed. May 28 2020  8:00AM    < end of copied text >  All films reviewed on PACS

## 2020-05-29 DIAGNOSIS — J18.9 PNEUMONIA, UNSPECIFIED ORGANISM: ICD-10-CM

## 2020-05-29 DIAGNOSIS — J96.00 ACUTE RESPIRATORY FAILURE, UNSPECIFIED WHETHER WITH HYPOXIA OR HYPERCAPNIA: ICD-10-CM

## 2020-05-29 LAB
ALBUMIN SERPL ELPH-MCNC: 2.7 G/DL — LOW (ref 3.3–5.2)
ALP SERPL-CCNC: 602 U/L — HIGH (ref 40–120)
ALT FLD-CCNC: 176 U/L — HIGH
ANION GAP SERPL CALC-SCNC: 8 MMOL/L — SIGNIFICANT CHANGE UP (ref 5–17)
AST SERPL-CCNC: 40 U/L — HIGH
BILIRUB SERPL-MCNC: 0.3 MG/DL — LOW (ref 0.4–2)
BUN SERPL-MCNC: 74 MG/DL — HIGH (ref 8–20)
C DIFF BY PCR RESULT: SIGNIFICANT CHANGE UP
C DIFF TOX GENS STL QL NAA+PROBE: SIGNIFICANT CHANGE UP
CALCIUM SERPL-MCNC: 9.2 MG/DL — SIGNIFICANT CHANGE UP (ref 8.6–10.2)
CHLORIDE SERPL-SCNC: 106 MMOL/L — SIGNIFICANT CHANGE UP (ref 98–107)
CO2 SERPL-SCNC: 42 MMOL/L — HIGH (ref 22–29)
CREAT ?TM UR-MCNC: 43 MG/DL — SIGNIFICANT CHANGE UP
CREAT SERPL-MCNC: 1 MG/DL — SIGNIFICANT CHANGE UP (ref 0.5–1.3)
GAS PNL BLDA: SIGNIFICANT CHANGE UP
GLUCOSE BLDC GLUCOMTR-MCNC: 146 MG/DL — HIGH (ref 70–99)
GLUCOSE BLDC GLUCOMTR-MCNC: 164 MG/DL — HIGH (ref 70–99)
GLUCOSE BLDC GLUCOMTR-MCNC: 191 MG/DL — HIGH (ref 70–99)
GLUCOSE BLDC GLUCOMTR-MCNC: 222 MG/DL — HIGH (ref 70–99)
GLUCOSE SERPL-MCNC: 120 MG/DL — HIGH (ref 70–99)
HCT VFR BLD CALC: 26.7 % — LOW (ref 39–50)
HGB BLD-MCNC: 8 G/DL — LOW (ref 13–17)
MAGNESIUM SERPL-MCNC: 2.7 MG/DL — HIGH (ref 1.6–2.6)
MCHC RBC-ENTMCNC: 30 GM/DL — LOW (ref 32–36)
MCHC RBC-ENTMCNC: 30.2 PG — SIGNIFICANT CHANGE UP (ref 27–34)
MCV RBC AUTO: 100.8 FL — HIGH (ref 80–100)
OSMOLALITY UR: 478 MOSM/KG — SIGNIFICANT CHANGE UP (ref 300–1000)
PHOSPHATE SERPL-MCNC: 2.7 MG/DL — SIGNIFICANT CHANGE UP (ref 2.4–4.7)
PLATELET # BLD AUTO: 388 K/UL — SIGNIFICANT CHANGE UP (ref 150–400)
POTASSIUM SERPL-MCNC: 3.3 MMOL/L — LOW (ref 3.5–5.3)
POTASSIUM SERPL-SCNC: 3.3 MMOL/L — LOW (ref 3.5–5.3)
PROT SERPL-MCNC: 6.6 G/DL — SIGNIFICANT CHANGE UP (ref 6.6–8.7)
RBC # BLD: 2.65 M/UL — LOW (ref 4.2–5.8)
RBC # FLD: 18 % — HIGH (ref 10.3–14.5)
SODIUM SERPL-SCNC: 155 MMOL/L — HIGH (ref 135–145)
SODIUM UR-SCNC: <30 MMOL/L — SIGNIFICANT CHANGE UP
WBC # BLD: 14.38 K/UL — HIGH (ref 3.8–10.5)
WBC # FLD AUTO: 14.38 K/UL — HIGH (ref 3.8–10.5)

## 2020-05-29 PROCEDURE — 99291 CRITICAL CARE FIRST HOUR: CPT

## 2020-05-29 RX ORDER — SODIUM CHLORIDE 9 MG/ML
1000 INJECTION, SOLUTION INTRAVENOUS
Refills: 0 | Status: DISCONTINUED | OUTPATIENT
Start: 2020-05-29 | End: 2020-05-30

## 2020-05-29 RX ORDER — POTASSIUM CHLORIDE 20 MEQ
40 PACKET (EA) ORAL ONCE
Refills: 0 | Status: COMPLETED | OUTPATIENT
Start: 2020-05-29 | End: 2020-05-29

## 2020-05-29 RX ORDER — POTASSIUM PHOSPHATE, MONOBASIC POTASSIUM PHOSPHATE, DIBASIC 236; 224 MG/ML; MG/ML
15 INJECTION, SOLUTION INTRAVENOUS ONCE
Refills: 0 | Status: COMPLETED | OUTPATIENT
Start: 2020-05-29 | End: 2020-05-29

## 2020-05-29 RX ADMIN — POTASSIUM PHOSPHATE, MONOBASIC POTASSIUM PHOSPHATE, DIBASIC 62.5 MILLIMOLE(S): 236; 224 INJECTION, SOLUTION INTRAVENOUS at 09:45

## 2020-05-29 RX ADMIN — Medication 1 PACKET(S): at 06:17

## 2020-05-29 RX ADMIN — Medication 4: at 17:33

## 2020-05-29 RX ADMIN — CHLORHEXIDINE GLUCONATE 1 APPLICATION(S): 213 SOLUTION TOPICAL at 09:45

## 2020-05-29 RX ADMIN — MEROPENEM 100 MILLIGRAM(S): 1 INJECTION INTRAVENOUS at 22:55

## 2020-05-29 RX ADMIN — Medication 4: at 06:17

## 2020-05-29 RX ADMIN — Medication 40 MILLIEQUIVALENT(S): at 09:45

## 2020-05-29 RX ADMIN — APIXABAN 5 MILLIGRAM(S): 2.5 TABLET, FILM COATED ORAL at 17:33

## 2020-05-29 RX ADMIN — CHLORHEXIDINE GLUCONATE 15 MILLILITER(S): 213 SOLUTION TOPICAL at 06:18

## 2020-05-29 RX ADMIN — QUETIAPINE FUMARATE 50 MILLIGRAM(S): 200 TABLET, FILM COATED ORAL at 22:55

## 2020-05-29 RX ADMIN — METHADONE HYDROCHLORIDE 5 MILLIGRAM(S): 40 TABLET ORAL at 06:17

## 2020-05-29 RX ADMIN — Medication 1 TABLET(S): at 17:33

## 2020-05-29 RX ADMIN — APIXABAN 5 MILLIGRAM(S): 2.5 TABLET, FILM COATED ORAL at 08:04

## 2020-05-29 RX ADMIN — METHADONE HYDROCHLORIDE 5 MILLIGRAM(S): 40 TABLET ORAL at 20:28

## 2020-05-29 RX ADMIN — SODIUM CHLORIDE 84 MILLILITER(S): 9 INJECTION, SOLUTION INTRAVENOUS at 06:20

## 2020-05-29 RX ADMIN — MEROPENEM 100 MILLIGRAM(S): 1 INJECTION INTRAVENOUS at 12:34

## 2020-05-29 RX ADMIN — Medication 1 PACKET(S): at 17:32

## 2020-05-29 RX ADMIN — CHLORHEXIDINE GLUCONATE 15 MILLILITER(S): 213 SOLUTION TOPICAL at 14:36

## 2020-05-29 RX ADMIN — Medication 5 MILLIGRAM(S): at 22:55

## 2020-05-29 RX ADMIN — MEROPENEM 100 MILLIGRAM(S): 1 INJECTION INTRAVENOUS at 06:17

## 2020-05-29 NOTE — PROGRESS NOTE ADULT - SUBJECTIVE AND OBJECTIVE BOX
24h Events: Patient more responsive today, following simple commands. Continues to have copious diarrhea, c diff sample pending however without leukocytosis.  Remains afebrile overnight.  On abx.  UOP adequate.  Vitals stable.     ICU Vital Signs Last 24 Hrs  T(C): 37.5 (28 May 2020 23:53), Max: 39 (28 May 2020 19:27)  T(F): 99.5 (28 May 2020 23:53), Max: 102.2 (28 May 2020 19:27)  HR: 87 (29 May 2020 01:00) (87 - 708)  BP: --  BP(mean): --  ABP: 130/61 (29 May 2020 01:00) (112/59 - 154/63)  ABP(mean): 81 (29 May 2020 01:00) (76 - 89)  RR: 28 (29 May 2020 01:00) (28 - 30)  SpO2: 97% (29 May 2020 01:00) (94% - 100%)      I&O's Detail    27 May 2020 07:01  -  28 May 2020 07:00  --------------------------------------------------------  IN:    Enteral Tube Flush: 170 mL    Free Water: 600 mL    heparin  Infusion.: 156 mL    Pivot 1.5: 1000 mL    Solution: 50 mL  Total IN: 1976 mL    OUT:    Indwelling Catheter - Urethral: 1860 mL  Total OUT: 1860 mL    Total NET: 116 mL      28 May 2020 07:01  -  29 May 2020 03:00  --------------------------------------------------------  IN:    Enteral Tube Flush: 730 mL    Free Water: 600 mL    heparin  Infusion.: 91 mL    Pivot 1.5: 600 mL    Solution: 50 mL  Total IN: 2071 mL    OUT:    Indwelling Catheter - Urethral: 990 mL  Total OUT: 990 mL    Total NET: 1081 mL          ABG - ( 28 May 2020 13:14 )  pH, Arterial: 7.45  pH, Blood: x     /  pCO2: 70    /  pO2: 69    / HCO3: 46    / Base Excess: 22.0  /  SaO2: 96                  MEDICATIONS  (STANDING):  apixaban 5 milliGRAM(s) Oral two times a day  chlorhexidine 0.12% Liquid 15 milliLiter(s) Oral Mucosa every 12 hours  chlorhexidine 2% Cloths 1 Application(s) Topical daily  insulin lispro (HumaLOG) corrective regimen sliding scale   SubCutaneous every 6 hours  melatonin 5 milliGRAM(s) Oral at bedtime  meropenem  IVPB 1000 milliGRAM(s) IV Intermittent every 8 hours  methadone    Tablet 5 milliGRAM(s) Oral every 12 hours  multivitamin/minerals 1 Tablet(s) Oral daily  psyllium Powder 1 Packet(s) Oral two times a day  QUEtiapine 50 milliGRAM(s) Oral at bedtime    MEDICATIONS  (PRN):  acetaminophen    Suspension .. 650 milliGRAM(s) Oral every 6 hours PRN Temp greater or equal to 38.5C (101.3F)        Physical Exam:    Neurological:  Intubated.  Non-focal.  Moving all extremities. Following simple commands    Neck: Trach in place.  Site C/D/I.  Neck supple, No JVD    Respiratory:  Mech vented.  Trachea midline, equal chest rise.  Breath Sounds equal bilateral    Cardiovascular: Regular rate & rhythm, normal S1, S2    Gastrointestinal: PEG site intact.  C/D/I.  Soft, non-tender, normal bowel sounds    Extremities: No peripheral edema, No cyanosis, clubbing     Vascular: Equal and normal pulses: 2+ peripheral pulses throughout    LABS:  CBC Full  -  ( 28 May 2020 05:24 )  WBC Count : 13.01 K/uL  RBC Count : 2.72 M/uL  Hemoglobin : 8.2 g/dL  Hematocrit : 27.5 %  Platelet Count - Automated : 446 K/uL  Mean Cell Volume : 101.1 fl  Mean Cell Hemoglobin : 30.1 pg  Mean Cell Hemoglobin Concentration : 29.8 gm/dL  Auto Neutrophil # : 10.23 K/uL  Auto Lymphocyte # : 1.65 K/uL  Auto Monocyte # : 0.89 K/uL  Auto Eosinophil # : 0.10 K/uL  Auto Basophil # : 0.04 K/uL  Auto Neutrophil % : 78.6 %  Auto Lymphocyte % : 12.7 %  Auto Monocyte % : 6.8 %  Auto Eosinophil % : 0.8 %  Auto Basophil % : 0.3 %    05-28    157<H>  |  105  |  61.0<H>  ----------------------------<  134<H>  3.5   |  40.0<H>  |  0.78    Ca    9.4      28 May 2020 05:24  Phos  3.0     05-28  Mg     2.9     05-28      PTT - ( 28 May 2020 05:24 )  PTT:56.9 sec    RECENT CULTURES:  05-25 .Sputum Sputum Acinetobacter baumannii   Few polymorphonuclear leukocytes per low power field  No Squamous epithelial cells per low power field  Few Gram Negative Diplococci per oil power field   **Please Note**: This is a Corrected Report**  Numerous Acinetobacter baumannii  / nosocomialis group  Previously reported as:  Numerous Acinetobacter baumannii (Carbapenem Resistant)  / nosocomialis  group  Normal Respiratory Christine present    05-25 .Blood Blood XXXX XXXX   No growth at 48 hours

## 2020-05-29 NOTE — CHART NOTE - NSCHARTNOTEFT_GEN_A_CORE
Source: Patient [ ]  Family [ ]   other [ x]    Current Diet: Diet, NPO with Tube Feed:   Tube Feeding Modality: Nasogastric  Pivot 1.5 Raj  Total Volume for 24 Hours (mL): 960  Continuous  Starting Tube Feed Rate {mL per Hour}: 40  Increase Tube Feed Rate by (mL): 0     Every hour  Until Goal Tube Feed Rate (mL per Hour): 40  Tube Feed Duration (in Hours): 24  Tube Feed Start Time: 11:45 (05-25-20 @ 11:37)    Enteral /Parenteral Nutrition: Tube feeds at goal rate of 40 ml/hr (x20 hrs) provides 800 ml, 1200 kcal, 75g protein, 607 ml free water, and 80% of RDIs for vitamins/minerals. Receiving an additional 300 ml free water q3 hrs.     Current Weight:   (5/18) 170.8 lbs  (5/14) 185.4 lbs  (5/10) 183.2 lbs  (4/18) 154.9 lbs  ? accuracy of weights, noted with 2+ generalized edema, continue to trend and maintain strict Is&Os     Pertinent Medications: MEDICATIONS  (STANDING):  apixaban 5 milliGRAM(s) Oral two times a day  chlorhexidine 0.12% Liquid 15 milliLiter(s) Oral Mucosa every 12 hours  chlorhexidine 2% Cloths 1 Application(s) Topical daily  insulin lispro (HumaLOG) corrective regimen sliding scale   SubCutaneous every 6 hours  melatonin 5 milliGRAM(s) Oral at bedtime  meropenem  IVPB 1000 milliGRAM(s) IV Intermittent every 8 hours  methadone    Tablet 5 milliGRAM(s) Oral every 12 hours  multiple electrolytes Injection Type 1 1000 milliLiter(s) (84 mL/Hr) IV Continuous <Continuous>  multivitamin/minerals 1 Tablet(s) Oral daily  potassium chloride   Powder 40 milliEquivalent(s) Oral once  potassium phosphate IVPB 15 milliMole(s) IV Intermittent once  psyllium Powder 1 Packet(s) Oral two times a day  QUEtiapine 50 milliGRAM(s) Oral at bedtime    MEDICATIONS  (PRN):  acetaminophen    Suspension .. 650 milliGRAM(s) Oral every 6 hours PRN Temp greater or equal to 38.5C (101.3F)    Pertinent Labs: CBC Full  -  ( 29 May 2020 05:06 )  WBC Count : 14.38 K/uL  RBC Count : 2.65 M/uL  Hemoglobin : 8.0 g/dL  Hematocrit : 26.7 %  Platelet Count - Automated : 388 K/uL  Mean Cell Volume : 100.8 fl  Mean Cell Hemoglobin : 30.2 pg  Mean Cell Hemoglobin Concentration : 30.0 gm/dL  Auto Neutrophil # : x  Auto Lymphocyte # : x  Auto Monocyte # : x  Auto Eosinophil # : x  Auto Basophil # : x  Auto Neutrophil % : x  Auto Lymphocyte % : x  Auto Monocyte % : x  Auto Eosinophil % : x  Auto Basophil % : x    05-29 Na155 mmol/L<H> Glu 120 mg/dL<H> K+ 3.3 mmol/L<L> Cr  1.00 mg/dL BUN 74.0 mg/dL<H> Phos 2.7 mg/dL Alb 2.7 g/dL<L> PAB n/a       Skin: Intact per documentation     Nutrition focused physical exam not conducted at this time - found signs of malnutrition [ ]absent [ ]present    Subcutaneous fat loss: [ ] Orbital fat pads region, [ ]Buccal fat region, [ ]Triceps region,  [ ]Ribs region    Muscle wasting: [ ]Temples region, [ ]Clavicle region, [ ]Shoulder region, [ ]Scapula region, [ ]Interosseous region,  [ ]thigh region, [ ]Calf region    Estimated Needs:   [ x] no change since previous assessment  [ ] recalculated:     Current Nutrition Diagnosis: Pt remains at high nutrition risk secondary malnutrition (severe, acute) related to inability to meet sufficient protein-energy requirements in setting of acute hypoxic respiratory failure, R Segmental PE, Severe ARDS secondary to COVID 19, rapid response requiring intubation on 5/1, s/p trach/PEG and altered GI function secondary to diarrhea as evidenced by meeting <50% nutrient needs >5 days and +fluid accumulation. Pt with copious diarrhea, aware C. Diff pending. Pivot 1.5 at 40 ml/hr ordered at this time.     Recommendations:   1) As medically feasible, increase tube feeds to goal rate of 65 ml/hr (x20 hrs) to provide 1300 ml, 1950 kcal, 122g protein, 987 ml free water, and >100% of RDIs for vitamins/minerals. Additional free water per MD discretion.   2) Discontinue Metamucil and add Banatrol TID.  3) Continue MVI daily.  4) Rx: Bacid to support gut integrity.  5) Obtain daily weights to monitor trends.    Monitoring and Evaluation:   [ ] PO intake [x ] Tolerance to diet prescription [X] Weights  [X] Follow up per protocol [X] Labs

## 2020-05-29 NOTE — PROGRESS NOTE ADULT - SUBJECTIVE AND OBJECTIVE BOX
PULMONARY PROGRESS NOTE      [id number]    Patient is a 64y old  Male who presents with a chief complaint of shortness of breath (29 May 2020 02:57)      INTERVAL HPI/OVERNIGHT EVENTS:    MEDICATIONS  (STANDING):  apixaban 5 milliGRAM(s) Oral two times a day  chlorhexidine 0.12% Liquid 15 milliLiter(s) Oral Mucosa every 12 hours  chlorhexidine 2% Cloths 1 Application(s) Topical daily  insulin lispro (HumaLOG) corrective regimen sliding scale   SubCutaneous every 6 hours  melatonin 5 milliGRAM(s) Oral at bedtime  meropenem  IVPB 1000 milliGRAM(s) IV Intermittent every 8 hours  methadone    Tablet 5 milliGRAM(s) Oral every 12 hours  multiple electrolytes Injection Type 1 1000 milliLiter(s) (84 mL/Hr) IV Continuous <Continuous>  multivitamin/minerals 1 Tablet(s) Oral daily  psyllium Powder 1 Packet(s) Oral two times a day  QUEtiapine 50 milliGRAM(s) Oral at bedtime      MEDICATIONS  (PRN):  acetaminophen    Suspension .. 650 milliGRAM(s) Oral every 6 hours PRN Temp greater or equal to 38.5C (101.3F)      Allergies    No Known Allergies    Intolerances        PAST MEDICAL & SURGICAL HISTORY:  No pertinent past medical history  No significant past surgical history      SOCIAL HISTORY  Smoking History:       REVIEW OF SYSTEMS:    CONSTITUTIONAL:  No distress    HEENT:  Eyes:  No diplopia or blurred vision. ENT:  No earache, sore throat or runny nose.    CARDIOVASCULAR:  No pressure, squeezing, tightness, heaviness or aching about the chest; no palpitations.    RESPIRATORY:  No cough, shortness of breath, PND or orthopnea. Mild SOBOE    GASTROINTESTINAL:  No nausea, vomiting or diarrhea.    GENITOURINARY:  No dysuria, frequency or urgency.    MUSCULOSKELETAL:  No joint pain    SKIN:  No new lesions.    NEUROLOGIC:  No paresthesias, fasciculations, seizures or weakness.    PSYCHIATRIC:  No disorder of thought or mood.    ENDOCRINE:  No heat or cold intolerance, polyuria or polydipsia.    HEMATOLOGICAL:  No easy bruising or bleeding.     Vital Signs Last 24 Hrs  T(C): 37.6 (29 May 2020 12:06), Max: 39 (28 May 2020 19:27)  T(F): 99.6 (29 May 2020 12:06), Max: 102.2 (28 May 2020 19:27)  HR: 93 (29 May 2020 14:00) (86 - 123)  BP: --  BP(mean): --  RR: 26 (29 May 2020 14:00) (26 - 30)  SpO2: 98% (29 May 2020 14:00) (94% - 99%)    PHYSICAL EXAMINATION:    GENERAL: The patient is awake and alert in no apparent distress.     HEENT: Head is normocephalic and atraumatic. Extraocular muscles are intact. Mucous membranes are moist.    NECK: Supple.    LUNGS: Clear to auscultation without wheezing, rales or rhonchi; respirations unlabored    HEART: Regular rate and rhythm without murmur.    ABDOMEN: Soft, nontender, and nondistended.      EXTREMITIES: Without any cyanosis, clubbing, rash, lesions or edema.    NEUROLOGIC: Grossly intact.    SKIN: No ulceration or induration present.      LABS:                        8.0    14.38 )-----------( 388      ( 29 May 2020 05:06 )             26.7     05-29    155<H>  |  106  |  74.0<H>  ----------------------------<  120<H>  3.3<L>   |  42.0<H>  |  1.00    Ca    9.2      29 May 2020 05:06  Phos  2.7     05-29  Mg     2.7     05-29    TPro  6.6  /  Alb  2.7<L>  /  TBili  0.3<L>  /  DBili  x   /  AST  40<H>  /  ALT  176<H>  /  AlkPhos  602<H>  05-29    PTT - ( 28 May 2020 05:24 )  PTT:56.9 sec    ABG - ( 29 May 2020 09:33 )  pH, Arterial: 7.35  pH, Blood: x     /  pCO2: 79    /  pO2: 76    / HCO3: 40    / Base Excess: 16.3  /  SaO2: 96                              MICROBIOLOGY:    RADIOLOGY & ADDITIONAL STUDIES:< from: Xray Chest 1 View- PORTABLE-Urgent (05.27.20 @ 21:16) >     EXAM:  XR CHEST PORTABLE URGENT 1V                          PROCEDURE DATE:  05/27/2020          INTERPRETATION:  AP chest on May 27, 2020 at 9:07 PM. Patient requires tracheostomy. Patient tested positive for the COVID virus in April.    Heart ismagnified by technique. Tracheostomy and right jugular line remain.    There are persistent moderately advanced diffuse infiltrates in the lungs.    Chest is similar to May 26.    IMPRESSION: Persistent fairly advanced bilateral infiltrates.                NICHOLE ARRINGTON M.D., ATTENDING RADIOLOGIST  This document has been electronically signed. May 28 2020  8:00AM        < end of copied text >

## 2020-05-29 NOTE — PROGRESS NOTE ADULT - ASSESSMENT
64y Male with acute hypoxic vent dependent respiratory failure, Covid Sepsis, Acinetobacter PNA, PE    Neuro:  Encephalopathic however, much improved.  Continue methadone- consider weaning.  Continue Seroquel. Delirium precautions, daily sedation holiday    CV: maintain MAP > 65, continue invasive monitoring with arterial line    Pulm: COVID - Type H resp failure with gradually improving lung compliance. Trial of PSV in AM.   Receiving Ranjit for acinetobacter sputum.     GI/Nutrition:  S/p PEG placement.  Continue tube feeds at goal    /Renal: Escalera for strict I&O, monitor kidney fxn.  Free water continue for hypernatremia.     ID: Acinetobacter PNA.  Continue Meropenem based on sensitivities.        Endo: ISS.  Monitor blood glucose    Skin: repositioning for DTI prevention while in bed    Heme/DVT Prophylaxis: SCDs / Elliquis - for hypercoagulable state.    Dispo: ICU

## 2020-05-29 NOTE — PROGRESS NOTE ADULT - SUBJECTIVE AND OBJECTIVE BOX
Day 27 on the Vent for COVID pneumonia.  Case discussed on eICU PM rounds.    No acute clinical issues.    ICU Vital Signs Last 24 Hrs  T(C): 37.5 (28 May 2020 23:53), Max: 39 (28 May 2020 19:27)  T(F): 99.5 (28 May 2020 23:53), Max: 102.2 (28 May 2020 19:27)  HR: 87 (29 May 2020 01:00) (87 - 708)  ABP: 130/61 (29 May 2020 01:00) (112/59 - 154/63)  ABP(mean): 81 (29 May 2020 01:00) (76 - 89)  RR: 28 (29 May 2020 01:00) (28 - 30)  SpO2: 97% (29 May 2020 01:00) (94% - 100%)    ABG - ( 28 May 2020 13:14 )  pH, Arterial: 7.45  pH, Blood: x     /  pCO2: 70    /  pO2: 69    / HCO3: 46    / Base Excess: 22.0  /  SaO2: 96                              8.2    13.01 )-----------( 446      ( 28 May 2020 05:24 )             27.5       05-28    157<H>  |  105  |  61.0<H>  ----------------------------<  134<H>  3.5   |  40.0<H>  |  0.78

## 2020-05-29 NOTE — PROGRESS NOTE ADULT - ASSESSMENT
COVID virus infection with respiratory failure    Acinetobacter on antibiotics    Eliquis for small PE    Persistent temps, diarrhea--must r/o C diff    Mild hypernatremia --check urine lytes

## 2020-05-30 LAB
ANION GAP SERPL CALC-SCNC: 9 MMOL/L — SIGNIFICANT CHANGE UP (ref 5–17)
ANISOCYTOSIS BLD QL: SLIGHT — SIGNIFICANT CHANGE UP
BASOPHILS # BLD AUTO: 0.11 K/UL — SIGNIFICANT CHANGE UP (ref 0–0.2)
BASOPHILS NFR BLD AUTO: 0.9 % — SIGNIFICANT CHANGE UP (ref 0–2)
BUN SERPL-MCNC: 78 MG/DL — HIGH (ref 8–20)
CALCIUM SERPL-MCNC: 8.8 MG/DL — SIGNIFICANT CHANGE UP (ref 8.6–10.2)
CHLORIDE SERPL-SCNC: 109 MMOL/L — HIGH (ref 98–107)
CO2 SERPL-SCNC: 40 MMOL/L — HIGH (ref 22–29)
CREAT SERPL-MCNC: 0.92 MG/DL — SIGNIFICANT CHANGE UP (ref 0.5–1.3)
CULTURE RESULTS: SIGNIFICANT CHANGE UP
DACRYOCYTES BLD QL SMEAR: SLIGHT — SIGNIFICANT CHANGE UP
EOSINOPHIL # BLD AUTO: 0.42 K/UL — SIGNIFICANT CHANGE UP (ref 0–0.5)
EOSINOPHIL NFR BLD AUTO: 3.5 % — SIGNIFICANT CHANGE UP (ref 0–6)
GAS PNL BLDA: SIGNIFICANT CHANGE UP
GLUCOSE BLDC GLUCOMTR-MCNC: 135 MG/DL — HIGH (ref 70–99)
GLUCOSE BLDC GLUCOMTR-MCNC: 139 MG/DL — HIGH (ref 70–99)
GLUCOSE BLDC GLUCOMTR-MCNC: 150 MG/DL — HIGH (ref 70–99)
GLUCOSE BLDC GLUCOMTR-MCNC: 171 MG/DL — HIGH (ref 70–99)
GLUCOSE SERPL-MCNC: 103 MG/DL — HIGH (ref 70–99)
HCT VFR BLD CALC: 23.5 % — LOW (ref 39–50)
HGB BLD-MCNC: 6.9 G/DL — CRITICAL LOW (ref 13–17)
HYPOCHROMIA BLD QL: SIGNIFICANT CHANGE UP
LYMPHOCYTES # BLD AUTO: 18.4 % — SIGNIFICANT CHANGE UP (ref 13–44)
LYMPHOCYTES # BLD AUTO: 2.2 K/UL — SIGNIFICANT CHANGE UP (ref 1–3.3)
MACROCYTES BLD QL: SLIGHT — SIGNIFICANT CHANGE UP
MAGNESIUM SERPL-MCNC: 2.7 MG/DL — HIGH (ref 1.6–2.6)
MANUAL SMEAR VERIFICATION: SIGNIFICANT CHANGE UP
MCHC RBC-ENTMCNC: 29.4 GM/DL — LOW (ref 32–36)
MCHC RBC-ENTMCNC: 30.1 PG — SIGNIFICANT CHANGE UP (ref 27–34)
MCV RBC AUTO: 102.6 FL — HIGH (ref 80–100)
MICROCYTES BLD QL: SLIGHT — SIGNIFICANT CHANGE UP
MONOCYTES # BLD AUTO: 0.42 K/UL — SIGNIFICANT CHANGE UP (ref 0–0.9)
MONOCYTES NFR BLD AUTO: 3.5 % — SIGNIFICANT CHANGE UP (ref 2–14)
NEUTROPHILS # BLD AUTO: 8.72 K/UL — HIGH (ref 1.8–7.4)
NEUTROPHILS NFR BLD AUTO: 71.9 % — SIGNIFICANT CHANGE UP (ref 43–77)
NEUTS BAND # BLD: 0.9 % — SIGNIFICANT CHANGE UP (ref 0–8)
OVALOCYTES BLD QL SMEAR: SLIGHT — SIGNIFICANT CHANGE UP
PHOSPHATE SERPL-MCNC: 3.5 MG/DL — SIGNIFICANT CHANGE UP (ref 2.4–4.7)
PLAT MORPH BLD: NORMAL — SIGNIFICANT CHANGE UP
PLATELET # BLD AUTO: 331 K/UL — SIGNIFICANT CHANGE UP (ref 150–400)
POIKILOCYTOSIS BLD QL AUTO: SLIGHT — SIGNIFICANT CHANGE UP
POLYCHROMASIA BLD QL SMEAR: SLIGHT — SIGNIFICANT CHANGE UP
POTASSIUM SERPL-MCNC: 3.5 MMOL/L — SIGNIFICANT CHANGE UP (ref 3.5–5.3)
POTASSIUM SERPL-SCNC: 3.5 MMOL/L — SIGNIFICANT CHANGE UP (ref 3.5–5.3)
RBC # BLD: 2.29 M/UL — LOW (ref 4.2–5.8)
RBC # FLD: 18.3 % — HIGH (ref 10.3–14.5)
RBC BLD AUTO: ABNORMAL
SODIUM SERPL-SCNC: 158 MMOL/L — HIGH (ref 135–145)
SPECIMEN SOURCE: SIGNIFICANT CHANGE UP
STOMATOCYTES BLD QL SMEAR: SLIGHT — SIGNIFICANT CHANGE UP
VARIANT LYMPHS # BLD: 0.9 % — SIGNIFICANT CHANGE UP (ref 0–6)
WBC # BLD: 11.98 K/UL — HIGH (ref 3.8–10.5)
WBC # FLD AUTO: 11.98 K/UL — HIGH (ref 3.8–10.5)

## 2020-05-30 PROCEDURE — 99291 CRITICAL CARE FIRST HOUR: CPT

## 2020-05-30 PROCEDURE — 74018 RADEX ABDOMEN 1 VIEW: CPT | Mod: 26

## 2020-05-30 RX ORDER — POTASSIUM CHLORIDE 20 MEQ
40 PACKET (EA) ORAL ONCE
Refills: 0 | Status: COMPLETED | OUTPATIENT
Start: 2020-05-30 | End: 2020-05-30

## 2020-05-30 RX ORDER — METOCLOPRAMIDE HCL 10 MG
10 TABLET ORAL EVERY 6 HOURS
Refills: 0 | Status: DISCONTINUED | OUTPATIENT
Start: 2020-05-30 | End: 2020-05-30

## 2020-05-30 RX ORDER — SODIUM CHLORIDE 9 MG/ML
1000 INJECTION, SOLUTION INTRAVENOUS
Refills: 0 | Status: DISCONTINUED | OUTPATIENT
Start: 2020-05-30 | End: 2020-06-01

## 2020-05-30 RX ADMIN — QUETIAPINE FUMARATE 50 MILLIGRAM(S): 200 TABLET, FILM COATED ORAL at 22:19

## 2020-05-30 RX ADMIN — Medication 1 PACKET(S): at 06:32

## 2020-05-30 RX ADMIN — SODIUM CHLORIDE 50 MILLILITER(S): 9 INJECTION, SOLUTION INTRAVENOUS at 10:32

## 2020-05-30 RX ADMIN — Medication 6: at 00:16

## 2020-05-30 RX ADMIN — CHLORHEXIDINE GLUCONATE 1 APPLICATION(S): 213 SOLUTION TOPICAL at 13:32

## 2020-05-30 RX ADMIN — APIXABAN 5 MILLIGRAM(S): 2.5 TABLET, FILM COATED ORAL at 06:32

## 2020-05-30 RX ADMIN — APIXABAN 5 MILLIGRAM(S): 2.5 TABLET, FILM COATED ORAL at 18:14

## 2020-05-30 RX ADMIN — Medication 4: at 06:33

## 2020-05-30 RX ADMIN — MEROPENEM 100 MILLIGRAM(S): 1 INJECTION INTRAVENOUS at 06:31

## 2020-05-30 RX ADMIN — CHLORHEXIDINE GLUCONATE 15 MILLILITER(S): 213 SOLUTION TOPICAL at 06:33

## 2020-05-30 RX ADMIN — CHLORHEXIDINE GLUCONATE 15 MILLILITER(S): 213 SOLUTION TOPICAL at 17:26

## 2020-05-30 RX ADMIN — SODIUM CHLORIDE 84 MILLILITER(S): 9 INJECTION, SOLUTION INTRAVENOUS at 00:17

## 2020-05-30 RX ADMIN — MEROPENEM 100 MILLIGRAM(S): 1 INJECTION INTRAVENOUS at 22:19

## 2020-05-30 RX ADMIN — Medication 40 MILLIEQUIVALENT(S): at 16:31

## 2020-05-30 RX ADMIN — Medication 1 TABLET(S): at 18:14

## 2020-05-30 RX ADMIN — MEROPENEM 100 MILLIGRAM(S): 1 INJECTION INTRAVENOUS at 15:22

## 2020-05-30 RX ADMIN — Medication 5 MILLIGRAM(S): at 22:19

## 2020-05-30 RX ADMIN — Medication 650 MILLIGRAM(S): at 00:16

## 2020-05-30 RX ADMIN — METHADONE HYDROCHLORIDE 5 MILLIGRAM(S): 40 TABLET ORAL at 06:31

## 2020-05-30 NOTE — PROGRESS NOTE ADULT - SUBJECTIVE AND OBJECTIVE BOX
24h Events:  c diff negative. afebrile overnight. no major events or vent changes made overnight.     ICU Vital Signs Last 24 Hrs  T(C): 36.8 (30 May 2020 00:35), Max: 37.9 (29 May 2020 07:50)  T(F): 98.2 (30 May 2020 00:35), Max: 100.3 (29 May 2020 07:50)  HR: 90 (30 May 2020 01:00) (80 - 123)  BP: --  BP(mean): --  ABP: 113/53 (30 May 2020 01:00) (96/51 - 162/70)  ABP(mean): 71 (30 May 2020 01:00) (71 - 99)  RR: 28 (30 May 2020 01:00) (26 - 30)  SpO2: 98% (30 May 2020 01:00) (95% - 100%)      I&O's Detail    28 May 2020 07:01  -  29 May 2020 07:00  --------------------------------------------------------  IN:    Enteral Tube Flush: 790 mL    Free Water: 1200 mL    heparin  Infusion.: 91 mL    multiple electrolytes Injection Type 1: 84 mL    Pivot 1.5: 880 mL    Solution: 50 mL    Solution: 50 mL  Total IN: 3145 mL    OUT:    Indwelling Catheter - Urethral: 1290 mL  Total OUT: 1290 mL    Total NET: 1855 mL      29 May 2020 07:01  -  30 May 2020 02:51  --------------------------------------------------------  IN:    Enteral Tube Flush: 180 mL    Free Water: 900 mL    multiple electrolytes Injection Type 1: 1344 mL    Pivot 1.5: 640 mL    Solution: 50 mL    Solution: 250 mL    Solution: 50 mL  Total IN: 3414 mL    OUT:    Indwelling Catheter - Urethral: 1465 mL    Rectal Tube: 400 mL  Total OUT: 1865 mL    Total NET: 1549 mL      ABG - ( 29 May 2020 09:33 )  pH, Arterial: 7.35  pH, Blood: x     /  pCO2: 79    /  pO2: 76    / HCO3: 40    / Base Excess: 16.3  /  SaO2: 96            MEDICATIONS  (STANDING):  apixaban 5 milliGRAM(s) Oral two times a day  chlorhexidine 0.12% Liquid 15 milliLiter(s) Oral Mucosa every 12 hours  chlorhexidine 2% Cloths 1 Application(s) Topical daily  insulin lispro (HumaLOG) corrective regimen sliding scale   SubCutaneous every 6 hours  melatonin 5 milliGRAM(s) Oral at bedtime  meropenem  IVPB 1000 milliGRAM(s) IV Intermittent every 8 hours  methadone    Tablet 5 milliGRAM(s) Oral every 12 hours  multiple electrolytes Injection Type 1 1000 milliLiter(s) (84 mL/Hr) IV Continuous <Continuous>  multivitamin/minerals 1 Tablet(s) Oral daily  psyllium Powder 1 Packet(s) Oral two times a day  QUEtiapine 50 milliGRAM(s) Oral at bedtime    MEDICATIONS  (PRN):  acetaminophen    Suspension .. 650 milliGRAM(s) Oral every 6 hours PRN Temp greater or equal to 38.5C (101.3F)        Physical Exam:    Neurological:  Intubated.  follows simple commands. off sedation. recieves seroquel at bedtime.     Neck: Trach in place.  Site C/D/I. on AC ventilation.     Respiratory:  Mech vented.  Trachea midline, equal chest rise.      Cardiovascular: Regular rate & rhythm, normal S1, S2    Gastrointestinal: PEG site intact.  C/D/I.  Soft, NTND    : alexander in place draining yellow urine    Skin: warm, dry, no diaphoresis, no pallor, cyanosis, or jaundice        LABS:  CBC Full  -  ( 29 May 2020 05:06 )  WBC Count : 14.38 K/uL  RBC Count : 2.65 M/uL  Hemoglobin : 8.0 g/dL  Hematocrit : 26.7 %  Platelet Count - Automated : 388 K/uL  Mean Cell Volume : 100.8 fl  Mean Cell Hemoglobin : 30.2 pg  Mean Cell Hemoglobin Concentration : 30.0 gm/dL  Auto Neutrophil # : x  Auto Lymphocyte # : x  Auto Monocyte # : x  Auto Eosinophil # : x  Auto Basophil # : x  Auto Neutrophil % : x  Auto Lymphocyte % : x  Auto Monocyte % : x  Auto Eosinophil % : x  Auto Basophil % : x    05-29    155<H>  |  106  |  74.0<H>  ----------------------------<  120<H>  3.3<L>   |  42.0<H>  |  1.00    Ca    9.2      29 May 2020 05:06  Phos  2.7     05-29  Mg     2.7     05-29    TPro  6.6  /  Alb  2.7<L>  /  TBili  0.3<L>  /  DBili  x   /  AST  40<H>  /  ALT  176<H>  /  AlkPhos  602<H>  05-29    PTT - ( 28 May 2020 05:24 )  PTT:56.9 sec    RECENT CULTURES:  05-25 .Sputum Sputum Acinetobacter baumannii   Few polymorphonuclear leukocytes per low power field  No Squamous epithelial cells per low power field  Few Gram Negative Diplococci per oil power field   **Please Note**: This is a Corrected Report**  Numerous Acinetobacter baumannii  / nosocomialis group  Previously reported as:  Numerous Acinetobacter baumannii (Carbapenem Resistant)  / nosocomialis  group  Normal Respiratory Christine present    05-25 .Blood Blood XXXX XXXX   No growth at 48 hours        LIVER FUNCTIONS - ( 29 May 2020 05:06 )  Alb: 2.7 g/dL / Pro: 6.6 g/dL / ALK PHOS: 602 U/L / ALT: 176 U/L / AST: 40 U/L / GGT: x                   ASSESSMENT/PLAN:  64y Male acute hypoxic vent dependent respiratory failure, Covid Sepsis, Acinetobacter PNA, PE    Neuro: off sedation. delirium precautions. continue methadone and seroquel    CV: continue invasive monitoring with arterial line, MAP > 65    Pulm: wean FiO2 / PEEP as tolerated    GI/Nutrition: S/p PEG placement.  Continue tube feeds at goal    /Renal: alexander for strict I&O, monitor kidney fxn    ID: Acinetobacter PNA.  Continue Meropenem     Endo: monitor blood glucose, on ISS    Skin: repositioning for DTI prevention while in bed    Heme/DVT Prophylaxis: SCDs, eliquis     DISPO: ICU

## 2020-05-30 NOTE — PROGRESS NOTE ADULT - SUBJECTIVE AND OBJECTIVE BOX
PULMONARY PROGRESS NOTE      [id number]    Patient is a 64y old  Male who presents with a chief complaint of shortness of breath (30 May 2020 02:50)      INTERVAL HPI/OVERNIGHT EVENTS:    MEDICATIONS  (STANDING):  apixaban 5 milliGRAM(s) Oral two times a day  chlorhexidine 0.12% Liquid 15 milliLiter(s) Oral Mucosa every 12 hours  chlorhexidine 2% Cloths 1 Application(s) Topical daily  insulin lispro (HumaLOG) corrective regimen sliding scale   SubCutaneous every 6 hours  melatonin 5 milliGRAM(s) Oral at bedtime  meropenem  IVPB 1000 milliGRAM(s) IV Intermittent every 8 hours  multivitamin/minerals 1 Tablet(s) Oral daily  potassium chloride   Powder 40 milliEquivalent(s) Enteral Tube once  psyllium Powder 1 Packet(s) Oral two times a day  QUEtiapine 50 milliGRAM(s) Oral at bedtime  sodium chloride 0.45%. 1000 milliLiter(s) (50 mL/Hr) IV Continuous <Continuous>      MEDICATIONS  (PRN):  acetaminophen    Suspension .. 650 milliGRAM(s) Oral every 6 hours PRN Temp greater or equal to 38.5C (101.3F)      Allergies    No Known Allergies    Intolerances        PAST MEDICAL & SURGICAL HISTORY:  No pertinent past medical history  No significant past surgical history      SOCIAL HISTORY  Smoking History:       REVIEW OF SYSTEMS:    CONSTITUTIONAL:  No distress    HEENT:  Eyes:  No diplopia or blurred vision. ENT:  No earache, sore throat or runny nose.    CARDIOVASCULAR:  No pressure, squeezing, tightness, heaviness or aching about the chest; no palpitations.    RESPIRATORY:  No cough, shortness of breath, PND or orthopnea. Mild SOBOE    GASTROINTESTINAL:  No nausea, vomiting or diarrhea.    GENITOURINARY:  No dysuria, frequency or urgency.    MUSCULOSKELETAL:  No joint pain    SKIN:  No new lesions.    NEUROLOGIC:  No paresthesias, fasciculations, seizures or weakness.    PSYCHIATRIC:  No disorder of thought or mood.    ENDOCRINE:  No heat or cold intolerance, polyuria or polydipsia.    HEMATOLOGICAL:  No easy bruising or bleeding.     Vital Signs Last 24 Hrs  T(C): 36.9 (30 May 2020 08:00), Max: 37.6 (29 May 2020 12:06)  T(F): 98.5 (30 May 2020 08:00), Max: 99.6 (29 May 2020 12:06)  HR: 97 (30 May 2020 10:00) (80 - 107)  BP: --  BP(mean): --  RR: 20 (30 May 2020 10:00) (20 - 29)  SpO2: 94% (30 May 2020 10:00) (94% - 100%)    PHYSICAL EXAMINATION:    GENERAL: The patient is awake and alert in no apparent distress.     HEENT: Head is normocephalic and atraumatic. Extraocular muscles are intact. Mucous membranes are moist.    NECK: Supple.    LUNGS: Clear to auscultation without wheezing, rales or rhonchi; respirations unlabored    HEART: Regular rate and rhythm without murmur.    ABDOMEN: Soft, nontender, and nondistended.      EXTREMITIES: Without any cyanosis, clubbing, rash, lesions or edema.    NEUROLOGIC: Grossly intact.    SKIN: No ulceration or induration present.      LABS:                        6.9    11.98 )-----------( 331      ( 30 May 2020 06:31 )             23.5     05-30    158<H>  |  109<H>  |  78.0<H>  ----------------------------<  103<H>  3.5   |  40.0<H>  |  0.92    Ca    8.8      30 May 2020 06:31  Phos  3.5     05-30  Mg     2.7     05-30    TPro  6.6  /  Alb  2.7<L>  /  TBili  0.3<L>  /  DBili  x   /  AST  40<H>  /  ALT  176<H>  /  AlkPhos  602<H>  05-29        ABG - ( 30 May 2020 05:05 )  pH, Arterial: 7.38  pH, Blood: x     /  pCO2: 71    /  pO2: 81    / HCO3: 40    / Base Excess: 16.6  /  SaO2: 99                              MICROBIOLOGY:    RADIOLOGY & ADDITIONAL STUDIES:< from: Xray Chest 1 View-PORTABLE IMMEDIATE (05.26.20 @ 14:25) >     EXAM:  XR CHEST PORTABLE IMMED 1V                          PROCEDURE DATE:  05/26/2020          INTERPRETATION:  Chest one view    HISTORY: Tracheostomy    COMPARISON STUDY: 5/1/2020    Frontal expiratory view of the chest shows the heart to be similar in size. Tracheostomy tube replaces endotracheal tube. Nasogastric tube has been removed. Right jugular line reaches the upper right atrium.    The lungs show fewer patchy pulmonary infiltrates with smaller pleural effusions and there is no evidence of pneumothorax.    IMPRESSION:  New tracheostomy tube. Partial clearing of the lungs.    Thank you for the courtesy of this referral.        < end of copied text >  < from: Xray Chest 1 View-PORTABLE IMMEDIATE (05.26.20 @ 14:25) >     EXAM:  XR CHEST PORTABLE IMMED 1V                          PROCEDURE DATE:  05/26/2020          INTERPRETATION:  Chest one view    HISTORY: Tracheostomy    COMPARISON STUDY: 5/1/2020    Frontal expiratory view of the chest shows the heart to be similar in size. Tracheostomy tube replaces endotracheal tube. Nasogastric tube has been removed. Right jugular line reaches the upper right atrium.    The lungs show fewer patchy pulmonary infiltrates with smaller pleural effusions and there is no evidence of pneumothorax.    IMPRESSION:  New tracheostomy tube. Partial clearing of the lungs.    Thank you for the courtesy of this referral.        < end of copied text >

## 2020-05-30 NOTE — PROGRESS NOTE ADULT - ASSESSMENT
COVID virus infection with respiratory failure    Acinetobacter on antibiotics    Eliquis for small PE    Temps appear decreased Continues on Meropenem for acinetobacter  Diarrhea appears decreased as well---C diff was negative    Sodium appears to have increased slightly, getting free water and will change IV fluid to hypotonic.  Note decr in H/H to 23 wheich may represent dilution, no evidence of bleeding at this time    Vent status appears to be improving tho, will begin SBT today as tolerated  Maintain Seroquel and will continue low dose methadone.  No evidence at this time of continue narcotic withdrawal syndrome.    Case discussed with Itzel HERNANDEZ and John HERNANDEZ

## 2020-05-31 LAB
ANION GAP SERPL CALC-SCNC: 7 MMOL/L — SIGNIFICANT CHANGE UP (ref 5–17)
BASOPHILS # BLD AUTO: 0.02 K/UL — SIGNIFICANT CHANGE UP (ref 0–0.2)
BASOPHILS NFR BLD AUTO: 0.2 % — SIGNIFICANT CHANGE UP (ref 0–2)
BLD GP AB SCN SERPL QL: SIGNIFICANT CHANGE UP
BUN SERPL-MCNC: 64 MG/DL — HIGH (ref 8–20)
CALCIUM SERPL-MCNC: 8.7 MG/DL — SIGNIFICANT CHANGE UP (ref 8.6–10.2)
CHLORIDE SERPL-SCNC: 108 MMOL/L — HIGH (ref 98–107)
CO2 SERPL-SCNC: 41 MMOL/L — HIGH (ref 22–29)
CREAT SERPL-MCNC: 0.74 MG/DL — SIGNIFICANT CHANGE UP (ref 0.5–1.3)
EOSINOPHIL # BLD AUTO: 0.51 K/UL — HIGH (ref 0–0.5)
EOSINOPHIL NFR BLD AUTO: 4.5 % — SIGNIFICANT CHANGE UP (ref 0–6)
GAS PNL BLDA: SIGNIFICANT CHANGE UP
GLUCOSE BLDC GLUCOMTR-MCNC: 114 MG/DL — HIGH (ref 70–99)
GLUCOSE BLDC GLUCOMTR-MCNC: 125 MG/DL — HIGH (ref 70–99)
GLUCOSE BLDC GLUCOMTR-MCNC: 176 MG/DL — HIGH (ref 70–99)
GLUCOSE SERPL-MCNC: 169 MG/DL — HIGH (ref 70–99)
HCT VFR BLD CALC: 22.4 % — LOW (ref 39–50)
HGB BLD-MCNC: 6.7 G/DL — CRITICAL LOW (ref 13–17)
IMM GRANULOCYTES NFR BLD AUTO: 1.2 % — SIGNIFICANT CHANGE UP (ref 0–1.5)
LYMPHOCYTES # BLD AUTO: 1.95 K/UL — SIGNIFICANT CHANGE UP (ref 1–3.3)
LYMPHOCYTES # BLD AUTO: 17.3 % — SIGNIFICANT CHANGE UP (ref 13–44)
MAGNESIUM SERPL-MCNC: 2.5 MG/DL — SIGNIFICANT CHANGE UP (ref 1.6–2.6)
MCHC RBC-ENTMCNC: 29.9 GM/DL — LOW (ref 32–36)
MCHC RBC-ENTMCNC: 30.3 PG — SIGNIFICANT CHANGE UP (ref 27–34)
MCV RBC AUTO: 101.4 FL — HIGH (ref 80–100)
MONOCYTES # BLD AUTO: 0.5 K/UL — SIGNIFICANT CHANGE UP (ref 0–0.9)
MONOCYTES NFR BLD AUTO: 4.4 % — SIGNIFICANT CHANGE UP (ref 2–14)
NEUTROPHILS # BLD AUTO: 8.12 K/UL — HIGH (ref 1.8–7.4)
NEUTROPHILS NFR BLD AUTO: 72.4 % — SIGNIFICANT CHANGE UP (ref 43–77)
PHOSPHATE SERPL-MCNC: 2 MG/DL — LOW (ref 2.4–4.7)
PLATELET # BLD AUTO: 301 K/UL — SIGNIFICANT CHANGE UP (ref 150–400)
POTASSIUM SERPL-MCNC: 3.5 MMOL/L — SIGNIFICANT CHANGE UP (ref 3.5–5.3)
POTASSIUM SERPL-SCNC: 3.5 MMOL/L — SIGNIFICANT CHANGE UP (ref 3.5–5.3)
RBC # BLD: 2.21 M/UL — LOW (ref 4.2–5.8)
RBC # FLD: 17.7 % — HIGH (ref 10.3–14.5)
SODIUM SERPL-SCNC: 156 MMOL/L — HIGH (ref 135–145)
WBC # BLD: 11.24 K/UL — HIGH (ref 3.8–10.5)
WBC # FLD AUTO: 11.24 K/UL — HIGH (ref 3.8–10.5)

## 2020-05-31 PROCEDURE — 99223 1ST HOSP IP/OBS HIGH 75: CPT

## 2020-05-31 PROCEDURE — 71045 X-RAY EXAM CHEST 1 VIEW: CPT | Mod: 26

## 2020-05-31 RX ORDER — PROTHROMBIN COMPLEX CONCENTRATE (HUMAN) 25.5; 16.5; 24; 22; 22; 26 [IU]/ML; [IU]/ML; [IU]/ML; [IU]/ML; [IU]/ML; [IU]/ML
1500 POWDER, FOR SOLUTION INTRAVENOUS ONCE
Refills: 0 | Status: COMPLETED | OUTPATIENT
Start: 2020-05-31 | End: 2020-05-31

## 2020-05-31 RX ORDER — POTASSIUM PHOSPHATE, MONOBASIC POTASSIUM PHOSPHATE, DIBASIC 236; 224 MG/ML; MG/ML
30 INJECTION, SOLUTION INTRAVENOUS ONCE
Refills: 0 | Status: COMPLETED | OUTPATIENT
Start: 2020-05-31 | End: 2020-05-31

## 2020-05-31 RX ADMIN — CHLORHEXIDINE GLUCONATE 15 MILLILITER(S): 213 SOLUTION TOPICAL at 17:36

## 2020-05-31 RX ADMIN — QUETIAPINE FUMARATE 50 MILLIGRAM(S): 200 TABLET, FILM COATED ORAL at 21:16

## 2020-05-31 RX ADMIN — APIXABAN 5 MILLIGRAM(S): 2.5 TABLET, FILM COATED ORAL at 17:37

## 2020-05-31 RX ADMIN — SODIUM CHLORIDE 50 MILLILITER(S): 9 INJECTION, SOLUTION INTRAVENOUS at 05:49

## 2020-05-31 RX ADMIN — Medication 1 TABLET(S): at 17:37

## 2020-05-31 RX ADMIN — Medication 5 MILLIGRAM(S): at 21:17

## 2020-05-31 RX ADMIN — MEROPENEM 100 MILLIGRAM(S): 1 INJECTION INTRAVENOUS at 21:16

## 2020-05-31 RX ADMIN — CHLORHEXIDINE GLUCONATE 15 MILLILITER(S): 213 SOLUTION TOPICAL at 06:12

## 2020-05-31 RX ADMIN — POTASSIUM PHOSPHATE, MONOBASIC POTASSIUM PHOSPHATE, DIBASIC 83.33 MILLIMOLE(S): 236; 224 INJECTION, SOLUTION INTRAVENOUS at 09:33

## 2020-05-31 RX ADMIN — MEROPENEM 100 MILLIGRAM(S): 1 INJECTION INTRAVENOUS at 14:38

## 2020-05-31 RX ADMIN — Medication 4: at 06:12

## 2020-05-31 RX ADMIN — CHLORHEXIDINE GLUCONATE 1 APPLICATION(S): 213 SOLUTION TOPICAL at 11:26

## 2020-05-31 RX ADMIN — Medication 1 PACKET(S): at 06:12

## 2020-05-31 RX ADMIN — Medication 1 PACKET(S): at 17:37

## 2020-05-31 RX ADMIN — APIXABAN 5 MILLIGRAM(S): 2.5 TABLET, FILM COATED ORAL at 06:11

## 2020-05-31 RX ADMIN — Medication 0: at 00:13

## 2020-05-31 RX ADMIN — MEROPENEM 100 MILLIGRAM(S): 1 INJECTION INTRAVENOUS at 06:12

## 2020-05-31 NOTE — PROGRESS NOTE ADULT - ASSESSMENT
ASSESSMENT/PLAN:  64y Male acute hypoxic vent dependent respiratory failure, Covid Sepsis, Acinetobacter PNA, PE    Neuro: off sedation. Delirium precautions. continue methadone and seroquel. Mild improvement of mentation, continue to monitor.    CV: continue invasive monitoring with arterial line, MAP > 65    Pulm: wean FiO2 / PEEP as tolerated    GI/Nutrition: S/p PEG placement.  Episode of vomiting today. Abd Xray shows distended gastric bubble. Feedings stopped and PEG vented. Continue to monitor. Resume feeds once distention resolves.     /Renal: Noted increased hypernatremia, IVF changed to hypotonics, monitor electrolytes. Escalera for strict I&O, monitor kidney fxn    ID: Acinetobacter PNA.  Continue Meropenem     Endo: monitor blood glucose, on ISS    Skin: repositioning for DTI prevention while in bed    Heme/DVT Prophylaxis: Hgb 6.9 on 30 May, will repeat lab and obtain type and screen. Transfuse PRBC as indicated. c/w SCDs, eliquis     DISPO: ICU

## 2020-05-31 NOTE — PROGRESS NOTE ADULT - SUBJECTIVE AND OBJECTIVE BOX
PULMONARY PROGRESS NOTE      [id number]    Patient is a 64y old  Male who presents with a chief complaint of shortness of breath (31 May 2020 02:35)      INTERVAL HPI/OVERNIGHT EVENTS:    MEDICATIONS  (STANDING):  apixaban 5 milliGRAM(s) Oral two times a day  chlorhexidine 0.12% Liquid 15 milliLiter(s) Oral Mucosa every 12 hours  chlorhexidine 2% Cloths 1 Application(s) Topical daily  insulin lispro (HumaLOG) corrective regimen sliding scale   SubCutaneous every 6 hours  melatonin 5 milliGRAM(s) Oral at bedtime  meropenem  IVPB 1000 milliGRAM(s) IV Intermittent every 8 hours  multivitamin/minerals 1 Tablet(s) Oral daily  potassium phosphate IVPB 30 milliMole(s) IV Intermittent once  psyllium Powder 1 Packet(s) Oral two times a day  QUEtiapine 50 milliGRAM(s) Oral at bedtime  sodium chloride 0.45%. 1000 milliLiter(s) (50 mL/Hr) IV Continuous <Continuous>      MEDICATIONS  (PRN):  acetaminophen    Suspension .. 650 milliGRAM(s) Oral every 6 hours PRN Temp greater or equal to 38.5C (101.3F)      Allergies    No Known Allergies    Intolerances        PAST MEDICAL & SURGICAL HISTORY:  No pertinent past medical history  No significant past surgical history      SOCIAL HISTORY  Smoking History:       REVIEW OF SYSTEMS:    CONSTITUTIONAL:  No distress    HEENT:  Eyes:  No diplopia or blurred vision. ENT:  No earache, sore throat or runny nose.    CARDIOVASCULAR:  No pressure, squeezing, tightness, heaviness or aching about the chest; no palpitations.    RESPIRATORY:  No cough, shortness of breath, PND or orthopnea. Mild SOBOE    GASTROINTESTINAL:  No nausea, vomiting or diarrhea.    GENITOURINARY:  No dysuria, frequency or urgency.    MUSCULOSKELETAL:  No joint pain    SKIN:  No new lesions.    NEUROLOGIC:  No paresthesias, fasciculations, seizures or weakness.    PSYCHIATRIC:  No disorder of thought or mood.    ENDOCRINE:  No heat or cold intolerance, polyuria or polydipsia.    HEMATOLOGICAL:  No easy bruising or bleeding.     Vital Signs Last 24 Hrs  T(C): 37 (31 May 2020 08:21), Max: 37.2 (30 May 2020 20:00)  T(F): 98.6 (31 May 2020 08:21), Max: 98.9 (30 May 2020 20:00)  HR: 73 (31 May 2020 08:42) (73 - 101)  BP: --  BP(mean): --  RR: 28 (31 May 2020 05:00) (20 - 31)  SpO2: 100% (31 May 2020 08:42) (94% - 100%)    PHYSICAL EXAMINATION:    GENERAL: The patient is awake and alert in no apparent distress.     HEENT: Head is normocephalic and atraumatic. Extraocular muscles are intact. Mucous membranes are moist.    NECK: Supple.    LUNGS: Clear to auscultation without wheezing, rales or rhonchi; respirations unlabored    HEART: Regular rate and rhythm without murmur.    ABDOMEN: Soft, nontender, and nondistended.      EXTREMITIES: Without any cyanosis, clubbing, rash, lesions or edema.    NEUROLOGIC: Grossly intact.    SKIN: No ulceration or induration present.      LABS:                        6.7    11.24 )-----------( 301      ( 31 May 2020 03:37 )             22.4     05-31    156<H>  |  108<H>  |  64.0<H>  ----------------------------<  169<H>  3.5   |  41.0<H>  |  0.74    Ca    8.7      31 May 2020 03:37  Phos  2.0     05-31  Mg     2.5     05-31          ABG - ( 31 May 2020 03:38 )  pH, Arterial: 7.44  pH, Blood: x     /  pCO2: 66    /  pO2: 99    / HCO3: 42    / Base Excess: 18.2  /  SaO2: 99                              MICROBIOLOGY:    RADIOLOGY & ADDITIONAL STUDIES:< from: Xray Chest 1 View- PORTABLE-Urgent (05.27.20 @ 21:16) >   EXAM:  XR CHEST PORTABLE URGENT 1V                          PROCEDURE DATE:  05/27/2020          INTERPRETATION:  AP chest on May 27, 2020 at 9:07 PM. Patient requires tracheostomy. Patient tested positive for the COVID virus in April.    Heart ismagnified by technique. Tracheostomy and right jugular line remain.    There are persistent moderately advanced diffuse infiltrates in the lungs.    Chest is similar to May 26.    IMPRESSION: Persistent fairly advanced bilateral infiltrates.    < end of copied text >

## 2020-05-31 NOTE — PROGRESS NOTE ADULT - ASSESSMENT
COVID infection and respir failure  Acinetobacter in sputum on Meropenem  Hx of PE on eliquis    He continues to tolerate SBT of increasing duration--will advance this today    Diarrhea appears to be decreasing as well as temps  Some vomiting last night, altho this has abated--ileus on abd film but as soft abdomen and positive bowels sounds    Case discussed with Itzel Kim and John Beth COVID infection and respir failure  Acinetobacter in sputum on Meropenem  Hx of PE on eliquis    He continues to tolerate SBT of increasing duration--will advance this today    Diarrhea appears to be decreasing as well as temps  Some vomiting last night, altho this has abated--ileus on abd film but as soft abdomen and positive bowel sounds  Receiving blood now for decr H/H, no signs of active bleeding source    Case discussed with Itzel Kim and John Beth

## 2020-05-31 NOTE — PROGRESS NOTE ADULT - SUBJECTIVE AND OBJECTIVE BOX
24h Events:  c diff negative. afebrile overnight.   Had 1 episode of vomiting, abd xray show distended gastric bubble  feedings stopped momentarily and NG tube vented.    no major vent changes made overnight.     Vital Signs Last 24 Hrs  T(C): 37.2 (31 May 2020 00:37), Max: 37.2 (30 May 2020 20:00)  T(F): 98.9 (31 May 2020 00:37), Max: 98.9 (30 May 2020 20:00)  HR: 74 (31 May 2020 02:00) (74 - 101)  RR: 26 (31 May 2020 02:00) (20 - 31)  SpO2: 100% (31 May 2020 02:00) (94% - 100%)    I&O's Detail    29 May 2020 07:01  -  30 May 2020 07:00  --------------------------------------------------------  IN:    Enteral Tube Flush: 280 mL    Free Water: 1200 mL    multiple electrolytes Injection Type 1multiple electrolytes Injection Type 1: 1932 mL    Pivot 1.5: 920 mL    Solution: 250 mL    Solution: 50 mL    Solution: 100 mL  Total IN: 4732 mL    OUT:    Indwelling Catheter - Urethral: 1915 mL    Rectal Tube: 660 mL  Total OUT: 2575 mL    Total NET: 2157 mL      30 May 2020 07:01  -  31 May 2020 02:38  --------------------------------------------------------  IN:    Free Water: 1500 mL    Pivot 1.5: 580 mL    sodium chloride 0.45%.: 575 mL    Solution: 100 mL  Total IN: 2755 mL    OUT:    Emesis: 200 mL    Indwelling Catheter - Urethral: 1025 mL    Rectal Tube: 300 mL  Total OUT: 1525 mL    Total NET: 1230 mL    Physical Exam:    Neurological:  Intubated.  follows simple commands. off sedation. recieves seroquel at bedtime. Mild improvement in alertness.     Neck: Trach in place.  Site C/D/I. on AC ventilation.     Respiratory:  Mech vented.  Trachea midline, equal chest rise.      Cardiovascular: Regular rate & rhythm, normal S1, S2    Gastrointestinal: PEG site intact., tube vented. C/D/I.  Soft, NTND    : alexander in place draining yellow urine    Skin: warm, dry, no diaphoresis, no pallor, cyanosis, or jaundice    LABS:                        6.9    11.98 )-----------( 331      ( 30 May 2020 06:31 )             23.5     05-30  158<H>  |  109<H>  |  78.0<H>  ----------------------------<  103<H>  3.5   |  40.0<H>  |  0.92    Ca    8.8      30 May 2020 06:31  Phos  3.5     05-30  Mg     2.7     05-30    TPro  6.6  /  Alb  2.7<L>  /  TBili  0.3<L>  /  DBili  x   /  AST  40<H>  /  ALT  176<H>  /  AlkPhos  602<H>  05-29      RECENT CULTURES:  05-25 .Sputum Sputum Acinetobacter baumannii   Few polymorphonuclear leukocytes per low power field  No Squamous epithelial cells per low power field  Few Gram Negative Diplococci per oil power field   **Please Note**: This is a Corrected Report**  Numerous Acinetobacter baumannii  / nosocomialis group  Previously reported as:  Numerous Acinetobacter baumannii (Carbapenem Resistant)  / nosocomialis  group  Normal Respiratory Christine present    05-25 .Blood Blood XXXX XXXX   No growth at 48 hours    MEDICATIONS  (STANDING):  apixaban 5 milliGRAM(s) Oral two times a day  chlorhexidine 0.12% Liquid 15 milliLiter(s) Oral Mucosa every 12 hours  chlorhexidine 2% Cloths 1 Application(s) Topical daily  insulin lispro (HumaLOG) corrective regimen sliding scale   SubCutaneous every 6 hours  melatonin 5 milliGRAM(s) Oral at bedtime  meropenem  IVPB 1000 milliGRAM(s) IV Intermittent every 8 hours  multivitamin/minerals 1 Tablet(s) Oral daily  psyllium Powder 1 Packet(s) Oral two times a day  QUEtiapine 50 milliGRAM(s) Oral at bedtime  sodium chloride 0.45%. 1000 milliLiter(s) (50 mL/Hr) IV Continuous <Continuous>    MEDICATIONS  (PRN):  acetaminophen    Suspension .. 650 milliGRAM(s) Oral every 6 hours PRN Temp greater or equal to 38.5C (101.3F)

## 2020-06-01 DIAGNOSIS — E87.0 HYPEROSMOLALITY AND HYPERNATREMIA: ICD-10-CM

## 2020-06-01 DIAGNOSIS — K62.5 HEMORRHAGE OF ANUS AND RECTUM: ICD-10-CM

## 2020-06-01 DIAGNOSIS — J18.9 PNEUMONIA, UNSPECIFIED ORGANISM: ICD-10-CM

## 2020-06-01 LAB
ANION GAP SERPL CALC-SCNC: 10 MMOL/L — SIGNIFICANT CHANGE UP (ref 5–17)
ANION GAP SERPL CALC-SCNC: 5 MMOL/L — SIGNIFICANT CHANGE UP (ref 5–17)
ANION GAP SERPL CALC-SCNC: 6 MMOL/L — SIGNIFICANT CHANGE UP (ref 5–17)
ANION GAP SERPL CALC-SCNC: 7 MMOL/L — SIGNIFICANT CHANGE UP (ref 5–17)
APTT BLD: 31.9 SEC — SIGNIFICANT CHANGE UP (ref 27.5–36.3)
BASOPHILS # BLD AUTO: 0.03 K/UL — SIGNIFICANT CHANGE UP (ref 0–0.2)
BASOPHILS NFR BLD AUTO: 0.3 % — SIGNIFICANT CHANGE UP (ref 0–2)
BUN SERPL-MCNC: 27 MG/DL — HIGH (ref 8–20)
BUN SERPL-MCNC: 30 MG/DL — HIGH (ref 8–20)
BUN SERPL-MCNC: 34 MG/DL — HIGH (ref 8–20)
BUN SERPL-MCNC: 45 MG/DL — HIGH (ref 8–20)
CALCIUM SERPL-MCNC: 5.2 MG/DL — CRITICAL LOW (ref 8.6–10.2)
CALCIUM SERPL-MCNC: 6.2 MG/DL — CRITICAL LOW (ref 8.6–10.2)
CALCIUM SERPL-MCNC: 7.6 MG/DL — LOW (ref 8.6–10.2)
CALCIUM SERPL-MCNC: 8.3 MG/DL — LOW (ref 8.6–10.2)
CHLORIDE SERPL-SCNC: 107 MMOL/L — SIGNIFICANT CHANGE UP (ref 98–107)
CHLORIDE SERPL-SCNC: 112 MMOL/L — HIGH (ref 98–107)
CHLORIDE SERPL-SCNC: 117 MMOL/L — HIGH (ref 98–107)
CHLORIDE SERPL-SCNC: 118 MMOL/L — HIGH (ref 98–107)
CO2 SERPL-SCNC: 26 MMOL/L — SIGNIFICANT CHANGE UP (ref 22–29)
CO2 SERPL-SCNC: 29 MMOL/L — SIGNIFICANT CHANGE UP (ref 22–29)
CO2 SERPL-SCNC: 35 MMOL/L — HIGH (ref 22–29)
CO2 SERPL-SCNC: 37 MMOL/L — HIGH (ref 22–29)
CREAT SERPL-MCNC: 0.3 MG/DL — LOW (ref 0.5–1.3)
CREAT SERPL-MCNC: 0.35 MG/DL — LOW (ref 0.5–1.3)
CREAT SERPL-MCNC: 0.51 MG/DL — SIGNIFICANT CHANGE UP (ref 0.5–1.3)
CREAT SERPL-MCNC: 0.6 MG/DL — SIGNIFICANT CHANGE UP (ref 0.5–1.3)
EOSINOPHIL # BLD AUTO: 0.14 K/UL — SIGNIFICANT CHANGE UP (ref 0–0.5)
EOSINOPHIL NFR BLD AUTO: 1.5 % — SIGNIFICANT CHANGE UP (ref 0–6)
FIBRINOGEN PPP-MCNC: 288 MG/DL — LOW (ref 300–520)
FIBRINOGEN PPP-MCNC: 375 MG/DL — SIGNIFICANT CHANGE UP (ref 300–520)
FIBRINOGEN PPP-MCNC: 388 MG/DL — SIGNIFICANT CHANGE UP (ref 300–520)
FIBRINOGEN PPP-MCNC: 854 MG/DL — CRITICAL HIGH (ref 300–520)
GAS PNL BLDA: SIGNIFICANT CHANGE UP
GLUCOSE BLDC GLUCOMTR-MCNC: 113 MG/DL — HIGH (ref 70–99)
GLUCOSE BLDC GLUCOMTR-MCNC: 215 MG/DL — HIGH (ref 70–99)
GLUCOSE SERPL-MCNC: 103 MG/DL — HIGH (ref 70–99)
GLUCOSE SERPL-MCNC: 123 MG/DL — HIGH (ref 70–99)
GLUCOSE SERPL-MCNC: 130 MG/DL — HIGH (ref 70–99)
GLUCOSE SERPL-MCNC: 203 MG/DL — HIGH (ref 70–99)
HCT VFR BLD CALC: 23.5 % — LOW (ref 39–50)
HCT VFR BLD CALC: 23.6 % — LOW (ref 39–50)
HCT VFR BLD CALC: 23.7 % — LOW (ref 39–50)
HCT VFR BLD CALC: 27 % — LOW (ref 39–50)
HCT VFR BLD CALC: 31.1 % — LOW (ref 39–50)
HGB BLD-MCNC: 10.8 G/DL — LOW (ref 13–17)
HGB BLD-MCNC: 7.2 G/DL — LOW (ref 13–17)
HGB BLD-MCNC: 7.9 G/DL — LOW (ref 13–17)
HGB BLD-MCNC: 8.1 G/DL — LOW (ref 13–17)
HGB BLD-MCNC: 9.2 G/DL — LOW (ref 13–17)
IMM GRANULOCYTES NFR BLD AUTO: 2.3 % — HIGH (ref 0–1.5)
INR BLD: 1.2 RATIO — HIGH (ref 0.88–1.16)
INR BLD: 1.24 RATIO — HIGH (ref 0.88–1.16)
INR BLD: 1.44 RATIO — HIGH (ref 0.88–1.16)
LYMPHOCYTES # BLD AUTO: 1.35 K/UL — SIGNIFICANT CHANGE UP (ref 1–3.3)
LYMPHOCYTES # BLD AUTO: 14.8 % — SIGNIFICANT CHANGE UP (ref 13–44)
MAGNESIUM SERPL-MCNC: 2.1 MG/DL — SIGNIFICANT CHANGE UP (ref 1.6–2.6)
MCHC RBC-ENTMCNC: 30.3 PG — SIGNIFICANT CHANGE UP (ref 27–34)
MCHC RBC-ENTMCNC: 31.2 PG — SIGNIFICANT CHANGE UP (ref 27–34)
MCHC RBC-ENTMCNC: 31.5 PG — SIGNIFICANT CHANGE UP (ref 27–34)
MCHC RBC-ENTMCNC: 31.5 PG — SIGNIFICANT CHANGE UP (ref 27–34)
MCHC RBC-ENTMCNC: 33.5 GM/DL — SIGNIFICANT CHANGE UP (ref 32–36)
MCHC RBC-ENTMCNC: 34.1 GM/DL — SIGNIFICANT CHANGE UP (ref 32–36)
MCHC RBC-ENTMCNC: 34.5 GM/DL — SIGNIFICANT CHANGE UP (ref 32–36)
MCHC RBC-ENTMCNC: 34.7 GM/DL — SIGNIFICANT CHANGE UP (ref 32–36)
MCV RBC AUTO: 90.4 FL — SIGNIFICANT CHANGE UP (ref 80–100)
MCV RBC AUTO: 90.7 FL — SIGNIFICANT CHANGE UP (ref 80–100)
MCV RBC AUTO: 91.4 FL — SIGNIFICANT CHANGE UP (ref 80–100)
MCV RBC AUTO: 91.5 FL — SIGNIFICANT CHANGE UP (ref 80–100)
MONOCYTES # BLD AUTO: 0.31 K/UL — SIGNIFICANT CHANGE UP (ref 0–0.9)
MONOCYTES NFR BLD AUTO: 3.4 % — SIGNIFICANT CHANGE UP (ref 2–14)
NEUTROPHILS # BLD AUTO: 7.06 K/UL — SIGNIFICANT CHANGE UP (ref 1.8–7.4)
NEUTROPHILS NFR BLD AUTO: 77.7 % — HIGH (ref 43–77)
PHOSPHATE SERPL-MCNC: 4.8 MG/DL — HIGH (ref 2.4–4.7)
PLATELET # BLD AUTO: 110 K/UL — LOW (ref 150–400)
PLATELET # BLD AUTO: 146 K/UL — LOW (ref 150–400)
PLATELET # BLD AUTO: 168 K/UL — SIGNIFICANT CHANGE UP (ref 150–400)
PLATELET # BLD AUTO: 91 K/UL — LOW (ref 150–400)
POTASSIUM SERPL-MCNC: 2.5 MMOL/L — CRITICAL LOW (ref 3.5–5.3)
POTASSIUM SERPL-MCNC: 3.5 MMOL/L — SIGNIFICANT CHANGE UP (ref 3.5–5.3)
POTASSIUM SERPL-MCNC: 3.8 MMOL/L — SIGNIFICANT CHANGE UP (ref 3.5–5.3)
POTASSIUM SERPL-MCNC: 4.7 MMOL/L — SIGNIFICANT CHANGE UP (ref 3.5–5.3)
POTASSIUM SERPL-SCNC: 2.5 MMOL/L — CRITICAL LOW (ref 3.5–5.3)
POTASSIUM SERPL-SCNC: 3.5 MMOL/L — SIGNIFICANT CHANGE UP (ref 3.5–5.3)
POTASSIUM SERPL-SCNC: 3.8 MMOL/L — SIGNIFICANT CHANGE UP (ref 3.5–5.3)
POTASSIUM SERPL-SCNC: 4.7 MMOL/L — SIGNIFICANT CHANGE UP (ref 3.5–5.3)
PROTHROM AB SERPL-ACNC: 13.6 SEC — HIGH (ref 10–12.9)
PROTHROM AB SERPL-ACNC: 14.1 SEC — HIGH (ref 10–12.9)
PROTHROM AB SERPL-ACNC: 16.4 SEC — HIGH (ref 10–12.9)
RBC # BLD: 2.57 M/UL — LOW (ref 4.2–5.8)
RBC # BLD: 2.61 M/UL — LOW (ref 4.2–5.8)
RBC # BLD: 2.95 M/UL — LOW (ref 4.2–5.8)
RBC # BLD: 3.43 M/UL — LOW (ref 4.2–5.8)
RBC # FLD: 13.8 % — SIGNIFICANT CHANGE UP (ref 10.3–14.5)
RBC # FLD: 14.3 % — SIGNIFICANT CHANGE UP (ref 10.3–14.5)
RBC # FLD: 14.6 % — HIGH (ref 10.3–14.5)
RBC # FLD: 15.8 % — HIGH (ref 10.3–14.5)
SODIUM SERPL-SCNC: 150 MMOL/L — HIGH (ref 135–145)
SODIUM SERPL-SCNC: 152 MMOL/L — HIGH (ref 135–145)
SODIUM SERPL-SCNC: 153 MMOL/L — HIGH (ref 135–145)
SODIUM SERPL-SCNC: 153 MMOL/L — HIGH (ref 135–145)
WBC # BLD: 11.26 K/UL — HIGH (ref 3.8–10.5)
WBC # BLD: 7.27 K/UL — SIGNIFICANT CHANGE UP (ref 3.8–10.5)
WBC # BLD: 9.07 K/UL — SIGNIFICANT CHANGE UP (ref 3.8–10.5)
WBC # BLD: 9.75 K/UL — SIGNIFICANT CHANGE UP (ref 3.8–10.5)
WBC # FLD AUTO: 11.26 K/UL — HIGH (ref 3.8–10.5)
WBC # FLD AUTO: 7.27 K/UL — SIGNIFICANT CHANGE UP (ref 3.8–10.5)
WBC # FLD AUTO: 9.07 K/UL — SIGNIFICANT CHANGE UP (ref 3.8–10.5)
WBC # FLD AUTO: 9.75 K/UL — SIGNIFICANT CHANGE UP (ref 3.8–10.5)

## 2020-06-01 PROCEDURE — 99223 1ST HOSP IP/OBS HIGH 75: CPT | Mod: 25

## 2020-06-01 PROCEDURE — 99291 CRITICAL CARE FIRST HOUR: CPT | Mod: 25

## 2020-06-01 PROCEDURE — 76937 US GUIDE VASCULAR ACCESS: CPT | Mod: 26

## 2020-06-01 PROCEDURE — 36556 INSERT NON-TUNNEL CV CATH: CPT

## 2020-06-01 PROCEDURE — 74174 CTA ABD&PLVS W/CONTRAST: CPT | Mod: 26

## 2020-06-01 PROCEDURE — 99291 CRITICAL CARE FIRST HOUR: CPT

## 2020-06-01 PROCEDURE — 99232 SBSQ HOSP IP/OBS MODERATE 35: CPT

## 2020-06-01 PROCEDURE — 93970 EXTREMITY STUDY: CPT | Mod: 26

## 2020-06-01 PROCEDURE — 99254 IP/OBS CNSLTJ NEW/EST MOD 60: CPT | Mod: GC

## 2020-06-01 PROCEDURE — 45334 SIGMOIDOSCOPY FOR BLEEDING: CPT

## 2020-06-01 PROCEDURE — 71045 X-RAY EXAM CHEST 1 VIEW: CPT | Mod: 26

## 2020-06-01 PROCEDURE — 99292 CRITICAL CARE ADDL 30 MIN: CPT | Mod: 25

## 2020-06-01 RX ORDER — POTASSIUM CHLORIDE 20 MEQ
10 PACKET (EA) ORAL
Refills: 0 | Status: DISCONTINUED | OUTPATIENT
Start: 2020-06-01 | End: 2020-06-01

## 2020-06-01 RX ORDER — POTASSIUM CHLORIDE 20 MEQ
10 PACKET (EA) ORAL
Refills: 0 | Status: COMPLETED | OUTPATIENT
Start: 2020-06-01 | End: 2020-06-01

## 2020-06-01 RX ORDER — CALCIUM GLUCONATE 100 MG/ML
2 VIAL (ML) INTRAVENOUS ONCE
Refills: 0 | Status: COMPLETED | OUTPATIENT
Start: 2020-06-01 | End: 2020-06-01

## 2020-06-01 RX ORDER — SODIUM CHLORIDE 9 MG/ML
10 INJECTION INTRAMUSCULAR; INTRAVENOUS; SUBCUTANEOUS
Refills: 0 | Status: DISCONTINUED | OUTPATIENT
Start: 2020-06-01 | End: 2020-06-06

## 2020-06-01 RX ORDER — CHLORHEXIDINE GLUCONATE 213 G/1000ML
1 SOLUTION TOPICAL
Refills: 0 | Status: DISCONTINUED | OUTPATIENT
Start: 2020-06-01 | End: 2020-06-14

## 2020-06-01 RX ORDER — NOREPINEPHRINE BITARTRATE/D5W 8 MG/250ML
0.05 PLASTIC BAG, INJECTION (ML) INTRAVENOUS
Qty: 8 | Refills: 0 | Status: DISCONTINUED | OUTPATIENT
Start: 2020-06-01 | End: 2020-06-01

## 2020-06-01 RX ORDER — PROTHROMBIN COMPLEX CONCENTRATE (HUMAN) 25.5; 16.5; 24; 22; 22; 26 [IU]/ML; [IU]/ML; [IU]/ML; [IU]/ML; [IU]/ML; [IU]/ML
500 POWDER, FOR SOLUTION INTRAVENOUS ONCE
Refills: 0 | Status: COMPLETED | OUTPATIENT
Start: 2020-06-01 | End: 2020-06-01

## 2020-06-01 RX ORDER — SODIUM CHLORIDE 9 MG/ML
1000 INJECTION, SOLUTION INTRAVENOUS
Refills: 0 | Status: DISCONTINUED | OUTPATIENT
Start: 2020-06-01 | End: 2020-06-08

## 2020-06-01 RX ADMIN — Medication 100 MILLIEQUIVALENT(S): at 14:30

## 2020-06-01 RX ADMIN — QUETIAPINE FUMARATE 50 MILLIGRAM(S): 200 TABLET, FILM COATED ORAL at 22:35

## 2020-06-01 RX ADMIN — Medication 100 MILLIEQUIVALENT(S): at 19:43

## 2020-06-01 RX ADMIN — Medication 5 MILLIGRAM(S): at 22:34

## 2020-06-01 RX ADMIN — Medication 100 MILLIEQUIVALENT(S): at 19:02

## 2020-06-01 RX ADMIN — CHLORHEXIDINE GLUCONATE 15 MILLILITER(S): 213 SOLUTION TOPICAL at 14:33

## 2020-06-01 RX ADMIN — Medication 200 GRAM(S): at 06:13

## 2020-06-01 RX ADMIN — MEROPENEM 100 MILLIGRAM(S): 1 INJECTION INTRAVENOUS at 05:58

## 2020-06-01 RX ADMIN — PROTHROMBIN COMPLEX CONCENTRATE (HUMAN) 400 INTERNATIONAL UNIT(S): 25.5; 16.5; 24; 22; 22; 26 POWDER, FOR SOLUTION INTRAVENOUS at 00:26

## 2020-06-01 RX ADMIN — Medication 100 MILLIEQUIVALENT(S): at 15:15

## 2020-06-01 RX ADMIN — Medication 100 MILLIEQUIVALENT(S): at 12:15

## 2020-06-01 RX ADMIN — SODIUM CHLORIDE 40 MILLILITER(S): 9 INJECTION, SOLUTION INTRAVENOUS at 13:00

## 2020-06-01 RX ADMIN — Medication 100 MILLIEQUIVALENT(S): at 22:04

## 2020-06-01 RX ADMIN — Medication 100 MILLIEQUIVALENT(S): at 13:16

## 2020-06-01 RX ADMIN — Medication 100 MILLIEQUIVALENT(S): at 16:15

## 2020-06-01 RX ADMIN — SODIUM CHLORIDE 50 MILLILITER(S): 9 INJECTION, SOLUTION INTRAVENOUS at 19:01

## 2020-06-01 RX ADMIN — MEROPENEM 100 MILLIGRAM(S): 1 INJECTION INTRAVENOUS at 12:30

## 2020-06-01 RX ADMIN — Medication 200 GRAM(S): at 14:15

## 2020-06-01 RX ADMIN — PROTHROMBIN COMPLEX CONCENTRATE (HUMAN) 500 INTERNATIONAL UNIT(S): 25.5; 16.5; 24; 22; 22; 26 POWDER, FOR SOLUTION INTRAVENOUS at 02:55

## 2020-06-01 RX ADMIN — Medication 100 MILLIEQUIVALENT(S): at 17:15

## 2020-06-01 RX ADMIN — MEROPENEM 100 MILLIGRAM(S): 1 INJECTION INTRAVENOUS at 22:34

## 2020-06-01 RX ADMIN — CHLORHEXIDINE GLUCONATE 1 APPLICATION(S): 213 SOLUTION TOPICAL at 09:44

## 2020-06-01 NOTE — CONSULT NOTE ADULT - PROBLEM SELECTOR RECOMMENDATION 9
apparent rectosigmoid source. Most likely rectal ulcer. Hemorrhoidal bleed or proctitis less likely. Rule out neoplasm but less likely as well. Will plan for flex sig at bedside with tap water enema prep early this AM. Give another 2 unit PRBC. Repeat BMP and H/H as well as PT. apparent rectosigmoid source. Most likely rectal ulcer related to dignicare balloon.  Hemorrhoidal bleed or proctitis less likely. Rule out neoplasm but less likely as well. Will plan for flex sig at bedside with tap water enema prep early this AM. Give another 2 unit PRBC. Repeat BMP and H/H as well as PT.

## 2020-06-01 NOTE — PROGRESS NOTE ADULT - SUBJECTIVE AND OBJECTIVE BOX
INTERVAL HPI/OVERNIGHT EVENTS/SUBJECTIVE: Pt began having copious amount of solitario blood per rectum continuously flowing just after midnight.  Nurse urgently notified me and I immediately ran to room to find massive amount of fresh blood in bed near rectal area, diaphoretic, very lethargic.  I called for emergent release of 2 units PRBC. And ordered labs. Pt HR was in 90s and SBP > 115 however he quickly began bleeding further and HR increased over 115 and SBP < 90.  I then called MTP and ordered 2 L Bolus as well as Kcentra and began preparing to gain central access.  I notified the attending on Call Dr Angelo.  The pt transiently responded quickly to volume. I notified colorectal resident for consult who quickly presented to bedside and performed anoscopy but could not visualize bleeding.  She asked me to call GI and I spoke with Dr Garcia who agreed with blood transfusion, reversal and asked for CTA if pt stable enough and he would see pt as consult but could not offer emergent colonoscopy, anoscopy or any other direct intervention. I placed Cordis to L IJ which was confirmed on CXR.  Pt more awake with transfusion and improvement of VS.  Denies pain, feeling cold, CP, Ab pain, Weakness or other CO.  I had Nurse YUNI Mills several hours earlier.    ICU Vital Signs Last 24 Hrs  T(C): 36.9 (31 May 2020 20:00), Max: 37.2 (31 May 2020 04:00)  T(F): 98.5 (31 May 2020 20:00), Max: 98.9 (31 May 2020 04:00)  HR: 100 (31 May 2020 23:25) (65 - 100)  BP: --  BP(mean): --  ABP: 111/54 (31 May 2020 22:00) (104/51 - 144/69)  ABP(mean): 72 (31 May 2020 22:00) (66 - 94)  RR: 30 (31 May 2020 22:00) (22 - 36)  SpO2: 96% (31 May 2020 23:25) (96% - 100%)      I&O's Detail    30 May 2020 07:01  -  31 May 2020 07:00  --------------------------------------------------------  IN:    Enteral Tube Flush: 50 mL    Free Water: 1800 mL    Pivot 1.5: 700 mL    sodium chloride 0.45%.: 1175 mL    Solution: 150 mL  Total IN: 3875 mL    OUT:    Emesis: 200 mL    Indwelling Catheter - Urethral: 1950 mL    Rectal Tube: 300 mL  Total OUT: 2450 mL    Total NET: 1425 mL      31 May 2020 07:01  -  01 Jun 2020 01:43  --------------------------------------------------------  IN:    Free Water: 1100 mL    ns in tub fed vital15: 120 mL    Packed Red Blood Cells: 275 mL    Pivot 1.5: 360 mL    sodium chloride 0.45%.: 600 mL    Solution: 499.8 mL  Total IN: 2954.8 mL    OUT:    Indwelling Catheter - Urethral: 885 mL    Rectal Tube: 650 mL  Total OUT: 1535 mL    Total NET: 1419.8 mL          Mode: AC/ CMV (Assist Control/ Continuous Mandatory Ventilation)  RR (machine): 30  TV (machine): 330  FiO2: 60  PEEP: 8  MAP: 15  PIP: 28    ABG - ( 31 May 2020 03:38 )  pH, Arterial: 7.44  pH, Blood: x     /  pCO2: 66    /  pO2: 99    / HCO3: 42    / Base Excess: 18.2  /  SaO2: 99                  MEDICATIONS  (STANDING):  chlorhexidine 0.12% Liquid 15 milliLiter(s) Oral Mucosa every 12 hours  chlorhexidine 2% Cloths 1 Application(s) Topical daily  chlorhexidine 4% Liquid 1 Application(s) Topical <User Schedule>  insulin lispro (HumaLOG) corrective regimen sliding scale   SubCutaneous every 6 hours  melatonin 5 milliGRAM(s) Oral at bedtime  meropenem  IVPB 1000 milliGRAM(s) IV Intermittent every 8 hours  multivitamin/minerals 1 Tablet(s) Oral daily  psyllium Powder 1 Packet(s) Oral two times a day  QUEtiapine 50 milliGRAM(s) Oral at bedtime  sodium chloride 0.45%. 1000 milliLiter(s) (50 mL/Hr) IV Continuous <Continuous>    MEDICATIONS  (PRN):  acetaminophen    Suspension .. 650 milliGRAM(s) Oral every 6 hours PRN Temp greater or equal to 38.5C (101.3F)  sodium chloride 0.9% lock flush 10 milliLiter(s) IV Push every 1 hour PRN Pre/post blood products, medications, blood draw, and to maintain line patency    Central Line: R IJ Cordis 6/1    A-LINE:    LOCATION:   L Rad       PEG:  Trach    PHYSICAL EXAM:     Gen: Moderately tachypneic, moderate diaphoresis. + Pallor. No cyanosis.    Eyes: PERRL ~ 3mm, EOMI,     Neurological: A&Ox3, GCS 15, No focal deficit.     Neck: Supple. NT AT, FROM no pain.  No JVD. No meningeal signs    Pulmonary: Mild tachypnea, CTA, = BL .      Cardiovascular: Tachycardia. S1, S2, No Murmurs, rubs or gallops noted.    Gastrointestinal: ND, Soft, NT. PEG Site CDI    Extremities: NT, AT, no edema, erythema or palpable cord noted.  FROM, = 2+ pulses throughout. Cap Refill ~ 3 sec      LABS:  CBC Full  -  ( 01 Jun 2020 00:13 )  WBC Count : x  RBC Count : x  Hemoglobin : 7.2 g/dL  Hematocrit : 23.7 %  Platelet Count - Automated : x  Mean Cell Volume : x  Mean Cell Hemoglobin : x  Mean Cell Hemoglobin Concentration : x  Auto Neutrophil # : x  Auto Lymphocyte # : x  Auto Monocyte # : x  Auto Eosinophil # : x  Auto Basophil # : x  Auto Neutrophil % : x  Auto Lymphocyte % : x  Auto Monocyte % : x  Auto Eosinophil % : x  Auto Basophil % : x    05-31    156<H>  |  108<H>  |  64.0<H>  ----------------------------<  169<H>  3.5   |  41.0<H>  |  0.74    Ca    8.7      31 May 2020 03:37  Phos  2.0     05-31  Mg     2.5     05-31      PT/INR - ( 01 Jun 2020 00:12 )   PT: 14.1 sec;   INR: 1.24 ratio             RECENT CULTURES:  05-29 .Blood Blood XXXX XXXX   No growth at 48 hours    05-25 .Sputum Sputum Acinetobacter baumannii   Few polymorphonuclear leukocytes per low power field  No Squamous epithelial cells per low power field  Few Gram Negative Diplococci per oil power field   **Please Note**: This is a Corrected Report**  Numerous Acinetobacter baumannii  / nosocomialis group  Previously reported as:  Numerous Acinetobacter baumannii (Carbapenem Resistant)  / nosocomialis  group  Normal Respiratory Christine present    05-25 .Blood Blood XXXX XXXX   No growth at 5 days.              CAPILLARY BLOOD GLUCOSE      RADIOLOGY & ADDITIONAL STUDIES:    ASSESSMENT/PLAN:  64yMale presenting with: COVID-19, acute hypoxic resp failure.  Prolonged intubation.  R Segmental PE. PNA    Neurological: Hold sedation    Neck: Keep Cordis site clean    Pulmonary: Keep on Full vent AC.      Cardiovascular: Continue Blood product transfusion in matched ratio to maintain MAPs >65, SBP >90 and good mentation.      Gastrointestinal: NPO.  PEG was aspirated and flushed and no blood was found.  If pt becomes more stable, would attempt CTA.  May need to call IR if can not get stable for CTA.    Genitourinary: Keep Escalera for I/O    Heme: Kcentra STAT to be administered.  Hospital Policy :PHT.454 DATED 1/6/2020 would not recommend Andexanet Alpha for GI Bleed.  Therefore we will not prescribe it.  Would hold on TXA for risk of active PE.  May reconsider giving if pt continues to be unstable.  Will DC Apixaban    ID: [email or web address]    Lines/ Tubes: As above.    Dispo: Pt in life threatening hemorrhagic shock from massive GI Bleed.  Possibly related to Dignicare and Apixaban.      CRITICAL CARE TIME SPENT: Critical care time spent: 60 minutes of critical care time spent providing medical care for patient's acute illness/conditions that impairs at least one vital organ system and/or poses a high risk of imminent or life threatening deterioration in the patient's condition. It includes time spent evaluating and treating the patient's acute illness as well as time spent reviewing labs, radiology, discussing goals of care with patient and/or patient's family, and discussing the case with a multidisciplinary team in an effort to prevent further life threatening deterioration or end organ damage. This time is independent of any procedures performed.

## 2020-06-01 NOTE — PROCEDURE NOTE - NSPOSTPRCRAD_GEN_A_CORE
central line located in the
post-procedure radiography performed/central line located in the superior vena cava/no pneumothorax

## 2020-06-01 NOTE — PROCEDURE NOTE - NSINDICATIONS_GEN_A_CORE
emergency venous access/volume resuscitation/critical illness
arterial puncture to obtain ABG's/monitoring purposes
venous access/hemodynamic monitoring
respiratory failure/respiratory distress

## 2020-06-01 NOTE — PROGRESS NOTE ADULT - ASSESSMENT
IMPRESSION:  - COVID 19 with prolonged respiratory failure s/p Trach; PEG;  - Hx of PEs on CTA ( segmental on 4/21) on Eliquis - off 2/2 Rectal bleed  + Acenitobacter PNA  + 6/1 - Rectal bleed 2/2 ulcer s/p proctoscopy with clipping; s/p MTP 13 Units PRBC; 10 FFP; Kcentra; Platelets x 2;   in Shock on Levophed  - Hypentratremia    PLAN:    Neuro - despite events of today and pressors; doing well; on Seroquel PO; Melatonin; in light of Lower GI bleed will ask GI if ok to give meds PEG; If becomes agitated --> will place on Precedex gtt    CV - Hypovolemic shock 2/2 Recal bleed; s/p clips by GI; s/p MTP; PRBC 13/PLts x2/FFP 10/KCentra;  repeat CBC/INR/Fibrinogen;  check serial lactates; wean of Levophed as able ( already better);  Echo 5/3 - normal LVEF; grade 1 diast dysfunction;  A line in place;    GI - s/p rectal bleed which is now controlled; GI help greatly appreciated;  NPO; c/w PPI for prophylaxis only;    Pulm - on AC 60%/8; ABG noted on 100%;  CXR with trach in place; b/l infiltrates noted;]  Would hold weaning today;  Hx of PE - unable to be on A/C at this time; noted LE doppler negative;  will place compression stockings; will d/w IR re IVC filter as cannot be anticoagulated;    Hemo - s/p PRBC/Plt/FFP for gi bleed; check h/h q4 x 12 hours; rpt INR;  h/h 8/23  INR 1.4    ID - Afebrile; WBC stable; + Acinetobacter in sputum - Merrem till 6/4;    DVT prophylaxis - in light of GI bleed; compression only;     Renal - cr stable at 0.3;   Ca++ 5.2 - s/p 2 grams given; K 2.5 - 10 meq x 6 given;     Endo - glu goal < 180;     Code status - full  Will update family  Lines - [facility] cordis 6/1    Case was d/w night CCM team; IMPRESSION:  - COVID 19 with prolonged respiratory failure s/p Trach; PEG;  - Hx of PEs on CTA ( segmental on 4/21) on Eliquis - off 2/2 Rectal bleed  + Acenitobacter PNA  + 6/1 - Rectal bleed 2/2 ulcer s/p proctoscopy with clipping; s/p MTP 13 Units PRBC; 10 FFP; Kcentra; Platelets x 2;   in Shock on Levophed  - Hypentratremia    PLAN:    Neuro - despite events of today and pressors; doing well; on Seroquel PO; Melatonin; in light of Lower GI bleed will ask GI if ok to give meds PEG; If becomes agitated --> will place on Precedex gtt    CV - Hypovolemic shock 2/2 Recal bleed; s/p clips by GI; s/p MTP; PRBC 13/PLts x2/FFP 10/KCentra;  repeat CBC/INR/Fibrinogen;  check serial lactates; wean of Levophed as able ( already better);  Echo 5/3 - normal LVEF; grade 1 diast dysfunction;  A line in place;    GI - s/p rectal bleed which is now controlled; GI help greatly appreciated;  NPO; c/w PPI for prophylaxis only;    Pulm - on AC 60%/8; ABG noted on 100%;  CXR with trach in place; b/l infiltrates noted;]  Would hold weaning today;  Hx of PE - unable to be on A/C at this time; noted LE doppler negative;  will place compression stockings; will d/w IR re IVC filter as cannot be anticoagulated;  Spoke with vascular PA re need for IVC filter;    Hemo - s/p PRBC/Plt/FFP for gi bleed; check h/h q4 x 12 hours; rpt INR;  h/h 8/23  INR 1.4    ID - Afebrile; WBC stable; + Acinetobacter in sputum - Merrem till 6/4;    DVT prophylaxis - in light of GI bleed; compression only;     Renal - cr stable at 0.3;   K 2.5 --> repleted;  Ca++ 5.2 - s/p 2 grams given; K 2.5 - 10 meq x 6 given;   Na 150 - will change IVF to D5 at 50 cc/hr; rpt BMP q8hs; goal 10-12 meq/24 hrs;    Endo - glu goal < 180;     Code status - full  updated family Yovani Sena [phone]  Lines - [facility] cordis 6/1    Case was d/w night CCM team;    Addendum   was bradycardic while K was given which now resolved; will slow rate on KCl; IMPRESSION:  - COVID 19 with prolonged respiratory failure s/p Trach; PEG;  - Hx of PEs on CTA ( segmental on 4/21) on Eliquis - off 2/2 Rectal bleed  + Acenitobacter PNA  + 6/1 - Rectal bleed 2/2 ulcer s/p proctoscopy with clipping; s/p MTP 13 Units PRBC; 10 FFP; Kcentra; Platelets x 2;   in Shock on Levophed  - Hypentratremia    PLAN:    Neuro - despite events of today and pressors; doing well; on Seroquel PO; Melatonin; in light of Lower GI bleed will ask GI if ok to give meds PEG; If becomes agitated --> will place on Precedex gtt    CV - Hypovolemic shock 2/2 Recal bleed; s/p clips by GI; s/p MTP; PRBC 13/PLts x2/FFP 10/KCentra;  repeat CBC/INR/Fibrinogen;  check serial lactates; wean of Levophed as able ( already better);  Echo 5/3 - normal LVEF; grade 1 diast dysfunction;  A line in place;    GI - s/p rectal bleed which is now controlled; GI help greatly appreciated;  NPO; c/w PPI for prophylaxis only;    Pulm - on AC 60%/8; ABG noted on 100%;  CXR with trach in place; b/l infiltrates noted;]  Would hold weaning today;  Hx of PE - unable to be on A/C at this time; noted LE doppler negative;  will place compression stockings; will d/w IR re IVC filter as cannot be anticoagulated;  Spoke with vascular PA re need for IVC filter;    Hemo - s/p PRBC/Plt/FFP for gi bleed; check h/h q4 x 12 hours; rpt INR;  h/h 8/23  INR 1.4    ID - Afebrile; WBC stable; + Acinetobacter in sputum - Merrem till 6/4;    DVT prophylaxis - in light of GI bleed; compression only;     Renal - cr stable at 0.3;   K 2.5 --> repleted;  Ca++ 5.2 - s/p 2 grams given; K 2.5 - 10 meq x 6 given;   Na 150 - will change IVF to D5 at 50 cc/hr; rpt BMP q8hs; goal 10-12 meq/24 hrs; c/w free water boluses;    Endo - glu goal < 180;     Code status - full  updated family's friend ( emerg contact) Yovani Sena [phone]  [place] - [facility] cordis 6/1    Case was d/w night CCM team;    Addendum   was bradycardic while K was given which now resolved; will slow rate on KCl;

## 2020-06-01 NOTE — CONSULT NOTE ADULT - SUBJECTIVE AND OBJECTIVE BOX
Patient is a 64y old  Male who presents with a chief complaint of shortness of breath (01 Jun 2020 01:57)      HPI:  65 yo M w/ no reported PMHx presented to ER on 4/18 for worsening shortness of breath, subjective fevers and dry cough for 10 days. poor po intake.  did not take any meds at home. no sick contacts, travel. In ER he tachycardia/hypoxic with leukocytosis, ARF and evidence of dehydration which initially improved improved with NRB mask. He was diagnosed with Covid 19 pneumonia. On May 1 his respiratory status worsened and he required intubation. Since that time hospital course complicated by segmental pulmonary embolus for which he was treated with hepatin and eliquis. Also with superimposed acenetobacter pneumonia. He remains on vent and has been trached and on May 26 surgical team performed a PEG with no gastric pathology noted.      REVIEW OF SYSTEMS:    CONSTITUTIONAL: No fever, weight loss, or fatigue  EYES: No eye pain, visual disturbances, or discharge  ENMT:  No difficulty hearing, tinnitus, vertigo; No sinus or throat pain  NECK: No pain or stiffness  RESPIRATORY: No cough, wheezing, chills or hemoptysis; No shortness of breath  CARDIOVASCULAR: No chest pain, palpitations, dizziness, or leg swelling  GASTROINTESTINAL: No abdominal or epigastric pain. No nausea, vomiting, or hematemesis; No diarrhea or constipation. No melena or hematochezia.  NEUROLOGICAL: No headaches, memory loss, loss of strength, numbness, or tremors  SKIN: No itching, burning, rashes, or lesions   LYMPH NODES: No enlarged glands  MUSCULOSKELETAL: No joint pain or swelling; No muscle, back, or extremity pain  PSYCHIATRIC: No depression, anxiety, mood swings, or difficulty sleeping  HEME/LYMPH: No easy bruising, or bleeding gums  ALLERY AND IMMUNOLOGIC: No hives or eczema      PAST MEDICAL & SURGICAL HISTORY:  No pertinent past medical history  No significant past surgical history      FAMILY HISTORY:  FH: hypertension      SOCIAL HISTORY:  Smoking Status: [ ] Current, [ ] Former, [ ] Never  Pack Years:  Alcohol Use:    Home Medications:      MEDICATIONS:  MEDICATIONS  (STANDING):  calcium gluconate IVPB 2 Gram(s) IV Intermittent once  chlorhexidine 0.12% Liquid 15 milliLiter(s) Oral Mucosa every 12 hours  chlorhexidine 2% Cloths 1 Application(s) Topical daily  chlorhexidine 4% Liquid 1 Application(s) Topical <User Schedule>  insulin lispro (HumaLOG) corrective regimen sliding scale   SubCutaneous every 6 hours  melatonin 5 milliGRAM(s) Oral at bedtime  meropenem  IVPB 1000 milliGRAM(s) IV Intermittent every 8 hours  multivitamin/minerals 1 Tablet(s) Oral daily  norepinephrine Infusion 0.05 MICROgram(s)/kG/Min (6.59 mL/Hr) IV Continuous <Continuous>  psyllium Powder 1 Packet(s) Oral two times a day  QUEtiapine 50 milliGRAM(s) Oral at bedtime  sodium chloride 0.45%. 1000 milliLiter(s) (50 mL/Hr) IV Continuous <Continuous>    MEDICATIONS  (PRN):  acetaminophen    Suspension .. 650 milliGRAM(s) Oral every 6 hours PRN Temp greater or equal to 38.5C (101.3F)  sodium chloride 0.9% lock flush 10 milliLiter(s) IV Push every 1 hour PRN Pre/post blood products, medications, blood draw, and to maintain line patency      Allergies    No Known Allergies    Intolerances        Vital Signs Last 24 Hrs  T(C): 36.9 (31 May 2020 20:00), Max: 37.1 (31 May 2020 12:00)  T(F): 98.5 (31 May 2020 20:00), Max: 98.8 (31 May 2020 12:00)  HR: 100 (31 May 2020 23:25) (65 - 100)  BP: --  BP(mean): --  RR: 30 (31 May 2020 22:00) (22 - 36)  SpO2: 96% (31 May 2020 23:25) (96% - 100%)    05-30 @ 07:01  -  05-31 @ 07:00  --------------------------------------------------------  IN: 3875 mL / OUT: 2450 mL / NET: 1425 mL    05-31 @ 07:01  -  06-01 @ 05:10  --------------------------------------------------------  IN: 2954.8 mL / OUT: 1535 mL / NET: 1419.8 mL          PHYSICAL EXAM:    General: Well developed; well nourished; in no acute distress  HEENT: MMM, conjunctiva and sclera clear  Lungs: Clear  Heart: Rhythm Regular, No Murmurs  Gastrointestinal: Soft, non-tender non-distended; Normal bowel sounds; No rebound or guarding; No Organomegaly & No Masses  Extremities: Normal range of motion, No clubbing, cyanosis or edema  Neurological: Alert and oriented x3, Non-focal  Skin: Warm and dry. No obvious rash  Rectal: No Masses      LABS:                        7.9    9.07  )-----------( 168      ( 01 Jun 2020 03:20 )             23.6     05-31    156<H>  |  108<H>  |  64.0<H>  ----------------------------<  169<H>  3.5   |  41.0<H>  |  0.74    Ca    8.7      31 May 2020 03:37  Phos  2.0     05-31  Mg     2.5     05-31            RADIOLOGY & ADDITIONAL STUDIES: Patient is a 64y old  Male who presents with a chief complaint of shortness of breath (01 Jun 2020 01:57)      HPI:  65 yo M w/ no reported PMHx presented to ER on 4/18 for worsening shortness of breath, subjective fevers and dry cough for 10 days. poor po intake.  did not take any meds at home. no sick contacts, travel. In ER he tachycardia/hypoxic with leukocytosis, ARF and evidence of dehydration which initially improved improved with NRB mask. He was diagnosed with Covid 19 pneumonia. On May 1 his respiratory status worsened and he required intubation. Since that time hospital course complicated by segmental pulmonary embolus for which he was treated with hepatin and eliquis. Also with superimposed acenetobacter pneumonia. He remains on vent and has been trached and on May 26 surgical team performed a PEG with no gastric pathology noted. Last night was noted to have developed rectal bleeding with drop in BP. His hemoglobins most recently between 7 and 8 but at 3:30 AM yesterday was down to 6.7. Soon after midnite his hemoglobin was 7.2 and after 4 units PRBC is 7.9 at 3:20 this AM He has gotten Kcentra, 2 unit FFP and 4 units blood since heavy bleeding began. CTA just performed shows bleeding in rectum. Anoscopy by surgical resident unsuccessful at finding source. He is no off the eliquis and hepatin-just stopped around midnight.      REVIEW OF SYSTEMS: unable to obtain as patient on vent.        PAST MEDICAL & SURGICAL HISTORY:  No pertinent past medical history  No significant past surgical history      FAMILY HISTORY:  FH: hypertension      SOCIAL HISTORY:  Smoking Status: [ ] Current, [ ] Former, [ ] Never  Pack Years:  Alcohol Use:    Home Medications:      MEDICATIONS:  MEDICATIONS  (STANDING):  calcium gluconate IVPB 2 Gram(s) IV Intermittent once  chlorhexidine 0.12% Liquid 15 milliLiter(s) Oral Mucosa every 12 hours  chlorhexidine 2% Cloths 1 Application(s) Topical daily  chlorhexidine 4% Liquid 1 Application(s) Topical <User Schedule>  insulin lispro (HumaLOG) corrective regimen sliding scale   SubCutaneous every 6 hours  melatonin 5 milliGRAM(s) Oral at bedtime  meropenem  IVPB 1000 milliGRAM(s) IV Intermittent every 8 hours  multivitamin/minerals 1 Tablet(s) Oral daily  norepinephrine Infusion 0.05 MICROgram(s)/kG/Min (6.59 mL/Hr) IV Continuous <Continuous>  psyllium Powder 1 Packet(s) Oral two times a day  QUEtiapine 50 milliGRAM(s) Oral at bedtime  sodium chloride 0.45%. 1000 milliLiter(s) (50 mL/Hr) IV Continuous <Continuous>    MEDICATIONS  (PRN):  acetaminophen    Suspension .. 650 milliGRAM(s) Oral every 6 hours PRN Temp greater or equal to 38.5C (101.3F)  sodium chloride 0.9% lock flush 10 milliLiter(s) IV Push every 1 hour PRN Pre/post blood products, medications, blood draw, and to maintain line patency      Allergies    No Known Allergies    Intolerances        Vital Signs Last 24 Hrs  T(C): 36.9 (31 May 2020 20:00), Max: 37.1 (31 May 2020 12:00)  T(F): 98.5 (31 May 2020 20:00), Max: 98.8 (31 May 2020 12:00)  HR: 100 (31 May 2020 23:25) (65 - 100)  BP: --  BP(mean): --  RR: 30 (31 May 2020 22:00) (22 - 36)  SpO2: 96% (31 May 2020 23:25) (96% - 100%)    05-30 @ 07:01  -  05-31 @ 07:00  --------------------------------------------------------  IN: 3875 mL / OUT: 2450 mL / NET: 1425 mL    05-31 @ 07:01  -  06-01 @ 05:10  --------------------------------------------------------  IN: 2954.8 mL / OUT: 1535 mL / NET: 1419.8 mL          PHYSICAL EXAM:    General: Well developed; well nourished; in no acute distress  HEENT: MMM, conjunctiva and sclera clear  Lungs: Clear  Heart: Rhythm Regular, No Murmurs  Gastrointestinal: Soft, non-tender non-distended; Normal bowel sounds; No rebound or guarding; No Organomegaly & No Masses  Extremities: Normal range of motion, No clubbing, cyanosis or edema  Neurological: Alert and oriented x3, Non-focal  Skin: Warm and dry. No obvious rash  Rectal: No Masses      LABS:                        7.9    9.07  )-----------( 168      ( 01 Jun 2020 03:20 )             23.6     05-31    156<H>  |  108<H>  |  64.0<H>  ----------------------------<  169<H>  3.5   |  41.0<H>  |  0.74    Ca    8.7      31 May 2020 03:37  Phos  2.0     05-31  Mg     2.5     05-31            RADIOLOGY & ADDITIONAL STUDIES:    < from: CT Angio Abdomen and Pelvis w/ IV Cont (06.01.20 @ 03:43) >  INTERPRETATION:  Abdominal/Pelvic CT    6/1/2020 3:46 AM    Indication: Rectal bleeding    Technique: Axial images were obtained prior to and during IVcontrast from the lung bases through pubic symphysis according to the GI bleeding protocol.  95 cc of Omnipaque 350 was administered intravenously without complication.  Reformatted coronal and sagittal images are submitted.    Comparison: None    FINDINGS:    LUNG BASES:  There are trace bilateral pleural effusions with associated subsegmental atelectasis.  PERITONEUM:  There is no free air or focal collection.  Trace amount of free fluid.  LIVER: Normal.  SPLEEN: Normal.  GALLBLADDER: Contracted with gallstones.  BILIARY TREE: Unremarkable.  PANCREAS: Normal.  ADRENAL GLANDS: Normal.  KIDNEYS: Mild right hydronephrosis without obstructing stone.  .  BOWEL: Gastrostomy tube in place. The stomach is incompletely distended. Mildly dilated loops of small bowel. There is no small bowel obstruction or diverticulitis. The appendix is unremarkable. There is an appendicolith at the cecal base. Diverticulosis. Mild thickening of the sigmoid colon and rectum. There is intraluminal accumulation of contrast in the rectum compatible with a focus of active GI bleeding.    URINARY BLADDER: Decompressed by Escalera catheter.  PELVIC ORGANS: The prostate gland is not enlarged.    There is no significant adenopathy.  VASCULATURE: Unremarkable.  RETROPERITONEUM:  There is no mass.  BONES: Unremarkable.  ABDOMINAL WALL: Unremarkable.    IMPRESSION:    Intraluminal accumulation of contrast in the rectum indicative of active gastrointestinal bleeding. Mild colitis involving sigmoid colon and rectum.  Findings discussed with DAVID Durham at  4 am on 6/1/2020 with [facility].    Gallstones.  Mild right hydronephrosis.                TANA CARVALHO M.D, ATTENDING RADIOLOGIST  This document has been electronically signed. Jun 1 2020  4:01AM                  < end of copied text > Patient is a 64y old  Male who presents with a chief complaint of shortness of breath (01 Jun 2020 01:57)      HPI:  63 yo M w/ no reported PMHx presented to ER on 4/18 for worsening shortness of breath, subjective fevers and dry cough for 10 days. poor po intake.  did not take any meds at home. no sick contacts, travel. In ER he tachycardia/hypoxic with leukocytosis, ARF and evidence of dehydration which initially improved improved with NRB mask. He was diagnosed with Covid 19 pneumonia. On May 1 his respiratory status worsened and he required intubation. Since that time hospital course complicated by segmental pulmonary embolus for which he was treated with hepatin and eliquis. Also with superimposed acenetobacter pneumonia. He remains on vent and has been trached and on May 26 surgical team performed a PEG with no gastric pathology noted. Last night was noted to have developed rectal bleeding with drop in BP. Gastric lavage thru G tube was negative for blood. His hemoglobins most recently between 7 and 8 but at 3:30 AM yesterday was down to 6.7. Soon after midnite his hemoglobin was 7.2 and after 7 units PRBC is 7.9 at 3:20 this AM He has gotten Kcentra, 2 unit FFP and 4 units blood since heavy bleeding began. CTA just performed shows bleeding in rectum. Anoscopy by surgical resident unsuccessful at finding source. He is no off the eliquis and hepatin-just stopped around midnight. He had a dignicare in place and there was noted to be some blood in it yesterday so removed last night. Currently on levo. Sodium 156. BP 90/54, pulse 100.      REVIEW OF SYSTEMS: unable to obtain as patient on vent.        PAST MEDICAL & SURGICAL HISTORY:  No pertinent past medical history  No significant past surgical history      FAMILY HISTORY:  FH: hypertension      SOCIAL HISTORY:  Smoking Status: [ ] Current, [ ] Former, [ ] Never  Pack Years:  Alcohol Use:    Home Medications:      MEDICATIONS:  MEDICATIONS  (STANDING):  calcium gluconate IVPB 2 Gram(s) IV Intermittent once  chlorhexidine 0.12% Liquid 15 milliLiter(s) Oral Mucosa every 12 hours  chlorhexidine 2% Cloths 1 Application(s) Topical daily  chlorhexidine 4% Liquid 1 Application(s) Topical <User Schedule>  insulin lispro (HumaLOG) corrective regimen sliding scale   SubCutaneous every 6 hours  melatonin 5 milliGRAM(s) Oral at bedtime  meropenem  IVPB 1000 milliGRAM(s) IV Intermittent every 8 hours  multivitamin/minerals 1 Tablet(s) Oral daily  norepinephrine Infusion 0.05 MICROgram(s)/kG/Min (6.59 mL/Hr) IV Continuous <Continuous>  psyllium Powder 1 Packet(s) Oral two times a day  QUEtiapine 50 milliGRAM(s) Oral at bedtime  sodium chloride 0.45%. 1000 milliLiter(s) (50 mL/Hr) IV Continuous <Continuous>    MEDICATIONS  (PRN):  acetaminophen    Suspension .. 650 milliGRAM(s) Oral every 6 hours PRN Temp greater or equal to 38.5C (101.3F)  sodium chloride 0.9% lock flush 10 milliLiter(s) IV Push every 1 hour PRN Pre/post blood products, medications, blood draw, and to maintain line patency      Allergies    No Known Allergies    Intolerances        Vital Signs Last 24 Hrs  T(C): 36.9 (31 May 2020 20:00), Max: 37.1 (31 May 2020 12:00)  T(F): 98.5 (31 May 2020 20:00), Max: 98.8 (31 May 2020 12:00)  HR: 100 (31 May 2020 23:25) (65 - 100)  BP: --  BP(mean): --  RR: 30 (31 May 2020 22:00) (22 - 36)  SpO2: 96% (31 May 2020 23:25) (96% - 100%)    05-30 @ 07:01  -  05-31 @ 07:00  --------------------------------------------------------  IN: 3875 mL / OUT: 2450 mL / NET: 1425 mL    05-31 @ 07:01  -  06-01 @ 05:10  --------------------------------------------------------  IN: 2954.8 mL / OUT: 1535 mL / NET: 1419.8 mL          PHYSICAL EXAM:    General: overweight male, minimally responsive but in no acute distress  HEENT: MMM, conjunctiva and sclera clear, trach present  Lungs: Clear  Heart: Rhythm Regular, No Murmurs  Gastrointestinal: Soft, non-tender non-distended; Normal bowel sounds; No rebound or guarding; No Organomegaly & No Masses, alexander and G tube present. present  Extremities:  No clubbing, cyanosis or edema  Neurological: on vent  and minimally responsive  Skin: Warm and dry. No obvious rash  Rectal: No Masses, red blood present      LABS:                        7.9    9.07  )-----------( 168      ( 01 Jun 2020 03:20 )             23.6     05-31    156<H>  |  108<H>  |  64.0<H>  ----------------------------<  169<H>  3.5   |  41.0<H>  |  0.74    Ca    8.7      31 May 2020 03:37  Phos  2.0     05-31  Mg     2.5     05-31            RADIOLOGY & ADDITIONAL STUDIES:    < from: CT Angio Abdomen and Pelvis w/ IV Cont (06.01.20 @ 03:43) >  INTERPRETATION:  Abdominal/Pelvic CT    6/1/2020 3:46 AM    Indication: Rectal bleeding    Technique: Axial images were obtained prior to and during IVcontrast from the lung bases through pubic symphysis according to the GI bleeding protocol.  95 cc of Omnipaque 350 was administered intravenously without complication.  Reformatted coronal and sagittal images are submitted.    Comparison: None    FINDINGS:    LUNG BASES:  There are trace bilateral pleural effusions with associated subsegmental atelectasis.  PERITONEUM:  There is no free air or focal collection.  Trace amount of free fluid.  LIVER: Normal.  SPLEEN: Normal.  GALLBLADDER: Contracted with gallstones.  BILIARY TREE: Unremarkable.  PANCREAS: Normal.  ADRENAL GLANDS: Normal.  KIDNEYS: Mild right hydronephrosis without obstructing stone.  .  BOWEL: Gastrostomy tube in place. The stomach is incompletely distended. Mildly dilated loops of small bowel. There is no small bowel obstruction or diverticulitis. The appendix is unremarkable. There is an appendicolith at the cecal base. Diverticulosis. Mild thickening of the sigmoid colon and rectum. There is intraluminal accumulation of contrast in the rectum compatible with a focus of active GI bleeding.    URINARY BLADDER: Decompressed by Alexander catheter.  PELVIC ORGANS: The prostate gland is not enlarged.    There is no significant adenopathy.  VASCULATURE: Unremarkable.  RETROPERITONEUM:  There is no mass.  BONES: Unremarkable.  ABDOMINAL WALL: Unremarkable.    IMPRESSION:    Intraluminal accumulation of contrast in the rectum indicative of active gastrointestinal bleeding. Mild colitis involving sigmoid colon and rectum.  Findings discussed with DAVID Durham at  4 am on 6/1/2020 with RBV.    Gallstones.  Mild right hydronephrosis.                TANA CARVALHO M.D, ATTENDING RADIOLOGIST  This document has been electronically signed. Jun 1 2020  4:01AM                  < end of copied text >

## 2020-06-01 NOTE — CHART NOTE - NSCHARTNOTEFT_GEN_A_CORE
Pt is unstable and requires emergent CTA of abdomen with IV contrast to find source of bleeding so that we can control it.  Dr Angelo agrees that this is emergent.

## 2020-06-01 NOTE — CHART NOTE - NSCHARTNOTEFT_GEN_A_CORE
Pt continues to have massive BRBPR.  BP very labile.  When transfusion stops, SBP goes to 60s.  Had 5 PRBC and Hgb only went from 7.2 to 7.9.   I have started low dose levo in order to temporize MAPs with goal to turn it off by giving additional blood products. I was able to get pt down to CTA during a time of SBP >90s consistently.    The CTA Showed active bleed in rectum.  He has now received 8 RBC, 6 FFP, 2Platelets.  I have notified Colorectal and they asked me to call GI to see if there would be any intervention prior to OR.  GI Dr Garcia states he will now come in and attempt hemostasis.  Dr Angelo has been notified of this as well as Colorectal resident.    Critical care time IN ADDITITION to previous note of same day spent: 60 minutes of critical care time spent providing medical care for patient's acute illness/conditions that impairs at least one vital organ system and/or poses a high risk of imminent or life threatening deterioration in the patient's condition. It includes time spent evaluating and treating the patient's acute illness as well as time spent reviewing labs, radiology, discussing goals of care with patient and/or patient's family, and discussing the case with a multidisciplinary team in an effort to prevent further life threatening deterioration or end organ damage. This time is independent of any procedures performed. Pt continues to have massive BRBPR.  BP very labile.  When transfusion stops, SBP goes to 60s.  Had 5 PRBC and Hgb only went from 7.2 to 7.9.   I have started low dose levo in order to temporize MAPs with goal to turn it off by giving additional blood products. I was able to get pt down to CTA during a time of SBP >90s consistently.    The CTA Showed active bleed in rectum.  He has now received 8 RBC, 6 FFP, 2Platelets.  I have notified Colorectal and they asked me to call GI to see if there would be any intervention prior to OR.  GI Dr Garcia states he will now come in and attempt hemostasis.  Dr Angelo has been notified of this as well as Colorectal resident.  I will call for 3rd MTP and continue to transfuse to keep MAPs >65, SBP > 90 and good mentation.    I will send repeat labs now.  I will call his decision maker to notify him of the severity of the situation.    Critical care time IN ADDITITION to previous note of same day spent: 60 minutes of critical care time spent providing medical care for patient's acute illness/conditions that impairs at least one vital organ system and/or poses a high risk of imminent or life threatening deterioration in the patient's condition. It includes time spent evaluating and treating the patient's acute illness as well as time spent reviewing labs, radiology, discussing goals of care with patient and/or patient's family, and discussing the case with a multidisciplinary team in an effort to prevent further life threatening deterioration or end organ damage. This time is independent of any procedures performed.

## 2020-06-01 NOTE — CONSULT NOTE ADULT - SUBJECTIVE AND OBJECTIVE BOX
Colorectal HPI: Admitted with COVID + PNA 4/18/2020 with long postoperative course following notable for Tracheostomy/PEG creation and ventilator dependence.  With dignacare stool collecting device in place for 3 days, RN then noted dark blood in bag, dignacare removed and copious bright red blood was evacuated from rectum.  Hemodynamically unstable, though responsive to blood transfusions.    Medicine HPI:  65 yo M w/ no reported PMHx presents to ER for worsening shortness of breath, subjective fevers and dry cough for 10 days. poor po intake.  did not take any meds at home. no sick contacts, travel.    in ER, tachycardia/hypoxic with leukocytosis, ARF and evidence of dehydration. improved with NRB mask. (18 Apr 2020 11:33)      PAST MEDICAL & SURGICAL HISTORY:  No pertinent past medical history  No significant past surgical history  No known hx of colorectal diagnoses including hemorrhoids, no known lifetime colonoscopy hx      MEDICATIONS  (STANDING):  chlorhexidine 0.12% Liquid 15 milliLiter(s) Oral Mucosa every 12 hours  chlorhexidine 2% Cloths 1 Application(s) Topical daily  chlorhexidine 4% Liquid 1 Application(s) Topical <User Schedule>  insulin lispro (HumaLOG) corrective regimen sliding scale   SubCutaneous every 6 hours  melatonin 5 milliGRAM(s) Oral at bedtime  meropenem  IVPB 1000 milliGRAM(s) IV Intermittent every 8 hours  multivitamin/minerals 1 Tablet(s) Oral daily  psyllium Powder 1 Packet(s) Oral two times a day  QUEtiapine 50 milliGRAM(s) Oral at bedtime  sodium chloride 0.45%. 1000 milliLiter(s) (50 mL/Hr) IV Continuous <Continuous>    MEDICATIONS  (PRN):  acetaminophen    Suspension .. 650 milliGRAM(s) Oral every 6 hours PRN Temp greater or equal to 38.5C (101.3F)  sodium chloride 0.9% lock flush 10 milliLiter(s) IV Push every 1 hour PRN Pre/post blood products, medications, blood draw, and to maintain line patency      Allergies    No Known Allergies    Intolerances      Vital Signs Last 24 Hrs  T(C): 36.9 (31 May 2020 20:00), Max: 37.2 (31 May 2020 04:00)  T(F): 98.5 (31 May 2020 20:00), Max: 98.9 (31 May 2020 04:00)  HR: 100 (31 May 2020 23:25) (65 - 100)  BP: --  BP(mean): --  RR: 30 (31 May 2020 22:00) (22 - 36)  SpO2: 96% (31 May 2020 23:25) (96% - 100%)    PHYSICAL EXAM:  GEN: NAD, laying in bed,  cool and clammy skin  HEENT: NCAT, clear oral mucosa, normal conjunctiva  Chest: non-tender  CV:  non-tachycardic, 2+ radial pulse  Pulm: ventilated   GI: soft, non tender, dressing in place c/d/i, non-distended  MSK: moving all extremities   Rectal exam: decreased tone, normal prostate without nodules, massive BRBPR  Anoscopy: no distinct source of bleeding, 2 column hemorrhoids      LABS:                        7.2    x     )-----------( x        ( 01 Jun 2020 00:13 )             23.7     05-31    156<H>  |  108<H>  |  64.0<H>  ----------------------------<  169<H>  3.5   |  41.0<H>  |  0.74    Ca    8.7      31 May 2020 03:37  Phos  2.0     05-31  Mg     2.5     05-31      PT/INR - ( 01 Jun 2020 00:12 )   PT: 14.1 sec;   INR: 1.24 ratio               RADIOLOGY & ADDITIONAL STUDIES:

## 2020-06-01 NOTE — CHART NOTE - NSCHARTNOTEFT_GEN_A_CORE
ADDENDUM to CCM note.  5 22 pm  patient is now weaned off pressors s/p 13 units in total of PRBCs and 10 FFP as per my note;  Fio2 down to 50% sat 99%;  Patient required suctioning for secretions by RN  KCL is being repleted;  Had bradycardic episodes when first of 10 meQ of KCL was being given which resolved;  Mental status is good;  rpt CBC and post supplementation BMP pending; ADDENDUM to [facility] note.  5 22 pm  patient is now weaned off pressors s/p 13 units in total of PRBCs and 10 FFP as per my note;  Fio2 down to 50% sat 99%;  Patient required suctioning for secretions by RN  KCL is being repleted;  Had bradycardic episodes when first of 10 meQ of KCL was being given which resolved;  Mental status is good;  rpt CBC and post supplementation BMP pending;  Vascular was called for possible IVC filter ( given GI bleed with PE on CTA); Had LE doppler negative on 6/1; Consult appreciated;  Advised that no IVC filter indicated; Will d/w GI when A/C can be resumed;  Case was d/w Vascular; ADDENDUM to [facility] note.  5 22 pm  patient is now weaned off pressors s/p 13 units in total of PRBCs and 10 FFP as per my note;  Fio2 down to 50% sat 99%;  Patient required suctioning for secretions by RN  KCL is being repleted;  Had bradycardic episodes when first of 10 meQ of KCL was being given which resolved;  Mental status is good;  rpt CBC and post supplementation BMP pending;  Vascular was called for possible IVC filter ( given GI bleed with PE on CTA); Had LE doppler negative on 6/1; Consult appreciated;  Advised that no IVC filter indicated; Will d/w GI when A/C can be resumed;  Case was d/w Vascular;  Spoke with GI as well; Given large bleeding vessel there is a significant risk of rebleeding and patient will be off A/C for about two weeks;  Will reassess bleeding risk on day by day basis.

## 2020-06-01 NOTE — PROCEDURE NOTE - NSPATIENTPOSTION_GEN_A_CORE
Trendelenburg
supine
Trendelenburg
You can access the [employer] [facility] Patient Portal, offered by [facility], by registering with the following website: [email or web address]

## 2020-06-01 NOTE — CONSULT NOTE ADULT - SUBJECTIVE AND OBJECTIVE BOX
Vascular Attending:  Dr Falcon      HPI:  65 yo M w/ no reported PMHx presents to ER for worsening shortness of breath, subjective fevers and dry cough for 10 days. poor po intake.  did not take any meds at home. no sick contacts, travel.  BRIEF HOSPITAL COURSE: No PMHx;  admitted 4/18 for worsening shortness of breath and s/p intubated since 5/1;   ARF and evidence of dehydration    + Covid 19 pneumonia.   + segmental pulmonary embolus for which he was treated with hepatin  and changed to eliquis  + superimposed acinetobacter pneumonia.   S/p Trach and PEG;   Overnight with brisk rectal bleeding after dignicare removal  and hypovolemic shock s/p  13 units in total of PRBCs; 10 Units of FFP; KCentra 2g and platelets x 2 and placed on Levophed    Stat Abd/Pelv CTA just performed shows bleeding in rectum.   Patient had proctoscopy and found to have rectal ulcer for which 3 clips placed + Epi 4cc given  Vascular surgery consulted for possible IVC filter    PAST MEDICAL & SURGICAL HISTORY:  No pertinent past medical history  No significant past surgical history      REVIEW OF SYSTEMS-unable to obtain  General:	  Skin/Breast:  Ophthalmologic:  ENMT:	  Respiratory and Thorax:  Cardiovascular:	  Gastrointestinal:	  Genitourinary:	  Musculoskeletal:	  Neurological:	  Psychiatric:	  Hematology/Lymphatics:	  Endocrine:	  Allergic/Immunologic:	    MEDICATIONS  (STANDING):  calcium gluconate IVPB 2 Gram(s) IV Intermittent once  chlorhexidine 0.12% Liquid 15 milliLiter(s) Oral Mucosa every 12 hours  chlorhexidine 2% Cloths 1 Application(s) Topical daily  chlorhexidine 4% Liquid 1 Application(s) Topical <User Schedule>  dextrose 5%. 1000 milliLiter(s) (40 mL/Hr) IV Continuous <Continuous>  insulin lispro (HumaLOG) corrective regimen sliding scale   SubCutaneous every 6 hours  melatonin 5 milliGRAM(s) Oral at bedtime  meropenem  IVPB 1000 milliGRAM(s) IV Intermittent every 8 hours  multivitamin/minerals 1 Tablet(s) Oral daily  norepinephrine Infusion 0.05 MICROgram(s)/kG/Min (6.59 mL/Hr) IV Continuous <Continuous>  potassium chloride  10 mEq/100 mL IVPB 10 milliEquivalent(s) IV Intermittent every 1 hour  psyllium Powder 1 Packet(s) Oral two times a day  QUEtiapine 50 milliGRAM(s) Oral at bedtime    MEDICATIONS  (PRN):  acetaminophen    Suspension .. 650 milliGRAM(s) Oral every 6 hours PRN Temp greater or equal to 38.5C (101.3F)  sodium chloride 0.9% lock flush 10 milliLiter(s) IV Push every 1 hour PRN Pre/post blood products, medications, blood draw, and to maintain line patency      Allergies  No Known Allergies    SOCIAL HISTORY: unable to obtain      Vital Signs Last 24 Hrs  T(C): 37.1 (01 Jun 2020 12:00), Max: 37.1 (01 Jun 2020 11:05)  T(F): 98.8 (01 Jun 2020 12:00), Max: 98.8 (01 Jun 2020 12:00)  HR: 40 (01 Jun 2020 12:21) (40 - 111)  BP: --  BP(mean): --  RR: 30 (01 Jun 2020 12:00) (28 - 36)  SpO2: 100% (01 Jun 2020 12:00) (96% - 100%)    PHYSICAL EXAM:  Constitutional: Ill appearing M in NAD  Neck: trach and vented  Respiratory: diminished at bases  Cardiovascular: normal S1, S2  Gastrointestinal: soft, ND, NT, PEG in situ  Extremities: generalized edema  Neurological: lethargic, opens eyes to name called        LABS:                        8.1    7.27  )-----------( 91       ( 01 Jun 2020 10:53 )             23.5     06-01    150<H>  |  117<H>  |  30.0<H>  ----------------------------<  123<H>  2.5<LL>   |  26.0  |  0.30<L>    Ca    5.2<LL>      01 Jun 2020 10:53  Phos  4.8     06-01  Mg     2.1     06-01      PT/INR - ( 01 Jun 2020 10:53 )   PT: 16.4 sec;   INR: 1.44 ratio    PTT - ( 01 Jun 2020 04:50 )  PTT:31.9 sec      RADIOLOGY & ADDITIONAL STUDIES  < from: CT Angio Abdomen and Pelvis w/ IV Cont (06.01.20 @ 03:43) >   EXAM:  CT ANGIO ABD PELV (W)AW IC                          PROCEDURE DATE:  06/01/2020          INTERPRETATION:  Abdominal/Pelvic CT    6/1/2020 3:46 AM    Indication: Rectal bleeding    Technique: Axial images were obtained prior to and during IVcontrast from the lung bases through pubic symphysis according to the GI bleeding protocol.  95 cc of Omnipaque 350 was administered intravenously without complication.  Reformatted coronal and sagittal images are submitted.    Comparison: None    FINDINGS:    LUNG BASES:  There are trace bilateral pleural effusions with associated subsegmental atelectasis.  PERITONEUM:  There is no free air or focal collection.  Trace amount of free fluid.  LIVER: Normal.  SPLEEN: Normal.  GALLBLADDER: Contracted with gallstones.  BILIARY TREE: Unremarkable.  PANCREAS: Normal.  ADRENAL GLANDS: Normal.  KIDNEYS: Mild right hydronephrosis without obstructing stone.  .  BOWEL: Gastrostomy tube in place. The stomach is incompletely distended. Mildly dilated loops of small bowel. There is no small bowel obstruction or diverticulitis. The appendix is unremarkable. There is an appendicolith at the cecal base. Diverticulosis. Mild thickening of the sigmoid colon and rectum. There is intraluminal accumulation of contrast in the rectum compatible with a focus of active GI bleeding.    URINARY BLADDER: Decompressed by Escalera catheter.  PELVIC ORGANS: The prostate gland is not enlarged.    There is no significant adenopathy.  VASCULATURE: Unremarkable.  RETROPERITONEUM:  There is no mass.  BONES: Unremarkable.  ABDOMINAL WALL: Unremarkable.    IMPRESSION:    Intraluminal accumulation of contrast in the rectum indicative of active gastrointestinal bleeding. Mild colitis involving sigmoid colon and rectum.  Findings discussed with DAVID Durham at  4 am on 6/1/2020 with RBV.    Gallstones.  Mild right hydronephrosis.      < end of copied text >    Impression and Plan: 65 y/o M with COVID pneumonia and complications from COVID including Hx of PE on Eliquis. Pt with acute rectal bleed likely from ulcer sec to dignicare. Underwent clipping today  Unable to be on A/C at this time per GI; noted LE doppler negative;  Compression stockings for DVT prophylaxis.  Currently on pressors  Will d/w Dr Falcon whether IVC filter is indicated in this pt as he cannot be anticoagulated at this time Vascular Attending:  Dr Falcon      HPI:  65 yo M w/ no reported PMHx presents to ER for worsening shortness of breath, subjective fevers and dry cough for 10 days. poor po intake.  did not take any meds at home. no sick contacts, travel.  BRIEF HOSPITAL COURSE: No PMHx;  admitted 4/18 for worsening shortness of breath and s/p intubated since 5/1;   ARF and evidence of dehydration    + Covid 19 pneumonia.   + segmental pulmonary embolus for which he was treated with hepatin  and changed to eliquis  + superimposed acinetobacter pneumonia.   S/p Trach and PEG;   Overnight with brisk rectal bleeding after dignicare removal  and hypovolemic shock s/p  13 units in total of PRBCs; 10 Units of FFP; KCentra 2g and platelets x 2 and placed on Levophed    Stat Abd/Pelv CTA just performed shows bleeding in rectum.   Patient had proctoscopy and found to have rectal ulcer for which 3 clips placed + Epi 4cc given  Vascular surgery consulted for possible IVC filter    PAST MEDICAL & SURGICAL HISTORY:  No pertinent past medical history  No significant past surgical history      REVIEW OF SYSTEMS-unable to obtain  General:	  Skin/Breast:  Ophthalmologic:  ENMT:	  Respiratory and Thorax:  Cardiovascular:	  Gastrointestinal:	  Genitourinary:	  Musculoskeletal:	  Neurological:	  Psychiatric:	  Hematology/Lymphatics:	  Endocrine:	  Allergic/Immunologic:	    MEDICATIONS  (STANDING):  calcium gluconate IVPB 2 Gram(s) IV Intermittent once  chlorhexidine 0.12% Liquid 15 milliLiter(s) Oral Mucosa every 12 hours  chlorhexidine 2% Cloths 1 Application(s) Topical daily  chlorhexidine 4% Liquid 1 Application(s) Topical <User Schedule>  dextrose 5%. 1000 milliLiter(s) (40 mL/Hr) IV Continuous <Continuous>  insulin lispro (HumaLOG) corrective regimen sliding scale   SubCutaneous every 6 hours  melatonin 5 milliGRAM(s) Oral at bedtime  meropenem  IVPB 1000 milliGRAM(s) IV Intermittent every 8 hours  multivitamin/minerals 1 Tablet(s) Oral daily  norepinephrine Infusion 0.05 MICROgram(s)/kG/Min (6.59 mL/Hr) IV Continuous <Continuous>  potassium chloride  10 mEq/100 mL IVPB 10 milliEquivalent(s) IV Intermittent every 1 hour  psyllium Powder 1 Packet(s) Oral two times a day  QUEtiapine 50 milliGRAM(s) Oral at bedtime    MEDICATIONS  (PRN):  acetaminophen    Suspension .. 650 milliGRAM(s) Oral every 6 hours PRN Temp greater or equal to 38.5C (101.3F)  sodium chloride 0.9% lock flush 10 milliLiter(s) IV Push every 1 hour PRN Pre/post blood products, medications, blood draw, and to maintain line patency      Allergies  No Known Allergies    SOCIAL HISTORY: unable to obtain      Vital Signs Last 24 Hrs  T(C): 37.1 (01 Jun 2020 12:00), Max: 37.1 (01 Jun 2020 11:05)  T(F): 98.8 (01 Jun 2020 12:00), Max: 98.8 (01 Jun 2020 12:00)  HR: 40 (01 Jun 2020 12:21) (40 - 111)  BP: --  BP(mean): --  RR: 30 (01 Jun 2020 12:00) (28 - 36)  SpO2: 100% (01 Jun 2020 12:00) (96% - 100%)    PHYSICAL EXAM:  Constitutional: Ill appearing M in NAD  Neck: trach and vented  Respiratory: diminished at bases  Cardiovascular: normal S1, S2  Gastrointestinal: soft, ND, NT, PEG in situ  Extremities: generalized edema  Neurological: lethargic, opens eyes to name called        LABS:                        8.1    7.27  )-----------( 91       ( 01 Jun 2020 10:53 )             23.5     06-01    150<H>  |  117<H>  |  30.0<H>  ----------------------------<  123<H>  2.5<LL>   |  26.0  |  0.30<L>    Ca    5.2<LL>      01 Jun 2020 10:53  Phos  4.8     06-01  Mg     2.1     06-01      PT/INR - ( 01 Jun 2020 10:53 )   PT: 16.4 sec;   INR: 1.44 ratio    PTT - ( 01 Jun 2020 04:50 )  PTT:31.9 sec      RADIOLOGY & ADDITIONAL STUDIES  < from: CT Angio Abdomen and Pelvis w/ IV Cont (06.01.20 @ 03:43) >   EXAM:  CT ANGIO ABD PELV (W)AW IC                          PROCEDURE DATE:  06/01/2020          INTERPRETATION:  Abdominal/Pelvic CT    6/1/2020 3:46 AM    Indication: Rectal bleeding    Technique: Axial images were obtained prior to and during IVcontrast from the lung bases through pubic symphysis according to the GI bleeding protocol.  95 cc of Omnipaque 350 was administered intravenously without complication.  Reformatted coronal and sagittal images are submitted.    Comparison: None    FINDINGS:    LUNG BASES:  There are trace bilateral pleural effusions with associated subsegmental atelectasis.  PERITONEUM:  There is no free air or focal collection.  Trace amount of free fluid.  LIVER: Normal.  SPLEEN: Normal.  GALLBLADDER: Contracted with gallstones.  BILIARY TREE: Unremarkable.  PANCREAS: Normal.  ADRENAL GLANDS: Normal.  KIDNEYS: Mild right hydronephrosis without obstructing stone.  .  BOWEL: Gastrostomy tube in place. The stomach is incompletely distended. Mildly dilated loops of small bowel. There is no small bowel obstruction or diverticulitis. The appendix is unremarkable. There is an appendicolith at the cecal base. Diverticulosis. Mild thickening of the sigmoid colon and rectum. There is intraluminal accumulation of contrast in the rectum compatible with a focus of active GI bleeding.    URINARY BLADDER: Decompressed by Escalera catheter.  PELVIC ORGANS: The prostate gland is not enlarged.    There is no significant adenopathy.  VASCULATURE: Unremarkable.  RETROPERITONEUM:  There is no mass.  BONES: Unremarkable.  ABDOMINAL WALL: Unremarkable.    IMPRESSION:    Intraluminal accumulation of contrast in the rectum indicative of active gastrointestinal bleeding. Mild colitis involving sigmoid colon and rectum.  Findings discussed with DAVID Durham at  4 am on 6/1/2020 with RBV.    Gallstones.  Mild right hydronephrosis.      < end of copied text >    Impression and Plan: 63 y/o M with COVID pneumonia and complications from COVID including Hx of PE on Eliquis. Pt with acute rectal bleed likely from ulcer sec to dignicare. Underwent clipping today  Unable to be on A/C at this time per GI; noted LE doppler negative;  Compression stockings/SCD for DVT prophylaxis.  Currently on pressors  d/w Dr Falcon, NO IVC filter indicated in this pt   Resume AC when deemed appropriate by GI

## 2020-06-01 NOTE — PROCEDURE NOTE - NSPROCDETAILS_GEN_ALL_CORE
patient pre-oxygenated, tube inserted, placement confirmed/connected to ventilator
sutured in place/location identified, draped/prepped, sterile technique used, needle inserted/introduced/connected to a pressurized flush line/all materials/supplies accounted for at end of procedure/positive blood return obtained via catheter/hemostasis with direct pressure, dressing applied/Seldinger technique
guidewire recovered/sterile technique, catheter placed/lumen(s) aspirated and flushed/sterile dressing applied/ultrasound guidance
lumen(s) aspirated and flushed/guidewire recovered/ultrasound guidance/sterile dressing applied/sterile technique, catheter placed

## 2020-06-01 NOTE — BRIEF OPERATIVE NOTE - NSICDXBRIEFPREOP_GEN_ALL_CORE_FT
PRE-OP DIAGNOSIS:  Rectal bleeding 01-Jun-2020 08:26:43  Liam Meek
PRE-OP DIAGNOSIS:  Oropharyngeal dysphagia 26-May-2020 14:14:29  Adin Cintron  Respiratory failure 26-May-2020 14:14:18  Adin Cintron

## 2020-06-01 NOTE — BRIEF OPERATIVE NOTE - NSICDXBRIEFPROCEDURE_GEN_ALL_CORE_FT
PROCEDURES:  Sigmoidoscopy, flexible, with control of hemorrhage 01-Jun-2020 08:26:02  Liam Meek
PROCEDURES:  EGD, with PEG 26-May-2020 14:14:02  Adin Cintron  Open tracheostomy 26-May-2020 14:13:55  Adin Cintron

## 2020-06-01 NOTE — BRIEF OPERATIVE NOTE - OPERATION/FINDINGS
scope advanced to the distal sigmoid  bright red blood and clots in the rectum; lavaged and suctioned  brisk active bleeding noted from a rectal ulcer; there was a large vessel treated with 4 ccs with diluted epinephrine and 3 clips placed  hemorrhoids
NOrmal nalatomy,  no ulcers,

## 2020-06-01 NOTE — CONSULT NOTE ADULT - ASSESSMENT
63yo male with ventilator dependent respiratory failure 2/2 COVID PNA now with massive LGIB.  PE on this admission, receiving eliquis    Agree with MTP resuscitation  Agree with KCentra reversal   Agree with GI consultation    If able to stablilize for transport, would recommend CT-A AP    Will follow, will likely need endscopic versus operative intervention 65yo male with ventilator dependent respiratory failure 2/2 COVID PNA now with massive LGIB.  PE on this admission, receiving eliquis    Agree with MTP resuscitation  Agree with KCentra reversal   Agree with GI consultation    If able to stablilize for transport, would recommend CT-A AP    Will follow, will likely need endoscopic intervention and potentially operative intervention if endoscopic procedure unable to control hemorrhage

## 2020-06-01 NOTE — PROGRESS NOTE ADULT - SUBJECTIVE AND OBJECTIVE BOX
Patient is a 64y old  Male who presents with a chief complaint of shortness of breath (01 Jun 2020 05:09)      BRIEF HOSPITAL COURSE: No PMHx;  admitted 4/18 for worsening shortness of breath and s/p intubated since 5/1;   ARF and evidence of dehydration    + Covid 19 pneumonia.   + segmental pulmonary embolus for which he was treated with hepatin  and changed to eliquis  + superimposed acinetobacter pneumonia.   S/p Trach and PEG;   Overnight with brisk rectal bleeding after dignicare removal  and hypovolemic shock s/p  13 units in total of PRBCs; 10 Units of FFP; KCentra 2g and platelets x 2 and placed on Levophed    Stat Abd/Pelv CTA just performed shows bleeding in rectum.   Patient had proctoscopy and found to have rectal ulcer for which 3 clips placed + Epi 4cc given      Events last 24 hours: as above;     PAST MEDICAL & SURGICAL HISTORY:  No pertinent past medical history  No significant past surgical history    Allergies    No Known Allergies    Intolerances      FAMILY HISTORY:  FH: hypertension      Review of Systems:  unable to obtain;    Medications:  meropenem  IVPB 1000 milliGRAM(s) IV Intermittent every 8 hours    norepinephrine Infusion 0.05 MICROgram(s)/kG/Min IV Continuous <Continuous>      acetaminophen    Suspension .. 650 milliGRAM(s) Oral every 6 hours PRN  melatonin 5 milliGRAM(s) Oral at bedtime  QUEtiapine 50 milliGRAM(s) Oral at bedtime        psyllium Powder 1 Packet(s) Oral two times a day      insulin lispro (HumaLOG) corrective regimen sliding scale   SubCutaneous every 6 hours    calcium gluconate IVPB 2 Gram(s) IV Intermittent once  multivitamin/minerals 1 Tablet(s) Oral daily  potassium chloride  10 mEq/100 mL IVPB 10 milliEquivalent(s) IV Intermittent every 1 hour  potassium chloride  10 mEq/100 mL IVPB 10 milliEquivalent(s) IV Intermittent every 1 hour  sodium chloride 0.45%. 1000 milliLiter(s) IV Continuous <Continuous>  sodium chloride 0.9% lock flush 10 milliLiter(s) IV Push every 1 hour PRN      chlorhexidine 0.12% Liquid 15 milliLiter(s) Oral Mucosa every 12 hours  chlorhexidine 2% Cloths 1 Application(s) Topical daily  chlorhexidine 4% Liquid 1 Application(s) Topical <User Schedule>        Mode: AC/ CMV (Assist Control/ Continuous Mandatory Ventilation)  RR (machine): 30  TV (machine): 330  FiO2: 60  PEEP: 8  MAP: 17  PIP: 37      ICU Vital Signs Last 24 Hrs  T(C): 37.1 (01 Jun 2020 11:05), Max: 37.1 (31 May 2020 12:00)  T(F): 98.7 (01 Jun 2020 11:05), Max: 98.8 (31 May 2020 12:00)  HR: 88 (01 Jun 2020 11:05) (65 - 111)  BP: --  BP(mean): --  ABP: 118/68 (01 Jun 2020 11:05) (72/448 - 144/69)  ABP(mean): 83 (01 Jun 2020 11:05) (62 - 95)  RR: 30 (01 Jun 2020 11:05) (24 - 36)  SpO2: 100% (01 Jun 2020 11:05) (96% - 100%)    Vital Signs Last 24 Hrs  T(C): 37.1 (01 Jun 2020 11:05), Max: 37.1 (31 May 2020 12:00)  T(F): 98.7 (01 Jun 2020 11:05), Max: 98.8 (31 May 2020 12:00)  HR: 88 (01 Jun 2020 11:05) (65 - 111)  BP: --  BP(mean): --  RR: 30 (01 Jun 2020 11:05) (24 - 36)  SpO2: 100% (01 Jun 2020 11:05) (96% - 100%)    ABG - ( 01 Jun 2020 04:45 )  pH, Arterial: 7.37  pH, Blood: x     /  pCO2: 68    /  pO2: 409   / HCO3: 36    / Base Excess: 12.1  /  SaO2: x                   I&O's Detail    31 May 2020 07:01  -  01 Jun 2020 07:00  --------------------------------------------------------  IN:    Free Water: 1100 mL    norepinephrine Infusion: 35 mL    ns in tub fed vital15: 120 mL    Packed Red Blood Cells: 6775 mL    Pivot 1.5: 360 mL    sodium chloride 0.45%.: 900 mL    Solution: 499.8 mL    Solution: 50 mL  Total IN: 9839.8 mL    OUT:    Estimated Blood Loss: 4200 mL    Indwelling Catheter - Urethral: 1625 mL    Rectal Tube: 650 mL  Total OUT: 6475 mL    Total NET: 3364.8 mL      01 Jun 2020 07:01  -  01 Jun 2020 11:44  --------------------------------------------------------  IN:    norepinephrine Infusion: 31 mL    Packed Red Blood Cells: 900 mL    Plasma: 249 mL    sodium chloride 0.45%.: 250 mL  Total IN: 1430 mL    OUT:    Indwelling Catheter - Urethral: 225 mL  Total OUT: 225 mL    Total NET: 1205 mL            LABS:                        8.1    7.27  )-----------( 91       ( 01 Jun 2020 10:53 )             23.5     06-01    150<H>  |  117<H>  |  30.0<H>  ----------------------------<  123<H>  2.5<LL>   |  26.0  |  0.30<L>    Ca    5.2<LL>      01 Jun 2020 10:53  Phos  4.8     06-01  Mg     2.1     06-01            CAPILLARY BLOOD GLUCOSE      POCT Blood Glucose.: 215 mg/dL (01 Jun 2020 06:22)    PT/INR - ( 01 Jun 2020 10:53 )   PT: 16.4 sec;   INR: 1.44 ratio         PTT - ( 01 Jun 2020 04:50 )  PTT:31.9 sec    CULTURES:  C Diff by PCR Result: NotDetec (05-29 @ 14:40)  Culture Results:   No growth at 48 hours (05-29 @ 11:56)  Culture Results:   No growth at 48 hours (05-29 @ 11:56)  Culture Results:   **Please Note**: This is a Corrected Report**  Numerous Acinetobacter baumannii  / nosocomialis group  Previously reported as:  Numerous Acinetobacter baumannii (Carbapenem Resistant)  / nosocomialis  group  Normal Respiratory Christine present (05-25 @ 14:46)      Physical Examination:    General: No acute distress.  responds to verbal commands via     HEENT: Pupils equal, reactive to light.  Symmetric.    PULM: S/p trach; no wheezing    CVS: Regular rate and rhythm, no murmurs, rubs, or gallops    ABD: Soft, nondistended, nontender, normoactive bowel sounds, no masses    EXT: No edema, nontender    SKIN: Warm and well perfused, no rashes noted.    RADIOLOGY: LE Doppler - No DVT 6/1  CTA of pelvis - 6/1 - active rectal bleed; colitis    CRITICAL CARE TIME SPENT: 63 minutes

## 2020-06-01 NOTE — CHART NOTE - NSCHARTNOTEFT_GEN_A_CORE
Procedure Note    Procedure: Bedside Rigid Proctoscopy  Indication: Massive LGIB  Resident: America Stein MD PGY4  Attending: Darlene Stack MD  Date: 6/1/1010    Preproc: Massive LGIB  Post proc: Mucosal Tear vs Ulceration    Procedure in Detail:    Patient was stable on ventilator via tracheostomy.  Active Bright red blood per rectum noted prior to start of procedure.  Patient placed in Left lateral decubitus position and knees elevated to chest.  Disposable Lighted Rigid Proctoscope gently introduced into rectum to approximately 10cm.  Large volume hematoma appreciated and evacuated, additional bleeding noted at approximately 3-6cm though unable to appreciate specific source.  No masses appreciated.    Patient in stable but critical condition at conclusion of case

## 2020-06-02 LAB
ANION GAP SERPL CALC-SCNC: 5 MMOL/L — SIGNIFICANT CHANGE UP (ref 5–17)
APTT BLD: 31.1 SEC — SIGNIFICANT CHANGE UP (ref 27.5–36.3)
BASOPHILS # BLD AUTO: 0.05 K/UL — SIGNIFICANT CHANGE UP (ref 0–0.2)
BASOPHILS NFR BLD AUTO: 0.5 % — SIGNIFICANT CHANGE UP (ref 0–2)
BLD GP AB SCN SERPL QL: SIGNIFICANT CHANGE UP
BUN SERPL-MCNC: 31 MG/DL — HIGH (ref 8–20)
CALCIUM SERPL-MCNC: 8.1 MG/DL — LOW (ref 8.6–10.2)
CHLORIDE SERPL-SCNC: 110 MMOL/L — HIGH (ref 98–107)
CO2 SERPL-SCNC: 36 MMOL/L — HIGH (ref 22–29)
CREAT SERPL-MCNC: 0.46 MG/DL — LOW (ref 0.5–1.3)
EOSINOPHIL # BLD AUTO: 0.38 K/UL — SIGNIFICANT CHANGE UP (ref 0–0.5)
EOSINOPHIL NFR BLD AUTO: 3.5 % — SIGNIFICANT CHANGE UP (ref 0–6)
GLUCOSE BLDC GLUCOMTR-MCNC: 114 MG/DL — HIGH (ref 70–99)
GLUCOSE BLDC GLUCOMTR-MCNC: 120 MG/DL — HIGH (ref 70–99)
GLUCOSE SERPL-MCNC: 124 MG/DL — HIGH (ref 70–99)
HCT VFR BLD CALC: 27.7 % — LOW (ref 39–50)
HCT VFR BLD CALC: 28.6 % — LOW (ref 39–50)
HCT VFR BLD CALC: 28.8 % — LOW (ref 39–50)
HCT VFR BLD CALC: 29.8 % — LOW (ref 39–50)
HGB BLD-MCNC: 10.1 G/DL — LOW (ref 13–17)
HGB BLD-MCNC: 9.3 G/DL — LOW (ref 13–17)
HGB BLD-MCNC: 9.6 G/DL — LOW (ref 13–17)
HGB BLD-MCNC: 9.6 G/DL — LOW (ref 13–17)
IMM GRANULOCYTES NFR BLD AUTO: 1.4 % — SIGNIFICANT CHANGE UP (ref 0–1.5)
INR BLD: 1.08 RATIO — SIGNIFICANT CHANGE UP (ref 0.88–1.16)
LYMPHOCYTES # BLD AUTO: 18.8 % — SIGNIFICANT CHANGE UP (ref 13–44)
LYMPHOCYTES # BLD AUTO: 2.05 K/UL — SIGNIFICANT CHANGE UP (ref 1–3.3)
MAGNESIUM SERPL-MCNC: 1.9 MG/DL — SIGNIFICANT CHANGE UP (ref 1.6–2.6)
MCHC RBC-ENTMCNC: 30.7 PG — SIGNIFICANT CHANGE UP (ref 27–34)
MCHC RBC-ENTMCNC: 31.1 PG — SIGNIFICANT CHANGE UP (ref 27–34)
MCHC RBC-ENTMCNC: 31.1 PG — SIGNIFICANT CHANGE UP (ref 27–34)
MCHC RBC-ENTMCNC: 31.3 PG — SIGNIFICANT CHANGE UP (ref 27–34)
MCHC RBC-ENTMCNC: 33.3 GM/DL — SIGNIFICANT CHANGE UP (ref 32–36)
MCHC RBC-ENTMCNC: 33.6 GM/DL — SIGNIFICANT CHANGE UP (ref 32–36)
MCHC RBC-ENTMCNC: 33.6 GM/DL — SIGNIFICANT CHANGE UP (ref 32–36)
MCHC RBC-ENTMCNC: 33.9 GM/DL — SIGNIFICANT CHANGE UP (ref 32–36)
MCV RBC AUTO: 91.7 FL — SIGNIFICANT CHANGE UP (ref 80–100)
MCV RBC AUTO: 92 FL — SIGNIFICANT CHANGE UP (ref 80–100)
MCV RBC AUTO: 92.6 FL — SIGNIFICANT CHANGE UP (ref 80–100)
MCV RBC AUTO: 93.3 FL — SIGNIFICANT CHANGE UP (ref 80–100)
MONOCYTES # BLD AUTO: 0.44 K/UL — SIGNIFICANT CHANGE UP (ref 0–0.9)
MONOCYTES NFR BLD AUTO: 4 % — SIGNIFICANT CHANGE UP (ref 2–14)
NEUTROPHILS # BLD AUTO: 7.81 K/UL — HIGH (ref 1.8–7.4)
NEUTROPHILS NFR BLD AUTO: 71.8 % — SIGNIFICANT CHANGE UP (ref 43–77)
PHOSPHATE SERPL-MCNC: 3.2 MG/DL — SIGNIFICANT CHANGE UP (ref 2.4–4.7)
PLATELET # BLD AUTO: 141 K/UL — LOW (ref 150–400)
PLATELET # BLD AUTO: 145 K/UL — LOW (ref 150–400)
PLATELET # BLD AUTO: 148 K/UL — LOW (ref 150–400)
PLATELET # BLD AUTO: 152 K/UL — SIGNIFICANT CHANGE UP (ref 150–400)
POTASSIUM SERPL-MCNC: 4.5 MMOL/L — SIGNIFICANT CHANGE UP (ref 3.5–5.3)
POTASSIUM SERPL-SCNC: 4.5 MMOL/L — SIGNIFICANT CHANGE UP (ref 3.5–5.3)
PROTHROM AB SERPL-ACNC: 12.2 SEC — SIGNIFICANT CHANGE UP (ref 10–12.9)
RBC # BLD: 2.97 M/UL — LOW (ref 4.2–5.8)
RBC # BLD: 3.09 M/UL — LOW (ref 4.2–5.8)
RBC # BLD: 3.13 M/UL — LOW (ref 4.2–5.8)
RBC # BLD: 3.25 M/UL — LOW (ref 4.2–5.8)
RBC # FLD: 14.6 % — HIGH (ref 10.3–14.5)
RBC # FLD: 14.7 % — HIGH (ref 10.3–14.5)
RBC # FLD: 14.7 % — HIGH (ref 10.3–14.5)
RBC # FLD: 14.8 % — HIGH (ref 10.3–14.5)
SODIUM SERPL-SCNC: 150 MMOL/L — HIGH (ref 135–145)
WBC # BLD: 10.78 K/UL — HIGH (ref 3.8–10.5)
WBC # BLD: 10.88 K/UL — HIGH (ref 3.8–10.5)
WBC # BLD: 11.94 K/UL — HIGH (ref 3.8–10.5)
WBC # BLD: 12.63 K/UL — HIGH (ref 3.8–10.5)
WBC # FLD AUTO: 10.78 K/UL — HIGH (ref 3.8–10.5)
WBC # FLD AUTO: 10.88 K/UL — HIGH (ref 3.8–10.5)
WBC # FLD AUTO: 11.94 K/UL — HIGH (ref 3.8–10.5)
WBC # FLD AUTO: 12.63 K/UL — HIGH (ref 3.8–10.5)

## 2020-06-02 PROCEDURE — 99232 SBSQ HOSP IP/OBS MODERATE 35: CPT

## 2020-06-02 PROCEDURE — 99291 CRITICAL CARE FIRST HOUR: CPT

## 2020-06-02 PROCEDURE — 99232 SBSQ HOSP IP/OBS MODERATE 35: CPT | Mod: GC

## 2020-06-02 RX ADMIN — Medication 1 PACKET(S): at 17:59

## 2020-06-02 RX ADMIN — SODIUM CHLORIDE 50 MILLILITER(S): 9 INJECTION, SOLUTION INTRAVENOUS at 07:18

## 2020-06-02 RX ADMIN — Medication 1 TABLET(S): at 17:59

## 2020-06-02 RX ADMIN — MEROPENEM 100 MILLIGRAM(S): 1 INJECTION INTRAVENOUS at 22:15

## 2020-06-02 RX ADMIN — CHLORHEXIDINE GLUCONATE 15 MILLILITER(S): 213 SOLUTION TOPICAL at 05:23

## 2020-06-02 RX ADMIN — CHLORHEXIDINE GLUCONATE 15 MILLILITER(S): 213 SOLUTION TOPICAL at 17:59

## 2020-06-02 RX ADMIN — Medication 1 PACKET(S): at 05:24

## 2020-06-02 RX ADMIN — MEROPENEM 100 MILLIGRAM(S): 1 INJECTION INTRAVENOUS at 14:29

## 2020-06-02 RX ADMIN — MEROPENEM 100 MILLIGRAM(S): 1 INJECTION INTRAVENOUS at 05:23

## 2020-06-02 RX ADMIN — QUETIAPINE FUMARATE 50 MILLIGRAM(S): 200 TABLET, FILM COATED ORAL at 23:54

## 2020-06-02 RX ADMIN — Medication 5 MILLIGRAM(S): at 23:53

## 2020-06-02 RX ADMIN — CHLORHEXIDINE GLUCONATE 1 APPLICATION(S): 213 SOLUTION TOPICAL at 05:23

## 2020-06-02 RX ADMIN — CHLORHEXIDINE GLUCONATE 1 APPLICATION(S): 213 SOLUTION TOPICAL at 11:58

## 2020-06-02 NOTE — PROGRESS NOTE ADULT - SUBJECTIVE AND OBJECTIVE BOX
INTERVAL HPI/OVERNIGHT EVENTS: s/p unsedated flexible sigmoidoscopy yesterday with clipping of visible vessel in rectal ulcer. Now off pressors. No melena or hematochezia since the procedure.   Somnolent this am.     ROS wnl     PAST MEDICAL & SURGICAL HISTORY:  No pertinent past medical history  No significant past surgical history      Home Medications:      MEDICATIONS  (STANDING):  chlorhexidine 0.12% Liquid 15 milliLiter(s) Oral Mucosa every 12 hours  chlorhexidine 2% Cloths 1 Application(s) Topical daily  chlorhexidine 4% Liquid 1 Application(s) Topical <User Schedule>  dextrose 5%. 1000 milliLiter(s) (50 mL/Hr) IV Continuous <Continuous>  insulin lispro (HumaLOG) corrective regimen sliding scale   SubCutaneous every 6 hours  melatonin 5 milliGRAM(s) Oral at bedtime  meropenem  IVPB 1000 milliGRAM(s) IV Intermittent every 8 hours  multivitamin/minerals 1 Tablet(s) Oral daily  psyllium Powder 1 Packet(s) Oral two times a day  QUEtiapine 50 milliGRAM(s) Oral at bedtime    MEDICATIONS  (PRN):  acetaminophen    Suspension .. 650 milliGRAM(s) Oral every 6 hours PRN Temp greater or equal to 38.5C (101.3F)  sodium chloride 0.9% lock flush 10 milliLiter(s) IV Push every 1 hour PRN Pre/post blood products, medications, blood draw, and to maintain line patency      Allergies    No Known Allergies    Intolerances          PHYSICAL EXAM:   Vital Signs:  Vital Signs Last 24 Hrs  T(C): 36.7 (01 Jun 2020 20:00), Max: 37.2 (01 Jun 2020 13:00)  T(F): 98.1 (01 Jun 2020 20:00), Max: 98.9 (01 Jun 2020 13:00)  HR: 72 (02 Jun 2020 06:00) (40 - 108)  BP: --  BP(mean): --  RR: 30 (02 Jun 2020 06:00) (30 - 30)  SpO2: 100% (02 Jun 2020 06:00) (99% - 100%)  Daily     Daily         GENERAL:  no distress trach  HEENT:  NC/AT,  anicteric  ABDOMEN:  Soft, non-tender, non-distended  EXTREMITIES  no cyanosis      LABS:                        9.6    10.88 )-----------( 141      ( 02 Jun 2020 06:05 )             28.8       Hemoglobin: 9.6 g/dL (06-02-20 @ 06:05)  Hemoglobin: 10.1 g/dL (06-02-20 @ 02:18)  Hemoglobin: 10.8 g/dL (06-01-20 @ 22:32)  Hemoglobin: 9.2 g/dL (06-01-20 @ 18:02)  Hemoglobin: 8.1 g/dL (06-01-20 @ 10:53)  Hemoglobin: 7.9 g/dL (06-01-20 @ 03:20)  Hemoglobin: 7.2 g/dL (06-01-20 @ 00:13)  Hemoglobin: 6.7 g/dL (05-31-20 @ 03:37)      06-02    150<H>  |  110<H>  |  31.0<H>  ----------------------------<  124<H>  4.5   |  36.0<H>  |  0.46<L>    Ca    8.1<L>      02 Jun 2020 06:05  Phos  3.2     06-02  Mg     1.9     06-02        INR: 1.08 ratio (06-02-20 @ 06:05)  INR: 1.44 ratio (06-01-20 @ 10:53)  INR: 1.20 ratio (06-01-20 @ 04:50)  INR: 1.24 ratio (06-01-20 @ 00:12)      RADIOLOGY & ADDITIONAL TESTS:  < from: CT Angio Abdomen and Pelvis w/ IV Cont (06.01.20 @ 03:43) >  FINDINGS:    LUNG BASES:  There are trace bilateral pleural effusions with associated subsegmental atelectasis.  PERITONEUM:  There is no free air or focal collection.  Trace amount of free fluid.  LIVER: Normal.  SPLEEN: Normal.  GALLBLADDER: Contracted with gallstones.  BILIARY TREE: Unremarkable.  PANCREAS: Normal.  ADRENAL GLANDS: Normal.  KIDNEYS: Mild right hydronephrosis without obstructing stone.  .  BOWEL: Gastrostomy tube in place. The stomach is incompletely distended. Mildly dilated loops of small bowel. There is no small bowel obstruction or diverticulitis. The appendix is unremarkable. There is an appendicolith at the cecal base. Diverticulosis. Mild thickening of the sigmoid colon and rectum. There is intraluminal accumulation of contrast in the rectum compatible with a focus of active GI bleeding.    URINARY BLADDER: Decompressed by Escalera catheter.  PELVIC ORGANS: The prostate gland is not enlarged.    There is no significant adenopathy.  VASCULATURE: Unremarkable.  RETROPERITONEUM:  There is no mass.  BONES: Unremarkable.  ABDOMINAL WALL: Unremarkable.    IMPRESSION:    Intraluminal accumulation of contrast in the rectum indicative of active gastrointestinal bleeding. Mild colitis involving sigmoid colon and rectum.  Findings discussed with DAVID Durham at  4 am on 6/1/2020 with [facility].    Gallstones.  Mild right hydronephrosis.      < end of copied text >

## 2020-06-02 NOTE — PROGRESS NOTE ADULT - ASSESSMENT
IMPRESSION:  - COVID 19 with prolonged respiratory failure s/p Trach; PEG;  - Hx of PEs on CTA ( segmental on 4/21) on Eliquis - off 2/2 Rectal bleed -   - Hx of segmental PE now off Anticoagulation;  + Acenitobacter PNA  + 6/1 - Rectal bleed 2/2 ulcer s/p proctoscopy with clipping; s/p MTP 13 Units PRBC; 10 FFP; Kcentra; Platelets x 2 with hypovolemic shock ( resolved 6/1)  in Shock on Levophed  - Hypernatremia    PLAN:    Neuro -responding well; on Seroquel PO; Melatonin; in light of Lower GI bleed will ask GI when ok to give meds PEG; If becomes agitated --> will place on Precedex gtt    CV - Hemodynamically stable over 24 hours.  s/p resolved Hypovolemic shock 2/2 Recal bleed; s/p clips by GI; s/p MTP; PRBC 13/PLts x2/FFP 10/KCentra;  repeat CBC/INR/Fibrinogen stable; will change CBC to q8h  Echo 5/3 - normal LVEF; grade 1 diast dysfunction;  A line in place;    GI - NPO for  now; s/p clipping of rectal ulcer bleed s/p clips + Epi;  Will be off A/C for at least a week as high rebleed risk;  s/p rectal bleed which is now controlled; GI help greatly appreciated;  NPO; c/w PPI for prophylaxis only;    Pulm - on AC 60%/8; ABG noted on 100%;  CXR with trach in place; b/l infiltrates noted;]  will do SBT today;  Hx of PE - unable to be on A/C at this time; noted LE doppler negative;  will place compression stockings;   spoke with Vascular who felt  IVC filter is not indicated   Will discuss again     Hemo - s/p PRBC/Plt/FFP for gi bleed;  stable h/h and INR; change monitoring to q8 hrs at this point.  10-->9;   ID - Afebrile; WBC stable; + Acinetobacter in sputum - Merrem till 6/4;    DVT prophylaxis - in light of GI bleed; compression only;   ? of IVC filter; see above;    Renal - cr stable at 0.46   K repleted  Ca repleted  Na 153 -->150 -c/w D5 at 50 cc/hr; BMP q8hs; goal 10-12 meq/24 hrs; c/w free water boluses;    Endo - glu goal < 180;     Code status - full  updated family's friend ( emerg contact) Yovani Sena [phone]  [facility] - [facility] 6/1    Case was d/w night CCM team;

## 2020-06-02 NOTE — PROGRESS NOTE ADULT - SUBJECTIVE AND OBJECTIVE BOX
Palliative Care Followup  INTERVAL HPI/OVERNIGHT EVENTS:: patient had rectal bleeding over the weekend, 6/1 underwent blood transfusions and is now s/p proctoscopy with clipping - no further bleeding at this time - now off sedation as well    CC: SOB     Present Symptoms: Unable to obtain due to poor mentation   Pain: No reports pain from RN - no symptoms of pain             Character-            Duration-            Effect-            Factors-            Frequency-            Location-            Severity-    Review of Systems: Reviewed  Per HPI all other ROS negative  Difficult to obtain due to poor mentation     MEDICATIONS  (STANDING):  chlorhexidine 0.12% Liquid 15 milliLiter(s) Oral Mucosa every 12 hours  chlorhexidine 2% Cloths 1 Application(s) Topical daily  chlorhexidine 4% Liquid 1 Application(s) Topical <User Schedule>  dextrose 5%. 1000 milliLiter(s) (50 mL/Hr) IV Continuous <Continuous>  insulin lispro (HumaLOG) corrective regimen sliding scale   SubCutaneous every 6 hours  melatonin 5 milliGRAM(s) Oral at bedtime  meropenem  IVPB 1000 milliGRAM(s) IV Intermittent every 8 hours  multivitamin/minerals 1 Tablet(s) Oral daily  psyllium Powder 1 Packet(s) Oral two times a day  QUEtiapine 50 milliGRAM(s) Oral at bedtime    MEDICATIONS  (PRN):  acetaminophen    Suspension .. 650 milliGRAM(s) Oral every 6 hours PRN Temp greater or equal to 38.5C (101.3F)  sodium chloride 0.9% lock flush 10 milliLiter(s) IV Push every 1 hour PRN Pre/post blood products, medications, blood draw, and to maintain line patency    PHYSICAL EXAM:  Physical Exam - limited physical exam due to COVID - 19 isolation status  No Physical Exam at bedside completed in attempt to limit COVID-19   Reviewed H&P physical exam (most recent physical exam noted) and Reviewed latest ICU documentation for reference    LABS:  Complete Blood Count Repeat Every 4 Hours x 24 Hours (06.02.20 @ 10:29)    WBC Count: 11.94 K/uL    RBC Count: 2.97 M/uL    Hemoglobin: 9.3 g/dL    Hematocrit: 27.7 %    Mean Cell Volume: 93.3 fl    Mean Cell Hemoglobin: 31.3 pg    Mean Cell Hemoglobin Conc: 33.6 gm/dL    Red Cell Distrib Width: 14.7 %    Platelet Count - Automated: 145: Specimen integrity verified. specimen checked for clots- no clots K/uL    Advance Directives  Full Code

## 2020-06-02 NOTE — PROGRESS NOTE ADULT - PROBLEM SELECTOR PLAN 1
Significant life threatening bleeding yesterday. s/p 13 UPRBCs. s/p flexible sigmoidoscopy rectal ulcer with visible vessel s/p clipping and epinephrine injection. No active bleeding. Now off pressors.  monitor cbc  cont to hold anticoagulation for at least a week because risk of rebleeding on anticoagulation is high

## 2020-06-02 NOTE — PROGRESS NOTE ADULT - ASSESSMENT
Patient is now s/p peg and trach - now off sedation - patient had rectal bleed and hypovolemic shock s/p clipping and transfusions of 13 units PRBC, 10 FFP, 2 platelets, Kcentra - Now is Hemodynamically stable over 24 hours.  Patient more alert, follows staff around with eyes, responds appropriately at times  Today RN states patient is a little more lethargic but will still respond to some questions

## 2020-06-02 NOTE — PROGRESS NOTE ADULT - SUBJECTIVE AND OBJECTIVE BOX
Patient is a 64y old  Male who presents with a chief complaint of shortness of breath (02 Jun 2020 08:00)      BRIEF HOSPITAL COURSE: No PMHx;  admitted 4/18 for worsening shortness of breath and s/p intubated since 5/1;   ARF and evidence of dehydration    + Covid 19 pneumonia.   + segmental pulmonary embolus for which he was treated with hepatin  and changed to eliquis  + superimposed acinetobacter pneumonia.   S/p Trach and PEG;   6/1 with brisk rectal bleeding after dignicare removal  and hypovolemic shock s/p  13 units in total of PRBCs; 10 Units of FFP; KCentra 2g and platelets x 2 and placed on Levophed    Stat Abd/Pelv CTA just performed shows bleeding in rectum.   Patient had proctoscopy and found to have rectal ulcer for which 3 clips placed + Epi 4cc given  6/2 - off pressors      Events last 24 hours: doing well; no more bleeding; serial h/h stable; fibrinogen stable;    PAST MEDICAL & SURGICAL HISTORY:  No pertinent past medical history  No significant past surgical history    Allergies    No Known Allergies    Intolerances      FAMILY HISTORY:  FH: hypertension      Review of Systems:  unable to obtain  Medications:  meropenem  IVPB 1000 milliGRAM(s) IV Intermittent every 8 hours        acetaminophen    Suspension .. 650 milliGRAM(s) Oral every 6 hours PRN  melatonin 5 milliGRAM(s) Oral at bedtime  QUEtiapine 50 milliGRAM(s) Oral at bedtime        psyllium Powder 1 Packet(s) Oral two times a day      insulin lispro (HumaLOG) corrective regimen sliding scale   SubCutaneous every 6 hours    dextrose 5%. 1000 milliLiter(s) IV Continuous <Continuous>  multivitamin/minerals 1 Tablet(s) Oral daily  sodium chloride 0.9% lock flush 10 milliLiter(s) IV Push every 1 hour PRN      chlorhexidine 0.12% Liquid 15 milliLiter(s) Oral Mucosa every 12 hours  chlorhexidine 2% Cloths 1 Application(s) Topical daily  chlorhexidine 4% Liquid 1 Application(s) Topical <User Schedule>        Mode: AC/ CMV (Assist Control/ Continuous Mandatory Ventilation)  RR (machine): 30  TV (machine): 330  FiO2: 60  PEEP: 8  MAP: 16  PIP: 33      ICU Vital Signs Last 24 Hrs  T(C): 36.7 (01 Jun 2020 20:00), Max: 37.2 (01 Jun 2020 13:00)  T(F): 98.1 (01 Jun 2020 20:00), Max: 98.9 (01 Jun 2020 13:00)  HR: 65 (02 Jun 2020 08:40) (40 - 93)  BP: --  BP(mean): --  ABP: 136/72 (02 Jun 2020 06:00) (89/57 - 180/77)  ABP(mean): 90 (02 Jun 2020 06:00) (65 - 102)  RR: 30 (02 Jun 2020 06:00) (30 - 30)  SpO2: 100% (02 Jun 2020 08:40) (99% - 100%)    Vital Signs Last 24 Hrs  T(C): 36.7 (01 Jun 2020 20:00), Max: 37.2 (01 Jun 2020 13:00)  T(F): 98.1 (01 Jun 2020 20:00), Max: 98.9 (01 Jun 2020 13:00)  HR: 65 (02 Jun 2020 08:40) (40 - 93)  BP: --  BP(mean): --  RR: 30 (02 Jun 2020 06:00) (30 - 30)  SpO2: 100% (02 Jun 2020 08:40) (99% - 100%)    ABG - ( 01 Jun 2020 04:45 )  pH, Arterial: 7.37  pH, Blood: x     /  pCO2: 68    /  pO2: 409   / HCO3: 36    / Base Excess: 12.1  /  SaO2: x                   I&O's Detail    01 Jun 2020 07:01  -  02 Jun 2020 07:00  --------------------------------------------------------  IN:    dextrose 5%.: 840 mL    Free Water: 1500 mL    norepinephrine Infusion: 37.5 mL    Packed Red Blood Cells: 900 mL    Plasma: 249 mL    sodium chloride 0.45%: 300 mL    Solution: 900 mL    Solution: 150 mL  Total IN: 4876.5 mL    OUT:    Estimated Blood Loss: 1100 mL    Indwelling Catheter - Urethral: 1680 mL  Total OUT: 2780 mL    Total NET: 2096.5 mL            LABS:                        9.6    10.88 )-----------( 141      ( 02 Jun 2020 06:05 )             28.8     06-02    150<H>  |  110<H>  |  31.0<H>  ----------------------------<  124<H>  4.5   |  36.0<H>  |  0.46<L>    Ca    8.1<L>      02 Jun 2020 06:05  Phos  3.2     06-02  Mg     1.9     06-02            CAPILLARY BLOOD GLUCOSE      POCT Blood Glucose.: 113 mg/dL (01 Jun 2020 17:30)    PT/INR - ( 02 Jun 2020 06:05 )   PT: 12.2 sec;   INR: 1.08 ratio         PTT - ( 02 Jun 2020 06:05 )  PTT:31.1 sec    CULTURES:  C Diff by PCR Result: NotDetec (05-29 @ 14:40)  Culture Results:   No growth at 48 hours (05-29 @ 11:56)  Culture Results:   No growth at 48 hours (05-29 @ 11:56)      Physical Examination:      Physical Examination:    General: No acute distress.  responds to verbal commands via     HEENT: Pupils equal, reactive to light.  Symmetric.    PULM: S/p trach; no wheezing    CVS: Regular rate and rhythm, no murmurs, rubs, or gallops    ABD: Soft, nondistended, nontender, normoactive bowel sounds, no masses    EXT: No edema, nontender    SKIN: Warm and well perfused, no rashes noted.    RADIOLOGY: no new imaging;    CRITICAL CARE TIME SPENT: 52 minutes.

## 2020-06-02 NOTE — PROGRESS NOTE ADULT - SUBJECTIVE AND OBJECTIVE BOX
Vascular surgery follow Up    - IVC filter may get placed tomorrow pending Dr. Falcon discussion with the the GI Attending  - Keep npo at midnight

## 2020-06-02 NOTE — PROGRESS NOTE ADULT - ASSESSMENT
63yo male with ventilator dependent respiratory failure 2/2 COVID PNA w/LGIB 2/2 rectal ulcer, s/p rigid sig, clipping of vessel and epinephrine    -continue to trend H/H  -bleeding appears to be stopped, no acute surgical intervention warranted at this time, will continue to monitor  -rest of care per primary team

## 2020-06-02 NOTE — PROGRESS NOTE ADULT - PROBLEM SELECTOR PLAN 4
-Patient is now s/p peg and trach - now off sedation - patient had rectal bleed and hypovolemic shock s/p clipping and transfusions of 13 units PRBC, 10 FFP, 2 platelets, Kcentra - Now is Hemodynamically stable over 24 hours.  -Patient more alert, follows staff around with eyes, responds appropriately at times  -Today RN states patient is a little more lethargic but will still respond to some questions   - Call placed using  # [phone] to speak with patient's HCP Yovani   -Emotionally supported Yovani Pina informed me that he spoke to the patient's brother who lives in [place] and updated him on his hospital course and that he is continuing to pray for his recovery. When I first encountered this patient he made it clear that no family members from [place] would be his HCP - he insisted that it would have to be his friend Yovani.   - Readdressed code status with Yovani as well - patient is to remain full code   - Will continue to support and monitor    Total time spent 25 minutes this includes speaking to HCP , discussing care with staff, and reviewing chart      Thank you for the opportunity to assist with the care of this patient.   [facility] [phone].

## 2020-06-02 NOTE — PROGRESS NOTE ADULT - ATTENDING COMMENTS
Patient seen and examined. Stable overnight. had bowel movements without any further bleeding. Okay to initiate tube feeds from colorectal standpoint. Would follow for another day to ensure no more bleeding

## 2020-06-02 NOTE — PROGRESS NOTE ADULT - SUBJECTIVE AND OBJECTIVE BOX
Patient s/p Cscope w/clipping of ulcerated vessel and epinephrine, no more active bleeding noted overnight. Patient off pressors.     MEDICATIONS  (STANDING):  chlorhexidine 0.12% Liquid 15 milliLiter(s) Oral Mucosa every 12 hours  chlorhexidine 2% Cloths 1 Application(s) Topical daily  chlorhexidine 4% Liquid 1 Application(s) Topical <User Schedule>  dextrose 5%. 1000 milliLiter(s) (50 mL/Hr) IV Continuous <Continuous>  insulin lispro (HumaLOG) corrective regimen sliding scale   SubCutaneous every 6 hours  melatonin 5 milliGRAM(s) Oral at bedtime  meropenem  IVPB 1000 milliGRAM(s) IV Intermittent every 8 hours  multivitamin/minerals 1 Tablet(s) Oral daily  psyllium Powder 1 Packet(s) Oral two times a day  QUEtiapine 50 milliGRAM(s) Oral at bedtime    MEDICATIONS  (PRN):  acetaminophen    Suspension .. 650 milliGRAM(s) Oral every 6 hours PRN Temp greater or equal to 38.5C (101.3F)  sodium chloride 0.9% lock flush 10 milliLiter(s) IV Push every 1 hour PRN Pre/post blood products, medications, blood draw, and to maintain line patency      Vital Signs Last 24 Hrs  T(C): 36.7 (01 Jun 2020 20:00), Max: 37.2 (01 Jun 2020 13:00)  T(F): 98.1 (01 Jun 2020 20:00), Max: 98.9 (01 Jun 2020 13:00)  HR: 86 (02 Jun 2020 00:00) (40 - 111)  BP: --  BP(mean): --  RR: 30 (02 Jun 2020 00:00) (30 - 30)  SpO2: 99% (02 Jun 2020 00:00) (99% - 100%)    PHYSICAL EXAM:  GEN: NAD  Chest: non-tender  CV:  +s1/s2, non-tachycardic  Pulm: ventilated   GI: soft, non tender  MSK: moving all extremities         I&O's Detail    31 May 2020 07:01  -  01 Jun 2020 07:00  --------------------------------------------------------  IN:    Free Water: 1100 mL    norepinephrine Infusion: 35 mL    ns in tub fed vital15: 120 mL    Packed Red Blood Cells: 6775 mL    Pivot 1.5: 360 mL    sodium chloride 0.45%: 900 mL    Solution: 499.8 mL    Solution: 50 mL  Total IN: 9839.8 mL    OUT:    Estimated Blood Loss: 4200 mL    Indwelling Catheter - Urethral: 1625 mL    Rectal Tube: 650 mL  Total OUT: 6475 mL    Total NET: 3364.8 mL      01 Jun 2020 07:01  -  02 Jun 2020 00:20  --------------------------------------------------------  IN:    dextrose 5%.: 490 mL    Free Water: 700 mL    norepinephrine Infusion: 37.5 mL    Packed Red Blood Cells: 900 mL    Plasma: 249 mL    sodium chloride 0.45%: 300 mL    Solution: 100 mL    Solution: 900 mL  Total IN: 3676.5 mL    OUT:    Estimated Blood Loss: 1100 mL    Indwelling Catheter - Urethral: 1290 mL  Total OUT: 2390 mL    Total NET: 1286.5 mL          LABS:                        10.8   11.26 )-----------( 146      ( 01 Jun 2020 22:32 )             31.1     06-01    153<H>  |  112<H>  |  34.0<H>  ----------------------------<  130<H>  4.7   |  37.0<H>  |  0.51    Ca    8.3<L>      01 Jun 2020 22:31  Phos  4.8     06-01  Mg     2.1     06-01      PT/INR - ( 01 Jun 2020 10:53 )   PT: 16.4 sec;   INR: 1.44 ratio         PTT - ( 01 Jun 2020 04:50 )  PTT:31.9 sec      RADIOLOGY & ADDITIONAL STUDIES:

## 2020-06-02 NOTE — PROGRESS NOTE ADULT - PROBLEM SELECTOR PLAN 2
Patient is s/p trach and peg, off sedation   History of PEs on CTA ( segmental on 4/21) was on hep drip and Eliquis - now off anticoagulation due to rectal bleed leading to hypovolemic shock (now resolved)  No acute distress Patient is s/p trach and peg, off sedation   History of PEs on CTA ( segmental on 4/21) was on hep drip and Eliquis - now off anticoagulation due to rectal bleed leading to hypovolemic shock (now resolved) - may get IVC filter  No acute distress

## 2020-06-02 NOTE — PROGRESS NOTE ADULT - PROBLEM SELECTOR PLAN 1
- supportive treatment for COVID-19- s/p Trach; PEG;   Improving mental status, resolving encephelopathy

## 2020-06-03 DIAGNOSIS — I26.99 OTHER PULMONARY EMBOLISM WITHOUT ACUTE COR PULMONALE: ICD-10-CM

## 2020-06-03 LAB
ANION GAP SERPL CALC-SCNC: 18 MMOL/L — HIGH (ref 5–17)
ANION GAP SERPL CALC-SCNC: 8 MMOL/L — SIGNIFICANT CHANGE UP (ref 5–17)
APTT BLD: 31.3 SEC — SIGNIFICANT CHANGE UP (ref 27.5–36.3)
BUN SERPL-MCNC: 11 MG/DL — SIGNIFICANT CHANGE UP (ref 8–20)
BUN SERPL-MCNC: 16 MG/DL — SIGNIFICANT CHANGE UP (ref 8–20)
CALCIUM SERPL-MCNC: 8.1 MG/DL — LOW (ref 8.6–10.2)
CALCIUM SERPL-MCNC: 8.4 MG/DL — LOW (ref 8.6–10.2)
CHLORIDE SERPL-SCNC: 100 MMOL/L — SIGNIFICANT CHANGE UP (ref 98–107)
CHLORIDE SERPL-SCNC: 106 MMOL/L — SIGNIFICANT CHANGE UP (ref 98–107)
CO2 SERPL-SCNC: 35 MMOL/L — HIGH (ref 22–29)
CO2 SERPL-SCNC: 37 MMOL/L — HIGH (ref 22–29)
CREAT SERPL-MCNC: 0.29 MG/DL — LOW (ref 0.5–1.3)
CREAT SERPL-MCNC: 0.32 MG/DL — LOW (ref 0.5–1.3)
CULTURE RESULTS: SIGNIFICANT CHANGE UP
CULTURE RESULTS: SIGNIFICANT CHANGE UP
GLUCOSE BLDC GLUCOMTR-MCNC: 126 MG/DL — HIGH (ref 70–99)
GLUCOSE BLDC GLUCOMTR-MCNC: 155 MG/DL — HIGH (ref 70–99)
GLUCOSE SERPL-MCNC: 122 MG/DL — HIGH (ref 70–99)
GLUCOSE SERPL-MCNC: 123 MG/DL — HIGH (ref 70–99)
HCT VFR BLD CALC: 28.1 % — LOW (ref 39–50)
HCT VFR BLD CALC: 29.3 % — LOW (ref 39–50)
HCT VFR BLD CALC: 31.1 % — LOW (ref 39–50)
HGB BLD-MCNC: 10 G/DL — LOW (ref 13–17)
HGB BLD-MCNC: 10.4 G/DL — LOW (ref 13–17)
HGB BLD-MCNC: 9.5 G/DL — LOW (ref 13–17)
INR BLD: 1.04 RATIO — SIGNIFICANT CHANGE UP (ref 0.88–1.16)
MAGNESIUM SERPL-MCNC: 1.6 MG/DL — SIGNIFICANT CHANGE UP (ref 1.6–2.6)
MCHC RBC-ENTMCNC: 30.7 PG — SIGNIFICANT CHANGE UP (ref 27–34)
MCHC RBC-ENTMCNC: 31.4 PG — SIGNIFICANT CHANGE UP (ref 27–34)
MCHC RBC-ENTMCNC: 31.6 PG — SIGNIFICANT CHANGE UP (ref 27–34)
MCHC RBC-ENTMCNC: 33.4 GM/DL — SIGNIFICANT CHANGE UP (ref 32–36)
MCHC RBC-ENTMCNC: 33.8 GM/DL — SIGNIFICANT CHANGE UP (ref 32–36)
MCHC RBC-ENTMCNC: 34.1 GM/DL — SIGNIFICANT CHANGE UP (ref 32–36)
MCV RBC AUTO: 91.7 FL — SIGNIFICANT CHANGE UP (ref 80–100)
MCV RBC AUTO: 92.7 FL — SIGNIFICANT CHANGE UP (ref 80–100)
MCV RBC AUTO: 92.7 FL — SIGNIFICANT CHANGE UP (ref 80–100)
PHOSPHATE SERPL-MCNC: 1.8 MG/DL — LOW (ref 2.4–4.7)
PLATELET # BLD AUTO: 161 K/UL — SIGNIFICANT CHANGE UP (ref 150–400)
PLATELET # BLD AUTO: 200 K/UL — SIGNIFICANT CHANGE UP (ref 150–400)
PLATELET # BLD AUTO: 218 K/UL — SIGNIFICANT CHANGE UP (ref 150–400)
POTASSIUM SERPL-MCNC: 3.1 MMOL/L — LOW (ref 3.5–5.3)
POTASSIUM SERPL-MCNC: 3.3 MMOL/L — LOW (ref 3.5–5.3)
POTASSIUM SERPL-SCNC: 3.1 MMOL/L — LOW (ref 3.5–5.3)
POTASSIUM SERPL-SCNC: 3.3 MMOL/L — LOW (ref 3.5–5.3)
PROTHROM AB SERPL-ACNC: 11.8 SEC — SIGNIFICANT CHANGE UP (ref 10–12.9)
RBC # BLD: 3.03 M/UL — LOW (ref 4.2–5.8)
RBC # BLD: 3.16 M/UL — LOW (ref 4.2–5.8)
RBC # BLD: 3.39 M/UL — LOW (ref 4.2–5.8)
RBC # FLD: 14 % — SIGNIFICANT CHANGE UP (ref 10.3–14.5)
RBC # FLD: 14.2 % — SIGNIFICANT CHANGE UP (ref 10.3–14.5)
RBC # FLD: 14.5 % — SIGNIFICANT CHANGE UP (ref 10.3–14.5)
SODIUM SERPL-SCNC: 149 MMOL/L — HIGH (ref 135–145)
SODIUM SERPL-SCNC: 155 MMOL/L — HIGH (ref 135–145)
SPECIMEN SOURCE: SIGNIFICANT CHANGE UP
SPECIMEN SOURCE: SIGNIFICANT CHANGE UP
WBC # BLD: 12.23 K/UL — HIGH (ref 3.8–10.5)
WBC # BLD: 13.76 K/UL — HIGH (ref 3.8–10.5)
WBC # BLD: 18.46 K/UL — HIGH (ref 3.8–10.5)
WBC # FLD AUTO: 12.23 K/UL — HIGH (ref 3.8–10.5)
WBC # FLD AUTO: 13.76 K/UL — HIGH (ref 3.8–10.5)
WBC # FLD AUTO: 18.46 K/UL — HIGH (ref 3.8–10.5)

## 2020-06-03 PROCEDURE — 99231 SBSQ HOSP IP/OBS SF/LOW 25: CPT | Mod: GC

## 2020-06-03 PROCEDURE — 99291 CRITICAL CARE FIRST HOUR: CPT

## 2020-06-03 PROCEDURE — 99232 SBSQ HOSP IP/OBS MODERATE 35: CPT

## 2020-06-03 RX ORDER — POTASSIUM PHOSPHATE, MONOBASIC POTASSIUM PHOSPHATE, DIBASIC 236; 224 MG/ML; MG/ML
15 INJECTION, SOLUTION INTRAVENOUS ONCE
Refills: 0 | Status: COMPLETED | OUTPATIENT
Start: 2020-06-03 | End: 2020-06-03

## 2020-06-03 RX ORDER — POTASSIUM CHLORIDE 20 MEQ
10 PACKET (EA) ORAL
Refills: 0 | Status: COMPLETED | OUTPATIENT
Start: 2020-06-03 | End: 2020-06-03

## 2020-06-03 RX ORDER — HEPARIN SODIUM 5000 [USP'U]/ML
5000 INJECTION INTRAVENOUS; SUBCUTANEOUS EVERY 12 HOURS
Refills: 0 | Status: DISCONTINUED | OUTPATIENT
Start: 2020-06-03 | End: 2020-06-08

## 2020-06-03 RX ORDER — POTASSIUM CHLORIDE 20 MEQ
40 PACKET (EA) ORAL EVERY 4 HOURS
Refills: 0 | Status: COMPLETED | OUTPATIENT
Start: 2020-06-03 | End: 2020-06-04

## 2020-06-03 RX ORDER — MAGNESIUM SULFATE 500 MG/ML
2 VIAL (ML) INJECTION ONCE
Refills: 0 | Status: COMPLETED | OUTPATIENT
Start: 2020-06-03 | End: 2020-06-03

## 2020-06-03 RX ADMIN — MEROPENEM 100 MILLIGRAM(S): 1 INJECTION INTRAVENOUS at 23:13

## 2020-06-03 RX ADMIN — Medication 40 MILLIEQUIVALENT(S): at 21:10

## 2020-06-03 RX ADMIN — Medication 100 MILLIEQUIVALENT(S): at 09:26

## 2020-06-03 RX ADMIN — POTASSIUM PHOSPHATE, MONOBASIC POTASSIUM PHOSPHATE, DIBASIC 62.5 MILLIMOLE(S): 236; 224 INJECTION, SOLUTION INTRAVENOUS at 21:10

## 2020-06-03 RX ADMIN — HEPARIN SODIUM 5000 UNIT(S): 5000 INJECTION INTRAVENOUS; SUBCUTANEOUS at 17:27

## 2020-06-03 RX ADMIN — Medication 100 MILLIEQUIVALENT(S): at 08:21

## 2020-06-03 RX ADMIN — CHLORHEXIDINE GLUCONATE 1 APPLICATION(S): 213 SOLUTION TOPICAL at 05:52

## 2020-06-03 RX ADMIN — MEROPENEM 100 MILLIGRAM(S): 1 INJECTION INTRAVENOUS at 05:52

## 2020-06-03 RX ADMIN — CHLORHEXIDINE GLUCONATE 1 APPLICATION(S): 213 SOLUTION TOPICAL at 11:26

## 2020-06-03 RX ADMIN — Medication 50 GRAM(S): at 21:10

## 2020-06-03 RX ADMIN — Medication 4: at 17:27

## 2020-06-03 RX ADMIN — Medication 100 MILLIEQUIVALENT(S): at 07:36

## 2020-06-03 RX ADMIN — MEROPENEM 100 MILLIGRAM(S): 1 INJECTION INTRAVENOUS at 14:07

## 2020-06-03 RX ADMIN — Medication 1 PACKET(S): at 05:52

## 2020-06-03 RX ADMIN — Medication 5 MILLIGRAM(S): at 21:22

## 2020-06-03 RX ADMIN — CHLORHEXIDINE GLUCONATE 15 MILLILITER(S): 213 SOLUTION TOPICAL at 05:52

## 2020-06-03 RX ADMIN — Medication 1 TABLET(S): at 17:15

## 2020-06-03 RX ADMIN — QUETIAPINE FUMARATE 50 MILLIGRAM(S): 200 TABLET, FILM COATED ORAL at 21:22

## 2020-06-03 RX ADMIN — CHLORHEXIDINE GLUCONATE 15 MILLILITER(S): 213 SOLUTION TOPICAL at 17:15

## 2020-06-03 RX ADMIN — Medication 1 PACKET(S): at 17:15

## 2020-06-03 NOTE — PROGRESS NOTE ADULT - SUBJECTIVE AND OBJECTIVE BOX
INTERVAL HPI/OVERNIGHT EVENTS: Patient with COVID PNA, rectal ulcer bleeding s/p clipping and epi. Hgb Stable. No melena or hematochezia.     ROS wnl     PAST MEDICAL & SURGICAL HISTORY:  No pertinent past medical history  No significant past surgical history      Home Medications:      MEDICATIONS  (STANDING):  chlorhexidine 0.12% Liquid 15 milliLiter(s) Oral Mucosa every 12 hours  chlorhexidine 2% Cloths 1 Application(s) Topical daily  chlorhexidine 4% Liquid 1 Application(s) Topical <User Schedule>  dextrose 5%. 1000 milliLiter(s) (50 mL/Hr) IV Continuous <Continuous>  heparin   Injectable 5000 Unit(s) SubCutaneous every 12 hours  insulin lispro (HumaLOG) corrective regimen sliding scale   SubCutaneous every 6 hours  melatonin 5 milliGRAM(s) Oral at bedtime  meropenem  IVPB 1000 milliGRAM(s) IV Intermittent every 8 hours  multivitamin/minerals 1 Tablet(s) Oral daily  potassium chloride  10 mEq/100 mL IVPB 10 milliEquivalent(s) IV Intermittent every 1 hour  psyllium Powder 1 Packet(s) Oral two times a day  QUEtiapine 50 milliGRAM(s) Oral at bedtime    MEDICATIONS  (PRN):  acetaminophen    Suspension .. 650 milliGRAM(s) Oral every 6 hours PRN Temp greater or equal to 38.5C (101.3F)  sodium chloride 0.9% lock flush 10 milliLiter(s) IV Push every 1 hour PRN Pre/post blood products, medications, blood draw, and to maintain line patency      Allergies    No Known Allergies    Intolerances      PHYSICAL EXAM:   Vital Signs:  Vital Signs Last 24 Hrs  T(C): 37.2 (03 Jun 2020 08:06), Max: 37.2 (03 Jun 2020 00:00)  T(F): 99 (03 Jun 2020 08:06), Max: 99 (03 Jun 2020 08:06)  HR: 86 (03 Jun 2020 09:23) (59 - 90)  BP: --  BP(mean): --  RR: 34 (03 Jun 2020 09:00) (30 - 41)  SpO2: 97% (03 Jun 2020 09:23) (94% - 100%)  Daily     Daily         GENERAL:  no distress  HEENT:  NC/AT,  anicteric  ABDOMEN:  Soft, non-tender, non-distended, normoactive bowel sounds  EXTREMITIES  no cyanosis      LABS:                        10.4   13.76 )-----------( 200      ( 03 Jun 2020 05:00 )             31.1       Hemoglobin: 10.4 g/dL (06-03-20 @ 05:00)  Hemoglobin: 9.5 g/dL (06-03-20 @ 00:49)  Hemoglobin: 9.6 g/dL (06-02-20 @ 16:39)  Hemoglobin: 9.3 g/dL (06-02-20 @ 10:29)  Hemoglobin: 9.6 g/dL (06-02-20 @ 06:05)  Hemoglobin: 10.1 g/dL (06-02-20 @ 02:18)  Hemoglobin: 10.8 g/dL (06-01-20 @ 22:32)  Hemoglobin: 9.2 g/dL (06-01-20 @ 18:02)  Hemoglobin: 8.1 g/dL (06-01-20 @ 10:53)  Hemoglobin: 7.9 g/dL (06-01-20 @ 03:20)  Hemoglobin: 7.2 g/dL (06-01-20 @ 00:13)      06-03    149<H>  |  106  |  16.0  ----------------------------<  123<H>  3.1<L>   |  35.0<H>  |  0.32<L>    Ca    8.4<L>      03 Jun 2020 05:17  Phos  3.2     06-02  Mg     1.9     06-02        INR: 1.04 ratio (06-03-20 @ 07:50)  INR: 1.08 ratio (06-02-20 @ 06:05)  INR: 1.44 ratio (06-01-20 @ 10:53)  INR: 1.20 ratio (06-01-20 @ 04:50)  INR: 1.24 ratio (06-01-20 @ 00:12)      RADIOLOGY & ADDITIONAL TESTS:  < from: CT Angio Abdomen and Pelvis w/ IV Cont (06.01.20 @ 03:43) >    FINDINGS:    LUNG BASES:  There are trace bilateral pleural effusions with associated subsegmental atelectasis.  PERITONEUM:  There is no free air or focal collection.  Trace amount of free fluid.  LIVER: Normal.  SPLEEN: Normal.  GALLBLADDER: Contracted with gallstones.  BILIARY TREE: Unremarkable.  PANCREAS: Normal.  ADRENAL GLANDS: Normal.  KIDNEYS: Mild right hydronephrosis without obstructing stone.  .  BOWEL: Gastrostomy tube in place. The stomach is incompletely distended. Mildly dilated loops of small bowel. There is no small bowel obstruction or diverticulitis. The appendix is unremarkable. There is an appendicolith at the cecal base. Diverticulosis. Mild thickening of the sigmoid colon and rectum. There is intraluminal accumulation of contrast in the rectum compatible with a focus of active GI bleeding.    URINARY BLADDER: Decompressed by Escalera catheter.  PELVIC ORGANS: The prostate gland is not enlarged.    There is no significant adenopathy.  VASCULATURE: Unremarkable.  RETROPERITONEUM:  There is no mass.  BONES: Unremarkable.  ABDOMINAL WALL: Unremarkable.    IMPRESSION:    Intraluminal accumulation of contrast in the rectum indicative of active gastrointestinal bleeding. Mild colitis involving sigmoid colon and rectum.  Findings discussed with DAVID Durham at  4 am on 6/1/2020 with [facility]BV.    Gallstones.  Mild right hydronephrosis.    < end of copied text >

## 2020-06-03 NOTE — PROGRESS NOTE ADULT - SUBJECTIVE AND OBJECTIVE BOX
Vascular Surgery Follow up    IVC filter placement canceled today    Recommend to start prophylactic heparinization    Placement of IVC filter in a patient with a recent history of covid19  may result in a high chance for caval thrombosis     Dr Rosario discussed the plan  with Dr Meek (GI)    ICU team made aware

## 2020-06-03 NOTE — PROGRESS NOTE ADULT - SUBJECTIVE AND OBJECTIVE BOX
SUBJECTIVE: DIANA overnight. Pain is well controlled. No bloody bowel movements overnight.       MEDICATIONS  (STANDING):  chlorhexidine 0.12% Liquid 15 milliLiter(s) Oral Mucosa every 12 hours  chlorhexidine 2% Cloths 1 Application(s) Topical daily  chlorhexidine 4% Liquid 1 Application(s) Topical <User Schedule>  dextrose 5%. 1000 milliLiter(s) (50 mL/Hr) IV Continuous <Continuous>  insulin lispro (HumaLOG) corrective regimen sliding scale   SubCutaneous every 6 hours  melatonin 5 milliGRAM(s) Oral at bedtime  meropenem  IVPB 1000 milliGRAM(s) IV Intermittent every 8 hours  multivitamin/minerals 1 Tablet(s) Oral daily  psyllium Powder 1 Packet(s) Oral two times a day  QUEtiapine 50 milliGRAM(s) Oral at bedtime    MEDICATIONS  (PRN):  acetaminophen    Suspension .. 650 milliGRAM(s) Oral every 6 hours PRN Temp greater or equal to 38.5C (101.3F)  sodium chloride 0.9% lock flush 10 milliLiter(s) IV Push every 1 hour PRN Pre/post blood products, medications, blood draw, and to maintain line patency      Vital Signs Last 24 Hrs  T(C): 37.1 (02 Jun 2020 20:00), Max: 37.2 (02 Jun 2020 07:46)  T(F): 98.7 (02 Jun 2020 20:00), Max: 99 (02 Jun 2020 07:46)  HR: 62 (02 Jun 2020 23:00) (59 - 80)  BP: --  BP(mean): --  RR: 30 (02 Jun 2020 23:00) (30 - 30)  SpO2: 100% (02 Jun 2020 23:00) (99% - 100%)    PE  GEN: NAD  Chest: non-tender  CV:  +s1/s2, non-tachycardic  Pulm: ventilated   GI: soft, non tender  MSK: moving all extremities       I&O's Detail    01 Jun 2020 07:01  -  02 Jun 2020 07:00  --------------------------------------------------------  IN:    dextrose 5%.: 840 mL    Free Water: 1500 mL    norepinephrine Infusion: 37.5 mL    Packed Red Blood Cells: 900 mL    Plasma: 249 mL    sodium chloride 0.45%: 300 mL    Solution: 900 mL    Solution: 150 mL  Total IN: 4876.5 mL    OUT:    Estimated Blood Loss: 1100 mL    Indwelling Catheter - Urethral: 1680 mL  Total OUT: 2780 mL    Total NET: 2096.5 mL      02 Jun 2020 07:01  -  03 Jun 2020 00:18  --------------------------------------------------------  IN:    dextrose 5%.: 850 mL    Free Water: 1600 mL    Solution: 100 mL  Total IN: 2550 mL    OUT:    Indwelling Catheter - Urethral: 1750 mL  Total OUT: 1750 mL    Total NET: 800 mL          LABS:                        9.6    12.63 )-----------( 152      ( 02 Jun 2020 16:39 )             28.6     06-02    150<H>  |  110<H>  |  31.0<H>  ----------------------------<  124<H>  4.5   |  36.0<H>  |  0.46<L>    Ca    8.1<L>      02 Jun 2020 06:05  Phos  3.2     06-02  Mg     1.9     06-02      PT/INR - ( 02 Jun 2020 06:05 )   PT: 12.2 sec;   INR: 1.08 ratio         PTT - ( 02 Jun 2020 06:05 )  PTT:31.1 sec      RADIOLOGY & ADDITIONAL STUDIES:

## 2020-06-03 NOTE — PROGRESS NOTE ADULT - ASSESSMENT
63yo male with ventilator dependent respiratory failure 2/2 COVID PNA w/LGIB 2/2 rectal ulcer, s/p rigid sig, clipping of vessel and epinephrine    -continue to trend H/H  -bleeding appears to be stopped, no acute surgical intervention warranted at this time, will sign off. Please re-consult as needed.  -rest of care per primary team 63yo male with ventilator dependent respiratory failure 2/2 COVID PNA w/LGIB 2/2 rectal ulcer, s/p rigid sig, clipping of vessel and epinephrine    -continue to trend H/H  -bleeding appears to be stopped, no acute surgical intervention warranted at this time, will sign off. Please re-consult as needed.  -rest of care per primary team    Surgery will sign off.

## 2020-06-03 NOTE — PROGRESS NOTE ADULT - ATTENDING COMMENTS
Stable H/H over the last 48 hrs s/p clipping of visible vessel in rectum.  Primary mgmt per GI.  Please reconsult as needed. Thanks.

## 2020-06-03 NOTE — PROGRESS NOTE ADULT - SUBJECTIVE AND OBJECTIVE BOX
Patient is a 64y old  Male who presents with a chief complaint of shortness of breath (03 Jun 2020 00:17)      BRIEF HOSPITAL COURSE:  No PMHx;  admitted 4/18 for worsening shortness of breath and s/p intubated since 5/1;   ARF and evidence of dehydration    + Covid 19 pneumonia.   + segmental pulmonary embolus for which he was treated with hepatin  and changed to eliquis  + superimposed acinetobacter pneumonia.   S/p Trach and PEG;   6/1 with brisk rectal bleeding after dignicare removal  and hypovolemic shock s/p  13 units in total of PRBCs; 10 Units of FFP; KCentra 2g and platelets x 2 and placed on Levophed    Stat Abd/Pelv CTA just performed shows bleeding in rectum.   Patient had proctoscopy and found to have rectal ulcer for which 3 clips placed + Epi 4cc given  6/2 - off pressors  6/3 - IVC filter on hold due to high risk of filter thrombosis in COVIDs;      Events last 24 hours: improved mental status; stable hemoglobin    PAST MEDICAL & SURGICAL HISTORY:  No pertinent past medical history  No significant past surgical history    Allergies    No Known Allergies    Intolerances      FAMILY HISTORY:  FH: hypertension      Review of Systems:  unable to verbalize    Medications:  meropenem  IVPB 1000 milliGRAM(s) IV Intermittent every 8 hours        acetaminophen    Suspension .. 650 milliGRAM(s) Oral every 6 hours PRN  melatonin 5 milliGRAM(s) Oral at bedtime  QUEtiapine 50 milliGRAM(s) Oral at bedtime        psyllium Powder 1 Packet(s) Oral two times a day      insulin lispro (HumaLOG) corrective regimen sliding scale   SubCutaneous every 6 hours    dextrose 5%. 1000 milliLiter(s) IV Continuous <Continuous>  multivitamin/minerals 1 Tablet(s) Oral daily  potassium chloride  10 mEq/100 mL IVPB 10 milliEquivalent(s) IV Intermittent every 1 hour  sodium chloride 0.9% lock flush 10 milliLiter(s) IV Push every 1 hour PRN      chlorhexidine 0.12% Liquid 15 milliLiter(s) Oral Mucosa every 12 hours  chlorhexidine 2% Cloths 1 Application(s) Topical daily  chlorhexidine 4% Liquid 1 Application(s) Topical <User Schedule>        Mode: AC/ CMV (Assist Control/ Continuous Mandatory Ventilation)  RR (machine): 30  TV (machine): 330  FiO2: 40  PEEP: 8  MAP: 14  PIP: 31      ICU Vital Signs Last 24 Hrs  T(C): 37.2 (03 Jun 2020 08:06), Max: 37.2 (03 Jun 2020 00:00)  T(F): 99 (03 Jun 2020 08:06), Max: 99 (03 Jun 2020 08:06)  HR: 88 (03 Jun 2020 06:00) (59 - 88)  BP: --  BP(mean): --  ABP: 151/63 (03 Jun 2020 06:00) (106/54 - 151/63)  ABP(mean): 90 (03 Jun 2020 06:00) (70 - 93)  RR: 30 (03 Jun 2020 06:00) (30 - 30)  SpO2: 98% (03 Jun 2020 06:00) (95% - 100%)    Vital Signs Last 24 Hrs  T(C): 37.2 (03 Jun 2020 08:06), Max: 37.2 (03 Jun 2020 00:00)  T(F): 99 (03 Jun 2020 08:06), Max: 99 (03 Jun 2020 08:06)  HR: 88 (03 Jun 2020 06:00) (59 - 88)  BP: --  BP(mean): --  RR: 30 (03 Jun 2020 06:00) (30 - 30)  SpO2: 98% (03 Jun 2020 06:00) (95% - 100%)        I&O's Detail    02 Jun 2020 07:01  -  03 Jun 2020 07:00  --------------------------------------------------------  IN:    dextrose 5%.: 1150 mL    Free Water: 2400 mL    Solution: 150 mL  Total IN: 3700 mL    OUT:    Indwelling Catheter - Urethral: 3100 mL  Total OUT: 3100 mL    Total NET: 600 mL            LABS:                        10.4   13.76 )-----------( 200      ( 03 Jun 2020 05:00 )             31.1     06-03    149<H>  |  106  |  16.0  ----------------------------<  123<H>  3.1<L>   |  35.0<H>  |  0.32<L>    Ca    8.4<L>      03 Jun 2020 05:17  Phos  3.2     06-02  Mg     1.9     06-02            CAPILLARY BLOOD GLUCOSE      POCT Blood Glucose.: 114 mg/dL (02 Jun 2020 17:44)    PT/INR - ( 03 Jun 2020 07:50 )   PT: 11.8 sec;   INR: 1.04 ratio         PTT - ( 03 Jun 2020 07:50 )  PTT:31.3 sec    CULTURES:  C Diff by PCR Result: NotDetec (05-29 @ 14:40)  Culture Results:   No growth at 48 hours (05-29 @ 11:56)  Culture Results:   No growth at 48 hours (05-29 @ 11:56)      Physical Examination:    General: No acute distress.  responds to verbal commands via     HEENT: Pupils equal, reactive to light.  Symmetric.    PULM: S/p trach; no wheezing    CVS: Regular rate and rhythm, no murmurs, rubs, or gallops    ABD: Soft, nondistended, nontender, normoactive bowel sounds, no masses    EXT: No edema, nontender    SKIN: Warm and well perfused, no rashes noted.    RADIOLOGY: no new imaging;  SKIN: Warm and well perfused, no rashes noted.

## 2020-06-03 NOTE — PROGRESS NOTE ADULT - ASSESSMENT
IMPRESSION:  - COVID 19 with prolonged respiratory failure s/p Trach; PEG;  - Hx of PEs on CTA ( segmental on 4/21) on Eliquis - off 2/2 Rectal bleed -   - Hx of segmental PE now off Anticoagulation;  + Acenitobacter PNA  + 6/1 - Rectal bleed 2/2 ulcer s/p proctoscopy with clipping; s/p MTP 13 Units PRBC; 10 FFP; Kcentra; Platelets x 2 with hypovolemic shock ( resolved 6/1)  in Shock on Levophed  - Hypernatremia  - due to Rectal bleed and PE, IVC filter considered, but ultimately felt to be even higher risk of filter Thrombosis in COVID patient, so on serial LE dopplers + Heparin sq    PLAN:    Neuro -responding well; on Seroquel PO; Melatonin; in light of Lower GI bleed will  ok to give meds PEG;    CV - Hemodynamically stable over 24 hours.  change CBC to q12  Echo 5/3 - normal LVEF; grade 1 diast dysfunction;  A line in place;    GI - resume feeds;    s/p clipping of rectal ulcer bleed s/p clips + Epi - stable;  GI input appreciated;     Pulm - on AC 40%/8; ABG noted on 100%;    will do SBT today;  Hx of PE - unable to be on full dose A/C at this time due to rectal bleed but does have segmental PE on CTA;   noted LE doppler negative;  will place compression stockings;   At this point plan: serial LE doppler q 4 days + SQ Heparin as per   Discussion between myself, GI ( Dr. Meek) and VAsc[facility] ( Dr. Cannon)  IVC filter is itself highly prothrombotic in COVID and the risk appear to be prohibitive.    Hemo - change h/h to q12 hrs; INR stable;    ID - Afebrile; WBC 13 + Acinetobacter in sputum - Merrem till 6/4;    DVT prophylaxis - start Heparin sq today + LE doppler q 4 day    Renal - cr stable at 0.46   K repleted  Ca repleted  Na 153 -->150 --> 149 -c/w D5 at 50 cc/hr; BMP q12s; goal 10-12 meq/24 hrs; c/w free water boluses;    Endo - glu goal < 180;     PT/OT    Code status - full    Lines - [facility] cordis 6/1    Case was d/w night CCM team; Vascular ( Dr. Cannon); GI ( Dr. Meek)  updated family's friend ( emerg contact) Yovani Sena [phone]

## 2020-06-03 NOTE — PROGRESS NOTE ADULT - PROBLEM SELECTOR PLAN 1
Significant life threatening bleeding.  s/p 13 U PRBCs. s/p flexible sigmoidoscopy rectal ulcer with visible vessel s/p clipping and epinephrine injection. No active bleeding.   monitor cbc  ideally we would hold anticoagulation for at least a week due to the high risk of rebleeding on anticoagulation.  after multiple discussions with my GI colleagues, vascular surgery and critical care the patient has a PE and is a high risk for DVTs; consensus was trial of heparin SQ BID

## 2020-06-03 NOTE — CHART NOTE - NSCHARTNOTEFT_GEN_A_CORE
Source: Patient [ ]  Family [ ]   other [ x]    Current Diet: Diet, NPO with Tube Feed:   Tube Feeding Modality: Gastrostomy  Pivot 1.5 Raj  Total Volume for 24 Hours (mL): 960  Continuous  Starting Tube Feed Rate {mL per Hour}: 20  Increase Tube Feed Rate by (mL): 10     Every hour  Until Goal Tube Feed Rate (mL per Hour): 40  Tube Feed Duration (in Hours): 24  Tube Feed Start Time: 10:00 (06-03-20 @ 09:45)    Enteral /Parenteral Nutrition: Tube feeds at 40 ml/hr (x20 hrs) provides 800 ml, 1200 kcal, 75g protein, 607 ml free water, and ~80% of RDIs for vitamins/minerals.     Current Weight:   (5/18) 170.8 lbs  (4/18) 154.9 lbs  ? accuracy of weights, noted with 1+ generalized edema, continue to trend and maintain strict Is&Os     Pertinent Medications: MEDICATIONS  (STANDING):  chlorhexidine 0.12% Liquid 15 milliLiter(s) Oral Mucosa every 12 hours  chlorhexidine 2% Cloths 1 Application(s) Topical daily  chlorhexidine 4% Liquid 1 Application(s) Topical <User Schedule>  dextrose 5%. 1000 milliLiter(s) (50 mL/Hr) IV Continuous <Continuous>  heparin   Injectable 5000 Unit(s) SubCutaneous every 12 hours  insulin lispro (HumaLOG) corrective regimen sliding scale   SubCutaneous every 6 hours  melatonin 5 milliGRAM(s) Oral at bedtime  meropenem  IVPB 1000 milliGRAM(s) IV Intermittent every 8 hours  multivitamin/minerals 1 Tablet(s) Oral daily  potassium chloride  10 mEq/100 mL IVPB 10 milliEquivalent(s) IV Intermittent every 1 hour  psyllium Powder 1 Packet(s) Oral two times a day  QUEtiapine 50 milliGRAM(s) Oral at bedtime    MEDICATIONS  (PRN):  acetaminophen    Suspension .. 650 milliGRAM(s) Oral every 6 hours PRN Temp greater or equal to 38.5C (101.3F)  sodium chloride 0.9% lock flush 10 milliLiter(s) IV Push every 1 hour PRN Pre/post blood products, medications, blood draw, and to maintain line patency    Pertinent Labs: CBC Full  -  ( 03 Jun 2020 05:00 )  WBC Count : 13.76 K/uL  RBC Count : 3.39 M/uL  Hemoglobin : 10.4 g/dL  Hematocrit : 31.1 %  Platelet Count - Automated : 200 K/uL  Mean Cell Volume : 91.7 fl  Mean Cell Hemoglobin : 30.7 pg  Mean Cell Hemoglobin Concentration : 33.4 gm/dL  Auto Neutrophil # : x  Auto Lymphocyte # : x  Auto Monocyte # : x  Auto Eosinophil # : x  Auto Basophil # : x  Auto Neutrophil % : x  Auto Lymphocyte % : x  Auto Monocyte % : x  Auto Eosinophil % : x  Auto Basophil % : x    06-03 Na149 mmol/L<H> Glu 123 mg/dL<H> K+ 3.1 mmol/L<L> Cr  0.32 mg/dL<L> BUN 16.0 mg/dL Phos n/a   Alb n/a   PAB n/a       Skin: Intact per documentation    Nutrition focused physical exam not conducted at this time - found signs of malnutrition [ ]absent [ ]present    Subcutaneous fat loss: [ ] Orbital fat pads region, [ ]Buccal fat region, [ ]Triceps region,  [ ]Ribs region    Muscle wasting: [ ]Temples region, [ ]Clavicle region, [ ]Shoulder region, [ ]Scapula region, [ ]Interosseous region,  [ ]thigh region, [ ]Calf region    Estimated Needs:   [ x] no change since previous assessment  [ ] recalculated:     Current Nutrition Diagnosis: Pt remains at high nutrition risk secondary malnutrition (severe, acute) related to inability to meet sufficient protein-energy requirements in setting of acute hypoxic respiratory failure, R Segmental PE, Severe ARDS secondary to COVID 19, rapid response requiring intubation on 5/1, s/p trach/PEG and altered GI function secondary to diarrhea as evidenced by meeting <50% nutrient needs >5 days and +fluid accumulation. Pt continues to receive tube feeds via PEG. Noted continues to have diarrhea.     Recommendations:   1) As medically feasible, increase tube feeds to goal rate of 65 ml/hr (x20 hrs) to provide 1300 ml, 1950 kcal, 122g protein, 987 ml free water, and >100% of RDIs for vitamins/minerals. Additional free water per MD discretion.   2) Discontinue Metamucil and add Banatrol TID.  3) Continue MVI daily.  4) Rx: Bacid to support gut integrity.  5) Obtain daily weights to monitor trends.    Monitoring and Evaluation:   [ ] PO intake [x ] Tolerance to diet prescription [X] Weights  [X] Follow up per protocol [X] Labs.

## 2020-06-04 LAB
ANION GAP SERPL CALC-SCNC: 8 MMOL/L — SIGNIFICANT CHANGE UP (ref 5–17)
BASE EXCESS BLDA CALC-SCNC: 15.6 MMOL/L — HIGH (ref -3–3)
BLOOD GAS COMMENTS ARTERIAL: SIGNIFICANT CHANGE UP
BUN SERPL-MCNC: 11 MG/DL — SIGNIFICANT CHANGE UP (ref 8–20)
CALCIUM SERPL-MCNC: 7.9 MG/DL — LOW (ref 8.6–10.2)
CHLORIDE SERPL-SCNC: 102 MMOL/L — SIGNIFICANT CHANGE UP (ref 98–107)
CO2 SERPL-SCNC: 34 MMOL/L — HIGH (ref 22–29)
CREAT SERPL-MCNC: 0.25 MG/DL — LOW (ref 0.5–1.3)
GAS PNL BLDA: SIGNIFICANT CHANGE UP
GLUCOSE BLDC GLUCOMTR-MCNC: 112 MG/DL — HIGH (ref 70–99)
GLUCOSE BLDC GLUCOMTR-MCNC: 136 MG/DL — HIGH (ref 70–99)
GLUCOSE BLDC GLUCOMTR-MCNC: 142 MG/DL — HIGH (ref 70–99)
GLUCOSE BLDC GLUCOMTR-MCNC: 183 MG/DL — HIGH (ref 70–99)
GLUCOSE SERPL-MCNC: 111 MG/DL — HIGH (ref 70–99)
HCO3 BLDA-SCNC: 40 MMOL/L — HIGH (ref 20–26)
HCT VFR BLD CALC: 29.3 % — LOW (ref 39–50)
HGB BLD-MCNC: 9.7 G/DL — LOW (ref 13–17)
HOROWITZ INDEX BLDA+IHG-RTO: 30 — SIGNIFICANT CHANGE UP
MAGNESIUM SERPL-MCNC: 2.1 MG/DL — SIGNIFICANT CHANGE UP (ref 1.6–2.6)
MCHC RBC-ENTMCNC: 31 PG — SIGNIFICANT CHANGE UP (ref 27–34)
MCHC RBC-ENTMCNC: 33.1 GM/DL — SIGNIFICANT CHANGE UP (ref 32–36)
MCV RBC AUTO: 93.6 FL — SIGNIFICANT CHANGE UP (ref 80–100)
PCO2 BLDA: 60 MMHG — HIGH (ref 35–45)
PH BLDA: 7.45 — SIGNIFICANT CHANGE UP (ref 7.35–7.45)
PHOSPHATE SERPL-MCNC: 2.5 MG/DL — SIGNIFICANT CHANGE UP (ref 2.4–4.7)
PLATELET # BLD AUTO: 233 K/UL — SIGNIFICANT CHANGE UP (ref 150–400)
PO2 BLDA: 64 MMHG — LOW (ref 83–108)
POTASSIUM SERPL-MCNC: 4.1 MMOL/L — SIGNIFICANT CHANGE UP (ref 3.5–5.3)
POTASSIUM SERPL-SCNC: 4.1 MMOL/L — SIGNIFICANT CHANGE UP (ref 3.5–5.3)
RBC # BLD: 3.13 M/UL — LOW (ref 4.2–5.8)
RBC # FLD: 14.2 % — SIGNIFICANT CHANGE UP (ref 10.3–14.5)
SAO2 % BLDA: 95 % — SIGNIFICANT CHANGE UP (ref 95–99)
SODIUM SERPL-SCNC: 144 MMOL/L — SIGNIFICANT CHANGE UP (ref 135–145)
WBC # BLD: 16.08 K/UL — HIGH (ref 3.8–10.5)
WBC # FLD AUTO: 16.08 K/UL — HIGH (ref 3.8–10.5)

## 2020-06-04 PROCEDURE — 99233 SBSQ HOSP IP/OBS HIGH 50: CPT

## 2020-06-04 PROCEDURE — 74018 RADEX ABDOMEN 1 VIEW: CPT | Mod: 26

## 2020-06-04 RX ORDER — LACTOBACILLUS ACIDOPHILUS 100MM CELL
1 CAPSULE ORAL
Refills: 0 | Status: DISCONTINUED | OUTPATIENT
Start: 2020-06-04 | End: 2020-06-09

## 2020-06-04 RX ORDER — MORPHINE SULFATE 50 MG/1
2 CAPSULE, EXTENDED RELEASE ORAL ONCE
Refills: 0 | Status: DISCONTINUED | OUTPATIENT
Start: 2020-06-04 | End: 2020-06-04

## 2020-06-04 RX ADMIN — Medication 5 MILLIGRAM(S): at 21:12

## 2020-06-04 RX ADMIN — Medication 4: at 01:00

## 2020-06-04 RX ADMIN — Medication 1 PACKET(S): at 17:21

## 2020-06-04 RX ADMIN — Medication 1 TABLET(S): at 05:22

## 2020-06-04 RX ADMIN — CHLORHEXIDINE GLUCONATE 15 MILLILITER(S): 213 SOLUTION TOPICAL at 05:21

## 2020-06-04 RX ADMIN — CHLORHEXIDINE GLUCONATE 15 MILLILITER(S): 213 SOLUTION TOPICAL at 17:22

## 2020-06-04 RX ADMIN — SODIUM CHLORIDE 50 MILLILITER(S): 9 INJECTION, SOLUTION INTRAVENOUS at 17:46

## 2020-06-04 RX ADMIN — Medication 1 TABLET(S): at 17:21

## 2020-06-04 RX ADMIN — HEPARIN SODIUM 5000 UNIT(S): 5000 INJECTION INTRAVENOUS; SUBCUTANEOUS at 17:21

## 2020-06-04 RX ADMIN — MORPHINE SULFATE 2 MILLIGRAM(S): 50 CAPSULE, EXTENDED RELEASE ORAL at 03:30

## 2020-06-04 RX ADMIN — CHLORHEXIDINE GLUCONATE 1 APPLICATION(S): 213 SOLUTION TOPICAL at 13:59

## 2020-06-04 RX ADMIN — Medication 1 PACKET(S): at 05:22

## 2020-06-04 RX ADMIN — Medication 40 MILLIEQUIVALENT(S): at 02:20

## 2020-06-04 RX ADMIN — HEPARIN SODIUM 5000 UNIT(S): 5000 INJECTION INTRAVENOUS; SUBCUTANEOUS at 05:22

## 2020-06-04 RX ADMIN — CHLORHEXIDINE GLUCONATE 1 APPLICATION(S): 213 SOLUTION TOPICAL at 05:21

## 2020-06-04 RX ADMIN — QUETIAPINE FUMARATE 50 MILLIGRAM(S): 200 TABLET, FILM COATED ORAL at 21:12

## 2020-06-04 RX ADMIN — Medication 1 TABLET(S): at 17:30

## 2020-06-04 NOTE — PROGRESS NOTE ADULT - SUBJECTIVE AND OBJECTIVE BOX
Pt seen and examined. Today's Hb noted stable at 9.7 grams. Status post endoscopic clipping of rectal ulcer. No further rectal bleeding.    REVIEW OF SYSTEMS:  Unobtainable in this pt. who is intubated and trached.      MEDICATIONS:  MEDICATIONS  (STANDING):  chlorhexidine 0.12% Liquid 15 milliLiter(s) Oral Mucosa every 12 hours  chlorhexidine 2% Cloths 1 Application(s) Topical daily  chlorhexidine 4% Liquid 1 Application(s) Topical <User Schedule>  dextrose 5%. 1000 milliLiter(s) (50 mL/Hr) IV Continuous <Continuous>  heparin   Injectable 5000 Unit(s) SubCutaneous every 12 hours  insulin lispro (HumaLOG) corrective regimen sliding scale   SubCutaneous every 6 hours  lactobacillus acidophilus 1 Tablet(s) Oral two times a day  melatonin 5 milliGRAM(s) Oral at bedtime  multivitamin/minerals 1 Tablet(s) Oral daily  psyllium Powder 1 Packet(s) Oral two times a day  QUEtiapine 50 milliGRAM(s) Oral at bedtime    MEDICATIONS  (PRN):  acetaminophen    Suspension .. 650 milliGRAM(s) Oral every 6 hours PRN Temp greater or equal to 38.5C (101.3F)  sodium chloride 0.9% lock flush 10 milliLiter(s) IV Push every 1 hour PRN Pre/post blood products, medications, blood draw, and to maintain line patency      Allergies    No Known Allergies    Intolerances        Vital Signs Last 24 Hrs  T(C): 37.6 (04 Jun 2020 04:00), Max: 37.6 (03 Jun 2020 16:00)  T(F): 99.6 (04 Jun 2020 04:00), Max: 99.6 (03 Jun 2020 16:00)  HR: 77 (04 Jun 2020 08:00) (75 - 111)  BP: --  BP(mean): --  RR: 30 (04 Jun 2020 08:00) (24 - 34)  SpO2: 99% (04 Jun 2020 08:00) (94% - 100%)    06-03 @ 07:01  -  06-04 @ 07:00  --------------------------------------------------------  IN: 4480 mL / OUT: 2410 mL / NET: 2070 mL        PHYSICAL EXAM:    General: Well developed; well nourished; in no acute distress  HEENT: MMM, conjunctiva pink and sclera anicteric.  Lungs: clear to auscultation bilaterally.  Cor: RRR S1, S2 only.  Gastrointestinal: Abdomen: Soft non-tender non-distended; Normal bowel sounds; No hepatosplenomegaly.  Extremities: no cyanosis, clubbing or edema.  Skin: Warm and dry. No obvious rash.    LABS:  CBC Full  -  ( 04 Jun 2020 05:59 )  WBC Count : 16.08 K/uL  RBC Count : 3.13 M/uL  Hemoglobin : 9.7 g/dL  Hematocrit : 29.3 %  Platelet Count - Automated : 233 K/uL  Mean Cell Volume : 93.6 fl  Mean Cell Hemoglobin : 31.0 pg  Mean Cell Hemoglobin Concentration : 33.1 gm/dL  Auto Neutrophil # : x  Auto Lymphocyte # : x  Auto Monocyte # : x  Auto Eosinophil # : x  Auto Basophil # : x  Auto Neutrophil % : x  Auto Lymphocyte % : x  Auto Monocyte % : x  Auto Eosinophil % : x  Auto Basophil % : x    06-04    144  |  102  |  11.0  ----------------------------<  111<H>  4.1   |  34.0<H>  |  0.25<L>    Ca    7.9<L>      04 Jun 2020 05:59  Phos  2.5     06-04  Mg     2.1     06-04      PT/INR - ( 03 Jun 2020 07:50 )   PT: 11.8 sec;   INR: 1.04 ratio         PTT - ( 03 Jun 2020 07:50 )  PTT:31.3 sec                  RADIOLOGY & ADDITIONAL STUDIES (The following images were personally reviewed):

## 2020-06-04 NOTE — PROGRESS NOTE ADULT - SUBJECTIVE AND OBJECTIVE BOX
Patient is a 64y old  Male who presents with a chief complaint of shortness of breath (04 Jun 2020 08:11)      BRIEF HOSPITAL COURSE:  No PMHx;  admitted 4/18 for worsening shortness of breath and s/p intubated since 5/1;   ARF and evidence of dehydration    + Covid 19 pneumonia.   + segmental pulmonary embolus for which he was treated with hepatin  and changed to eliquis  + superimposed acinetobacter pneumonia.   S/p Trach and PEG;   6/1 with brisk rectal bleeding after dignicare removal  and hypovolemic shock s/p  13 units in total of PRBCs; 10 Units of FFP; KCentra 2g and platelets x 2 and placed on Levophed    Stat Abd/Pelv CTA just performed shows bleeding in rectum.   Patient had proctoscopy and found to have rectal ulcer for which 3 clips placed + Epi 4cc given  6/2 - off pressors  6/3 - IVC filter on hold due to high risk of filter thrombosis in COVIDs; placed on serial LE dopplers q4h + Heparin BID;  Tolerating CPAP trials;    Events last 24 hours:  did well on CPAP; was placed on vent overnight.    PAST MEDICAL & SURGICAL HISTORY:  No pertinent past medical history  No significant past surgical history    Allergies    No Known Allergies    Intolerances      FAMILY HISTORY:  FH: hypertension      Review of Systems:      Medications:        acetaminophen    Suspension .. 650 milliGRAM(s) Oral every 6 hours PRN  melatonin 5 milliGRAM(s) Oral at bedtime  QUEtiapine 50 milliGRAM(s) Oral at bedtime      heparin   Injectable 5000 Unit(s) SubCutaneous every 12 hours    psyllium Powder 1 Packet(s) Oral two times a day      insulin lispro (HumaLOG) corrective regimen sliding scale   SubCutaneous every 6 hours    dextrose 5%. 1000 milliLiter(s) IV Continuous <Continuous>  multivitamin/minerals 1 Tablet(s) Oral daily  sodium chloride 0.9% lock flush 10 milliLiter(s) IV Push every 1 hour PRN      chlorhexidine 0.12% Liquid 15 milliLiter(s) Oral Mucosa every 12 hours  chlorhexidine 2% Cloths 1 Application(s) Topical daily  chlorhexidine 4% Liquid 1 Application(s) Topical <User Schedule>    lactobacillus acidophilus 1 Tablet(s) Oral two times a day      Mode: CPAP with PS  FiO2: 30  PEEP: 5  PS: 15  MAP: 10      ICU Vital Signs Last 24 Hrs  T(C): 36.8 (04 Jun 2020 08:11), Max: 37.6 (03 Jun 2020 16:00)  T(F): 98.3 (04 Jun 2020 08:11), Max: 99.6 (03 Jun 2020 16:00)  HR: 84 (04 Jun 2020 10:00) (75 - 111)  BP: --  BP(mean): --  ABP: 131/64 (04 Jun 2020 10:00) (119/58 - 155/73)  ABP(mean): 84 (04 Jun 2020 10:00) (62 - 103)  RR: 18 (04 Jun 2020 10:00) (18 - 34)  SpO2: 94% (04 Jun 2020 10:00) (91% - 100%)    Vital Signs Last 24 Hrs  T(C): 36.8 (04 Jun 2020 08:11), Max: 37.6 (03 Jun 2020 16:00)  T(F): 98.3 (04 Jun 2020 08:11), Max: 99.6 (03 Jun 2020 16:00)  HR: 84 (04 Jun 2020 10:00) (75 - 111)  BP: --  BP(mean): --  RR: 18 (04 Jun 2020 10:00) (18 - 34)  SpO2: 94% (04 Jun 2020 10:00) (91% - 100%)        I&O's Detail    03 Jun 2020 07:01  -  04 Jun 2020 07:00  --------------------------------------------------------  IN:    dextrose 5%.: 1200 mL    Free Water: 2000 mL    Pivot 1.5: 580 mL    Solution: 300 mL    Solution: 50 mL    Solution: 250 mL    Solution: 100 mL  Total IN: 4480 mL    OUT:    Indwelling Catheter - Urethral: 775 mL    Voided: 1635 mL  Total OUT: 2410 mL    Total NET: 2070 mL      04 Jun 2020 07:01  -  04 Jun 2020 12:22  --------------------------------------------------------  IN:    dextrose 5%.: 250 mL    Enteral Tube Flush: 50 mL    Pivot 1.5: 90 mL  Total IN: 390 mL    OUT:    Indwelling Catheter - Urethral: 600 mL  Total OUT: 600 mL    Total NET: -210 mL            LABS:                        9.7    16.08 )-----------( 233      ( 04 Jun 2020 05:59 )             29.3     06-04    144  |  102  |  11.0  ----------------------------<  111<H>  4.1   |  34.0<H>  |  0.25<L>    Ca    7.9<L>      04 Jun 2020 05:59  Phos  2.5     06-04  Mg     2.1     06-04            CAPILLARY BLOOD GLUCOSE      POCT Blood Glucose.: 112 mg/dL (04 Jun 2020 11:48)    PT/INR - ( 03 Jun 2020 07:50 )   PT: 11.8 sec;   INR: 1.04 ratio         PTT - ( 03 Jun 2020 07:50 )  PTT:31.3 sec    CULTURES:  C Diff by PCR Result: NotDetec (05-29 @ 14:40)  Culture Results:   No growth at 5 days. (05-29 @ 11:56)  Culture Results:   No growth at 5 days. (05-29 @ 11:56)      Physical Examination:    General: No acute distress.  responds to verbal commands via     HEENT: Pupils equal, reactive to light.  Symmetric.    PULM: S/p trach; no wheezing    CVS: Regular rate and rhythm, no murmurs, rubs, or gallops    ABD: S/p PEG; resumed feeds;  EXT: No edema, nontender    SKIN: Warm and well perfused, no rashes noted.

## 2020-06-04 NOTE — PHYSICAL THERAPY INITIAL EVALUATION ADULT - CRITERIA FOR SKILLED THERAPEUTIC INTERVENTIONS
rehab potential/predicted duration of therapy intervention/anticipated equipment needs at discharge/impairments found/functional limitations in following categories/risk reduction/prevention/therapy frequency/anticipated discharge recommendation

## 2020-06-04 NOTE — PHYSICAL THERAPY INITIAL EVALUATION ADULT - PRECAUTIONS/LIMITATIONS, REHAB EVAL
cardiac precautions/fall precautions/isolation precautions/trach, airborne, contact, droplets/oxygen therapy device and L/min/aspiration precautions

## 2020-06-04 NOTE — PROGRESS NOTE ADULT - PROBLEM SELECTOR PLAN 1
Resolved. Secondary to rectal ulcer. OK for tube feeds. Trend cbc while here. Resolved. Secondary to rectal ulcer. OK for tube feeds and BID SQ Heparin.. Trend cbc while here.

## 2020-06-04 NOTE — PROGRESS NOTE ADULT - ASSESSMENT
IMPRESSION:  - COVID 19 with prolonged respiratory failure s/p Trach; PEG;  - Hx of PEs on CTA ( segmental on 4/21) on Eliquis - off 2/2 Rectal bleed -   - Hx of segmental PE now off Anticoagulation;  + Acenitobacter PNA  + 6/1 - Rectal bleed 2/2 ulcer s/p proctoscopy with clipping; s/p MTP 13 Units PRBC; 10 FFP; Kcentra; Platelets x 2 with hypovolemic shock ( resolved 6/1)  in Shock on Levophed  - Hypernatremia  - due to Rectal bleed and PE, IVC filter considered, but ultimately felt to be even higher risk of filter Thrombosis in COVID patient, so on serial LE dopplers + Heparin sq  as of 6/3 - tolerating CPAP x 8 hours.    PLAN:    Neuro -responding well; on Seroquel PO; Melatonin;     CV - Hemodynamically stable over 24 hours.  change CBC to q12  Echo 5/3 - normal LVEF; grade 1 diast dysfunction;  d/c a line;    GI - resume feeds;    s/p clipping of rectal ulcer bleed s/p clips + Epi - stable;  GI input appreciated;     Pulm - on AC 40%/8; ABG noted on 100%;    did well on  SBT 6/3  Hx of PE - unable to be on full dose A/C at this time due to rectal bleed but does have segmental PE on CTA;   noted LE doppler negative;  will place compression stockings;   At this point plan: serial LE doppler q 4 days + SQ Heparin as per   Discussion between myself, GI ( Dr. Meek) and VAscular ( Dr. Cannon)  IVC filter is itself highly prothrombotic in COVID and the risk appear to be prohibitive.  on Heparin BID;    PLAN --> keep on CPAP overnight tonight 6/4 - 6/5    Hemo - change h/h to q24 hrs; INR stable;    ID - Afebrile; WBC 13--> 16 + Acinetobacter in sputum - Merrem till 6/4;    DVT prophylaxis -c/w Heparin sq today + LE doppler q 4 day    Renal - cr stable at 0.25    Na 153 -->150 --> 149 -->155 -->  144 will hold D5W for now;   BMP q12s; goal 10-12 meq/24 hrs;    Endo - glu goal < 180;     PT/OT    Code status - full    Lines - [facility] cordis 6/1    Disposition - unable to get due to guardianship issue at this time;    Case was d/w night CCM team; Vascular ( Dr. Cannon); GI ( Dr. Meek)  updated family's friend ( emerg contact) Yovani Sena [phone]

## 2020-06-05 LAB
ALBUMIN SERPL ELPH-MCNC: 2.7 G/DL — LOW (ref 3.3–5.2)
ALP SERPL-CCNC: 139 U/L — HIGH (ref 40–120)
ALT FLD-CCNC: 26 U/L — SIGNIFICANT CHANGE UP
ANION GAP SERPL CALC-SCNC: 4 MMOL/L — LOW (ref 5–17)
AST SERPL-CCNC: 24 U/L — SIGNIFICANT CHANGE UP
BASOPHILS # BLD AUTO: 0.05 K/UL — SIGNIFICANT CHANGE UP (ref 0–0.2)
BASOPHILS NFR BLD AUTO: 0.3 % — SIGNIFICANT CHANGE UP (ref 0–2)
BILIRUB SERPL-MCNC: 0.2 MG/DL — LOW (ref 0.4–2)
BUN SERPL-MCNC: 11 MG/DL — SIGNIFICANT CHANGE UP (ref 8–20)
CALCIUM SERPL-MCNC: 8 MG/DL — LOW (ref 8.6–10.2)
CHLORIDE SERPL-SCNC: 98 MMOL/L — SIGNIFICANT CHANGE UP (ref 98–107)
CO2 SERPL-SCNC: 39 MMOL/L — HIGH (ref 22–29)
CREAT SERPL-MCNC: 0.3 MG/DL — LOW (ref 0.5–1.3)
EOSINOPHIL # BLD AUTO: 0.49 K/UL — SIGNIFICANT CHANGE UP (ref 0–0.5)
EOSINOPHIL NFR BLD AUTO: 3 % — SIGNIFICANT CHANGE UP (ref 0–6)
GLUCOSE BLDC GLUCOMTR-MCNC: 131 MG/DL — HIGH (ref 70–99)
GLUCOSE BLDC GLUCOMTR-MCNC: 136 MG/DL — HIGH (ref 70–99)
GLUCOSE BLDC GLUCOMTR-MCNC: 142 MG/DL — HIGH (ref 70–99)
GLUCOSE BLDC GLUCOMTR-MCNC: 148 MG/DL — HIGH (ref 70–99)
GLUCOSE BLDC GLUCOMTR-MCNC: 187 MG/DL — HIGH (ref 70–99)
GLUCOSE SERPL-MCNC: 143 MG/DL — HIGH (ref 70–99)
HCT VFR BLD CALC: 30.5 % — LOW (ref 39–50)
HGB BLD-MCNC: 10 G/DL — LOW (ref 13–17)
IMM GRANULOCYTES NFR BLD AUTO: 0.8 % — SIGNIFICANT CHANGE UP (ref 0–1.5)
LYMPHOCYTES # BLD AUTO: 1.51 K/UL — SIGNIFICANT CHANGE UP (ref 1–3.3)
LYMPHOCYTES # BLD AUTO: 9.4 % — LOW (ref 13–44)
MAGNESIUM SERPL-MCNC: 1.9 MG/DL — SIGNIFICANT CHANGE UP (ref 1.6–2.6)
MCHC RBC-ENTMCNC: 31 PG — SIGNIFICANT CHANGE UP (ref 27–34)
MCHC RBC-ENTMCNC: 32.8 GM/DL — SIGNIFICANT CHANGE UP (ref 32–36)
MCV RBC AUTO: 94.4 FL — SIGNIFICANT CHANGE UP (ref 80–100)
MONOCYTES # BLD AUTO: 0.87 K/UL — SIGNIFICANT CHANGE UP (ref 0–0.9)
MONOCYTES NFR BLD AUTO: 5.4 % — SIGNIFICANT CHANGE UP (ref 2–14)
NEUTROPHILS # BLD AUTO: 13.08 K/UL — HIGH (ref 1.8–7.4)
NEUTROPHILS NFR BLD AUTO: 81.1 % — HIGH (ref 43–77)
PHOSPHATE SERPL-MCNC: 2.2 MG/DL — LOW (ref 2.4–4.7)
PLATELET # BLD AUTO: 284 K/UL — SIGNIFICANT CHANGE UP (ref 150–400)
POTASSIUM SERPL-MCNC: 3.4 MMOL/L — LOW (ref 3.5–5.3)
POTASSIUM SERPL-SCNC: 3.4 MMOL/L — LOW (ref 3.5–5.3)
PROT SERPL-MCNC: 5.6 G/DL — LOW (ref 6.6–8.7)
RBC # BLD: 3.23 M/UL — LOW (ref 4.2–5.8)
RBC # FLD: 14.3 % — SIGNIFICANT CHANGE UP (ref 10.3–14.5)
SODIUM SERPL-SCNC: 141 MMOL/L — SIGNIFICANT CHANGE UP (ref 135–145)
WBC # BLD: 16.13 K/UL — HIGH (ref 3.8–10.5)
WBC # FLD AUTO: 16.13 K/UL — HIGH (ref 3.8–10.5)

## 2020-06-05 PROCEDURE — 99232 SBSQ HOSP IP/OBS MODERATE 35: CPT

## 2020-06-05 PROCEDURE — 99291 CRITICAL CARE FIRST HOUR: CPT

## 2020-06-05 RX ORDER — MAGNESIUM SULFATE 500 MG/ML
1 VIAL (ML) INJECTION ONCE
Refills: 0 | Status: COMPLETED | OUTPATIENT
Start: 2020-06-05 | End: 2020-06-05

## 2020-06-05 RX ORDER — POTASSIUM PHOSPHATE, MONOBASIC POTASSIUM PHOSPHATE, DIBASIC 236; 224 MG/ML; MG/ML
30 INJECTION, SOLUTION INTRAVENOUS ONCE
Refills: 0 | Status: COMPLETED | OUTPATIENT
Start: 2020-06-05 | End: 2020-06-05

## 2020-06-05 RX ORDER — MAGNESIUM SULFATE 500 MG/ML
2 VIAL (ML) INJECTION ONCE
Refills: 0 | Status: COMPLETED | OUTPATIENT
Start: 2020-06-05 | End: 2020-06-05

## 2020-06-05 RX ADMIN — CHLORHEXIDINE GLUCONATE 15 MILLILITER(S): 213 SOLUTION TOPICAL at 05:10

## 2020-06-05 RX ADMIN — Medication 50 GRAM(S): at 06:47

## 2020-06-05 RX ADMIN — HEPARIN SODIUM 5000 UNIT(S): 5000 INJECTION INTRAVENOUS; SUBCUTANEOUS at 17:58

## 2020-06-05 RX ADMIN — Medication 1 TABLET(S): at 05:15

## 2020-06-05 RX ADMIN — Medication 100 GRAM(S): at 22:29

## 2020-06-05 RX ADMIN — CHLORHEXIDINE GLUCONATE 15 MILLILITER(S): 213 SOLUTION TOPICAL at 17:58

## 2020-06-05 RX ADMIN — HEPARIN SODIUM 5000 UNIT(S): 5000 INJECTION INTRAVENOUS; SUBCUTANEOUS at 05:15

## 2020-06-05 RX ADMIN — Medication 1 TABLET(S): at 18:00

## 2020-06-05 RX ADMIN — Medication 5 MILLIGRAM(S): at 22:29

## 2020-06-05 RX ADMIN — CHLORHEXIDINE GLUCONATE 1 APPLICATION(S): 213 SOLUTION TOPICAL at 05:10

## 2020-06-05 RX ADMIN — QUETIAPINE FUMARATE 50 MILLIGRAM(S): 200 TABLET, FILM COATED ORAL at 22:29

## 2020-06-05 RX ADMIN — Medication 1 PACKET(S): at 05:15

## 2020-06-05 RX ADMIN — Medication 4: at 23:29

## 2020-06-05 RX ADMIN — Medication 1 PACKET(S): at 17:58

## 2020-06-05 RX ADMIN — POTASSIUM PHOSPHATE, MONOBASIC POTASSIUM PHOSPHATE, DIBASIC 83.33 MILLIMOLE(S): 236; 224 INJECTION, SOLUTION INTRAVENOUS at 08:40

## 2020-06-05 RX ADMIN — Medication 1 TABLET(S): at 17:58

## 2020-06-05 RX ADMIN — CHLORHEXIDINE GLUCONATE 1 APPLICATION(S): 213 SOLUTION TOPICAL at 12:00

## 2020-06-05 NOTE — PROGRESS NOTE ADULT - ASSESSMENT
No further bleeding from rectal ulcer since endoscopically treated with EPI and clips 4 days ago.  Would hold off on full AC as long as possible.

## 2020-06-05 NOTE — PROGRESS NOTE ADULT - SUBJECTIVE AND OBJECTIVE BOX
Patient seen and examined;  chart reviewed.  Tolerates Tube feeds.  No bleeding.  Had brown BM.  No rectal bleeding.  No abdominal pain. Hgb stable.  Heparin SQ in place, tolerated.      PAST MEDICAL & SURGICAL HISTORY:  No pertinent past medical history  No significant past surgical history      ROS:  No Heartburn, regurgitation, dysphagia, odynophagia.  No dyspepsia  No abdominal pain.    No Nausea, vomiting.  No Bleeding.  No hematemesis.   No diarrhea.    No hematochesia.  No weight loss, anorexia.  No edema.      MEDICATIONS  (STANDING):  chlorhexidine 0.12% Liquid 15 milliLiter(s) Oral Mucosa every 12 hours  chlorhexidine 2% Cloths 1 Application(s) Topical daily  chlorhexidine 4% Liquid 1 Application(s) Topical <User Schedule>  dextrose 5%. 1000 milliLiter(s) (50 mL/Hr) IV Continuous <Continuous>  heparin   Injectable 5000 Unit(s) SubCutaneous every 12 hours  insulin lispro (HumaLOG) corrective regimen sliding scale   SubCutaneous every 6 hours  lactobacillus acidophilus 1 Tablet(s) Oral two times a day  magnesium sulfate  IVPB 1 Gram(s) IV Intermittent once  melatonin 5 milliGRAM(s) Oral at bedtime  multivitamin/minerals 1 Tablet(s) Oral daily  psyllium Powder 1 Packet(s) Oral two times a day  QUEtiapine 50 milliGRAM(s) Oral at bedtime    MEDICATIONS  (PRN):  acetaminophen    Suspension .. 650 milliGRAM(s) Oral every 6 hours PRN Temp greater or equal to 38.5C (101.3F)  sodium chloride 0.9% lock flush 10 milliLiter(s) IV Push every 1 hour PRN Pre/post blood products, medications, blood draw, and to maintain line patency      Allergies    No Known Allergies    Intolerances        Vital Signs Last 24 Hrs  T(C): 37 (05 Jun 2020 09:00), Max: 37.3 (04 Jun 2020 13:00)  T(F): 98.6 (05 Jun 2020 09:00), Max: 99.1 (04 Jun 2020 13:00)  HR: 92 (05 Jun 2020 09:00) (70 - 114)  BP: --  BP(mean): --  RR: 25 (05 Jun 2020 09:00) (16 - 31)  SpO2: 98% (05 Jun 2020 09:00) (91% - 100%)    PHYSICAL EXAM:    GENERAL: NAD, well-groomed, well-developed  HEAD:  Atraumatic, Normocephalic  EYES: EOMI, PERRLA, conjunctiva and sclera clear  ENMT: No tonsillar erythema, exudates, or enlargement; Moist mucous membranes, Good dentition, No lesions  NECK: Supple, No JVD, Normal thyroid  CHEST/LUNG: Clear to percussion bilaterally; No rales, rhonchi, wheezing, or rubs  HEART: Regular rate and rhythm; No murmurs, rubs, or gallops  ABDOMEN: Soft, Nontender, Nondistended; Bowel sounds present  EXTREMITIES:  2+ Peripheral Pulses, No clubbing, cyanosis, or edema  LYMPH: No lymphadenopathy noted  SKIN: No rashes or lesions      LABS:                        10.0   16.13 )-----------( 284      ( 05 Jun 2020 03:10 )             30.5     06-05    141  |  98  |  11.0  ----------------------------<  143<H>  3.4<L>   |  39.0<H>  |  0.30<L>    Ca    8.0<L>      05 Jun 2020 03:10  Phos  2.2     06-05  Mg     1.9     06-05    TPro  5.6<L>  /  Alb  2.7<L>  /  TBili  0.2<L>  /  DBili  x   /  AST  24  /  ALT  26  /  AlkPhos  139<H>  06-05             RADIOLOGY & ADDITIONAL STUDIES:

## 2020-06-05 NOTE — PROGRESS NOTE ADULT - ASSESSMENT
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

## 2020-06-05 NOTE — PROGRESS NOTE ADULT - SUBJECTIVE AND OBJECTIVE BOX
Patient is a 64y old  Male who presents with a chief complaint of shortness of breath; Rectal bleeding/ rectal ulcer (05 Jun 2020 10:44)      BRIEF HOSPITAL COURSE:   COVID pneumonia  Segmental PE  Acinetobacter pneumonia  Trach and PEG  Rectal bleed - post clips  Serial LE dopplers on lower dose heparin    Events last 24 hours:   Brown BM  No rectal bleeding  SBT      PAST MEDICAL & SURGICAL HISTORY:  No pertinent past medical history  No significant past surgical history        Medications:        acetaminophen    Suspension .. 650 milliGRAM(s) Oral every 6 hours PRN  melatonin 5 milliGRAM(s) Oral at bedtime  QUEtiapine 50 milliGRAM(s) Oral at bedtime      heparin   Injectable 5000 Unit(s) SubCutaneous every 12 hours    psyllium Powder 1 Packet(s) Oral two times a day      insulin lispro (HumaLOG) corrective regimen sliding scale   SubCutaneous every 6 hours    dextrose 5%. 1000 milliLiter(s) IV Continuous <Continuous>  magnesium sulfate  IVPB 1 Gram(s) IV Intermittent once  multivitamin/minerals 1 Tablet(s) Oral daily  sodium chloride 0.9% lock flush 10 milliLiter(s) IV Push every 1 hour PRN      chlorhexidine 0.12% Liquid 15 milliLiter(s) Oral Mucosa every 12 hours  chlorhexidine 2% Cloths 1 Application(s) Topical daily  chlorhexidine 4% Liquid 1 Application(s) Topical <User Schedule>    lactobacillus acidophilus 1 Tablet(s) Oral two times a day      Mode: CPAP with PS  FiO2: 30  PEEP: 5  PS: 15  MAP: 8.5      ICU Vital Signs Last 24 Hrs  T(C): 36.9 (05 Jun 2020 12:00), Max: 37.3 (04 Jun 2020 13:00)  T(F): 98.4 (05 Jun 2020 12:00), Max: 99.1 (04 Jun 2020 13:00)  HR: 90 (05 Jun 2020 12:00) (70 - 114)  BP: --  BP(mean): --  ABP: 133/57 (05 Jun 2020 12:00) (111/55 - 161/68)  ABP(mean): 94 (05 Jun 2020 12:00) (74 - 104)  RR: 26 (05 Jun 2020 12:00) (16 - 31)  SpO2: 97% (05 Jun 2020 12:00) (91% - 100%)      ABG - ( 04 Jun 2020 20:44 )  pH, Arterial: 7.45  pH, Blood: x     /  pCO2: 60    /  pO2: 64    / HCO3: 40    / Base Excess: 15.6  /  SaO2: 95                  I&O's Detail    04 Jun 2020 07:01  -  05 Jun 2020 07:00  --------------------------------------------------------  IN:    dextrose 5%.: 650 mL    Enteral Tube Flush: 50 mL    Free Water: 1800 mL    Pivot 1.5: 850 mL  Total IN: 3350 mL    OUT:    Indwelling Catheter - Urethral: 1650 mL  Total OUT: 1650 mL    Total NET: 1700 mL            LABS:                        10.0   16.13 )-----------( 284      ( 05 Jun 2020 03:10 )             30.5     06-05    141  |  98  |  11.0  ----------------------------<  143<H>  3.4<L>   |  39.0<H>  |  0.30<L>    Ca    8.0<L>      05 Jun 2020 03:10  Phos  2.2     06-05  Mg     1.9     06-05    TPro  5.6<L>  /  Alb  2.7<L>  /  TBili  0.2<L>  /  DBili  x   /  AST  24  /  ALT  26  /  AlkPhos  139<H>  06-05          CAPILLARY BLOOD GLUCOSE      POCT Blood Glucose.: 136 mg/dL (05 Jun 2020 11:48)        CULTURES:  C Diff by PCR Result: NotDetec (05-29 @ 14:40)      Physical Examination:    General: No acute distress.      HEENT: Pupils equal, reactive to light.  Symmetric.    PULM: Clear to auscultation bilaterally, no significant sputum production    NECK: Supple, no lymphadenopathy, trachea midline    CVS: Regular rate and rhythm, no murmurs, rubs, or gallops    ABD: Soft, nondistended, nontender, normoactive bowel sounds, no masses    EXT: No edema, nontender    SKIN: Warm and well perfused, no rashes noted.    NEURO: Alert, oriented, interactive, nonfocal    DEVICES:     RADIOLOGY: ***    CRITICAL CARE TIME SPENT: ***

## 2020-06-06 LAB
ALBUMIN SERPL ELPH-MCNC: 2.5 G/DL — LOW (ref 3.3–5.2)
ALP SERPL-CCNC: 287 U/L — HIGH (ref 40–120)
ALT FLD-CCNC: 68 U/L — HIGH
ANION GAP SERPL CALC-SCNC: 5 MMOL/L — SIGNIFICANT CHANGE UP (ref 5–17)
AST SERPL-CCNC: 59 U/L — HIGH
BASE EXCESS BLDA CALC-SCNC: 13.5 MMOL/L — HIGH (ref -3–3)
BASOPHILS # BLD AUTO: 0.04 K/UL — SIGNIFICANT CHANGE UP (ref 0–0.2)
BASOPHILS NFR BLD AUTO: 0.3 % — SIGNIFICANT CHANGE UP (ref 0–2)
BILIRUB SERPL-MCNC: <0.2 MG/DL — LOW (ref 0.4–2)
BLOOD GAS COMMENTS ARTERIAL: SIGNIFICANT CHANGE UP
BUN SERPL-MCNC: 17 MG/DL — SIGNIFICANT CHANGE UP (ref 8–20)
CALCIUM SERPL-MCNC: 7.7 MG/DL — LOW (ref 8.6–10.2)
CHLORIDE SERPL-SCNC: 104 MMOL/L — SIGNIFICANT CHANGE UP (ref 98–107)
CO2 SERPL-SCNC: 36 MMOL/L — HIGH (ref 22–29)
CREAT SERPL-MCNC: 0.23 MG/DL — LOW (ref 0.5–1.3)
EOSINOPHIL # BLD AUTO: 0.46 K/UL — SIGNIFICANT CHANGE UP (ref 0–0.5)
EOSINOPHIL NFR BLD AUTO: 3.2 % — SIGNIFICANT CHANGE UP (ref 0–6)
GAS PNL BLDA: SIGNIFICANT CHANGE UP
GLUCOSE BLDC GLUCOMTR-MCNC: 122 MG/DL — HIGH (ref 70–99)
GLUCOSE BLDC GLUCOMTR-MCNC: 124 MG/DL — HIGH (ref 70–99)
GLUCOSE BLDC GLUCOMTR-MCNC: 131 MG/DL — HIGH (ref 70–99)
GLUCOSE SERPL-MCNC: 120 MG/DL — HIGH (ref 70–99)
HCO3 BLDA-SCNC: 37 MMOL/L — HIGH (ref 20–26)
HCT VFR BLD CALC: 29.3 % — LOW (ref 39–50)
HGB BLD-MCNC: 9.3 G/DL — LOW (ref 13–17)
HOROWITZ INDEX BLDA+IHG-RTO: 50 — SIGNIFICANT CHANGE UP
IMM GRANULOCYTES NFR BLD AUTO: 1 % — SIGNIFICANT CHANGE UP (ref 0–1.5)
LYMPHOCYTES # BLD AUTO: 1.94 K/UL — SIGNIFICANT CHANGE UP (ref 1–3.3)
LYMPHOCYTES # BLD AUTO: 13.5 % — SIGNIFICANT CHANGE UP (ref 13–44)
MAGNESIUM SERPL-MCNC: 2.2 MG/DL — SIGNIFICANT CHANGE UP (ref 1.6–2.6)
MCHC RBC-ENTMCNC: 30.7 PG — SIGNIFICANT CHANGE UP (ref 27–34)
MCHC RBC-ENTMCNC: 31.7 GM/DL — LOW (ref 32–36)
MCV RBC AUTO: 96.7 FL — SIGNIFICANT CHANGE UP (ref 80–100)
MONOCYTES # BLD AUTO: 0.79 K/UL — SIGNIFICANT CHANGE UP (ref 0–0.9)
MONOCYTES NFR BLD AUTO: 5.5 % — SIGNIFICANT CHANGE UP (ref 2–14)
NEUTROPHILS # BLD AUTO: 10.97 K/UL — HIGH (ref 1.8–7.4)
NEUTROPHILS NFR BLD AUTO: 76.5 % — SIGNIFICANT CHANGE UP (ref 43–77)
PCO2 BLDA: 59 MMHG — HIGH (ref 35–45)
PH BLDA: 7.44 — SIGNIFICANT CHANGE UP (ref 7.35–7.45)
PHOSPHATE SERPL-MCNC: 2.6 MG/DL — SIGNIFICANT CHANGE UP (ref 2.4–4.7)
PLATELET # BLD AUTO: 305 K/UL — SIGNIFICANT CHANGE UP (ref 150–400)
PO2 BLDA: 177 MMHG — HIGH (ref 83–108)
POTASSIUM SERPL-MCNC: 4.1 MMOL/L — SIGNIFICANT CHANGE UP (ref 3.5–5.3)
POTASSIUM SERPL-SCNC: 4.1 MMOL/L — SIGNIFICANT CHANGE UP (ref 3.5–5.3)
PROT SERPL-MCNC: 5.4 G/DL — LOW (ref 6.6–8.7)
RBC # BLD: 3.03 M/UL — LOW (ref 4.2–5.8)
RBC # FLD: 14.6 % — HIGH (ref 10.3–14.5)
SAO2 % BLDA: 100 % — HIGH (ref 95–99)
SARS-COV-2 RNA SPEC QL NAA+PROBE: SIGNIFICANT CHANGE UP
SODIUM SERPL-SCNC: 145 MMOL/L — SIGNIFICANT CHANGE UP (ref 135–145)
WBC # BLD: 14.35 K/UL — HIGH (ref 3.8–10.5)
WBC # FLD AUTO: 14.35 K/UL — HIGH (ref 3.8–10.5)

## 2020-06-06 PROCEDURE — 99232 SBSQ HOSP IP/OBS MODERATE 35: CPT

## 2020-06-06 PROCEDURE — 93970 EXTREMITY STUDY: CPT | Mod: 26

## 2020-06-06 PROCEDURE — 99291 CRITICAL CARE FIRST HOUR: CPT

## 2020-06-06 RX ADMIN — Medication 1 TABLET(S): at 05:58

## 2020-06-06 RX ADMIN — CHLORHEXIDINE GLUCONATE 15 MILLILITER(S): 213 SOLUTION TOPICAL at 17:30

## 2020-06-06 RX ADMIN — QUETIAPINE FUMARATE 50 MILLIGRAM(S): 200 TABLET, FILM COATED ORAL at 21:04

## 2020-06-06 RX ADMIN — HEPARIN SODIUM 5000 UNIT(S): 5000 INJECTION INTRAVENOUS; SUBCUTANEOUS at 05:17

## 2020-06-06 RX ADMIN — CHLORHEXIDINE GLUCONATE 15 MILLILITER(S): 213 SOLUTION TOPICAL at 05:07

## 2020-06-06 RX ADMIN — HEPARIN SODIUM 5000 UNIT(S): 5000 INJECTION INTRAVENOUS; SUBCUTANEOUS at 18:40

## 2020-06-06 RX ADMIN — Medication 1 TABLET(S): at 18:41

## 2020-06-06 RX ADMIN — CHLORHEXIDINE GLUCONATE 1 APPLICATION(S): 213 SOLUTION TOPICAL at 12:14

## 2020-06-06 RX ADMIN — CHLORHEXIDINE GLUCONATE 1 APPLICATION(S): 213 SOLUTION TOPICAL at 05:07

## 2020-06-06 RX ADMIN — Medication 1 TABLET(S): at 18:40

## 2020-06-06 RX ADMIN — Medication 1 PACKET(S): at 05:18

## 2020-06-06 RX ADMIN — Medication 5 MILLIGRAM(S): at 21:03

## 2020-06-06 RX ADMIN — Medication 1 PACKET(S): at 18:40

## 2020-06-06 NOTE — PROGRESS NOTE ADULT - SUBJECTIVE AND OBJECTIVE BOX
Patient seen and examined;  Sedated on vent.  No BM's recorded.  No overt bleeding.  Hgb steady.      PAST MEDICAL & SURGICAL HISTORY:  No pertinent past medical history  No significant past surgical history      ROS:  No Heartburn, regurgitation, dysphagia, odynophagia.  No dyspepsia  No abdominal pain.    No Nausea, vomiting.  No Bleeding.  No hematemesis.   No diarrhea.    No hematochesia.  No weight loss, anorexia.  No edema.      MEDICATIONS  (STANDING):  chlorhexidine 0.12% Liquid 15 milliLiter(s) Oral Mucosa every 12 hours  chlorhexidine 2% Cloths 1 Application(s) Topical daily  chlorhexidine 4% Liquid 1 Application(s) Topical <User Schedule>  dextrose 5%. 1000 milliLiter(s) (50 mL/Hr) IV Continuous <Continuous>  heparin   Injectable 5000 Unit(s) SubCutaneous every 12 hours  insulin lispro (HumaLOG) corrective regimen sliding scale   SubCutaneous every 6 hours  lactobacillus acidophilus 1 Tablet(s) Oral two times a day  melatonin 5 milliGRAM(s) Oral at bedtime  multivitamin/minerals 1 Tablet(s) Oral daily  psyllium Powder 1 Packet(s) Oral two times a day  QUEtiapine 50 milliGRAM(s) Oral at bedtime    MEDICATIONS  (PRN):  acetaminophen    Suspension .. 650 milliGRAM(s) Oral every 6 hours PRN Temp greater or equal to 38.5C (101.3F)  sodium chloride 0.9% lock flush 10 milliLiter(s) IV Push every 1 hour PRN Pre/post blood products, medications, blood draw, and to maintain line patency      Allergies    No Known Allergies    Intolerances        Vital Signs Last 24 Hrs  T(C): 37.2 (06 Jun 2020 09:00), Max: 37.8 (05 Jun 2020 20:00)  T(F): 99 (06 Jun 2020 09:00), Max: 100 (05 Jun 2020 20:00)  HR: 83 (06 Jun 2020 09:00) (78 - 112)  BP: 95/51 (06 Jun 2020 09:00) (95/51 - 125/73)  BP(mean): 61 (06 Jun 2020 09:00) (61 - 89)  RR: 26 (06 Jun 2020 09:00) (20 - 30)  SpO2: 100% (06 Jun 2020 09:00) (92% - 100%)    PHYSICAL EXAM:    GENERAL: NAD, well-groomed, well-developed  HEAD:  Atraumatic, Normocephalic  EYES: EOMI, PERRLA, conjunctiva and sclera clear  ENMT: No tonsillar erythema, exudates, or enlargement; Moist mucous membranes, Good dentition, No lesions  NECK: Supple, No JVD, Normal thyroid  CHEST/LUNG: Clear to percussion bilaterally; No rales, rhonchi, wheezing, or rubs  HEART: Regular rate and rhythm; No murmurs, rubs, or gallops  ABDOMEN: Soft, Nontender, Nondistended; Bowel sounds present  EXTREMITIES:  2+ Peripheral Pulses, No clubbing, cyanosis, or edema  LYMPH: No lymphadenopathy noted  SKIN: No rashes or lesions      LABS:                        9.3    14.35 )-----------( 305      ( 06 Jun 2020 05:25 )             29.3     06-06    145  |  104  |  17.0  ----------------------------<  120<H>  4.1   |  36.0<H>  |  0.23<L>    Ca    7.7<L>      06 Jun 2020 05:25  Phos  2.6     06-06  Mg     2.2     06-06    TPro  5.4<L>  /  Alb  2.5<L>  /  TBili  <0.2<L>  /  DBili  x   /  AST  59<H>  /  ALT  68<H>  /  AlkPhos  287<H>  06-06             RADIOLOGY & ADDITIONAL STUDIES:

## 2020-06-06 NOTE — PROGRESS NOTE ADULT - SUBJECTIVE AND OBJECTIVE BOX
Patient is a 64y old  Male who presents with a chief complaint of shortness of breath (06 Jun 2020 09:58)      BRIEF HOSPITAL COURSE:   COVID pneumonia  Segmental PE  Acinetobacter pneumonia  Trach and PEG  Rectal bleed - post clips  Serial LE dopplers on lower dose heparin      Events last 24 hours:   No overt bleeding  Confused    PAST MEDICAL & SURGICAL HISTORY:  No pertinent past medical history  No significant past surgical history        Medications:        acetaminophen    Suspension .. 650 milliGRAM(s) Oral every 6 hours PRN  melatonin 5 milliGRAM(s) Oral at bedtime  QUEtiapine 50 milliGRAM(s) Oral at bedtime      heparin   Injectable 5000 Unit(s) SubCutaneous every 12 hours    psyllium Powder 1 Packet(s) Oral two times a day      insulin lispro (HumaLOG) corrective regimen sliding scale   SubCutaneous every 6 hours    dextrose 5%. 1000 milliLiter(s) IV Continuous <Continuous>  multivitamin/minerals 1 Tablet(s) Oral daily  sodium chloride 0.9% lock flush 10 milliLiter(s) IV Push every 1 hour PRN      chlorhexidine 0.12% Liquid 15 milliLiter(s) Oral Mucosa every 12 hours  chlorhexidine 2% Cloths 1 Application(s) Topical daily  chlorhexidine 4% Liquid 1 Application(s) Topical <User Schedule>    lactobacillus acidophilus 1 Tablet(s) Oral two times a day      Mode: CPAP with PS  FiO2: 50  PEEP: 5  PS: 15  MAP: 7  PIP: 14      ICU Vital Signs Last 24 Hrs  T(C): 37.2 (06 Jun 2020 09:00), Max: 37.8 (05 Jun 2020 20:00)  T(F): 99 (06 Jun 2020 09:00), Max: 100 (05 Jun 2020 20:00)  HR: 83 (06 Jun 2020 09:00) (78 - 112)  BP: 95/51 (06 Jun 2020 09:00) (95/51 - 125/73)  BP(mean): 61 (06 Jun 2020 09:00) (61 - 89)  ABP: 146/66 (05 Jun 2020 18:00) (124/53 - 154/68)  ABP(mean): 82 (05 Jun 2020 18:00) (79 - 99)  RR: 26 (06 Jun 2020 09:00) (20 - 30)  SpO2: 100% (06 Jun 2020 09:00) (92% - 100%)      ABG - ( 06 Jun 2020 04:33 )  pH, Arterial: 7.44  pH, Blood: x     /  pCO2: 59    /  pO2: 177   / HCO3: 37    / Base Excess: 13.5  /  SaO2: 100                 I&O's Detail    05 Jun 2020 07:01  -  06 Jun 2020 07:00  --------------------------------------------------------  IN:    Free Water: 1200 mL    Pivot 1.5: 440 mL    Solution: 100 mL  Total IN: 1740 mL    OUT:  Total OUT: 0 mL    Total NET: 1740 mL            LABS:                        9.3    14.35 )-----------( 305      ( 06 Jun 2020 05:25 )             29.3     06-06    145  |  104  |  17.0  ----------------------------<  120<H>  4.1   |  36.0<H>  |  0.23<L>    Ca    7.7<L>      06 Jun 2020 05:25  Phos  2.6     06-06  Mg     2.2     06-06    TPro  5.4<L>  /  Alb  2.5<L>  /  TBili  <0.2<L>  /  DBili  x   /  AST  59<H>  /  ALT  68<H>  /  AlkPhos  287<H>  06-06          CAPILLARY BLOOD GLUCOSE      POCT Blood Glucose.: 131 mg/dL (06 Jun 2020 05:09)        CULTURES:      Physical Examination:    General: No acute distress.      HEENT: Pupils equal, reactive to light.  Symmetric.    PULM: Clear to auscultation bilaterally, no significant sputum production    NECK: Supple, no lymphadenopathy, trachea midline    CVS: Regular rate and rhythm, no murmurs, rubs, or gallops    ABD: Soft, nondistended, nontender, normoactive bowel sounds, no masses    EXT: No edema, nontender    SKIN: Warm and well perfused, no rashes noted.    NEURO: Alert, oriented, interactive, nonfocal    DEVICES:     RADIOLOGY:   No recent imaging    CRITICAL CARE TIME SPENT: 35

## 2020-06-07 LAB
ALBUMIN SERPL ELPH-MCNC: 2.7 G/DL — LOW (ref 3.3–5.2)
ALP SERPL-CCNC: 275 U/L — HIGH (ref 40–120)
ALT FLD-CCNC: 56 U/L — HIGH
ANION GAP SERPL CALC-SCNC: 4 MMOL/L — LOW (ref 5–17)
AST SERPL-CCNC: 43 U/L — HIGH
BASE EXCESS BLDA CALC-SCNC: 16.1 MMOL/L — HIGH (ref -3–3)
BASOPHILS # BLD AUTO: 0.03 K/UL — SIGNIFICANT CHANGE UP (ref 0–0.2)
BASOPHILS NFR BLD AUTO: 0.2 % — SIGNIFICANT CHANGE UP (ref 0–2)
BILIRUB SERPL-MCNC: 0.2 MG/DL — LOW (ref 0.4–2)
BLOOD GAS COMMENTS ARTERIAL: SIGNIFICANT CHANGE UP
BUN SERPL-MCNC: 21 MG/DL — HIGH (ref 8–20)
CALCIUM SERPL-MCNC: 8.3 MG/DL — LOW (ref 8.6–10.2)
CHLORIDE SERPL-SCNC: 97 MMOL/L — LOW (ref 98–107)
CO2 SERPL-SCNC: 39 MMOL/L — HIGH (ref 22–29)
CREAT SERPL-MCNC: 0.2 MG/DL — LOW (ref 0.5–1.3)
EOSINOPHIL # BLD AUTO: 0.45 K/UL — SIGNIFICANT CHANGE UP (ref 0–0.5)
EOSINOPHIL NFR BLD AUTO: 3.4 % — SIGNIFICANT CHANGE UP (ref 0–6)
GAS PNL BLDA: SIGNIFICANT CHANGE UP
GLUCOSE BLDC GLUCOMTR-MCNC: 120 MG/DL — HIGH (ref 70–99)
GLUCOSE BLDC GLUCOMTR-MCNC: 126 MG/DL — HIGH (ref 70–99)
GLUCOSE BLDC GLUCOMTR-MCNC: 142 MG/DL — HIGH (ref 70–99)
GLUCOSE BLDC GLUCOMTR-MCNC: 147 MG/DL — HIGH (ref 70–99)
GLUCOSE SERPL-MCNC: 133 MG/DL — HIGH (ref 70–99)
HCO3 BLDA-SCNC: 40 MMOL/L — HIGH (ref 20–26)
HCT VFR BLD CALC: 30.4 % — LOW (ref 39–50)
HGB BLD-MCNC: 9.6 G/DL — LOW (ref 13–17)
HOROWITZ INDEX BLDA+IHG-RTO: 40 — SIGNIFICANT CHANGE UP
IMM GRANULOCYTES NFR BLD AUTO: 0.7 % — SIGNIFICANT CHANGE UP (ref 0–1.5)
LYMPHOCYTES # BLD AUTO: 16.8 % — SIGNIFICANT CHANGE UP (ref 13–44)
LYMPHOCYTES # BLD AUTO: 2.2 K/UL — SIGNIFICANT CHANGE UP (ref 1–3.3)
MAGNESIUM SERPL-MCNC: 2 MG/DL — SIGNIFICANT CHANGE UP (ref 1.8–2.6)
MCHC RBC-ENTMCNC: 31.3 PG — SIGNIFICANT CHANGE UP (ref 27–34)
MCHC RBC-ENTMCNC: 31.6 GM/DL — LOW (ref 32–36)
MCV RBC AUTO: 99 FL — SIGNIFICANT CHANGE UP (ref 80–100)
MONOCYTES # BLD AUTO: 0.7 K/UL — SIGNIFICANT CHANGE UP (ref 0–0.9)
MONOCYTES NFR BLD AUTO: 5.3 % — SIGNIFICANT CHANGE UP (ref 2–14)
NEUTROPHILS # BLD AUTO: 9.64 K/UL — HIGH (ref 1.8–7.4)
NEUTROPHILS NFR BLD AUTO: 73.6 % — SIGNIFICANT CHANGE UP (ref 43–77)
PCO2 BLDA: 73 MMHG — CRITICAL HIGH (ref 35–45)
PH BLDA: 7.38 — SIGNIFICANT CHANGE UP (ref 7.35–7.45)
PHOSPHATE SERPL-MCNC: 1.9 MG/DL — LOW (ref 2.4–4.7)
PLATELET # BLD AUTO: 375 K/UL — SIGNIFICANT CHANGE UP (ref 150–400)
PO2 BLDA: 88 MMHG — SIGNIFICANT CHANGE UP (ref 83–108)
POTASSIUM SERPL-MCNC: 3.8 MMOL/L — SIGNIFICANT CHANGE UP (ref 3.5–5.3)
POTASSIUM SERPL-SCNC: 3.8 MMOL/L — SIGNIFICANT CHANGE UP (ref 3.5–5.3)
PROT SERPL-MCNC: 5.8 G/DL — LOW (ref 6.6–8.7)
RBC # BLD: 3.07 M/UL — LOW (ref 4.2–5.8)
RBC # FLD: 14.6 % — HIGH (ref 10.3–14.5)
SAO2 % BLDA: 98 % — SIGNIFICANT CHANGE UP (ref 95–99)
SODIUM SERPL-SCNC: 140 MMOL/L — SIGNIFICANT CHANGE UP (ref 135–145)
WBC # BLD: 13.11 K/UL — HIGH (ref 3.8–10.5)
WBC # FLD AUTO: 13.11 K/UL — HIGH (ref 3.8–10.5)

## 2020-06-07 PROCEDURE — 99232 SBSQ HOSP IP/OBS MODERATE 35: CPT

## 2020-06-07 RX ADMIN — CHLORHEXIDINE GLUCONATE 15 MILLILITER(S): 213 SOLUTION TOPICAL at 05:29

## 2020-06-07 RX ADMIN — Medication 1 TABLET(S): at 17:17

## 2020-06-07 RX ADMIN — Medication 5 MILLIGRAM(S): at 22:31

## 2020-06-07 RX ADMIN — CHLORHEXIDINE GLUCONATE 1 APPLICATION(S): 213 SOLUTION TOPICAL at 11:28

## 2020-06-07 RX ADMIN — CHLORHEXIDINE GLUCONATE 1 APPLICATION(S): 213 SOLUTION TOPICAL at 05:29

## 2020-06-07 RX ADMIN — CHLORHEXIDINE GLUCONATE 15 MILLILITER(S): 213 SOLUTION TOPICAL at 17:16

## 2020-06-07 RX ADMIN — QUETIAPINE FUMARATE 50 MILLIGRAM(S): 200 TABLET, FILM COATED ORAL at 22:31

## 2020-06-07 RX ADMIN — Medication 1 PACKET(S): at 05:29

## 2020-06-07 RX ADMIN — HEPARIN SODIUM 5000 UNIT(S): 5000 INJECTION INTRAVENOUS; SUBCUTANEOUS at 17:16

## 2020-06-07 RX ADMIN — HEPARIN SODIUM 5000 UNIT(S): 5000 INJECTION INTRAVENOUS; SUBCUTANEOUS at 05:29

## 2020-06-07 RX ADMIN — Medication 1 PACKET(S): at 17:17

## 2020-06-07 RX ADMIN — Medication 1 TABLET(S): at 05:29

## 2020-06-07 NOTE — PROGRESS NOTE ADULT - SUBJECTIVE AND OBJECTIVE BOX
Patient seen and examined;  No bleeding.  Tolerates TF via peg.  Passing soft BM's.  On SQ heparin 5000 u bid.  Hgb Static.        PAST MEDICAL & SURGICAL HISTORY:  No pertinent past medical history  No significant past surgical history      ROS:  No Heartburn, regurgitation, dysphagia, odynophagia.  No dyspepsia  No abdominal pain.    No Nausea, vomiting.  No Bleeding.  No hematemesis.   No diarrhea.    No hematochesia.  No weight loss, anorexia.  No edema.      MEDICATIONS  (STANDING):  chlorhexidine 0.12% Liquid 15 milliLiter(s) Oral Mucosa every 12 hours  chlorhexidine 2% Cloths 1 Application(s) Topical daily  chlorhexidine 4% Liquid 1 Application(s) Topical <User Schedule>  dextrose 5%. 1000 milliLiter(s) (50 mL/Hr) IV Continuous <Continuous>  heparin   Injectable 5000 Unit(s) SubCutaneous every 12 hours  insulin lispro (HumaLOG) corrective regimen sliding scale   SubCutaneous every 6 hours  lactobacillus acidophilus 1 Tablet(s) Oral two times a day  melatonin 5 milliGRAM(s) Oral at bedtime  multivitamin/minerals 1 Tablet(s) Oral daily  psyllium Powder 1 Packet(s) Oral two times a day  QUEtiapine 50 milliGRAM(s) Oral at bedtime    MEDICATIONS  (PRN):  acetaminophen    Suspension .. 650 milliGRAM(s) Oral every 6 hours PRN Temp greater or equal to 38.5C (101.3F)      Allergies    No Known Allergies    Intolerances        Vital Signs Last 24 Hrs  T(C): 37.1 (07 Jun 2020 11:03), Max: 37.1 (07 Jun 2020 07:34)  T(F): 98.7 (07 Jun 2020 11:03), Max: 98.8 (07 Jun 2020 07:34)  HR: 77 (07 Jun 2020 14:00) (75 - 96)  BP: 108/59 (07 Jun 2020 14:00) (96/48 - 122/59)  BP(mean): 70 (07 Jun 2020 14:00) (59 - 76)  RR: 21 (07 Jun 2020 14:00) (16 - 25)  SpO2: 100% (07 Jun 2020 14:00) (95% - 100%)    PHYSICAL EXAM:    GENERAL: NAD, well-groomed, well-developed  HEAD:  Atraumatic, Normocephalic  EYES: EOMI, PERRLA, conjunctiva and sclera clear  ENMT: No tonsillar erythema, exudates, or enlargement; Moist mucous membranes, Good dentition, No lesions  NECK: Supple, No JVD, Normal thyroid  CHEST/LUNG: Clear to percussion bilaterally; No rales, rhonchi, wheezing, or rubs  HEART: Regular rate and rhythm; No murmurs, rubs, or gallops  ABDOMEN: Soft, Nontender, Nondistended; Bowel sounds present;  Flat abdomen.  PEG site clean/ dry.    EXTREMITIES:  2+ Peripheral Pulses, No clubbing, cyanosis, or edema  LYMPH: No lymphadenopathy noted  SKIN: No rashes or lesions      LABS:                        9.6    13.11 )-----------( 375      ( 07 Jun 2020 04:15 )             30.4     06-07    140  |  97<L>  |  21.0<H>  ----------------------------<  133<H>  3.8   |  39.0<H>  |  0.20<L>    Ca    8.3<L>      07 Jun 2020 04:15  Phos  1.9     06-07  Mg     2.0     06-07    TPro  5.8<L>  /  Alb  2.7<L>  /  TBili  0.2<L>  /  DBili  x   /  AST  43<H>  /  ALT  56<H>  /  AlkPhos  275<H>  06-07             RADIOLOGY & ADDITIONAL STUDIES:

## 2020-06-07 NOTE — PROGRESS NOTE ADULT - ASSESSMENT
No bleeding x 1 week.  Tolerates SQ Heparin.    Rectal ulcer presumes to be healing.    No further GI intervention planned.    OK for full AC as clinically needed.

## 2020-06-07 NOTE — PROGRESS NOTE ADULT - ASSESSMENT
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

## 2020-06-07 NOTE — PROGRESS NOTE ADULT - SUBJECTIVE AND OBJECTIVE BOX
Patient is a 64y old  Male who presents with a chief complaint of shortness of breath (06 Jun 2020 10:25)      BRIEF HOSPITAL COURSE:   COVID pneumonia  Segmental PE  Acinetobacter pneumonia  Trach and PEG  Rectal bleed - post clips  Serial LE dopplers on lower dose heparin      Events last 24 hours:   No overt bleeding  Confused  Trach collar    PAST MEDICAL & SURGICAL HISTORY:  No pertinent past medical history  No significant past surgical history        Medications:        acetaminophen    Suspension .. 650 milliGRAM(s) Oral every 6 hours PRN  melatonin 5 milliGRAM(s) Oral at bedtime  QUEtiapine 50 milliGRAM(s) Oral at bedtime      heparin   Injectable 5000 Unit(s) SubCutaneous every 12 hours    psyllium Powder 1 Packet(s) Oral two times a day      insulin lispro (HumaLOG) corrective regimen sliding scale   SubCutaneous every 6 hours    dextrose 5%. 1000 milliLiter(s) IV Continuous <Continuous>  multivitamin/minerals 1 Tablet(s) Oral daily      chlorhexidine 0.12% Liquid 15 milliLiter(s) Oral Mucosa every 12 hours  chlorhexidine 2% Cloths 1 Application(s) Topical daily  chlorhexidine 4% Liquid 1 Application(s) Topical <User Schedule>    lactobacillus acidophilus 1 Tablet(s) Oral two times a day      Mode: CPAP with PS  FiO2: 40  PEEP: 5  PS: 15  MAP: 8  PIP: 20      ICU Vital Signs Last 24 Hrs  T(C): 37.1 (07 Jun 2020 07:34), Max: 37.3 (06 Jun 2020 11:00)  T(F): 98.8 (07 Jun 2020 07:34), Max: 99.1 (06 Jun 2020 11:00)  HR: 83 (07 Jun 2020 09:00) (78 - 96)  BP: 110/58 (07 Jun 2020 09:00) (97/54 - 122/59)  BP(mean): 69 (07 Jun 2020 09:00) (63 - 76)  ABP: --  ABP(mean): --  RR: 19 (07 Jun 2020 09:00) (18 - 25)  SpO2: 98% (07 Jun 2020 09:00) (95% - 100%)      ABG - ( 07 Jun 2020 04:46 )  pH, Arterial: 7.38  pH, Blood: x     /  pCO2: 73    /  pO2: 88    / HCO3: 40    / Base Excess: 16.1  /  SaO2: 98                  I&O's Detail    06 Jun 2020 07:01  -  07 Jun 2020 07:00  --------------------------------------------------------  IN:    Free Water: 1200 mL    Pivot 1.5: 480 mL  Total IN: 1680 mL    OUT:  Total OUT: 0 mL    Total NET: 1680 mL      07 Jun 2020 07:01  -  07 Jun 2020 10:56  --------------------------------------------------------  IN:    Pivot 1.5: 80 mL  Total IN: 80 mL    OUT:  Total OUT: 0 mL    Total NET: 80 mL            LABS:                        9.6    13.11 )-----------( 375      ( 07 Jun 2020 04:15 )             30.4     06-07    140  |  97<L>  |  21.0<H>  ----------------------------<  133<H>  3.8   |  39.0<H>  |  0.20<L>    Ca    8.3<L>      07 Jun 2020 04:15  Phos  1.9     06-07  Mg     2.0     06-07    TPro  5.8<L>  /  Alb  2.7<L>  /  TBili  0.2<L>  /  DBili  x   /  AST  43<H>  /  ALT  56<H>  /  AlkPhos  275<H>  06-07          CAPILLARY BLOOD GLUCOSE      POCT Blood Glucose.: 142 mg/dL (07 Jun 2020 05:19)        CULTURES:      Physical Examination:    General: No acute distress.      HEENT: Pupils equal, reactive to light.  Symmetric.    PULM: Clear to auscultation bilaterally, no significant sputum production    NECK: Supple, no lymphadenopathy, trachea midline    CVS: Regular rate and rhythm, no murmurs, rubs, or gallops    ABD: Soft, nondistended, nontender, normoactive bowel sounds, no masses    EXT: No edema, nontender    SKIN: Warm and well perfused, no rashes noted.    NEURO: Alert, oriented, interactive, nonfocal    DEVICES:     RADIOLOGY:    EXAM:  US DPLX LWR EXT VEINS COMPL BI                          PROCEDURE DATE:  06/06/2020          INTERPRETATION:  CLINICAL INFORMATION: Leg swelling. History of PE.     COMPARISON: 6/1/2020    TECHNIQUE: Duplex sonography of the BILATERAL LOWERextremity veins with color and spectral Doppler, with and without compression.      FINDINGS:  There is normal compressibility of the bilateral common femoral, femoral and popliteal veins.   Doppler examination shows normal spontaneous and phasic flow.    No calf vein thrombosis is detected.    IMPRESSION:   No evidence of deep venous thrombosis in either lower extremity.      JAMIE RUST M.D., ATTENDING RADIOLOGIST      CRITICAL CARE TIME SPENT: *35

## 2020-06-08 LAB
ANION GAP SERPL CALC-SCNC: 6 MMOL/L — SIGNIFICANT CHANGE UP (ref 5–17)
BUN SERPL-MCNC: 18 MG/DL — SIGNIFICANT CHANGE UP (ref 8–20)
CALCIUM SERPL-MCNC: 8.3 MG/DL — LOW (ref 8.6–10.2)
CHLORIDE SERPL-SCNC: 95 MMOL/L — LOW (ref 98–107)
CO2 SERPL-SCNC: 38 MMOL/L — HIGH (ref 22–29)
CREAT SERPL-MCNC: <0.2 MG/DL — LOW (ref 0.5–1.3)
GLUCOSE BLDC GLUCOMTR-MCNC: 113 MG/DL — HIGH (ref 70–99)
GLUCOSE BLDC GLUCOMTR-MCNC: 116 MG/DL — HIGH (ref 70–99)
GLUCOSE BLDC GLUCOMTR-MCNC: 125 MG/DL — HIGH (ref 70–99)
GLUCOSE BLDC GLUCOMTR-MCNC: 151 MG/DL — HIGH (ref 70–99)
GLUCOSE SERPL-MCNC: 116 MG/DL — HIGH (ref 70–99)
HCT VFR BLD CALC: 32.4 % — LOW (ref 39–50)
HGB BLD-MCNC: 9.7 G/DL — LOW (ref 13–17)
MAGNESIUM SERPL-MCNC: 1.8 MG/DL — SIGNIFICANT CHANGE UP (ref 1.6–2.6)
MCHC RBC-ENTMCNC: 29.9 GM/DL — LOW (ref 32–36)
MCHC RBC-ENTMCNC: 30.3 PG — SIGNIFICANT CHANGE UP (ref 27–34)
MCV RBC AUTO: 101.3 FL — HIGH (ref 80–100)
PHOSPHATE SERPL-MCNC: 2.3 MG/DL — LOW (ref 2.4–4.7)
PLATELET # BLD AUTO: 325 K/UL — SIGNIFICANT CHANGE UP (ref 150–400)
POTASSIUM SERPL-MCNC: 4 MMOL/L — SIGNIFICANT CHANGE UP (ref 3.5–5.3)
POTASSIUM SERPL-SCNC: 4 MMOL/L — SIGNIFICANT CHANGE UP (ref 3.5–5.3)
RBC # BLD: 3.2 M/UL — LOW (ref 4.2–5.8)
RBC # FLD: 14.3 % — SIGNIFICANT CHANGE UP (ref 10.3–14.5)
SODIUM SERPL-SCNC: 139 MMOL/L — SIGNIFICANT CHANGE UP (ref 135–145)
WBC # BLD: 10.17 K/UL — SIGNIFICANT CHANGE UP (ref 3.8–10.5)
WBC # FLD AUTO: 10.17 K/UL — SIGNIFICANT CHANGE UP (ref 3.8–10.5)

## 2020-06-08 PROCEDURE — 99233 SBSQ HOSP IP/OBS HIGH 50: CPT

## 2020-06-08 PROCEDURE — 99291 CRITICAL CARE FIRST HOUR: CPT

## 2020-06-08 RX ORDER — ENOXAPARIN SODIUM 100 MG/ML
70 INJECTION SUBCUTANEOUS EVERY 12 HOURS
Refills: 0 | Status: DISCONTINUED | OUTPATIENT
Start: 2020-06-08 | End: 2020-06-19

## 2020-06-08 RX ADMIN — Medication 4: at 00:26

## 2020-06-08 RX ADMIN — Medication 1 PACKET(S): at 17:25

## 2020-06-08 RX ADMIN — Medication 1 TABLET(S): at 17:25

## 2020-06-08 RX ADMIN — Medication 1 TABLET(S): at 05:13

## 2020-06-08 RX ADMIN — CHLORHEXIDINE GLUCONATE 1 APPLICATION(S): 213 SOLUTION TOPICAL at 05:20

## 2020-06-08 RX ADMIN — Medication 1 PACKET(S): at 05:13

## 2020-06-08 RX ADMIN — CHLORHEXIDINE GLUCONATE 15 MILLILITER(S): 213 SOLUTION TOPICAL at 05:20

## 2020-06-08 RX ADMIN — ENOXAPARIN SODIUM 70 MILLIGRAM(S): 100 INJECTION SUBCUTANEOUS at 17:39

## 2020-06-08 RX ADMIN — HEPARIN SODIUM 5000 UNIT(S): 5000 INJECTION INTRAVENOUS; SUBCUTANEOUS at 05:13

## 2020-06-08 RX ADMIN — QUETIAPINE FUMARATE 50 MILLIGRAM(S): 200 TABLET, FILM COATED ORAL at 22:05

## 2020-06-08 RX ADMIN — Medication 5 MILLIGRAM(S): at 22:05

## 2020-06-08 RX ADMIN — CHLORHEXIDINE GLUCONATE 15 MILLILITER(S): 213 SOLUTION TOPICAL at 17:25

## 2020-06-08 RX ADMIN — CHLORHEXIDINE GLUCONATE 1 APPLICATION(S): 213 SOLUTION TOPICAL at 12:17

## 2020-06-08 NOTE — PROGRESS NOTE ADULT - ASSESSMENT
64 year old male with insignificant pmh coming to hospital with worsening shortness of breath. patient found to have covid and respiratory failure subsequently with complicated icu course including leukocytosis with bacterial pna s/p abx, gi bleed and pe, s/p trach and peg. patient now being downgraded to medicine service as table. will restart full dose anticoagulation    #ACute hypoxic respiratory failure 2/2 Covid 19 pneumonia   - w/ chronic hypercapnia (compensated given chronic pH)  - s/p ICU now being downgraded to medicine   - s/p trach and peg   - isolation precautions   - monitor vitals   - pt consult     #PE  - restart full dose lovenox     #hx of gib bleed w/ rectal ulcer (healing)  - s/p prbc transfusion - s/p rigid sig, clipping of vessel and epinephrine  - gi consult appreciated - ok to restart full dose a/c given no bleed >1 week   - colorectal surgery consult appreciated    #dysphagia  - peg tube     #type 2 diabetes  - a1c 6.7 on admit   - iss   - monitor fingersticks     #dvt prophyla 64 year old male with insignificant pmh coming to hospital with worsening shortness of breath. patient found to have covid and respiratory failure subsequently with complicated icu course including leukocytosis with bacterial pna s/p abx, gi bleed and pe, s/p trach and peg. patient now being downgraded to medicine service as table. will restart full dose anticoagulation    #ACute hypoxic respiratory failure 2/2 Covid 19 pneumonia   - w/ chronic hypercapnia (compensated given chronic pH)  - s/p ICU now being downgraded to medicine   - s/p trach and peg   - isolation precautions   - monitor vitals   - pt consult     #PE  - restart full dose lovenox     #hx of gib bleed w/ rectal ulcer (healing)  - s/p prbc transfusion - s/p rigid sig, clipping of vessel and epinephrine  - gi consult appreciated - ok to restart full dose a/c given no bleed >1 week   - colorectal surgery consult appreciated    #dysphagia  - peg tube     #type 2 diabetes  - a1c 6.7 on admit   - iss   - monitor fingersticks     #dvt prophylaxis  - lovenox SC

## 2020-06-08 NOTE — CHART NOTE - NSCHARTNOTEFT_GEN_A_CORE
Source: Patient [ ]  Family [ ]   other [x ]    Current Diet: Diet, NPO with Tube Feed:   Tube Feeding Modality: Gastrostomy  Pivot 1.5 Raj  Total Volume for 24 Hours (mL): 960  Continuous  Starting Tube Feed Rate {mL per Hour}: 20  Increase Tube Feed Rate by (mL): 10     Every hour  Until Goal Tube Feed Rate (mL per Hour): 40  Tube Feed Duration (in Hours): 24  Tube Feed Start Time: 10:00 (06-04-20 @ 09:25)    Enteral Nutrition: Tube feeds at 40 ml/hr (x20 hrs) provides 800 ml, 1200 kcal, 75g protein, 607 ml free water, and ~80% of RDIs for vitamins/minerals.     Current Weight:   (5/18) 170.8 lbs  (4/18) 154.9 lbs    % Weight Change - ? accuracy of weights, no new weight to assess.  Aware pt with 1+ dependent edema noted per documentation.      Pertinent Medications: MEDICATIONS  (STANDING):  chlorhexidine 0.12% Liquid 15 milliLiter(s) Oral Mucosa every 12 hours  chlorhexidine 2% Cloths 1 Application(s) Topical daily  chlorhexidine 4% Liquid 1 Application(s) Topical <User Schedule>  dextrose 5%. 1000 milliLiter(s) (50 mL/Hr) IV Continuous <Continuous>  heparin   Injectable 5000 Unit(s) SubCutaneous every 12 hours  insulin lispro (HumaLOG) corrective regimen sliding scale   SubCutaneous every 6 hours  lactobacillus acidophilus 1 Tablet(s) Oral two times a day  melatonin 5 milliGRAM(s) Oral at bedtime  multivitamin/minerals 1 Tablet(s) Oral daily  psyllium Powder 1 Packet(s) Oral two times a day  QUEtiapine 50 milliGRAM(s) Oral at bedtime    MEDICATIONS  (PRN):  acetaminophen    Suspension .. 650 milliGRAM(s) Oral every 6 hours PRN Temp greater or equal to 38.5C (101.3F)    Pertinent Labs: CBC Full  -  ( 07 Jun 2020 04:15 )  WBC Count : 13.11 K/uL  RBC Count : 3.07 M/uL  Hemoglobin : 9.6 g/dL  Hematocrit : 30.4 %  Platelet Count - Automated : 375 K/uL  Mean Cell Volume : 99.0 fl  Mean Cell Hemoglobin : 31.3 pg  Mean Cell Hemoglobin Concentration : 31.6 gm/dL  Auto Neutrophil # : 9.64 K/uL  Auto Lymphocyte # : 2.20 K/uL  Auto Monocyte # : 0.70 K/uL  Auto Eosinophil # : 0.45 K/uL  Auto Basophil # : 0.03 K/uL  Auto Neutrophil % : 73.6 %  Auto Lymphocyte % : 16.8 %  Auto Monocyte % : 5.3 %  Auto Eosinophil % : 3.4 %  Auto Basophil % : 0.2 %  06-07 Na140 mmol/L Glu 133 mg/dL<H> K+ 3.8 mmol/L Cr  0.20 mg/dL<L> BUN 21.0 mg/dL<H> Phos 1.9 mg/dL<L> Alb 2.7 g/dL<L> PAB n/a       Skin: no skin breakdown noted    Current Nutrition Diagnosis: Pt remains at high nutrition risk secondary to severe protein calorie malnutrition (acute) related to inability to meet sufficient protein-energy requirements in setting of acute hypoxic respiratory failure, R Segmental PE, Severe ARDS secondary to COVID 19, rapid response requiring intubation on 5/1, s/p trach/PEG and altered GI function secondary to diarrhea as evidenced by meeting <50% nutrient needs >5 days and +fluid accumulation. Pt continues to receive tube feeds via PEG.  Pt also continues to have loose BM's.    Recommendations:   1) As medically feasible, increase Pivot 1.5 to goal rate of 65 ml/hr (x20 hrs) to provide 1300 ml, 1950 kcal, 122g protein, 987 ml free water, and >100% of RDIs for vitamins/minerals. Additional free water per MD discretion.   2) Discontinue Metamucil and add 1 packet Banatrol TID.  3) Continue with MVI daily.  4) Rx: Bacid to support gut integrity.  5) Obtain daily weights to monitor trends.    Monitoring and Evaluation:   [ ] PO intake [x ] Tolerance to diet prescription [X] Weights  [X] Follow up per protocol [X] Labs:

## 2020-06-08 NOTE — CHART NOTE - NSCHARTNOTEFT_GEN_A_CORE
Patient having >3 loose, watery stools today. Nonbloody. C dif sample sent earlier this AM, cancelled by lab as stool was too solid. Currently patient denies abdominal pain, chest pain, n/v, SOB, fevers, chills, headaches, dizziness. All vital signs stable. Patient is currently afebrile, leukocytosis has resolved. Currently not on abx. Patient has been taking metamucil BID. At this time metamucil d/c'd, will continue to monitor diarrhea. C dif sample not resent at this time, will defer for at least 12hrs and monitor off metamucil. Patient having >3 loose, watery stools today. Nonbloody. C dif sample sent earlier this AM, cancelled by lab as stool was too solid. Currently patient denies abdominal pain, chest pain, n/v, SOB, fevers, chills, headaches, dizziness. All vital signs stable as follows: T98.6 rectal, HR 88, /56, RR 24. Patient is currently afebrile, leukocytosis has resolved. Currently not on abx. Patient has been taking metamucil BID. At this time metamucil d/c'd, will continue to monitor diarrhea. C dif sample not resent at this time, will defer for at least 12hrs and monitor off metamucil.

## 2020-06-08 NOTE — PROGRESS NOTE ADULT - SUBJECTIVE AND OBJECTIVE BOX
Patient is a 64y old  Male who presents with a chief complaint of shortness of breath (08 Jun 2020 10:14)      Patient seen and examined at bedside.    ALLERGIES:  No Known Allergies    MEDICATIONS  (STANDING):  chlorhexidine 0.12% Liquid 15 milliLiter(s) Oral Mucosa every 12 hours  chlorhexidine 2% Cloths 1 Application(s) Topical daily  chlorhexidine 4% Liquid 1 Application(s) Topical <User Schedule>  enoxaparin Injectable 70 milliGRAM(s) SubCutaneous every 12 hours  insulin lispro (HumaLOG) corrective regimen sliding scale   SubCutaneous every 6 hours  lactobacillus acidophilus 1 Tablet(s) Oral two times a day  melatonin 5 milliGRAM(s) Oral at bedtime  multivitamin/minerals 1 Tablet(s) Oral daily  psyllium Powder 1 Packet(s) Oral two times a day  QUEtiapine 50 milliGRAM(s) Oral at bedtime    MEDICATIONS  (PRN):  acetaminophen    Suspension .. 650 milliGRAM(s) Oral every 6 hours PRN Temp greater or equal to 38.5C (101.3F)    Vital Signs Last 24 Hrs  T(F): 97.9 (08 Jun 2020 16:18), Max: 98.2 (08 Jun 2020 00:00)  HR: 76 (08 Jun 2020 15:00) (67 - 84)  BP: 132/62 (08 Jun 2020 15:00) (91/55 - 132/62)  RR: 26 (08 Jun 2020 15:00) (16 - 26)  SpO2: 94% (08 Jun 2020 15:00) (94% - 100%)  I&O's Summary    07 Jun 2020 07:01  -  08 Jun 2020 07:00  --------------------------------------------------------  IN: 3460 mL / OUT: 1900 mL / NET: 1560 mL    08 Jun 2020 07:01  -  08 Jun 2020 16:29  --------------------------------------------------------  IN: 1520 mL / OUT: 1100 mL / NET: 420 mL      PHYSICAL EXAM:  General: NAD, Alert  ENT: MMM, no thrush  Neck: Supple, No JVD  Lungs: good air entry, non-labored breathing +trach  Cardio: +S1/S2, No pitting edema +peg  Abdomen: Soft, Nontender, Nondistended; Bowel sounds present +peg  Extremities: No calf tenderness    LABS:                        9.7    10.17 )-----------( 325      ( 08 Jun 2020 08:39 )             32.4     06-08    139  |  95  |  18.0  ----------------------------<  116  4.0   |  38.0  |  <0.20    Ca    8.3      08 Jun 2020 08:39  Phos  2.3     06-08  Mg     1.8     06-08    TPro  5.8  /  Alb  2.7  /  TBili  0.2  /  DBili  x   /  AST  43  /  ALT  56  /  AlkPhos  275  06-07    eGFR if Non : 169 mL/min/1.73M2 (06-08-20 @ 08:39)  eGFR if : 196 mL/min/1.73M2 (06-08-20 @ 08:39)    04-24 Chol -- LDL -- HDL -- Trig 85 mg/dL    ABG - ( 07 Jun 2020 04:46 )  pH, Arterial: 7.38  pH, Blood: x     /  pCO2: 73    /  pO2: 88    / HCO3: 40    / Base Excess: 16.1  /  SaO2: 98        Glucose  POCT Blood Glucose.: 116 mg/dL (08 Jun 2020 11:37)  POCT Blood Glucose.: 113 mg/dL (08 Jun 2020 05:18)  POCT Blood Glucose.: 151 mg/dL (08 Jun 2020 00:23)  POCT Blood Glucose.: 126 mg/dL (07 Jun 2020 17:25)    RADIOLOGY & ADDITIONAL TESTS:  - no new tests

## 2020-06-08 NOTE — PROGRESS NOTE ADULT - ASSESSMENT
Covid 19 pneumonia   Respiratory failure improved  Tolerating TC  S/p trach and PEG  Critical illness polyneuropathy  PE  GIB - appears resolved  Leukocytosis resolved  Chronic hypercarbia but compensated given normal pH    Rec:    Vent support if needed  TC as tolerates  TF  A/c for PE but GIB is of concern though full AC OK with GI  Eventual tx to [facility] in [place]  Continue supportive care  Consider downgrade from ICU

## 2020-06-08 NOTE — PROGRESS NOTE ADULT - SUBJECTIVE AND OBJECTIVE BOX
PULMONARY PROGRESS NOTE      LISSA MALLOY  [id number]    Patient is a 64y old  Male who presents with a chief complaint of shortness of breath (07 Jun 2020 14:42)      INTERVAL HPI/OVERNIGHT EVENTS:    Pt is awake and alert  Tolerating TC    MEDICATIONS  (STANDING):  chlorhexidine 0.12% Liquid 15 milliLiter(s) Oral Mucosa every 12 hours  chlorhexidine 2% Cloths 1 Application(s) Topical daily  chlorhexidine 4% Liquid 1 Application(s) Topical <User Schedule>  heparin   Injectable 5000 Unit(s) SubCutaneous every 12 hours  insulin lispro (HumaLOG) corrective regimen sliding scale   SubCutaneous every 6 hours  lactobacillus acidophilus 1 Tablet(s) Oral two times a day  melatonin 5 milliGRAM(s) Oral at bedtime  multivitamin/minerals 1 Tablet(s) Oral daily  psyllium Powder 1 Packet(s) Oral two times a day  QUEtiapine 50 milliGRAM(s) Oral at bedtime      MEDICATIONS  (PRN):  acetaminophen    Suspension .. 650 milliGRAM(s) Oral every 6 hours PRN Temp greater or equal to 38.5C (101.3F)      Allergies    No Known Allergies    Intolerances        PAST MEDICAL & SURGICAL HISTORY:  No pertinent past medical history  No significant past surgical history        REVIEW OF SYSTEMS:    CONSTITUTIONAL:  No distress    CARDIOVASCULAR:  No pressure, squeezing, tightness, heaviness or aching about the chest; no palpitations.    RESPIRATORY:  No cough, shortness of breath, PND or orthopnea. Mild SOBOE    GASTROINTESTINAL:  No nausea, vomiting or diarrhea.    NEUROLOGIC:  No seizures or weakness.      Vital Signs Last 24 Hrs  T(C): 36.4 (08 Jun 2020 08:00), Max: 37.1 (07 Jun 2020 11:03)  T(F): 97.5 (08 Jun 2020 08:00), Max: 98.7 (07 Jun 2020 11:03)  HR: 67 (08 Jun 2020 08:00) (67 - 84)  BP: 113/56 (08 Jun 2020 08:00) (91/55 - 116/63)  BP(mean): 68 (08 Jun 2020 08:00) (59 - 97)  RR: 16 (08 Jun 2020 08:00) (16 - 21)  SpO2: 100% (08 Jun 2020 08:00) (100% - 100%)    PHYSICAL EXAMINATION:    GENERAL: The patient is awake and alert s/p trach in no apparent distress.     HEENT: Head is normocephalic and atraumatic. Extraocular muscles are intact. Mucous membranes are moist.    NECK: Supple.    LUNGS: Clear to auscultation without wheezing, rales or rhonchi; respirations unlabored    HEART: Regular rate and rhythm without murmur.    ABDOMEN: Soft, nontender, and nondistended.      EXTREMITIES: Without any cyanosis, clubbing, rash, lesions or edema.    NEUROLOGIC: Responsive.    LABS:                        9.7    10.17 )-----------( 325      ( 08 Jun 2020 08:39 )             32.4     06-08    139  |  95<L>  |  18.0  ----------------------------<  116<H>  4.0   |  38.0<H>  |  <0.20<L>    Ca    8.3<L>      08 Jun 2020 08:39  Phos  2.3     06-08  Mg     1.8     06-08    TPro  5.8<L>  /  Alb  2.7<L>  /  TBili  0.2<L>  /  DBili  x   /  AST  43<H>  /  ALT  56<H>  /  AlkPhos  275<H>  06-07        ABG - ( 07 Jun 2020 04:46 )  pH, Arterial: 7.38  pH, Blood: x     /  pCO2: 73    /  pO2: 88    / HCO3: 40    / Base Excess: 16.1  /  SaO2: 98          MICROBIOLOGY:    COVID-19 PCR . (06.05.20 @ 16:37)    COVID-19 PCR: NotDetec: This test has been validated by [employer] to be accurate;  though it has not been FDA cleared/approved by the usual pathway.  As with all laboratory tests, results should be correlated with clinical  findings.  https://www.fda.gov/media/716706/download  https://www.fda.gov/media/962916/download        RADIOLOGY & ADDITIONAL STUDIES:     EXAM:  US DPLX LWR EXT VEINS COMPL BI                          PROCEDURE DATE:  06/06/2020          INTERPRETATION:  CLINICAL INFORMATION: Leg swelling. History of PE.     COMPARISON: 6/1/2020    TECHNIQUE: Duplex sonography of the BILATERAL LOWERextremity veins with color and spectral Doppler, with and without compression.      FINDINGS:  There is normal compressibility of the bilateral common femoral, femoral and popliteal veins.   Doppler examination shows normal spontaneous and phasic flow.    No calf vein thrombosis is detected.    IMPRESSION:   No evidence of deep venous thrombosis in either lower extremity.        JAMIE RUST M.D., ATTENDING RADIOLOGIST  This document has been electronically signed. Jun 6 2020  7:37PM       EXAM:  XR CHEST PORTABLE URGENT 1V                          PROCEDURE DATE:  06/01/2020          INTERPRETATION:  AP chest on June 1, 2020 1:28 AM. Patient had left jugular line placement. Patient requires tracheostomy. Patient tested positive forthe COVID virus on April 18.    Heart is magnified by technique. Tracheostomy again noted.    There are persistent advanced bilateral infiltrates.    The above findings are similar to May 31.    Present film shows a jugular sleeve inserted. Tip is inthe left innominate vein area.    IMPRESSION: Persistent advanced infiltrates. Left jugular sleeve inserted as above.                NICHOLE ARRINGTON M.D., ATTENDING RADIOLOGIST  This document has been electronically signed. Jun 1 2020  9:02AM          ECHO:    Summary:   1. Left ventricular ejection fraction, by visual estimation, is 70 to 75%.   2. Normal global left ventricular systolic function.   3. LV Ejection Fraction by Lobo's Method with a biplane EF of 79 %.   4. Normal left ventricular internal cavity size.   5. Spectral Doppler shows impaired relaxation pattern of left ventricular myocardial filling (Grade I diastolic dysfunction).   6. There is no evidence of pericardial effusion.    MD Chung Electronically signed on 5/3/2020 at 5:09:19 PM

## 2020-06-09 LAB
ANION GAP SERPL CALC-SCNC: 6 MMOL/L — SIGNIFICANT CHANGE UP (ref 5–17)
BUN SERPL-MCNC: 18 MG/DL — SIGNIFICANT CHANGE UP (ref 8–20)
CALCIUM SERPL-MCNC: 8.4 MG/DL — LOW (ref 8.6–10.2)
CHLORIDE SERPL-SCNC: 95 MMOL/L — LOW (ref 98–107)
CO2 SERPL-SCNC: 39 MMOL/L — HIGH (ref 22–29)
CREAT SERPL-MCNC: 0.21 MG/DL — LOW (ref 0.5–1.3)
GLUCOSE BLDC GLUCOMTR-MCNC: 118 MG/DL — HIGH (ref 70–99)
GLUCOSE BLDC GLUCOMTR-MCNC: 124 MG/DL — HIGH (ref 70–99)
GLUCOSE BLDC GLUCOMTR-MCNC: 148 MG/DL — HIGH (ref 70–99)
GLUCOSE BLDC GLUCOMTR-MCNC: 97 MG/DL — SIGNIFICANT CHANGE UP (ref 70–99)
GLUCOSE SERPL-MCNC: 118 MG/DL — HIGH (ref 70–99)
MAGNESIUM SERPL-MCNC: 1.7 MG/DL — SIGNIFICANT CHANGE UP (ref 1.6–2.6)
PHOSPHATE SERPL-MCNC: 1.8 MG/DL — LOW (ref 2.4–4.7)
POTASSIUM SERPL-MCNC: 3.9 MMOL/L — SIGNIFICANT CHANGE UP (ref 3.5–5.3)
POTASSIUM SERPL-SCNC: 3.9 MMOL/L — SIGNIFICANT CHANGE UP (ref 3.5–5.3)
SODIUM SERPL-SCNC: 140 MMOL/L — SIGNIFICANT CHANGE UP (ref 135–145)

## 2020-06-09 PROCEDURE — 99233 SBSQ HOSP IP/OBS HIGH 50: CPT

## 2020-06-09 PROCEDURE — 99223 1ST HOSP IP/OBS HIGH 75: CPT

## 2020-06-09 RX ORDER — POTASSIUM PHOSPHATE, MONOBASIC POTASSIUM PHOSPHATE, DIBASIC 236; 224 MG/ML; MG/ML
15 INJECTION, SOLUTION INTRAVENOUS ONCE
Refills: 0 | Status: COMPLETED | OUTPATIENT
Start: 2020-06-09 | End: 2020-06-09

## 2020-06-09 RX ORDER — FLUOXETINE HCL 10 MG
10 CAPSULE ORAL DAILY
Refills: 0 | Status: DISCONTINUED | OUTPATIENT
Start: 2020-06-09 | End: 2020-06-09

## 2020-06-09 RX ORDER — NYSTATIN 500MM UNIT
500000 POWDER (EA) MISCELLANEOUS THREE TIMES A DAY
Refills: 0 | Status: DISCONTINUED | OUTPATIENT
Start: 2020-06-09 | End: 2020-06-18

## 2020-06-09 RX ORDER — SODIUM CHLORIDE 9 MG/ML
1000 INJECTION INTRAMUSCULAR; INTRAVENOUS; SUBCUTANEOUS
Refills: 0 | Status: DISCONTINUED | OUTPATIENT
Start: 2020-06-09 | End: 2020-06-17

## 2020-06-09 RX ORDER — FLUOXETINE HCL 10 MG
10 CAPSULE ORAL DAILY
Refills: 0 | Status: DISCONTINUED | OUTPATIENT
Start: 2020-06-09 | End: 2020-06-18

## 2020-06-09 RX ADMIN — Medication 10 MILLIGRAM(S): at 13:21

## 2020-06-09 RX ADMIN — Medication 500000 UNIT(S): at 21:56

## 2020-06-09 RX ADMIN — Medication 500000 UNIT(S): at 13:20

## 2020-06-09 RX ADMIN — CHLORHEXIDINE GLUCONATE 1 APPLICATION(S): 213 SOLUTION TOPICAL at 11:11

## 2020-06-09 RX ADMIN — Medication 1 TABLET(S): at 06:11

## 2020-06-09 RX ADMIN — POTASSIUM PHOSPHATE, MONOBASIC POTASSIUM PHOSPHATE, DIBASIC 62.5 MILLIMOLE(S): 236; 224 INJECTION, SOLUTION INTRAVENOUS at 09:11

## 2020-06-09 RX ADMIN — Medication 5 MILLIGRAM(S): at 21:56

## 2020-06-09 RX ADMIN — ENOXAPARIN SODIUM 70 MILLIGRAM(S): 100 INJECTION SUBCUTANEOUS at 17:32

## 2020-06-09 RX ADMIN — CHLORHEXIDINE GLUCONATE 1 APPLICATION(S): 213 SOLUTION TOPICAL at 04:51

## 2020-06-09 RX ADMIN — SODIUM CHLORIDE 75 MILLILITER(S): 9 INJECTION INTRAMUSCULAR; INTRAVENOUS; SUBCUTANEOUS at 22:34

## 2020-06-09 RX ADMIN — CHLORHEXIDINE GLUCONATE 15 MILLILITER(S): 213 SOLUTION TOPICAL at 04:51

## 2020-06-09 RX ADMIN — ENOXAPARIN SODIUM 70 MILLIGRAM(S): 100 INJECTION SUBCUTANEOUS at 06:11

## 2020-06-09 RX ADMIN — Medication 1 TABLET(S): at 17:31

## 2020-06-09 NOTE — PROGRESS NOTE ADULT - SUBJECTIVE AND OBJECTIVE BOX
Patient is a 64y old  Male who presents with a chief complaint of shortness of breath (08 Jun 2020 16:28)      Patient seen and examined at bedside. No overnight events reported.     ALLERGIES:  No Known Allergies    MEDICATIONS  (STANDING):  chlorhexidine 0.12% Liquid 15 milliLiter(s) Oral Mucosa every 12 hours  chlorhexidine 2% Cloths 1 Application(s) Topical daily  chlorhexidine 4% Liquid 1 Application(s) Topical <User Schedule>  enoxaparin Injectable 70 milliGRAM(s) SubCutaneous every 12 hours  insulin lispro (HumaLOG) corrective regimen sliding scale   SubCutaneous every 6 hours  melatonin 5 milliGRAM(s) Oral at bedtime  multivitamin/minerals 1 Tablet(s) Oral daily  potassium phosphate IVPB 15 milliMole(s) IV Intermittent once  QUEtiapine 50 milliGRAM(s) Oral at bedtime    MEDICATIONS  (PRN):  acetaminophen    Suspension .. 650 milliGRAM(s) Oral every 6 hours PRN Temp greater or equal to 38.5C (101.3F)    Vital Signs Last 24 Hrs  T(F): 98.9 (09 Jun 2020 08:00), Max: 98.9 (09 Jun 2020 08:00)  HR: 95 (09 Jun 2020 08:00) (70 - 101)  BP: 123/62 (09 Jun 2020 08:00) (99/37 - 132/62)  RR: 27 (09 Jun 2020 08:00) (16 - 27)  SpO2: 96% (09 Jun 2020 08:00) (94% - 100%)  I&O's Summary    08 Jun 2020 07:01  -  09 Jun 2020 07:00  --------------------------------------------------------  IN: 3040 mL / OUT: 2270 mL / NET: 770 mL    09 Jun 2020 07:01  -  09 Jun 2020 09:00  --------------------------------------------------------  IN: 240 mL / OUT: 350 mL / NET: -110 mL    PHYSICAL EXAM:  General: NAD, Alert  ENT: MMM, no thrush  Neck: Supple, No JVD  Lungs: good air entry, non-labored breathing +trach  Cardio: +S1/S2, No pitting edema +peg  Abdomen: Soft, Nontender, Nondistended; Bowel sounds present +peg  Extremities: No calf tenderness      LABS:                        9.7    10.17 )-----------( 325      ( 08 Jun 2020 08:39 )             32.4     06-09    140  |  95  |  18.0  ----------------------------<  118  3.9   |  39.0  |  0.21    Ca    8.4      09 Jun 2020 04:47  Phos  1.8     06-09  Mg     1.7     06-09    TPro  5.8  /  Alb  2.7  /  TBili  0.2  /  DBili  x   /  AST  43  /  ALT  56  /  AlkPhos  275  06-07    eGFR if Non African American: 166 mL/min/1.73M2 (06-09-20 @ 04:47)  eGFR if African American: 192 mL/min/1.73M2 (06-09-20 @ 04:47)    04-24 Chol -- LDL -- HDL -- Trig 85 mg/dL    ABG - ( 07 Jun 2020 04:46 )  pH, Arterial: 7.38  pH, Blood: x     /  pCO2: 73    /  pO2: 88    / HCO3: 40    / Base Excess: 16.1  /  SaO2: 98        Glucose  POCT Blood Glucose.: 97 mg/dL (09 Jun 2020 06:06)  POCT Blood Glucose.: 148 mg/dL (09 Jun 2020 00:14)  POCT Blood Glucose.: 125 mg/dL (08 Jun 2020 17:40)  POCT Blood Glucose.: 116 mg/dL (08 Jun 2020 11:37)    RADIOLOGY & ADDITIONAL TESTS:  - no new tests    Care Discussed with Consultants/Other Providers:   - PMR   - Case Management Patient is a 64y old  Male who presents with a chief complaint of shortness of breath (08 Jun 2020 16:28)      Patient seen and examined at bedside.      ALLERGIES:  No Known Allergies    MEDICATIONS  (STANDING):  chlorhexidine 0.12% Liquid 15 milliLiter(s) Oral Mucosa every 12 hours  chlorhexidine 2% Cloths 1 Application(s) Topical daily  chlorhexidine 4% Liquid 1 Application(s) Topical <User Schedule>  enoxaparin Injectable 70 milliGRAM(s) SubCutaneous every 12 hours  insulin lispro (HumaLOG) corrective regimen sliding scale   SubCutaneous every 6 hours  melatonin 5 milliGRAM(s) Oral at bedtime  multivitamin/minerals 1 Tablet(s) Oral daily  potassium phosphate IVPB 15 milliMole(s) IV Intermittent once  QUEtiapine 50 milliGRAM(s) Oral at bedtime    MEDICATIONS  (PRN):  acetaminophen    Suspension .. 650 milliGRAM(s) Oral every 6 hours PRN Temp greater or equal to 38.5C (101.3F)    Vital Signs Last 24 Hrs  T(F): 98.9 (09 Jun 2020 08:00), Max: 98.9 (09 Jun 2020 08:00)  HR: 95 (09 Jun 2020 08:00) (70 - 101)  BP: 123/62 (09 Jun 2020 08:00) (99/37 - 132/62)  RR: 27 (09 Jun 2020 08:00) (16 - 27)  SpO2: 96% (09 Jun 2020 08:00) (94% - 100%)  I&O's Summary    08 Jun 2020 07:01  -  09 Jun 2020 07:00  --------------------------------------------------------  IN: 3040 mL / OUT: 2270 mL / NET: 770 mL    09 Jun 2020 07:01  -  09 Jun 2020 09:00  --------------------------------------------------------  IN: 240 mL / OUT: 350 mL / NET: -110 mL    PHYSICAL EXAM:  General: NAD, Alert  ENT: MMM, no thrush  Neck: Supple, No JVD  Lungs: good air entry, non-labored breathing +trach  Cardio: +S1/S2, No pitting edema +peg  Abdomen: Soft, Nontender, Nondistended; Bowel sounds present +peg  Extremities: No calf tenderness      LABS:                        9.7    10.17 )-----------( 325      ( 08 Jun 2020 08:39 )             32.4     06-09    140  |  95  |  18.0  ----------------------------<  118  3.9   |  39.0  |  0.21    Ca    8.4      09 Jun 2020 04:47  Phos  1.8     06-09  Mg     1.7     06-09    TPro  5.8  /  Alb  2.7  /  TBili  0.2  /  DBili  x   /  AST  43  /  ALT  56  /  AlkPhos  275  06-07    eGFR if Non African American: 166 mL/min/1.73M2 (06-09-20 @ 04:47)  eGFR if African American: 192 mL/min/1.73M2 (06-09-20 @ 04:47)    04-24 Chol -- LDL -- HDL -- Trig 85 mg/dL    ABG - ( 07 Jun 2020 04:46 )  pH, Arterial: 7.38  pH, Blood: x     /  pCO2: 73    /  pO2: 88    / HCO3: 40    / Base Excess: 16.1  /  SaO2: 98        Glucose  POCT Blood Glucose.: 97 mg/dL (09 Jun 2020 06:06)  POCT Blood Glucose.: 148 mg/dL (09 Jun 2020 00:14)  POCT Blood Glucose.: 125 mg/dL (08 Jun 2020 17:40)  POCT Blood Glucose.: 116 mg/dL (08 Jun 2020 11:37)    RADIOLOGY & ADDITIONAL TESTS:  - no new tests    Care Discussed with Consultants/Other Providers:   - PMR   - Case Management

## 2020-06-09 NOTE — PROGRESS NOTE ADULT - ASSESSMENT
64 year old male with insignificant pmh coming to hospital with worsening shortness of breath. patient found to have covid and respiratory failure subsequently with complicated icu course including leukocytosis with bacterial pna s/p abx, gi bleed and pe, s/p trach and peg. patient now being downgraded to medicine service as table. will restart full dose anticoagulation    #ACute hypoxic respiratory failure 2/2 Covid 19 pneumonia   - w/ chronic hypercapnia (compensated given chronic pH)  - s/p ICU now being downgraded to medicine   - s/p trach and peg   - isolation precautions   - monitor vitals   - pt consult     #PE  - restart full dose lovenox     #hx of gib bleed w/ rectal ulcer (healing)  - s/p prbc transfusion - s/p rigid sig, clipping of vessel and epinephrine  - gi consult appreciated - ok to restart full dose a/c given no bleed >1 week   - colorectal surgery consult appreciated    #dysphagia  - peg tube     #type 2 diabetes  - a1c 6.7 on admit   - iss   - monitor fingersticks     #dvt prophylaxis   - lovenox SC     Dispo- pending gaurdianship; court date set for 09/2020 64 year old male with insignificant pmh coming to hospital with worsening shortness of breath. patient found to have covid and respiratory failure subsequently with complicated icu course including leukocytosis with bacterial pna s/p abx, gi bleed and pe, s/p trach and peg. patient now being downgraded to medicine service as table. will restart full dose anticoagulation    #ACute hypoxic respiratory failure 2/2 Covid 19 pneumonia   - w/ chronic hypercapnia (compensated given chronic pH)  - s/p ICU now being downgraded to medicine   - s/p trach and peg   - isolation precautions   - monitor vitals   - pt consult     #PE  - lovenox     #hx of gib bleed w/ rectal ulcer (healing)  - s/p prbc transfusion - s/p rigid sig, clipping of vessel and epinephrine  - gi consult appreciated  - colorectal surgery consult appreciated    #dysphagia  - peg tube     #type 2 diabetes  - a1c 6.7 on admit   - iss   - monitor fingersticks     #dvt prophylaxis   - lovenox SC     Dispo- pending gaurdianship; court date set for 09/2020

## 2020-06-09 NOTE — CHART NOTE - NSCHARTNOTEFT_GEN_A_CORE
Patient experiencing discomfort and is unable to tolerate tube feeds and amount of free water being administered. Tube feeds adjusted today to bolus feeds. Patient refusing his 6pm feed, otherwise received feeds earlier today. Also refusing administration of free water. No episodes of vomiting. Tube feeds held overnight. Started on normal saline IV at 75cc/hr for gentle hydration. Continue to monitor.

## 2020-06-09 NOTE — PROGRESS NOTE ADULT - ASSESSMENT
Covid 19 pneumonia   Respiratory failure improved  Tolerating TC with HFO2  S/p trach and PEG  Critical illness polyneuropathy  PE  GIB - appears resolved  Leukocytosis resolved  Chronic hypercarbia but compensated given normal pH    Rec:    Vent support if needed  TC as tolerates  Trach change to fenestrated trach  TF  A/c for PE but GIB is of concern though full AC OK with GI  Eventual tx to [facility] in [place]  Continue supportive care

## 2020-06-09 NOTE — PROGRESS NOTE ADULT - SUBJECTIVE AND OBJECTIVE BOX
PULMONARY PROGRESS NOTE      LISSA MALLOY  MRN-[id number]    Patient is a 64y old  Male who presents with a chief complaint of shortness of breath (09 Jun 2020 08:59)      INTERVAL HPI/OVERNIGHT EVENTS:    Pt is awake and alert  Tolerating HFO2 via TC    MEDICATIONS  (STANDING):  chlorhexidine 0.12% Liquid 15 milliLiter(s) Oral Mucosa every 12 hours  chlorhexidine 2% Cloths 1 Application(s) Topical daily  chlorhexidine 4% Liquid 1 Application(s) Topical <User Schedule>  enoxaparin Injectable 70 milliGRAM(s) SubCutaneous every 12 hours  insulin lispro (HumaLOG) corrective regimen sliding scale   SubCutaneous every 6 hours  melatonin 5 milliGRAM(s) Oral at bedtime  multivitamin/minerals 1 Tablet(s) Oral daily  QUEtiapine 50 milliGRAM(s) Oral at bedtime      MEDICATIONS  (PRN):  acetaminophen    Suspension .. 650 milliGRAM(s) Oral every 6 hours PRN Temp greater or equal to 38.5C (101.3F)      Allergies    No Known Allergies    Intolerances        PAST MEDICAL & SURGICAL HISTORY:  No pertinent past medical history  No significant past surgical history        REVIEW OF SYSTEMS:    CONSTITUTIONAL:  No distress    CARDIOVASCULAR:  No pressure, squeezing, tightness, heaviness or aching about the chest; no palpitations.    RESPIRATORY:  No cough, shortness of breath, PND or orthopnea. Mild SOBOE    GASTROINTESTINAL:  No nausea, vomiting or diarrhea.    NEUROLOGIC:  No seizures or weakness.    Vital Signs Last 24 Hrs  T(C): 37.2 (09 Jun 2020 08:00), Max: 37.2 (09 Jun 2020 08:00)  T(F): 98.9 (09 Jun 2020 08:00), Max: 98.9 (09 Jun 2020 08:00)  HR: 95 (09 Jun 2020 08:00) (70 - 101)  BP: 123/62 (09 Jun 2020 08:00) (106/59 - 132/62)  BP(mean): 78 (09 Jun 2020 08:00) (66 - 80)  RR: 27 (09 Jun 2020 08:00) (16 - 27)  SpO2: 96% (09 Jun 2020 08:00) (94% - 100%)    PHYSICAL EXAMINATION:    GENERAL: The patient is awake and alert s/p trach in no apparent distress.     HEENT: Head is normocephalic and atraumatic. Extraocular muscles are intact. Mucous membranes are moist.    NECK: Supple. + trach    LUNGS: Clear to auscultation without wheezing, rales with mild rhonchi; respirations unlabored    HEART: Regular rate and rhythm without murmur.    ABDOMEN: Soft, nontender, and nondistended.      EXTREMITIES: Without any cyanosis, clubbing, rash, lesions or edema.    NEUROLOGIC: Grossly intact.    LABS:                        9.7    10.17 )-----------( 325      ( 08 Jun 2020 08:39 )             32.4     06-09    140  |  95<L>  |  18.0  ----------------------------<  118<H>  3.9   |  39.0<H>  |  0.21<L>    Ca    8.4<L>      09 Jun 2020 04:47  Phos  1.8     06-09  Mg     1.7     06-09      MICROBIOLOGY:    COVID-19 PCR . (06.05.20 @ 16:37)    COVID-19 PCR: NotDetec: This test has been validated by [facility] to be accurate;  though it has not been FDA cleared/approved by the usual pathway.  As with all laboratory tests, results should be correlated with clinical  findings.  https://www.fda.gov/media/116513/download  https://www.fda.gov/media/241286/download        RADIOLOGY & ADDITIONAL STUDIES:     EXAM:  US DPLX LWR EXT VEINS COMPL BI                          PROCEDURE DATE:  06/06/2020          INTERPRETATION:  CLINICAL INFORMATION: Leg swelling. History of PE.     COMPARISON: 6/1/2020    TECHNIQUE: Duplex sonography of the BILATERAL LOWERextremity veins with color and spectral Doppler, with and without compression.      FINDINGS:  There is normal compressibility of the bilateral common femoral, femoral and popliteal veins.   Doppler examination shows normal spontaneous and phasic flow.    No calf vein thrombosis is detected.    IMPRESSION:   No evidence of deep venous thrombosis in either lower extremity.        JAMIE RUST M.D., ATTENDING RADIOLOGIST  This document has been electronically signed. Jun 6 2020  7:37PM       EXAM:  XR CHEST PORTABLE URGENT 1V                          PROCEDURE DATE:  06/01/2020          INTERPRETATION:  AP chest on June 1, 2020 1:28 AM. Patient had left jugular line placement. Patient requires tracheostomy. Patient tested positive forthe COVID virus on April 18.    Heart is magnified by technique. Tracheostomy again noted.    There are persistent advanced bilateral infiltrates.    The above findings are similar to May 31.    Present film shows a jugular sleeve inserted. Tip is inthe left innominate vein area.    IMPRESSION: Persistent advanced infiltrates. Left jugular sleeve inserted as above.                NICHOLE ARRINGTON M.D., ATTENDING RADIOLOGIST  This document has been electronically signed. Jun 1 2020  9:02AM          ECHO:    Summary:   1. Left ventricular ejection fraction, by visual estimation, is 70 to 75%.   2. Normal global left ventricular systolic function.   3. LV Ejection Fraction by Lobo's Method with a biplane EF of 79 %.   4. Normal left ventricular internal cavity size.   5. Spectral Doppler shows impaired relaxation pattern of left ventricular myocardial filling (Grade I diastolic dysfunction).   6. There is no evidence of pericardial effusion.    MD Chung Electronically signed on 5/3/2020 at 5:09:19 PM

## 2020-06-09 NOTE — CHART NOTE - NSCHARTNOTEFT_GEN_A_CORE
On-Call Resident Note    Paged to bedside by primary team for tracheostomy exchange.      Patient underwent percutaneous tracheostomy with 6-0 cuffed Shiley on 5/26/2020 with Dr. Adin Hansen for ventilator dependent respiratory failure secondary to COVID PNA.  Now tolerating trach collar.  95% on 40% humidified.    Tracheostomy tube exchanged for 6-0 fenestrated Shiley without complication.  Tolerated well.  95% saturation at conclusion of exchange.     Discussed with attending physician Dr. Dereck London whom agrees.

## 2020-06-09 NOTE — CONSULT NOTE ADULT - SUBJECTIVE AND OBJECTIVE BOX
64yM was admitted on 04-18 due to COVID related infection. Course was complicated by prolonged ICU course for acute hypoxic respiratory failure, requiring vent, s/p PEG and trach. Course also complicated by PE, GI bleed with rectal ulceration and is s/p RBCs as well as sigmoidoscopy and clipping/epinephrine.    Patient is undocumented and has no insurance. He is currently undergoing guardianship and has a court date set for  a day in Sept.     Patient continues to be on trach, currently cuffed.   Current diet is NPO.   Patient has is currently aphonic with quadriparesis.      REVIEW OF SYSTEMS  Constitutional - No fever, +weight loss, +fatigue  HEENT - No eye pain, No visual disturbances, No difficulty hearing, No tinnitus, No vertigo, No neck pain  Respiratory - No cough, No wheezing, +shortness of breath  Cardiovascular - No chest pain, No palpitations  Gastrointestinal - No abdominal pain, No nausea, No vomiting, No diarrhea, No constipation  Genitourinary - No dysuria, No frequency, No hematuria, +incontinence  Neurological - No headaches, No memory loss, +loss of strength, +numbness, No tremors  Skin - No itching, No rashes, No lesions   Endocrine - No temperature intolerance  Musculoskeletal - No joint pain, No joint swelling, No muscle pain  Psychiatric - +depression, No anxiety    VITALS  T(C): 37.2 (06-09-20 @ 08:00), Max: 37.2 (06-09-20 @ 08:00)  HR: 95 (06-09-20 @ 08:00) (70 - 101)  BP: 123/62 (06-09-20 @ 08:00) (106/59 - 132/62)  RR: 27 (06-09-20 @ 08:00) (16 - 27)  SpO2: 96% (06-09-20 @ 08:00) (94% - 100%)  Wt(kg): --    PAST MEDICAL & SURGICAL HISTORY  No pertinent past medical history  No significant past surgical history      SOCIAL HISTORY  Smoking - Denied  EtOH - Denied   Drugs - Denied    FUNCTIONAL HISTORY  Independent    CURRENT FUNCTIONAL STATUS  6/4  Bed Mobility: Rolling/Turning:     · Level of [place]	maximum assist (25% patients effort); as per RN	    Bed Mobility: Scooting/Bridging:     · Level of [place]	dependent (less than 25% patients effort); as per RN	    Bed Mobility: Sit to Supine:     · Level of [place]	NA/safety	    Bed Mobility: Supine to Sit:     · Level of [place]	NA/safety	    Transfer: Bed to Chair:     Transfer Skill: Bed to Chair   · Level of [place]	mechanical lift recommended at present time	    Transfer: Sit to Stand:     · Level of [place]	unable to perform	    Gait Skills:     · Level of [place]	unable to perform	    Stair Negotiation:     · Level of [place]	unable to perform	      FAMILY HISTORY   FH: hypertension      RECENT LABS/IMAGING  CBC Full  -  ( 08 Jun 2020 08:39 )  WBC Count : 10.17 K/uL  RBC Count : 3.20 M/uL  Hemoglobin : 9.7 g/dL  Hematocrit : 32.4 %  Platelet Count - Automated : 325 K/uL  Mean Cell Volume : 101.3 fl  Mean Cell Hemoglobin : 30.3 pg  Mean Cell Hemoglobin Concentration : 29.9 gm/dL  Auto Neutrophil # : x  Auto Lymphocyte # : x  Auto Monocyte # : x  Auto Eosinophil # : x  Auto Basophil # : x  Auto Neutrophil % : x  Auto Lymphocyte % : x  Auto Monocyte % : x  Auto Eosinophil % : x  Auto Basophil % : x    06-09    140  |  95<L>  |  18.0  ----------------------------<  118<H>  3.9   |  39.0<H>  |  0.21<L>    Ca    8.4<L>      09 Jun 2020 04:47  Phos  1.8     06-09  Mg     1.7     06-09          ALLERGIES  No Known Allergies      MEDICATIONS   acetaminophen    Suspension .. 650 milliGRAM(s) Oral every 6 hours PRN  chlorhexidine 0.12% Liquid 15 milliLiter(s) Oral Mucosa every 12 hours  chlorhexidine 2% Cloths 1 Application(s) Topical daily  chlorhexidine 4% Liquid 1 Application(s) Topical <User Schedule>  enoxaparin Injectable 70 milliGRAM(s) SubCutaneous every 12 hours  insulin lispro (HumaLOG) corrective regimen sliding scale   SubCutaneous every 6 hours  melatonin 5 milliGRAM(s) Oral at bedtime  multivitamin/minerals 1 Tablet(s) Oral daily  QUEtiapine 50 milliGRAM(s) Oral at bedtime      ----------------------------------------------------------------------------------------  PHYSICAL EXAM  Constitutional - NAD, Comfortable  HEENT - NCAT, EOMI, +Cuffed trach  Neck - Supple, No limited ROM  Chest - Increased work of breathing with capping of trach for phonation, No wheezing  Cardiovascular - S1S2   Abdomen - Soft, +PEG  Extremities - Severe wasting  Neurologic Exam -                    Cognitive - AAOx self     Communication - Aphonic     Motor - Diffuse weakness                    LEFT    UE - ShAB 1/5, EF 3/5, EE 3/5, WE 3/5,  3/5                    RIGHT UE - ShAB 1/5, EF 3/5, EE 3/5, WE 3/5,  3/5                    LEFT    LE - HF 1/5, KE 1/5, DF 1/5, PF 1/5                    RIGHT LE - HF 1/5, KE 1/5, DF 1/5, PF 1/5        Sensory - Intact to LT     Coordination - FTN impaired  Psychiatric - Mood flat  ----------------------------------------------------------------------------------------  ASSESSMENT/PLAN  64yMale with functional deficits after COVID related complications  COVID - MVI  Trach - Recommend switching to UNCUFFED FENESTRATED  PE - Lovenox  Pain - Tylenol  Oral Hygiene - Aggressive care TID, Nystatin TID  Confusion - DC Seroquel (6/9)  Sleep - Melatonin 5mg HS  Functional Quadriplegia/Atrophy/Foot Drops - PRAFOs  Mood - Prozac 10mg (6/9)  Oropharyngeal Dysphagia - Pivot 1.5 5x/day, Prosource TID, SLP EVAL  DVT PPX - SCDs  Rehab - Will continue to follow for ongoing rehab needs and recommendations.   maximize potential for functional recovery with above recommendations with goal to decannulate, achieve PO diet, DC PEG while awaiting court date.     Discussed with primary team.

## 2020-06-10 LAB
ANION GAP SERPL CALC-SCNC: 7 MMOL/L — SIGNIFICANT CHANGE UP (ref 5–17)
BUN SERPL-MCNC: 22 MG/DL — HIGH (ref 8–20)
CALCIUM SERPL-MCNC: 8.3 MG/DL — LOW (ref 8.6–10.2)
CHLORIDE SERPL-SCNC: 96 MMOL/L — LOW (ref 98–107)
CO2 SERPL-SCNC: 35 MMOL/L — HIGH (ref 22–29)
CREAT SERPL-MCNC: 0.21 MG/DL — LOW (ref 0.5–1.3)
GLUCOSE BLDC GLUCOMTR-MCNC: 104 MG/DL — HIGH (ref 70–99)
GLUCOSE BLDC GLUCOMTR-MCNC: 108 MG/DL — HIGH (ref 70–99)
GLUCOSE BLDC GLUCOMTR-MCNC: 108 MG/DL — HIGH (ref 70–99)
GLUCOSE BLDC GLUCOMTR-MCNC: 114 MG/DL — HIGH (ref 70–99)
GLUCOSE BLDC GLUCOMTR-MCNC: 133 MG/DL — HIGH (ref 70–99)
GLUCOSE SERPL-MCNC: 113 MG/DL — HIGH (ref 70–99)
HCT VFR BLD CALC: 30.8 % — LOW (ref 39–50)
HGB BLD-MCNC: 10 G/DL — LOW (ref 13–17)
MAGNESIUM SERPL-MCNC: 1.7 MG/DL — SIGNIFICANT CHANGE UP (ref 1.6–2.6)
MCHC RBC-ENTMCNC: 31.3 PG — SIGNIFICANT CHANGE UP (ref 27–34)
MCHC RBC-ENTMCNC: 32.5 GM/DL — SIGNIFICANT CHANGE UP (ref 32–36)
MCV RBC AUTO: 96.3 FL — SIGNIFICANT CHANGE UP (ref 80–100)
PHOSPHATE SERPL-MCNC: 3 MG/DL — SIGNIFICANT CHANGE UP (ref 2.4–4.7)
PLATELET # BLD AUTO: 431 K/UL — HIGH (ref 150–400)
POTASSIUM SERPL-MCNC: 4.8 MMOL/L — SIGNIFICANT CHANGE UP (ref 3.5–5.3)
POTASSIUM SERPL-SCNC: 4.8 MMOL/L — SIGNIFICANT CHANGE UP (ref 3.5–5.3)
RBC # BLD: 3.2 M/UL — LOW (ref 4.2–5.8)
RBC # FLD: 14.3 % — SIGNIFICANT CHANGE UP (ref 10.3–14.5)
SODIUM SERPL-SCNC: 138 MMOL/L — SIGNIFICANT CHANGE UP (ref 135–145)
WBC # BLD: 13.78 K/UL — HIGH (ref 3.8–10.5)
WBC # FLD AUTO: 13.78 K/UL — HIGH (ref 3.8–10.5)

## 2020-06-10 PROCEDURE — 99233 SBSQ HOSP IP/OBS HIGH 50: CPT

## 2020-06-10 PROCEDURE — 99232 SBSQ HOSP IP/OBS MODERATE 35: CPT

## 2020-06-10 RX ORDER — AMANTADINE HCL 100 MG
100 CAPSULE ORAL
Refills: 0 | Status: DISCONTINUED | OUTPATIENT
Start: 2020-06-10 | End: 2020-06-18

## 2020-06-10 RX ADMIN — Medication 100 MILLIGRAM(S): at 15:28

## 2020-06-10 RX ADMIN — Medication 500000 UNIT(S): at 15:28

## 2020-06-10 RX ADMIN — Medication 5 MILLIGRAM(S): at 21:57

## 2020-06-10 RX ADMIN — ENOXAPARIN SODIUM 40 MILLIGRAM(S): 100 INJECTION SUBCUTANEOUS at 06:36

## 2020-06-10 RX ADMIN — Medication 1 TABLET(S): at 18:05

## 2020-06-10 RX ADMIN — CHLORHEXIDINE GLUCONATE 1 APPLICATION(S): 213 SOLUTION TOPICAL at 12:39

## 2020-06-10 RX ADMIN — Medication 10 MILLIGRAM(S): at 12:44

## 2020-06-10 RX ADMIN — Medication 500000 UNIT(S): at 06:35

## 2020-06-10 RX ADMIN — CHLORHEXIDINE GLUCONATE 1 APPLICATION(S): 213 SOLUTION TOPICAL at 05:30

## 2020-06-10 RX ADMIN — ENOXAPARIN SODIUM 70 MILLIGRAM(S): 100 INJECTION SUBCUTANEOUS at 18:05

## 2020-06-10 RX ADMIN — Medication 500000 UNIT(S): at 21:57

## 2020-06-10 NOTE — PROGRESS NOTE ADULT - SUBJECTIVE AND OBJECTIVE BOX
Patient is a 64y old  Male who presents with a chief complaint of shortness of breath (08 Jun 2020 16:28)      Patient seen and examined at bedside.      ALLERGIES:  No Known Allergies    MEDICATIONS  (STANDING):  amantadine Syrup 100 milliGRAM(s) Oral <User Schedule>  chlorhexidine 2% Cloths 1 Application(s) Topical daily  chlorhexidine 4% Liquid 1 Application(s) Topical <User Schedule>  enoxaparin Injectable 70 milliGRAM(s) SubCutaneous every 12 hours  FLUoxetine Solution 10 milliGRAM(s) Oral daily  insulin lispro (HumaLOG) corrective regimen sliding scale   SubCutaneous every 6 hours  melatonin 5 milliGRAM(s) Oral at bedtime  multivitamin/minerals 1 Tablet(s) Oral daily  nystatin    Suspension 158429 Unit(s) Oral three times a day  sodium chloride 0.9%. 1000 milliLiter(s) (75 mL/Hr) IV Continuous <Continuous>    MEDICATIONS  (PRN):  acetaminophen    Suspension .. 650 milliGRAM(s) Oral every 6 hours PRN Temp greater or equal to 38.5C (101.3F)      Vital Signs Last 24 Hrs  T(C): 36.2 (10 Brian 2020 12:13), Max: 37.3 (09 Jun 2020 15:39)  T(F): 97.1 (10 Brian 2020 12:13), Max: 99.1 (09 Jun 2020 15:39)  HR: 86 (10 Brian 2020 08:00) (84 - 101)  BP: 124/62 (10 Brian 2020 08:00) (107/62 - 135/76)  BP(mean): 83 (10 Brian 2020 04:00) (70 - 91)  RR: 21 (10 Brian 2020 08:00) (18 - 23)  SpO2: 98% (10 Brian 2020 08:00) (94% - 98%)        06-09 @ 07:01  -  06-10 @ 07:00  --------------------------------------------------------  IN: 2180 mL / OUT: 1340 mL / NET: 840 mL          PHYSICAL EXAM:  General: NAD, Alert  ENT: MMM, no thrush  Neck: Supple, No JVD  Lungs: good air entry, non-labored breathing +trach  Cardio: +S1/S2, No pitting edema +peg  Abdomen: Soft, Nontender, Nondistended; Bowel sounds present +peg  Extremities: No calf tenderness                 10.0   13.78  )----------(  431       ( 10 Brian 2020 04:04 )               30.8      138    |  96     |  22.0   ----------------------------<  113        ( 10 Brian 2020 04:04 )  4.8     |  35.0   |  0.21     Ca    8.3        ( 10 Brian 2020 04:04 )  Phos  3.0       ( 10 Brian 2020 04:04 )  Mg     1.7       ( 10 Brian 2020 04:04 )            CAPILLARY BLOOD GLUCOSE            RADIOLOGY & ADDITIONAL TESTS:  - no new tests Patient is a 64y old  Male who presents with a chief complaint of shortness of breath (10 Brian 2020 13:24)      Patient seen and examined at bedside. follow up with gi Dr Meek for peg tube to restart feeds    ALLERGIES:  No Known Allergies    MEDICATIONS  (STANDING):  amantadine Syrup 100 milliGRAM(s) Oral <User Schedule>  chlorhexidine 2% Cloths 1 Application(s) Topical daily  chlorhexidine 4% Liquid 1 Application(s) Topical <User Schedule>  enoxaparin Injectable 70 milliGRAM(s) SubCutaneous every 12 hours  FLUoxetine Solution 10 milliGRAM(s) Oral daily  insulin lispro (HumaLOG) corrective regimen sliding scale   SubCutaneous every 6 hours  melatonin 5 milliGRAM(s) Oral at bedtime  multivitamin/minerals 1 Tablet(s) Oral daily  nystatin    Suspension 407454 Unit(s) Oral three times a day  sodium chloride 0.9%. 1000 milliLiter(s) (75 mL/Hr) IV Continuous <Continuous>    MEDICATIONS  (PRN):  acetaminophen    Suspension .. 650 milliGRAM(s) Oral every 6 hours PRN Temp greater or equal to 38.5C (101.3F)    Vital Signs Last 24 Hrs  T(F): 97.1 (10 Brian 2020 12:13), Max: 99.1 (09 Jun 2020 15:39)  HR: 86 (10 Brian 2020 08:00) (84 - 101)  BP: 124/62 (10 Brian 2020 08:00) (107/62 - 135/76)  RR: 21 (10 Brian 2020 08:00) (18 - 23)  SpO2: 98% (10 Brian 2020 08:00) (94% - 98%)  I&O's Summary    09 Jun 2020 07:01  -  10 Brian 2020 07:00  --------------------------------------------------------  IN: 2180 mL / OUT: 1340 mL / NET: 840 mL    PHYSICAL EXAM:  General: NAD, Alert  ENT: MMM, no thrush  Neck: Supple, No JVD  Lungs: good air entry, non-labored breathing +trach  Cardio: +S1/S2, No pitting edema +peg  Abdomen: Soft, Nontender, Nondistended; Bowel sounds present +peg  Extremities: No calf tenderness      LABS:                        10.0   13.78 )-----------( 431      ( 10 Brian 2020 04:04 )             30.8     06-10    138  |  96  |  22.0  ----------------------------<  113  4.8   |  35.0  |  0.21    Ca    8.3      10 Brian 2020 04:04  Phos  3.0     06-10  Mg     1.7     06-10    eGFR if Non African American: 166 mL/min/1.73M2 (06-10-20 @ 04:04)  eGFR if : 192 mL/min/1.73M2 (06-10-20 @ 04:04)    04-24 Chol -- LDL -- HDL -- Trig 85 mg/dL    Glucose  POCT Blood Glucose.: 108 mg/dL (10 Brian 2020 11:28)  POCT Blood Glucose.: 114 mg/dL (10 Brian 2020 06:05)  POCT Blood Glucose.: 133 mg/dL (10 Brian 2020 00:17)  POCT Blood Glucose.: 118 mg/dL (09 Jun 2020 17:34)    RADIOLOGY & ADDITIONAL TESTS:  - no new tests    Care Discussed with Consultants/Other Providers:   GI

## 2020-06-10 NOTE — PROGRESS NOTE ADULT - SUBJECTIVE AND OBJECTIVE BOX
INTERVAL HPI/OVERNIGHT EVENTS: Patient with COVID PNA, s/p trach and PEG, PE and rectal ulcer bleeding. Called by primary team to evaluate PEG tube. According to nursing and resident Terry pt was refusing PEG feeds and free water via PEG last night. PEG was not flushing well overnight.   PEG tube flushing ok this am according to nursing today. No nausea or vomiting. Discussed with patient using pacific  pt is nonverbal so nodes to questions.  Describes distention mainly after free water.     ROS wnl     PAST MEDICAL & SURGICAL HISTORY:  No pertinent past medical history  No significant past surgical history      Home Medications:      MEDICATIONS  (STANDING):  amantadine Syrup 100 milliGRAM(s) Oral <User Schedule>  chlorhexidine 2% Cloths 1 Application(s) Topical daily  chlorhexidine 4% Liquid 1 Application(s) Topical <User Schedule>  enoxaparin Injectable 70 milliGRAM(s) SubCutaneous every 12 hours  FLUoxetine Solution 10 milliGRAM(s) Oral daily  insulin lispro (HumaLOG) corrective regimen sliding scale   SubCutaneous every 6 hours  melatonin 5 milliGRAM(s) Oral at bedtime  multivitamin/minerals 1 Tablet(s) Oral daily  nystatin    Suspension 106132 Unit(s) Oral three times a day  sodium chloride 0.9%. 1000 milliLiter(s) (75 mL/Hr) IV Continuous <Continuous>    MEDICATIONS  (PRN):  acetaminophen    Suspension .. 650 milliGRAM(s) Oral every 6 hours PRN Temp greater or equal to 38.5C (101.3F)      Allergies    No Known Allergies    Intolerances      PHYSICAL EXAM:   Vital Signs:  Vital Signs Last 24 Hrs  T(C): 36.2 (10 Brian 2020 12:13), Max: 37.3 (09 Jun 2020 15:39)  T(F): 97.1 (10 Brian 2020 12:13), Max: 99.1 (09 Jun 2020 15:39)  HR: 86 (10 Brian 2020 08:00) (84 - 101)  BP: 124/62 (10 Brian 2020 08:00) (107/62 - 135/76)  BP(mean): 83 (10 Brian 2020 04:00) (70 - 91)  RR: 21 (10 Brian 2020 08:00) (18 - 23)  SpO2: 98% (10 Brian 2020 08:00) (94% - 98%)  Daily     Daily     GENERAL:  no distress  HEENT:  NC/AT,  anicteric + trach   ABDOMEN:  Soft, non-tender, non-distended + PEG   EXTREMITIES  no cyanosis      LABS:                        10.0   13.78 )-----------( 431      ( 10 Brian 2020 04:04 )             30.8       Hemoglobin: 10.0 g/dL (06-10-20 @ 04:04)  Hemoglobin: 9.7 g/dL (06-08-20 @ 08:39)      06-10    138  |  96<L>  |  22.0<H>  ----------------------------<  113<H>  4.8   |  35.0<H>  |  0.21<L>    Ca    8.3<L>      10 Brian 2020 04:04  Phos  3.0     06-10  Mg     1.7     06-10      RADIOLOGY & ADDITIONAL TESTS:  < from: Xray Kidney Ureter Bladder (06.04.20 @ 06:25) >  IMPRESSION:  Left upper quadrant gastric tube.    < end of copied text >    < from: CT Angio Abdomen and Pelvis w/ IV Cont (06.01.20 @ 03:43) >  FINDINGS:    LUNG BASES:  There are trace bilateral pleural effusions with associated subsegmental atelectasis.  PERITONEUM:  There is no free air or focal collection.  Trace amount of free fluid.  LIVER: Normal.  SPLEEN: Normal.  GALLBLADDER: Contracted with gallstones.  BILIARY TREE: Unremarkable.  PANCREAS: Normal.  ADRENAL GLANDS: Normal.  KIDNEYS: Mild right hydronephrosis without obstructing stone.  .  BOWEL: Gastrostomy tube in place. The stomach is incompletely distended. Mildly dilated loops of small bowel. There is no small bowel obstruction or diverticulitis. The appendix is unremarkable. There is an appendicolith at the cecal base. Diverticulosis. Mild thickening of the sigmoid colon and rectum. There is intraluminal accumulation of contrast in the rectum compatible with a focus of active GI bleeding.    URINARY BLADDER: Decompressed by Escalera catheter.  PELVIC ORGANS: The prostate gland is not enlarged.    There is no significant adenopathy.  VASCULATURE: Unremarkable.  RETROPERITONEUM:  There is no mass.  BONES: Unremarkable.  ABDOMINAL WALL: Unremarkable.    IMPRESSION:    Intraluminal accumulation of contrast in the rectum indicative of active gastrointestinal bleeding. Mild colitis involving sigmoid colon and rectum.  Findings discussed with DAVID Durham at  4 am on 6/1/2020 with [facility].    Gallstones.  Mild right hydronephrosis.    < end of copied text >

## 2020-06-10 NOTE — PROGRESS NOTE ADULT - ASSESSMENT
Post Covid 19 pneumonia   Respiratory failure improved  Tolerating TC with HFO2  S/p trach and PEG  Critical illness polyneuropathy  PE  GIB - appears resolved  Leukocytosis resolved  Chronic hypercarbia but compensated given normal pH    Rec:    Vent support if needed  TC as tolerates  Trach change to fenestrated trach  TF  A/c for PE but GIB is of concern though full AC OK with GI  Eventual tx to [facility] in [place]  Continue supportive care

## 2020-06-10 NOTE — PROGRESS NOTE ADULT - PROBLEM SELECTOR PLAN 1
- supportive treatment for COVID-19, respiratory failure improved  s/p trach -Trach change to fenestrated trach   Improving mental status, resolving encephelopathy  Pulmonary following

## 2020-06-10 NOTE — PROGRESS NOTE ADULT - ASSESSMENT
Patient is now s/p peg and trach  ICU downgraded patient   He is awake, alert, responds appropriately to commands, mouthes words

## 2020-06-10 NOTE — PROGRESS NOTE ADULT - PROBLEM SELECTOR PLAN 2
Appears comfortable, mild dyspnea on exertion, excessive secretion management through suction and trach management

## 2020-06-10 NOTE — PROGRESS NOTE ADULT - PROBLEM SELECTOR PLAN 4
Met with patient at bedside used pacific  ID # [id number]  Patient alert , awake, mouths responses, follows commands  Plan to place a call to Yovani Walden (HCP) using pacific  as well to touch base and provide support  Patient is now downgraded to medical team care from ICU  Trach change to fenestrated trach - tolerating well  Will continue to support and monitor    Total time spent 25 minutes     Thank you for the opportunity to assist with the care of this patient.   [facility] [phone]. Met with patient at bedside used pacific  ID # [id number]  Patient alert , awake, mouths responses, follows commands  Plan to place a call to Yovani Walden (HCP) using pacific  as well to touch base and provide support  Patient is now downgraded to medical team care from ICU  Trach change to fenestrated trach - tolerating well  Will continue to support and monitor  Addendum 310pm: I attempted to call Yovani (HCP) twice using  ID [id number] and using ID [id number] - no response from Yovani on both attempts    Total time spent 25 minutes     Thank you for the opportunity to assist with the care of this patient.   [facility] [phone].

## 2020-06-10 NOTE — PROGRESS NOTE ADULT - PROBLEM SELECTOR PLAN 3
wean as tolerated.
Assist in ADLs  Maintain safety, fall, aspiration, PPE isolation precautions
Total assistance for ADLs  Maintain safety, fall , aspiration, isolation precautions

## 2020-06-10 NOTE — PROGRESS NOTE ADULT - SUBJECTIVE AND OBJECTIVE BOX
Palliative Care Followup  INTERVAL HPI/OVERNIGHT EVENTS:: patient s/p peg and trach - awake, alert, downgraded from icu , patient follows commands, awake, alert - used  today   Overnight patient didn't tolerate feeds and free water , held overnight     CC: SOB     Present Symptoms:   Dyspnea: 0 - 1 trached  Nausea/Vomiting: No  Anxiety:  No  Depression: No  Fatigue: Yes   Loss of appetite: Yes   Constipation: No    Pain: No pain            Character-            Duration-            Effect-            Factors-            Frequency-            Location-            Severity-    Review of Systems: Reviewed  Per HPI all other ROS negative  Difficult to obtain due to mentation     MEDICATIONS  (STANDING):  chlorhexidine 2% Cloths 1 Application(s) Topical daily  chlorhexidine 4% Liquid 1 Application(s) Topical <User Schedule>  enoxaparin Injectable 70 milliGRAM(s) SubCutaneous every 12 hours  FLUoxetine Solution 10 milliGRAM(s) Oral daily  insulin lispro (HumaLOG) corrective regimen sliding scale   SubCutaneous every 6 hours  melatonin 5 milliGRAM(s) Oral at bedtime  multivitamin/minerals 1 Tablet(s) Oral daily  nystatin    Suspension 035027 Unit(s) Oral three times a day  sodium chloride 0.9%. 1000 milliLiter(s) (75 mL/Hr) IV Continuous <Continuous>    MEDICATIONS  (PRN):  acetaminophen    Suspension .. 650 milliGRAM(s) Oral every 6 hours PRN Temp greater or equal to 38.5C (101.3F)    PHYSICAL EXAM:  Physical Exam - limited physical exam due to COVID - 19 isolation status    General: alert , awake, mouths responses, follows commands    Karnofsky:  %20    HEENT: dry mouth  trach    Lungs: mild dyspnea when moving, excessive secretions    CV: normal      GI:Peg    :  alexander    MSK: weakness  bedbound    Skin:  thin frail ecchymotic skin   Vital Signs Last 24 Hrs  T(C): 36.2 (10 Brian 2020 12:13), Max: 37.3 (09 Jun 2020 15:39)  T(F): 97.1 (10 Brian 2020 12:13), Max: 99.1 (09 Jun 2020 15:39)  HR: 86 (10 Brian 2020 08:00) (84 - 101)  BP: 124/62 (10 Brian 2020 08:00) (107/62 - 135/76)  BP(mean): 83 (10 Brian 2020 04:00) (70 - 91)  RR: 21 (10 Brian 2020 08:00) (18 - 23)  SpO2: 98% (10 Brian 2020 08:00) (94% - 98%)    LABS:                          10.0   13.78 )-----------( 431      ( 10 Brian 2020 04:04 )             30.8     06-10    138  |  96<L>  |  22.0<H>  ----------------------------<  113<H>  4.8   |  35.0<H>  |  0.21<L>    Ca    8.3<L>      10 Brian 2020 04:04  Phos  3.0     06-10  Mg     1.7     06-10    I&O's Summary    09 Jun 2020 07:01  -  10 Brian 2020 07:00  --------------------------------------------------------  IN: 2180 mL / OUT: 1340 mL / NET: 840 mL      ADVANCE DIRECTIVES:   Full Code

## 2020-06-10 NOTE — PROGRESS NOTE ADULT - ASSESSMENT
64 year old male with insignificant pmh coming to hospital with worsening shortness of breath. patient found to have covid and respiratory failure subsequently with complicated icu course including leukocytosis with bacterial pna s/p abx, gi bleed and pe, s/p trach and peg. patient now being downgraded to medicine service as table. will restart full dose.  anticoagulation. GI consulted, for peg tube malfunction    #ACute hypoxic respiratory failure 2/2 Covid 19 pneumonia   - w/ chronic hypercapnia (compensated given chronic pH)  - s/p ICU now on  medicine service  - s/p trach and peg   - isolation precautions   - monitor vitals   - pt consult     #PE  - restart full dose lovenox     #hx of gib bleed w/ rectal ulcer (healing)  - s/p prbc transfusion - s/p rigid sig, clipping of vessel and epinephrine  - gi consult appreciated - ok to restart full dose a/c given no bleed >1 week   - colorectal surgery consult appreciated    #dysphagia  - peg tube  - GI consult    #type 2 diabetes  - a1c 6.7 on admit   - iss   - monitor fingersticks     #dvt prophylaxis   - lovenox SC     Dispo- pending gaurdianship; court date set for 09/2020 64 year old male with insignificant pmh coming to hospital with worsening shortness of breath. patient found to have covid and respiratory failure subsequently with complicated icu course including leukocytosis with bacterial pna s/p abx, gi bleed and pe, s/p trach and peg. patient now being downgraded to medicine service as table. will restart full dose.  anticoagulation. GI consulted, for peg tube malfunction    #ACute hypoxic respiratory failure 2/2 Covid 19 pneumonia   - w/ chronic hypercapnia (compensated given chronic pH)  - s/p ICU now on  medicine service  - s/p trach and peg   - isolation precautions   - monitor vitals   - pt consult     Leukocytosis  - afebrile,   - trend CBC      #PE  - restart full dose lovenox     #hx of gib bleed w/ rectal ulcer (healing)  - s/p prbc transfusion - s/p rigid sig, clipping of vessel and epinephrine  - gi consult appreciated - ok to restart full dose a/c given no bleed >1 week   - colorectal surgery consult appreciated    #dysphagia  - peg tube  - GI consult    #type 2 diabetes  - a1c 6.7 on admit   - iss   - monitor fingersticks     #dvt prophylaxis   - lovenox SC     Dispo- pending gaurdianship; court date set for 09/2020 64 year old male with insignificant pmh coming to hospital with worsening shortness of breath. patient found to have covid and respiratory failure subsequently with complicated icu course including leukocytosis with bacterial pna s/p abx, gi bleed and pe, s/p trach and peg. patient now being downgraded to medicine service as table. will restart full dose.  anticoagulation. GI consulted, for peg tube malfunction    #ACute hypoxic respiratory failure 2/2 Covid 19 pneumonia   - resolved  - s/p ICU now on  medicine service  - s/p trach and peg   - isolation precautions   - monitor vitals   - pt consult     Leukocytosis  - afebrile,   - trend CBC      #PE  - restart full dose lovenox     #hx of gib bleed w/ rectal ulcer (healing)  - s/p prbc transfusion - s/p rigid sig, clipping of vessel and epinephrine  - gi consult appreciated - ok to restart full dose a/c given no bleed >1 week   - colorectal surgery consult appreciated    #dysphagia  - peg tube  - GI consult    #type 2 diabetes  - a1c 6.7 on admit   - iss   - monitor fingersticks     #dvt prophylaxis   - lovenox SC     Dispo- pending gaurdianship; court date set for 09/2020 64 year old male with insignificant pmh coming to hospital with worsening shortness of breath. patient found to have covid and respiratory failure subsequently with complicated icu course including leukocytosis with bacterial pna s/p abx, gi bleed and pe, s/p trach and peg. patient now being downgraded to medicine service as table. will restart full dose.  anticoagulation. GI consulted, for peg tube malfunction    #ACute hypoxic respiratory failure 2/2 Covid 19 pneumonia   - resolved  - s/p ICU now on  medicine service  - s/p trach and peg   - isolation precautions   - monitor vitals   - pt consult     #Leukocytosis  - afebrile,   - trend CBC    #PE  - lovenox     #hx of gib bleed w/ rectal ulcer (healing)  - s/p prbc transfusion - s/p rigid sig, clipping of vessel and epinephrine  - gi consult appreciated - ok to restart full dose a/c given no bleed >1 week   - colorectal surgery consult appreciated    #dysphagia  - peg tube  - GI consult    #type 2 diabetes  - a1c 6.7 on admit   - iss   - monitor fingersticks     #dvt prophylaxis   - lovenox SC     Dispo- pending gaurdianship; court date set for 09/2020 64 year old male with insignificant pmh coming to hospital with worsening shortness of breath. patient found to have covid and respiratory failure subsequently with complicated icu course including leukocytosis with bacterial pna s/p abx, gi bleed and pe, s/p trach and peg. patient now being downgraded to medicine service as table. will restart full dose.  anticoagulation. GI consulted, for peg tube malfunction    #ACute hypoxic respiratory failure 2/2 Covid 19 pneumonia   - resolved  - s/p ICU now on  medicine service  - s/p trach and peg   - isolation precautions   - monitor vitals   - pt consult     #Leukocytosis  - afebrile,   - trend CBC    #PE  - lovenox     #hx of gib bleed w/ rectal ulcer (healing)  - s/p prbc transfusion - s/p rigid sig, clipping of vessel and epinephrine  - gi consult appreciated  - colorectal surgery consult appreciated    #dysphagia  - peg tube  - GI consult    #type 2 diabetes  - a1c 6.7 on admit   - iss   - monitor fingersticks     #dvt prophylaxis   - lovenox SC     Dispo- pending gaurdianship; court date set for 09/2020

## 2020-06-10 NOTE — PROGRESS NOTE ADULT - SUBJECTIVE AND OBJECTIVE BOX
PULMONARY PROGRESS NOTE      [id number]    HPI: Patient is a 64y old  Male who presents with a chief complaint of shortness of breath (09 Jun 2020 10:54)  64yM was admitted on 04-18 due to COVID related infection. Course was complicated by prolonged ICU course for acute hypoxic respiratory failure, requiring vent, s/p PEG and trach. Course also complicated by PE, GI bleed with rectal ulceration and is s/p RBCs as well as sigmoidoscopy and clipping/epinephrine.    Patient is undocumented and has no insurance. He is currently undergoing guardianship and has a court date set for  a day in Sept.     Patient continues to be on trach, currently cuffed.   Current diet is NPO.   Patient has is currently aphonic with quadriparesis.          MEDICATIONS  (STANDING):  chlorhexidine 2% Cloths 1 Application(s) Topical daily  chlorhexidine 4% Liquid 1 Application(s) Topical <User Schedule>  enoxaparin Injectable 70 milliGRAM(s) SubCutaneous every 12 hours  FLUoxetine Solution 10 milliGRAM(s) Oral daily  insulin lispro (HumaLOG) corrective regimen sliding scale   SubCutaneous every 6 hours  melatonin 5 milliGRAM(s) Oral at bedtime  multivitamin/minerals 1 Tablet(s) Oral daily  nystatin    Suspension 550362 Unit(s) Oral three times a day  sodium chloride 0.9%. 1000 milliLiter(s) (75 mL/Hr) IV Continuous <Continuous>      MEDICATIONS  (PRN):  acetaminophen    Suspension .. 650 milliGRAM(s) Oral every 6 hours PRN Temp greater or equal to 38.5C (101.3F)      Allergies    No Known Allergies    Intolerances        PAST MEDICAL & SURGICAL HISTORY:  No pertinent past medical history  No significant past surgical history      SOCIAL HISTORY  Smoking History:       REVIEW OF SYSTEMS:    Non-communicative    Vital Signs Last 24 Hrs  T(C): 36.3 (10 Brian 2020 07:30), Max: 37.3 (09 Jun 2020 15:39)  T(F): 97.3 (10 Brian 2020 07:30), Max: 99.1 (09 Jun 2020 15:39)  HR: 86 (10 Brian 2020 08:00) (84 - 101)  BP: 124/62 (10 Brian 2020 08:00) (107/62 - 135/76)  BP(mean): 83 (10 Brian 2020 04:00) (70 - 91)  RR: 21 (10 Brian 2020 08:00) (18 - 27)  SpO2: 98% (10 Brian 2020 08:00) (94% - 98%)    PHYSICAL EXAMINATION:    GENERAL: The patient is awake and alert in no apparent distress.     HEENT: Head is normocephalic and atraumatic. Extraocular muscles are intact. Mucous membranes are moist.    NECK: Supple.    LUNGS: Clear to auscultation without wheezing, rales or rhonchi; respirations unlabored    HEART: Regular rate and rhythm without murmur.    ABDOMEN: Soft, nontender, and nondistended.      EXTREMITIES: Without any cyanosis, clubbing, rash, lesions or edema.    NEUROLOGIC: Grossly intact.    LABS:                        10.0   13.78 )-----------( 431      ( 10 Brian 2020 04:04 )             30.8     06-10    138  |  96<L>  |  22.0<H>  ----------------------------<  113<H>  4.8   |  35.0<H>  |  0.21<L>    Ca    8.3<L>      10 Brian 2020 04:04  Phos  3.0     06-10  Mg     1.7     06-10    COVID Neg on 6/5/20    RADIOLOGY & ADDITIONAL STUDIES:     EXAM:  US DPLX LWR EXT VEINS COMPL BI                          PROCEDURE DATE:  06/06/2020      INTERPRETATION:  CLINICAL INFORMATION: Leg swelling. History of PE.     COMPARISON: 6/1/2020    TECHNIQUE: Duplex sonography of the BILATERAL LOWERextremity veins with color and spectral Doppler, with and without compression.      FINDINGS:  There is normal compressibility of the bilateral common femoral, femoral and popliteal veins.   Doppler examination shows normal spontaneous and phasic flow.    No calf vein thrombosis is detected.    IMPRESSION:   No evidence of deep venous thrombosis in either lower extremity.    JAMIE RUST M.D., ATTENDING RADIOLOGIST  This document has been electronically signed. Jun 6 2020  7:37PM           EXAM:  XR CHEST PORTABLE URGENT 1V                          PROCEDURE DATE:  06/01/2020      INTERPRETATION:  AP chest on June 1, 2020 1:28 AM. Patient had left jugular line placement. Patient requires tracheostomy. Patient tested positive forthe COVID virus on April 18.    Heart is magnified by technique. Tracheostomy again noted.    There are persistent advanced bilateral infiltrates.    The above findings are similar to May 31.    Present film shows a jugular sleeve inserted. Tip is inthe left innominate vein area.    IMPRESSION: Persistent advanced infiltrates. Left jugular sleeve inserted as above.    NICHOLE ARRINGTON M.D., ATTENDING RADIOLOGIST  This document has been electronically signed. Jun 1 2020  9:02AM        ECHO:    Summary:   1. Left ventricular ejection fraction, by visual estimation, is 70 to 75%.   2. Normal global left ventricular systolic function.   3. LV Ejection Fraction by Lobo's Method with a biplane EF of 79 %.   4. Normal left ventricular internal cavity size.   5. Spectral Doppler shows impaired relaxation pattern of left ventricular myocardial filling (Grade I diastolic dysfunction).   6. There is no evidence of pericardial effusion.    MD Chung Electronically signed on 5/3/2020 at 5:09:19 PM

## 2020-06-10 NOTE — PROGRESS NOTE ADULT - SUBJECTIVE AND OBJECTIVE BOX
Patient had trach changed   Continues to have secretions, but improved as per RN.  Patient is fatigued, as he watched TV all night.     REVIEW OF SYSTEMS  Constitutional - No fever,  +fatigue  Neurological - +loss of strength    FUNCTIONAL PROGRESS  6/10  Bed Mobility  Bed Mobility Training Rehab Potential: good, to achieve stated therapy goals  Bed Mobility Training Symptoms Noted During/After Treatment: fatigue  Bed Mobility Training Rolling/Turning: moderate assist (50% patient effort);  1 person assist;  nonverbal cues (demo/gestures);  verbal cues  Bed Mobility Training Sit-to-Supine: maximum assist (25% patient effort);  1 person + 1 person to manage equipment;  nonverbal cues (demo/gestures);  verbal cues  Bed Mobility Training Supine-to-Sit: maximum assist (25% patient effort);  1 person + 1 person to manage equipment;  nonverbal cues (demo/gestures);  verbal cues;  head of the bed elevated   Bed Mobility Training Limitations: decreased ability to use arms for pushing/pulling;  decreased ability to use legs for bridging/pushing;  decreased flexibility;  decreased strength;  impaired balance    Therapeutic Exercise  Therapeutic Exercise Rehab Effort: fair  Therapeutic Exercise Detail: in semifowller position AAROM Diaz LE hip abduction/adduction, flexion extension, knee flexion extension     Functional Endurance  Functional Endurance Rehab Effort: fair  Functional Endurance Symptoms Noted During/After Treatment: fatigue  Functional Endurance Detail: sititng at the edge of the bed in closed kinetic chain for about 3 min with min A       VITALS  T(C): 36.2 (06-10-20 @ 12:13), Max: 37.3 (06-09-20 @ 15:39)  HR: 86 (06-10-20 @ 08:00) (84 - 101)  BP: 124/62 (06-10-20 @ 08:00) (107/62 - 135/76)  RR: 21 (06-10-20 @ 08:00) (18 - 23)  SpO2: 98% (06-10-20 @ 08:00) (94% - 98%)  Wt(kg): --    MEDICATIONS   acetaminophen    Suspension .. 650 milliGRAM(s) every 6 hours PRN  chlorhexidine 2% Cloths 1 Application(s) daily  chlorhexidine 4% Liquid 1 Application(s) <User Schedule>  enoxaparin Injectable 70 milliGRAM(s) every 12 hours  FLUoxetine Solution 10 milliGRAM(s) daily  insulin lispro (HumaLOG) corrective regimen sliding scale   every 6 hours  melatonin 5 milliGRAM(s) at bedtime  multivitamin/minerals 1 Tablet(s) daily  nystatin    Suspension 253715 Unit(s) three times a day  sodium chloride 0.9%. 1000 milliLiter(s) <Continuous>      RECENT LABS - Reviewed                        10.0   13.78 )-----------( 431      ( 10 Brian 2020 04:04 )             30.8     06-10    138  |  96<L>  |  22.0<H>  ----------------------------<  113<H>  4.8   |  35.0<H>  |  0.21<L>    Ca    8.3<L>      10 Brian 2020 04:04  Phos  3.0     06-10  Mg     1.7     06-10              --------------------------------------------------------------------------  PHYSICAL EXAM  Constitutional - NAD, Comfortable  HEENT - +Uncuffed trach  Chest - Increased work of breathing with capping of trach for phonation, No wheezing  Abdomen - Soft, +PEG  Extremities - Severe wasting  Neurologic Exam -                    Cognitive - AAOx self     Communication - Improved phonation     Motor - Diffuse weakness                    LEFT    UE - ShAB 1/5, EF 3/5, EE 3/5, WE 3/5,  3/5                    RIGHT UE - ShAB 1/5, EF 3/5, EE 3/5, WE 3/5,  3/5                    LEFT    LE - HF 1/5, KE 1/5, DF 1/5, PF 1/5                    RIGHT LE - HF 1/5, KE 1/5, DF 1/5, PF 1/5        Sensory - Intact to LT     Coordination - FTN impaired  Psychiatric - Mood flat  ----------------------------------------------------------------------------------------  ASSESSMENT/PLAN  64yMale with functional deficits after COVID related complications  COVID - MVI  Trach - Trach - Start PMV Trials   PE - Lovenox  Pain - Tylenol  Oral Hygiene - Aggressive care TID, Nystatin TID  Sleep - Melatonin 5mg HS  Daytime Wakefulness - NO TV at night, Amantadine 100mg BID (6/10)  Functional Quadriplegia/Atrophy/Foot Drops - PRAFOs  Mood - Prozac 10mg (6/9)  Oropharyngeal Dysphagia - Pivot 1.5 5x/day, Prosource TID, SLP EVAL  DVT PPX - SCDs  Rehab - Will continue to follow for ongoing rehab needs and recommendations.   maximize potential for functional recovery with above recommendations with goal to decannulate, achieve PO diet, DC PEG while awaiting court date.

## 2020-06-10 NOTE — PROGRESS NOTE ADULT - PROBLEM SELECTOR PROBLEM 4
COVID-19 virus infection
Pneumonia
Pneumonia
Hypernatremia
Palliative care encounter

## 2020-06-10 NOTE — PROGRESS NOTE ADULT - ATTENDING COMMENTS
I have personally seen and examined patient on the above date.  I discussed the case with MD Resident Stewart and I agree with findings and plan as detailed per note above, which I have amended where appropriate.

## 2020-06-10 NOTE — PROGRESS NOTE ADULT - ASSESSMENT
64y man with COVID PNA, s/p trach and PEG, PE and rectal ulcer bleeding. Called by primary team to evaluate PEG.   PEG was not flushing well overnight.   Flushed tube -> PEG tube flushing ok I flushed it with 120 ccs no issue. No nausea or vomiting.   Patient was recently switched to bolus feeds and free water via PEG. He describes distention mainly after free water. Would decrease the amount of free water via PEG initially.   If he continue to c/o of distention would switch patient back to continuous feeding. 64y man with COVID PNA, s/p trach and PEG, PE and rectal ulcer bleeding. Called by primary team to evaluate PEG.   PEG was not flushing well overnight.   Flushed tube -> PEG tube flushing ok I flushed it with 120 ccs no issue. No nausea or vomiting.   Patient was recently switched to bolus feeds and free water via PEG. He describes distention mainly after free water. Would decrease the amount of free water via PEG initially.   If he continue to c/o of distention would switch patient back to continuous feeding.   Rectal bleeding resolved patient currently on lovenox q 12.     D/W Dr Senior    Thank you.

## 2020-06-11 DIAGNOSIS — K94.23 GASTROSTOMY MALFUNCTION: ICD-10-CM

## 2020-06-11 LAB
ALBUMIN SERPL ELPH-MCNC: 2.8 G/DL — LOW (ref 3.3–5.2)
ALP SERPL-CCNC: 228 U/L — HIGH (ref 40–120)
ALT FLD-CCNC: 59 U/L — HIGH
ANION GAP SERPL CALC-SCNC: 9 MMOL/L — SIGNIFICANT CHANGE UP (ref 5–17)
AST SERPL-CCNC: 47 U/L — HIGH
BASOPHILS # BLD AUTO: 0.07 K/UL — SIGNIFICANT CHANGE UP (ref 0–0.2)
BASOPHILS NFR BLD AUTO: 0.6 % — SIGNIFICANT CHANGE UP (ref 0–2)
BILIRUB SERPL-MCNC: 0.3 MG/DL — LOW (ref 0.4–2)
BUN SERPL-MCNC: 19 MG/DL — SIGNIFICANT CHANGE UP (ref 8–20)
CALCIUM SERPL-MCNC: 8.8 MG/DL — SIGNIFICANT CHANGE UP (ref 8.6–10.2)
CHLORIDE SERPL-SCNC: 94 MMOL/L — LOW (ref 98–107)
CO2 SERPL-SCNC: 33 MMOL/L — HIGH (ref 22–29)
CREAT SERPL-MCNC: 0.28 MG/DL — LOW (ref 0.5–1.3)
EOSINOPHIL # BLD AUTO: 0.29 K/UL — SIGNIFICANT CHANGE UP (ref 0–0.5)
EOSINOPHIL NFR BLD AUTO: 2.6 % — SIGNIFICANT CHANGE UP (ref 0–6)
GLUCOSE BLDC GLUCOMTR-MCNC: 125 MG/DL — HIGH (ref 70–99)
GLUCOSE BLDC GLUCOMTR-MCNC: 131 MG/DL — HIGH (ref 70–99)
GLUCOSE BLDC GLUCOMTR-MCNC: 152 MG/DL — HIGH (ref 70–99)
GLUCOSE BLDC GLUCOMTR-MCNC: 163 MG/DL — HIGH (ref 70–99)
GLUCOSE SERPL-MCNC: 112 MG/DL — HIGH (ref 70–99)
HCT VFR BLD CALC: 31.4 % — LOW (ref 39–50)
HGB BLD-MCNC: 10.1 G/DL — LOW (ref 13–17)
IMM GRANULOCYTES NFR BLD AUTO: 0.7 % — SIGNIFICANT CHANGE UP (ref 0–1.5)
LYMPHOCYTES # BLD AUTO: 27.5 % — SIGNIFICANT CHANGE UP (ref 13–44)
LYMPHOCYTES # BLD AUTO: 3.05 K/UL — SIGNIFICANT CHANGE UP (ref 1–3.3)
MAGNESIUM SERPL-MCNC: 1.8 MG/DL — SIGNIFICANT CHANGE UP (ref 1.6–2.6)
MCHC RBC-ENTMCNC: 30.6 PG — SIGNIFICANT CHANGE UP (ref 27–34)
MCHC RBC-ENTMCNC: 32.2 GM/DL — SIGNIFICANT CHANGE UP (ref 32–36)
MCV RBC AUTO: 95.2 FL — SIGNIFICANT CHANGE UP (ref 80–100)
MONOCYTES # BLD AUTO: 0.8 K/UL — SIGNIFICANT CHANGE UP (ref 0–0.9)
MONOCYTES NFR BLD AUTO: 7.2 % — SIGNIFICANT CHANGE UP (ref 2–14)
NEUTROPHILS # BLD AUTO: 6.79 K/UL — SIGNIFICANT CHANGE UP (ref 1.8–7.4)
NEUTROPHILS NFR BLD AUTO: 61.4 % — SIGNIFICANT CHANGE UP (ref 43–77)
PHOSPHATE SERPL-MCNC: 2.9 MG/DL — SIGNIFICANT CHANGE UP (ref 2.4–4.7)
PLATELET # BLD AUTO: 435 K/UL — HIGH (ref 150–400)
POTASSIUM SERPL-MCNC: 3.8 MMOL/L — SIGNIFICANT CHANGE UP (ref 3.5–5.3)
POTASSIUM SERPL-SCNC: 3.8 MMOL/L — SIGNIFICANT CHANGE UP (ref 3.5–5.3)
PROT SERPL-MCNC: 6.7 G/DL — SIGNIFICANT CHANGE UP (ref 6.6–8.7)
RBC # BLD: 3.3 M/UL — LOW (ref 4.2–5.8)
RBC # FLD: 13.8 % — SIGNIFICANT CHANGE UP (ref 10.3–14.5)
SODIUM SERPL-SCNC: 135 MMOL/L — SIGNIFICANT CHANGE UP (ref 135–145)
WBC # BLD: 11.08 K/UL — HIGH (ref 3.8–10.5)
WBC # FLD AUTO: 11.08 K/UL — HIGH (ref 3.8–10.5)

## 2020-06-11 PROCEDURE — 99232 SBSQ HOSP IP/OBS MODERATE 35: CPT

## 2020-06-11 PROCEDURE — 99233 SBSQ HOSP IP/OBS HIGH 50: CPT

## 2020-06-11 RX ORDER — MODAFINIL 200 MG/1
200 TABLET ORAL
Refills: 0 | Status: DISCONTINUED | OUTPATIENT
Start: 2020-06-11 | End: 2020-06-18

## 2020-06-11 RX ADMIN — CHLORHEXIDINE GLUCONATE 1 APPLICATION(S): 213 SOLUTION TOPICAL at 06:20

## 2020-06-11 RX ADMIN — Medication 500000 UNIT(S): at 06:22

## 2020-06-11 RX ADMIN — Medication 100 MILLIGRAM(S): at 06:20

## 2020-06-11 RX ADMIN — Medication 4: at 23:49

## 2020-06-11 RX ADMIN — Medication 500000 UNIT(S): at 16:11

## 2020-06-11 RX ADMIN — CHLORHEXIDINE GLUCONATE 1 APPLICATION(S): 213 SOLUTION TOPICAL at 13:09

## 2020-06-11 RX ADMIN — MODAFINIL 200 MILLIGRAM(S): 200 TABLET ORAL at 16:45

## 2020-06-11 RX ADMIN — ENOXAPARIN SODIUM 70 MILLIGRAM(S): 100 INJECTION SUBCUTANEOUS at 16:45

## 2020-06-11 RX ADMIN — ENOXAPARIN SODIUM 70 MILLIGRAM(S): 100 INJECTION SUBCUTANEOUS at 06:21

## 2020-06-11 RX ADMIN — Medication 1 TABLET(S): at 16:45

## 2020-06-11 RX ADMIN — Medication 10 MILLIGRAM(S): at 13:09

## 2020-06-11 RX ADMIN — Medication 100 MILLIGRAM(S): at 13:09

## 2020-06-11 RX ADMIN — Medication 4: at 11:17

## 2020-06-11 NOTE — PROGRESS NOTE ADULT - PROBLEM SELECTOR PLAN 2
resolved. Suggest smaller free water administration and if needed just more frequent. Nothing further to add from GI standpoint. Will see only prn your request.

## 2020-06-11 NOTE — SPEAKING VALVE EVALUATION - REMOVE VALVE FOR
Increase RR (1.5 x baseline)/Evidence of air trapping/Excessive coughing/Sleep/Aerosolized respiratory treatments/SpO2 < 92%/Increased work of breathing/Diaphoresis/Subjective complaints/Increased HR (1.5 x baseline)

## 2020-06-11 NOTE — PROGRESS NOTE ADULT - ASSESSMENT
64 year old male with insignificant pmh coming to hospital with worsening shortness of breath. patient found to have covid and respiratory failure subsequently with complicated icu course including leukocytosis with bacterial pna s/p abx, gi bleed and pe, s/p trach and peg. patient now being downgraded to medicine service as table. will restart full dose.  anticoagulation. GI consulted, for peg tube malfunction    #ACute hypoxic respiratory failure 2/2 Covid 19 pneumonia   - resolved  - s/p ICU now on  medicine service  - s/p trach and peg   - isolation precautions   - monitor vitals   - pt consult     #Leukocytosis  - afebrile,   - trend CBC    #PE  - lovenox     #hx of gib bleed w/ rectal ulcer (healing)  - s/p prbc transfusion - s/p rigid sig, clipping of vessel and epinephrine  - gi consult appreciated  - colorectal surgery consult appreciated    #dysphagia  - peg tube- changed to continuous feeds from bolus on 6/10 and decreased frequency of free water on 6/11 as patient stating free water is too much  - GI consult    #type 2 diabetes  - a1c 6.7 on admit   - iss   - monitor fingersticks     #dvt prophylaxis   - lovenox SC     Dispo- pending gaurdianship; court date set for 09/2020

## 2020-06-11 NOTE — SPEAKING VALVE EVALUATION - RECOMMENDATIONS
+speaking valve placement, as medically appropriate: RN notified ST that pt has speaking valve in room, at end of eval, however, unable to locate & has not yet been placed

## 2020-06-11 NOTE — PROGRESS NOTE ADULT - SUBJECTIVE AND OBJECTIVE BOX
Pt seen and examined f/u for prior rectal bleed due to rectal ulcer and now ? clogged G tube yesterday.    This AM he is tolerating G tube feeds and g tube not clogged or dysfunctional in any way. He does not tolerate large bolus water infusions. No nausea or vomikting.    REVIEW OF SYSTEMS: unable to obtain        MEDICATIONS:  MEDICATIONS  (STANDING):  amantadine Syrup 100 milliGRAM(s) Oral <User Schedule>  chlorhexidine 2% Cloths 1 Application(s) Topical daily  chlorhexidine 4% Liquid 1 Application(s) Topical <User Schedule>  enoxaparin Injectable 70 milliGRAM(s) SubCutaneous every 12 hours  FLUoxetine Solution 10 milliGRAM(s) Oral daily  insulin lispro (HumaLOG) corrective regimen sliding scale   SubCutaneous every 6 hours  melatonin 5 milliGRAM(s) Oral at bedtime  multivitamin/minerals 1 Tablet(s) Oral daily  nystatin    Suspension 212910 Unit(s) Oral three times a day  sodium chloride 0.9%. 1000 milliLiter(s) (75 mL/Hr) IV Continuous <Continuous>    MEDICATIONS  (PRN):  acetaminophen    Suspension .. 650 milliGRAM(s) Oral every 6 hours PRN Temp greater or equal to 38.5C (101.3F)      Allergies    No Known Allergies    Intolerances        Vital Signs Last 24 Hrs  T(C): 36.4 (11 Jun 2020 07:34), Max: 37.3 (10 Brian 2020 20:39)  T(F): 97.5 (11 Jun 2020 07:34), Max: 99.1 (10 Brian 2020 20:39)  HR: 90 (11 Jun 2020 08:00) (78 - 93)  BP: 107/66 (11 Jun 2020 08:00) (107/66 - 137/80)  BP(mean): 89 (11 Jun 2020 04:00) (79 - 89)  RR: 21 (11 Jun 2020 08:00) (20 - 28)  SpO2: 96% (11 Jun 2020 08:00) (93% - 99%)    06-10 @ 07:01  -  06-11 @ 07:00  --------------------------------------------------------  IN: 1510 mL / OUT: 600 mL / NET: 910 mL        PHYSICAL EXAM:    General: Well developed; well nourished; in no acute distress, on vent with trach  HEENT: MMM, conjunctiva and sclera clear  Gastrointestinal:Abdomen: Soft non-tender non-distended; Normal bowel sounds; No hepatosplenomegaly, G tube present  Extremities: no cyanosis, clubbing or edema.  Skin: Warm and dry. No obvious rash    LABS:      CBC Full  -  ( 11 Jun 2020 04:40 )  WBC Count : 11.08 K/uL  RBC Count : 3.30 M/uL  Hemoglobin : 10.1 g/dL  Hematocrit : 31.4 %  Platelet Count - Automated : 435 K/uL  Mean Cell Volume : 95.2 fl  Mean Cell Hemoglobin : 30.6 pg  Mean Cell Hemoglobin Concentration : 32.2 gm/dL  Auto Neutrophil # : 6.79 K/uL  Auto Lymphocyte # : 3.05 K/uL  Auto Monocyte # : 0.80 K/uL  Auto Eosinophil # : 0.29 K/uL  Auto Basophil # : 0.07 K/uL  Auto Neutrophil % : 61.4 %  Auto Lymphocyte % : 27.5 %  Auto Monocyte % : 7.2 %  Auto Eosinophil % : 2.6 %  Auto Basophil % : 0.6 %    06-11    135  |  94<L>  |  19.0  ----------------------------<  112<H>  3.8   |  33.0<H>  |  0.28<L>    Ca    8.8      11 Jun 2020 04:40  Phos  2.9     06-11  Mg     1.8     06-11    TPro  6.7  /  Alb  2.8<L>  /  TBili  0.3<L>  /  DBili  x   /  AST  47<H>  /  ALT  59<H>  /  AlkPhos  228<H>  06-11

## 2020-06-11 NOTE — PROGRESS NOTE ADULT - SUBJECTIVE AND OBJECTIVE BOX
PULMONARY PROGRESS NOTE      [id number]    HPI: Patient is a 64y old  Male who presents with a chief complaint of shortness of breath (09 Jun 2020 10:54)  64yM was admitted on 04-18 due to COVID related infection. Course was complicated by prolonged ICU course for acute hypoxic respiratory failure, requiring vent, s/p PEG and trach. Course also complicated by PE, GI bleed with rectal ulceration and is s/p RBCs as well as sigmoidoscopy and clipping/epinephrine.    Patient is undocumented and has no insurance. He is currently undergoing guardianship and has a court date set for  a day in Sept.     Patient continues to be on trach, currently cuffed.   Patient has is currently aphonic with quadriparesis.          MEDICATIONS  (STANDING):  chlorhexidine 2% Cloths 1 Application(s) Topical daily  chlorhexidine 4% Liquid 1 Application(s) Topical <User Schedule>  enoxaparin Injectable 70 milliGRAM(s) SubCutaneous every 12 hours  FLUoxetine Solution 10 milliGRAM(s) Oral daily  insulin lispro (HumaLOG) corrective regimen sliding scale   SubCutaneous every 6 hours  melatonin 5 milliGRAM(s) Oral at bedtime  multivitamin/minerals 1 Tablet(s) Oral daily  nystatin    Suspension 041830 Unit(s) Oral three times a day  sodium chloride 0.9%. 1000 milliLiter(s) (75 mL/Hr) IV Continuous <Continuous>      MEDICATIONS  (PRN):  acetaminophen    Suspension .. 650 milliGRAM(s) Oral every 6 hours PRN Temp greater or equal to 38.5C (101.3F)      Allergies    No Known Allergies    Intolerances        PAST MEDICAL & SURGICAL HISTORY:  No pertinent past medical history  No significant past surgical history      SOCIAL HISTORY  Smoking History:       REVIEW OF SYSTEMS:    Non-communicative    Vital Signs Last 24 Hrs  T(C): 36.3 (10 Brian 2020 07:30), Max: 37.3 (09 Jun 2020 15:39)  T(F): 97.3 (10 Brian 2020 07:30), Max: 99.1 (09 Jun 2020 15:39)  HR: 86 (10 Brian 2020 08:00) (84 - 101)  BP: 124/62 (10 Brian 2020 08:00) (107/62 - 135/76)  BP(mean): 83 (10 Brian 2020 04:00) (70 - 91)  RR: 21 (10 Brian 2020 08:00) (18 - 27)  SpO2: 98% (10 Brian 2020 08:00) (94% - 98%)    PHYSICAL EXAMINATION:    GENERAL: The patient is awake and alert in no apparent distress.     HEENT: Head is normocephalic and atraumatic. Extraocular muscles are intact. Mucous membranes are moist.    NECK: Supple.    LUNGS: Clear to auscultation without wheezing, rales or rhonchi; respirations unlabored    HEART: Regular rate and rhythm without murmur.    ABDOMEN: Soft, nontender, and nondistended.      EXTREMITIES: Without any cyanosis, clubbing, rash, lesions or edema.    NEUROLOGIC: Grossly intact.    LABS:                        10.0   13.78 )-----------( 431      ( 10 Brian 2020 04:04 )             30.8     06-10    138  |  96<L>  |  22.0<H>  ----------------------------<  113<H>  4.8   |  35.0<H>  |  0.21<L>    Ca    8.3<L>      10 Brian 2020 04:04  Phos  3.0     06-10  Mg     1.7     06-10    COVID Neg on 6/5/20    RADIOLOGY & ADDITIONAL STUDIES:     EXAM:  US DPLX LWR EXT VEINS COMPL BI                          PROCEDURE DATE:  06/06/2020      INTERPRETATION:  CLINICAL INFORMATION: Leg swelling. History of PE.     COMPARISON: 6/1/2020    TECHNIQUE: Duplex sonography of the BILATERAL LOWERextremity veins with color and spectral Doppler, with and without compression.      FINDINGS:  There is normal compressibility of the bilateral common femoral, femoral and popliteal veins.   Doppler examination shows normal spontaneous and phasic flow.    No calf vein thrombosis is detected.    IMPRESSION:   No evidence of deep venous thrombosis in either lower extremity.    JAMIE RUST M.D., ATTENDING RADIOLOGIST  This document has been electronically signed. Jun 6 2020  7:37PM           EXAM:  XR CHEST PORTABLE URGENT 1V                          PROCEDURE DATE:  06/01/2020      INTERPRETATION:  AP chest on June 1, 2020 1:28 AM. Patient had left jugular line placement. Patient requires tracheostomy. Patient tested positive forthe COVID virus on April 18.    Heart is magnified by technique. Tracheostomy again noted.    There are persistent advanced bilateral infiltrates.    The above findings are similar to May 31.    Present film shows a jugular sleeve inserted. Tip is inthe left innominate vein area.    IMPRESSION: Persistent advanced infiltrates. Left jugular sleeve inserted as above.    NICHOLE ARRINGTON M.D., ATTENDING RADIOLOGIST  This document has been electronically signed. Jun 1 2020  9:02AM        ECHO:    Summary:   1. Left ventricular ejection fraction, by visual estimation, is 70 to 75%.   2. Normal global left ventricular systolic function.   3. LV Ejection Fraction by Lobo's Method with a biplane EF of 79 %.   4. Normal left ventricular internal cavity size.   5. Spectral Doppler shows impaired relaxation pattern of left ventricular myocardial filling (Grade I diastolic dysfunction).   6. There is no evidence of pericardial effusion.    MD Chung Electronically signed on 5/3/2020 at 5:09:19 PM

## 2020-06-11 NOTE — PROGRESS NOTE ADULT - SUBJECTIVE AND OBJECTIVE BOX
Patient is a 64y old  Male who presents with a chief complaint of shortness of breath (11 Jun 2020 09:15)    Patient seen and examined at bedside.    ALLERGIES:  No Known Allergies    MEDICATIONS  (STANDING):  amantadine Syrup 100 milliGRAM(s) Oral <User Schedule>  chlorhexidine 2% Cloths 1 Application(s) Topical daily  chlorhexidine 4% Liquid 1 Application(s) Topical <User Schedule>  enoxaparin Injectable 70 milliGRAM(s) SubCutaneous every 12 hours  FLUoxetine Solution 10 milliGRAM(s) Oral daily  insulin lispro (HumaLOG) corrective regimen sliding scale   SubCutaneous every 6 hours  melatonin 5 milliGRAM(s) Oral at bedtime  multivitamin/minerals 1 Tablet(s) Oral daily  nystatin    Suspension 361938 Unit(s) Oral three times a day  sodium chloride 0.9%. 1000 milliLiter(s) (75 mL/Hr) IV Continuous <Continuous>    MEDICATIONS  (PRN):  acetaminophen    Suspension .. 650 milliGRAM(s) Oral every 6 hours PRN Temp greater or equal to 38.5C (101.3F)    Vital Signs Last 24 Hrs  T(F): 97.5 (11 Jun 2020 07:34), Max: 99.1 (10 Brian 2020 20:39)  HR: 90 (11 Jun 2020 08:00) (78 - 93)  BP: 107/66 (11 Jun 2020 08:00) (107/66 - 137/80)  RR: 21 (11 Jun 2020 08:00) (20 - 28)  SpO2: 96% (11 Jun 2020 08:00) (93% - 99%)  I&O's Summary    10 Brian 2020 07:01  -  11 Jun 2020 07:00  --------------------------------------------------------  IN: 1510 mL / OUT: 600 mL / NET: 910 mL    PHYSICAL EXAM:  General: NAD, Alert  ENT: MMM, no thrush  Neck: Supple, No JVD  Lungs: good air entry, non-labored breathing +trach  Cardio: +S1/S2, No pitting edema +peg  Abdomen: Soft, Nontender, Nondistended; Bowel sounds present +peg  Extremities: No calf tenderness    LABS:                        10.1   11.08 )-----------( 435      ( 11 Jun 2020 04:40 )             31.4     06-11    135  |  94  |  19.0  ----------------------------<  112  3.8   |  33.0  |  0.28    Ca    8.8      11 Jun 2020 04:40  Phos  2.9     06-11  Mg     1.8     06-11    TPro  6.7  /  Alb  2.8  /  TBili  0.3  /  DBili  x   /  AST  47  /  ALT  59  /  AlkPhos  228  06-11    eGFR if Non African American: 147 mL/min/1.73M2 (06-11-20 @ 04:40)  eGFR if African American: 171 mL/min/1.73M2 (06-11-20 @ 04:40)    04-24 Chol -- LDL -- HDL -- Trig 85 mg/dL    Glucose  POCT Blood Glucose.: 131 mg/dL (11 Jun 2020 06:25)  POCT Blood Glucose.: 104 mg/dL (10 Brian 2020 23:42)  POCT Blood Glucose.: 108 mg/dL (10 Brian 2020 17:17)  POCT Blood Glucose.: 108 mg/dL (10 Brian 2020 11:28)    RADIOLOGY & ADDITIONAL TESTS:  - no new tests

## 2020-06-11 NOTE — SPEAKING VALVE EVALUATION - SLP GENERAL OBSERVATIONS
Pt received & seen seated upright in bed, +asleep upon arrival, however, rousable, +reduced cognition, +#6 fenestrated cuffless trach, +voicing via digital occlusion, 0/10 pain, +language line ID: [id number] used for Armenian.

## 2020-06-11 NOTE — SPEAKING VALVE EVALUATION - COMMENTS
"64 year old male with insignificant pmh coming to hospital with worsening shortness of breath. patient found to have covid and respiratory failure subsequently with complicated icu course including leukocytosis with bacterial pna s/p abx, gi bleed and pe, s/p trach and peg. patient now being downgraded to medicine service as table. will restart full dose.  anticoagulation. GI consulted, for peg tube malfunction"

## 2020-06-11 NOTE — PROGRESS NOTE ADULT - SUBJECTIVE AND OBJECTIVE BOX
Patient did not tolerate bolus feeds.  Noted to have a Portex inner cannula with Shiley external cannula and changed back to Shiley.  NP and RN aware.   Will add more stimulants as continues to have daytime sleepiness.  Patient was turned to side, and barely awakened.  Occluded trach and did not desaturate significantly, remained above 92%.     REVIEW OF SYSTEMS  Constitutional - No fever,  +fatigue  Neurological - +loss of strength    FUNCTIONAL PROGRESS  Total A      VITALS  T(C): 36.4 (06-11-20 @ 07:34), Max: 37.3 (06-10-20 @ 20:39)  HR: 82 (06-11-20 @ 12:00) (78 - 93)  BP: 117/75 (06-11-20 @ 12:00) (107/66 - 137/80)  RR: 21 (06-11-20 @ 08:00) (20 - 28)  SpO2: 96% (06-11-20 @ 12:00) (93% - 99%)  Wt(kg): --    MEDICATIONS   acetaminophen    Suspension .. 650 milliGRAM(s) every 6 hours PRN  amantadine Syrup 100 milliGRAM(s) <User Schedule>  chlorhexidine 2% Cloths 1 Application(s) daily  chlorhexidine 4% Liquid 1 Application(s) <User Schedule>  enoxaparin Injectable 70 milliGRAM(s) every 12 hours  FLUoxetine Solution 10 milliGRAM(s) daily  insulin lispro (HumaLOG) corrective regimen sliding scale   every 6 hours  melatonin 5 milliGRAM(s) at bedtime  multivitamin/minerals 1 Tablet(s) daily  nystatin    Suspension 048630 Unit(s) three times a day  sodium chloride 0.9%. 1000 milliLiter(s) <Continuous>      RECENT LABS - Reviewed                        10.1   11.08 )-----------( 435      ( 11 Jun 2020 04:40 )             31.4     06-11    135  |  94<L>  |  19.0  ----------------------------<  112<H>  3.8   |  33.0<H>  |  0.28<L>    Ca    8.8      11 Jun 2020 04:40  Phos  2.9     06-11  Mg     1.8     06-11    TPro  6.7  /  Alb  2.8<L>  /  TBili  0.3<L>  /  DBili  x   /  AST  47<H>  /  ALT  59<H>  /  AlkPhos  228<H>  06-11              --------------------------------------------------------------------------  PHYSICAL EXAM  Constitutional - NAD, Comfortable  HEENT - +Uncuffed trach  Chest - No significant work of breathing with capping of trachAbdomen - Soft, +PEG  Extremities - Severe wasting  Neurologic Exam -                    Cognitive - Somnolent     Communication - Improved phonation     Motor - Diffuse weakness                    LEFT    UE - ShAB 1/5, EF 3/5, EE 3/5, WE 3/5,  3/5                    RIGHT UE - ShAB 1/5, EF 3/5, EE 3/5, WE 3/5,  3/5                    LEFT    LE - HF 1/5, KE 1/5, DF 1/5, PF 1/5                    RIGHT LE - HF 1/5, KE 1/5, DF 1/5, PF 1/5        Sensory - Intact to LT     Coordination - FTN impaired  Psychiatric - Mood flat, Somnolent  ----------------------------------------------------------------------------------------  ASSESSMENT/PLAN  64yMale with functional deficits after COVID related complications  COVID - MVI  Trach - Trach - Start PMV Trials   PE - Lovenox  Pain - Tylenol  Oral Hygiene - Aggressive care TID, Nystatin TID  Sleep - Melatonin 5mg HS  Daytime Wakefulness - NO TV at night, Amantadine 100mg BID (6/10), Provigil 200mg Q6AM (6/12)  Functional Quadriplegia/Atrophy/Foot Drops - PRAFOs  Mood - Prozac 10mg (6/9)  Oropharyngeal Dysphagia - SLP   DVT PPX - SCDs  Rehab - Will continue to follow for ongoing rehab needs and recommendations.   maximize potential for functional recovery with above recommendations with goal to decannulate, achieve PO diet, DC PEG while awaiting court date.

## 2020-06-11 NOTE — PROGRESS NOTE ADULT - ASSESSMENT
Assess    Post Covid 19 pneumonia   Respiratory failure improved  Tolerating TC with HFO2  S/p trach and PEG  Critical illness polyneuropathy  PE  GIB - appears resolved  Leukocytosis resolved  Chronic hypercarbia but compensated given normal pH    Rec:    Vent support if needed  TC as tolerates  Trach change to fenestrated trach  TF  A/c for PE but GIB is of concern though full AC OK with GI  Eventual tx to [facility] in [place]  Continue supportive care

## 2020-06-12 DIAGNOSIS — M21.371 FOOT DROP, RIGHT FOOT: ICD-10-CM

## 2020-06-12 LAB
GLUCOSE BLDC GLUCOMTR-MCNC: 119 MG/DL — HIGH (ref 70–99)
GLUCOSE BLDC GLUCOMTR-MCNC: 124 MG/DL — HIGH (ref 70–99)
GLUCOSE BLDC GLUCOMTR-MCNC: 125 MG/DL — HIGH (ref 70–99)
GLUCOSE BLDC GLUCOMTR-MCNC: 128 MG/DL — HIGH (ref 70–99)

## 2020-06-12 PROCEDURE — 99232 SBSQ HOSP IP/OBS MODERATE 35: CPT

## 2020-06-12 PROCEDURE — 99233 SBSQ HOSP IP/OBS HIGH 50: CPT

## 2020-06-12 RX ADMIN — Medication 10 MILLIGRAM(S): at 17:38

## 2020-06-12 RX ADMIN — Medication 5 MILLIGRAM(S): at 21:50

## 2020-06-12 RX ADMIN — Medication 500000 UNIT(S): at 21:50

## 2020-06-12 RX ADMIN — ENOXAPARIN SODIUM 70 MILLIGRAM(S): 100 INJECTION SUBCUTANEOUS at 17:39

## 2020-06-12 RX ADMIN — CHLORHEXIDINE GLUCONATE 1 APPLICATION(S): 213 SOLUTION TOPICAL at 06:23

## 2020-06-12 RX ADMIN — CHLORHEXIDINE GLUCONATE 1 APPLICATION(S): 213 SOLUTION TOPICAL at 12:59

## 2020-06-12 RX ADMIN — Medication 1 TABLET(S): at 17:39

## 2020-06-12 RX ADMIN — Medication 100 MILLIGRAM(S): at 06:14

## 2020-06-12 RX ADMIN — MODAFINIL 200 MILLIGRAM(S): 200 TABLET ORAL at 06:13

## 2020-06-12 RX ADMIN — ENOXAPARIN SODIUM 70 MILLIGRAM(S): 100 INJECTION SUBCUTANEOUS at 06:15

## 2020-06-12 RX ADMIN — Medication 100 MILLIGRAM(S): at 13:00

## 2020-06-12 RX ADMIN — Medication 500000 UNIT(S): at 13:58

## 2020-06-12 NOTE — PROGRESS NOTE ADULT - PROBLEM SELECTOR PROBLEM 1
COVID-19 virus infection
Rectal bleeding
Acquired bilateral foot drop
Acute respiratory failure, unspecified whether with hypoxia or hypercapnia
COVID-19 virus infection
Hypernatremia
Pneumonia of both lungs due to infectious organism, unspecified part of lung
Rectal bleeding

## 2020-06-12 NOTE — OCCUPATIONAL THERAPY INITIAL EVALUATION ADULT - LIVES WITH, PROFILE
alone/Patient questionable historian, confirm all info with social work. Per pt lives in a house where he rents a room from a friend. [address] with hand rail, full flight of stairs + hand rail to the second story where his bedroom/bathroom are located. Patient states has several friends who can assist upon discharge.

## 2020-06-12 NOTE — OCCUPATIONAL THERAPY INITIAL EVALUATION ADULT - ADDITIONAL COMMENTS
Patient states has a tub with doors and grab bars. Patient does not own any DME. Patient is right handed and does not drive/does not have a license. Patient states his friends primarily took care of IADLs (cooking, cleaning, laundry and food shopping). Patient questionable historian, confirm all info with social work including PLOF, set up of home, DME owned/used, level of support/assist available upon discharge.

## 2020-06-12 NOTE — PROGRESS NOTE ADULT - PROBLEM SELECTOR PLAN 1
Fit/dispensed bilateral PRAFO and socks.  All went without incident, fit is proper.  Pt. to use as directed by   Please call [facility] with any questions.  [phone].

## 2020-06-12 NOTE — OCCUPATIONAL THERAPY INITIAL EVALUATION ADULT - PLANNED THERAPY INTERVENTIONS, OT EVAL
neuromuscular re-education/balance training/bed mobility training/ADL retraining/strengthening/transfer training/motor coordination training

## 2020-06-12 NOTE — OCCUPATIONAL THERAPY INITIAL EVALUATION ADULT - GENERAL OBSERVATIONS, REHAB EVAL
Patient is Moh[place] speaking, use of ipad  for OT evaluation. Received pt in NAD, +isolation precautions, +Trach/vent, +Telemetry//BP cuff, +[place] Catheter, + PEG. Patient agreeable to OT evaluation.

## 2020-06-12 NOTE — PROGRESS NOTE ADULT - ASSESSMENT
64 year old male with insignificant pmh coming to hospital with worsening shortness of breath. patient found to have covid and respiratory failure subsequently with complicated icu course including leukocytosis with bacterial pna s/p abx, gi bleed and pe, s/p trach and peg. patient now being downgraded to medicine service as table. will restart full dose.  anticoagulation. GI consulted, for peg tube malfunction    #ACute hypoxic respiratory failure 2/2 Covid 19 pneumonia   - resolved  - s/p ICU now on  medicine service  - s/p trach and peg   - isolation precautions   - monitor vitals   - pt consult     #Leukocytosis  - afebrile,   - trend CBC    #PE  - lovenox     #hx of gib bleed w/ rectal ulcer (healing)  - s/p prbc transfusion - s/p rigid sig, clipping of vessel and epinephrine  - gi consult appreciated  - colorectal surgery consult appreciated    #dysphagia  - peg tube- changed to continuous feeds from bolus on 6/10 and decreased frequency of free water on 6/11 as patient stating free water is too much  - GI consult    #type 2 diabetes  - a1c 6.7 on admit   - iss   - monitor fingersticks     #dvt prophylaxis   - lovenox SC     Dispo- pending guardianship court date set for 09/2020  inr ordered for am to attempt to transition patient from lovenox to coumadin inr goal 2-3

## 2020-06-12 NOTE — OCCUPATIONAL THERAPY INITIAL EVALUATION ADULT - DIAGNOSIS, OT EVAL
SOB, COVID+ and respiratory failure. Patient with complicated ICU course including leukocytosis with bacterial PNA, s/p abx, GI bleed and PE. Now s/p Trach/PEG

## 2020-06-12 NOTE — OCCUPATIONAL THERAPY INITIAL EVALUATION ADULT - IMPAIRMENTS CONTRIBUTING IMPAIRED BED MOBILITY, REHAB EVAL
deconditioned; fatigues easily/cognition/decreased strength/impaired motor control/impaired balance/impaired postural control

## 2020-06-12 NOTE — PROGRESS NOTE ADULT - ASSESSMENT
Post Covid 19 pneumonia   Respiratory failure improved  Tolerating TC with HFO2  S/p trach and PEG  Critical illness polyneuropathy  PE  GIB - appears resolved  Leukocytosis resolved  Chronic hypercarbia but compensated given normal pH    Would recheck ABG in AM  Hopefully, will be assessed for [facility] in [place]

## 2020-06-12 NOTE — OCCUPATIONAL THERAPY INITIAL EVALUATION ADULT - ORIENTATION, REHAB EVAL
Patient required choices for place but able to correctly identify./oriented to person, place, time and situation

## 2020-06-12 NOTE — PROGRESS NOTE ADULT - ASSESSMENT
Pt. requires bilateral PRAFO's and interface socks.  Device to preserve ROM, prevent contractures, prevent pressures to heels.  Socks to prevent skin breakdown.

## 2020-06-12 NOTE — PROGRESS NOTE ADULT - PROBLEM SELECTOR PROBLEM 2
COVID-19 virus infection
Gastrostomy tube dysfunction
Pneumonia
Pneumonia due to infectious organism, unspecified laterality, unspecified part of lung
Rectal bleeding
Dyspnea
Hypernatremia
Pneumonia of both lungs due to infectious organism, unspecified part of lung

## 2020-06-12 NOTE — PROGRESS NOTE ADULT - SUBJECTIVE AND OBJECTIVE BOX
Patient seen 6.12 at 10AM.  More awake this AM.   Reports he wants O2 despite breathing at 95%.  Has no pain.     REVIEW OF SYSTEMS  Constitutional - No fever,  No fatigue  Neurological - +loss of strength    FUNCTIONAL PROGRESS  Total A    VITALS  T(C): 37 (06-13-20 @ 16:00), Max: 37.2 (06-13-20 @ 04:30)  HR: 80 (06-13-20 @ 00:00) (80 - 80)  BP: 116/61 (06-13-20 @ 16:00) (108/64 - 124/62)  RR: 22 (06-13-20 @ 16:00) (20 - 22)  SpO2: 97% (06-13-20 @ 16:00) (85% - 100%)  Wt(kg): --    MEDICATIONS   acetaminophen    Suspension .. 650 milliGRAM(s) every 6 hours PRN  amantadine Syrup 100 milliGRAM(s) <User Schedule>  chlorhexidine 2% Cloths 1 Application(s) daily  chlorhexidine 4% Liquid 1 Application(s) <User Schedule>  enoxaparin Injectable 70 milliGRAM(s) every 12 hours  FLUoxetine Solution 10 milliGRAM(s) daily  insulin lispro (HumaLOG) corrective regimen sliding scale   every 6 hours  melatonin 5 milliGRAM(s) at bedtime  modafinil 200 milliGRAM(s) <User Schedule>  multivitamin/minerals 1 Tablet(s) daily  nystatin    Suspension 733904 Unit(s) three times a day  sodium chloride 0.9%. 1000 milliLiter(s) <Continuous>      RECENT LABS - Reviewed                        10.9   15.21 )-----------( 393      ( 13 Jun 2020 06:41 )             31.8     06-13    141  |  96<L>  |  20.0  ----------------------------<  114<H>  3.7   |  34.0<H>  |  0.27<L>    Ca    9.0      13 Jun 2020 06:41      PT/INR - ( 13 Jun 2020 06:41 )   PT: 12.5 sec;   INR: 1.10 ratio         PTT - ( 13 Jun 2020 06:41 )  PTT:34.3 sec          --------------------------------------------------------------------------  PHYSICAL EXAM  Constitutional - NAD, Comfortable  HEENT - +Uncuffed trach  Chest - No significant work of breathing with capping of trachAbdomen - Soft, +PEG  Extremities - Severe wasting  Neurologic Exam -                    Cognitive - Somnolent     Communication - Improved phonation     Motor - Diffuse weakness                    LEFT    UE - ShAB 1/5, EF 3/5, EE 3/5, WE 3/5,  3/5                    RIGHT UE - ShAB 1/5, EF 3/5, EE 3/5, WE 3/5,  3/5                    LEFT    LE - HF 1/5, KE 1/5, DF 1/5, PF 1/5                    RIGHT LE - HF 1/5, KE 1/5, DF 1/5, PF 1/5        Sensory - Intact to LT     Coordination - FTN impaired  Psychiatric - Mood flat, Somnolent  ----------------------------------------------------------------------------------------  ASSESSMENT/PLAN  64yMale with functional deficits after COVID related complications  COVID - MVI  Trach - Trach - PMV Trials   PE - Lovenox  Pain - Tylenol  Oral Hygiene - Aggressive care TID, Nystatin TID  Sleep - Melatonin 5mg HS  Daytime Wakefulness - NO TV at night, Amantadine 100mg BID (6/10), Provigil 200mg Q6AM (6/12)  Functional Quadriplegia/Atrophy/Foot Drops - PRAFOs  Mood - Prozac 10mg (6/9)  Oropharyngeal Dysphagia - SLP   DVT PPX - SCDs  Rehab - Will continue to follow for ongoing rehab needs and recommendations.   maximize potential for functional recovery with above recommendations with goal to decannulate, achieve PO diet, DC PEG while awaiting court date.

## 2020-06-12 NOTE — OCCUPATIONAL THERAPY INITIAL EVALUATION ADULT - SPECIAL TRAINING, OT EVAL
Evaluation limited by generalized weakness, pt with c/o of dizziness and decrease in SPO2. Patient O2 range from 96%-82% with exertion and position changes. Jolly lift transfer performed from bed to chair with PT Roma and RYANNE Medrano to assist managing lines.

## 2020-06-12 NOTE — OCCUPATIONAL THERAPY INITIAL EVALUATION ADULT - LEVEL OF INDEPENDENCE: SIT/STAND, REHAB EVAL
deemed unsafe at this time, patient desat to 82% when seated EOB, RN Marcela bedside, returned pt to supine to [facility] transfer to bedside chair. O2 sat back at 92% after ~1minute./unable to perform

## 2020-06-12 NOTE — PROGRESS NOTE ADULT - PROBLEM SELECTOR PROBLEM 3
Acute respiratory failure, unspecified whether with hypoxia or hypercapnia
Dyspnea
Gastrostomy tube dysfunction
Pneumonia due to infectious organism, unspecified laterality, unspecified part of lung
Acute respiratory failure, unspecified whether with hypoxia or hypercapnia
Rectal bleeding
Debility
Dyspnea

## 2020-06-12 NOTE — PROGRESS NOTE ADULT - SUBJECTIVE AND OBJECTIVE BOX
PULMONARY PROGRESS NOTE      [id number]    Patient is a 64y old  Male who presents with a chief complaint of shortness of breath (12 Jun 2020 13:26)      INTERVAL HPI/OVERNIGHT EVENTS:    MEDICATIONS  (STANDING):  amantadine Syrup 100 milliGRAM(s) Oral <User Schedule>  chlorhexidine 2% Cloths 1 Application(s) Topical daily  chlorhexidine 4% Liquid 1 Application(s) Topical <User Schedule>  enoxaparin Injectable 70 milliGRAM(s) SubCutaneous every 12 hours  FLUoxetine Solution 10 milliGRAM(s) Oral daily  insulin lispro (HumaLOG) corrective regimen sliding scale   SubCutaneous every 6 hours  melatonin 5 milliGRAM(s) Oral at bedtime  modafinil 200 milliGRAM(s) Oral <User Schedule>  multivitamin/minerals 1 Tablet(s) Oral daily  nystatin    Suspension 238789 Unit(s) Oral three times a day  sodium chloride 0.9%. 1000 milliLiter(s) (75 mL/Hr) IV Continuous <Continuous>      MEDICATIONS  (PRN):  acetaminophen    Suspension .. 650 milliGRAM(s) Oral every 6 hours PRN Temp greater or equal to 38.5C (101.3F)      Allergies    No Known Allergies    Intolerances        PAST MEDICAL & SURGICAL HISTORY:  No pertinent past medical history  No significant past surgical history      SOCIAL HISTORY  Smoking History:       REVIEW OF SYSTEMS:    CONSTITUTIONAL:  No distress    HEENT:  Eyes:  No diplopia or blurred vision. ENT:  No earache, sore throat or runny nose.    CARDIOVASCULAR:  No pressure, squeezing, tightness, heaviness or aching about the chest; no palpitations.    RESPIRATORY:  No cough, shortness of breath, PND or orthopnea. Mild SOBOE    GASTROINTESTINAL:  No nausea, vomiting or diarrhea.    GENITOURINARY:  No dysuria, frequency or urgency.    MUSCULOSKELETAL:  No joint pain    SKIN:  No new lesions.    NEUROLOGIC:  No paresthesias, fasciculations, seizures or weakness.    PSYCHIATRIC:  No disorder of thought or mood.    ENDOCRINE:  No heat or cold intolerance, polyuria or polydipsia.    HEMATOLOGICAL:  No easy bruising or bleeding.     Vital Signs Last 24 Hrs  T(C): 36.3 (12 Jun 2020 07:45), Max: 36.9 (12 Jun 2020 04:00)  T(F): 97.3 (12 Jun 2020 07:45), Max: 98.4 (12 Jun 2020 04:00)  HR: 90 (12 Jun 2020 12:00) (82 - 90)  BP: 116/70 (12 Jun 2020 12:00) (106/58 - 118/66)  BP(mean): 82 (12 Jun 2020 12:00) (69 - 82)  RR: 22 (12 Jun 2020 12:00) (17 - 27)  SpO2: 100% (12 Jun 2020 12:00) (98% - 100%)    PHYSICAL EXAMINATION:    GENERAL: The patient is awake and alert in no apparent distress.     HEENT: Head is normocephalic and atraumatic. Extraocular muscles are intact. Mucous membranes are moist.    NECK: Supple.    LUNGS: Clear to auscultation without wheezing, rales or rhonchi; respirations unlabored    HEART: Regular rate and rhythm without murmur.    ABDOMEN: Soft, nontender, and nondistended.      EXTREMITIES: Without any cyanosis, clubbing, rash, lesions or edema.    NEUROLOGIC: Grossly intact.    SKIN: No ulceration or induration present.      LABS:                        10.1   11.08 )-----------( 435      ( 11 Jun 2020 04:40 )             31.4     06-11    135  |  94<L>  |  19.0  ----------------------------<  112<H>  3.8   |  33.0<H>  |  0.28<L>    Ca    8.8      11 Jun 2020 04:40  Phos  2.9     06-11  Mg     1.8     06-11    TPro  6.7  /  Alb  2.8<L>  /  TBili  0.3<L>  /  DBili  x   /  AST  47<H>  /  ALT  59<H>  /  AlkPhos  228<H>  06-11                        MICROBIOLOGY:    RADIOLOGY & ADDITIONAL STUDIES:< from: Xray Chest 1 View- PORTABLE-Urgent (06.01.20 @ 01:43) >   EXAM:  XR CHEST PORTABLE URGENT 1V                          PROCEDURE DATE:  06/01/2020          INTERPRETATION:  AP chest on June 1, 2020 1:28 AM. Patient had left jugular line placement. Patient requires tracheostomy. Patient tested positive forthe COVID virus on April 18.    Heart is magnified by technique. Tracheostomy again noted.    There are persistent advanced bilateral infiltrates.    The above findings are similar to May 31.    Present film shows a jugular sleeve inserted. Tip is inthe left innominate vein area.    IMPRESSION: Persistent advanced infiltrates. Left jugular sleeve inserted as above.        < end of copied text >

## 2020-06-12 NOTE — OCCUPATIONAL THERAPY INITIAL EVALUATION ADULT - MANUAL MUSCLE TESTING RESULTS, REHAB EVAL
Bilateral shoulders grossly assessed with AROM against gravity 3/5, bilateral elbows grossly assessed with AROM against gravity 3/5, bilateral gross grasp 3/5.

## 2020-06-12 NOTE — PROGRESS NOTE ADULT - SUBJECTIVE AND OBJECTIVE BOX
Patient is a 64y old  Male who presents with a chief complaint of shortness of breath (11 Jun 2020 14:30)    Patient seen and examined at bedside.     ALLERGIES:  No Known Allergies    MEDICATIONS  (STANDING):  amantadine Syrup 100 milliGRAM(s) Oral <User Schedule>  chlorhexidine 2% Cloths 1 Application(s) Topical daily  chlorhexidine 4% Liquid 1 Application(s) Topical <User Schedule>  enoxaparin Injectable 70 milliGRAM(s) SubCutaneous every 12 hours  FLUoxetine Solution 10 milliGRAM(s) Oral daily  insulin lispro (HumaLOG) corrective regimen sliding scale   SubCutaneous every 6 hours  melatonin 5 milliGRAM(s) Oral at bedtime  modafinil 200 milliGRAM(s) Oral <User Schedule>  multivitamin/minerals 1 Tablet(s) Oral daily  nystatin    Suspension 316682 Unit(s) Oral three times a day  sodium chloride 0.9%. 1000 milliLiter(s) (75 mL/Hr) IV Continuous <Continuous>    MEDICATIONS  (PRN):  acetaminophen    Suspension .. 650 milliGRAM(s) Oral every 6 hours PRN Temp greater or equal to 38.5C (101.3F)    Vital Signs Last 24 Hrs  T(F): 97.3 (12 Jun 2020 07:45), Max: 98.4 (12 Jun 2020 04:00)  HR: 90 (12 Jun 2020 12:00) (82 - 90)  BP: 116/70 (12 Jun 2020 12:00) (106/58 - 118/66)  RR: 22 (12 Jun 2020 12:00) (17 - 27)  SpO2: 100% (12 Jun 2020 12:00) (98% - 100%)  I&O's Summary    11 Jun 2020 07:01  -  12 Jun 2020 07:00  --------------------------------------------------------  IN: 1410 mL / OUT: 1000 mL / NET: 410 mL    PHYSICAL EXAM:  General: NAD, Alert  ENT: MMM, no thrush  Neck: Supple, No JVD  Lungs: good air entry, non-labored breathing +trach  Cardio: +S1/S2, No pitting edema +peg  Abdomen: Soft, Nontender, Nondistended; Bowel sounds present +peg  Extremities: No calf tenderness    LABS:                        10.1   11.08 )-----------( 435      ( 11 Jun 2020 04:40 )             31.4     06-11    135  |  94  |  19.0  ----------------------------<  112  3.8   |  33.0  |  0.28    Ca    8.8      11 Jun 2020 04:40  Phos  2.9     06-11  Mg     1.8     06-11    TPro  6.7  /  Alb  2.8  /  TBili  0.3  /  DBili  x   /  AST  47  /  ALT  59  /  AlkPhos  228  06-11    eGFR if Non African American: 147 mL/min/1.73M2 (06-11-20 @ 04:40)  eGFR if African American: 171 mL/min/1.73M2 (06-11-20 @ 04:40)    04-24 Chol -- LDL -- HDL -- Trig 85 mg/dL    Glucose  POCT Blood Glucose.: 128 mg/dL (12 Jun 2020 13:02)  POCT Blood Glucose.: 125 mg/dL (12 Jun 2020 06:09)  POCT Blood Glucose.: 163 mg/dL (11 Jun 2020 23:46)  POCT Blood Glucose.: 125 mg/dL (11 Jun 2020 16:56)    RADIOLOGY & ADDITIONAL TESTS:  - no new test

## 2020-06-13 LAB
ANION GAP SERPL CALC-SCNC: 11 MMOL/L — SIGNIFICANT CHANGE UP (ref 5–17)
APTT BLD: 34.3 SEC — SIGNIFICANT CHANGE UP (ref 27.5–36.3)
BASE EXCESS BLDA CALC-SCNC: 12.1 MMOL/L — HIGH (ref -3–3)
BLOOD GAS COMMENTS ARTERIAL: SIGNIFICANT CHANGE UP
BUN SERPL-MCNC: 20 MG/DL — SIGNIFICANT CHANGE UP (ref 8–20)
CALCIUM SERPL-MCNC: 9 MG/DL — SIGNIFICANT CHANGE UP (ref 8.6–10.2)
CHLORIDE SERPL-SCNC: 96 MMOL/L — LOW (ref 98–107)
CO2 SERPL-SCNC: 34 MMOL/L — HIGH (ref 22–29)
CREAT SERPL-MCNC: 0.27 MG/DL — LOW (ref 0.5–1.3)
GAS PNL BLDA: SIGNIFICANT CHANGE UP
GLUCOSE BLDC GLUCOMTR-MCNC: 113 MG/DL — HIGH (ref 70–99)
GLUCOSE BLDC GLUCOMTR-MCNC: 114 MG/DL — HIGH (ref 70–99)
GLUCOSE BLDC GLUCOMTR-MCNC: 131 MG/DL — HIGH (ref 70–99)
GLUCOSE BLDC GLUCOMTR-MCNC: 140 MG/DL — HIGH (ref 70–99)
GLUCOSE SERPL-MCNC: 114 MG/DL — HIGH (ref 70–99)
HCO3 BLDA-SCNC: 36 MMOL/L — HIGH (ref 20–26)
HCT VFR BLD CALC: 31.8 % — LOW (ref 39–50)
HGB BLD-MCNC: 10.9 G/DL — LOW (ref 13–17)
HOROWITZ INDEX BLDA+IHG-RTO: 0.55 — SIGNIFICANT CHANGE UP
INR BLD: 1.1 RATIO — SIGNIFICANT CHANGE UP (ref 0.88–1.16)
MCHC RBC-ENTMCNC: 33 PG — SIGNIFICANT CHANGE UP (ref 27–34)
MCHC RBC-ENTMCNC: 34.3 GM/DL — SIGNIFICANT CHANGE UP (ref 32–36)
MCV RBC AUTO: 96.4 FL — SIGNIFICANT CHANGE UP (ref 80–100)
PCO2 BLDA: 61 MMHG — HIGH (ref 35–45)
PH BLDA: 7.41 — SIGNIFICANT CHANGE UP (ref 7.35–7.45)
PLATELET # BLD AUTO: 393 K/UL — SIGNIFICANT CHANGE UP (ref 150–400)
PO2 BLDA: 100 MMHG — SIGNIFICANT CHANGE UP (ref 83–108)
POTASSIUM SERPL-MCNC: 3.7 MMOL/L — SIGNIFICANT CHANGE UP (ref 3.5–5.3)
POTASSIUM SERPL-SCNC: 3.7 MMOL/L — SIGNIFICANT CHANGE UP (ref 3.5–5.3)
PROTHROM AB SERPL-ACNC: 12.5 SEC — SIGNIFICANT CHANGE UP (ref 10–12.9)
RBC # BLD: 3.3 M/UL — LOW (ref 4.2–5.8)
RBC # FLD: 13.8 % — SIGNIFICANT CHANGE UP (ref 10.3–14.5)
SAO2 % BLDA: 99 % — SIGNIFICANT CHANGE UP (ref 95–99)
SODIUM SERPL-SCNC: 141 MMOL/L — SIGNIFICANT CHANGE UP (ref 135–145)
WBC # BLD: 15.21 K/UL — HIGH (ref 3.8–10.5)
WBC # FLD AUTO: 15.21 K/UL — HIGH (ref 3.8–10.5)

## 2020-06-13 PROCEDURE — 99232 SBSQ HOSP IP/OBS MODERATE 35: CPT

## 2020-06-13 PROCEDURE — 99233 SBSQ HOSP IP/OBS HIGH 50: CPT

## 2020-06-13 RX ADMIN — ENOXAPARIN SODIUM 70 MILLIGRAM(S): 100 INJECTION SUBCUTANEOUS at 05:30

## 2020-06-13 RX ADMIN — Medication 100 MILLIGRAM(S): at 12:57

## 2020-06-13 RX ADMIN — Medication 500000 UNIT(S): at 22:24

## 2020-06-13 RX ADMIN — CHLORHEXIDINE GLUCONATE 1 APPLICATION(S): 213 SOLUTION TOPICAL at 06:38

## 2020-06-13 RX ADMIN — Medication 10 MILLIGRAM(S): at 12:58

## 2020-06-13 RX ADMIN — Medication 5 MILLIGRAM(S): at 22:24

## 2020-06-13 RX ADMIN — Medication 500000 UNIT(S): at 14:43

## 2020-06-13 RX ADMIN — Medication 1 TABLET(S): at 18:02

## 2020-06-13 RX ADMIN — ENOXAPARIN SODIUM 70 MILLIGRAM(S): 100 INJECTION SUBCUTANEOUS at 18:02

## 2020-06-13 RX ADMIN — CHLORHEXIDINE GLUCONATE 1 APPLICATION(S): 213 SOLUTION TOPICAL at 12:50

## 2020-06-13 NOTE — SWALLOW BEDSIDE ASSESSMENT ADULT - SWALLOW EVAL: DIAGNOSIS
Oral stage WFL. Pharyngeal stage appears functional with no overt s/s aspiration at bedside. No green dye in secretions suctioned by RN post PO trials. To follow to assess secretions for green dye in 1-2 hours. Oral stage WFL. Pharyngeal stage appears functional with no overt s/s aspiration at bedside. No green dye in secretions suctioned by RN post PO trials. Pt seen ~2 hours post PO administration for suction by RN with no observed green dye in suctioned secretions. Can initiate PUREE diet with ongoing evaluation for candidacy for diet upgrade for additional consistencies

## 2020-06-13 NOTE — SWALLOW BEDSIDE ASSESSMENT ADULT - NS SPL SWALLOW CLINIC TRIAL FT
Pt seen ~2 hours post PO administration. RYANNE Leija completed suctioning at bedside with no observed green dye in secretions.

## 2020-06-13 NOTE — SWALLOW BEDSIDE ASSESSMENT ADULT - PHARYNGEAL PHASE
No overt s/s aspiration post  swallow; secretions suction by RN post trials clear./Within functional limits

## 2020-06-13 NOTE — SWALLOW BEDSIDE ASSESSMENT ADULT - ADDITIONAL RECOMMENDATIONS
Continued alternative means of hydration    Ongoing assessment via blue dye bedside swallow assessment to determine pt's candidacy for additional food/liquid consistencies.

## 2020-06-13 NOTE — PROGRESS NOTE ADULT - SUBJECTIVE AND OBJECTIVE BOX
PULMONARY PROGRESS NOTE      LISSA MALLOYTHERESE-[id number]    Patient is a 64y old  Male who presents with a chief complaint of shortness of breath (12 Jun 2020 18:20)      INTERVAL HPI/OVERNIGHT EVENTS:  no overnight issues  MEDICATIONS  (STANDING):  amantadine Syrup 100 milliGRAM(s) Oral <User Schedule>  chlorhexidine 2% Cloths 1 Application(s) Topical daily  chlorhexidine 4% Liquid 1 Application(s) Topical <User Schedule>  enoxaparin Injectable 70 milliGRAM(s) SubCutaneous every 12 hours  FLUoxetine Solution 10 milliGRAM(s) Oral daily  insulin lispro (HumaLOG) corrective regimen sliding scale   SubCutaneous every 6 hours  melatonin 5 milliGRAM(s) Oral at bedtime  modafinil 200 milliGRAM(s) Oral <User Schedule>  multivitamin/minerals 1 Tablet(s) Oral daily  nystatin    Suspension 843979 Unit(s) Oral three times a day  sodium chloride 0.9%. 1000 milliLiter(s) (75 mL/Hr) IV Continuous <Continuous>      MEDICATIONS  (PRN):  acetaminophen    Suspension .. 650 milliGRAM(s) Oral every 6 hours PRN Temp greater or equal to 38.5C (101.3F)      Allergies    No Known Allergies    Intolerances        PAST MEDICAL & SURGICAL HISTORY:  No pertinent past medical history  No significant past surgical history      SOCIAL HISTORY  Smoking History:       REVIEW OF SYSTEMS:    N/A    Vital Signs Last 24 Hrs  T(C): 36.8 (13 Jun 2020 07:34), Max: 37.2 (13 Jun 2020 04:30)  T(F): 98.2 (13 Jun 2020 07:34), Max: 99 (13 Jun 2020 04:30)  HR: 80 (13 Jun 2020 00:00) (76 - 90)  BP: 108/64 (13 Jun 2020 08:00) (108/64 - 130/67)  BP(mean): 76 (13 Jun 2020 08:00) (76 - 85)  RR: 22 (13 Jun 2020 08:00) (20 - 24)  SpO2: 85% (13 Jun 2020 04:00) (85% - 100%)    PHYSICAL EXAMINATION:          LABS:                        10.9   15.21 )-----------( 393      ( 13 Jun 2020 06:41 )             31.8     06-13    141  |  96<L>  |  20.0  ----------------------------<  114<H>  3.7   |  34.0<H>  |  0.27<L>    Ca    9.0      13 Jun 2020 06:41      PT/INR - ( 13 Jun 2020 06:41 )   PT: 12.5 sec;   INR: 1.10 ratio         PTT - ( 13 Jun 2020 06:41 )  PTT:34.3 sec    ABG - ( 13 Jun 2020 04:58 )  pH, Arterial: 7.41  pH, Blood: x     /  pCO2: 61    /  pO2: 100   / HCO3: 36    / Base Excess: 12.1  /  SaO2: 99                              MICROBIOLOGY:    RADIOLOGY & ADDITIONAL STUDIES:  CXR and CT scans reviewed

## 2020-06-13 NOTE — PROGRESS NOTE ADULT - ASSESSMENT
64 year old male with insignificant pmh coming to hospital with worsening shortness of breath. patient found to have covid and respiratory failure subsequently with complicated icu course including leukocytosis with bacterial pna s/p abx, gi bleed and pe, s/p trach and peg. patient now being downgraded to medicine service as table. will restart full dose.  anticoagulation. GI consulted, for peg tube malfunction    #ACute hypoxic respiratory failure 2/2 Covid 19 pneumonia   - resolved  - s/p ICU now on  medicine service  - s/p trach and peg   - isolation precautions   - monitor vitals   - pt consult     #Leukocytosis  - afebrile,   - trend CBC    #PE  - lovenox     #hx of gib bleed w/ rectal ulcer (healing)  - s/p prbc transfusion - s/p rigid sig, clipping of vessel and epinephrine  - gi consult appreciated  - colorectal surgery consult appreciated    #dysphagia  - peg tube- changed to continuous feeds from bolus on 6/10 and decreased frequency of free water on 6/11 as patient stating free water is too much  - GI consult    #type 2 diabetes  - a1c 6.7 on admit   - iss   - monitor fingersticks     #dvt prophylaxis   - lovenox SC     Dispo- pending guardianship court date set for 09/2020  inr ordered for am to attempt to transition patient from lovenox to coumadin inr goal 2-3 64 year old male with insignificant pmh coming to hospital with worsening shortness of breath. patient found to have covid and respiratory failure subsequently with complicated icu course including leukocytosis with bacterial pna s/p abx, gi bleed and pe, s/p trach and peg. patient now being downgraded to medicine service as table. will restart full dose.  anticoagulation.        Acute hypoxic respiratory failure 2/2 Covid 19 pneumonia   - resolved  - s/p ICU now on  medicine service  - s/p trach and peg   - isolation precautions   - monitor vitals   - pt consult     #Leukocytosis  - afebrile,   - trend CBC    #PE  - lovenox     #hx of gib bleed w/ rectal ulcer (healing)  - s/p prbc transfusion - s/p rigid sig, clipping of vessel and epinephrine  - gi consult appreciated  - colorectal surgery consult appreciated    #dysphagia  - peg tube- changed to continuous feeds from bolus on 6/10 and decreased frequency of free water on 6/11 as patient stating free water is too much  - GI consult    #type 2 diabetes  - a1c 6.7 on admit   - iss   - monitor fingersticks     #dvt prophylaxis   - lovenox SC     Dispo- pending guardianship court date set for 09/2020

## 2020-06-13 NOTE — PROGRESS NOTE ADULT - ASSESSMENT
. 64yM was admitted on 04-18 due to COVID related infection. Course was complicated by prolonged ICU course for acute hypoxic respiratory failure, requiring vent, s/p PEG and trach. Course also complicated by PE, GI bleed with rectal ulceration and is s/p RBCs as well as sigmoidoscopy and clipping/epinephrine.    Patient is undocumented and has no insurance. He is currently undergoing guardianship and has a court date set for  a day in Sept.       Post Covid 19 pneumonia   Respiratory failure improved  Tolerating TC with HFO2  S/p trach and PEG  Critical illness polyneuropathy  PE  GIB - appears resolved. last HCT stable, mild leucocytosis, no fever  Chronic hypercarbia but compensated given normal pH    Rec:  wean High flow per protocol  follow HCT closely  mobilize   PT  nutritional support through peg, eventual repeat swallow  Eventual tx to [facility] in [place]  Continue supportive care . 64yM was admitted on 04-18 due to COVID related infection. Course was complicated by prolonged ICU course for acute hypoxic respiratory failure, requiring vent, s/p PEG and trach. Course also complicated by PE, GI bleed with rectal ulceration and is s/p RBCs as well as sigmoidoscopy and clipping/epinephrine.    Patient is undocumented and has no insurance. He is currently undergoing guardianship and has a court date set for  a day in Sept.       Post Covid 19 pneumonia ,PE, CCI polyneuropathy  Tolerating TC with HFO2/vapotherm 25 L, 55%- compensated hypercapnia  S/p trach and PEG  Hct stable, on full AC, mild leucocytosis , no fever    wean High flow per protocol, decrease Fi02, eventual T collar as improves  follow HCT closely  mobilize   PT  nutritional support through peg, eventual repeat swallow  Eventual tx to [facility] in [place]  Continue supportive care

## 2020-06-13 NOTE — SWALLOW BEDSIDE ASSESSMENT ADULT - SLP GENERAL OBSERVATIONS
Pt received A&A in bed, +Ukrainian speaking, video  [id number] used for Chinese interpretation, +#6 shiley cuffless fenestrated trach, on trach collar, Sp02 98%, +voicing over trach with reduced vocal intensity, +vocal hoarseness, improved vocal intensity via finger occlusion to trach. RYANNE Leija present for suctioning (pt suctioned pre and post PO trials), pain 0/10 pre/post eval

## 2020-06-13 NOTE — SWALLOW BEDSIDE ASSESSMENT ADULT - ASR SWALLOW ASPIRATION MONITOR
cough/fever/pneumonia/throat clearing/upper respiratory infection/oral hygiene/position upright (90Y)/change of breathing pattern/gurgly voice

## 2020-06-13 NOTE — PROGRESS NOTE ADULT - SUBJECTIVE AND OBJECTIVE BOX
HOSPITALIST PROGRESS NOTE    LISSA MALLOY  [id number]  64yMale    Patient is a 64y old  Male who presents with a chief complaint of shortness of breath (13 Jun 2020 10:52)      SUBJECTIVE:   Chart reviewed; patient seen and examined at bedside for respiratory failure, PE, GIB.  Nonverbal  Had BM as per RN  unclear why TF on hold - asked RN to resume      OBJECTIVE:  Vital Signs Last 24 Hrs  T(C): 36.8 (13 Jun 2020 12:00), Max: 37.2 (13 Jun 2020 04:30)  T(F): 98.2 (13 Jun 2020 12:00), Max: 99 (13 Jun 2020 04:30)  HR: 80 (13 Jun 2020 00:00) (76 - 82)  BP: 110/60 (13 Jun 2020 12:00) (108/64 - 130/67)  BP(mean): 71 (13 Jun 2020 12:00) (71 - 85)  RR: 20 (13 Jun 2020 12:00) (20 - 24)  SpO2: 95% (13 Jun 2020 12:00) (85% - 100%)    PHYSICAL EXAMINATION  General: Lying in bed, NAD  HEENT:  EOMI  NECK:  trach 50%  CVS: regular rate and rhythm S1 S2  RESP:  CTAB  GI:  PEG+ BS+ Soft nondistended nontender   : No suprapubic tenderness  MSK:  No edema. bilateral offloading boots  CNS:  generalized weakness  INTEG:  Warm dry skin  PSYCH:  Unable to assess    MONITOR:  CAPILLARY BLOOD GLUCOSE      POCT Blood Glucose.: 113 mg/dL (13 Jun 2020 12:11)  POCT Blood Glucose.: 114 mg/dL (13 Jun 2020 06:40)  POCT Blood Glucose.: 124 mg/dL (12 Jun 2020 21:59)  POCT Blood Glucose.: 119 mg/dL (12 Jun 2020 17:48)        I&O's Summary    12 Jun 2020 07:01  -  13 Jun 2020 07:00  --------------------------------------------------------  IN: 650 mL / OUT: 1075 mL / NET: -425 mL    13 Jun 2020 07:01  -  13 Jun 2020 15:56  --------------------------------------------------------  IN: 55 mL / OUT: 30 mL / NET: 25 mL                            10.9   15.21 )-----------( 393      ( 13 Jun 2020 06:41 )             31.8     PT/INR - ( 13 Jun 2020 06:41 )   PT: 12.5 sec;   INR: 1.10 ratio         PTT - ( 13 Jun 2020 06:41 )  PTT:34.3 sec  06-13    141  |  96<L>  |  20.0  ----------------------------<  114<H>  3.7   |  34.0<H>  |  0.27<L>    Ca    9.0      13 Jun 2020 06:41              Culture:    TTE:    RADIOLOGY        MEDICATIONS  (STANDING):  amantadine Syrup 100 milliGRAM(s) Oral <User Schedule>  chlorhexidine 2% Cloths 1 Application(s) Topical daily  chlorhexidine 4% Liquid 1 Application(s) Topical <User Schedule>  enoxaparin Injectable 70 milliGRAM(s) SubCutaneous every 12 hours  FLUoxetine Solution 10 milliGRAM(s) Oral daily  insulin lispro (HumaLOG) corrective regimen sliding scale   SubCutaneous every 6 hours  melatonin 5 milliGRAM(s) Oral at bedtime  modafinil 200 milliGRAM(s) Oral <User Schedule>  multivitamin/minerals 1 Tablet(s) Oral daily  nystatin    Suspension 947416 Unit(s) Oral three times a day  sodium chloride 0.9%. 1000 milliLiter(s) (75 mL/Hr) IV Continuous <Continuous>      MEDICATIONS  (PRN):  acetaminophen    Suspension .. 650 milliGRAM(s) Oral every 6 hours PRN Temp greater or equal to 38.5C (101.3F) HOSPITALIST PROGRESS NOTE    LISSA MALLOY  [id number]  64yMale    Patient is a 64y old  Male who presents with a chief complaint of shortness of breath (13 Jun 2020 10:52)      SUBJECTIVE:   Chart reviewed; patient seen and examined at bedside for respiratory failure, PE, GIB.  No complaints elicited  Had BM as per RN  unclear why TF on hold - asked RN to resume      OBJECTIVE:  Vital Signs Last 24 Hrs  T(C): 36.8 (13 Jun 2020 12:00), Max: 37.2 (13 Jun 2020 04:30)  T(F): 98.2 (13 Jun 2020 12:00), Max: 99 (13 Jun 2020 04:30)  HR: 80 (13 Jun 2020 00:00) (76 - 82)  BP: 110/60 (13 Jun 2020 12:00) (108/64 - 130/67)  BP(mean): 71 (13 Jun 2020 12:00) (71 - 85)  RR: 20 (13 Jun 2020 12:00) (20 - 24)  SpO2: 95% (13 Jun 2020 12:00) (85% - 100%)    PHYSICAL EXAMINATION  General: Lying in bed, NAD  HEENT:  EOMI  NECK:  trach 50%  CVS: regular rate and rhythm S1 S2  RESP:  CTAB  GI:  PEG+ BS+ Soft nondistended nontender   : No suprapubic tenderness  MSK:  No edema. bilateral offloading boots  CNS:  generalized weakness  INTEG:  Warm dry skin  PSYCH:  Unable to assess    MONITOR:  CAPILLARY BLOOD GLUCOSE      POCT Blood Glucose.: 113 mg/dL (13 Jun 2020 12:11)  POCT Blood Glucose.: 114 mg/dL (13 Jun 2020 06:40)  POCT Blood Glucose.: 124 mg/dL (12 Jun 2020 21:59)  POCT Blood Glucose.: 119 mg/dL (12 Jun 2020 17:48)        I&O's Summary    12 Jun 2020 07:01  -  13 Jun 2020 07:00  --------------------------------------------------------  IN: 650 mL / OUT: 1075 mL / NET: -425 mL    13 Jun 2020 07:01  -  13 Jun 2020 15:56  --------------------------------------------------------  IN: 55 mL / OUT: 30 mL / NET: 25 mL                            10.9   15.21 )-----------( 393      ( 13 Jun 2020 06:41 )             31.8     PT/INR - ( 13 Jun 2020 06:41 )   PT: 12.5 sec;   INR: 1.10 ratio         PTT - ( 13 Jun 2020 06:41 )  PTT:34.3 sec  06-13    141  |  96<L>  |  20.0  ----------------------------<  114<H>  3.7   |  34.0<H>  |  0.27<L>    Ca    9.0      13 Jun 2020 06:41              Culture:    TTE:    RADIOLOGY        MEDICATIONS  (STANDING):  amantadine Syrup 100 milliGRAM(s) Oral <User Schedule>  chlorhexidine 2% Cloths 1 Application(s) Topical daily  chlorhexidine 4% Liquid 1 Application(s) Topical <User Schedule>  enoxaparin Injectable 70 milliGRAM(s) SubCutaneous every 12 hours  FLUoxetine Solution 10 milliGRAM(s) Oral daily  insulin lispro (HumaLOG) corrective regimen sliding scale   SubCutaneous every 6 hours  melatonin 5 milliGRAM(s) Oral at bedtime  modafinil 200 milliGRAM(s) Oral <User Schedule>  multivitamin/minerals 1 Tablet(s) Oral daily  nystatin    Suspension [id number] Unit(s) Oral three times a day  sodium chloride 0.9%. 1000 milliLiter(s) (75 mL/Hr) IV Continuous <Continuous>      MEDICATIONS  (PRN):  acetaminophen    Suspension .. 650 milliGRAM(s) Oral every 6 hours PRN Temp greater or equal to 38.5C (101.3F)

## 2020-06-14 LAB
GLUCOSE BLDC GLUCOMTR-MCNC: 140 MG/DL — HIGH (ref 70–99)
GLUCOSE BLDC GLUCOMTR-MCNC: 157 MG/DL — HIGH (ref 70–99)
GLUCOSE BLDC GLUCOMTR-MCNC: 166 MG/DL — HIGH (ref 70–99)
GLUCOSE BLDC GLUCOMTR-MCNC: 181 MG/DL — HIGH (ref 70–99)

## 2020-06-14 PROCEDURE — 99232 SBSQ HOSP IP/OBS MODERATE 35: CPT

## 2020-06-14 PROCEDURE — 99233 SBSQ HOSP IP/OBS HIGH 50: CPT

## 2020-06-14 RX ADMIN — ENOXAPARIN SODIUM 70 MILLIGRAM(S): 100 INJECTION SUBCUTANEOUS at 05:45

## 2020-06-14 RX ADMIN — Medication 100 MILLIGRAM(S): at 11:50

## 2020-06-14 RX ADMIN — Medication 4: at 17:32

## 2020-06-14 RX ADMIN — ENOXAPARIN SODIUM 70 MILLIGRAM(S): 100 INJECTION SUBCUTANEOUS at 17:18

## 2020-06-14 RX ADMIN — Medication 10 MILLIGRAM(S): at 11:50

## 2020-06-14 RX ADMIN — Medication 4: at 12:01

## 2020-06-14 RX ADMIN — Medication 500000 UNIT(S): at 14:20

## 2020-06-14 RX ADMIN — Medication 500000 UNIT(S): at 05:45

## 2020-06-14 RX ADMIN — Medication 1 TABLET(S): at 17:18

## 2020-06-14 RX ADMIN — Medication 4: at 05:59

## 2020-06-14 RX ADMIN — Medication 5 MILLIGRAM(S): at 22:09

## 2020-06-14 RX ADMIN — Medication 500000 UNIT(S): at 22:09

## 2020-06-14 RX ADMIN — CHLORHEXIDINE GLUCONATE 1 APPLICATION(S): 213 SOLUTION TOPICAL at 05:45

## 2020-06-14 RX ADMIN — MODAFINIL 200 MILLIGRAM(S): 200 TABLET ORAL at 05:45

## 2020-06-14 RX ADMIN — Medication 100 MILLIGRAM(S): at 05:45

## 2020-06-14 NOTE — PROGRESS NOTE ADULT - SUBJECTIVE AND OBJECTIVE BOX
HOSPITALIST PROGRESS NOTE    LISSA MALLOY  [id number]  64yMale    Patient is a 64y old  Male who presents with a chief complaint of shortness of breath (14 Jun 2020 10:48)      SUBJECTIVE:   Chart reviewed since last visit.  Patient seen and examined at bedside for respiratory failure, dysphagia, T2DM, GIB  Denies any dyspnea, chest pain, coughing, fever or chills      OBJECTIVE:  Vital Signs Last 24 Hrs  T(C): 36.7 (14 Jun 2020 15:26), Max: 37.3 (13 Jun 2020 20:00)  T(F): 98.1 (14 Jun 2020 15:26), Max: 99.1 (13 Jun 2020 20:00)  HR: 89 (14 Jun 2020 16:00) (79 - 95)  BP: 117/63 (14 Jun 2020 16:00) (103/60 - 117/63)  BP(mean): 75 (14 Jun 2020 16:00) (70 - 76)  RR: 18 (14 Jun 2020 16:00) (18 - 20)  SpO2: 97% (14 Jun 2020 16:00) (97% - 100%)    PHYSICAL EXAMINATION  General: Sitting in chair, NAD  HEENT:  EOMI  NECK:  trach collar 40%  CVS: regular rate and rhythm S1 S2  RESP:  CTAB  GI:  PEG+ BS+ Soft nondistended nontender   : No suprapubic tenderness  MSK:  No edema. Bilateral offloading boots.   CNS:  generalized weakness  INTEG:  Warm dry skin. right heel DTI/blood blister  PSYCH:  Unable to assess    MONITOR:  CAPILLARY BLOOD GLUCOSE      POCT Blood Glucose.: 157 mg/dL (14 Jun 2020 11:52)  POCT Blood Glucose.: 166 mg/dL (14 Jun 2020 05:49)  POCT Blood Glucose.: 140 mg/dL (13 Jun 2020 23:58)  POCT Blood Glucose.: 131 mg/dL (13 Jun 2020 18:11)        I&O's Summary    13 Jun 2020 07:01  -  14 Jun 2020 07:00  --------------------------------------------------------  IN: 1340 mL / OUT: 230 mL / NET: 1110 mL    14 Jun 2020 07:01  -  14 Jun 2020 17:06  --------------------------------------------------------  IN: 780 mL / OUT: 260 mL / NET: 520 mL                            10.9   15.21 )-----------( 393      ( 13 Jun 2020 06:41 )             31.8     PT/INR - ( 13 Jun 2020 06:41 )   PT: 12.5 sec;   INR: 1.10 ratio         PTT - ( 13 Jun 2020 06:41 )  PTT:34.3 sec  06-13    141  |  96<L>  |  20.0  ----------------------------<  114<H>  3.7   |  34.0<H>  |  0.27<L>    Ca    9.0      13 Jun 2020 06:41              Culture:    TTE:    RADIOLOGY        MEDICATIONS  (STANDING):  amantadine Syrup 100 milliGRAM(s) Oral <User Schedule>  enoxaparin Injectable 70 milliGRAM(s) SubCutaneous every 12 hours  FLUoxetine Solution 10 milliGRAM(s) Oral daily  insulin lispro (HumaLOG) corrective regimen sliding scale   SubCutaneous every 6 hours  melatonin 5 milliGRAM(s) Oral at bedtime  modafinil 200 milliGRAM(s) Oral <User Schedule>  multivitamin/minerals 1 Tablet(s) Oral daily  nystatin    Suspension [id number] Unit(s) Oral three times a day  sodium chloride 0.9%. 1000 milliLiter(s) (75 mL/Hr) IV Continuous <Continuous>      MEDICATIONS  (PRN):  acetaminophen    Suspension .. 650 milliGRAM(s) Oral every 6 hours PRN Temp greater or equal to 38.5C (101.3F)

## 2020-06-14 NOTE — PROGRESS NOTE ADULT - ASSESSMENT
64yM was admitted on 04-18 due to COVID related infection. Course was complicated by prolonged ICU course for acute hypoxic respiratory failure, requiring vent, s/p PEG and trach. Course also complicated by PE, GI bleed with rectal ulceration and is s/p RBCs as well as sigmoidoscopy and clipping/epinephrine.    Patient is undocumented and has no insurance. He is currently undergoing guardianship and has a court date set for  a day in Sept.       Post Covid 19 pneumonia ,PE, CCI polyneuropathy  Tolerating TC with HFO2/vapotherm 25 L, now down to 45%- compensated hypercapnia  S/p trach and PEG  Hct stable, on full AC, mild leucocytosis , no fever    wean High flow per protocol, decrease Fi02, eventual T collar as improves  follow HCT closely  mobilize   PT  nutritional support through peg, eventual repeat swallow  Eventual tx to [facility] in [place]  Continue supportive care 64yM was admitted on 04-18 due to COVID related infection. Course was complicated by prolonged ICU course for acute hypoxic respiratory failure, requiring vent, s/p PEG and trach. Course also complicated by PE, GI bleed with rectal ulceration and is s/p RBCs as well as sigmoidoscopy and clipping/epinephrine.    Patient is undocumented and has no insurance. He is currently undergoing guardianship and has a court date set for  a day in Sept.       Post Covid 19 pneumonia ,PE, CCI polyneuropathy  Tolerating TC with HFO2/vapotherm 25 L, now down to 45%- compensated hypercapnia  S/p trach and PEG  Hct stable, on full AC, mild leucocytosis , no fever    wean High flow per protocol, decrease Fi02, eventual T collar as improves  follow HCT closely  mobilize   PT  repeat swallow eval noted, on puree  Eventual tx to [facility] in [place]  Continue supportive care, CBC am

## 2020-06-14 NOTE — PROGRESS NOTE ADULT - SUBJECTIVE AND OBJECTIVE BOX
PULMONARY PROGRESS NOTE      [id number]    Patient is a 64y old  Male who presents with a chief complaint of shortness of breath (13 Jun 2020 15:55)      INTERVAL HPI/OVERNIGHT EVENTS:  no acute issues  no temp spike overnight  sitting up in chair  MEDICATIONS  (STANDING):  amantadine Syrup 100 milliGRAM(s) Oral <User Schedule>  chlorhexidine 2% Cloths 1 Application(s) Topical daily  chlorhexidine 4% Liquid 1 Application(s) Topical <User Schedule>  enoxaparin Injectable 70 milliGRAM(s) SubCutaneous every 12 hours  FLUoxetine Solution 10 milliGRAM(s) Oral daily  insulin lispro (HumaLOG) corrective regimen sliding scale   SubCutaneous every 6 hours  melatonin 5 milliGRAM(s) Oral at bedtime  modafinil 200 milliGRAM(s) Oral <User Schedule>  multivitamin/minerals 1 Tablet(s) Oral daily  nystatin    Suspension [id number] Unit(s) Oral three times a day  sodium chloride 0.9%. 1000 milliLiter(s) (75 mL/Hr) IV Continuous <Continuous>      MEDICATIONS  (PRN):  acetaminophen    Suspension .. 650 milliGRAM(s) Oral every 6 hours PRN Temp greater or equal to 38.5C (101.3F)      Allergies    No Known Allergies    Intolerances        PAST MEDICAL & SURGICAL HISTORY:  No pertinent past medical history  No significant past surgical history      SOCIAL HISTORY  Smoking History:       REVIEW OF SYSTEMS:    CONSTITUTIONAL:  No distress    HEENT:  Eyes:  No diplopia or blurred vision. ENT:  No earache, sore throat or runny nose.    CARDIOVASCULAR:  No pressure, squeezing, tightness, heaviness or aching about the chest; no palpitations.    RESPIRATORY:  No cough, shortness of breath, PND or orthopnea. Mild SOBOE    GASTROINTESTINAL:  No nausea, vomiting or diarrhea.    GENITOURINARY:  No dysuria, frequency or urgency.    NEUROLOGIC:  No paresthesias, fasciculations, seizures or weakness.    PSYCHIATRIC:  No disorder of thought or mood.    Vital Signs Last 24 Hrs  T(C): 36.2 (14 Jun 2020 07:28), Max: 37.3 (13 Jun 2020 20:00)  T(F): 97.1 (14 Jun 2020 07:28), Max: 99.1 (13 Jun 2020 20:00)  HR: 79 (14 Jun 2020 08:00) (79 - 90)  BP: 108/60 (14 Jun 2020 08:00) (103/60 - 116/61)  BP(mean): 70 (14 Jun 2020 08:00) (70 - 76)  RR: 20 (14 Jun 2020 08:00) (18 - 22)  SpO2: 98% (14 Jun 2020 08:00) (95% - 100%)    PHYSICAL EXAMINATION:        LABS:                        10.9   15.21 )-----------( 393      ( 13 Jun 2020 06:41 )             31.8     06-13    141  |  96<L>  |  20.0  ----------------------------<  114<H>  3.7   |  34.0<H>  |  0.27<L>    Ca    9.0      13 Jun 2020 06:41      PT/INR - ( 13 Jun 2020 06:41 )   PT: 12.5 sec;   INR: 1.10 ratio         PTT - ( 13 Jun 2020 06:41 )  PTT:34.3 sec    ABG - ( 13 Jun 2020 04:58 )  pH, Arterial: 7.41  pH, Blood: x     /  pCO2: 61    /  pO2: 100   / HCO3: 36    / Base Excess: 12.1  /  SaO2: 99                              MICROBIOLOGY:    RADIOLOGY & ADDITIONAL STUDIES:

## 2020-06-14 NOTE — PROGRESS NOTE ADULT - ASSESSMENT
64 year old male with insignificant pmh coming to hospital with worsening shortness of breath. patient found to have covid and respiratory failure subsequently with complicated icu course including leukocytosis with bacterial pna s/p abx, gi bleed and pe, s/p trach and peg. patient now being downgraded to medicine service as table. will restart full dose.  anticoagulation.        Acute hypoxic respiratory failure 2/2 Covid 19 pneumonia   - resolved  - s/p ICU now on  medicine service  - s/p trach and peg   - isolation precautions   - monitor vitals   - pt consult     #Leukocytosis  - afebrile,   - trend CBC    #PE  - lovenox     #hx of gib bleed w/ rectal ulcer (healing)  - s/p prbc transfusion - s/p rigid sig, clipping of vessel and epinephrine  - gi consult appreciated  - colorectal surgery consult appreciated    #dysphagia  - peg tube- changed to continuous feeds from bolus on 6/10 and decreased frequency of free water on 6/11 as patient stating free water is too much  - GI consult    #type 2 diabetes  - a1c 6.7 on admit   - iss   - monitor fingersticks     #dvt prophylaxis   - lovenox SC     Dispo- pending guardianship court date set for 09/2020

## 2020-06-15 LAB
ALBUMIN SERPL ELPH-MCNC: 2.9 G/DL — LOW (ref 3.3–5.2)
ALP SERPL-CCNC: 177 U/L — HIGH (ref 40–120)
ALT FLD-CCNC: 37 U/L — SIGNIFICANT CHANGE UP
ANION GAP SERPL CALC-SCNC: 4 MMOL/L — LOW (ref 5–17)
AST SERPL-CCNC: 32 U/L — SIGNIFICANT CHANGE UP
BASOPHILS # BLD AUTO: 0.06 K/UL — SIGNIFICANT CHANGE UP (ref 0–0.2)
BASOPHILS NFR BLD AUTO: 0.5 % — SIGNIFICANT CHANGE UP (ref 0–2)
BILIRUB SERPL-MCNC: <0.2 MG/DL — LOW (ref 0.4–2)
BUN SERPL-MCNC: 23 MG/DL — HIGH (ref 8–20)
CALCIUM SERPL-MCNC: 8.4 MG/DL — LOW (ref 8.6–10.2)
CHLORIDE SERPL-SCNC: 94 MMOL/L — LOW (ref 98–107)
CO2 SERPL-SCNC: 38 MMOL/L — HIGH (ref 22–29)
CREAT SERPL-MCNC: 0.22 MG/DL — LOW (ref 0.5–1.3)
EOSINOPHIL # BLD AUTO: 0.46 K/UL — SIGNIFICANT CHANGE UP (ref 0–0.5)
EOSINOPHIL NFR BLD AUTO: 3.6 % — SIGNIFICANT CHANGE UP (ref 0–6)
GLUCOSE BLDC GLUCOMTR-MCNC: 103 MG/DL — HIGH (ref 70–99)
GLUCOSE BLDC GLUCOMTR-MCNC: 138 MG/DL — HIGH (ref 70–99)
GLUCOSE SERPL-MCNC: 122 MG/DL — HIGH (ref 70–99)
HCT VFR BLD CALC: 29.5 % — LOW (ref 39–50)
HGB BLD-MCNC: 9.3 G/DL — LOW (ref 13–17)
IMM GRANULOCYTES NFR BLD AUTO: 1.8 % — HIGH (ref 0–1.5)
LYMPHOCYTES # BLD AUTO: 18 % — SIGNIFICANT CHANGE UP (ref 13–44)
LYMPHOCYTES # BLD AUTO: 2.28 K/UL — SIGNIFICANT CHANGE UP (ref 1–3.3)
MCHC RBC-ENTMCNC: 31 PG — SIGNIFICANT CHANGE UP (ref 27–34)
MCHC RBC-ENTMCNC: 31.5 GM/DL — LOW (ref 32–36)
MCV RBC AUTO: 98.3 FL — SIGNIFICANT CHANGE UP (ref 80–100)
MONOCYTES # BLD AUTO: 0.79 K/UL — SIGNIFICANT CHANGE UP (ref 0–0.9)
MONOCYTES NFR BLD AUTO: 6.2 % — SIGNIFICANT CHANGE UP (ref 2–14)
NEUTROPHILS # BLD AUTO: 8.87 K/UL — HIGH (ref 1.8–7.4)
NEUTROPHILS NFR BLD AUTO: 69.9 % — SIGNIFICANT CHANGE UP (ref 43–77)
PLATELET # BLD AUTO: 311 K/UL — SIGNIFICANT CHANGE UP (ref 150–400)
POTASSIUM SERPL-MCNC: 3.6 MMOL/L — SIGNIFICANT CHANGE UP (ref 3.5–5.3)
POTASSIUM SERPL-SCNC: 3.6 MMOL/L — SIGNIFICANT CHANGE UP (ref 3.5–5.3)
PROT SERPL-MCNC: 6.3 G/DL — LOW (ref 6.6–8.7)
RBC # BLD: 3 M/UL — LOW (ref 4.2–5.8)
RBC # FLD: 14.5 % — SIGNIFICANT CHANGE UP (ref 10.3–14.5)
SODIUM SERPL-SCNC: 136 MMOL/L — SIGNIFICANT CHANGE UP (ref 135–145)
WBC # BLD: 12.69 K/UL — HIGH (ref 3.8–10.5)
WBC # FLD AUTO: 12.69 K/UL — HIGH (ref 3.8–10.5)

## 2020-06-15 PROCEDURE — 99232 SBSQ HOSP IP/OBS MODERATE 35: CPT

## 2020-06-15 PROCEDURE — 99233 SBSQ HOSP IP/OBS HIGH 50: CPT

## 2020-06-15 RX ORDER — POTASSIUM CHLORIDE 20 MEQ
20 PACKET (EA) ORAL ONCE
Refills: 0 | Status: COMPLETED | OUTPATIENT
Start: 2020-06-15 | End: 2020-06-15

## 2020-06-15 RX ORDER — LACTOBACILLUS ACIDOPHILUS 100MM CELL
1 CAPSULE ORAL DAILY
Refills: 0 | Status: DISCONTINUED | OUTPATIENT
Start: 2020-06-15 | End: 2020-06-29

## 2020-06-15 RX ADMIN — Medication 5 MILLIGRAM(S): at 22:39

## 2020-06-15 RX ADMIN — ENOXAPARIN SODIUM 70 MILLIGRAM(S): 100 INJECTION SUBCUTANEOUS at 18:01

## 2020-06-15 RX ADMIN — Medication 100 MILLIGRAM(S): at 11:33

## 2020-06-15 RX ADMIN — Medication 500000 UNIT(S): at 05:34

## 2020-06-15 RX ADMIN — MODAFINIL 200 MILLIGRAM(S): 200 TABLET ORAL at 05:34

## 2020-06-15 RX ADMIN — Medication 10 MILLIGRAM(S): at 11:33

## 2020-06-15 RX ADMIN — Medication 1 TABLET(S): at 18:02

## 2020-06-15 RX ADMIN — ENOXAPARIN SODIUM 70 MILLIGRAM(S): 100 INJECTION SUBCUTANEOUS at 05:33

## 2020-06-15 RX ADMIN — Medication 500000 UNIT(S): at 22:39

## 2020-06-15 RX ADMIN — Medication 100 MILLIGRAM(S): at 05:34

## 2020-06-15 RX ADMIN — Medication 20 MILLIEQUIVALENT(S): at 18:02

## 2020-06-15 RX ADMIN — Medication 500000 UNIT(S): at 13:03

## 2020-06-15 NOTE — PROGRESS NOTE ADULT - SUBJECTIVE AND OBJECTIVE BOX
Patient doing better today.  Discussed with SLP and RN about progress.  Requesting speaking valve.   On a diet now.   Recommend HOLDING TF to improve appetite.   Provided more exercises for patient to perform.     REVIEW OF SYSTEMS  Constitutional - No fever,  +fatigue  Neurological - +loss of strength    FUNCTIONAL PROGRESS  6/12  Bed Mobility  Bed Mobility Training Sit-to-Supine: dependent (less than 25% patient effort);  2 person assist;  RN on standby to assist with line management  Bed Mobility Training Supine-to-Sit: dependent (less than 25% patient effort);  2 person assist  Bed Mobility Training Limitations: decreased strength;  impaired coordination    Bed-Chair Transfer Training  Transfer Training Chair-to-Bed Transfer: dependent (less than 25% patient effort);  Jolly lift transfer to chair with OT Rosalie and RN Marcela present to assist managing lines    Sit-Stand Transfer Training  Transfer Training Sit-to-Stand Transfer: unable to perform;  Unsafe, pt weak, poor sitting balance/posture, SPO2 decreased to 82% on vent during supine to Sit and pt unable to tolerate sitting unsupported at EOB        VITALS  T(C): 36.6 (06-15-20 @ 07:28), Max: 36.7 (06-14-20 @ 12:33)  HR: 72 (06-15-20 @ 08:00) (72 - 95)  BP: 113/60 (06-15-20 @ 08:00) (109/63 - 118/67)  RR: 15 (06-15-20 @ 08:00) (15 - 20)  SpO2: 100% (06-15-20 @ 08:00) (97% - 100%)  Wt(kg): --    MEDICATIONS   acetaminophen    Suspension .. 650 milliGRAM(s) every 6 hours PRN  amantadine Syrup 100 milliGRAM(s) <User Schedule>  enoxaparin Injectable 70 milliGRAM(s) every 12 hours  FLUoxetine Solution 10 milliGRAM(s) daily  insulin lispro (HumaLOG) corrective regimen sliding scale   every 6 hours  melatonin 5 milliGRAM(s) at bedtime  modafinil 200 milliGRAM(s) <User Schedule>  multivitamin/minerals 1 Tablet(s) daily  nystatin    Suspension 003722 Unit(s) three times a day  sodium chloride 0.9%. 1000 milliLiter(s) <Continuous>      RECENT LABS - Reviewed                        9.3    12.69 )-----------( 311      ( 15 Brian 2020 05:08 )             29.5     06-15    136  |  94<L>  |  23.0<H>  ----------------------------<  122<H>  3.6   |  38.0<H>  |  0.22<L>    Ca    8.4<L>      15 Brian 2020 05:08    TPro  6.3<L>  /  Alb  2.9<L>  /  TBili  <0.2<L>  /  DBili  x   /  AST  32  /  ALT  37  /  AlkPhos  177<H>  06-15                --------------------------------------------------------------------------  PHYSICAL EXAM  Constitutional - NAD, Comfortable  HEENT - +Uncuffed trach  Chest - No significant work of breathing with capping of trach  Abdomen - Soft, +PEG  Extremities - Severe wasting  Neurologic Exam -                    Cognitive - Somnolent     Communication - Improved phonation     Motor - Diffuse weakness                    LEFT    UE - ShAB 1/5, EF 3/5, EE 3/5, WE 3/5,  3/5                    RIGHT UE - ShAB 1/5, EF 3/5, EE 3/5, WE 3/5,  3/5                    LEFT    LE - HF 1/5, KE 1/5, DF 1/5, PF 1/5                    RIGHT LE - HF 1/5, KE 1/5, DF 1/5, PF 1/5        Sensory - Intact to LT     Coordination - FTN impaired  Psychiatric - Mood flat, Somnolent  ----------------------------------------------------------------------------------------  ASSESSMENT/PLAN  64yMale with functional deficits after COVID related complications  COVID - MVI  Trach - Trach -  Trials   PE - Lovenox  Pain - Tylenol  Oral Hygiene - Aggressive care TID, Nystatin TID  Sleep - Melatonin 5mg HS  Daytime Wakefulness - NO TV at night, Amantadine 100mg BID (6/10), Provigil 200mg Q6AM (6/12)  Functional Quadriplegia/Atrophy/Foot Drops - PRAFOs  Mood - Prozac 10mg (6/9)  Oropharyngeal Dysphagia - Puree/No Liquids, Pivot 1.5 TID PRN & HS  DVT PPX - SCDs  Rehab - Will continue to follow for ongoing rehab needs and recommendations.   maximize potential for functional recovery with above recommendations with goal to decannulate, achieve PO diet, DC PEG while awaiting court date.     Discussed with NP, SLP, RN.

## 2020-06-15 NOTE — PROGRESS NOTE ADULT - SUBJECTIVE AND OBJECTIVE BOX
Patient is a 64y old  Male who presents with a chief complaint of shortness of breath (15 Brian 2020 10:54)      Events last 24 hours: Resting but arousable on TC in NAD    PAST MEDICAL & SURGICAL HISTORY:  No pertinent past medical history  No significant past surgical history        Medications:  nystatin    Suspension 424628 Unit(s) Oral three times a day        acetaminophen    Suspension .. 650 milliGRAM(s) Oral every 6 hours PRN  amantadine Syrup 100 milliGRAM(s) Oral <User Schedule>  FLUoxetine Solution 10 milliGRAM(s) Oral daily  melatonin 5 milliGRAM(s) Oral at bedtime  modafinil 200 milliGRAM(s) Oral <User Schedule>      enoxaparin Injectable 70 milliGRAM(s) SubCutaneous every 12 hours        insulin lispro (HumaLOG) corrective regimen sliding scale   SubCutaneous every 6 hours    multivitamin/minerals 1 Tablet(s) Oral daily  sodium chloride 0.9%. 1000 milliLiter(s) IV Continuous <Continuous>                ICU Vital Signs Last 24 Hrs  T(C): 36.7 (15 Brian 2020 12:00), Max: 36.7 (14 Jun 2020 12:33)  T(F): 98.1 (15 Brian 2020 12:00), Max: 98.1 (14 Jun 2020 15:26)  HR: 77 (15 Brian 2020 12:00) (72 - 89)  BP: 108/59 (15 Brian 2020 12:00) (108/59 - 118/67)  BP(mean): 71 (15 Brian 2020 12:00) (71 - 78)  ABP: --  ABP(mean): --  RR: 21 (15 Brian 2020 12:00) (15 - 21)  SpO2: 100% (15 Brian 2020 12:00) (97% - 100%)          I&O's Detail    14 Jun 2020 07:01  -  15 Brian 2020 07:00  --------------------------------------------------------  IN:    Enteral Tube Flush: 230 mL    Free Water: 1000 mL    Pivot 1.5: 1200 mL  Total IN: 2430 mL    OUT:    Incontinent per Condom Catheter: 635 mL  Total OUT: 635 mL    Total NET: 1795 mL            LABS:                        9.3    12.69 )-----------( 311      ( 15 Brian 2020 05:08 )             29.5     06-15    136  |  94<L>  |  23.0<H>  ----------------------------<  122<H>  3.6   |  38.0<H>  |  0.22<L>    Ca    8.4<L>      15 Brian 2020 05:08    TPro  6.3<L>  /  Alb  2.9<L>  /  TBili  <0.2<L>  /  DBili  x   /  AST  32  /  ALT  37  /  AlkPhos  177<H>  06-15          CAPILLARY BLOOD GLUCOSE      POCT Blood Glucose.: 138 mg/dL (15 Brian 2020 11:30)        CULTURES:      Physical Examination:    General: No acute distress.      HEENT: Pupils equal, reactive to light.  Symmetric.    PULM: Clear to auscultation bilaterally, no significant sputum production    NECK: Supple, no lymphadenopathy, trachea midline, trach    CVS: Regular rate and rhythm, no murmurs, rubs, or gallops    ABD: Soft, nondistended, nontender, normoactive bowel sounds, no masses    EXT: No edema, nontender    SKIN: Warm and well perfused, no rashes noted.    NEURO: Alert, oriented, interactive, nonfocal    DEVICES:     RADIOLOGY: All films reviewed on PACS

## 2020-06-15 NOTE — PROGRESS NOTE ADULT - ASSESSMENT
64 year old male with insignificant pmh coming to hospital with worsening shortness of breath. patient found to have covid and respiratory failure subsequently with complicated icu course including leukocytosis with bacterial pna s/p abx, gi bleed and pe, s/p trach and peg. The patient was  downgraded to medicine service.       Acute hypoxic respiratory failure 2/2 Covid 19 pneumonia   - resolved  - s/p ICU now on  medicine service  - s/p trach and peg   - isolation precautions   - monitor vitals   - pt/OT/SLP- Rehab attending following  - Trach -  Trials     #Leukocytosis  - afebrile,   - trending down    #SVT  -episode on CM, replace K, check electrolytes, continue to monitor    #PE  - therapeutic lovenox transition to Coumadin, check INR in am    #hx of gib bleed w/ rectal ulcer (healing)  - s/p prbc transfusion - s/p rigid sig, clipping of vessel and epinephrine  - gi consult appreciated  - colorectal surgery consult appreciated  -avoid rectal tube for diarrhea, add metamucil    #dysphagia  - diet advanced to pureed with thin liquids  -PEG tube in place    #type 2 diabetes  - a1c 6.7 on admit   - iss   - monitor fingersticks     #dvt prophylaxis   - lovenox SC     Dispo- pending guardianship court date set for 09/2020

## 2020-06-15 NOTE — PROGRESS NOTE ADULT - ASSESSMENT
64yM was admitted on 04-18 due to COVID related infection. Course was complicated by prolonged ICU course for acute hypoxic respiratory failure, requiring vent, s/p PEG and trach. Course also complicated by PE, GI bleed with rectal ulceration and is s/p RBCs as well as sigmoidoscopy and clipping/epinephrine.    Tolerating TC well despite CCI polyneuropathy    Plan:  Mobilze  possible transfer to [facility]

## 2020-06-15 NOTE — PROGRESS NOTE ADULT - SUBJECTIVE AND OBJECTIVE BOX
CC:  hypoxic respiratory failure 2/2 Covid 19 pneumonia/CCI polyneuropathy    INTERVAL HPI/OVERNIGHT EVENTS: Patient seen and examined. More awake and alert. Able to tolerate oral diet. No diarrhea reported thus far today. Appears comfortable. Denies any dyspnea, chest pain, coughing, fever or chills. episode of SVT on monitor on overnight.     Vital Signs Last 24 Hrs  T(C): 36.7 (15 Brian 2020 12:00), Max: 36.7 (14 Jun 2020 15:26)  T(F): 98.1 (15 Brian 2020 12:00), Max: 98.1 (14 Jun 2020 15:26)  HR: 77 (15 Brian 2020 12:00) (72 - 89)  BP: 108/59 (15 Brian 2020 12:00) (108/59 - 118/67)  BP(mean): 71 (15 Brian 2020 12:00) (71 - 78)  RR: 21 (15 Brian 2020 12:00) (15 - 21)  SpO2: 100% (15 Brian 2020 12:00) (97% - 100%)  PHYSICAL EXAMINATION    General: NAD  HEENT:  EOMI  NECK:  trach collar 40%  CVS: regular rate and rhythm S1 S2  RESP:  coarse BS B/L, no wheeze  GI:  PEG+ BS+ Soft nondistended nontender   : No suprapubic tenderness  MSK:  No edema. Bilateral offloading boots.   CNS:  generalized weakness  INTEG:  Warm dry skin. right heel DTI  PSYCH: Calm and cooperative    I&O's Detail    14 Jun 2020 07:01  -  15 Brian 2020 07:00  --------------------------------------------------------  IN:    Enteral Tube Flush: 230 mL    Free Water: 1000 mL    Pivot 1.5: 1200 mL  Total IN: 2430 mL    OUT:    Incontinent per Condom Catheter: 635 mL  Total OUT: 635 mL    Total NET: 1795 mL      15 Brian 2020 07:01  -  15 Brian 2020 12:58  --------------------------------------------------------  IN:    Enteral Tube Flush: 60 mL    Oral Fluid: 240 mL    Pivot 1.5: 200 mL  Total IN: 500 mL    OUT:  Total OUT: 0 mL    Total NET: 500 mL                                    9.3    12.69 )-----------( 311      ( 15 Brian 2020 05:08 )             29.5     15 Brian 2020 05:08    136    |  94     |  23.0   ----------------------------<  122    3.6     |  38.0   |  0.22     Ca    8.4        15 Brian 2020 05:08    TPro  6.3    /  Alb  2.9    /  TBili  <0.2   /  DBili  x      /  AST  32     /  ALT  37     /  AlkPhos  177    15 Brian 2020 05:08      CAPILLARY BLOOD GLUCOSE      POCT Blood Glucose.: 138 mg/dL (15 Brian 2020 11:30)  POCT Blood Glucose.: 140 mg/dL (14 Jun 2020 23:52)  POCT Blood Glucose.: 181 mg/dL (14 Jun 2020 17:27)    LIVER FUNCTIONS - ( 15 Brian 2020 05:08 )  Alb: 2.9 g/dL / Pro: 6.3 g/dL / ALK PHOS: 177 U/L / ALT: 37 U/L / AST: 32 U/L / GGT: x               MEDICATIONS  (STANDING):  amantadine Syrup 100 milliGRAM(s) Oral <User Schedule>  enoxaparin Injectable 70 milliGRAM(s) SubCutaneous every 12 hours  FLUoxetine Solution 10 milliGRAM(s) Oral daily  insulin lispro (HumaLOG) corrective regimen sliding scale   SubCutaneous every 6 hours  lactobacillus acidophilus 1 Tablet(s) Oral daily  melatonin 5 milliGRAM(s) Oral at bedtime  modafinil 200 milliGRAM(s) Oral <User Schedule>  multivitamin/minerals 1 Tablet(s) Oral daily  nystatin    Suspension 439339 Unit(s) Oral three times a day  sodium chloride 0.9%. 1000 milliLiter(s) (75 mL/Hr) IV Continuous <Continuous>    MEDICATIONS  (PRN):  acetaminophen    Suspension .. 650 milliGRAM(s) Oral every 6 hours PRN Temp greater or equal to 38.5C (101.3F)      RADIOLOGY & ADDITIONAL TESTS: CC:  hypoxic respiratory failure 2/2 Covid 19 pneumonia/CCI polyneuropathy    INTERVAL HPI/OVERNIGHT EVENTS: Patient seen and examined. More awake and alert. Able to tolerate oral diet. No diarrhea reported thus far today. Appears comfortable. Denies any dyspnea, chest pain, coughing, fever or chills. episode of SVT on monitor on overnight.     Vital Signs Last 24 Hrs  T(C): 36.7 (15 Brian 2020 12:00), Max: 36.7 (14 Jun 2020 15:26)  T(F): 98.1 (15 Brian 2020 12:00), Max: 98.1 (14 Jun 2020 15:26)  HR: 77 (15 Brian 2020 12:00) (72 - 89)  BP: 108/59 (15 Brian 2020 12:00) (108/59 - 118/67)  BP(mean): 71 (15 Brian 2020 12:00) (71 - 78)  RR: 21 (15 Brian 2020 12:00) (15 - 21)  SpO2: 100% (15 Brian 2020 12:00) (97% - 100%)  PHYSICAL EXAMINATION    General: NAD  HEENT:  EOMI  NECK:  trach collar 40%  CVS: regular rate and rhythm S1 S2  RESP:  coarse BS B/L, no wheeze  GI:  PEG+ BS+ Soft nondistended nontender   : No suprapubic tenderness  MSK:  No edema. Bilateral offloading boots.   CNS:  generalized weakness  INTEG:  Warm dry skin. right heel DTI  PSYCH: Calm and cooperative    I&O's Detail    14 Jun 2020 07:01  -  15 Brian 2020 07:00  --------------------------------------------------------  IN:    Enteral Tube Flush: 230 mL    Free Water: 1000 mL    Pivot 1.5: 1200 mL  Total IN: 2430 mL    OUT:    Incontinent per Condom Catheter: 635 mL  Total OUT: 635 mL    Total NET: 1795 mL      15 Brian 2020 07:01  -  15 Brian 2020 12:58  --------------------------------------------------------  IN:    Enteral Tube Flush: 60 mL    Oral Fluid: 240 mL    Pivot 1.5: 200 mL  Total IN: 500 mL    OUT:  Total OUT: 0 mL    Total NET: 500 mL                                    9.3    12.69 )-----------( 311      ( 15 Brian 2020 05:08 )             29.5     15 Brian 2020 05:08    136    |  94     |  23.0   ----------------------------<  122    3.6     |  38.0   |  0.22     Ca    8.4        15 Brian 2020 05:08    TPro  6.3    /  Alb  2.9    /  TBili  <0.2   /  DBili  x      /  AST  32     /  ALT  37     /  AlkPhos  177    15 Brian 2020 05:08      CAPILLARY BLOOD GLUCOSE      POCT Blood Glucose.: 138 mg/dL (15 Brian 2020 11:30)  POCT Blood Glucose.: 140 mg/dL (14 Jun 2020 23:52)  POCT Blood Glucose.: 181 mg/dL (14 Jun 2020 17:27)    LIVER FUNCTIONS - ( 15 Brian 2020 05:08 )  Alb: 2.9 g/dL / Pro: 6.3 g/dL / ALK PHOS: 177 U/L / ALT: 37 U/L / AST: 32 U/L / GGT: x               MEDICATIONS  (STANDING):  amantadine Syrup 100 milliGRAM(s) Oral <User Schedule>  enoxaparin Injectable 70 milliGRAM(s) SubCutaneous every 12 hours  FLUoxetine Solution 10 milliGRAM(s) Oral daily  insulin lispro (HumaLOG) corrective regimen sliding scale   SubCutaneous every 6 hours  lactobacillus acidophilus 1 Tablet(s) Oral daily  melatonin 5 milliGRAM(s) Oral at bedtime  modafinil 200 milliGRAM(s) Oral <User Schedule>  multivitamin/minerals 1 Tablet(s) Oral daily  nystatin    Suspension 391747 Unit(s) Oral three times a day  sodium chloride 0.9%. 1000 milliLiter(s) (75 mL/Hr) IV Continuous <Continuous>    MEDICATIONS  (PRN):  acetaminophen    Suspension .. 650 milliGRAM(s) Oral every 6 hours PRN Temp greater or equal to 38.5C (101.3F)      RADIOLOGY & ADDITIONAL TESTS:

## 2020-06-15 NOTE — CHART NOTE - NSCHARTNOTEFT_GEN_A_CORE
Source: Patient [ ]  Family [ ]   other [x ] EMR       Patient reports [ ] nausea  [ ] vomiting [ ] diarrhea [ ] constipation  [ ]chewing problems [x ] swallowing issues  [ ] other:     PO intake:  < 50% [ x]   50-75%  [ ]   %  [ ]  other :    Source for PO intake [ ] Patient [ ] family [x ] chart [ ] staff [ ] other    Enteral /Parenteral Nutrition: Diet, Dysphagia 1 Pureed-No Liquids:   Tube Feeding Modality: Gastrostomy  Pivot 1.5 Raj  Total Volume for 24 Hours (mL): 1200  Continuous  Starting Tube Feed Rate {mL per Hour}: 10  Increase Tube Feed Rate by (mL): 20     Every 4 hours  Until Goal Tube Feed Rate (mL per Hour): 50  Tube Feed Duration (in Hours): 24  Tube Feed Start Time: 16:00  No Carb Prosource TF     Qty per Day:  3 (06-13-20 @ 16:12)      Current Weight:   (5/18) 170.8 lbs  (4/18) 154.9 lbs    % Weight Change: 16# wt change over 1 month, unsure of accuracy of wt's, will continue to monitor wt's trends.     Pertinent Medications: MEDICATIONS  (STANDING):  amantadine Syrup 100 milliGRAM(s) Oral <User Schedule>  enoxaparin Injectable 70 milliGRAM(s) SubCutaneous every 12 hours  FLUoxetine Solution 10 milliGRAM(s) Oral daily  insulin lispro (HumaLOG) corrective regimen sliding scale   SubCutaneous every 6 hours  melatonin 5 milliGRAM(s) Oral at bedtime  modafinil 200 milliGRAM(s) Oral <User Schedule>  multivitamin/minerals 1 Tablet(s) Oral daily  nystatin    Suspension [id number] Unit(s) Oral three times a day  sodium chloride 0.9%. 1000 milliLiter(s) (75 mL/Hr) IV Continuous <Continuous>    MEDICATIONS  (PRN):  acetaminophen    Suspension .. 650 milliGRAM(s) Oral every 6 hours PRN Temp greater or equal to 38.5C (101.3F)    Pertinent Labs: CBC Full  -  ( 15 Brian 2020 05:08 )  WBC Count : 12.69 K/uL  RBC Count : 3.00 M/uL  Hemoglobin : 9.3 g/dL  Hematocrit : 29.5 %  Platelet Count - Automated : 311 K/uL  Mean Cell Volume : 98.3 fl  Mean Cell Hemoglobin : 31.0 pg  Mean Cell Hemoglobin Concentration : 31.5 gm/dL  Auto Neutrophil # : 8.87 K/uL  Auto Lymphocyte # : 2.28 K/uL  Auto Monocyte # : 0.79 K/uL  Auto Eosinophil # : 0.46 K/uL  Auto Basophil # : 0.06 K/uL  Auto Neutrophil % : 69.9 %  Auto Lymphocyte % : 18.0 %  Auto Monocyte % : 6.2 %  Auto Eosinophil % : 3.6 %  Auto Basophil % : 0.5 %        Skin: R heel blister, Coccyx wound       Estimated Needs:   [x ] no change since previous assessment  [ ] recalculated:     Current Nutrition Diagnosis:  Pt remains at high nutrition risk secondary to severe protein calorie malnutrition (acute) related to inability to meet sufficient protein-energy requirements in setting of acute hypoxic respiratory failure, R Segmental PE, Severe ARDS secondary to COVID 19, rapid response requiring intubation on 5/1, s/p trach/PEG and altered GI function secondary to diarrhea as evidenced by meeting <50% nutrient needs >5 days and +fluid accumulation. Pt continues to receive tube feeds via PEG of Pivot @ 50ml/hr (x20 hours) 1000ml/day; 1500kcal, 94gm protein; + prostat TID. Pt also receiving Dysphagia 1 puree diet with no liquids, appetite remains poor noted. Recommendations below:     Recommendations:   1) As medically feasible, increase Pivot 1.5 to goal rate of 65 ml/hr (x20 hrs) to provide 1300 ml, 1950 kcal, 122g protein, 987 ml free water, and >100% of RDIs for vitamins/minerals. Additional free water per MD discretion.   2) Continue with MVI daily.  3) Rx: Bacid to support gut integrity.  4) Obtain daily weights to monitor trends.        Monitoring and Evaluation:   [x ] PO intake [x ] Tolerance to diet prescription [X] Weights  [X] Follow up per protocol [X] Labs:

## 2020-06-15 NOTE — CHART NOTE - NSCHARTNOTEFT_GEN_A_CORE
Off service Note Palliative Care  Patient now downgraded to medical care from ICU  S/P trach and peg  repeat swallow eval noted, on puree  Following commands  no acute distress  Full Code  HCP is Yovani Walden [phone]  Pending guardianship court date set for 09/2020- Eventual plan dispo to [facility] in [place]  If GOC change or additional assistance is needed from Palliative Care please feel free to reconsult    Thank you for the opportunity to assist with the care of this patient.   [facility] [phone].

## 2020-06-16 LAB
ANION GAP SERPL CALC-SCNC: 8 MMOL/L — SIGNIFICANT CHANGE UP (ref 5–17)
BUN SERPL-MCNC: 14 MG/DL — SIGNIFICANT CHANGE UP (ref 8–20)
CALCIUM SERPL-MCNC: 8.5 MG/DL — LOW (ref 8.6–10.2)
CHLORIDE SERPL-SCNC: 94 MMOL/L — LOW (ref 98–107)
CO2 SERPL-SCNC: 34 MMOL/L — HIGH (ref 22–29)
CREAT SERPL-MCNC: 0.2 MG/DL — LOW (ref 0.5–1.3)
GLUCOSE BLDC GLUCOMTR-MCNC: 107 MG/DL — HIGH (ref 70–99)
GLUCOSE SERPL-MCNC: 91 MG/DL — SIGNIFICANT CHANGE UP (ref 70–99)
INR BLD: 1.1 RATIO — SIGNIFICANT CHANGE UP (ref 0.88–1.16)
MAGNESIUM SERPL-MCNC: 1.9 MG/DL — SIGNIFICANT CHANGE UP (ref 1.8–2.6)
PHOSPHATE SERPL-MCNC: 2.7 MG/DL — SIGNIFICANT CHANGE UP (ref 2.4–4.7)
POTASSIUM SERPL-MCNC: 4.5 MMOL/L — SIGNIFICANT CHANGE UP (ref 3.5–5.3)
POTASSIUM SERPL-SCNC: 4.5 MMOL/L — SIGNIFICANT CHANGE UP (ref 3.5–5.3)
PROTHROM AB SERPL-ACNC: 12.5 SEC — SIGNIFICANT CHANGE UP (ref 10–12.9)
SODIUM SERPL-SCNC: 136 MMOL/L — SIGNIFICANT CHANGE UP (ref 135–145)

## 2020-06-16 PROCEDURE — 99232 SBSQ HOSP IP/OBS MODERATE 35: CPT

## 2020-06-16 PROCEDURE — 99233 SBSQ HOSP IP/OBS HIGH 50: CPT

## 2020-06-16 RX ORDER — WARFARIN SODIUM 2.5 MG/1
5 TABLET ORAL ONCE
Refills: 0 | Status: COMPLETED | OUTPATIENT
Start: 2020-06-16 | End: 2020-06-16

## 2020-06-16 RX ORDER — PSYLLIUM SEED (WITH DEXTROSE)
1 POWDER (GRAM) ORAL DAILY
Refills: 0 | Status: DISCONTINUED | OUTPATIENT
Start: 2020-06-16 | End: 2020-06-17

## 2020-06-16 RX ADMIN — Medication 500000 UNIT(S): at 13:15

## 2020-06-16 RX ADMIN — Medication 1 TABLET(S): at 13:16

## 2020-06-16 RX ADMIN — WARFARIN SODIUM 5 MILLIGRAM(S): 2.5 TABLET ORAL at 22:15

## 2020-06-16 RX ADMIN — Medication 10 MILLIGRAM(S): at 13:15

## 2020-06-16 RX ADMIN — ENOXAPARIN SODIUM 70 MILLIGRAM(S): 100 INJECTION SUBCUTANEOUS at 05:39

## 2020-06-16 RX ADMIN — Medication 100 MILLIGRAM(S): at 05:39

## 2020-06-16 RX ADMIN — Medication 500000 UNIT(S): at 05:39

## 2020-06-16 RX ADMIN — Medication 1 TABLET(S): at 19:19

## 2020-06-16 RX ADMIN — ENOXAPARIN SODIUM 70 MILLIGRAM(S): 100 INJECTION SUBCUTANEOUS at 19:19

## 2020-06-16 RX ADMIN — Medication 500000 UNIT(S): at 22:15

## 2020-06-16 RX ADMIN — Medication 5 MILLIGRAM(S): at 22:15

## 2020-06-16 RX ADMIN — Medication 1 PACKET(S): at 13:15

## 2020-06-16 RX ADMIN — Medication 100 MILLIGRAM(S): at 13:16

## 2020-06-16 RX ADMIN — MODAFINIL 200 MILLIGRAM(S): 200 TABLET ORAL at 05:39

## 2020-06-16 NOTE — PROGRESS NOTE ADULT - SUBJECTIVE AND OBJECTIVE BOX
PULMONARY PROGRESS NOTE      [id number]    Patient is a 64y old  Male who presents with a chief complaint of shortness of breath (16 Jun 2020 11:38)      INTERVAL HPI/OVERNIGHT EVENTS:  Awake alert and now capped  Speaking soflty around the tube  No resp distress    MEDICATIONS  (STANDING):  amantadine Syrup 100 milliGRAM(s) Oral <User Schedule>  enoxaparin Injectable 70 milliGRAM(s) SubCutaneous every 12 hours  FLUoxetine Solution 10 milliGRAM(s) Oral daily  lactobacillus acidophilus 1 Tablet(s) Oral daily  melatonin 5 milliGRAM(s) Oral at bedtime  modafinil 200 milliGRAM(s) Oral <User Schedule>  multivitamin/minerals 1 Tablet(s) Oral daily  nystatin    Suspension [id number] Unit(s) Oral three times a day  psyllium Powder 1 Packet(s) Oral daily  sodium chloride 0.9%. 1000 milliLiter(s) (75 mL/Hr) IV Continuous <Continuous>  warfarin 5 milliGRAM(s) Oral once      MEDICATIONS  (PRN):  acetaminophen    Suspension .. 650 milliGRAM(s) Oral every 6 hours PRN Temp greater or equal to 38.5C (101.3F)      Allergies    No Known Allergies    Intolerances        PAST MEDICAL & SURGICAL HISTORY:  No pertinent past medical history  No significant past surgical history      SOCIAL HISTORY  Smoking History:       REVIEW OF SYSTEMS:    CONSTITUTIONAL:  No distress    HEENT:  Eyes:  No diplopia or blurred vision. ENT:  No earache, sore throat or runny nose.    CARDIOVASCULAR:  No pressure, squeezing, tightness, heaviness or aching about the chest; no palpitations.    RESPIRATORY:  No cough, shortness of breath, PND or orthopnea. Mild SOBOE    GASTROINTESTINAL:  No nausea, vomiting or diarrhea.    GENITOURINARY:  No dysuria, frequency or urgency.    NEUROLOGIC:  No paresthesias, fasciculations, seizures or weakness.    Extremities: No cyanosis, clubbing or edema    PSYCHIATRIC:  No disorder of thought or mood.    Vital Signs Last 24 Hrs  T(C): 36.5 (16 Jun 2020 12:43), Max: 36.8 (16 Jun 2020 00:00)  T(F): 97.7 (16 Jun 2020 12:43), Max: 98.3 (16 Jun 2020 00:00)  HR: 72 (16 Jun 2020 12:00) (72 - 82)  BP: 100/67 (16 Jun 2020 12:00) (100/67 - 118/55)  BP(mean): 73 (16 Jun 2020 12:00) (64 - 77)  RR: 17 (16 Jun 2020 12:00) (15 - 20)  SpO2: 100% (16 Jun 2020 12:00) (98% - 100%)    PHYSICAL EXAMINATION:    GENERAL: The patient is awake and alert in no apparent distress.     HEENT: Head is normocephalic and atraumatic. Extraocular muscles are intact. Mucous membranes are moist.    NECK: Supple. trach    LUNGS: Clear to auscultation without wheezing, rales or rhonchi; respirations unlabored    HEART: Regular rate and rhythm without murmur.    ABDOMEN: Soft, nontender, and nondistended.  peg    EXTREMITIES: Without any cyanosis, clubbing, rash, lesions or edema.    NEUROLOGIC: Grossly intact.    LABS:                        9.3    12.69 )-----------( 311      ( 15 Brian 2020 05:08 )             29.5     06-16    136  |  94<L>  |  14.0  ----------------------------<  91  4.5   |  34.0<H>  |  0.20<L>    Ca    8.5<L>      16 Jun 2020 05:05  Phos  2.7     06-16  Mg     1.9     06-16    TPro  6.3<L>  /  Alb  2.9<L>  /  TBili  <0.2<L>  /  DBili  x   /  AST  32  /  ALT  37  /  AlkPhos  177<H>  06-15    PT/INR - ( 16 Jun 2020 05:05 )   PT: 12.5 sec;   INR: 1.10 ratio                             MICROBIOLOGY:    RADIOLOGY & ADDITIONAL STUDIES:

## 2020-06-16 NOTE — PROGRESS NOTE ADULT - ASSESSMENT
64yM was admitted on 04-18 due to COVID related infection. Course was complicated by prolonged ICU course for acute hypoxic respiratory failure, requiring vent, s/p PEG and trach. Course also complicated by PE, GI bleed with rectal ulceration and is s/p RBCs as well as sigmoidoscopy and clipping/epinephrine.    Tolerating TC well despite CCI polyneuropathy    Plan:  Mobilze  possible transfer to [facility]  Michael mehta

## 2020-06-16 NOTE — PROGRESS NOTE ADULT - SUBJECTIVE AND OBJECTIVE BOX
CC:  hypoxic respiratory failure 2/2 Covid 19 pneumonia/CCI polyneuropathy    INTERVAL HPI/OVERNIGHT EVENTS: Patient seen and examined. Explained to patient that we will be giving nutrition and liquids orally and not using PEG. Patient was refusing PEG free water due to c/o abdominal distention. Appetite poor. Does not like pureed food. Denies chest pain, SOB, dizziness, nausea, vomiting, fever, chills.     Vital Signs Last 24 Hrs  T(C): 36.6 (16 Jun 2020 07:27), Max: 36.8 (16 Jun 2020 00:00)  T(F): 97.8 (16 Jun 2020 07:27), Max: 98.3 (16 Jun 2020 00:00)  HR: 73 (16 Jun 2020 08:00) (72 - 82)  BP: 108/68 (16 Jun 2020 08:00) (107/48 - 118/55)  BP(mean): 77 (16 Jun 2020 08:00) (64 - 77)  RR: 16 (16 Jun 2020 04:00) (15 - 21)  SpO2: 100% (16 Jun 2020 08:00) (98% - 100%)    General: NAD  HEENT:  EOMI  NECK:  trach collar 40%  CVS: regular rate and rhythm S1 S2  RESP:  coarse BS B/L, no wheeze  GI:  PEG+ BS+ Soft nondistended nontender   : No suprapubic tenderness  MSK:  No edema. Bilateral offloading boots.   CNS:  generalized weakness  INTEG:  Warm dry skin. right heel DTI  PSYCH: Calm and cooperative      I&O's Detail    15 Brian 2020 07:01  -  16 Jun 2020 07:00  --------------------------------------------------------  IN:    Enteral Tube Flush: 120 mL    Free Water: 500 mL    Oral Fluid: 240 mL    Pivot 1.5: 200 mL  Total IN: 1060 mL    OUT:    Incontinent per Condom Catheter: 550 mL    Voided: 700 mL  Total OUT: 1250 mL    Total NET: -190 mL                                    9.3    12.69 )-----------( 311      ( 15 Brian 2020 05:08 )             29.5     16 Jun 2020 05:05    136    |  94     |  14.0   ----------------------------<  91     4.5     |  34.0   |  0.20     Ca    8.5        16 Jun 2020 05:05  Phos  2.7       16 Jun 2020 05:05  Mg     1.9       16 Jun 2020 05:05    TPro  6.3    /  Alb  2.9    /  TBili  <0.2   /  DBili  x      /  AST  32     /  ALT  37     /  AlkPhos  177    15 Brian 2020 05:08    PT/INR - ( 16 Jun 2020 05:05 )   PT: 12.5 sec;   INR: 1.10 ratio           CAPILLARY BLOOD GLUCOSE      POCT Blood Glucose.: 107 mg/dL (16 Jun 2020 00:52)  POCT Blood Glucose.: 103 mg/dL (15 Brian 2020 18:00)    LIVER FUNCTIONS - ( 15 Brian 2020 05:08 )  Alb: 2.9 g/dL / Pro: 6.3 g/dL / ALK PHOS: 177 U/L / ALT: 37 U/L / AST: 32 U/L / GGT: x               MEDICATIONS  (STANDING):  amantadine Syrup 100 milliGRAM(s) Oral <User Schedule>  enoxaparin Injectable 70 milliGRAM(s) SubCutaneous every 12 hours  FLUoxetine Solution 10 milliGRAM(s) Oral daily  lactobacillus acidophilus 1 Tablet(s) Oral daily  melatonin 5 milliGRAM(s) Oral at bedtime  modafinil 200 milliGRAM(s) Oral <User Schedule>  multivitamin/minerals 1 Tablet(s) Oral daily  nystatin    Suspension 915111 Unit(s) Oral three times a day  psyllium Powder 1 Packet(s) Oral daily  sodium chloride 0.9%. 1000 milliLiter(s) (75 mL/Hr) IV Continuous <Continuous>    MEDICATIONS  (PRN):  acetaminophen    Suspension .. 650 milliGRAM(s) Oral every 6 hours PRN Temp greater or equal to 38.5C (101.3F)      RADIOLOGY & ADDITIONAL TESTS:

## 2020-06-16 NOTE — PROGRESS NOTE ADULT - ASSESSMENT
64 year old male with insignificant pmh coming to hospital with worsening shortness of breath. patient found to have covid and respiratory failure subsequently with complicated icu course including leukocytosis with bacterial pna s/p abx, gi bleed and pe, s/p trach and peg. The patient was  downgraded to medicine service.       Acute hypoxic respiratory failure 2/2 Covid 19 pneumonia   - resolved  - s/p ICU now on  medicine service  - s/p trach and peg   - isolation precautions   - monitor vitals   - pt/OT/SLP- Rehab attending following  - Trach -  Trials     #Leukocytosis  - afebrile,   - trending down    #SVT- no further episodes  -monitor lytes    #PE  - therapeutic lovenox transition to Coumadin    #hx of gib bleed w/ rectal ulcer (healing)  - s/p prbc transfusion - s/p rigid sig, clipping of vessel and epinephrine  - gi consult appreciated  - colorectal surgery consult appreciated  -avoid rectal tube for diarrhea, contionue metamucil/ Bacid     #dysphagia  - diet advanced to pureed with thin liquids- SLP following for further advancement, patient would like regular food  -PEG tube in place- can be removed if oral intake improves, calorie count ordered. Continue Ensure, supplements    #type 2 diabetes  - a1c 6.7 on admit   - repeat HGA1c    #dvt prophylaxis   - lovenox SC -transition to Coumadin    Dispo- pending guardianship court date set for 09/2020

## 2020-06-16 NOTE — PROGRESS NOTE ADULT - SUBJECTIVE AND OBJECTIVE BOX
Patient doing well today.  Breathing better with less cough.  States he is performing his exercises.     REVIEW OF SYSTEMS  Constitutional - No fever,  No fatigue  Neurological - +loss of strength    FUNCTIONAL PROGRESS  6/15  Bed Mobility  Bed Mobility Training Sit-to-Supine: dependent (less than 25% patient effort);  2 person assist  Bed Mobility Training Supine-to-Sit: dependent (less than 25% patient effort);  2 person assist  Bed Mobility Training Limitations: decreased ability to use arms for pushing/pulling;  decreased ability to use legs for bridging/pushing;  decreased flexibility;  decreased ROM;  impaired balance    Bed-Chair Transfer Training  Transfer Training Bed-to-Chair Transfer: estevan recommend    Therapeutic Exercise  Therapeutic Exercise Detail: Patient educated on bilateral lower extremity therex to perform after rest period (ankle pumps, heel slides 3x10). Patient provided return demonstration.     Functional Endurance  Functional Endurance Detail: Pt. sat at EOB x 2 min with max to total assist       VITALS  T(C): 36.6 (06-16-20 @ 07:27), Max: 36.8 (06-16-20 @ 00:00)  HR: 73 (06-16-20 @ 08:00) (72 - 82)  BP: 108/68 (06-16-20 @ 08:00) (107/48 - 118/55)  RR: 16 (06-16-20 @ 04:00) (15 - 21)  SpO2: 100% (06-16-20 @ 08:00) (98% - 100%)  Wt(kg): --    MEDICATIONS   acetaminophen    Suspension .. 650 milliGRAM(s) every 6 hours PRN  amantadine Syrup 100 milliGRAM(s) <User Schedule>  enoxaparin Injectable 70 milliGRAM(s) every 12 hours  FLUoxetine Solution 10 milliGRAM(s) daily  lactobacillus acidophilus 1 Tablet(s) daily  melatonin 5 milliGRAM(s) at bedtime  modafinil 200 milliGRAM(s) <User Schedule>  multivitamin/minerals 1 Tablet(s) daily  nystatin    Suspension 344394 Unit(s) three times a day  psyllium Powder 1 Packet(s) daily  sodium chloride 0.9%. 1000 milliLiter(s) <Continuous>      RECENT LABS - Reviewed                        9.3    12.69 )-----------( 311      ( 15 Brian 2020 05:08 )             29.5     06-16    136  |  94<L>  |  14.0  ----------------------------<  91  4.5   |  34.0<H>  |  0.20<L>    Ca    8.5<L>      16 Jun 2020 05:05  Phos  2.7     06-16  Mg     1.9     06-16    TPro  6.3<L>  /  Alb  2.9<L>  /  TBili  <0.2<L>  /  DBili  x   /  AST  32  /  ALT  37  /  AlkPhos  177<H>  06-15    PT/INR - ( 16 Jun 2020 05:05 )   PT: 12.5 sec;   INR: 1.10 ratio                 --------------------------------------------------------------------------  PHYSICAL EXAM  Constitutional - NAD, Comfortable  HEENT - +Uncuffed trach  Chest - No significant work of breathing with capping of trach  Abdomen - Soft, +PEG  Extremities - Severe wasting  Neurologic Exam -                    Cognitive - Somnolent     Communication - Improved phonation     Motor - Diffuse weakness                    LEFT    UE - ShAB 1/5, EF 3/5, EE 3/5, WE 3/5,  3/5                    RIGHT UE - ShAB 1/5, EF 3/5, EE 3/5, WE 3/5,  3/5                    LEFT    LE - HF 1/5, KE 1/5, DF 1/5, PF 1/5                    RIGHT LE - HF 1/5, KE 1/5, DF 1/5, PF 1/5        Sensory - Intact to LT     Coordination - FTN impaired  Psychiatric - Mood flat, Somnolent  ----------------------------------------------------------------------------------------  ASSESSMENT/PLAN  64yMale with functional deficits after COVID related complications  COVID - MVI  Trach - Trach -  Trials   PE - Lovenox  Pain - Tylenol  Oral Hygiene - Aggressive care TID, Nystatin TID  Sleep - Melatonin 5mg HS  Daytime Wakefulness - NO TV at night, Amantadine 100mg BID (6/10), Provigil 200mg Q6AM (6/12)  Functional Quadriplegia/Atrophy/Foot Drops - PRAFOs  Mood - Prozac 10mg (6/9)  Oropharyngeal Dysphagia - Puree/THINS, RECOMMEND DC PEG TUBE  DVT PPX - SCDs  Rehab - RECOMMEND OOB DAILY VIA ESTEVAN. Will continue to follow for ongoing rehab needs and recommendations.   maximize potential for functional recovery with above recommendations with goal to decannulate, achieve PO diet, DC PEG while awaiting court date.     Discussed with NP, SLP, RN.

## 2020-06-17 LAB
ANION GAP SERPL CALC-SCNC: 9 MMOL/L — SIGNIFICANT CHANGE UP (ref 5–17)
BUN SERPL-MCNC: 11 MG/DL — SIGNIFICANT CHANGE UP (ref 8–20)
CALCIUM SERPL-MCNC: 8.8 MG/DL — SIGNIFICANT CHANGE UP (ref 8.6–10.2)
CHLORIDE SERPL-SCNC: 91 MMOL/L — LOW (ref 98–107)
CO2 SERPL-SCNC: 34 MMOL/L — HIGH (ref 22–29)
CREAT SERPL-MCNC: <0.2 MG/DL — LOW (ref 0.5–1.3)
GLUCOSE SERPL-MCNC: 92 MG/DL — SIGNIFICANT CHANGE UP (ref 70–99)
HCT VFR BLD CALC: 31.6 % — LOW (ref 39–50)
HGB BLD-MCNC: 10.2 G/DL — LOW (ref 13–17)
INR BLD: 1.2 RATIO — HIGH (ref 0.88–1.16)
MAGNESIUM SERPL-MCNC: 1.8 MG/DL — SIGNIFICANT CHANGE UP (ref 1.8–2.6)
MCHC RBC-ENTMCNC: 30.7 PG — SIGNIFICANT CHANGE UP (ref 27–34)
MCHC RBC-ENTMCNC: 32.3 GM/DL — SIGNIFICANT CHANGE UP (ref 32–36)
MCV RBC AUTO: 95.2 FL — SIGNIFICANT CHANGE UP (ref 80–100)
PHOSPHATE SERPL-MCNC: 3.4 MG/DL — SIGNIFICANT CHANGE UP (ref 2.4–4.7)
PLATELET # BLD AUTO: 344 K/UL — SIGNIFICANT CHANGE UP (ref 150–400)
POTASSIUM SERPL-MCNC: 3.6 MMOL/L — SIGNIFICANT CHANGE UP (ref 3.5–5.3)
POTASSIUM SERPL-SCNC: 3.6 MMOL/L — SIGNIFICANT CHANGE UP (ref 3.5–5.3)
PREALB SERPL-MCNC: 13 MG/DL — LOW (ref 18–38)
PROTHROM AB SERPL-ACNC: 13.6 SEC — HIGH (ref 10–12.9)
RBC # BLD: 3.32 M/UL — LOW (ref 4.2–5.8)
RBC # FLD: 13.6 % — SIGNIFICANT CHANGE UP (ref 10.3–14.5)
SODIUM SERPL-SCNC: 134 MMOL/L — LOW (ref 135–145)
WBC # BLD: 10.51 K/UL — HIGH (ref 3.8–10.5)
WBC # FLD AUTO: 10.51 K/UL — HIGH (ref 3.8–10.5)

## 2020-06-17 PROCEDURE — 74230 X-RAY XM SWLNG FUNCJ C+: CPT | Mod: 26

## 2020-06-17 PROCEDURE — 99232 SBSQ HOSP IP/OBS MODERATE 35: CPT

## 2020-06-17 PROCEDURE — 99233 SBSQ HOSP IP/OBS HIGH 50: CPT

## 2020-06-17 RX ORDER — PSYLLIUM SEED (WITH DEXTROSE)
1 POWDER (GRAM) ORAL
Refills: 0 | Status: DISCONTINUED | OUTPATIENT
Start: 2020-06-17 | End: 2020-06-21

## 2020-06-17 RX ORDER — SODIUM CHLORIDE 9 MG/ML
1000 INJECTION INTRAMUSCULAR; INTRAVENOUS; SUBCUTANEOUS
Refills: 0 | Status: DISCONTINUED | OUTPATIENT
Start: 2020-06-17 | End: 2020-06-20

## 2020-06-17 RX ORDER — WARFARIN SODIUM 2.5 MG/1
5 TABLET ORAL ONCE
Refills: 0 | Status: COMPLETED | OUTPATIENT
Start: 2020-06-17 | End: 2020-06-17

## 2020-06-17 RX ADMIN — ENOXAPARIN SODIUM 70 MILLIGRAM(S): 100 INJECTION SUBCUTANEOUS at 17:31

## 2020-06-17 RX ADMIN — Medication 500000 UNIT(S): at 13:07

## 2020-06-17 RX ADMIN — Medication 500000 UNIT(S): at 22:08

## 2020-06-17 RX ADMIN — Medication 100 MILLIGRAM(S): at 12:19

## 2020-06-17 RX ADMIN — WARFARIN SODIUM 5 MILLIGRAM(S): 2.5 TABLET ORAL at 22:08

## 2020-06-17 RX ADMIN — Medication 10 MILLIGRAM(S): at 12:19

## 2020-06-17 RX ADMIN — Medication 5 MILLIGRAM(S): at 22:07

## 2020-06-17 RX ADMIN — ENOXAPARIN SODIUM 70 MILLIGRAM(S): 100 INJECTION SUBCUTANEOUS at 05:36

## 2020-06-17 RX ADMIN — Medication 1 PACKET(S): at 17:31

## 2020-06-17 RX ADMIN — MODAFINIL 200 MILLIGRAM(S): 200 TABLET ORAL at 05:36

## 2020-06-17 RX ADMIN — Medication 1 TABLET(S): at 17:31

## 2020-06-17 RX ADMIN — SODIUM CHLORIDE 75 MILLILITER(S): 9 INJECTION INTRAMUSCULAR; INTRAVENOUS; SUBCUTANEOUS at 10:30

## 2020-06-17 RX ADMIN — Medication 500000 UNIT(S): at 05:36

## 2020-06-17 RX ADMIN — Medication 1 PACKET(S): at 12:19

## 2020-06-17 RX ADMIN — Medication 1 TABLET(S): at 12:19

## 2020-06-17 RX ADMIN — Medication 100 MILLIGRAM(S): at 05:36

## 2020-06-17 NOTE — SWALLOW VFSS/MBS ASSESSMENT ADULT - SLP GENERAL OBSERVATIONS
Pt received A&A in Radiology suite, +#6 shiley trach on trach collar, +8L 02, Sp02 97-98% upon receipt, decreasing to 90-03% with upright positioning, +baseline wet cough, +voicing over trach, Jamaican speaking, [facility] line #[id number] used for interpretation, RYANNE An present for study. RN reported pt not wearing speaking valve as per pulmonary.

## 2020-06-17 NOTE — SWALLOW VFSS/MBS ASSESSMENT ADULT - PHARYNGEAL PHASE COMMENTS
+cough noted post swallow 2/2 trials, however no PO observed in airway +cough noted after swallow, however no PO observed in airway    Sp02 noted to decrease to 89-90% and pt with c/o uncomfortable upright positioning. Pt reclined to 45* with improvement in Sp02 to 93% for remainder of study

## 2020-06-17 NOTE — PROGRESS NOTE ADULT - ASSESSMENT
64 year old male with insignificant pmh coming to hospital with worsening shortness of breath. patient found to have covid and respiratory failure subsequently with complicated icu course including leukocytosis with bacterial pna s/p abx, gi bleed and pe, s/p trach and peg. The patient was  downgraded to medicine service.       Acute hypoxic respiratory failure 2/2 Covid 19 pneumonia   - resolved  - s/p ICU now on  medicine service  - s/p trach and peg   - isolation precautions   - monitor vitals   - pt/OT/SLP- Rehab attending following  - Trach -  Trials   -currently on 28% TC  -Pulmonary following    #Leukocytosis  - afebrile,   - trending down    #SVT- no further episodes  -dc cardiac monitor    #PE  - therapeutic lovenox transition to Coumadin, INR goal 2-3, may dc Lovenox when INR >2    #hx of gib bleed w/ rectal ulcer (healing)  - s/p prbc transfusion - s/p rigid sig, clipping of vessel and epinephrine  - gi consult appreciated  - colorectal surgery consult appreciated  -avoid rectal tube for diarrhea, continue  metamucil/ Bacid     #dysphagia  -reported aspiration of food on overnight, discussed with SLP, for MBS today  -PEG tube in place    #type 2 diabetes  - a1c 6.7 on admit   - repeat HGA1c    #dvt prophylaxis   - lovenox SC -transition to Coumadin, dc lovenox when INR>2    Dispo- pending guardianship court date set for 09/2020

## 2020-06-17 NOTE — SWALLOW VFSS/MBS ASSESSMENT ADULT - NS SWALLOW VFSS REC ASPIR MON
oral hygiene/fever/cough/change of breathing pattern/position upright (90Y)/gurgly voice/pneumonia/throat clearing/upper respiratory infection

## 2020-06-17 NOTE — SWALLOW VFSS/MBS ASSESSMENT ADULT - SLP PERTINENT HISTORY OF CURRENT PROBLEM
64 year old male with insignificant pmh coming to hospital with worsening shortness of breath. patient found to have covid and respiratory failure subsequently with complicated icu course including leukocytosis with bacterial pna s/p abx, gi bleed and pe, s/p trach and peg. Pt s/p gree dye swallow evaluation at bedside, and passed for regular/thin liquids, however concern for ?aspiration and MBS requested for objective view of pharyngeal stage of swallow

## 2020-06-17 NOTE — PROGRESS NOTE ADULT - ASSESSMENT
64yM was admitted on 04-18 due to COVID related infection. Course was complicated by prolonged ICU course for acute hypoxic respiratory failure, requiring vent, s/p PEG and trach. Course also complicated by PE, GI bleed with rectal ulceration and is s/p RBCs as well as sigmoidoscopy and clipping/epinephrine.    Tolerating TC now 28%, off High flow  improving  CCI polyneuropathy  MBS pending   Mobilize  still with secretions, Passy Alex trial , trach downsized  to 6

## 2020-06-17 NOTE — PROGRESS NOTE ADULT - SUBJECTIVE AND OBJECTIVE BOX
PULMONARY PROGRESS NOTE      [id number]    Patient is a 64y old  Male who presents with a chief complaint of shortness of breath (17 Jun 2020 13:01)      INTERVAL HPI/OVERNIGHT EVENTS:  no overnight issues  strength improving  for MBS  MEDICATIONS  (STANDING):  amantadine Syrup 100 milliGRAM(s) Oral <User Schedule>  enoxaparin Injectable 70 milliGRAM(s) SubCutaneous every 12 hours  FLUoxetine Solution 10 milliGRAM(s) Oral daily  lactobacillus acidophilus 1 Tablet(s) Oral daily  melatonin 5 milliGRAM(s) Oral at bedtime  modafinil 200 milliGRAM(s) Oral <User Schedule>  multivitamin/minerals 1 Tablet(s) Oral daily  nystatin    Suspension 088424 Unit(s) Oral three times a day  psyllium Powder 1 Packet(s) Oral daily  sodium chloride 0.9%. 1000 milliLiter(s) (75 mL/Hr) IV Continuous <Continuous>  warfarin 5 milliGRAM(s) Oral once      MEDICATIONS  (PRN):  acetaminophen    Suspension .. 650 milliGRAM(s) Oral every 6 hours PRN Temp greater or equal to 38.5C (101.3F)      Allergies    No Known Allergies    Intolerances        PAST MEDICAL & SURGICAL HISTORY:  No pertinent past medical history  No significant past surgical history      SOCIAL HISTORY  Smoking History:       REVIEW OF SYSTEMS:    CONSTITUTIONAL:  No distress    HEENT:  Eyes:  No diplopia or blurred vision. ENT:  No earache, sore throat or runny nose.    CARDIOVASCULAR:  No pressure, squeezing, tightness, heaviness or aching about the chest; no palpitations.    RESPIRATORY:  see HPI  GASTROINTESTINAL:  No nausea, vomiting or diarrhea.    GENITOURINARY:  No dysuria, frequency or urgency.    NEUROLOGIC:  No paresthesias, fasciculations, seizures or weakness.    PSYCHIATRIC:  No disorder of thought or mood.    Vital Signs Last 24 Hrs  T(C): 36.4 (17 Jun 2020 08:35), Max: 37.1 (16 Jun 2020 16:07)  T(F): 97.6 (17 Jun 2020 08:35), Max: 98.7 (16 Jun 2020 16:07)  HR: 90 (17 Jun 2020 12:00) (76 - 91)  BP: 128/76 (17 Jun 2020 12:00) (105/66 - 152/75)  BP(mean): 79 (17 Jun 2020 07:00) (75 - 81)  RR: 16 (17 Jun 2020 12:00) (15 - 22)  SpO2: 98% (17 Jun 2020 12:00) (95% - 100%)    PHYSICAL EXAMINATION:  alert, NAD resting comfortably    NEUROLOGIC: Grossly intact .strength improving    LABS:                        10.2   10.51 )-----------( 344      ( 17 Jun 2020 04:36 )             31.6     06-17    134<L>  |  91<L>  |  11.0  ----------------------------<  92  3.6   |  34.0<H>  |  <0.20<L>    Ca    8.8      17 Jun 2020 04:36  Phos  3.4     06-17  Mg     1.8     06-17      PT/INR - ( 17 Jun 2020 04:36 )   PT: 13.6 sec;   INR: 1.20 ratio                             MICROBIOLOGY:    RADIOLOGY & ADDITIONAL STUDIES:

## 2020-06-17 NOTE — PROGRESS NOTE ADULT - NSREFPHYEXREFTO_GEN_ALL_CORE
ED Physical Exam
Inpatient Physical Exam

## 2020-06-17 NOTE — PROGRESS NOTE ADULT - SUBJECTIVE AND OBJECTIVE BOX
CC:  hypoxic respiratory failure 2/2 Covid 19 pneumonia/CCI polyneuropathy    INTERVAL HPI/OVERNIGHT EVENTS: Patient seen and examined. RN reported that on overnight nurse suctioned "food" out of trach. As per patient via , has not been having difficulty swallowing. Denies chest pain, SOB, clears secretions with cough. No nausea, vomiting, fever, chills. Understands need to check swallowing with MBS.     Vital Signs Last 24 Hrs  T(C): 36.4 (17 Jun 2020 08:35), Max: 37.1 (16 Jun 2020 16:07)  T(F): 97.6 (17 Jun 2020 08:35), Max: 98.7 (16 Jun 2020 16:07)  HR: 91 (17 Jun 2020 10:00) (76 - 91)  BP: 152/75 (17 Jun 2020 10:00) (105/66 - 152/75)  BP(mean): 79 (17 Jun 2020 07:00) (75 - 81)  RR: 15 (17 Jun 2020 10:00) (15 - 22)  SpO2: 95% (17 Jun 2020 10:00) (95% - 100%)    General: NAD  HEENT:  EOMI  NECK:  trach collar 40%  CVS: regular rate and rhythm S1 S2  RESP:  coarse BS B/L, no wheeze  GI:  PEG+ BS+ Soft nondistended nontender   : No suprapubic tenderness  MSK:  No edema. Bilateral offloading boots.   CNS:  generalized weakness  INTEG:  Warm dry skin. right heel DTI  PSYCH: Calm and cooperative    I&O's Detail    16 Jun 2020 07:01  -  17 Jun 2020 07:00  --------------------------------------------------------  IN:    Enteral Tube Flush: 450 mL    Oral Fluid: 180 mL  Total IN: 630 mL    OUT:    Voided: 850 mL  Total OUT: 850 mL    Total NET: -220 mL      17 Jun 2020 07:01  -  17 Jun 2020 13:01  --------------------------------------------------------  IN:    sodium chloride 0.9%.: 225 mL  Total IN: 225 mL    OUT:  Total OUT: 0 mL    Total NET: 225 mL                                    10.2   10.51 )-----------( 344      ( 17 Jun 2020 04:36 )             31.6     17 Jun 2020 04:36    134    |  91     |  11.0   ----------------------------<  92     3.6     |  34.0   |  <0.20    Ca    8.8        17 Jun 2020 04:36  Phos  3.4       17 Jun 2020 04:36  Mg     1.8       17 Jun 2020 04:36      PT/INR - ( 17 Jun 2020 04:36 )   PT: 13.6 sec;   INR: 1.20 ratio           CAPILLARY BLOOD GLUCOSE              MEDICATIONS  (STANDING):  amantadine Syrup 100 milliGRAM(s) Oral <User Schedule>  enoxaparin Injectable 70 milliGRAM(s) SubCutaneous every 12 hours  FLUoxetine Solution 10 milliGRAM(s) Oral daily  lactobacillus acidophilus 1 Tablet(s) Oral daily  melatonin 5 milliGRAM(s) Oral at bedtime  modafinil 200 milliGRAM(s) Oral <User Schedule>  multivitamin/minerals 1 Tablet(s) Oral daily  nystatin    Suspension 436872 Unit(s) Oral three times a day  psyllium Powder 1 Packet(s) Oral daily  sodium chloride 0.9%. 1000 milliLiter(s) (75 mL/Hr) IV Continuous <Continuous>    MEDICATIONS  (PRN):  acetaminophen    Suspension .. 650 milliGRAM(s) Oral every 6 hours PRN Temp greater or equal to 38.5C (101.3F)      RADIOLOGY & ADDITIONAL TESTS:

## 2020-06-18 PROCEDURE — 99233 SBSQ HOSP IP/OBS HIGH 50: CPT

## 2020-06-18 PROCEDURE — 99232 SBSQ HOSP IP/OBS MODERATE 35: CPT

## 2020-06-18 PROCEDURE — 71045 X-RAY EXAM CHEST 1 VIEW: CPT | Mod: 26

## 2020-06-18 RX ORDER — PANTOPRAZOLE SODIUM 20 MG/1
40 TABLET, DELAYED RELEASE ORAL
Refills: 0 | Status: DISCONTINUED | OUTPATIENT
Start: 2020-06-18 | End: 2020-06-29

## 2020-06-18 RX ORDER — WARFARIN SODIUM 2.5 MG/1
5 TABLET ORAL ONCE
Refills: 0 | Status: COMPLETED | OUTPATIENT
Start: 2020-06-18 | End: 2020-06-18

## 2020-06-18 RX ORDER — FLUOXETINE HCL 10 MG
10 CAPSULE ORAL DAILY
Refills: 0 | Status: DISCONTINUED | OUTPATIENT
Start: 2020-06-18 | End: 2020-06-19

## 2020-06-18 RX ORDER — MODAFINIL 200 MG/1
200 TABLET ORAL
Refills: 0 | Status: DISCONTINUED | OUTPATIENT
Start: 2020-06-19 | End: 2020-06-20

## 2020-06-18 RX ADMIN — MODAFINIL 200 MILLIGRAM(S): 200 TABLET ORAL at 05:58

## 2020-06-18 RX ADMIN — Medication 1 TABLET(S): at 17:44

## 2020-06-18 RX ADMIN — ENOXAPARIN SODIUM 70 MILLIGRAM(S): 100 INJECTION SUBCUTANEOUS at 05:58

## 2020-06-18 RX ADMIN — Medication 1 TABLET(S): at 14:53

## 2020-06-18 RX ADMIN — ENOXAPARIN SODIUM 70 MILLIGRAM(S): 100 INJECTION SUBCUTANEOUS at 17:44

## 2020-06-18 RX ADMIN — PANTOPRAZOLE SODIUM 40 MILLIGRAM(S): 20 TABLET, DELAYED RELEASE ORAL at 08:56

## 2020-06-18 RX ADMIN — Medication 10 MILLIGRAM(S): at 11:11

## 2020-06-18 RX ADMIN — Medication 5 MILLIGRAM(S): at 22:02

## 2020-06-18 RX ADMIN — Medication 100 MILLIGRAM(S): at 11:11

## 2020-06-18 RX ADMIN — Medication 1 PACKET(S): at 17:44

## 2020-06-18 RX ADMIN — Medication 100 MILLIGRAM(S): at 05:58

## 2020-06-18 RX ADMIN — WARFARIN SODIUM 5 MILLIGRAM(S): 2.5 TABLET ORAL at 22:02

## 2020-06-18 RX ADMIN — Medication 1 PACKET(S): at 05:59

## 2020-06-18 RX ADMIN — Medication 500000 UNIT(S): at 05:59

## 2020-06-18 NOTE — CHART NOTE - NSCHARTNOTEFT_GEN_A_CORE
Speaking valve placed on PT trach per order. No distress noted, continue speaking valve trial as tolerated and continue to monitor.

## 2020-06-18 NOTE — PROGRESS NOTE ADULT - SUBJECTIVE AND OBJECTIVE BOX
CC: hypoxic respiratory failure 2/2 Covid 19 pneumonia/CCI polyneuropathy/dysphagia    INTERVAL HPI/OVERNIGHT EVENTS: Patient seen and examined. On overnight RN reported suctioning food from trach. Patient had MBS yesterday and passed. Denies difficulty swallowing, states he has "alot of oral secretion". + strong cough. Denies chest pain, epigastric pain, nausea, vomiting, abdominal pain. Exhibits increase lower extremity strength, able to lift legs off bed briefly.     Vital Signs Last 24 Hrs  T(C): 36.8 (18 Jun 2020 07:18), Max: 36.9 (17 Jun 2020 23:58)  T(F): 98.2 (18 Jun 2020 07:18), Max: 98.5 (17 Jun 2020 23:58)  HR: 88 (18 Jun 2020 08:31) (84 - 92)  BP: 117/75 (18 Jun 2020 08:31) (103/85 - 152/75)  BP(mean): 84 (18 Jun 2020 04:00) (83 - 96)  RR: 20 (18 Jun 2020 08:31) (15 - 20)  SpO2: 98% (18 Jun 2020 08:31) (93% - 99%)    General: NAD  HEENT:  EOMI  NECK:  trach collar   CVS: regular rate and rhythm S1 S2  RESP:  coarse BS B/L, no wheeze, rhonchi clears with cough  GI:  PEG+ BS+ Soft nondistended nontender   : No suprapubic tenderness  MSK:  No edema. Bilateral offloading boots.   CNS:  generalized weakness  INTEG:  Warm dry skin. right heel DTI  PSYCH: Calm and cooperative    I&O's Detail    17 Jun 2020 07:01  -  18 Jun 2020 07:00  --------------------------------------------------------  IN:    Enteral Tube Flush: 200 mL    Oral Fluid: 60 mL    Pivot 1.5: 240 mL    sodium chloride 0.9%.: 375 mL  Total IN: 875 mL    OUT:    Voided: 400 mL  Total OUT: 400 mL    Total NET: 475 mL                                    10.2   10.51 )-----------( 344      ( 17 Jun 2020 04:36 )             31.6     17 Jun 2020 04:36    134    |  91     |  11.0   ----------------------------<  92     3.6     |  34.0   |  <0.20    Ca    8.8        17 Jun 2020 04:36  Phos  3.4       17 Jun 2020 04:36  Mg     1.8       17 Jun 2020 04:36      PT/INR - ( 17 Jun 2020 04:36 )   PT: 13.6 sec;   INR: 1.20 ratio           CAPILLARY BLOOD GLUCOSE              MEDICATIONS  (STANDING):  amantadine Syrup 100 milliGRAM(s) Oral <User Schedule>  enoxaparin Injectable 70 milliGRAM(s) SubCutaneous every 12 hours  FLUoxetine Solution 10 milliGRAM(s) Oral daily  lactobacillus acidophilus 1 Tablet(s) Oral daily  melatonin 5 milliGRAM(s) Oral at bedtime  multivitamin/minerals 1 Tablet(s) Oral daily  nystatin    Suspension 518719 Unit(s) Oral three times a day  pantoprazole    Tablet 40 milliGRAM(s) Oral before breakfast  psyllium Powder 1 Packet(s) Oral two times a day  sodium chloride 0.9%. 1000 milliLiter(s) (75 mL/Hr) IV Continuous <Continuous>    MEDICATIONS  (PRN):  acetaminophen    Suspension .. 650 milliGRAM(s) Oral every 6 hours PRN Temp greater or equal to 38.5C (101.3F)      RADIOLOGY & ADDITIONAL TESTS: CC: hypoxic respiratory failure 2/2 Covid 19 pneumonia/CCI polyneuropathy/dysphagia    INTERVAL HPI/OVERNIGHT EVENTS: Patient seen and examined. On overnight RN reported suctioning food from trach. Patient had MBS yesterday and passed. Denies difficulty swallowing, states he has "alot of oral secretion". + strong cough. Denies chest pain, epigastric pain, nausea, vomiting, abdominal pain. Exhibits increase lower extremity strength, able to lift legs off bed briefly.     Vital Signs Last 24 Hrs  T(C): 36.8 (18 Jun 2020 07:18), Max: 36.9 (17 Jun 2020 23:58)  T(F): 98.2 (18 Jun 2020 07:18), Max: 98.5 (17 Jun 2020 23:58)  HR: 88 (18 Jun 2020 08:31) (84 - 92)  BP: 117/75 (18 Jun 2020 08:31) (103/85 - 152/75)  BP(mean): 84 (18 Jun 2020 04:00) (83 - 96)  RR: 20 (18 Jun 2020 08:31) (15 - 20)  SpO2: 98% (18 Jun 2020 08:31) (93% - 99%)    General: NAD  HEENT:  EOMI  NECK:  trach collar   CVS: regular rate and rhythm S1 S2  RESP:  coarse BS B/L, no wheeze, rhonchi clears with cough  GI:  PEG+ BS+ Soft nondistended nontender   : No suprapubic tenderness  MSK:  No edema. Bilateral offloading boots.   CNS:  generalized weakness  INTEG:  Warm dry skin. right heel DTI  PSYCH: Calm and cooperative    I&O's Detail    17 Jun 2020 07:01  -  18 Jun 2020 07:00  --------------------------------------------------------  IN:    Enteral Tube Flush: 200 mL    Oral Fluid: 60 mL    Pivot 1.5: 240 mL    sodium chloride 0.9%.: 375 mL  Total IN: 875 mL    OUT:    Voided: 400 mL  Total OUT: 400 mL    Total NET: 475 mL                                    10.2   10.51 )-----------( 344      ( 17 Jun 2020 04:36 )             31.6     17 Jun 2020 04:36    134    |  91     |  11.0   ----------------------------<  92     3.6     |  34.0   |  <0.20    Ca    8.8        17 Jun 2020 04:36  Phos  3.4       17 Jun 2020 04:36  Mg     1.8       17 Jun 2020 04:36      PT/INR - ( 17 Jun 2020 04:36 )   PT: 13.6 sec;   INR: 1.20 ratio           CAPILLARY BLOOD GLUCOSE              MEDICATIONS  (STANDING):  amantadine Syrup 100 milliGRAM(s) Oral <User Schedule>  enoxaparin Injectable 70 milliGRAM(s) SubCutaneous every 12 hours  FLUoxetine Solution 10 milliGRAM(s) Oral daily  lactobacillus acidophilus 1 Tablet(s) Oral daily  melatonin 5 milliGRAM(s) Oral at bedtime  multivitamin/minerals 1 Tablet(s) Oral daily  nystatin    Suspension 163622 Unit(s) Oral three times a day  pantoprazole    Tablet 40 milliGRAM(s) Oral before breakfast  psyllium Powder 1 Packet(s) Oral two times a day  sodium chloride 0.9%. 1000 milliLiter(s) (75 mL/Hr) IV Continuous <Continuous>    MEDICATIONS  (PRN):  acetaminophen    Suspension .. 650 milliGRAM(s) Oral every 6 hours PRN Temp greater or equal to 38.5C (101.3F)      RADIOLOGY & ADDITIONAL TESTS:

## 2020-06-18 NOTE — PROGRESS NOTE ADULT - ASSESSMENT
64yM was admitted on 04-18 due to COVID related infection. Course was complicated by prolonged ICU course for acute hypoxic respiratory failure, requiring vent, s/p PEG and trach. Course also complicated by PE, GI bleed with rectal ulceration and is s/p RBCs as well as sigmoidoscopy and clipping/epinephrine.    Tolerating Passy [place] day time , weak voice, on cannula  improving  CCI polyneuropathy  MBS good, negative blue dye, has nocturnal secretions, keep HOB elevated   Mobilize

## 2020-06-18 NOTE — PHARMACOTHERAPY INTERVENTION NOTE - COMMENTS
Recommended to change pantoprazole tablet to suspension
notified MD to renew
vanco trough ordered
vanco trough ordered

## 2020-06-18 NOTE — CHART NOTE - NSCHARTNOTEFT_GEN_A_CORE
Notified by RN pt cough continues and she continues to suction what appears to be “food particles” through pt trach. Pt is a 65 y/o male admitted for Respiratory distress 2* COVID PNA. MBS performed yesterday due to same c/o "food" being suctioned out of trach. Pt with TC. 6/17 SLP evaluation and MBS results noted-pt was advanced to regular diet yesterday. Pt is afebrile, vital signs stable, leukocytosis trending down. Pt in no signs of acute distress at this time. Made pt NPO overnight. Recommend re-evaluation with SLP. PMD will be notified in AM.

## 2020-06-18 NOTE — PROGRESS NOTE ADULT - SUBJECTIVE AND OBJECTIVE BOX
Patient is awake, in bed.   Smiling.  States he has been performing his exercises.   As per RN, refused to get OOB yesterday.  Discussed with RN and patient that will need OOB today.   Continue to emphasize that patient will need to actively participate for his recovery.   SLP recommends REG/THINs.     REVIEW OF SYSTEMS  Constitutional - No fever,  No fatigue  Neurological - +loss of strength    FUNCTIONAL PROGRESS  6/17  Bed Mobility  Bed Mobility Training Symptoms Noted During/After Treatment: shortness of breath;  SpO2 decreased to 82%, returned to 91% while in supine  Bed Mobility Training Sit-to-Supine: maximum assist (25% patient effort);  2 person assist;  bed rails  Bed Mobility Training Supine-to-Sit: maximum assist (25% patient effort);  2 person assist;  bed rails;  HOB elevated   Bed Mobility Training Limitations: decreased ability to use arms for pushing/pulling;  decreased ability to use legs for bridging/pushing;  decreased ROM;  decreased strength;  impaired balance;  impaired coordination;  impaired postural control;  SpO2 decreased to 82% while sitting at edge of bed    Sit-Stand Transfer Training  Transfer Training Sit-to-Stand Transfer: unable to perform;  decreased safety at this time    Therapeutic Exercise  Therapeutic Exercise Detail: Ther ex of LE included AAROM ankle pumps, knee extension, hip flexion. Pt instructed to perform as able throughout day. Pt verbalized understanding of therapeutic exercises.       VITALS  T(C): 36.8 (06-18-20 @ 07:18), Max: 36.9 (06-17-20 @ 23:58)  HR: 88 (06-18-20 @ 08:31) (84 - 92)  BP: 117/75 (06-18-20 @ 08:31) (103/85 - 147/77)  RR: 20 (06-18-20 @ 08:31) (16 - 20)  SpO2: 98% (06-18-20 @ 08:31) (93% - 99%)  Wt(kg): --    MEDICATIONS   acetaminophen    Suspension .. 650 milliGRAM(s) every 6 hours PRN  amantadine Syrup 100 milliGRAM(s) <User Schedule>  enoxaparin Injectable 70 milliGRAM(s) every 12 hours  FLUoxetine Solution 10 milliGRAM(s) daily  lactobacillus acidophilus 1 Tablet(s) daily  melatonin 5 milliGRAM(s) at bedtime  multivitamin/minerals 1 Tablet(s) daily  pantoprazole    Tablet 40 milliGRAM(s) before breakfast  psyllium Powder 1 Packet(s) two times a day  sodium chloride 0.9%. 1000 milliLiter(s) <Continuous>  warfarin 5 milliGRAM(s) once      RECENT LABS - Reviewed                        10.2   10.51 )-----------( 344      ( 17 Jun 2020 04:36 )             31.6     06-17    134<L>  |  91<L>  |  11.0  ----------------------------<  92  3.6   |  34.0<H>  |  <0.20<L>    Ca    8.8      17 Jun 2020 04:36  Phos  3.4     06-17  Mg     1.8     06-17      PT/INR - ( 17 Jun 2020 04:36 )   PT: 13.6 sec;   INR: 1.20 ratio                 --------------------------------------------------------------------------  PHYSICAL EXAM  Constitutional - NAD, Comfortable  HEENT - +PMV  Abdomen - Soft, +PEG  Extremities - Severe wasting  Neurologic Exam -                    Cognitive - AAOx self, part of situation     Communication - Improved phonation     Motor - Diffuse weakness                    LEFT    UE - ShAB 3/5, EF 3/5, EE 3/5, WE 3/5,  3/5                    RIGHT UE - ShAB 3/5, EF 3/5, EE 3/5, WE 3/5,  3/5                    LEFT    LE - HF 1/5, KE 1/5, DF 1/5, PF 1/5                    RIGHT LE - HF 1/5, KE 1/5, DF 1/5, PF 1/5        Sensory - Intact to LT     Coordination - FTN impaired  Psychiatric - Mood flat - improving  ----------------------------------------------------------------------------------------  ASSESSMENT/PLAN  64yMale with functional deficits after COVID related complications  Trach -  Trials   PE - Lovenox Bridge + Coumadin  Pain - Tylenol  Oral Hygiene - Aggressive care TID, Nystatin TID  Sleep - Melatonin 5mg HS  Daytime Wakefulness - NO TV at night, DC Amantadine & Provigil    Functional Quadriplegia/Atrophy/Foot Drops - PRAFOs  Mood - Prozac 10mg (6/9)  Oropharyngeal Dysphagia - Reg/Thins, RECOMMEND DC PEG TUBE  DVT PPX - SCDs  Rehab - RECOMMEND OOB DAILY VIA ESTEVAN. Will continue to follow for ongoing rehab needs and recommendations.   maximize potential for functional recovery with above recommendations with goal to decannulate, achieve PO diet, DC PEG while awaiting court date.     Discussed with RN.

## 2020-06-18 NOTE — PROGRESS NOTE ADULT - SUBJECTIVE AND OBJECTIVE BOX
PULMONARY PROGRESS NOTE      [id number]    Patient is a 64y old  Male who presents with a chief complaint of shortness of breath (18 Jun 2020 08:38)      INTERVAL HPI/OVERNIGHT EVENTS:  passed MBS  resting comfortably with passy [place] valve  nasal o2  had secretions in pm  MEDICATIONS  (STANDING):  amantadine Syrup 100 milliGRAM(s) Oral <User Schedule>  enoxaparin Injectable 70 milliGRAM(s) SubCutaneous every 12 hours  FLUoxetine Solution 10 milliGRAM(s) Oral daily  lactobacillus acidophilus 1 Tablet(s) Oral daily  melatonin 5 milliGRAM(s) Oral at bedtime  multivitamin/minerals 1 Tablet(s) Oral daily  nystatin    Suspension 317326 Unit(s) Oral three times a day  pantoprazole    Tablet 40 milliGRAM(s) Oral before breakfast  psyllium Powder 1 Packet(s) Oral two times a day  sodium chloride 0.9%. 1000 milliLiter(s) (75 mL/Hr) IV Continuous <Continuous>  warfarin 5 milliGRAM(s) Oral once      MEDICATIONS  (PRN):  acetaminophen    Suspension .. 650 milliGRAM(s) Oral every 6 hours PRN Temp greater or equal to 38.5C (101.3F)      Allergies    No Known Allergies    Intolerances        PAST MEDICAL & SURGICAL HISTORY:  No pertinent past medical history  No significant past surgical history      SOCIAL HISTORY  Smoking History:       REVIEW OF SYSTEMS:    CONSTITUTIONAL:  No distress    HEENT:  Eyes:  No diplopia or blurred vision. ENT:  No earache, sore throat or runny nose.    CARDIOVASCULAR:  No pressure, squeezing, tightness, heaviness or aching about the chest; no palpitations.    RESPIRATORY:  see HPI    GASTROINTESTINAL:  No nausea, vomiting or diarrhea.    GENITOURINARY:  No dysuria, frequency or urgency.    NEUROLOGIC:  No paresthesias, fasciculations, seizures or weakness.    PSYCHIATRIC:  No disorder of thought or mood.    Vital Signs Last 24 Hrs  T(C): 36.8 (18 Jun 2020 07:18), Max: 36.9 (17 Jun 2020 23:58)  T(F): 98.2 (18 Jun 2020 07:18), Max: 98.5 (17 Jun 2020 23:58)  HR: 88 (18 Jun 2020 08:31) (84 - 92)  BP: 117/75 (18 Jun 2020 08:31) (103/85 - 152/75)  BP(mean): 84 (18 Jun 2020 04:00) (83 - 96)  RR: 20 (18 Jun 2020 08:31) (15 - 20)  SpO2: 98% (18 Jun 2020 08:31) (93% - 99%)    PHYSICAL EXAMINATION:    GENERAL: The patient is awake and alert in no apparent distress.     NEUROLOGIC: Grossly intact.    LABS:                        10.2   10.51 )-----------( 344      ( 17 Jun 2020 04:36 )             31.6     06-17    134<L>  |  91<L>  |  11.0  ----------------------------<  92  3.6   |  34.0<H>  |  <0.20<L>    Ca    8.8      17 Jun 2020 04:36  Phos  3.4     06-17  Mg     1.8     06-17      PT/INR - ( 17 Jun 2020 04:36 )   PT: 13.6 sec;   INR: 1.20 ratio                             MICROBIOLOGY:    RADIOLOGY & ADDITIONAL STUDIES:

## 2020-06-18 NOTE — PROGRESS NOTE ADULT - ASSESSMENT
64 year old male with insignificant pmh coming to hospital with worsening shortness of breath. patient found to have covid and respiratory failure subsequently with complicated icu course including leukocytosis with bacterial pna s/p abx, gi bleed and pe, s/p trach and peg. The patient was  downgraded to medicine service.       Acute hypoxic respiratory failure 2/2 Covid 19 pneumonia   - resolved  - s/p ICU now on  medicine service  - s/p trach and peg   - isolation precautions- COVID negative from 6/5  - monitor vitals   - pt/OT/SLP- Rehab attending following  - Trach -  Trials   -currently on 28% TC  -Pulmonary following    #Leukocytosis  - afebrile,   - trending down    #SVT- no further episodes  -dc cardiac monitor    #PE  - therapeutic lovenox transition to Coumadin, INR goal 2-3, may dc Lovenox when INR >2    #hx of gib bleed w/ rectal ulcer (healing)  - s/p prbc transfusion - s/p rigid sig, clipping of vessel and epinephrine  - gi consult appreciated  - colorectal surgery consult appreciated  -avoid rectal tube for diarrhea, continue  metamucil/ Bacid     #dysphagia  -reported aspiration of food again on overnight, passed MBS yesterday. Discussed with SLP, patient has passed both subjective tests with blue dye at bedside and MBS, reinforce proper positioning with feeding with RN.   -Added protonix, ?reflux   -PEG tube in place- plan to remove if tolerating oral diet and taking in adequate amounts, monitor intake/calorie count    #type 2 diabetes  - a1c 6.7 on admit   - repeat HGA1c    #dvt prophylaxis   - lovenox SC -transition to Coumadin, dc lovenox when INR>2    Dispo- pending guardianship court date set for 09/2020

## 2020-06-19 LAB
ANION GAP SERPL CALC-SCNC: 13 MMOL/L — SIGNIFICANT CHANGE UP (ref 5–17)
APTT BLD: 40 SEC — HIGH (ref 27.5–36.3)
BUN SERPL-MCNC: 10 MG/DL — SIGNIFICANT CHANGE UP (ref 8–20)
CALCIUM SERPL-MCNC: 9.2 MG/DL — SIGNIFICANT CHANGE UP (ref 8.6–10.2)
CHLORIDE SERPL-SCNC: 92 MMOL/L — LOW (ref 98–107)
CO2 SERPL-SCNC: 33 MMOL/L — HIGH (ref 22–29)
CREAT SERPL-MCNC: 0.28 MG/DL — LOW (ref 0.5–1.3)
GLUCOSE SERPL-MCNC: 87 MG/DL — SIGNIFICANT CHANGE UP (ref 70–99)
HCT VFR BLD CALC: 34 % — LOW (ref 39–50)
HGB BLD-MCNC: 11.3 G/DL — LOW (ref 13–17)
INR BLD: 2.08 RATIO — HIGH (ref 0.88–1.16)
MCHC RBC-ENTMCNC: 31.5 PG — SIGNIFICANT CHANGE UP (ref 27–34)
MCHC RBC-ENTMCNC: 33.2 GM/DL — SIGNIFICANT CHANGE UP (ref 32–36)
MCV RBC AUTO: 94.7 FL — SIGNIFICANT CHANGE UP (ref 80–100)
PLATELET # BLD AUTO: 365 K/UL — SIGNIFICANT CHANGE UP (ref 150–400)
POTASSIUM SERPL-MCNC: 3.2 MMOL/L — LOW (ref 3.5–5.3)
POTASSIUM SERPL-SCNC: 3.2 MMOL/L — LOW (ref 3.5–5.3)
PROTHROM AB SERPL-ACNC: 24.1 SEC — HIGH (ref 10–12.9)
RBC # BLD: 3.59 M/UL — LOW (ref 4.2–5.8)
RBC # FLD: 13.7 % — SIGNIFICANT CHANGE UP (ref 10.3–14.5)
SARS-COV-2 RNA SPEC QL NAA+PROBE: SIGNIFICANT CHANGE UP
SODIUM SERPL-SCNC: 137 MMOL/L — SIGNIFICANT CHANGE UP (ref 135–145)
WBC # BLD: 12.29 K/UL — HIGH (ref 3.8–10.5)
WBC # FLD AUTO: 12.29 K/UL — HIGH (ref 3.8–10.5)

## 2020-06-19 PROCEDURE — 99233 SBSQ HOSP IP/OBS HIGH 50: CPT

## 2020-06-19 PROCEDURE — 99232 SBSQ HOSP IP/OBS MODERATE 35: CPT

## 2020-06-19 RX ORDER — WARFARIN SODIUM 2.5 MG/1
5 TABLET ORAL ONCE
Refills: 0 | Status: COMPLETED | OUTPATIENT
Start: 2020-06-19 | End: 2020-06-19

## 2020-06-19 RX ORDER — FLUOXETINE HCL 10 MG
20 CAPSULE ORAL DAILY
Refills: 0 | Status: DISCONTINUED | OUTPATIENT
Start: 2020-06-19 | End: 2020-06-22

## 2020-06-19 RX ORDER — POTASSIUM CHLORIDE 20 MEQ
40 PACKET (EA) ORAL ONCE
Refills: 0 | Status: COMPLETED | OUTPATIENT
Start: 2020-06-19 | End: 2020-06-19

## 2020-06-19 RX ADMIN — Medication 1 TABLET(S): at 19:29

## 2020-06-19 RX ADMIN — Medication 20 MILLIGRAM(S): at 13:00

## 2020-06-19 RX ADMIN — Medication 1 PACKET(S): at 19:29

## 2020-06-19 RX ADMIN — Medication 1 TABLET(S): at 11:59

## 2020-06-19 RX ADMIN — MODAFINIL 200 MILLIGRAM(S): 200 TABLET ORAL at 08:56

## 2020-06-19 RX ADMIN — WARFARIN SODIUM 5 MILLIGRAM(S): 2.5 TABLET ORAL at 22:07

## 2020-06-19 RX ADMIN — PANTOPRAZOLE SODIUM 40 MILLIGRAM(S): 20 TABLET, DELAYED RELEASE ORAL at 08:58

## 2020-06-19 RX ADMIN — Medication 1 PACKET(S): at 08:57

## 2020-06-19 RX ADMIN — Medication 5 MILLIGRAM(S): at 22:07

## 2020-06-19 NOTE — PROGRESS NOTE ADULT - SUBJECTIVE AND OBJECTIVE BOX
Patient encouraged to be OOB.  Discussed with NP and PT.  Will need ongoing OOB to chair and exercises to be performed by the patient every hour to maximize potential for recovery.     REVIEW OF SYSTEMS  Constitutional - No fever,  No fatigue  Neurological - +loss of strength    FUNCTIONAL PROGRESS  6/18  Bed Mobility  Bed Mobility Training Supine-to-Sit: maximum assist (25% patient effort);  2 person assist;  bed rails  Bed Mobility Training Limitations: decreased ability to use arms for pushing/pulling;  decreased ability to use legs for bridging/pushing;  impaired ability to control trunk for mobility;  decreased strength;  impaired balance;  impaired coordination;  impaired motor control;  impaired postural control    Bed-Chair Transfer Training  Transfer Training Bed-to-Chair Transfer: dependent (less than 25% patient effort);  2 person assist;  full weight-bearing   hand-hold assist   Bed-to-Chair Transfer Training Transfer Safety Analysis: decreased balance;  decreased sequencing ability;  decreased weight-shifting ability;  decreased strength;  impaired balance;  impaired coordination;  impaired motor control;  impaired postural control    Sit-Stand Transfer Training  Transfer Training Sit-to-Stand Transfer: maximum assist (25% patient effort);  2 person assist;  full weight-bearing   hand-hold assist   Transfer Training Stand-to-Sit Transfer: maximum assist (25% patient effort);  2 person assist;  full weight-bearing   hand-hold assist   Sit-to-Stand Transfer Training Transfer Safety Analysis: decreased balance;  decreased sequencing ability;  decreased weight-shifting ability;  decreased strength;  impaired balance;  impaired coordination;  impaired motor control;  impaired postural control    Therapeutic Exercise  Therapeutic Exercise Detail: Ther-ex consisting of: ankle pumps, knee extension, quad sets, hip flexion. Pt instructed to perform as able throughout day. Pt verbalized understanding of therapeutic exercises.     Functional Endurance  Functional Endurance Detail: Supported sitting at EOB x3-4 minutes, min-mod assist to maintain posture.       VITALS  T(C): 36.5 (06-19-20 @ 07:35), Max: 36.9 (06-19-20 @ 00:00)  HR: 84 (06-19-20 @ 08:00) (72 - 90)  BP: 127/71 (06-19-20 @ 08:00) (119/71 - 138/84)  RR: 20 (06-19-20 @ 08:00) (19 - 20)  SpO2: 96% (06-19-20 @ 08:00) (96% - 100%)  Wt(kg): --    MEDICATIONS   acetaminophen    Suspension .. 650 milliGRAM(s) every 6 hours PRN  FLUoxetine Solution 10 milliGRAM(s) daily  lactobacillus acidophilus 1 Tablet(s) daily  melatonin 5 milliGRAM(s) at bedtime  modafinil 200 milliGRAM(s) <User Schedule>  multivitamin/minerals 1 Tablet(s) daily  pantoprazole    Tablet 40 milliGRAM(s) before breakfast  potassium chloride    Tablet ER 40 milliEquivalent(s) once  psyllium Powder 1 Packet(s) two times a day  sodium chloride 0.9%. 1000 milliLiter(s) <Continuous>  warfarin 5 milliGRAM(s) once      RECENT LABS - Reviewed                        11.3   12.29 )-----------( 365      ( 19 Jun 2020 05:32 )             34.0     06-19    137  |  92<L>  |  10.0  ----------------------------<  87  3.2<L>   |  33.0<H>  |  0.28<L>    Ca    9.2      19 Jun 2020 05:32      PT/INR - ( 19 Jun 2020 05:22 )   PT: 24.1 sec;   INR: 2.08 ratio         PTT - ( 19 Jun 2020 05:22 )  PTT:40.0 sec          --------------------------------------------------------------------------  PHYSICAL EXAM  Constitutional - NAD, Comfortable  HEENT - +PMV  Abdomen - Soft, +PEG  Extremities - Severe wasting  Neurologic Exam -                    Cognitive - AAOx self, part of situation     Communication - Improved phonation     Motor - Diffuse weakness                    LEFT    UE - ShAB 3/5, EF 3/5, EE 3/5, WE 3/5,  3/5                    RIGHT UE - ShAB 3/5, EF 3/5, EE 3/5, WE 3/5,  3/5                    LEFT    LE - HF 1/5, KE 1/5, DF 1/5, PF 1/5                    RIGHT LE - HF 1/5, KE 1/5, DF 1/5, PF 1/5        Sensory - Intact to LT     Coordination - FTN impaired  Psychiatric - Mood flat - improving  ----------------------------------------------------------------------------------------  ASSESSMENT/PLAN  64yMale with functional deficits after COVID related complications  Trach -  Trials   PE - Coumadin  Pain - Tylenol  Oral Hygiene - Aggressive care TID, Nystatin TID  Sleep - Melatonin 5mg HS  Daytime Wakefulness - NO TV at night, DC Amantadine, Recommend DC Provigil    Functional Quadriplegia/Atrophy/Foot Drops - PRAFOs  Mood - Prozac 20mg (increased from 10mg 6/19)  Oropharyngeal Dysphagia - Reg/Thins, RECOMMEND DC PEG TUBE  DVT PPX - SCDs  Rehab - OOB DAILY VIA ESTEVAN. Will continue to follow for ongoing rehab needs and recommendations.   maximize potential for functional recovery with above recommendations with goal to decannulate, achieve PO diet, DC PEG while awaiting court date.     Discussed with PT and NP.

## 2020-06-19 NOTE — PROGRESS NOTE ADULT - SUBJECTIVE AND OBJECTIVE BOX
PULMONARY PROGRESS NOTE      LISSA MALLOY  MRN-[id number]    Patient is a 64y old  Male who presents with a chief complaint of shortness of breath (19 Jun 2020 10:57)      INTERVAL HPI/OVERNIGHT EVENTS:    Pt is awake and alert  Tolerating PMV trials    MEDICATIONS  (STANDING):  FLUoxetine Solution 20 milliGRAM(s) Oral daily  lactobacillus acidophilus 1 Tablet(s) Oral daily  melatonin 5 milliGRAM(s) Oral at bedtime  modafinil 200 milliGRAM(s) Oral <User Schedule>  multivitamin/minerals 1 Tablet(s) Oral daily  pantoprazole    Tablet 40 milliGRAM(s) Oral before breakfast  potassium chloride    Tablet ER 40 milliEquivalent(s) Oral once  psyllium Powder 1 Packet(s) Oral two times a day  sodium chloride 0.9%. 1000 milliLiter(s) (75 mL/Hr) IV Continuous <Continuous>  warfarin 5 milliGRAM(s) Oral once      MEDICATIONS  (PRN):  acetaminophen    Suspension .. 650 milliGRAM(s) Oral every 6 hours PRN Temp greater or equal to 38.5C (101.3F)      Allergies    No Known Allergies    Intolerances        PAST MEDICAL & SURGICAL HISTORY:  No pertinent past medical history  No significant past surgical history        REVIEW OF SYSTEMS: Unable to provide due to trach and weakness though offers no complaints and reports no pain      Vital Signs Last 24 Hrs  T(C): 36.5 (19 Jun 2020 07:35), Max: 36.9 (19 Jun 2020 00:00)  T(F): 97.7 (19 Jun 2020 07:35), Max: 98.4 (19 Jun 2020 00:00)  HR: 84 (19 Jun 2020 08:00) (72 - 90)  BP: 127/71 (19 Jun 2020 08:00) (119/71 - 138/84)  BP(mean): 84 (19 Jun 2020 08:00) (75 - 84)  RR: 20 (19 Jun 2020 08:00) (19 - 20)  SpO2: 96% (19 Jun 2020 08:00) (96% - 100%)    PHYSICAL EXAMINATION:    GENERAL: The patient is awake and alert in no apparent distress.     HEENT: Head is normocephalic and atraumatic. Extraocular muscles are intact. Mucous membranes are moist.    NECK: Supple. + trach    LUNGS: Clear to auscultation without wheezing, rales or rhonchi; respirations unlabored    HEART: Regular rate and rhythm without murmur.    ABDOMEN: Soft, nontender, and nondistended.      EXTREMITIES: Without any cyanosis, clubbing, rash, lesions or edema.    NEUROLOGIC: Grossly intact.    LABS:                        11.3   12.29 )-----------( 365      ( 19 Jun 2020 05:32 )             34.0     06-19    137  |  92<L>  |  10.0  ----------------------------<  87  3.2<L>   |  33.0<H>  |  0.28<L>    Ca    9.2      19 Jun 2020 05:32      PT/INR - ( 19 Jun 2020 05:22 )   PT: 24.1 sec;   INR: 2.08 ratio         PTT - ( 19 Jun 2020 05:22 )  PTT:40.0 sec      MICROBIOLOGY:    COVID-19 PCR . (06.18.20 @ 09:43)    COVID-19 PCR: NotDetec: Testing is performed using polymerase chain reaction (PCR) or  transcription mediated amplification (TMA). This COVID-19 (SARS-CoV-2)  nucleic acid amplification test was validated by [facility] and is  in use under the FDA Emergency Use Authorization (EUA) for clinical labs  CLIA-certified to perform high complexity testing. Test results should be  correlated with clinical presentation, patient history, and epidemiology.      RADIOLOGY & ADDITIONAL STUDIES:       EXAM:  XR CHEST PORTABLE URGENT 1V                          PROCEDURE DATE:  06/18/2020          INTERPRETATION:  AP chest on June 18, 2020 5:17 PM. Patient requires tracheostomy. Covid virus test was positive on April 18 and was negative on June 6. Patient had convalescent plasma treatment of Covid infection. There is history of pulmonary embolism and respiratory failure. Patient developed bilateral foot drop.    Heart is magnified by technique. Tracheostomy remains.    Significant bilateral infiltrates are again noted. There is slightly better aeration on the right compared to June 1.    IMPRESSION: Advanced infiltrates again noted. There is slightly better aeration on the right.        NICHOLE ARRINGTON M.D., ATTENDING RADIOLOGIST  This document has been electronically signed. Jun 18 2020  5:38PM      ECHO:      Summary:   1. Left ventricular ejection fraction, by visual estimation, is 70 to 75%.   2. Normal global left ventricular systolic function.   3. LV Ejection Fraction by Lobo's Method with a biplane EF of 79 %.   4. Normal left ventricular internal cavity size.   5. Spectral Doppler shows impaired relaxation pattern of left ventricular myocardial filling (Grade I diastolic dysfunction).   6. There is no evidence of pericardial effusion.    MD Chung Electronically signed on 5/3/2020 at 5:09:19 PM

## 2020-06-19 NOTE — PROGRESS NOTE ADULT - ASSESSMENT
64 year old male with insignificant pmh coming to hospital with worsening shortness of breath. patient found to have covid and respiratory failure subsequently with complicated icu course including leukocytosis with bacterial pna s/p abx, gi bleed and pe, s/p trach and peg. The patient was  downgraded to medicine service.       Acute hypoxic respiratory failure 2/2 Covid 19 pneumonia   - resolved  - s/p ICU now on  medicine service  - s/p trach and peg   - COVID negative from 6/5 and 6/18  - monitor vitals   - pt/OT/SLP- Rehab attending following  - Trach -Saad Johnson day time  as per Pulmonary  -currently on 28% TC  -Pulmonary following    #Leukocytosis  - afebrile,       #SVT- no further episodes      #PE  -lovenox dc'd as INR now therapeutic  -Coumadin, INR goal 2-3    #hx of gib bleed w/ rectal ulcer (healing)  - s/p prbc transfusion - s/p rigid sig, clipping of vessel and epinephrine  - gi consult appreciated  - colorectal surgery consult appreciated  -avoid rectal tube for diarrhea, continue  metamucil/ Bacid     #dysphagia- on regular diet with thin liquids  - no episodes overnight of possible aspiration  -Aspiration precautions  -Continue protonix for possible reflux  -Keep HOB elevated after meals or remain sitting in chair for at least one hour  -PEG tube in place- plan to remove if tolerating oral diet and taking in adequate amounts, monitor intake/calorie count    #type 2 diabetes  - a1c 6.7 on admit   - repeat HGA1c    #dvt prophylaxis   - Coumadin    Dispo- pending guardianship court date set for 09/2020

## 2020-06-19 NOTE — PROGRESS NOTE ADULT - ASSESSMENT
64yM was admitted on 04-18 due to COVID related infection  Course was complicated by prolonged ICU course for acute hypoxic respiratory failure, requiring vent.  Critical illness polyneuropathy; slowly improving  S/p trach/PEG  Prior PE  GIB s/p GI w/u  Mild leukocytosis  Improving anemia  INR therapeutic  Hypokalemia    Rec:    Tolerating Passy [place] day time, weak voice, on cannula  Consider capping as strength improves given improving CCI polyneuropathy  Optimize nutrition given weakness  Follow lytes and correct as needed  Keep HOB elevated   Mobilize  Eventual tx to [facility] in [place] when able  Supportive care

## 2020-06-19 NOTE — PROGRESS NOTE ADULT - SUBJECTIVE AND OBJECTIVE BOX
CC: hypoxic respiratory failure 2/2 Covid 19 pneumonia/CCI polyneuropathy/dysphagia    INTERVAL HPI/OVERNIGHT EVENTS: no reports of issues with swallowing or aspiration on overnight. Patient is tolerating oral diet. Had BM, no diarrhea. Denies chest pain, SOB, dizziness, nausea, vomiting, fever, chills. Understands the need to get up in chair and perform exercises while in bed. States he was a [place] by trade and knows he would need to be much stronger to ever work again.     Vital Signs Last 24 Hrs  T(C): 36.5 (19 Jun 2020 07:35), Max: 36.9 (19 Jun 2020 00:00)  T(F): 97.7 (19 Jun 2020 07:35), Max: 98.4 (19 Jun 2020 00:00)  HR: 78 (19 Jun 2020 04:00) (72 - 90)  BP: 129/62 (19 Jun 2020 04:00) (119/71 - 138/84)  BP(mean): 80 (19 Jun 2020 04:00) (75 - 81)  RR: 20 (19 Jun 2020 04:00) (19 - 20)  SpO2: 99% (19 Jun 2020 04:00) (98% - 100%)    General: NAD  HEENT:  EOMI  NECK:  trach collar   CVS: regular rate and rhythm S1 S2  RESP:  coarse BS B/L, no wheeze, rhonchi clears with cough  GI:  PEG+ BS+ Soft nondistended nontender   : No suprapubic tenderness  MSK:  No edema. Bilateral offloading boots.   CNS:  generalized weakness  INTEG:  Warm dry skin. right heel DTI  PSYCH: Calm and cooperative    I&O's Detail    18 Jun 2020 07:01  -  19 Jun 2020 07:00  --------------------------------------------------------  IN:    Oral Fluid: 200 mL  Total IN: 200 mL    OUT:    Incontinent per Condom Catheter: 550 mL  Total OUT: 550 mL    Total NET: -350 mL                                    11.3   12.29 )-----------( 365      ( 19 Jun 2020 05:32 )             34.0     19 Jun 2020 05:32    137    |  92     |  10.0   ----------------------------<  87     3.2     |  33.0   |  0.28     Ca    9.2        19 Jun 2020 05:32      PT/INR - ( 19 Jun 2020 05:22 )   PT: 24.1 sec;   INR: 2.08 ratio         PTT - ( 19 Jun 2020 05:22 )  PTT:40.0 sec  CAPILLARY BLOOD GLUCOSE              MEDICATIONS  (STANDING):  FLUoxetine Solution 10 milliGRAM(s) Oral daily  lactobacillus acidophilus 1 Tablet(s) Oral daily  melatonin 5 milliGRAM(s) Oral at bedtime  modafinil 200 milliGRAM(s) Oral <User Schedule>  multivitamin/minerals 1 Tablet(s) Oral daily  pantoprazole    Tablet 40 milliGRAM(s) Oral before breakfast  potassium chloride    Tablet ER 40 milliEquivalent(s) Oral once  psyllium Powder 1 Packet(s) Oral two times a day  sodium chloride 0.9%. 1000 milliLiter(s) (75 mL/Hr) IV Continuous <Continuous>  warfarin 5 milliGRAM(s) Oral once    MEDICATIONS  (PRN):  acetaminophen    Suspension .. 650 milliGRAM(s) Oral every 6 hours PRN Temp greater or equal to 38.5C (101.3F)      RADIOLOGY & ADDITIONAL TESTS:

## 2020-06-20 LAB
APTT BLD: 39.2 SEC — HIGH (ref 27.5–36.3)
INR BLD: 3.25 RATIO — HIGH (ref 0.88–1.16)
PROTHROM AB SERPL-ACNC: 38.1 SEC — HIGH (ref 10–12.9)
TROPONIN T SERPL-MCNC: 0.13 NG/ML — HIGH (ref 0–0.06)
TROPONIN T SERPL-MCNC: 0.13 NG/ML — HIGH (ref 0–0.06)

## 2020-06-20 PROCEDURE — 71045 X-RAY EXAM CHEST 1 VIEW: CPT | Mod: 26

## 2020-06-20 PROCEDURE — 99233 SBSQ HOSP IP/OBS HIGH 50: CPT

## 2020-06-20 PROCEDURE — 93010 ELECTROCARDIOGRAM REPORT: CPT

## 2020-06-20 RX ORDER — ALPRAZOLAM 0.25 MG
0.25 TABLET ORAL EVERY 12 HOURS
Refills: 0 | Status: DISCONTINUED | OUTPATIENT
Start: 2020-06-20 | End: 2020-06-25

## 2020-06-20 RX ORDER — SODIUM CHLORIDE 9 MG/ML
1000 INJECTION, SOLUTION INTRAVENOUS
Refills: 0 | Status: DISCONTINUED | OUTPATIENT
Start: 2020-06-20 | End: 2020-06-22

## 2020-06-20 RX ADMIN — Medication 1 TABLET(S): at 13:44

## 2020-06-20 RX ADMIN — Medication 20 MILLIGRAM(S): at 13:00

## 2020-06-20 RX ADMIN — Medication 5 MILLIGRAM(S): at 22:17

## 2020-06-20 RX ADMIN — PANTOPRAZOLE SODIUM 40 MILLIGRAM(S): 20 TABLET, DELAYED RELEASE ORAL at 13:00

## 2020-06-20 RX ADMIN — Medication 0.25 MILLIGRAM(S): at 20:18

## 2020-06-20 RX ADMIN — SODIUM CHLORIDE 75 MILLILITER(S): 9 INJECTION, SOLUTION INTRAVENOUS at 20:18

## 2020-06-20 NOTE — CHART NOTE - NSCHARTNOTEFT_GEN_A_CORE
Source: Patient [ ]  Family [ ]   other [x ]    Current Diet: Diet, Regular:   Consistent Carbohydrate {Evening Snacks}  Supplement Feeding Modality:  Oral  Glucerna Shake Cans or Servings Per Day:  1       Frequency:  Three Times a day (06-18-20 @ 08:18)    PO intake: Pt continues with suboptimal po intake    Current Weight:   (5/18) 170.8 lbs  (4/18) 154.9 lbs  -16# wt change over 1 month, unsure of accuracy of wt's, will continue to monitor wt's trends. No edema noted.     Pertinent Medications: MEDICATIONS  (STANDING):  FLUoxetine Solution 20 milliGRAM(s) Oral daily  lactobacillus acidophilus 1 Tablet(s) Oral daily  melatonin 5 milliGRAM(s) Oral at bedtime  modafinil 200 milliGRAM(s) Oral <User Schedule>  multivitamin/minerals 1 Tablet(s) Oral daily  pantoprazole    Tablet 40 milliGRAM(s) Oral before breakfast  potassium chloride    Tablet ER 40 milliEquivalent(s) Oral once  psyllium Powder 1 Packet(s) Oral two times a day  sodium chloride 0.9%. 1000 milliLiter(s) (75 mL/Hr) IV Continuous <Continuous>    MEDICATIONS  (PRN):  acetaminophen    Suspension .. 650 milliGRAM(s) Oral every 6 hours PRN Temp greater or equal to 38.5C (101.3F)    Pertinent Labs: CBC Full  -  ( 19 Jun 2020 05:32 )  WBC Count : 12.29 K/uL  RBC Count : 3.59 M/uL  Hemoglobin : 11.3 g/dL  Hematocrit : 34.0 %  Platelet Count - Automated : 365 K/uL  Mean Cell Volume : 94.7 fl  Mean Cell Hemoglobin : 31.5 pg  Mean Cell Hemoglobin Concentration : 33.2 gm/dL  Auto Neutrophil # : x  Auto Lymphocyte # : x  Auto Monocyte # : x  Auto Eosinophil # : x  Auto Basophil # : x  Auto Neutrophil % : x  Auto Lymphocyte % : x  Auto Monocyte % : x  Auto Eosinophil % : x  Auto Basophil % : x    Skin: R heel blister, Coccyx wound     Estimated Needs:   [x ] no change since previous assessment  [ ] recalculated:     Current Nutrition Diagnosis:  Pt remains at high nutrition risk secondary to severe protein calorie malnutrition (acute) related to inability to meet sufficient protein-energy requirements in setting of acute hypoxic respiratory failure, R Segmental PE, Severe ARDS secondary to COVID 19, rapid response requiring intubation on 5/1, s/p trach/PEG and altered GI function secondary to diarrhea as evidenced by meeting <50% nutrient needs >5 days and +fluid accumulation. Aware of SLP assessment 6/17 advancing diet. Pt receiving regular diet- with poor po intake per documentation, needing encouragement at meals. PEG tube in place- plan to remove if tolerating oral diet and taking in adequate amounts. Per I&Os pt received Pivot 1.5 240ml x 5 daily bolus feeds- 1200ml/day (6/17). RD to remain available.     Recommendations:   1) Monitor po intake- Continue glucerna TID to optimize po intake and provide an additional 220kcal, 10g protein per serving   2) If po intake remains suboptimal- continue bolus feeds  3) Continue with MVI daily.  4) Continue Bacid to support gut integrity.  5) Obtain daily weights to monitor trends.    Monitoring and Evaluation:   [x ] PO intake [x ] Tolerance to diet prescription [X] Weights  [X] Follow up per protocol [X] Labs:

## 2020-06-20 NOTE — CHART NOTE - NSCHARTNOTEFT_GEN_A_CORE
Asked by primary team to remove PEG tube, as the patient is now tolerating PO.  Per chart review, the PEG tube was placed on 26 May.  The PEG tube must remain in place for a minimum of six weeks to prevent a peritoneal infection.  Thus, it is too early to remove it.

## 2020-06-20 NOTE — PROVIDER CONTACT NOTE (CRITICAL VALUE NOTIFICATION) - TEST AND RESULT REPORTED:
troponin 0.13
Aptt 120.4
CO2
CO2- 47
Fibrinogen
INR 5.24  Fibrinogen 1200
INR 6.05
PCO2 86
aPTT >200
co2- 46.0
fibrinogen 1142
finofibrinogen 1114
troponin 0.13 and EKG

## 2020-06-20 NOTE — PROVIDER CONTACT NOTE (CRITICAL VALUE NOTIFICATION) - ACTION/TREATMENT ORDERED:
Follow heparin nomogram. Hold gtt x1hr, decrease by 200units/hr
DAVID BOSTON MADE AWARE. PT CO2 HAS BEEN CONSISTENT. NO NEW INTERVENTIONS AT THIS TIME.
N.O. for xanax, LR at 75 hour, repeat troponin at 00:00.
continue to hold lovenox and coumadin
will  f/u

## 2020-06-20 NOTE — PROGRESS NOTE ADULT - ASSESSMENT
64 year old male with insignificant pmh coming to hospital with worsening shortness of breath. patient found to have covid and respiratory failure subsequently with complicated icu course including leukocytosis with bacterial pna s/p abx, gi bleed and pe, s/p trach and peg. The patient was  downgraded to medicine service.     #Acute hypoxic respiratory failure 2/2 Covid 19 pneumonia   - resolved  - s/p ICU   - s/p trach  - COVID negative from 6/5 and 6/18  - monitor vitals   - pt/OT/SLP- Rehab following  - Pulmonary following    #Leukocytosis  - afebrile,     #SVT  - no further episodes    #PE  - s/p lovenox  - hold coumadin, INR goal 2-3 (currently supratherapuetic INR)    #supratherapuetic INR   - hold coumadin (5mg) as goal inr 2-3    #hx of gib bleed w/ rectal ulcer (healing)  - s/p prbc transfusion - s/p rigid sig, clipping of vessel and epinephrine  - gi consult appreciated  - colorectal surgery consult appreciated  - avoid rectal tube for diarrhea, continue  metamucil/ Bacid     #dysphagia  - improved now on regular diet with thin liquids  - aspiration precautions   - protonix   - will follow up w/ gi for removal of PEG tube     #type 2 diabetes  - a1c 6.7 on admit   - repeat HGA1c    #dvt prophylaxis   - Coumadin    Dispo- pending guardianship court date set for 09/2020

## 2020-06-20 NOTE — PROGRESS NOTE ADULT - SUBJECTIVE AND OBJECTIVE BOX
Patient is a 64y old  Male who presents with a chief complaint of shortness of breath (19 Jun 2020 15:17)    Patient seen and examined at bedside.     ALLERGIES:  No Known Allergies    MEDICATIONS  (STANDING):  FLUoxetine Solution 20 milliGRAM(s) Oral daily  lactobacillus acidophilus 1 Tablet(s) Oral daily  melatonin 5 milliGRAM(s) Oral at bedtime  multivitamin/minerals 1 Tablet(s) Oral daily  pantoprazole    Tablet 40 milliGRAM(s) Oral before breakfast  potassium chloride    Tablet ER 40 milliEquivalent(s) Oral once  psyllium Powder 1 Packet(s) Oral two times a day    MEDICATIONS  (PRN):  acetaminophen    Suspension .. 650 milliGRAM(s) Oral every 6 hours PRN Temp greater or equal to 38.5C (101.3F)    Vital Signs Last 24 Hrs  T(F): 98.1 (20 Jun 2020 08:00), Max: 98.5 (20 Jun 2020 00:00)  HR: 88 (20 Jun 2020 04:00) (88 - 94)  BP: 134/75 (20 Jun 2020 04:00) (122/68 - 136/71)  RR: 18 (20 Jun 2020 04:00) (16 - 18)  SpO2: 100% (20 Jun 2020 04:00) (96% - 100%)  I&O's Summary    19 Jun 2020 07:01  -  20 Jun 2020 07:00  --------------------------------------------------------  IN: 0 mL / OUT: 450 mL / NET: -450 mL      PHYSICAL EXAM:  General: NAD,  ENT: MMM, no thrush +trach collar  Neck: Supple, No JVD  Lungs: coarse breath sounds b/l no wheezing  Cardio: +s1/s2, no pitting edema   Abdomen: Soft, Nontender, Nondistended; Bowel sounds present +peg  Extremities: No calf tenderness, b/l offloading boots present    LABS:                        11.3   12.29 )-----------( 365      ( 19 Jun 2020 05:32 )             34.0     06-19    137  |  92  |  10.0  ----------------------------<  87  3.2   |  33.0  |  0.28    Ca    9.2      19 Jun 2020 05:32    eGFR if Non African American: 147 mL/min/1.73M2 (06-19-20 @ 05:32)  eGFR if African American: 171 mL/min/1.73M2 (06-19-20 @ 05:32)    PT/INR - ( 20 Jun 2020 04:23 )   PT: 38.1 sec;   INR: 3.25 ratio    PTT - ( 20 Jun 2020 04:23 )  PTT:39.2 sec    04-24 Chol -- LDL -- HDL -- Trig 85 mg/dL    RADIOLOGY & ADDITIONAL TESTS:  - no new tests

## 2020-06-20 NOTE — PROVIDER CONTACT NOTE (CRITICAL VALUE NOTIFICATION) - NAME OF MD/NP/PA/DO NOTIFIED:
DAVID Robbins
Caryn HERNANDEZ
Cece HERNANDEZ
DAVID GALVEZ
Itzel HRENANDEZ
Lynette HERNANDEZ
MD Pinto
MD Vivar
SURGERY DAVID BOSTON
YANET Epstein MD
bill Talbert
dr cavazos

## 2020-06-20 NOTE — CHART NOTE - NSCHARTNOTEFT_GEN_A_CORE
called by rn for patient stating having shortness of breath    Vital Signs Last 24 Hrs  T(C): 36.8 (20 Jun 2020 15:20), Max: 37 (20 Jun 2020 11:30)  T(F): 98.3 (20 Jun 2020 15:20), Max: 98.6 (20 Jun 2020 11:30)  HR: 108 (20 Jun 2020 12:00) (88 - 108)  BP: 144/79 (20 Jun 2020 12:00) (128/78 - 144/79)  BP(mean): 95 (20 Jun 2020 12:00) (85 - 95)  RR: 18 (20 Jun 2020 12:00) (16 - 20)  SpO2: 98% (20 Jun 2020 12:00) (95% - 100%)    assessment and plan  - per rn and review of vitals - stable  - continue to monitor vitals  - check ekg cxr trop  - may have component of anxiety will start xanax 0.25mg bid  - patient also being treated for pe however with supratherapuetic inr today  - matthew

## 2020-06-20 NOTE — PROVIDER CONTACT NOTE (CRITICAL VALUE NOTIFICATION) - SITUATION
notified PA that tropinin is still 0.13, pt is asymptomatic, vss. no new orderes.
Patient on heparin gtt
Fibrinogen= 1400
INR 5.24  Fibrinogen 1200
assess pt
patient proned and abg obtained.
pt admitted with R segmental PE. INR on 4/25 was 7.1. as per md Vivar  lovenox and coumadin on hold.
pt was anxious had shortness of breath, no chest pain, vss, troponin is 0.13, EKG being done now. Pt   refusing to eating or drink.

## 2020-06-21 LAB
ANION GAP SERPL CALC-SCNC: 10 MMOL/L — SIGNIFICANT CHANGE UP (ref 5–17)
ANION GAP SERPL CALC-SCNC: 13 MMOL/L — SIGNIFICANT CHANGE UP (ref 5–17)
APTT BLD: 42.5 SEC — HIGH (ref 27.5–36.3)
BUN SERPL-MCNC: 11 MG/DL — SIGNIFICANT CHANGE UP (ref 8–20)
BUN SERPL-MCNC: 8 MG/DL — SIGNIFICANT CHANGE UP (ref 8–20)
CALCIUM SERPL-MCNC: 8.8 MG/DL — SIGNIFICANT CHANGE UP (ref 8.6–10.2)
CALCIUM SERPL-MCNC: 9.2 MG/DL — SIGNIFICANT CHANGE UP (ref 8.6–10.2)
CHLORIDE SERPL-SCNC: 92 MMOL/L — LOW (ref 98–107)
CHLORIDE SERPL-SCNC: 94 MMOL/L — LOW (ref 98–107)
CO2 SERPL-SCNC: 34 MMOL/L — HIGH (ref 22–29)
CO2 SERPL-SCNC: 35 MMOL/L — HIGH (ref 22–29)
CREAT SERPL-MCNC: 0.22 MG/DL — LOW (ref 0.5–1.3)
CREAT SERPL-MCNC: 0.28 MG/DL — LOW (ref 0.5–1.3)
GLUCOSE SERPL-MCNC: 86 MG/DL — SIGNIFICANT CHANGE UP (ref 70–99)
GLUCOSE SERPL-MCNC: 88 MG/DL — SIGNIFICANT CHANGE UP (ref 70–99)
HCT VFR BLD CALC: 33.2 % — LOW (ref 39–50)
HGB BLD-MCNC: 10.8 G/DL — LOW (ref 13–17)
INR BLD: 4.13 RATIO — HIGH (ref 0.88–1.16)
LACTATE SERPL-SCNC: 0.8 MMOL/L — SIGNIFICANT CHANGE UP (ref 0.5–2)
MAGNESIUM SERPL-MCNC: 1.7 MG/DL — SIGNIFICANT CHANGE UP (ref 1.6–2.6)
MCHC RBC-ENTMCNC: 30.8 PG — SIGNIFICANT CHANGE UP (ref 27–34)
MCHC RBC-ENTMCNC: 32.5 GM/DL — SIGNIFICANT CHANGE UP (ref 32–36)
MCV RBC AUTO: 94.6 FL — SIGNIFICANT CHANGE UP (ref 80–100)
PHOSPHATE SERPL-MCNC: 2.9 MG/DL — SIGNIFICANT CHANGE UP (ref 2.4–4.7)
PLATELET # BLD AUTO: 354 K/UL — SIGNIFICANT CHANGE UP (ref 150–400)
POTASSIUM SERPL-MCNC: 3.1 MMOL/L — LOW (ref 3.5–5.3)
POTASSIUM SERPL-MCNC: 4.8 MMOL/L — SIGNIFICANT CHANGE UP (ref 3.5–5.3)
POTASSIUM SERPL-SCNC: 3.1 MMOL/L — LOW (ref 3.5–5.3)
POTASSIUM SERPL-SCNC: 4.8 MMOL/L — SIGNIFICANT CHANGE UP (ref 3.5–5.3)
PROCALCITONIN SERPL-MCNC: 0.11 NG/ML — HIGH (ref 0.02–0.1)
PROTHROM AB SERPL-ACNC: 48.8 SEC — HIGH (ref 10–12.9)
RBC # BLD: 3.51 M/UL — LOW (ref 4.2–5.8)
RBC # FLD: 13.9 % — SIGNIFICANT CHANGE UP (ref 10.3–14.5)
SODIUM SERPL-SCNC: 138 MMOL/L — SIGNIFICANT CHANGE UP (ref 135–145)
SODIUM SERPL-SCNC: 140 MMOL/L — SIGNIFICANT CHANGE UP (ref 135–145)
TROPONIN T SERPL-MCNC: 0.14 NG/ML — HIGH (ref 0–0.06)
WBC # BLD: 15.38 K/UL — HIGH (ref 3.8–10.5)
WBC # FLD AUTO: 15.38 K/UL — HIGH (ref 3.8–10.5)

## 2020-06-21 PROCEDURE — 99233 SBSQ HOSP IP/OBS HIGH 50: CPT

## 2020-06-21 RX ORDER — POTASSIUM CHLORIDE 20 MEQ
10 PACKET (EA) ORAL
Refills: 0 | Status: DISCONTINUED | OUTPATIENT
Start: 2020-06-21 | End: 2020-06-21

## 2020-06-21 RX ORDER — POTASSIUM CHLORIDE 20 MEQ
40 PACKET (EA) ORAL ONCE
Refills: 0 | Status: COMPLETED | OUTPATIENT
Start: 2020-06-21 | End: 2020-06-21

## 2020-06-21 RX ADMIN — Medication 1 TABLET(S): at 11:30

## 2020-06-21 RX ADMIN — Medication 5 MILLIGRAM(S): at 23:00

## 2020-06-21 RX ADMIN — Medication 40 MILLIEQUIVALENT(S): at 16:09

## 2020-06-21 RX ADMIN — Medication 1 TABLET(S): at 18:26

## 2020-06-21 RX ADMIN — PANTOPRAZOLE SODIUM 40 MILLIGRAM(S): 20 TABLET, DELAYED RELEASE ORAL at 06:02

## 2020-06-21 RX ADMIN — Medication 0.25 MILLIGRAM(S): at 06:02

## 2020-06-21 RX ADMIN — Medication 100 MILLIEQUIVALENT(S): at 15:34

## 2020-06-21 RX ADMIN — Medication 40 MILLIEQUIVALENT(S): at 15:31

## 2020-06-21 RX ADMIN — Medication 20 MILLIGRAM(S): at 11:30

## 2020-06-21 NOTE — PROGRESS NOTE ADULT - SUBJECTIVE AND OBJECTIVE BOX
Patient is a 64y old  Male who presents with a chief complaint of shortness of breath (20 Jun 2020 12:17)    Patient seen and examined at bedside. lactate and procalcitonin negative;     ALLERGIES:  No Known Allergies    MEDICATIONS  (STANDING):  ALPRAZolam 0.25 milliGRAM(s) Oral every 12 hours  FLUoxetine Solution 20 milliGRAM(s) Oral daily  lactated ringers. 1000 milliLiter(s) (75 mL/Hr) IV Continuous <Continuous>  lactobacillus acidophilus 1 Tablet(s) Oral daily  melatonin 5 milliGRAM(s) Oral at bedtime  multivitamin/minerals 1 Tablet(s) Oral daily  pantoprazole    Tablet 40 milliGRAM(s) Oral before breakfast  potassium chloride    Tablet ER 40 milliEquivalent(s) Oral once  potassium chloride   Powder 40 milliEquivalent(s) Oral once  potassium chloride  10 mEq/100 mL IVPB 10 milliEquivalent(s) IV Intermittent every 1 hour  psyllium Powder 1 Packet(s) Oral two times a day    MEDICATIONS  (PRN):  acetaminophen    Suspension .. 650 milliGRAM(s) Oral every 6 hours PRN Temp greater or equal to 38.5C (101.3F)    Vital Signs Last 24 Hrs  T(F): 98 (21 Jun 2020 11:00), Max: 99 (20 Jun 2020 19:00)  HR: 88 (21 Jun 2020 06:00) (86 - 112)  BP: 130/79 (21 Jun 2020 11:00) (118/59 - 155/61)  RR: 22 (21 Jun 2020 11:00) (18 - 27)  SpO2: 99% (21 Jun 2020 11:00) (92% - 100%)  I&O's Summary    20 Jun 2020 07:01  -  21 Jun 2020 07:00  --------------------------------------------------------  IN: 810 mL / OUT: 750 mL / NET: 60 mL    PHYSICAL EXAM:  General: NAD,  ENT: MMM, no thrush +trach collar  Neck: Supple, No JVD  Lungs: coarse breath sounds b/l no wheezing  Cardio: +s1/s2, no pitting edema   Abdomen: Soft, Nontender, Nondistended; Bowel sounds present +peg  Extremities: No calf tenderness, b/l offloading boots present      LABS:                        10.8   15.38 )-----------( 354      ( 21 Jun 2020 06:24 )             33.2     06-21    140  |  92  |  11.0  ----------------------------<  86  3.1   |  35.0  |  0.28    Ca    9.2      21 Jun 2020 06:24  Phos  2.9     06-21  Mg     1.7     06-21    eGFR if Non African American: 147 mL/min/1.73M2 (06-21-20 @ 06:24)  eGFR if : 171 mL/min/1.73M2 (06-21-20 @ 06:24)    PT/INR - ( 21 Jun 2020 06:24 )   PT: 48.8 sec;   INR: 4.13 ratio    PTT - ( 21 Jun 2020 06:24 )  PTT:42.5 sec    Lactate, Blood: 0.8 mmol/L (06-21 @ 13:18)    Procalcitonin, Serum: 0.11 ng/mL (06-21-20 @ 13:18)    CARDIAC MARKERS ( 21 Jun 2020 06:24 )  x     / 0.14 ng/mL / x     / x     / x      CARDIAC MARKERS ( 20 Jun 2020 23:25 )  x     / 0.13 ng/mL / x     / x     / x      CARDIAC MARKERS ( 20 Jun 2020 18:55 )  x     / 0.13 ng/mL / x     / x     / x          04-24 Chol -- LDL -- HDL -- Trig 85 mg/dL                      RADIOLOGY & ADDITIONAL TESTS:    Care Discussed with Consultants/Other Providers:

## 2020-06-21 NOTE — CHART NOTE - NSCHARTNOTEFT_GEN_A_CORE
Medicine PA- F/U labs on pt w/ hypokalemia K+3.1 and supplemented, repeat K+ 4.8, VSS, continue to monitor, BMP a.m.

## 2020-06-21 NOTE — PROGRESS NOTE ADULT - ASSESSMENT
64 year old male with insignificant pmh coming to hospital with worsening shortness of breath. patient found to have covid and respiratory failure subsequently with complicated icu course including leukocytosis with bacterial pna s/p abx, gi bleed and pe, s/p trach and peg. The patient was  downgraded to medicine service.     #Acute hypoxic respiratory failure 2/2 Covid 19 pneumonia   - resolved  - s/p ICU   - s/p trach  - COVID negative from 6/5 and 6/18  - monitor vitals   - pt/OT/SLP- Rehab following  - Pulmonary following    #Leukocytosis  - afebrile,     #SVT  - no further episodes    #PE  - s/p lovenox  - hold coumadin, INR goal 2-3 (currently supratherapuetic INR)    #supratherapuetic INR   - hold coumadin (5mg) as goal inr 2-3    #hx of gib bleed w/ rectal ulcer (healing)  - s/p prbc transfusion - s/p rigid sig, clipping of vessel and epinephrine  - gi consult appreciated  - colorectal surgery consult appreciated  - avoid rectal tube for diarrhea, continue  metamucil/ Bacid     #dysphagia  - improved now on regular diet with thin liquids  - aspiration precautions   - protonix   - will need to have peg tube in for 6 weeks prior to removal    #hypokalemia  - repleted   - monitor     #anxiety  - xanax    #type 2 diabetes  - a1c 6.7 on admit   - repeat HGA1c    #dvt prophylaxis   - Coumadin  - venodynes    Dispo- pending guardianship court date set for 09/2020

## 2020-06-21 NOTE — CHART NOTE - NSCHARTNOTEFT_GEN_A_CORE
Patient unable to tolerate potassium IV even at lower rate and with fluids running to dilute K+. RN attempted to get 2nd IV without success. d/w Dr Senior. Will repeat PO K+ supplementation and repeat labs this evening.

## 2020-06-22 LAB
A1C WITH ESTIMATED AVERAGE GLUCOSE RESULT: 5.1 % — SIGNIFICANT CHANGE UP (ref 4–5.6)
ANION GAP SERPL CALC-SCNC: 9 MMOL/L — SIGNIFICANT CHANGE UP (ref 5–17)
APTT BLD: 42.2 SEC — HIGH (ref 27.5–36.3)
BUN SERPL-MCNC: 7 MG/DL — LOW (ref 8–20)
CALCIUM SERPL-MCNC: 8.9 MG/DL — SIGNIFICANT CHANGE UP (ref 8.6–10.2)
CHLORIDE SERPL-SCNC: 94 MMOL/L — LOW (ref 98–107)
CK SERPL-CCNC: 22 U/L — LOW (ref 30–200)
CO2 SERPL-SCNC: 36 MMOL/L — HIGH (ref 22–29)
CREAT SERPL-MCNC: 0.26 MG/DL — LOW (ref 0.5–1.3)
ESTIMATED AVERAGE GLUCOSE: 100 MG/DL — SIGNIFICANT CHANGE UP (ref 68–114)
GLUCOSE SERPL-MCNC: 86 MG/DL — SIGNIFICANT CHANGE UP (ref 70–99)
HCT VFR BLD CALC: 30.7 % — LOW (ref 39–50)
HGB BLD-MCNC: 10 G/DL — LOW (ref 13–17)
INR BLD: 3.37 RATIO — HIGH (ref 0.88–1.16)
MCHC RBC-ENTMCNC: 31.3 PG — SIGNIFICANT CHANGE UP (ref 27–34)
MCHC RBC-ENTMCNC: 32.6 GM/DL — SIGNIFICANT CHANGE UP (ref 32–36)
MCV RBC AUTO: 95.9 FL — SIGNIFICANT CHANGE UP (ref 80–100)
NT-PROBNP SERPL-SCNC: 116 PG/ML — SIGNIFICANT CHANGE UP (ref 0–300)
PLATELET # BLD AUTO: 347 K/UL — SIGNIFICANT CHANGE UP (ref 150–400)
POTASSIUM SERPL-MCNC: 3.8 MMOL/L — SIGNIFICANT CHANGE UP (ref 3.5–5.3)
POTASSIUM SERPL-SCNC: 3.8 MMOL/L — SIGNIFICANT CHANGE UP (ref 3.5–5.3)
PROTHROM AB SERPL-ACNC: 39.5 SEC — HIGH (ref 10–12.9)
RBC # BLD: 3.2 M/UL — LOW (ref 4.2–5.8)
RBC # FLD: 14.2 % — SIGNIFICANT CHANGE UP (ref 10.3–14.5)
SODIUM SERPL-SCNC: 139 MMOL/L — SIGNIFICANT CHANGE UP (ref 135–145)
TROPONIN T SERPL-MCNC: 0.11 NG/ML — HIGH (ref 0–0.06)
TROPONIN T SERPL-MCNC: 0.13 NG/ML — HIGH (ref 0–0.06)
WBC # BLD: 11.73 K/UL — HIGH (ref 3.8–10.5)
WBC # FLD AUTO: 11.73 K/UL — HIGH (ref 3.8–10.5)

## 2020-06-22 PROCEDURE — 93308 TTE F-UP OR LMTD: CPT | Mod: 26

## 2020-06-22 PROCEDURE — 99233 SBSQ HOSP IP/OBS HIGH 50: CPT

## 2020-06-22 PROCEDURE — 99232 SBSQ HOSP IP/OBS MODERATE 35: CPT

## 2020-06-22 PROCEDURE — 99223 1ST HOSP IP/OBS HIGH 75: CPT

## 2020-06-22 RX ORDER — ASPIRIN/CALCIUM CARB/MAGNESIUM 324 MG
81 TABLET ORAL DAILY
Refills: 0 | Status: DISCONTINUED | OUTPATIENT
Start: 2020-06-22 | End: 2020-06-29

## 2020-06-22 RX ORDER — ESCITALOPRAM OXALATE 10 MG/1
20 TABLET, FILM COATED ORAL DAILY
Refills: 0 | Status: DISCONTINUED | OUTPATIENT
Start: 2020-06-22 | End: 2020-06-29

## 2020-06-22 RX ADMIN — PANTOPRAZOLE SODIUM 40 MILLIGRAM(S): 20 TABLET, DELAYED RELEASE ORAL at 06:13

## 2020-06-22 RX ADMIN — Medication 0.25 MILLIGRAM(S): at 06:13

## 2020-06-22 RX ADMIN — Medication 81 MILLIGRAM(S): at 17:33

## 2020-06-22 RX ADMIN — ESCITALOPRAM OXALATE 20 MILLIGRAM(S): 10 TABLET, FILM COATED ORAL at 14:20

## 2020-06-22 RX ADMIN — Medication 1 TABLET(S): at 12:57

## 2020-06-22 RX ADMIN — Medication 0.25 MILLIGRAM(S): at 17:33

## 2020-06-22 RX ADMIN — Medication 1 TABLET(S): at 17:32

## 2020-06-22 NOTE — PROGRESS NOTE ADULT - SUBJECTIVE AND OBJECTIVE BOX
PULMONARY PROGRESS NOTE      [id number]    Patient is a 64y old  Male who presents with a chief complaint of shortness of breath (22 Jun 2020 12:37)      INTERVAL HPI/OVERNIGHT EVENTS: On T/C. NAD. Awake and alert.    MEDICATIONS  (STANDING):  ALPRAZolam 0.25 milliGRAM(s) Oral every 12 hours  aspirin  chewable 81 milliGRAM(s) Oral daily  escitalopram 20 milliGRAM(s) Oral daily  lactobacillus acidophilus 1 Tablet(s) Oral daily  melatonin 5 milliGRAM(s) Oral at bedtime  multivitamin/minerals 1 Tablet(s) Oral daily  pantoprazole    Tablet 40 milliGRAM(s) Oral before breakfast      MEDICATIONS  (PRN):  acetaminophen    Suspension .. 650 milliGRAM(s) Oral every 6 hours PRN Temp greater or equal to 38.5C (101.3F)      Allergies    No Known Allergies    Intolerances        PAST MEDICAL & SURGICAL HISTORY:  No pertinent past medical history  No significant past surgical history            Vital Signs Last 24 Hrs  T(C): 36.8 (22 Jun 2020 07:44), Max: 36.8 (22 Jun 2020 04:00)  T(F): 98.3 (22 Jun 2020 07:44), Max: 98.3 (22 Jun 2020 04:00)  HR: 74 (22 Jun 2020 08:00) (74 - 104)  BP: 119/57 (22 Jun 2020 08:00) (110/63 - 142/62)  BP(mean): 73 (22 Jun 2020 08:00) (65 - 79)  RR: 14 (22 Jun 2020 08:00) (14 - 25)  SpO2: 100% (22 Jun 2020 08:00) (93% - 100%)    PHYSICAL EXAMINATION:    GENERAL: The patient is awake and alert in no apparent distress.     HEENT: Head is normocephalic and atraumatic.  Mucous membranes are moist.    NECK: trach    LUNGS: Clear to auscultation without wheezing, rales or rhonchi; respirations unlabored    HEART: Regular rate and rhythm      ABDOMEN: Soft, nontender, and nondistended.      EXTREMITIES: Without any cyanosis, clubbing, rash, lesions or edema.         COVID-19 PCR . (06.18.20 @ 09:43)    COVID-19 PCR: NotDetec:          RADIOLOGY & ADDITIONAL STUDIES:  < from: Xray Chest 1 View- PORTABLE-Urgent (06.20.20 @ 19:27) >     EXAM:  XR CHEST PORTABLE URGENT 1V                          PROCEDURE DATE:  06/20/2020          INTERPRETATION:  Portable chest radiograph        CLINICAL INFORMATION:   Short of breath.    TECHNIQUE:  Portable  AP view of the chest was obtained.    COMPARISON: 6/18/2020 6/18/2020 available for review.    FINDINGS: Decreasing LEFT perihilar and of LEFT basilar ill-defined opacities/infiltrates. A RIGHT interstitial increased markings unchanged from prior exam. Continued surveillance recommended.    The heart and mediastinum are within normal limits.    Visualized osseous structures are intact.        IMPRESSION:   Decreasing bilateral perihilar LEFT lung base airspace consolidations..                    NICHOLE NUNEZ M.D., ATTENDING RADIOLOGIST  This document has been electronically signed. Jun 21 2020  9:48AM        < end of copied text >

## 2020-06-22 NOTE — CONSULT NOTE ADULT - SUBJECTIVE AND OBJECTIVE BOX
[facility]                                                               Office:  [address] [address]                                                              Telephone: [phone]. Fax:[phone]                                                                        CARDIOLOGY CONSULTATION NOTE                                                                                             Consult requested by:  Dr. Dutch Sarmiento  Reason for Consultation: Elevated troponin  History obtained by: Patient and medical record   obtained:     Chief complaint:    64y old  Male who presents with a chief complaint of shortness of breath (22 Jun 2020 11:20)        HPI:  63 yo Icelandic speaking male w/no reported PMH presents to ER for worsening shortness of breath, subjective fevers and dry cough for 10 days. In ER found to be tachycardic/hypoxic with leukocytosis, ARF and evidence of dehydration. Admitted Covid (+). Since Trach, PEG, covid (-), PE on coumadin.  Now with elevated troponin (now 0.11).  Patient lethargic likely s/t Xanax.  Per RN pt gets extremely anxious with PT (sitting in chair) causing him to get agitated, tachycardic, tachypneic, hypoxic, and at times c/o chest pain.        REVIEW OF SYMPTOMS:     CONSTITUTIONAL: No fever, weight loss, or fatigue  ENMT:  No difficulty hearing, tinnitus, vertigo; No sinus or throat pain  NECK: No pain or stiffness  CARDIOVASCULAR: as per HPI  RESPIRATORY: as per HPI  : No dysuria, no hematuria   GI: No dark color stool, no melena, no diarrhea, no constipation, no abdominal pain   NEURO: No headache, no dizziness, no slurred speech   MUSCULOSKELETAL: No joint pain or swelling; No muscle, back, or extremity pain  PSYCH: No agitation, no anxiety.    ALL OTHER REVIEW OF SYSTEMS ARE NEGATIVE.      PREVIOUS DIAGNOSTIC TESTING  ECHO FINDINGS:  < from: TTE Echo Complete w/o contrast w/ Doppler (05.03.20 @ 13:35) >  Summary:   1. Left ventricular ejection fraction, by visual estimation, is 70 to 75%.   2. Normal global left ventricular systolic function.   3. LV Ejection Fraction by Lobo's Method with a biplane EF of 79 %.   4. Normal left ventricular internal cavity size.   5. Spectral Doppler shows impaired relaxation pattern of left ventricular myocardial filling (Grade I diastolic dysfunction).   6. There is no evidence of pericardial effusion.    MD Chung Electronically signed on 5/3/2020 at 5:09:19 PM       < end of copied text >      STRESS FINDINGS:      CATHETERIZATION FINDINGS:         ALLERGIES: Allergies    No Known Allergies    Intolerances          PAST MEDICAL HISTORY  No pertinent past medical history      PAST SURGICAL HISTORY  No significant past surgical history      FAMILY HISTORY:  FH: hypertension      SOCIAL HISTORY:  Denies smoking/alcohol/drugs  CIGARETTES:     ALCOHOL:  DRUGS:      CURRENT MEDICATIONS:     ALPRAZolam  escitalopram  melatonin  pantoprazole    Tablet  aspirin  chewable  lactobacillus acidophilus  multivitamin/minerals        HOME MEDICATIONS:      Vital Signs Last 24 Hrs  T(C): 36.8 (22 Jun 2020 07:44), Max: 36.8 (22 Jun 2020 04:00)  T(F): 98.3 (22 Jun 2020 07:44), Max: 98.3 (22 Jun 2020 04:00)  HR: 74 (22 Jun 2020 08:00) (74 - 104)  BP: 119/57 (22 Jun 2020 08:00) (110/63 - 142/62)  BP(mean): 73 (22 Jun 2020 08:00) (65 - 79)  RR: 14 (22 Jun 2020 08:00) (14 - 25)  SpO2: 100% (22 Jun 2020 08:00) (93% - 100%)      PHYSICAL EXAM:  Constitutional: Comfortable. No acute distress.   HEENT: Atraumatic and normocephalic, neck is supple. No JVD. No carotid bruit. PEERL   CNS: A&Ox3. No focal deficits. EOMI. Cranial nerves II-IX are intact.   Lymph Nodes: Cervical: Not palpable.  Respiratory: b/l LL rhonchi, trach  Cardiovascular: S1S2 RRR. No murmur/rubs or gallop.  Gastrointestinal: +PEG, Soft non-tender and non distended. +Bowel sounds. Negative Sargent's sign.  Extremities: No edema.   Psychiatric: Calm. No agitation.  Skin: No skin rash/ulcers visualized to face, hands or feet.    Intake and output:   06-21 @ 07:01  -  06-22 @ 07:00  --------------------------------------------------------  IN: 1975 mL / OUT: 400 mL / NET: 1575 mL        LABS:                        10.0   11.73 )-----------( 347      ( 22 Jun 2020 03:49 )             30.7     06-22    139  |  94<L>  |  7.0<L>  ----------------------------<  86  3.8   |  36.0<H>  |  0.26<L>    Ca    8.9      22 Jun 2020 03:49  Phos  2.9     06-21  Mg     1.7     06-21      CARDIAC MARKERS ( 22 Jun 2020 09:40 )  x     / 0.11 ng/mL / x     / x     / x      CARDIAC MARKERS ( 22 Jun 2020 03:49 )  x     / 0.13 ng/mL / x     / x     / x      CARDIAC MARKERS ( 21 Jun 2020 06:24 )  x     / 0.14 ng/mL / x     / x     / x      CARDIAC MARKERS ( 20 Jun 2020 23:25 )  x     / 0.13 ng/mL / x     / x     / x      CARDIAC MARKERS ( 20 Jun 2020 18:55 )  x     / 0.13 ng/mL / x     / x     / x        ;p-BNP=  PT/INR - ( 22 Jun 2020 03:49 )   PT: 39.5 sec;   INR: 3.37 ratio         PTT - ( 22 Jun 2020 03:49 )  PTT:42.2 sec      INTERPRETATION OF TELEMETRY: Sinus rhythm 70s  ECG: NSR 92bpm. NS-T wave abnormalities.    RADIOLOGY & ADDITIONAL STUDIES:    X-ray:  < from: Xray Chest 1 View- PORTABLE-Urgent (06.20.20 @ 19:27) >  FINDINGS: Decreasing LEFT perihilar and of LEFT basilar ill-defined opacities/infiltrates. A RIGHT interstitial increased markings unchanged from prior exam. Continued surveillance recommended.    The heart and mediastinum are within normal limits.    Visualized osseous structures are intact.        IMPRESSION:   Decreasing bilateral perihilar LEFT lung base airspace consolidations..    < end of copied text >      CT scan:   < from: CT Angio Abdomen and Pelvis w/ IV Cont (06.01.20 @ 03:43) >  There is no significant adenopathy.  VASCULATURE: Unremarkable.  RETROPERITONEUM:  There is no mass.  BONES: Unremarkable.  ABDOMINAL WALL: Unremarkable.    IMPRESSION:    Intraluminal accumulation of contrast in the rectum indicative of active gastrointestinal bleeding. Mild colitis involving sigmoid colon and rectum.  Findings discussed with DAVID Durham at  4 am on 6/1/2020 with RBV.    Gallstones.  Mild right hydronephrosis.    < end of copied text >    MRI:

## 2020-06-22 NOTE — PROGRESS NOTE ADULT - ASSESSMENT
Admitted on 04-18 due to COVID related infection  Course was complicated by prolonged ICU course for acute hypoxic respiratory failure, requiring vent.  Critical illness polyneuropathy; slowly improving  S/p trach/PEG  Prior PE  GIB s/p GI w/u  Mild leukocytosis  Improving anemia  INR therapeutic  Hypokalemia    Rec:    Tolerating Passy Alex day time, weak voice, on cannula  Consider capping as strength improves given improving CCI polyneuropathy  Optimize nutrition given weakness  Follow lytes and correct as needed  Keep HOB elevated   Mobilize  Eventual tx to [facility] in [place] when able  Supportive care

## 2020-06-22 NOTE — PROGRESS NOTE ADULT - SUBJECTIVE AND OBJECTIVE BOX
Patient continues to need significant motivation for OOB.  Patient always requesting HOB be to lowered and each time I elevate past 30deg.  Found to have green dye form trach after MBS.   Continues to be limited in progress despite weakness being primarily in the LE, and is dependent with transfers.  Discussed case with rehab director.  Patient's plan is for [facility] pending court appointed guardianship.     REVIEW OF SYSTEMS  Constitutional - No fever,  +fatigue  Neurological - +loss of strength    FUNCTIONAL PROGRESS  6/19  Bed-Chair Transfer Training  Transfer Training Bed-to-Chair Transfer: dependent (less than 25% patient effort);  2 person assist;  full weight-bearing   hand hold assist  Bed-to-Chair Transfer Training Transfer Safety Analysis: decreased balance;  decreased ROM;  decreased strength;  impaired balance;  impaired motor control;  impaired postural control;  pt anxious ++    Sit-Stand Transfer Training  Transfer Training Sit-to-Stand Transfer: maximum assist (25% patient effort);  2 person assist;  full weight-bearing   hand hold assist  Transfer Training Stand-to-Sit Transfer: maximum assist (25% patient effort);  2 person assist;  full weight-bearing   hand hold assist   Sit-to-Stand Transfer Training Transfer Safety Analysis: decreased balance;  decreased ROM;  decreased strength;  impaired balance;  impaired coordination;  impaired postural control;  decreased endurance    Gait Training  Gait Training: unable to perform;  pt not advancing right leg despite max assist and encouragment, crossing midline with left foot    6/18  Bed Mobility  Bed Mobility Training Rolling/Turning: maximum assist (25% patient effort);  1 person assist;  nonverbal cues (demo/gestures);  verbal cues;  bed rails;  head of bed elevated  Bed Mobility Training Scooting: maximum assist (25% patient effort);  2 person assist;  verbal cues;  nonverbal cues (demo/gestures);  to scoot toward EOB  Bed Mobility Training Sit-to-Supine: Left patient sitting in bedside chair  Bed Mobility Training Supine-to-Sit: maximum assist (25% patient effort);  2 person assist;  nonverbal cues (demo/gestures);  verbal cues;  bed rails;  head of bed elevated;  Patient attempted to assist with maneuvering bilateral LE and using UE to reach across for bedrail but fatigues easily requiring assist to achieve/maintain upright trunk posture at EOB  Bed Mobility Training Limitations: decreased ability to use arms for pushing/pulling;  impaired ability to control trunk for mobility;  decreased ability to use legs for bridging/pushing;  decreased strength;  impaired balance;  impaired coordination;  impaired postural control;  impaired motor control;  cognitive, decreased safety awareness    Bed-Chair Transfer Training  Transfer Training Bed-to-Chair Transfer: dependent (less than 25% patient effort);  2 person assist;  verbal cues;  nonverbal cues (demo/gestures);  full weight-bearing   Patient unable to to take steps even with max A x2, external cues and physical assist to advance feet. Therapists performed stand pivot transfer from bed to bedside recliner  Transfer Training Chair-to-Bed Transfer: Left patient sitting in bedside chair  Bed-to-Chair Transfer Training Transfer Safety Analysis: decreased balance;  decreased sequencing ability;  decreased weight-shifting ability;  decreased step length;  decreased strength;  impaired balance;  impaired coordination;  impaired motor control;  impaired postural control;  cognitive, decreased safety awareness    Sit-Stand Transfer Training  Transfer Training Sit-to-Stand Transfer: maximum assist (25% patient effort);  2 person assist;  nonverbal cues (demo/gestures);  verbal cues;  full weight-bearing   2 attempts with therapists blocking bilateral knees. Left knee +buckling. ;  hand held assist  Transfer Training Stand-to-Sit Transfer: maximum assist (25% patient effort);  2 person assist;  nonverbal cues (demo/gestures);  verbal cues;  full weight-bearing   hand held assist  Sit-to-Stand Transfer Training Transfer Safety Analysis: decreased balance;  decreased sequencing ability;  decreased weight-shifting ability;  decreased strength;  impaired balance;  impaired motor control;  impaired postural control;  cognitive, decreased safety awareness;  hand held assist    Therapeutic Exercise  Therapeutic Exercise Detail: Patient performed bilateral UE therex semifowler in bed for AROM for shoulder flexion, elbow flex/ext, pronation/supination, wrist flex/ext, gross grasp and digit extension 5 reps 2 sets. ROM therex performed to maintain muscle integrity, prevent atrophy and to facilitate participation in ADLs. Patient educated in proper form and encouraged to take rest breaks between sets.     Neuromuscular Re-education  Neuromuscular Re-education Detail: Patient performed static sitting balance at edge of bed with fluctuating support required from max A to min A, +verbal/tactile cues to maintain upright trunk position. Patient with +right lateral lean noted. Patient encouraged/attempted to use bilateral UE +bed rail to support self but fatigued quickly requiring additional assist. Patient with fatigue upon exertion. Patient able to tolerate ~3-4 minutes. Patient O2 sat range 99%-90% with exertion. Patient educated on importance of sitting upright and energy conservation techniques.     Lower Body Dressing Training  Lower Body Dressing Training Assistance: maximum assist (25% patient effort);  nonverbal cues (demo/gestures);  verbal cues;  2 person assist;  Max A sitting EOB to support upright trunk posture + Max A to don/doff socks.;  decreased strength;  impaired balance;  impaired postural control;  impaired motor control;  cognitive, decreased safety awareness;  decreased flexibility        VITALS  T(C): 36.8 (06-22-20 @ 07:44), Max: 36.8 (06-22-20 @ 04:00)  HR: 74 (06-22-20 @ 08:00) (74 - 104)  BP: 119/57 (06-22-20 @ 08:00) (110/63 - 142/62)  RR: 14 (06-22-20 @ 08:00) (14 - 25)  SpO2: 100% (06-22-20 @ 08:00) (93% - 100%)  Wt(kg): --    MEDICATIONS   acetaminophen    Suspension .. 650 milliGRAM(s) every 6 hours PRN  ALPRAZolam 0.25 milliGRAM(s) every 12 hours  FLUoxetine Solution 20 milliGRAM(s) daily  lactobacillus acidophilus 1 Tablet(s) daily  melatonin 5 milliGRAM(s) at bedtime  multivitamin/minerals 1 Tablet(s) daily  pantoprazole    Tablet 40 milliGRAM(s) before breakfast      RECENT LABS - Reviewed                        10.0   11.73 )-----------( 347      ( 22 Jun 2020 03:49 )             30.7     06-22    139  |  94<L>  |  7.0<L>  ----------------------------<  86  3.8   |  36.0<H>  |  0.26<L>    Ca    8.9      22 Jun 2020 03:49  Phos  2.9     06-21  Mg     1.7     06-21      PT/INR - ( 22 Jun 2020 03:49 )   PT: 39.5 sec;   INR: 3.37 ratio         PTT - ( 22 Jun 2020 03:49 )  PTT:42.2 sec            --------------------------------------------------------------------------  PHYSICAL EXAM  Constitutional - NAD, Comfortable  HEENT - +PMV  Abdomen - Soft, +PEG  Extremities - Severe wasting of BLE  Neurologic Exam -                    Cognitive - AAOx self, part of situation, limited by participation     Motor - Diffuse weakness                    LEFT    UE - ShAB 3/5, EF 4/5, EE 4/5, WE 3/5,  3/5                    RIGHT UE - ShAB 3/5, EF 4/5, EE 4/5, WE 3/5,  3/5                    LEFT    LE - HF 1/5, KE 1/5, DF 1/5, PF 1/5                    RIGHT LE - HF 1/5, KE 1/5, DF 1/5, PF 1/5        Sensory - Intact to LT  Psychiatric - Mood withdrawn, Flat  ----------------------------------------------------------------------------------------  ASSESSMENT/PLAN  64yMale with functional deficits after COVID related complications  Trach -  Trials   PE - Coumadin  Pain - Tylenol  Oral Hygiene - Aggressive care TID, Nystatin TID  Sleep - Melatonin 5mg HS  Functional Quadriplegia/Atrophy/Foot Drops - PRAFOs  Mood - Lexapro 20mg (6/22 for anxiety - DC Prozac), Recommend  DC Xanax  Oropharyngeal Dysphagia - Reg/Thins, RECOMMEND DC PEG TUBE  DVT PPX - SCDs  Rehab - Patient is making very limited progress. Continues to be dependent with OOB, despite motor exam demonstrating improved strength and indicating capability to do more on own. Despite motivation, patient has no disposition plan and given he is undocumented, requires a court appointed guardianship. Given that patient has limited progress despite functional use of his UE and no disposition with family available to help upon DC, patient does not meet criteria for AR.     Patient does not meet criteria for receiving daily or BID therapy. PT/OT can follow 2-3 times per week and staff can continue Jolly OOB and assist with standing.  In addition, patient has been demonstrated multiple bedside exercises which he has not performed, as indicated by ongoing motor exam. Once he improves from a motor standpoint, will increase recommended therapeutic interventions.

## 2020-06-22 NOTE — CONSULT NOTE ADULT - ATTENDING COMMENTS
Patient seen and examined  Recent PE in setting of COVID pneumonia  Now with recent massive rectal bleed, unable to safely therapeutically anticoagulate at this time  Venous duplex 6/1/20 shows no DVT  In absence of DVT, there is no clear benefit to IVC filter placement.   Due to COVID, he will likely be at high risk for IVC thrombosis if an IVC filter is placed  Recommend pharmacomechanical DVT prophylaxis and surveillance venous ultrasounds every 3-4 days until safe to resume AC
Seen and examined.   Consulted initially 12 hours earlier and updated throughout the night on patient's progress.  Massive LGIB while on eliquis and shortly after removal of dignicare which had been present for at least 3 days in trach/PEG Covid patient.  Initially unstable for localization studies secondary to bleeding.  Throughout the night, coagulopathy was reversed with KCentra, PRBC, FFP and, platelets and MTP instituted.  Hemodynamics were further buttressed by low dose levo.  CTA completed ~ 4am to localize bleed which was found to be in rectum.   GI notified of study results with plans to further transfuse and for flex sig.  Flex sig successful at bedside with findings of visible vessel ~ 5cm from AV, injected with epi and clipped.  Currently HD stable with no e/o ongoing bleed.    Plan to follow H/H.   Avoid dignicare.  No indication for surgical intervention unless repeat bleeding per rectum no able to be managed endoscopically.  Thanks.
Unclear etiology. ? of recurrent PE vs cardiac symptoms.   Plan for aspirin 81 mg.   Check CPK to assess for true myocardial injury via plaque rupture.   Repeat TTE to assess LV/RV function.  COVID + in the past- ? of myocarditis/pericarditis.  Check BNP.

## 2020-06-22 NOTE — CONSULT NOTE ADULT - PROVIDER SPECIALTY LIST ADULT
Critical Care
Gastroenterology
Infectious Disease
Rehab Medicine
Vascular Surgery
Cardiology
Colorectal Surgery
Critical Care
Critical Care
Palliative Care

## 2020-06-22 NOTE — PROGRESS NOTE ADULT - SUBJECTIVE AND OBJECTIVE BOX
Patient is a 64y old  Male who presents with a chief complaint of shortness of breath (22 Jun 2020 08:59)    Patient seen and examined at bedside.    ALLERGIES:  No Known Allergies    MEDICATIONS  (STANDING):  ALPRAZolam 0.25 milliGRAM(s) Oral every 12 hours  FLUoxetine Solution 20 milliGRAM(s) Oral daily  lactobacillus acidophilus 1 Tablet(s) Oral daily  melatonin 5 milliGRAM(s) Oral at bedtime  multivitamin/minerals 1 Tablet(s) Oral daily  pantoprazole    Tablet 40 milliGRAM(s) Oral before breakfast    MEDICATIONS  (PRN):  acetaminophen    Suspension .. 650 milliGRAM(s) Oral every 6 hours PRN Temp greater or equal to 38.5C (101.3F)    Vital Signs Last 24 Hrs  T(F): 98.3 (22 Jun 2020 07:44), Max: 98.3 (22 Jun 2020 04:00)  HR: 74 (22 Jun 2020 08:00) (74 - 104)  BP: 119/57 (22 Jun 2020 08:00) (110/63 - 142/62)  RR: 14 (22 Jun 2020 08:00) (14 - 25)  SpO2: 100% (22 Jun 2020 08:00) (93% - 100%)  I&O's Summary    21 Jun 2020 07:01  -  22 Jun 2020 07:00  --------------------------------------------------------  IN: 1975 mL / OUT: 400 mL / NET: 1575 mL    PHYSICAL EXAM:  General: NAD,  ENT: MMM, no thrush +trach collar  Neck: Supple, No JVD  Lungs: coarse breath sounds b/l no wheezing  Cardio: +s1/s2, no pitting edema   Abdomen: Soft, Nontender, Nondistended; Bowel sounds present +peg  Extremities: No calf tenderness, b/l offloading boots present      LABS:                        10.0   11.73 )-----------( 347      ( 22 Jun 2020 03:49 )             30.7     06-22    139  |  94  |  7.0  ----------------------------<  86  3.8   |  36.0  |  0.26    Ca    8.9      22 Jun 2020 03:49  Phos  2.9     06-21  Mg     1.7     06-21    eGFR if Non African American: 152 mL/min/1.73M2 (06-22-20 @ 03:49)  eGFR if : 176 mL/min/1.73M2 (06-22-20 @ 03:49)    PT/INR - ( 22 Jun 2020 03:49 )   PT: 39.5 sec;   INR: 3.37 ratio         PTT - ( 22 Jun 2020 03:49 )  PTT:42.2 sec  Lactate, Blood: 0.8 mmol/L (06-21 @ 13:18)    Procalcitonin, Serum: 0.11 ng/mL (06-21-20 @ 13:18)    CARDIAC MARKERS ( 22 Jun 2020 03:49 )  x     / 0.13 ng/mL / x     / x     / x      CARDIAC MARKERS ( 21 Jun 2020 06:24 )  x     / 0.14 ng/mL / x     / x     / x      CARDIAC MARKERS ( 20 Jun 2020 23:25 )  x     / 0.13 ng/mL / x     / x     / x      CARDIAC MARKERS ( 20 Jun 2020 18:55 )  x     / 0.13 ng/mL / x     / x     / x        04-24 Chol -- LDL -- HDL -- Trig 85 mg/dL    RADIOLOGY & ADDITIONAL TESTS:  - no new tests

## 2020-06-22 NOTE — PROGRESS NOTE ADULT - ASSESSMENT
64 year old male with insignificant pmh coming to hospital with worsening shortness of breath. patient found to have covid and respiratory failure subsequently with complicated icu course including leukocytosis with bacterial pna s/p abx, gi bleed and pe, s/p trach and peg. The patient was  downgraded to medicine service.     #Acute hypoxic respiratory failure 2/2 Covid 19 pneumonia   - resolved  - s/p ICU and trach  - COVID negative from 6/5 and 6/18  - monitor vitals   - pt/OT/SLP- Rehab following  - Pulmonary following    #Leukocytosis  - afebrile     #troponinemia  - probable 2/2 demand   - trend   - cardio consult pending    #SVT  - no further episodes    #PE  - s/p lovenox  - hold coumadin, INR goal 2-3 (currently supratherapuetic INR)    #supratherapuetic INR   - hold coumadin (5mg) as goal inr 2-3    #hx of gib bleed w/ rectal ulcer (healing)  - s/p prbc transfusion - s/p rigid sig, clipping of vessel and epinephrine  - gi consult appreciated  - colorectal surgery consult appreciated  - avoid rectal tube for diarrhea, continue  metamucil/ Bacid     #dysphagia  - improved now on regular diet with thin liquids  - aspiration precautions   - protonix   - will need to have peg tube in for 6 weeks prior to removal    #anxiety  - xanax    #type 2 diabetes  - a1c 6.7 on admit and repeat 5.1    #dvt prophylaxis   - Coumadin when inr tolerates  - venodynes    Dispo- pending guardianship court date set for 09/2020

## 2020-06-22 NOTE — CONSULT NOTE ADULT - ASSESSMENT
A/P:  63 yo Norwegian speaking male w/no reported PMH presents to ER for worsening shortness of breath, subjective fevers and dry cough for 10 days. In ER found to be tachycardic/hypoxic with leukocytosis, ARF and evidence of dehydration. Admitted Covid (+). Since Trach, PEG, covid (-), PE on coumadin.  Now with elevated troponin (now 0.11).  Patient lethargic likely s/t Xanax.  Per RN pt gets extremely anxious with PT (sitting in chair) causing him to get agitated, tachycardic, tachypneic, hypoxic, and at times c/o chest pain.    Elevated troponin  -EKG: no acute ischemia noted  -Trop 0.14, 0.13, 0.11  -CPK  -BnP  -Repeat TTE to eval for effusion vs RV dysfunction r/t PE  -ASA 81mg via PEG QD  -pending results of above, need for further ischemic eval will be determined.    Preliminary evaluation, please await complete evaluation by Dr. Richardson A/P:  63 yo Guamanian speaking male w/no reported PMH presents to ER for worsening shortness of breath, subjective fevers and dry cough for 10 days. In ER found to be tachycardic/hypoxic with leukocytosis, ARF and evidence of dehydration. Admitted Covid (+). Since Trach, PEG, covid (-), PE on coumadin.  Now with elevated troponin (now 0.11).  Patient lethargic likely s/t Xanax.  Per RN pt gets extremely anxious with PT (sitting in chair) causing him to get agitated, tachycardic, tachypneic, hypoxic, and at times c/o chest pain.    Elevated troponin  -EKG: no acute ischemia noted  -Trop 0.14, 0.13, 0.11  -CPK  -BnP  -Repeat TTE to eval for effusion vs RV dysfunction r/t PE  -ASA 81mg via PEG QD  -pending results of above, need for further ischemic eval will be determined.

## 2020-06-23 LAB
ANION GAP SERPL CALC-SCNC: 9 MMOL/L — SIGNIFICANT CHANGE UP (ref 5–17)
APTT BLD: 38 SEC — HIGH (ref 27.5–36.3)
BUN SERPL-MCNC: 5 MG/DL — LOW (ref 8–20)
CALCIUM SERPL-MCNC: 8.5 MG/DL — LOW (ref 8.6–10.2)
CHLORIDE SERPL-SCNC: 89 MMOL/L — LOW (ref 98–107)
CO2 SERPL-SCNC: 39 MMOL/L — HIGH (ref 22–29)
CREAT SERPL-MCNC: 0.24 MG/DL — LOW (ref 0.5–1.3)
GLUCOSE SERPL-MCNC: 94 MG/DL — SIGNIFICANT CHANGE UP (ref 70–99)
HCT VFR BLD CALC: 32.7 % — LOW (ref 39–50)
HGB BLD-MCNC: 10.4 G/DL — LOW (ref 13–17)
INR BLD: 2.1 RATIO — HIGH (ref 0.88–1.16)
MCHC RBC-ENTMCNC: 31 PG — SIGNIFICANT CHANGE UP (ref 27–34)
MCHC RBC-ENTMCNC: 31.8 GM/DL — LOW (ref 32–36)
MCV RBC AUTO: 97.3 FL — SIGNIFICANT CHANGE UP (ref 80–100)
PLATELET # BLD AUTO: 350 K/UL — SIGNIFICANT CHANGE UP (ref 150–400)
POTASSIUM SERPL-MCNC: 3.6 MMOL/L — SIGNIFICANT CHANGE UP (ref 3.5–5.3)
POTASSIUM SERPL-SCNC: 3.6 MMOL/L — SIGNIFICANT CHANGE UP (ref 3.5–5.3)
PROTHROM AB SERPL-ACNC: 24.3 SEC — HIGH (ref 10–12.9)
RBC # BLD: 3.36 M/UL — LOW (ref 4.2–5.8)
RBC # FLD: 13.9 % — SIGNIFICANT CHANGE UP (ref 10.3–14.5)
SODIUM SERPL-SCNC: 137 MMOL/L — SIGNIFICANT CHANGE UP (ref 135–145)
WBC # BLD: 10.28 K/UL — SIGNIFICANT CHANGE UP (ref 3.8–10.5)
WBC # FLD AUTO: 10.28 K/UL — SIGNIFICANT CHANGE UP (ref 3.8–10.5)

## 2020-06-23 PROCEDURE — 99233 SBSQ HOSP IP/OBS HIGH 50: CPT

## 2020-06-23 PROCEDURE — 99232 SBSQ HOSP IP/OBS MODERATE 35: CPT

## 2020-06-23 RX ADMIN — PANTOPRAZOLE SODIUM 40 MILLIGRAM(S): 20 TABLET, DELAYED RELEASE ORAL at 04:56

## 2020-06-23 RX ADMIN — Medication 5 MILLIGRAM(S): at 21:57

## 2020-06-23 RX ADMIN — ESCITALOPRAM OXALATE 20 MILLIGRAM(S): 10 TABLET, FILM COATED ORAL at 12:36

## 2020-06-23 RX ADMIN — Medication 0.25 MILLIGRAM(S): at 04:56

## 2020-06-23 RX ADMIN — Medication 81 MILLIGRAM(S): at 12:36

## 2020-06-23 RX ADMIN — Medication 1 TABLET(S): at 18:27

## 2020-06-23 RX ADMIN — Medication 0.25 MILLIGRAM(S): at 18:27

## 2020-06-23 RX ADMIN — Medication 1 TABLET(S): at 12:36

## 2020-06-23 NOTE — PROGRESS NOTE ADULT - SUBJECTIVE AND OBJECTIVE BOX
[place] [facility]                                                               Office: [address] [facility], [place] [address]                                                              Telephone: [phone]. Fax:[phone]                                                                             PROGRESS NOTE  Reason for follow up: Elevated troponin  Overnight: No new events.   Update: 6/23: Patient remains lethargic and withdrawn.  CK 22. BnP 116. CM SR 70-80s      Review of symptoms:   a 12 point ROS was negative except as per the HPI above.   	  Vitals:  T(C): 36.8 (06-23-20 @ 07:00), Max: 36.8 (06-22-20 @ 15:27)  HR: 83 (06-23-20 @ 10:00) (66 - 103)  BP: 131/70 (06-23-20 @ 10:00) (108/61 - 137/63)  RR: 19 (06-23-20 @ 10:00) (16 - 24)  SpO2: 100% (06-23-20 @ 10:00) (96% - 100%)  Wt(kg): --  I&O's Summary    22 Jun 2020 07:01  -  23 Jun 2020 07:00  --------------------------------------------------------  IN: 120 mL / OUT: 1100 mL / NET: -980 mL            PHYSICAL EXAM:  Constitutional: Comfortable. No acute distress.   HEENT: Atraumatic and normocephalic, neck is supple. No JVD. No carotid bruit. PEERL   CNS: A&Ox3. No focal deficits. EOMI. Cranial nerves II-IX are intact.   Lymph Nodes: Cervical: Not palpable.  Respiratory: b/l LL rhonchi, trach  Cardiovascular: S1S2 RRR. No murmur/rubs or gallop.  Gastrointestinal: +PEG, Soft non-tender and non distended. +Bowel sounds. Negative Sargent's sign.  Extremities: No edema.   Psychiatric: Calm. No agitation.  Skin: No skin rash/ulcers visualized to face, hands or feet.      CURRENT MEDICATIONS:    ALPRAZolam  escitalopram  melatonin  pantoprazole    Tablet  aspirin  chewable  multivitamin/minerals      DIAGNOSTIC TESTING:  [ ] Echocardiogram:   < from: TTE Echo Limited or F/U (06.22.20 @ 17:05) >  Summary:   1. Left ventricular ejection fraction, by visual estimation, is 70 to 75%.   2. Hyperdynamic global left ventricular systolic function.   3. Spectral Doppler shows pseudonormal pattern of left ventricular myocardial filling (Grade II diastolic dysfunction).   4. There is no evidence of pericardial effusion.    MD Chnug Electronically signed on 6/22/2020 at 5:53:20 PM    < end of copied text >    < from: TTE Echo Complete w/o contrast w/ Doppler (05.03.20 @ 13:35) >  Summary:   1. Left ventricular ejection fraction, by visual estimation, is 70 to 75%.   2. Normal global left ventricular systolic function.   3. LV Ejection Fraction by Lobo's Method with a biplane EF of 79 %.   4. Normal left ventricular internal cavity size.   5. Spectral Doppler shows impaired relaxation pattern of left ventricular myocardial filling (Grade I diastolic dysfunction).   6. There is no evidence of pericardial effusion.    MD Chung Electronically signed on 5/3/2020 at 5:09:19 PM    < end of copied text >    [ ]  Catheterization:  [ ] Stress Test:    OTHER: 	      LABS:	 	  CARDIAC MARKERS ( 22 Jun 2020 13:42 )  x     / x     / 22 U/L / x     / x      p-BNP 22 Jun 2020 13:42: 116 pg/mL, CARDIAC MARKERS ( 22 Jun 2020 09:40 )  x     / 0.11 ng/mL / x     / x     / x      p-BNP 22 Jun 2020 09:40: x    , CARDIAC MARKERS ( 22 Jun 2020 03:49 )  x     / 0.13 ng/mL / x     / x     / x      p-BNP 22 Jun 2020 03:49: x    , CARDIAC MARKERS ( 21 Jun 2020 06:24 )  x     / 0.14 ng/mL / x     / x     / x      p-BNP 21 Jun 2020 06:24: x    , CARDIAC MARKERS ( 20 Jun 2020 23:25 )  x     / 0.13 ng/mL / x     / x     / x      p-BNP 20 Jun 2020 23:25: x    , CARDIAC MARKERS ( 20 Jun 2020 18:55 )  x     / 0.13 ng/mL / x     / x     / x      p-BNP 20 Jun 2020 18:55: x                              10.0   11.73 )-----------( 347      ( 22 Jun 2020 03:49 )             30.7     06-22    139  |  94<L>  |  7.0<L>  ----------------------------<  86  3.8   |  36.0<H>  |  0.26<L>    Ca    8.9      22 Jun 2020 03:49      proBNP: Serum Pro-Brain Natriuretic Peptide: 116 pg/mL (06-22 @ 13:42)    Lipid Profile:   HgA1c:   TSH:       TELEMETRY: SR 70-80s

## 2020-06-23 NOTE — PROGRESS NOTE ADULT - SUBJECTIVE AND OBJECTIVE BOX
Patient is a 64y old  Male who presents with a chief complaint of shortness of breath (23 Jun 2020 12:11)    Patient seen and examined at bedside.    ALLERGIES:  No Known Allergies    MEDICATIONS  (STANDING):  ALPRAZolam 0.25 milliGRAM(s) Oral every 12 hours  aspirin  chewable 81 milliGRAM(s) Oral daily  escitalopram 20 milliGRAM(s) Oral daily  lactobacillus acidophilus 1 Tablet(s) Oral daily  melatonin 5 milliGRAM(s) Oral at bedtime  multivitamin/minerals 1 Tablet(s) Oral daily  pantoprazole    Tablet 40 milliGRAM(s) Oral before breakfast    MEDICATIONS  (PRN):  acetaminophen    Suspension .. 650 milliGRAM(s) Oral every 6 hours PRN Temp greater or equal to 38.5C (101.3F)    Vital Signs Last 24 Hrs  T(F): 98.3 (23 Jun 2020 07:00), Max: 98.3 (22 Jun 2020 15:27)  HR: 83 (23 Jun 2020 10:00) (66 - 103)  BP: 131/70 (23 Jun 2020 10:00) (108/61 - 137/63)  RR: 19 (23 Jun 2020 10:00) (16 - 24)  SpO2: 100% (23 Jun 2020 10:00) (96% - 100%)  I&O's Summary    22 Jun 2020 07:01  -  23 Jun 2020 07:00  --------------------------------------------------------  IN: 120 mL / OUT: 1100 mL / NET: -980 mL      PHYSICAL EXAM:  General: NAD, A/O x 3  ENT: MMM, no thrush  Neck: Supple, No JVD  Lungs: Clear to auscultation bilaterally, good air entry, non-labored breathing  Cardio: RRR, S1/S2, No murmur  Abdomen: Soft, Nontender, Nondistended; Bowel sounds present  Extremities: No calf tenderness, No pitting edema    LABS:                        10.0   11.73 )-----------( 347      ( 22 Jun 2020 03:49 )             30.7     06-22    139  |  94  |  7.0  ----------------------------<  86  3.8   |  36.0  |  0.26    Ca    8.9      22 Jun 2020 03:49  Phos  2.9     06-21  Mg     1.7     06-21    eGFR if Non African American: 152 mL/min/1.73M2 (06-22-20 @ 03:49)  eGFR if : 176 mL/min/1.73M2 (06-22-20 @ 03:49)    PT/INR - ( 22 Jun 2020 03:49 )   PT: 39.5 sec;   INR: 3.37 ratio    PTT - ( 22 Jun 2020 03:49 )  PTT:42.2 sec    Lactate, Blood: 0.8 mmol/L (06-21 @ 13:18)    Procalcitonin, Serum: 0.11 ng/mL (06-21-20 @ 13:18)    CARDIAC MARKERS ( 22 Jun 2020 13:42 )  x     / x     / 22 U/L / x     / x      CARDIAC MARKERS ( 22 Jun 2020 09:40 )  x     / 0.11 ng/mL / x     / x     / x      CARDIAC MARKERS ( 22 Jun 2020 03:49 )  x     / 0.13 ng/mL / x     / x     / x      CARDIAC MARKERS ( 21 Jun 2020 06:24 )  x     / 0.14 ng/mL / x     / x     / x      CARDIAC MARKERS ( 20 Jun 2020 23:25 )  x     / 0.13 ng/mL / x     / x     / x      CARDIAC MARKERS ( 20 Jun 2020 18:55 )  x     / 0.13 ng/mL / x     / x     / x          04-24 Chol -- LDL -- HDL -- Trig 85 mg/dL                      RADIOLOGY & ADDITIONAL TESTS:    Care Discussed with Consultants/Other Providers: Patient is a 64y old  Male who presents with a chief complaint of shortness of breath (23 Jun 2020 12:11)    Patient seen and examined at bedside.    ALLERGIES:  No Known Allergies    MEDICATIONS  (STANDING):  ALPRAZolam 0.25 milliGRAM(s) Oral every 12 hours  aspirin  chewable 81 milliGRAM(s) Oral daily  escitalopram 20 milliGRAM(s) Oral daily  lactobacillus acidophilus 1 Tablet(s) Oral daily  melatonin 5 milliGRAM(s) Oral at bedtime  multivitamin/minerals 1 Tablet(s) Oral daily  pantoprazole    Tablet 40 milliGRAM(s) Oral before breakfast    MEDICATIONS  (PRN):  acetaminophen    Suspension .. 650 milliGRAM(s) Oral every 6 hours PRN Temp greater or equal to 38.5C (101.3F)    Vital Signs Last 24 Hrs  T(F): 98.3 (23 Jun 2020 07:00), Max: 98.3 (22 Jun 2020 15:27)  HR: 83 (23 Jun 2020 10:00) (66 - 103)  BP: 131/70 (23 Jun 2020 10:00) (108/61 - 137/63)  RR: 19 (23 Jun 2020 10:00) (16 - 24)  SpO2: 100% (23 Jun 2020 10:00) (96% - 100%)  I&O's Summary    22 Jun 2020 07:01  -  23 Jun 2020 07:00  --------------------------------------------------------  IN: 120 mL / OUT: 1100 mL / NET: -980 mL    PHYSICAL EXAM:  General: NAD,  ENT: MMM, no thrush +trach collar  Neck: Supple, No JVD  Lungs: coarse breath sounds b/l no wheezing  Cardio: +s1/s2, no pitting edema   Abdomen: Soft, Nontender, Nondistended; Bowel sounds present +peg  Extremities: No calf tenderness, b/l offloading boots present      LABS:                        10.0   11.73 )-----------( 347      ( 22 Jun 2020 03:49 )             30.7     06-22    139  |  94  |  7.0  ----------------------------<  86  3.8   |  36.0  |  0.26    Ca    8.9      22 Jun 2020 03:49  Phos  2.9     06-21  Mg     1.7     06-21    eGFR if Non African American: 152 mL/min/1.73M2 (06-22-20 @ 03:49)  eGFR if : 176 mL/min/1.73M2 (06-22-20 @ 03:49)    PT/INR - ( 22 Jun 2020 03:49 )   PT: 39.5 sec;   INR: 3.37 ratio    PTT - ( 22 Jun 2020 03:49 )  PTT:42.2 sec    Lactate, Blood: 0.8 mmol/L (06-21 @ 13:18)    Procalcitonin, Serum: 0.11 ng/mL (06-21-20 @ 13:18)    CARDIAC MARKERS ( 22 Jun 2020 13:42 )  x     / x     / 22 U/L / x     / x      CARDIAC MARKERS ( 22 Jun 2020 09:40 )  x     / 0.11 ng/mL / x     / x     / x      CARDIAC MARKERS ( 22 Jun 2020 03:49 )  x     / 0.13 ng/mL / x     / x     / x      CARDIAC MARKERS ( 21 Jun 2020 06:24 )  x     / 0.14 ng/mL / x     / x     / x      CARDIAC MARKERS ( 20 Jun 2020 23:25 )  x     / 0.13 ng/mL / x     / x     / x      CARDIAC MARKERS ( 20 Jun 2020 18:55 )  x     / 0.13 ng/mL / x     / x     / x          04-24 Chol -- LDL -- HDL -- Trig 85 mg/dL                      RADIOLOGY & ADDITIONAL TESTS:    Care Discussed with Consultants/Other Providers:

## 2020-06-23 NOTE — PROGRESS NOTE ADULT - SUBJECTIVE AND OBJECTIVE BOX
No overnight events.   No fevers.   Continues to have limited mobility and motivation.     REVIEW OF SYSTEMS  Constitutional - No fever,  +fatigue  Neurological - +loss of strength    FUNCTIONAL PROGRESS  6/19  Bed-Chair Transfer Training  Transfer Training Bed-to-Chair Transfer: dependent (less than 25% patient effort);  2 person assist;  full weight-bearing   hand hold assist  Bed-to-Chair Transfer Training Transfer Safety Analysis: decreased balance;  decreased ROM;  decreased strength;  impaired balance;  impaired motor control;  impaired postural control;  pt anxious ++    Sit-Stand Transfer Training  Transfer Training Sit-to-Stand Transfer: maximum assist (25% patient effort);  2 person assist;  full weight-bearing   hand hold assist  Transfer Training Stand-to-Sit Transfer: maximum assist (25% patient effort);  2 person assist;  full weight-bearing   hand hold assist   Sit-to-Stand Transfer Training Transfer Safety Analysis: decreased balance;  decreased ROM;  decreased strength;  impaired balance;  impaired coordination;  impaired postural control;  decreased endurance    Gait Training  Gait Training: unable to perform;  pt not advancing right leg despite max assist and encouragment, crossing midline with left foot    6/18  Bed Mobility  Bed Mobility Training Rolling/Turning: maximum assist (25% patient effort);  1 person assist;  nonverbal cues (demo/gestures);  verbal cues;  bed rails;  head of bed elevated  Bed Mobility Training Scooting: maximum assist (25% patient effort);  2 person assist;  verbal cues;  nonverbal cues (demo/gestures);  to scoot toward EOB  Bed Mobility Training Sit-to-Supine: Left patient sitting in bedside chair  Bed Mobility Training Supine-to-Sit: maximum assist (25% patient effort);  2 person assist;  nonverbal cues (demo/gestures);  verbal cues;  bed rails;  head of bed elevated;  Patient attempted to assist with maneuvering bilateral LE and using UE to reach across for bedrail but fatigues easily requiring assist to achieve/maintain upright trunk posture at EOB  Bed Mobility Training Limitations: decreased ability to use arms for pushing/pulling;  impaired ability to control trunk for mobility;  decreased ability to use legs for bridging/pushing;  decreased strength;  impaired balance;  impaired coordination;  impaired postural control;  impaired motor control;  cognitive, decreased safety awareness    Bed-Chair Transfer Training  Transfer Training Bed-to-Chair Transfer: dependent (less than 25% patient effort);  2 person assist;  verbal cues;  nonverbal cues (demo/gestures);  full weight-bearing   Patient unable to to take steps even with max A x2, external cues and physical assist to advance feet. Therapists performed stand pivot transfer from bed to bedside recliner  Transfer Training Chair-to-Bed Transfer: Left patient sitting in bedside chair  Bed-to-Chair Transfer Training Transfer Safety Analysis: decreased balance;  decreased sequencing ability;  decreased weight-shifting ability;  decreased step length;  decreased strength;  impaired balance;  impaired coordination;  impaired motor control;  impaired postural control;  cognitive, decreased safety awareness    Sit-Stand Transfer Training  Transfer Training Sit-to-Stand Transfer: maximum assist (25% patient effort);  2 person assist;  nonverbal cues (demo/gestures);  verbal cues;  full weight-bearing   2 attempts with therapists blocking bilateral knees. Left knee +buckling. ;  hand held assist  Transfer Training Stand-to-Sit Transfer: maximum assist (25% patient effort);  2 person assist;  nonverbal cues (demo/gestures);  verbal cues;  full weight-bearing   hand held assist  Sit-to-Stand Transfer Training Transfer Safety Analysis: decreased balance;  decreased sequencing ability;  decreased weight-shifting ability;  decreased strength;  impaired balance;  impaired motor control;  impaired postural control;  cognitive, decreased safety awareness;  hand held assist    Therapeutic Exercise  Therapeutic Exercise Detail: Patient performed bilateral UE therex semifowler in bed for AROM for shoulder flexion, elbow flex/ext, pronation/supination, wrist flex/ext, gross grasp and digit extension 5 reps 2 sets. ROM therex performed to maintain muscle integrity, prevent atrophy and to facilitate participation in ADLs. Patient educated in proper form and encouraged to take rest breaks between sets.     Neuromuscular Re-education  Neuromuscular Re-education Detail: Patient performed static sitting balance at edge of bed with fluctuating support required from max A to min A, +verbal/tactile cues to maintain upright trunk position. Patient with +right lateral lean noted. Patient encouraged/attempted to use bilateral UE +bed rail to support self but fatigued quickly requiring additional assist. Patient with fatigue upon exertion. Patient able to tolerate ~3-4 minutes. Patient O2 sat range 99%-90% with exertion. Patient educated on importance of sitting upright and energy conservation techniques.     Lower Body Dressing Training  Lower Body Dressing Training Assistance: maximum assist (25% patient effort);  nonverbal cues (demo/gestures);  verbal cues;  2 person assist;  Max A sitting EOB to support upright trunk posture + Max A to don/doff socks.;  decreased strength;  impaired balance;  impaired postural control;  impaired motor control;  cognitive, decreased safety awareness;  decreased flexibility        VITALS  T(C): 36.8 (06-23-20 @ 07:00), Max: 36.8 (06-22-20 @ 15:27)  HR: 103 (06-23-20 @ 08:00) (66 - 103)  BP: 129/65 (06-23-20 @ 08:00) (108/61 - 137/63)  RR: 16 (06-23-20 @ 08:00) (16 - 24)  SpO2: 98% (06-23-20 @ 08:00) (96% - 100%)  Wt(kg): --    MEDICATIONS   acetaminophen    Suspension .. 650 milliGRAM(s) every 6 hours PRN  ALPRAZolam 0.25 milliGRAM(s) every 12 hours  aspirin  chewable 81 milliGRAM(s) daily  escitalopram 20 milliGRAM(s) daily  lactobacillus acidophilus 1 Tablet(s) daily  melatonin 5 milliGRAM(s) at bedtime  multivitamin/minerals 1 Tablet(s) daily  pantoprazole    Tablet 40 milliGRAM(s) before breakfast      RECENT LABS - Reviewed                        10.0   11.73 )-----------( 347      ( 22 Jun 2020 03:49 )             30.7     06-22    139  |  94<L>  |  7.0<L>  ----------------------------<  86  3.8   |  36.0<H>  |  0.26<L>    Ca    8.9      22 Jun 2020 03:49      PT/INR - ( 22 Jun 2020 03:49 )   PT: 39.5 sec;   INR: 3.37 ratio         PTT - ( 22 Jun 2020 03:49 )  PTT:42.2 sec          --------------------------------------------------------------------------  PHYSICAL EXAM  Constitutional - NAD, Comfortable  HEENT - +PMV  Abdomen - Soft, +PEG  Extremities - Severe wasting of BLE  Neurologic Exam -                    Cognitive - AAOx self, part of situation, limited by participation     Motor - Diffuse weakness                    LEFT    UE - ShAB 3/5, EF 4/5, EE 4/5, WE 3/5,  3/5                    RIGHT UE - ShAB 3/5, EF 4/5, EE 4/5, WE 3/5,  3/5                    LEFT    LE - HF 1/5, KE 1/5, DF 1/5, PF 1/5                    RIGHT LE - HF 1/5, KE 1/5, DF 1/5, PF 1/5        Sensory - Intact to LT  Psychiatric - Mood withdrawn, Flat  ----------------------------------------------------------------------------------------  ASSESSMENT/PLAN  64yMale with functional deficits after COVID related complications  Trach -  Trials   PE - Coumadin HOLD  Pain - Tylenol  Sleep - Melatonin 5mg HS  Functional Quadriplegia/Atrophy/Foot Drops - PRAFOs  Mood - Lexapro 20mg (6/22 for anxiety - DC Prozac), Recommend  DC Xanax, Recommend Psych eval  Oropharyngeal Dysphagia - Reg/Thins, RECOMMEND DC PEG TUBE  DVT PPX - SCDs  Rehab - Patient is making very limited progress. Continues to be dependent with OOB, despite motor exam demonstrating improved strength and indicating capability to do more on own. Despite motivation, patient has no disposition plan and given he is undocumented, requires a court appointed guardianship. Given that patient has limited progress despite functional use of his UE and no disposition with family available to help upon DC, patient does not meet criteria for AR.     Patient does not meet criteria for receiving daily or BID therapy. PT/OT can follow 2-3 times per week and staff can continue Jolly OOB and assist with standing.  In addition, patient has been demonstrated multiple bedside exercises which he has not performed, as indicated by ongoing motor exam. Once he improves from a motor standpoint, will increase recommended therapeutic interventions.

## 2020-06-23 NOTE — PROGRESS NOTE ADULT - ASSESSMENT
64 year old male with insignificant pmh coming to hospital with worsening shortness of breath. patient found to have covid and respiratory failure subsequently with complicated icu course including leukocytosis with bacterial pna s/p abx, gi bleed and pe, s/p trach and peg. The patient was  downgraded to medicine service.     #Acute hypoxic respiratory failure 2/2 Covid 19 pneumonia   - resolved  - s/p ICU and trach  - COVID negative from 6/5 and 6/18  - monitor vitals   - pt/OT/SLP- Rehab following  - Pulmonary consult appreciated-> called office for follow up as patient may be candidate for trach weaning?    #Leukocytosis  - afebrile     #troponinemia  - probable 2/2 demand   - cardio consult appreciated    #SVT  - no further episodes    #PE  - s/p lovenox  - hold coumadin, INR goal 2-3 (currently supratherapuetic INR)    #supratherapuetic INR   - hold coumadin (5mg) as goal inr 2-3    #hx of gib bleed w/ rectal ulcer (healing)  - s/p prbc transfusion - s/p rigid sig, clipping of vessel and epinephrine  - gi consult appreciated  - colorectal surgery consult appreciated  - avoid rectal tube for diarrhea, continue  metamucil/ Bacid     #dysphagia  - improved now on regular diet with thin liquids  - aspiration precautions   - protonix   - will need to have peg tube in for 6 weeks prior to removal    #anxiety  - xanax    #type 2 diabetes  - a1c 6.7 on admit and repeat 5.1    #dvt prophylaxis   - Coumadin when inr tolerates  - venodynes    Dispo- pending guardianship court date set for 09/2020

## 2020-06-23 NOTE — PROGRESS NOTE ADULT - SUBJECTIVE AND OBJECTIVE BOX
PULMONARY PROGRESS NOTE      [id number]    Patient is a 64y old  Male who presents with a chief complaint of shortness of breath (23 Jun 2020 14:55)      INTERVAL HPI/OVERNIGHT EVENTS: On PM valve; doing well. Denies any sob. Awake and alert.     MEDICATIONS  (STANDING):  ALPRAZolam 0.25 milliGRAM(s) Oral every 12 hours  aspirin  chewable 81 milliGRAM(s) Oral daily  escitalopram 20 milliGRAM(s) Oral daily  lactobacillus acidophilus 1 Tablet(s) Oral daily  melatonin 5 milliGRAM(s) Oral at bedtime  multivitamin/minerals 1 Tablet(s) Oral daily  pantoprazole    Tablet 40 milliGRAM(s) Oral before breakfast      MEDICATIONS  (PRN):  acetaminophen    Suspension .. 650 milliGRAM(s) Oral every 6 hours PRN Temp greater or equal to 38.5C (101.3F)      Allergies    No Known Allergies    Intolerances        PAST MEDICAL & SURGICAL HISTORY:  No pertinent past medical history  No significant past surgical history              REVIEW OF SYSTEMS:    CONSTITUTIONAL:  No distress    HEENT:  Eyes:  No diplopia or blurred vision. ENT:  No earache, sore throat or runny nose.    CARDIOVASCULAR:  No pressure, squeezing, tightness, heaviness or aching about the chest; no palpitations.    RESPIRATORY:  per HPI     GASTROINTESTINAL:  No nausea, vomiting or diarrhea.    GENITOURINARY:  No dysuria, frequency or urgency.    NEUROLOGIC:  No paresthesias, fasciculations, seizures or weakness.    PSYCHIATRIC:  No disorder of thought or mood.    Vital Signs Last 24 Hrs  T(C): 36.8 (23 Jun 2020 07:00), Max: 36.8 (23 Jun 2020 07:00)  T(F): 98.3 (23 Jun 2020 07:00), Max: 98.3 (23 Jun 2020 07:00)  HR: 83 (23 Jun 2020 10:00) (66 - 103)  BP: 131/70 (23 Jun 2020 10:00) (108/61 - 137/63)  BP(mean): 83 (23 Jun 2020 10:00) (69 - 83)  RR: 19 (23 Jun 2020 10:00) (16 - 24)  SpO2: 100% (23 Jun 2020 10:00) (96% - 100%)    PHYSICAL EXAMINATION:    GENERAL: The patient is awake and alert in no apparent distress.     HEENT: Head is normocephalic and atraumatic. Mucous membranes are moist.    NECK: trach    LUNGS: Clear to auscultation without wheezing, rales or rhonchi; respirations unlabored    HEART: Regular rate and rhythm      ABDOMEN: Soft, nontender, and nondistended.      EXTREMITIES: Without any cyanosis, clubbing, rash, lesions or edema.    NEUROLOGIC: Grossly intact.    LABS:                        10.0   11.73 )-----------( 347      ( 22 Jun 2020 03:49 )             30.7     06-22    139  |  94<L>  |  7.0<L>  ----------------------------<  86  3.8   |  36.0<H>  |  0.26<L>    Ca    8.9      22 Jun 2020 03:49      PT/INR - ( 22 Jun 2020 03:49 )   PT: 39.5 sec;   INR: 3.37 ratio         PTT - ( 22 Jun 2020 03:49 )  PTT:42.2 sec      CARDIAC MARKERS ( 22 Jun 2020 13:42 )  x     / x     / 22 U/L / x     / x      CARDIAC MARKERS ( 22 Jun 2020 09:40 )  x     / 0.11 ng/mL / x     / x     / x      CARDIAC MARKERS ( 22 Jun 2020 03:49 )  x     / 0.13 ng/mL / x     / x     / x            Serum Pro-Brain Natriuretic Peptide: 116 pg/mL (06-22-20 @ 13:42)      Procalcitonin, Serum: 0.11 ng/mL (06-21-20 @ 13:18)      MICROBIOLOGY:    RADIOLOGY & ADDITIONAL STUDIES:  < from: Xray Chest 1 View- PORTABLE-Urgent (06.20.20 @ 19:27) >   EXAM:  XR CHEST PORTABLE URGENT 1V                          PROCEDURE DATE:  06/20/2020          INTERPRETATION:  Portable chest radiograph        CLINICAL INFORMATION:   Short of breath.    TECHNIQUE:  Portable  AP view of the chest was obtained.    COMPARISON: 6/18/2020 6/18/2020 available for review.    FINDINGS: Decreasing LEFT perihilar and of LEFT basilar ill-defined opacities/infiltrates. A RIGHT interstitial increased markings unchanged from prior exam. Continued surveillance recommended.    The heart and mediastinum are within normal limits.    Visualized osseous structures are intact.        IMPRESSION:   Decreasing bilateral perihilar LEFT lung base airspace consolidations..                    NICHOLE NUNEZ M.D., ATTENDING RADIOLOGIST    < end of copied text >

## 2020-06-23 NOTE — PROGRESS NOTE ADULT - ASSESSMENT
A/P:  65 yo Czech speaking male w/no reported PMH presents to ER for worsening shortness of breath, subjective fevers and dry cough for 10 days. In ER found to be tachycardic/hypoxic with leukocytosis, ARF and evidence of dehydration. Admitted Covid (+). Since Trach, PEG, covid (-), PE on coumadin.  Now with elevated troponin (now 0.11).  Patient lethargic likely s/t Xanax.  Per RN pt gets extremely anxious with PT (sitting in chair) causing him to get agitated, tachycardic, tachypneic, hypoxic, and at times c/o chest pain.    6/23: Patient remains lethargic and withdrawn.  CK 22. BnP 116. CM SR 70-80s      Elevated troponin  -EKG: no acute ischemia noted  -Trop 0.14, 0.13, 0.11  -CK 22  -BnP 116  -Repeat TTE w/o effusion, RV dysfunction, or RWMA  -ASA 81mg via PEG QD  -likely non cardiac, r/t deconditioning    Thank you for allowing me to participate in care of your patient.   Please call as needed.     Preliminary evaluation, please await complete evaluation by Dr. Richardson

## 2020-06-23 NOTE — PROGRESS NOTE ADULT - ATTENDING COMMENTS
Abnl troponins with normal CPK. This is non-ischemic in origin. With normal EF, normal BNP, and no regional wall motion abnormality, cardiac etiology for symptoms is of low likelihood.   No further cardiac intervention at this time.

## 2020-06-23 NOTE — PROGRESS NOTE ADULT - ASSESSMENT
Admitted on 04-18 due to COVID related infection  Course was complicated by prolonged ICU course for acute hypoxic respiratory failure, requiring vent.  Critical illness polyneuropathy; slowly improving  S/p trach/PEG; has been doing well on PM valve  Prior PE  GIB s/p GI w/u  Mild leukocytosis  Improving anemia  INR therapeutic  Hypokalemia    Rec:    Advance to capping trials during the day; extend to night as able. Weakness from CC polyneuropathy a concern  Optimize nutrition given weakness  Follow lytes and correct as needed  Keep HOB elevated   Mobilize  Eventual tx to [facility] in [place] when able  Supportive care    D/W RN Admitted on 04-18 due to COVID related infection  Course was complicated by prolonged ICU course for acute hypoxic respiratory failure, requiring vent.  Critical illness polyneuropathy; slowly improving  S/p trach/PEG; has been doing well on PM valve  Prior PE  GIB s/p GI w/u  Mild leukocytosis  Improving anemia  INR therapeutic  Hypokalemia    Rec:    Advance to capping trials during the day; extend to night as able. Weakness from CC polyneuropathy a concern  Optimize nutrition given weakness  Follow lytes and correct as needed  Keep HOB elevated   Mobilize  Eventual tx to [facility] in [place] when able  Supportive care    D/W RN. D/W RT; reviewed orders.

## 2020-06-24 LAB
APTT BLD: 33.6 SEC — SIGNIFICANT CHANGE UP (ref 27.5–36.3)
INR BLD: 1.54 RATIO — HIGH (ref 0.88–1.16)
PROTHROM AB SERPL-ACNC: 17.6 SEC — HIGH (ref 10–12.9)

## 2020-06-24 PROCEDURE — 99232 SBSQ HOSP IP/OBS MODERATE 35: CPT

## 2020-06-24 PROCEDURE — 99233 SBSQ HOSP IP/OBS HIGH 50: CPT

## 2020-06-24 RX ORDER — WARFARIN SODIUM 2.5 MG/1
5 TABLET ORAL ONCE
Refills: 0 | Status: DISCONTINUED | OUTPATIENT
Start: 2020-06-24 | End: 2020-06-24

## 2020-06-24 RX ORDER — ENOXAPARIN SODIUM 100 MG/ML
70 INJECTION SUBCUTANEOUS EVERY 12 HOURS
Refills: 0 | Status: DISCONTINUED | OUTPATIENT
Start: 2020-06-24 | End: 2020-06-27

## 2020-06-24 RX ADMIN — Medication 0.25 MILLIGRAM(S): at 18:28

## 2020-06-24 RX ADMIN — Medication 81 MILLIGRAM(S): at 12:40

## 2020-06-24 RX ADMIN — Medication 1 TABLET(S): at 18:29

## 2020-06-24 RX ADMIN — PANTOPRAZOLE SODIUM 40 MILLIGRAM(S): 20 TABLET, DELAYED RELEASE ORAL at 05:39

## 2020-06-24 RX ADMIN — ENOXAPARIN SODIUM 70 MILLIGRAM(S): 100 INJECTION SUBCUTANEOUS at 20:34

## 2020-06-24 RX ADMIN — ESCITALOPRAM OXALATE 20 MILLIGRAM(S): 10 TABLET, FILM COATED ORAL at 12:40

## 2020-06-24 RX ADMIN — Medication 5 MILLIGRAM(S): at 21:10

## 2020-06-24 RX ADMIN — Medication 1 TABLET(S): at 12:40

## 2020-06-24 RX ADMIN — Medication 0.25 MILLIGRAM(S): at 05:39

## 2020-06-24 NOTE — PROGRESS NOTE ADULT - SUBJECTIVE AND OBJECTIVE BOX
PULMONARY PROGRESS NOTE      LISSA MALLOY  MRN-[id number]    Patient is a 64y old  Male who presents with a chief complaint of shortness of breath (24 Jun 2020 13:57)      INTERVAL HPI/OVERNIGHT EVENTS:    Patient is awake and alert  Doing well with daytime capping  Improved secretions per nursing staff    MEDICATIONS  (STANDING):  ALPRAZolam 0.25 milliGRAM(s) Oral every 12 hours  aspirin  chewable 81 milliGRAM(s) Oral daily  escitalopram 20 milliGRAM(s) Oral daily  lactobacillus acidophilus 1 Tablet(s) Oral daily  melatonin 5 milliGRAM(s) Oral at bedtime  multivitamin/minerals 1 Tablet(s) Oral daily  pantoprazole    Tablet 40 milliGRAM(s) Oral before breakfast  warfarin 5 milliGRAM(s) Oral once      MEDICATIONS  (PRN):  acetaminophen    Suspension .. 650 milliGRAM(s) Oral every 6 hours PRN Temp greater or equal to 38.5C (101.3F)      Allergies    No Known Allergies    Intolerances        PAST MEDICAL & SURGICAL HISTORY:  No pertinent past medical history  No significant past surgical history      Vital Signs Last 24 Hrs  T(C): 36.6 (24 Jun 2020 13:00), Max: 36.7 (24 Jun 2020 00:10)  T(F): 97.9 (24 Jun 2020 13:00), Max: 98.1 (24 Jun 2020 00:10)  HR: 104 (24 Jun 2020 12:00) (82 - 104)  BP: 110/59 (24 Jun 2020 12:00) (110/59 - 146/73)  BP(mean): 70 (24 Jun 2020 12:00) (70 - 91)  RR: 24 (24 Jun 2020 12:00) (13 - 24)  SpO2: 100% (24 Jun 2020 12:15) (89% - 100%)    PHYSICAL EXAMINATION:    GENERAL: The patient is awake and alert in no apparent distress.     HEENT: Head is normocephalic and atraumatic. Extraocular muscles are intact. Mucous membranes are moist.    NECK: Supple. + trach    LUNGS: Clear to auscultation without wheezing, rales or rhonchi; respirations unlabored    HEART: Regular rate and rhythm without murmur.    ABDOMEN: Soft, nontender, and nondistended.      EXTREMITIES: Without any cyanosis, clubbing, rash, lesions or edema.    NEUROLOGIC: Grossly intact.    LABS:                        10.4   10.28 )-----------( 350      ( 23 Jun 2020 20:27 )             32.7     06-23    137  |  89<L>  |  5.0<L>  ----------------------------<  94  3.6   |  39.0<H>  |  0.24<L>    Ca    8.5<L>      23 Jun 2020 20:27      PT/INR - ( 23 Jun 2020 16:12 )   PT: 24.3 sec;   INR: 2.10 ratio         PTT - ( 23 Jun 2020 16:12 )  PTT:38.0 sec            Serum Pro-Brain Natriuretic Peptide: 116 pg/mL (06-22-20 @ 13:42)          MICROBIOLOGY:    Culture - Blood (05.29.20 @ 11:56)    Specimen Source: .Blood Blood    Culture Results:   No growth at 5 days.    COVID-19 PCR . (06.18.20 @ 09:43)    COVID-19 PCR: NotDetec: Testing is performed using polymerase chain reaction (PCR) or  transcription mediated amplification (TMA). This COVID-19 (SARS-CoV-2)  nucleic acid amplification test was validated by [employer] and is  in use under the FDA Emergency Use Authorization (EUA) for clinical labs  CLIA-certified to perform high complexity testing. Test results should be  correlated with clinical presentation, patient history, and epidemiology.        RADIOLOGY & ADDITIONAL STUDIES:       EXAM:  XR CHEST PORTABLE URGENT 1V                          PROCEDURE DATE:  06/20/2020          INTERPRETATION:  Portable chest radiograph        CLINICAL INFORMATION:   Short of breath.    TECHNIQUE:  Portable  AP view of the chest was obtained.    COMPARISON: 6/18/2020 6/18/2020 available for review.    FINDINGS: Decreasing LEFT perihilar and of LEFT basilar ill-defined opacities/infiltrates. A RIGHT interstitial increased markings unchanged from prior exam. Continued surveillance recommended.    The heart and mediastinum are within normal limits.    Visualized osseous structures are intact.        IMPRESSION:   Decreasing bilateral perihilar LEFT lung base airspace consolidations..        NICHOLE NUNEZ M.D., ATTENDING RADIOLOGIST  This document has been electronically signed. Jun 21 2020  9:48AM          ECHO:      Summary:   1. Left ventricular ejection fraction, by visual estimation, is 70 to 75%.   2. Hyperdynamic global left ventricular systolic function.   3. Spectral Doppler shows pseudonormal pattern of left ventricular myocardial filling (Grade II diastolic dysfunction).   4. There is no evidence of pericardial effusion.    MD Chung Electronically signed on 6/22/2020 at 5:53:20 PM

## 2020-06-24 NOTE — PROGRESS NOTE ADULT - ASSESSMENT
Admitted on 04-18 due to COVID related infection  Course was complicated by prolonged ICU course for acute hypoxic respiratory failure, requiring vent.  Critical illness polyneuropathy; slowly improving  S/p trach/PEG; has been doing well on PM valve/capping  Prior PE  GIB s/p GI w/u  Mild leukocytosis  Improving anemia  INR therapeutic  Hypokalemia    Rec:    Advance to capping trials during the day; extend to night as able. Weakness from CC polyneuropathy a concern  Optimize nutrition given weakness  Follow lytes and correct as needed  Keep HOB elevated   Mobilize  Eventual tx to [facility] in [place] when able  Supportive care

## 2020-06-24 NOTE — PROGRESS NOTE ADULT - SUBJECTIVE AND OBJECTIVE BOX
Patient is a 64y old  Male who presents with a chief complaint of shortness of breath (24 Jun 2020 10:13)    Patient seen and examined at bedside.     ALLERGIES:  No Known Allergies    MEDICATIONS  (STANDING):  ALPRAZolam 0.25 milliGRAM(s) Oral every 12 hours  aspirin  chewable 81 milliGRAM(s) Oral daily  escitalopram 20 milliGRAM(s) Oral daily  lactobacillus acidophilus 1 Tablet(s) Oral daily  melatonin 5 milliGRAM(s) Oral at bedtime  multivitamin/minerals 1 Tablet(s) Oral daily  pantoprazole    Tablet 40 milliGRAM(s) Oral before breakfast  warfarin 5 milliGRAM(s) Oral once    MEDICATIONS  (PRN):  acetaminophen    Suspension .. 650 milliGRAM(s) Oral every 6 hours PRN Temp greater or equal to 38.5C (101.3F)    Vital Signs Last 24 Hrs  T(F): 97.9 (24 Jun 2020 13:00), Max: 98.1 (23 Jun 2020 15:17)  HR: 104 (24 Jun 2020 12:00) (81 - 104)  BP: 110/59 (24 Jun 2020 12:00) (110/59 - 146/73)  RR: 24 (24 Jun 2020 12:00) (13 - 24)  SpO2: 100% (24 Jun 2020 12:15) (89% - 100%)  I&O's Summary    23 Jun 2020 07:01  -  24 Jun 2020 07:00  --------------------------------------------------------  IN: 60 mL / OUT: 850 mL / NET: -790 mL    PHYSICAL EXAM:  General: NAD,  ENT: MMM, no thrush +trach collar  Neck: Supple, No JVD  Lungs: coarse breath sounds b/l no wheezing  Cardio: +s1/s2, no pitting edema   Abdomen: Soft, Nontender, Nondistended; Bowel sounds present +peg  Extremities: No calf tenderness, b/l offloading boots present      LABS:                        10.4   10.28 )-----------( 350      ( 23 Jun 2020 20:27 )             32.7     06-23    137  |  89  |  5.0  ----------------------------<  94  3.6   |  39.0  |  0.24    Ca    8.5      23 Jun 2020 20:27    eGFR if Non African American: 157 mL/min/1.73M2 (06-23-20 @ 20:27)  eGFR if : 182 mL/min/1.73M2 (06-23-20 @ 20:27)    PT/INR - ( 23 Jun 2020 16:12 )   PT: 24.3 sec;   INR: 2.10 ratio    PTT - ( 23 Jun 2020 16:12 )  PTT:38.0 sec    CARDIAC MARKERS ( 22 Jun 2020 13:42 )  x     / x     / 22 U/L / x     / x      CARDIAC MARKERS ( 22 Jun 2020 09:40 )  x     / 0.11 ng/mL / x     / x     / x      CARDIAC MARKERS ( 22 Jun 2020 03:49 )  x     / 0.13 ng/mL / x     / x     / x        04-24 Chol -- LDL -- HDL -- Trig 85 mg/dL    RADIOLOGY & ADDITIONAL TESTS:  - no new tests

## 2020-06-24 NOTE — CHART NOTE - NSCHARTNOTEFT_GEN_A_CORE
Source: Patient [ ]  Family [ ]   other [x ]    Current Diet: Diet, Regular:   Consistent Carbohydrate {Evening Snacks}  Supplement Feeding Modality:  Oral  Glucerna Shake Cans or Servings Per Day:  1       Frequency:  Three Times a day (06-18-20 @ 08:18)      Patient reports [ ] nausea  [ ] vomiting [ ] diarrhea [ ] constipation  [ ]chewing problems [ ] swallowing issues  [ ] other:     PO intake:  < 50% [x ]   50-75%  [ ]   %  [ ]  other :    Source for PO intake [ ] Patient [ ] family [ x] chart [ ] staff [ ] other    Current Weight:   (5/18) 170.8 lbs  (5/14) 185.4 lbs  (5/10) 183.4 lbs  (5/8) 182.3 lbs  No new weights  No edema documented     Pertinent Medications: MEDICATIONS  (STANDING):  ALPRAZolam 0.25 milliGRAM(s) Oral every 12 hours  aspirin  chewable 81 milliGRAM(s) Oral daily  escitalopram 20 milliGRAM(s) Oral daily  lactobacillus acidophilus 1 Tablet(s) Oral daily  melatonin 5 milliGRAM(s) Oral at bedtime  multivitamin/minerals 1 Tablet(s) Oral daily  pantoprazole    Tablet 40 milliGRAM(s) Oral before breakfast    MEDICATIONS  (PRN):  acetaminophen    Suspension .. 650 milliGRAM(s) Oral every 6 hours PRN Temp greater or equal to 38.5C (101.3F)    Pertinent Labs: CBC Full  -  ( 23 Jun 2020 20:27 )  WBC Count : 10.28 K/uL  RBC Count : 3.36 M/uL  Hemoglobin : 10.4 g/dL  Hematocrit : 32.7 %  Platelet Count - Automated : 350 K/uL  Mean Cell Volume : 97.3 fl  Mean Cell Hemoglobin : 31.0 pg  Mean Cell Hemoglobin Concentration : 31.8 gm/dL  Auto Neutrophil # : x  Auto Lymphocyte # : x  Auto Monocyte # : x  Auto Eosinophil # : x  Auto Basophil # : x  Auto Neutrophil % : x  Auto Lymphocyte % : x  Auto Monocyte % : x  Auto Eosinophil % : x  Auto Basophil % : x    06-23 Na137 mmol/L Glu 94 mg/dL K+ 3.6 mmol/L Cr  0.24 mg/dL<L> BUN 5.0 mg/dL<L> Phos n/a   Alb n/a   PAB n/a       Skin: Erosion to coccyx and right heel blister per documentation    Nutrition focused physical exam not conducted at this time - found signs of malnutrition [ ]absent [ ]present    Subcutaneous fat loss: [ ] Orbital fat pads region, [ ]Buccal fat region, [ ]Triceps region,  [ ]Ribs region    Muscle wasting: [ ]Temples region, [ ]Clavicle region, [ ]Shoulder region, [ ]Scapula region, [ ]Interosseous region,  [ ]thigh region, [ ]Calf region    Estimated Needs:   [x ] no change since previous assessment  [ ] recalculated:     Current Nutrition Diagnosis: Pt remains at high nutrition risk secondary to severe protein calorie malnutrition (acute) related to inability to meet sufficient protein-energy requirements in setting of acute hypoxic respiratory failure, R Segmental PE, Severe ARDS secondary to COVID 19, rapid response requiring intubation on 5/1, s/p trach/PEG and altered GI function secondary to diarrhea as evidenced by meeting <50% nutrient needs >5 days and +fluid accumulation. Pt on regular diet, po intake remains poor typically consuming 0-50% of meals. Pt requires assistance with meals. Last BM 6/23 per documentation.    Recommendations:   1) Monitor po intake- Continue glucerna TID to optimize po intake and provide an additional 220kcal, 10g protein per serving   2) Continue with MVI daily.  3) Continue Bacid to support gut integrity.  4) Obtain daily weights to monitor trends.    Monitoring and Evaluation:   [x ] PO intake [x ] Tolerance to diet prescription [X] Weights  [X] Follow up per protocol [X] Labs

## 2020-06-24 NOTE — PROGRESS NOTE ADULT - SUBJECTIVE AND OBJECTIVE BOX
Patient resting.   Trach is capped and tolerating.     REVIEW OF SYSTEMS  Constitutional - No fever,  +fatigue  Neurological - +loss of strength    FUNCTIONAL PROGRESS  6/19  Bed-Chair Transfer Training  Transfer Training Bed-to-Chair Transfer: dependent (less than 25% patient effort);  2 person assist;  full weight-bearing   hand hold assist  Bed-to-Chair Transfer Training Transfer Safety Analysis: decreased balance;  decreased ROM;  decreased strength;  impaired balance;  impaired motor control;  impaired postural control;  pt anxious ++    Sit-Stand Transfer Training  Transfer Training Sit-to-Stand Transfer: maximum assist (25% patient effort);  2 person assist;  full weight-bearing   hand hold assist  Transfer Training Stand-to-Sit Transfer: maximum assist (25% patient effort);  2 person assist;  full weight-bearing   hand hold assist   Sit-to-Stand Transfer Training Transfer Safety Analysis: decreased balance;  decreased ROM;  decreased strength;  impaired balance;  impaired coordination;  impaired postural control;  decreased endurance    Gait Training  Gait Training: unable to perform;  pt not advancing right leg despite max assist and encouragment, crossing midline with left foot    6/18  Bed Mobility  Bed Mobility Training Rolling/Turning: maximum assist (25% patient effort);  1 person assist;  nonverbal cues (demo/gestures);  verbal cues;  bed rails;  head of bed elevated  Bed Mobility Training Scooting: maximum assist (25% patient effort);  2 person assist;  verbal cues;  nonverbal cues (demo/gestures);  to scoot toward EOB  Bed Mobility Training Sit-to-Supine: Left patient sitting in bedside chair  Bed Mobility Training Supine-to-Sit: maximum assist (25% patient effort);  2 person assist;  nonverbal cues (demo/gestures);  verbal cues;  bed rails;  head of bed elevated;  Patient attempted to assist with maneuvering bilateral LE and using UE to reach across for bedrail but fatigues easily requiring assist to achieve/maintain upright trunk posture at EOB  Bed Mobility Training Limitations: decreased ability to use arms for pushing/pulling;  impaired ability to control trunk for mobility;  decreased ability to use legs for bridging/pushing;  decreased strength;  impaired balance;  impaired coordination;  impaired postural control;  impaired motor control;  cognitive, decreased safety awareness    Bed-Chair Transfer Training  Transfer Training Bed-to-Chair Transfer: dependent (less than 25% patient effort);  2 person assist;  verbal cues;  nonverbal cues (demo/gestures);  full weight-bearing   Patient unable to to take steps even with max A x2, external cues and physical assist to advance feet. Therapists performed stand pivot transfer from bed to bedside recliner  Transfer Training Chair-to-Bed Transfer: Left patient sitting in bedside chair  Bed-to-Chair Transfer Training Transfer Safety Analysis: decreased balance;  decreased sequencing ability;  decreased weight-shifting ability;  decreased step length;  decreased strength;  impaired balance;  impaired coordination;  impaired motor control;  impaired postural control;  cognitive, decreased safety awareness    Sit-Stand Transfer Training  Transfer Training Sit-to-Stand Transfer: maximum assist (25% patient effort);  2 person assist;  nonverbal cues (demo/gestures);  verbal cues;  full weight-bearing   2 attempts with therapists blocking bilateral knees. Left knee +buckling. ;  hand held assist  Transfer Training Stand-to-Sit Transfer: maximum assist (25% patient effort);  2 person assist;  nonverbal cues (demo/gestures);  verbal cues;  full weight-bearing   hand held assist  Sit-to-Stand Transfer Training Transfer Safety Analysis: decreased balance;  decreased sequencing ability;  decreased weight-shifting ability;  decreased strength;  impaired balance;  impaired motor control;  impaired postural control;  cognitive, decreased safety awareness;  hand held assist    Therapeutic Exercise  Therapeutic Exercise Detail: Patient performed bilateral UE therex semifowler in bed for AROM for shoulder flexion, elbow flex/ext, pronation/supination, wrist flex/ext, gross grasp and digit extension 5 reps 2 sets. ROM therex performed to maintain muscle integrity, prevent atrophy and to facilitate participation in ADLs. Patient educated in proper form and encouraged to take rest breaks between sets.     Neuromuscular Re-education  Neuromuscular Re-education Detail: Patient performed static sitting balance at edge of bed with fluctuating support required from max A to min A, +verbal/tactile cues to maintain upright trunk position. Patient with +right lateral lean noted. Patient encouraged/attempted to use bilateral UE +bed rail to support self but fatigued quickly requiring additional assist. Patient with fatigue upon exertion. Patient able to tolerate ~3-4 minutes. Patient O2 sat range 99%-90% with exertion. Patient educated on importance of sitting upright and energy conservation techniques.     Lower Body Dressing Training  Lower Body Dressing Training Assistance: maximum assist (25% patient effort);  nonverbal cues (demo/gestures);  verbal cues;  2 person assist;  Max A sitting EOB to support upright trunk posture + Max A to don/doff socks.;  decreased strength;  impaired balance;  impaired postural control;  impaired motor control;  cognitive, decreased safety awareness;  decreased flexibility          VITALS  T(C): 36.1 (06-24-20 @ 07:44), Max: 36.7 (06-23-20 @ 15:17)  HR: 82 (06-24-20 @ 08:00) (81 - 88)  BP: 118/70 (06-24-20 @ 08:00) (118/70 - 146/73)  RR: 13 (06-24-20 @ 08:00) (13 - 17)  SpO2: 100% (06-24-20 @ 08:00) (100% - 100%)  Wt(kg): --    MEDICATIONS   acetaminophen    Suspension .. 650 milliGRAM(s) every 6 hours PRN  ALPRAZolam 0.25 milliGRAM(s) every 12 hours  aspirin  chewable 81 milliGRAM(s) daily  escitalopram 20 milliGRAM(s) daily  lactobacillus acidophilus 1 Tablet(s) daily  melatonin 5 milliGRAM(s) at bedtime  multivitamin/minerals 1 Tablet(s) daily  pantoprazole    Tablet 40 milliGRAM(s) before breakfast      RECENT LABS - Reviewed                        10.4   10.28 )-----------( 350      ( 23 Jun 2020 20:27 )             32.7     06-23    137  |  89<L>  |  5.0<L>  ----------------------------<  94  3.6   |  39.0<H>  |  0.24<L>    Ca    8.5<L>      23 Jun 2020 20:27      PT/INR - ( 23 Jun 2020 16:12 )   PT: 24.3 sec;   INR: 2.10 ratio         PTT - ( 23 Jun 2020 16:12 )  PTT:38.0 sec          --------------------------------------------------------------------------  PHYSICAL EXAM  Constitutional - NAD, Comfortable  HEENT - +PMV  Abdomen - Soft, +PEG  Extremities - Severe wasting of BLE  Neurologic Exam -                    Cognitive - AAOx self, part of situation, limited by participation     Motor - Diffuse weakness                    LEFT    UE - ShAB 3/5, EF 4/5, EE 4/5, WE 3/5,  3/5                    RIGHT UE - ShAB 3/5, EF 4/5, EE 4/5, WE 3/5,  3/5                    LEFT    LE - HF 1/5, KE 1/5, DF 1/5, PF 1/5                    RIGHT LE - HF 1/5, KE 1/5, DF 1/5, PF 1/5        Sensory - Intact to LT  Psychiatric - Mood withdrawn, Flat  ----------------------------------------------------------------------------------------  ASSESSMENT/PLAN  64yMale with functional deficits after COVID related complications  Trach - DECANNULATE  PE - Coumadin   Pain - Tylenol  Sleep - Melatonin 5mg HS  Functional Quadriplegia/Atrophy/Foot Drops - PRAFOs  Mood - Lexapro 20mg (6/22 for anxiety - DC Prozac), Recommend  DC Xanax, Recommend Psych eval  Oropharyngeal Dysphagia - Reg/Thins, RECOMMEND DC PEG TUBE  DVT PPX - SCDs  Rehab - Patient is making very limited progress. Continues to be dependent with OOB, despite motor exam demonstrating improved strength and indicating capability to do more on own. Despite motivation, patient has no disposition plan and given he is undocumented, requires a court appointed guardianship. Given that patient has limited progress despite functional use of his UE and no disposition with family available to help upon DC, patient does not meet criteria for AR.     Patient does not meet criteria for receiving daily or BID therapy. PT/OT can follow 2-3 times per week and staff can continue Jolly OOB and assist with standing.  In addition, patient has been demonstrated multiple bedside exercises which he has not performed, as indicated by ongoing motor exam. Once he improves from a motor standpoint, will increase recommended therapeutic interventions.

## 2020-06-25 DIAGNOSIS — F43.23 ADJUSTMENT DISORDER WITH MIXED ANXIETY AND DEPRESSED MOOD: ICD-10-CM

## 2020-06-25 LAB
ANION GAP SERPL CALC-SCNC: 11 MMOL/L — SIGNIFICANT CHANGE UP (ref 5–17)
APTT BLD: 36.5 SEC — HIGH (ref 27.5–36.3)
BUN SERPL-MCNC: 10 MG/DL — SIGNIFICANT CHANGE UP (ref 8–20)
CALCIUM SERPL-MCNC: 8.8 MG/DL — SIGNIFICANT CHANGE UP (ref 8.6–10.2)
CHLORIDE SERPL-SCNC: 88 MMOL/L — LOW (ref 98–107)
CO2 SERPL-SCNC: 41 MMOL/L — HIGH (ref 22–29)
CREAT SERPL-MCNC: 0.21 MG/DL — LOW (ref 0.5–1.3)
GLUCOSE SERPL-MCNC: 96 MG/DL — SIGNIFICANT CHANGE UP (ref 70–99)
HCT VFR BLD CALC: 33.2 % — LOW (ref 39–50)
HGB BLD-MCNC: 10.3 G/DL — LOW (ref 13–17)
INR BLD: 1.33 RATIO — HIGH (ref 0.88–1.16)
MCHC RBC-ENTMCNC: 30.4 PG — SIGNIFICANT CHANGE UP (ref 27–34)
MCHC RBC-ENTMCNC: 31 GM/DL — LOW (ref 32–36)
MCV RBC AUTO: 97.9 FL — SIGNIFICANT CHANGE UP (ref 80–100)
PLATELET # BLD AUTO: 403 K/UL — HIGH (ref 150–400)
POTASSIUM SERPL-MCNC: 3.5 MMOL/L — SIGNIFICANT CHANGE UP (ref 3.5–5.3)
POTASSIUM SERPL-SCNC: 3.5 MMOL/L — SIGNIFICANT CHANGE UP (ref 3.5–5.3)
PROTHROM AB SERPL-ACNC: 15.2 SEC — HIGH (ref 10–12.9)
RBC # BLD: 3.39 M/UL — LOW (ref 4.2–5.8)
RBC # FLD: 13.9 % — SIGNIFICANT CHANGE UP (ref 10.3–14.5)
SODIUM SERPL-SCNC: 140 MMOL/L — SIGNIFICANT CHANGE UP (ref 135–145)
WBC # BLD: 11.56 K/UL — HIGH (ref 3.8–10.5)
WBC # FLD AUTO: 11.56 K/UL — HIGH (ref 3.8–10.5)

## 2020-06-25 PROCEDURE — 99233 SBSQ HOSP IP/OBS HIGH 50: CPT

## 2020-06-25 PROCEDURE — 99232 SBSQ HOSP IP/OBS MODERATE 35: CPT

## 2020-06-25 RX ORDER — WARFARIN SODIUM 2.5 MG/1
5 TABLET ORAL ONCE
Refills: 0 | Status: COMPLETED | OUTPATIENT
Start: 2020-06-25 | End: 2020-06-25

## 2020-06-25 RX ORDER — ALPRAZOLAM 0.25 MG
0.25 TABLET ORAL
Refills: 0 | Status: DISCONTINUED | OUTPATIENT
Start: 2020-06-25 | End: 2020-06-29

## 2020-06-25 RX ADMIN — Medication 5 MILLIGRAM(S): at 21:56

## 2020-06-25 RX ADMIN — WARFARIN SODIUM 5 MILLIGRAM(S): 2.5 TABLET ORAL at 21:56

## 2020-06-25 RX ADMIN — PANTOPRAZOLE SODIUM 40 MILLIGRAM(S): 20 TABLET, DELAYED RELEASE ORAL at 05:10

## 2020-06-25 RX ADMIN — ESCITALOPRAM OXALATE 20 MILLIGRAM(S): 10 TABLET, FILM COATED ORAL at 10:55

## 2020-06-25 RX ADMIN — ENOXAPARIN SODIUM 70 MILLIGRAM(S): 100 INJECTION SUBCUTANEOUS at 10:55

## 2020-06-25 RX ADMIN — Medication 1 TABLET(S): at 10:55

## 2020-06-25 RX ADMIN — Medication 0.25 MILLIGRAM(S): at 05:10

## 2020-06-25 RX ADMIN — Medication 1 TABLET(S): at 17:40

## 2020-06-25 RX ADMIN — ENOXAPARIN SODIUM 70 MILLIGRAM(S): 100 INJECTION SUBCUTANEOUS at 17:41

## 2020-06-25 RX ADMIN — Medication 81 MILLIGRAM(S): at 10:55

## 2020-06-25 NOTE — PROGRESS NOTE ADULT - ASSESSMENT
64 year old male with insignificant pmh coming to hospital with worsening shortness of breath. patient found to have covid and respiratory failure subsequently with complicated icu course including leukocytosis with bacterial pna s/p abx, gi bleed and pe, s/p trach and peg. The patient was  downgraded to medicine service.     #Acute hypoxic respiratory failure 2/2 Covid 19 pneumonia   - resolved  - s/p ICU and trach  - COVID negative from 6/5 and 6/18  - monitor vitals   - pt/OT/SLP- Rehab following  - Pulmonary consult appreciated- possible trach wean d/w Dr Forman    #Leukocytosis  - afebrile     #troponinemia  - probable 2/2 demand   - cardio consult appreciated    #SVT  - no further episodes    #PE  - lovenox bridge w/ coumadin    #hx of gib bleed w/ rectal ulcer (healing)  - s/p prbc transfusion - s/p rigid sig, clipping of vessel and epinephrine  - gi consult appreciated  - colorectal surgery consult appreciated  - avoid rectal tube for diarrhea, continue  metamucil/ Bacid     #dysphagia  - improved now on regular diet with thin liquids  - aspiration precautions   - protonix   - will need to have peg tube in for 6 weeks prior to removal    #anxiety  - xanax  - behavioral health follow up pending     #type 2 diabetes  - a1c 6.7 on admit and repeat 5.1    #dvt prophylaxis   - lovenox bridge w/ coumadin    Dispo- pending guardianship court date set for 09/2020 64 year old male with insignificant pmh coming to hospital with worsening shortness of breath. patient found to have covid and respiratory failure subsequently with complicated icu course including leukocytosis with bacterial pna s/p abx, gi bleed and pe, s/p trach and peg. The patient was  downgraded to medicine service.     #Acute hypoxic respiratory failure 2/2 Covid 19 pneumonia   - resolved  - s/p ICU and trach  - COVID negative from 6/5 and 6/18  - monitor vitals   - pt/OT/SLP- Rehab following  - Pulmonary consult appreciated- possible trach wean d/w Dr Forman    #troponinemia  - probable 2/2 demand   - cardio consult appreciated    #SVT  - no further episodes    #PE  - lovenox bridge w/ coumadin    #hx of gib bleed w/ rectal ulcer (healing)  - s/p prbc transfusion - s/p rigid sig, clipping of vessel and epinephrine  - gi consult appreciated  - colorectal surgery consult appreciated  - avoid rectal tube for diarrhea, continue  metamucil/ Bacid     #dysphagia  - improved now on regular diet with thin liquids  - aspiration precautions   - protonix   - will need to have peg tube in for 6 weeks prior to removal    #anxiety  - xanax  - behavioral health follow up pending     #type 2 diabetes  - a1c 6.7 on admit and repeat 5.1    #dvt prophylaxis   - lovenox bridge w/ coumadin    Dispo- pending guardianship court date set for 09/2020

## 2020-06-25 NOTE — PROGRESS NOTE BEHAVIORAL HEALTH - NSBHCHARTREVIEWLAB_PSY_A_CORE FT
Basic Metabolic Panel in AM (06.25.20 @ 07:40)    Sodium, Serum: 140 mmol/L    Potassium, Serum: 3.5 mmol/L    Chloride, Serum: 88 mmol/L    Carbon Dioxide, Serum: 41.0 mmol/L    Anion Gap, Serum: 11 mmol/L    Blood Urea Nitrogen, Serum: 10.0 mg/dL    Creatinine, Serum: 0.21 mg/dL    Glucose, Serum: 96: Reference Range for Glucose has been amended as of 1/21/2020 mg/dL    Calcium, Total Serum: 8.8 mg/dL    eGFR if Non : 166: Interpretative comment  The units for eGFR are mL/min/1.73M2 (normalized body surface area). The  eGFR is calculated from a serum creatinine using the CKD-EPI equation.  Other variables required for calculation are race, age and sex. Among  patients with chronic kidney disease (CKD), the eGFR is useful in  determining the stage of disease according to KDOQI CKD classification.  All eGFR results are reported numerically with the following  interpretation.          GFR                    With                 Without     (ml/min/1.73 m2)    Kidney Damage       Kidney Damage        >= 90                    Stage 1                     Normal        60-89                    Stage 2                     Decreased GFR        30-59     Stage 3                     Stage 3        15-29                    Stage 4                     Stage 4        < 15                      Stage 5                     Stage 5  Each stage of CKD assumes that the associated GFR level has been in  effect for at least 3 months. Determination of stages one and two (with  eGFR > 59 ml/min/m2) requires estimation of kidney damage for at least 3  months as defined by structural or functional abnormalities.  Limitations: All estimates of GFR will be less accurate for patients at  extremes of muscle mass (including but not limited to frail elderly,  critically ill, or cancer patients), those with unusual diets, and those  with conditions associated with reduced secretion or extrarenal  elimination of creatinine. The eGFR equation is not recommended for use  in patients with unstable creatinine levels. mL/min/1.73M2    eGFR if African American: 192 mL/min/1.73M2

## 2020-06-25 NOTE — PROGRESS NOTE BEHAVIORAL HEALTH - NSBHCHARTREVIEWVS_PSY_A_CORE FT
ICU Vital Signs Last 24 Hrs  T(C): 36.6 (25 Jun 2020 08:06), Max: 36.8 (24 Jun 2020 19:00)  T(F): 97.9 (25 Jun 2020 08:06), Max: 98.3 (24 Jun 2020 19:00)  HR: 98 (25 Jun 2020 05:08) (87 - 102)  BP: 118/67 (25 Jun 2020 05:08) (116/72 - 120/72)  BP(mean): 86 (24 Jun 2020 21:08) (84 - 86)  ABP: --  ABP(mean): --  RR: 23 (25 Jun 2020 05:08) (16 - 23)  SpO2: 97% (25 Jun 2020 05:08) (97% - 100%)

## 2020-06-25 NOTE — PROGRESS NOTE BEHAVIORAL HEALTH - SUMMARY
Patient is a  64 year old,   male; reportedly of [place] descent, had been working in construction, with no known immediate family; with no known past psychiatric history of; no known psychiatric  hospitalizations; no known suicide attempts; no known active substance abuse or known history of complicated withdrawal; patient with acute hypoxic vent dependent respiratory failure, Covid Sepsis, Acinetobacter PNA, PE. Based upon my evaluation, the pt does not have factual understanding of current situation and does not appreciates the current situation and its consequences. Pt is not able to rationally manipulate information to make decisions as evidenced by and is not able to  express a clear, consistent  choice secondary to protracted delirium  Would begin application for guardianship.  No other acute psychiatric history is known.  6/25/20  Seen for follow up.  Alert and oriented.  Endorsing mild depression, denies SI/HI/Anxiety/psychosis or mery.  Denies psych h/o.  No acute psych condition which requires psych admission.  Denies asking for xanax and no evidence of anxiety.  Change Xanax to prn only.

## 2020-06-25 NOTE — PROGRESS NOTE ADULT - SUBJECTIVE AND OBJECTIVE BOX
Patient is a 64y old  Male who presents with a chief complaint of shortness of breath (25 Jun 2020 07:43)    Patient seen and examined at bedside.     ALLERGIES:  No Known Allergies    MEDICATIONS  (STANDING):  ALPRAZolam 0.25 milliGRAM(s) Oral every 12 hours  aspirin  chewable 81 milliGRAM(s) Oral daily  enoxaparin Injectable 70 milliGRAM(s) SubCutaneous every 12 hours  escitalopram 20 milliGRAM(s) Oral daily  lactobacillus acidophilus 1 Tablet(s) Oral daily  melatonin 5 milliGRAM(s) Oral at bedtime  multivitamin/minerals 1 Tablet(s) Oral daily  pantoprazole    Tablet 40 milliGRAM(s) Oral before breakfast    MEDICATIONS  (PRN):  acetaminophen    Suspension .. 650 milliGRAM(s) Oral every 6 hours PRN Temp greater or equal to 38.5C (101.3F)    Vital Signs Last 24 Hrs  T(F): 97.8 (25 Jun 2020 05:08), Max: 98.3 (24 Jun 2020 19:00)  HR: 98 (25 Jun 2020 05:08) (82 - 104)  BP: 118/67 (25 Jun 2020 05:08) (110/59 - 120/72)  RR: 23 (25 Jun 2020 05:08) (13 - 24)  SpO2: 97% (25 Jun 2020 05:08) (89% - 100%)  I&O's Summary    24 Jun 2020 07:01  -  25 Jun 2020 07:00  --------------------------------------------------------  IN: 510 mL / OUT: 200 mL / NET: 310 mL    PHYSICAL EXAM:  General: NAD,  ENT: MMM, no thrush +trach collar  Neck: Supple, No JVD  Lungs: coarse breath sounds b/l no wheezing  Cardio: +s1/s2, no pitting edema   Abdomen: Soft, Nontender, Nondistended; Bowel sounds present +peg  Extremities: No calf tenderness, b/l offloading boots present      LABS:                        10.4   10.28 )-----------( 350      ( 23 Jun 2020 20:27 )             32.7     06-23    137  |  89  |  5.0  ----------------------------<  94  3.6   |  39.0  |  0.24    Ca    8.5      23 Jun 2020 20:27    eGFR if Non African American: 157 mL/min/1.73M2 (06-23-20 @ 20:27)  eGFR if : 182 mL/min/1.73M2 (06-23-20 @ 20:27)    PT/INR - ( 24 Jun 2020 20:27 )   PT: 17.6 sec;   INR: 1.54 ratio    PTT - ( 24 Jun 2020 20:27 )  PTT:33.6 sec    CARDIAC MARKERS ( 22 Jun 2020 13:42 )  x     / x     / 22 U/L / x     / x      CARDIAC MARKERS ( 22 Jun 2020 09:40 )  x     / 0.11 ng/mL / x     / x     / x        04-24 Chol -- LDL -- HDL -- Trig 85 mg/dL    RADIOLOGY & ADDITIONAL TESTS:  - no new tests

## 2020-06-25 NOTE — PROGRESS NOTE BEHAVIORAL HEALTH - NSBHFUPINTERVALHXFT_PSY_A_CORE
Seen for follow up per request secondary to anxiety and need for meds.  Pt lying in bed with eyes closed, awake for interview, via [employer] interceptor #[id number].  Denies complaints.  States has "a little depression", denies anxiety, denies requesting meds for anxiety.  Asking to have TV reinstated (TV service agreed), denies SI/HI/AH/delirium no evidence of psychosis or mery.  Recommend change Xanax to prn.

## 2020-06-25 NOTE — PROGRESS NOTE ADULT - SUBJECTIVE AND OBJECTIVE BOX
Patient in bed, resting.  Discussed importance of exercises.   Exam is unchanged.  Has been capped since yesterday AM.    REVIEW OF SYSTEMS  Constitutional - No fever,  +fatigue  Neurological - +loss of strength    FUNCTIONAL PROGRESS  6/24  Bed Mobility  Bed Mobility Training Supine-to-Sit: maximum assist (25% patient effort);  2 person assist  Bed Mobility Training Limitations: decreased ability to use arms for pushing/pulling;  decreased ability to use legs for bridging/pushing;  impaired ability to control trunk for mobility;  decreased strength;  impaired motor control;  impaired postural control;  abnormal muscle tone    Bed-Chair Transfer Training  Transfer Training Bed-to-Chair Transfer: maximum assist (25% patient effort);  2 person assist;  full weight-bearing   hand-hold assist   Bed-to-Chair Transfer Training Transfer Safety Analysis: decreased balance;  decreased sequencing ability;  decreased weight-shifting ability;  abnormal muscle tone;  decreased strength;  impaired balance;  impaired coordination;  impaired motor control;  impaired postural control    Sit-Stand Transfer Training  Transfer Training Sit-to-Stand Transfer: maximum assist (25% patient effort);  2 person assist;  full weight-bearing   hand-hold assist   Transfer Training Stand-to-Sit Transfer: maximum assist (25% patient effort);  2 person assist;  full weight-bearing   hand-hold assist   Sit-to-Stand Transfer Training Transfer Safety Analysis: decreased balance;  decreased sequencing ability;  decreased weight-shifting ability;  abnormal muscle tone;  decreased strength;  impaired balance;  impaired coordination;  impaired motor control;  impaired postural control    Gait Training  Gait Training Treatment not Performed: Unable to perform secondary to weakness    Therapeutic Exercise  Therapeutic Exercise Detail: Ther-ex consisting of: ankle pumps, knee extension, quad sets, hip flexion. Pt instructed to perform as able throughout day. Pt verbalized understanding of therapeutic exercises.     Functional Endurance  Functional Endurance Detail: Patient left in wheelchair, encouraged to sit for at least an hour     Bed Mobility  Bed Mobility Training Sit-to-Supine: maximum assist (25% patient effort);  2 person assist;  nonverbal cues (demo/gestures);  verbal cues;  bed rails  Bed Mobility Training Supine-to-Sit: maximum assist (25% patient effort);  2 person assist;  verbal cues;  nonverbal cues (demo/gestures);  bed rails;  head of bed elevated  Bed Mobility Training Limitations: decreased ability to use arms for pushing/pulling;  decreased ability to use legs for bridging/pushing;  impaired ability to control trunk for mobility;  decreased strength;  impaired balance;  impaired motor control;  impaired postural control;  cognitive, decreased safety awareness    Sit-Stand Transfer Training  Sit-to-Stand Transfer Training Treatment not Performed: Patient unable to perform transfer at this time secondary to pt reporting he had a BM requiring nursing assistance for hygiene. RN made aware    Therapeutic Exercise  Therapeutic Exercise Detail: Patient performed bilateral UE therex semifowler in bed for AROM for shoulder flexion, elbow flex/ext, pronation/supination, wrist flex/ext, gross grasp and digit extension 5 reps 2 sets. ROM therex performed to maintain muscle integrity, prevent atrophy and to facilitate participation in ADLs. Patient educated in proper form and encouraged to take rest breaks between sets.     Neuromuscular Re-education  Neuromuscular Re-education Detail: Patient performed static sitting balance EOB with Max A x1 to maintain upright trunk posture. Patient able to tolerate for ~5-7 minutes. External cues including verbal instruction and visual demonstration from  required for energy conservation to educate patient on proper breathing. Patient O2 levels desaturated following supine to sit transfer to 86-87%. During session O2 gradually increased to 4L NC to aid in recovery, pt returned to 94%. Pt attempted and able to use bilateral UE +Right UE and bed rail to assist with maintaining upright posture but fatigue quickly. Patient with right lateral lean noted requiring cueing and assist to acheive midline posture and to maintain proper head alignment.     Lower Body Dressing Training  Lower Body Dressing Training Assistance: maximum assist (25% patient effort);  2 person assist;  verbal cues;  seated EOB to don socks. Additional assist needed to maintain upright trunk posture. Patient attempt to participate but fatigues easily ;  decreased strength;  impaired balance;  impaired postural control;  impaired motor control;  cognitive, decreased safety awareness    Upper Body Dressing Training  Upper Body Dressing Training Assistance: maximum assist (25% patient effort);  2 person assist;  verbal cues;  nonverbal cues (demo/gestures);  to don/doff gown seated EOB. Physical assist to maintain upright trunk posture and additional assist to provide hand over hand guidance to find arm holes and pull gown up. Patient deconditioned with poor activity tolerance. Physical assist to correct right lateral lean and weight shift in order to place UE through holes;  decreased strength;  impaired balance;  impaired motor control;  impaired postural control;  cognitive, decreased safety awareness        VITALS  T(C): 36.6 (06-25-20 @ 08:06), Max: 36.8 (06-24-20 @ 19:00)  HR: 98 (06-25-20 @ 05:08) (87 - 104)  BP: 118/67 (06-25-20 @ 05:08) (110/59 - 120/72)  RR: 23 (06-25-20 @ 05:08) (16 - 24)  SpO2: 97% (06-25-20 @ 05:08) (89% - 100%)  Wt(kg): --    MEDICATIONS   acetaminophen    Suspension .. 650 milliGRAM(s) every 6 hours PRN  ALPRAZolam 0.25 milliGRAM(s) every 12 hours  aspirin  chewable 81 milliGRAM(s) daily  enoxaparin Injectable 70 milliGRAM(s) every 12 hours  escitalopram 20 milliGRAM(s) daily  lactobacillus acidophilus 1 Tablet(s) daily  melatonin 5 milliGRAM(s) at bedtime  multivitamin/minerals 1 Tablet(s) daily  pantoprazole    Tablet 40 milliGRAM(s) before breakfast  warfarin 5 milliGRAM(s) once      RECENT LABS - Reviewed                        10.3   11.56 )-----------( 403      ( 25 Jun 2020 07:40 )             33.2     06-25    140  |  88<L>  |  10.0  ----------------------------<  96  3.5   |  41.0<H>  |  0.21<L>    Ca    8.8      25 Jun 2020 07:40      PT/INR - ( 25 Jun 2020 07:40 )   PT: 15.2 sec;   INR: 1.33 ratio         PTT - ( 25 Jun 2020 07:40 )  PTT:36.5 sec          --------------------------------------------------------------------------  PHYSICAL EXAM  Constitutional - NAD, Comfortable  HEENT - +PMV  Abdomen - Soft, +PEG  Extremities - Severe wasting of BLE  Neurologic Exam -                    Cognitive - AAOx self, part of situation, limited by participation     Motor - Diffuse weakness - unchanged                    LEFT    UE - ShAB 3/5, EF 4/5, EE 4/5, WE 3/5,  3/5                    RIGHT UE - ShAB 3/5, EF 4/5, EE 4/5, WE 3/5,  3/5                    LEFT    LE - HF 1/5, KE 1/5, DF 1/5, PF 1/5                    RIGHT LE - HF 1/5, KE 1/5, DF 1/5, PF 1/5        Sensory - Intact to LT  Psychiatric - Mood withdrawn, Flat  ----------------------------------------------------------------------------------------  ASSESSMENT/PLAN  64yMale with functional deficits after COVID related complications  Trach - DECANNULATE  PE - Coumadin   Pain - Tylenol  Sleep - Melatonin 5mg HS  Functional Quadriplegia/Atrophy/Foot Drops - PRAFOs  Mood - Lexapro 20mg (6/22 for anxiety - DC Prozac), Recommend  DC Xanax, Recommend Psych eval  Oropharyngeal Dysphagia - Reg/Thins, RECOMMEND DC PEG TUBE  DVT PPX - SCDs  Rehab - Patient is making very limited progress. Continues to be dependent with OOB, despite motor exam demonstrating improved strength and indicating capability to do more on own. Despite motivation, patient has no disposition plan and given he is undocumented, requires a court appointed guardianship. Given that patient has limited progress despite functional use of his UE and no disposition with family available to help upon DC, patient does not meet criteria for AR.     Patient does not meet criteria for receiving daily or BID therapy. PT/OT can follow 2-3 times per week and staff can continue Jolly OOB and assist with standing.  In addition, patient has been demonstrated multiple bedside exercises which he has not performed, as indicated by ongoing motor exam. Once he improves from a motor standpoint, will increase recommended therapeutic interventions.

## 2020-06-25 NOTE — PROGRESS NOTE ADULT - ASSESSMENT
Admitted on 04-18 due to COVID related infection  Course was complicated by prolonged ICU course for acute hypoxic respiratory failure, requiring vent.  Critical illness polyneuropathy; slowly improving  S/p trach/PEG; has been doing well on PM valve/capping  Tolerated capping over night  Prior PE  GIB s/p GI w/u  Mild leukocytosis  Improving anemia  INR therapeutic  Hypokalemia    Rec:    Continuous capping trials during the day and night as able. Weakness from CC polyneuropathy a concern but appears to be tolerating  If continues to do well, consider decannulation in AM  Optimize nutrition given weakness  Follow lytes and correct as needed  Keep HOB elevated   Mobilize  Eventual tx to [facility] in [place] when able  Supportive care

## 2020-06-25 NOTE — PROGRESS NOTE ADULT - SUBJECTIVE AND OBJECTIVE BOX
PULMONARY PROGRESS NOTE      LISSA MALLOY  MRN-[id number]    Patient is a 64y old  Male who presents with a chief complaint of shortness of breath (25 Jun 2020 09:56)      INTERVAL HPI/OVERNIGHT EVENTS:    Patient is awake and alert  Comfortable  Tolerating capping trials to this point  Some secretions but apparently manageable to this point    MEDICATIONS  (STANDING):  ALPRAZolam 0.25 milliGRAM(s) Oral every 12 hours  aspirin  chewable 81 milliGRAM(s) Oral daily  enoxaparin Injectable 70 milliGRAM(s) SubCutaneous every 12 hours  escitalopram 20 milliGRAM(s) Oral daily  lactobacillus acidophilus 1 Tablet(s) Oral daily  melatonin 5 milliGRAM(s) Oral at bedtime  multivitamin/minerals 1 Tablet(s) Oral daily  pantoprazole    Tablet 40 milliGRAM(s) Oral before breakfast  warfarin 5 milliGRAM(s) Oral once      MEDICATIONS  (PRN):  acetaminophen    Suspension .. 650 milliGRAM(s) Oral every 6 hours PRN Temp greater or equal to 38.5C (101.3F)      Allergies    No Known Allergies    Intolerances        PAST MEDICAL & SURGICAL HISTORY:  No pertinent past medical history  No significant past surgical history        REVIEW OF SYSTEMS:    CONSTITUTIONAL:  No distress    HEENT:  Eyes:  No diplopia or blurred vision. ENT:  No earache, sore throat or runny nose.    CARDIOVASCULAR:  No pressure, squeezing, tightness, heaviness or aching about the chest; no palpitations.    RESPIRATORY:  Mild cough/sputum production, no shortness of breath, PND or orthopnea. Mild SOBOE    GASTROINTESTINAL:  No nausea, vomiting or diarrhea.    GENITOURINARY:  No dysuria, frequency or urgency.    NEUROLOGIC:  No paresthesias, fasciculations, seizures or weakness.    PSYCHIATRIC:  No disorder of thought or mood.    Vital Signs Last 24 Hrs  T(C): 36.6 (25 Jun 2020 08:06), Max: 36.8 (24 Jun 2020 19:00)  T(F): 97.9 (25 Jun 2020 08:06), Max: 98.3 (24 Jun 2020 19:00)  HR: 98 (25 Jun 2020 05:08) (87 - 104)  BP: 118/67 (25 Jun 2020 05:08) (110/59 - 120/72)  BP(mean): 86 (24 Jun 2020 21:08) (70 - 86)  RR: 23 (25 Jun 2020 05:08) (16 - 24)  SpO2: 97% (25 Jun 2020 05:08) (89% - 100%)    PHYSICAL EXAMINATION:    GENERAL: The patient is awake and alert in no apparent distress.     HEENT: Head is normocephalic and atraumatic. Extraocular muscles are intact. Mucous membranes are moist.    NECK: Supple. + trach    LUNGS: Clear to auscultation without wheezing, rales or rhonchi; respirations unlabored    HEART: Regular rate and rhythm without murmur.    ABDOMEN: Soft, nontender, and nondistended.      EXTREMITIES: Without any cyanosis, clubbing, rash, lesions or edema.    NEUROLOGIC: Grossly intact.    LABS:                        10.3   11.56 )-----------( 403      ( 25 Jun 2020 07:40 )             33.2     06-25    140  |  88<L>  |  10.0  ----------------------------<  96  3.5   |  41.0<H>  |  0.21<L>    Ca    8.8      25 Jun 2020 07:40      PT/INR - ( 25 Jun 2020 07:40 )   PT: 15.2 sec;   INR: 1.33 ratio         PTT - ( 25 Jun 2020 07:40 )  PTT:36.5 sec            Serum Pro-Brain Natriuretic Peptide: 116 pg/mL (06-22-20 @ 13:42)          MICROBIOLOGY:    COVID-19 PCR . (06.18.20 @ 09:43)    COVID-19 PCR: NotDetec: Testing is performed using polymerase chain reaction (PCR) or  transcription mediated amplification (TMA). This COVID-19 (SARS-CoV-2)  nucleic acid amplification test was validated by [facility] and is  in use under the FDA Emergency Use Authorization (EUA) for clinical labs  CLIA-certified to perform high complexity testing. Test results should be  correlated with clinical presentation, patient history, and epidemiology.        RADIOLOGY & ADDITIONAL STUDIES:       EXAM:  XR CHEST PORTABLE URGENT 1V                          PROCEDURE DATE:  06/20/2020          INTERPRETATION:  Portable chest radiograph        CLINICAL INFORMATION:   Short of breath.    TECHNIQUE:  Portable  AP view of the chest was obtained.    COMPARISON: 6/18/2020 6/18/2020 available for review.    FINDINGS: Decreasing LEFT perihilar and of LEFT basilar ill-defined opacities/infiltrates. A RIGHT interstitial increased markings unchanged from prior exam. Continued surveillance recommended.    The heart and mediastinum are within normal limits.    Visualized osseous structures are intact.        IMPRESSION:   Decreasing bilateral perihilar LEFT lung base airspace consolidations..        NICHOLE NUNEZ M.D., ATTENDING RADIOLOGIST  This document has been electronically signed. Jun 21 2020  9:48AM      ECHO:    Summary:   1. Left ventricular ejection fraction, by visual estimation, is 70 to 75%.   2. Hyperdynamic global left ventricular systolic function.   3. Spectral Doppler shows pseudonormal pattern of left ventricular myocardial filling (Grade II diastolic dysfunction).   4. There is no evidence of pericardial effusion.    MD Chung Electronically signed on 6/22/2020 at 5:53:20 PM

## 2020-06-25 NOTE — PROGRESS NOTE BEHAVIORAL HEALTH - NSBHCHARTREVIEWINVESTIGATE_PSY_A_CORE FT
Ventricular Rate 92 BPM    Atrial Rate 92 BPM    P-R Interval 138 ms    QRS Duration 70 ms    Q-T Interval 316 ms    QTC Calculation(Bezet) 390 ms    P Axis 34 degrees    R Axis -23 degrees    T Axis -14 degrees    Diagnosis Line *** Poor data quality, interpretation may be adversely affected  Normal sinus rhythm  Minimal voltage criteria for LVH, may be normal variant  Borderline ECG    Confirmed by Josiah Jang ([id number]) on 6/21/2020 2:42:32 PM

## 2020-06-26 LAB
ANION GAP SERPL CALC-SCNC: 13 MMOL/L — SIGNIFICANT CHANGE UP (ref 5–17)
APTT BLD: 39.1 SEC — HIGH (ref 27.5–36.3)
BUN SERPL-MCNC: 12 MG/DL — SIGNIFICANT CHANGE UP (ref 8–20)
CALCIUM SERPL-MCNC: 8.9 MG/DL — SIGNIFICANT CHANGE UP (ref 8.6–10.2)
CHLORIDE SERPL-SCNC: 89 MMOL/L — LOW (ref 98–107)
CO2 SERPL-SCNC: 40 MMOL/L — HIGH (ref 22–29)
CREAT SERPL-MCNC: 0.3 MG/DL — LOW (ref 0.5–1.3)
GLUCOSE SERPL-MCNC: 92 MG/DL — SIGNIFICANT CHANGE UP (ref 70–99)
HCT VFR BLD CALC: 33.3 % — LOW (ref 39–50)
HGB BLD-MCNC: 10.6 G/DL — LOW (ref 13–17)
INR BLD: 1.56 RATIO — HIGH (ref 0.88–1.16)
MCHC RBC-ENTMCNC: 30.9 PG — SIGNIFICANT CHANGE UP (ref 27–34)
MCHC RBC-ENTMCNC: 31.8 GM/DL — LOW (ref 32–36)
MCV RBC AUTO: 97.1 FL — SIGNIFICANT CHANGE UP (ref 80–100)
PLATELET # BLD AUTO: 432 K/UL — HIGH (ref 150–400)
POTASSIUM SERPL-MCNC: 3.7 MMOL/L — SIGNIFICANT CHANGE UP (ref 3.5–5.3)
POTASSIUM SERPL-SCNC: 3.7 MMOL/L — SIGNIFICANT CHANGE UP (ref 3.5–5.3)
PROTHROM AB SERPL-ACNC: 17.9 SEC — HIGH (ref 10–12.9)
RBC # BLD: 3.43 M/UL — LOW (ref 4.2–5.8)
RBC # FLD: 13.9 % — SIGNIFICANT CHANGE UP (ref 10.3–14.5)
SODIUM SERPL-SCNC: 142 MMOL/L — SIGNIFICANT CHANGE UP (ref 135–145)
WBC # BLD: 12.25 K/UL — HIGH (ref 3.8–10.5)
WBC # FLD AUTO: 12.25 K/UL — HIGH (ref 3.8–10.5)

## 2020-06-26 PROCEDURE — 99232 SBSQ HOSP IP/OBS MODERATE 35: CPT

## 2020-06-26 PROCEDURE — 99233 SBSQ HOSP IP/OBS HIGH 50: CPT

## 2020-06-26 RX ORDER — WARFARIN SODIUM 2.5 MG/1
5 TABLET ORAL ONCE
Refills: 0 | Status: COMPLETED | OUTPATIENT
Start: 2020-06-26 | End: 2020-06-26

## 2020-06-26 RX ADMIN — Medication 0.25 MILLIGRAM(S): at 11:26

## 2020-06-26 RX ADMIN — ENOXAPARIN SODIUM 70 MILLIGRAM(S): 100 INJECTION SUBCUTANEOUS at 05:42

## 2020-06-26 RX ADMIN — PANTOPRAZOLE SODIUM 40 MILLIGRAM(S): 20 TABLET, DELAYED RELEASE ORAL at 05:42

## 2020-06-26 RX ADMIN — Medication 5 MILLIGRAM(S): at 20:45

## 2020-06-26 RX ADMIN — ENOXAPARIN SODIUM 70 MILLIGRAM(S): 100 INJECTION SUBCUTANEOUS at 18:20

## 2020-06-26 RX ADMIN — Medication 1 TABLET(S): at 18:20

## 2020-06-26 RX ADMIN — Medication 81 MILLIGRAM(S): at 11:24

## 2020-06-26 RX ADMIN — ESCITALOPRAM OXALATE 20 MILLIGRAM(S): 10 TABLET, FILM COATED ORAL at 11:24

## 2020-06-26 RX ADMIN — WARFARIN SODIUM 5 MILLIGRAM(S): 2.5 TABLET ORAL at 20:45

## 2020-06-26 RX ADMIN — Medication 1 TABLET(S): at 11:24

## 2020-06-26 NOTE — PROGRESS NOTE ADULT - ASSESSMENT
64 year old male with insignificant pmh coming to hospital with worsening shortness of breath. patient found to have covid and respiratory failure subsequently with complicated icu course including leukocytosis with bacterial pna s/p abx, gi bleed and pe, s/p trach and peg. The patient was  downgraded to medicine service.     #Acute hypoxic respiratory failure 2/2 Covid 19 pneumonia   - resolved  - s/p ICU and trach  - COVID negative from 6/5 and 6/18  - monitor vitals   - pt/OT/SLP- Rehab following  - Pulmonary consult appreciated- possible decannulation per pulm     #troponinemia  - probable 2/2 demand   - cardio consult appreciated    #SVT  - no further episodes    #PE  - lovenox bridge w/ coumadin  - monitor inr    #hx of gib bleed w/ rectal ulcer (healing)  - s/p prbc transfusion - s/p rigid sig, clipping of vessel and epinephrine  - gi consult appreciated  - colorectal surgery consult appreciated  - avoid rectal tube for diarrhea, continue  metamucil/ Bacid     #dysphagia  - improved now on regular diet with thin liquids  - aspiration precautions   - protonix   - will need to have peg tube in for 6 weeks prior to removal    #anxiety  - xanax  - behavioral health follow up appreciated- xanax prn    #type 2 diabetes  - a1c 6.7 on admit and repeat 5.1    #dvt prophylaxis   - lovenox bridge w/ coumadin    Dispo- pending guardianship court date set for 09/2020

## 2020-06-26 NOTE — PROGRESS NOTE ADULT - ASSESSMENT
S/p COVID 19 pneumonia and ARDS  CCI polyneuropathy resolving  resp failure resolved  Stable for decannulation    Plan:  decannulate  mobilize  encourage cough and deep breaths  rehab  Please call pulmonary with any issues or re-eval over the weekend. Will f/u on Monday unless called

## 2020-06-26 NOTE — PROGRESS NOTE ADULT - SUBJECTIVE AND OBJECTIVE BOX
Patient is doing well.   Continues to be limited in his ability to progress.     REVIEW OF SYSTEMS  Constitutional - No fever,  +fatigue  Neurological - +loss of strength    FUNCTIONAL PROGRESS  6/25    Bed Mobility  Bed Mobility Training Rolling/Turning: maximum assist (25% patient effort);  1 person assist;  bed rails  Bed Mobility Training Scooting: maximum assist (25% patient effort);  1 person assist;  bed rails    6/24  Bed Mobility  Bed Mobility Training Sit-to-Supine: maximum assist (25% patient effort);  2 person assist;  nonverbal cues (demo/gestures);  verbal cues;  bed rails  Bed Mobility Training Supine-to-Sit: maximum assist (25% patient effort);  2 person assist;  verbal cues;  nonverbal cues (demo/gestures);  bed rails;  head of bed elevated  Bed Mobility Training Limitations: decreased ability to use arms for pushing/pulling;  decreased ability to use legs for bridging/pushing;  impaired ability to control trunk for mobility;  decreased strength;  impaired balance;  impaired motor control;  impaired postural control;  cognitive, decreased safety awareness    Sit-Stand Transfer Training  Sit-to-Stand Transfer Training Treatment not Performed: Patient unable to perform transfer at this time secondary to pt reporting he had a BM requiring nursing assistance for hygiene. RN made aware    Therapeutic Exercise  Therapeutic Exercise Detail: Patient performed bilateral UE therex semifowler in bed for AROM for shoulder flexion, elbow flex/ext, pronation/supination, wrist flex/ext, gross grasp and digit extension 5 reps 2 sets. ROM therex performed to maintain muscle integrity, prevent atrophy and to facilitate participation in ADLs. Patient educated in proper form and encouraged to take rest breaks between sets.     Neuromuscular Re-education  Neuromuscular Re-education Detail: Patient performed static sitting balance EOB with Max A x1 to maintain upright trunk posture. Patient able to tolerate for ~5-7 minutes. External cues including verbal instruction and visual demonstration from  required for energy conservation to educate patient on proper breathing. Patient O2 levels desaturated following supine to sit transfer to 86-87%. During session O2 gradually increased to 4L NC to aid in recovery, pt returned to 94%. Pt attempted and able to use bilateral UE +Right UE and bed rail to assist with maintaining upright posture but fatigue quickly. Patient with right lateral lean noted requiring cueing and assist to acheive midline posture and to maintain proper head alignment.     Lower Body Dressing Training  Lower Body Dressing Training Assistance: maximum assist (25% patient effort);  2 person assist;  verbal cues;  seated EOB to don socks. Additional assist needed to maintain upright trunk posture. Patient attempt to participate but fatigues easily ;  decreased strength;  impaired balance;  impaired postural control;  impaired motor control;  cognitive, decreased safety awareness    Upper Body Dressing Training  Upper Body Dressing Training Assistance: maximum assist (25% patient effort);  2 person assist;  verbal cues;  nonverbal cues (demo/gestures);  to don/doff gown seated EOB. Physical assist to maintain upright trunk posture and additional assist to provide hand over hand guidance to find arm holes and pull gown up. Patient deconditioned with poor activity tolerance. Physical assist to correct right lateral lean and weight shift in order to place UE through holes;  decreased strength;  impaired balance;  impaired motor control;  impaired postural control;  cognitive, decreased safety awareness      VITALS  T(C): 36.5 (06-26-20 @ 07:24), Max: 37.6 (06-26-20 @ 04:00)  HR: 96 (06-26-20 @ 07:24) (90 - 96)  BP: 118/73 (06-26-20 @ 07:24) (118/73 - 124/75)  RR: 18 (06-26-20 @ 07:24) (18 - 18)  SpO2: 94% (06-26-20 @ 07:24) (92% - 95%)  Wt(kg): --    MEDICATIONS   acetaminophen    Suspension .. 650 milliGRAM(s) every 6 hours PRN  ALPRAZolam 0.25 milliGRAM(s) two times a day PRN  aspirin  chewable 81 milliGRAM(s) daily  enoxaparin Injectable 70 milliGRAM(s) every 12 hours  escitalopram 20 milliGRAM(s) daily  lactobacillus acidophilus 1 Tablet(s) daily  melatonin 5 milliGRAM(s) at bedtime  multivitamin/minerals 1 Tablet(s) daily  pantoprazole    Tablet 40 milliGRAM(s) before breakfast  warfarin 5 milliGRAM(s) once      RECENT LABS - Reviewed                        10.6   12.25 )-----------( 432      ( 26 Jun 2020 06:56 )             33.3     06-26    142  |  89<L>  |  12.0  ----------------------------<  92  3.7   |  40.0<H>  |  0.30<L>    Ca    8.9      26 Jun 2020 06:56      PT/INR - ( 26 Jun 2020 06:56 )   PT: 17.9 sec;   INR: 1.56 ratio         PTT - ( 26 Jun 2020 06:56 )  PTT:39.1 sec        ------------------------------------------------------------------  PHYSICAL EXAM  Constitutional - NAD, Comfortable  HEENT - +PMV  Abdomen - Soft, +PEG  Extremities - Severe wasting of BLE  Neurologic Exam -                    Cognitive - AAOx self, part of situation, limited by participation     Motor - Diffuse weakness - unchanged                    LEFT    UE - ShAB 3/5, EF 4/5, EE 4/5, WE 3/5,  3/5                    RIGHT UE - ShAB 3/5, EF 4/5, EE 4/5, WE 3/5,  3/5                    LEFT    LE - HF 1/5, KE 1/5, DF 1/5, PF 1/5                    RIGHT LE - HF 1/5, KE 1/5, DF 1/5, PF 1/5        Sensory - Intact to LT  Psychiatric - Mood withdrawn, Flat  ----------------------------------------------------------------------------------------  ASSESSMENT/PLAN  64yMale with functional deficits after COVID related complications  Trach - DECANNULATE  PE - Coumadin   Pain - Tylenol  Sleep - Melatonin 5mg HS  Functional Quadriplegia/Atrophy/Foot Drops - PRAFOs  Mood - Lexapro 20mg (6/22 for anxiety - DC Prozac), Recommend  DC Xanax   Oropharyngeal Dysphagia - Reg/Thins, RECOMMEND DC PEG TUBE  DVT PPX - SCDs  Rehab - Patient continues to make limited progress. Continues to be dependent with OOB, despite motor exam demonstrating improved strength and indicating capability to do more on own. Despite motivation, patient has no disposition plan and given he is undocumented, requires a court appointed guardianship. Given that patient has limited progress despite functional use of his UE and no disposition with family available to help upon DC, patient does not meet criteria for AR.     Patient does not meet criteria for receiving daily or BID therapy. PT/OT can follow 2-3 times per week and staff can continue Jolly OOB and assist with standing.  In addition, patient has been demonstrated multiple bedside exercises which he has not performed, as indicated by ongoing motor exam. Once he improves from a motor standpoint, will increase recommended therapeutic interventions.

## 2020-06-26 NOTE — PROGRESS NOTE ADULT - SUBJECTIVE AND OBJECTIVE BOX
Patient is a 64y old  Male who presents with a chief complaint of shortness of breath (25 Jun 2020 10:13)    Patient seen and examined at bedside.    ALLERGIES:  No Known Allergies    MEDICATIONS  (STANDING):  aspirin  chewable 81 milliGRAM(s) Oral daily  enoxaparin Injectable 70 milliGRAM(s) SubCutaneous every 12 hours  escitalopram 20 milliGRAM(s) Oral daily  lactobacillus acidophilus 1 Tablet(s) Oral daily  melatonin 5 milliGRAM(s) Oral at bedtime  multivitamin/minerals 1 Tablet(s) Oral daily  pantoprazole    Tablet 40 milliGRAM(s) Oral before breakfast  warfarin 5 milliGRAM(s) Oral once    MEDICATIONS  (PRN):  acetaminophen    Suspension .. 650 milliGRAM(s) Oral every 6 hours PRN Temp greater or equal to 38.5C (101.3F)  ALPRAZolam 0.25 milliGRAM(s) Oral two times a day PRN anxiety    Vital Signs Last 24 Hrs  T(F): 99.2 (26 Jun 2020 05:35), Max: 99.7 (26 Jun 2020 04:00)  HR: 90 (26 Jun 2020 05:35) (90 - 93)  BP: 124/75 (26 Jun 2020 05:35) (124/73 - 124/75)  RR: 18 (26 Jun 2020 05:35) (18 - 18)  SpO2: 95% (26 Jun 2020 05:35) (92% - 95%)  I&O's Summary    PHYSICAL EXAM:  General: NAD, alert  ENT: MMM, no thrush +trach collar  Neck: Supple, No JVD  Lungs: coarse breath sounds b/l no wheezing  Cardio: +s1/s2, no pitting edema   Abdomen: Soft, Nontender, Nondistended; Bowel sounds present +peg  Extremities: No calf tenderness, b/l offloading boots present      LABS:                        10.6   12.25 )-----------( 432      ( 26 Jun 2020 06:56 )             33.3     06-26    142  |  89  |  12.0  ----------------------------<  92  3.7   |  40.0  |  0.30    Ca    8.9      26 Jun 2020 06:56    eGFR if Non African American: 143 mL/min/1.73M2 (06-26-20 @ 06:56)  eGFR if : 166 mL/min/1.73M2 (06-26-20 @ 06:56)    PT/INR - ( 26 Jun 2020 06:56 )   PT: 17.9 sec;   INR: 1.56 ratio    PTT - ( 26 Jun 2020 06:56 )  PTT:39.1 sec    04-24 Chol -- LDL -- HDL -- Trig 85 mg/dL    RADIOLOGY & ADDITIONAL TESTS:  - no new tests

## 2020-06-27 LAB
ANION GAP SERPL CALC-SCNC: 13 MMOL/L — SIGNIFICANT CHANGE UP (ref 5–17)
APTT BLD: 49.4 SEC — HIGH (ref 27.5–36.3)
BUN SERPL-MCNC: 15 MG/DL — SIGNIFICANT CHANGE UP (ref 8–20)
CALCIUM SERPL-MCNC: 8.9 MG/DL — SIGNIFICANT CHANGE UP (ref 8.6–10.2)
CHLORIDE SERPL-SCNC: 89 MMOL/L — LOW (ref 98–107)
CO2 SERPL-SCNC: 40 MMOL/L — HIGH (ref 22–29)
CREAT SERPL-MCNC: 0.25 MG/DL — LOW (ref 0.5–1.3)
GLUCOSE SERPL-MCNC: 93 MG/DL — SIGNIFICANT CHANGE UP (ref 70–99)
HCT VFR BLD CALC: 31.9 % — LOW (ref 39–50)
HGB BLD-MCNC: 10 G/DL — LOW (ref 13–17)
INR BLD: 2.64 RATIO — HIGH (ref 0.88–1.16)
MCHC RBC-ENTMCNC: 30.6 PG — SIGNIFICANT CHANGE UP (ref 27–34)
MCHC RBC-ENTMCNC: 31.3 GM/DL — LOW (ref 32–36)
MCV RBC AUTO: 97.6 FL — SIGNIFICANT CHANGE UP (ref 80–100)
PLATELET # BLD AUTO: 454 K/UL — HIGH (ref 150–400)
POTASSIUM SERPL-MCNC: 3.7 MMOL/L — SIGNIFICANT CHANGE UP (ref 3.5–5.3)
POTASSIUM SERPL-SCNC: 3.7 MMOL/L — SIGNIFICANT CHANGE UP (ref 3.5–5.3)
PROTHROM AB SERPL-ACNC: 30.7 SEC — HIGH (ref 10–12.9)
RBC # BLD: 3.27 M/UL — LOW (ref 4.2–5.8)
RBC # FLD: 14.1 % — SIGNIFICANT CHANGE UP (ref 10.3–14.5)
SARS-COV-2 RNA SPEC QL NAA+PROBE: SIGNIFICANT CHANGE UP
SODIUM SERPL-SCNC: 141 MMOL/L — SIGNIFICANT CHANGE UP (ref 135–145)
WBC # BLD: 10.16 K/UL — SIGNIFICANT CHANGE UP (ref 3.8–10.5)
WBC # FLD AUTO: 10.16 K/UL — SIGNIFICANT CHANGE UP (ref 3.8–10.5)

## 2020-06-27 PROCEDURE — 99232 SBSQ HOSP IP/OBS MODERATE 35: CPT

## 2020-06-27 RX ORDER — WARFARIN SODIUM 2.5 MG/1
3 TABLET ORAL ONCE
Refills: 0 | Status: COMPLETED | OUTPATIENT
Start: 2020-06-27 | End: 2020-06-27

## 2020-06-27 RX ADMIN — ESCITALOPRAM OXALATE 20 MILLIGRAM(S): 10 TABLET, FILM COATED ORAL at 11:53

## 2020-06-27 RX ADMIN — Medication 81 MILLIGRAM(S): at 11:52

## 2020-06-27 RX ADMIN — PANTOPRAZOLE SODIUM 40 MILLIGRAM(S): 20 TABLET, DELAYED RELEASE ORAL at 05:54

## 2020-06-27 RX ADMIN — Medication 1 TABLET(S): at 11:52

## 2020-06-27 RX ADMIN — ENOXAPARIN SODIUM 70 MILLIGRAM(S): 100 INJECTION SUBCUTANEOUS at 05:54

## 2020-06-27 RX ADMIN — Medication 5 MILLIGRAM(S): at 22:10

## 2020-06-27 RX ADMIN — WARFARIN SODIUM 3 MILLIGRAM(S): 2.5 TABLET ORAL at 22:10

## 2020-06-27 RX ADMIN — Medication 1 TABLET(S): at 17:03

## 2020-06-27 NOTE — PROGRESS NOTE ADULT - SUBJECTIVE AND OBJECTIVE BOX
HOSPITALIST PROGRESS NOTE    LISSA MALLOY  [id number]  64yMale    Patient is a 64y old  Male who presents with a chief complaint of shortness of breath (26 Jun 2020 12:29)      SUBJECTIVE:   Chart reviewed since last visit, discussed with Dr Tate  Interval decannulation 6/26    Patient seen and examined at bedside for chronic respiratory failure, dysphagia, VTE  Denies any dyspnea. chest pain or cough  Denies any nausea, vomiting abdominal pain  Denies any diarrhea      OBJECTIVE:  Vital Signs Last 24 Hrs  T(C): 36.8 (27 Jun 2020 08:05), Max: 37.5 (26 Jun 2020 15:57)  T(F): 98.3 (27 Jun 2020 08:05), Max: 99.5 (26 Jun 2020 15:57)  HR: 83 (27 Jun 2020 05:57) (83 - 100)  BP: 115/69 (27 Jun 2020 05:57) (107/69 - 134/81)   RR: 18 (27 Jun 2020 05:57) (18 - 19)  SpO2: 98% (27 Jun 2020 05:57) (98% - 98%)    General: NAD  HEENT:  EOMI  NECK:  supple, interval decannulation.  CVS: regular rate and rhythm S1 S2  RESP:  CTAB  GI:  PEG+ BS+ Soft nondistended nontender   : No suprapubic tenderness  MSK:  No edema. Bilateral offloading boots.   CNS:  generalized weakness  INTEG:  Warm dry skin. right heel DTI  PSYCH: Calm and cooperative      MONITOR:  CAPILLARY BLOOD GLUCOSE            I&O's Summary                          10.0   10.16 )-----------( 454      ( 27 Jun 2020 08:17 )             31.9     PT/INR - ( 27 Jun 2020 08:17 )   PT: 30.7 sec;   INR: 2.64 ratio         PTT - ( 27 Jun 2020 08:17 )  PTT:49.4 sec  06-27    141  |  89<L>  |  15.0  ----------------------------<  93  3.7   |  40.0<H>  |  0.25<L>    Ca    8.9      27 Jun 2020 08:17              Culture:    TTE:    RADIOLOGY        MEDICATIONS  (STANDING):  aspirin  chewable 81 milliGRAM(s) Oral daily  escitalopram 20 milliGRAM(s) Oral daily  lactobacillus acidophilus 1 Tablet(s) Oral daily  melatonin 5 milliGRAM(s) Oral at bedtime  multivitamin/minerals 1 Tablet(s) Oral daily  pantoprazole    Tablet 40 milliGRAM(s) Oral before breakfast      MEDICATIONS  (PRN):  acetaminophen    Suspension .. 650 milliGRAM(s) Oral every 6 hours PRN Temp greater or equal to 38.5C (101.3F)  ALPRAZolam 0.25 milliGRAM(s) Oral two times a day PRN anxiety

## 2020-06-27 NOTE — PROGRESS NOTE ADULT - ASSESSMENT
64 year old male with insignificant pmh coming to hospital with worsening shortness of breath. patient found to have covid and respiratory failure subsequently with complicated icu course including leukocytosis with bacterial pna s/p abx, gi bleed and pe, s/p trach and peg. The patient was  downgraded to medicine service.     #Acute hypoxic respiratory failure 2/2 Covid 19 pneumonia   - resolved  - s/p ICU and trach - decannulated 6/26  - COVID negative from 6/5 and 6/18  - monitor vitals   - pt/OT/SLP- Rehab following  - Pulmonary consult appreciated     #troponinemia  - probable 2/2 demand   - cardio consult appreciated    #SVT  - no further episodes    #PE  - Continue Coumadin.  Discontinue LMWH as INR>2    #hx of gib bleed w/ rectal ulcer (healing)  - s/p prbc transfusion - s/p rigid sig, clipping of vessel and epinephrine  - gi consult appreciated  - colorectal surgery consult appreciated  - avoid rectal tube for diarrhea, continue  metamucil/ Bacid     #dysphagia  - improved now on regular diet with thin liquids  - aspiration precautions   - Protonix   - will need to have peg tube in for 6 weeks prior to removal    #anxiety  - xanax  - behavioral health follow up appreciated- xanax prn    #type 2 diabetes  - a1c 6.7 on admit and repeat 5.1    #dvt prophylaxis   - lovenox bridge w/ coumadin    Dispo- pending guardianship court date set for 09/2020

## 2020-06-28 LAB
INR BLD: 2.89 RATIO — HIGH (ref 0.88–1.16)
PROTHROM AB SERPL-ACNC: 33.8 SEC — HIGH (ref 10–12.9)

## 2020-06-28 PROCEDURE — 99232 SBSQ HOSP IP/OBS MODERATE 35: CPT

## 2020-06-28 RX ORDER — WARFARIN SODIUM 2.5 MG/1
3 TABLET ORAL ONCE
Refills: 0 | Status: COMPLETED | OUTPATIENT
Start: 2020-06-28 | End: 2020-06-28

## 2020-06-28 RX ORDER — PSYLLIUM SEED (WITH DEXTROSE)
1 POWDER (GRAM) ORAL
Refills: 0 | Status: DISCONTINUED | OUTPATIENT
Start: 2020-06-28 | End: 2020-06-29

## 2020-06-28 RX ADMIN — PANTOPRAZOLE SODIUM 40 MILLIGRAM(S): 20 TABLET, DELAYED RELEASE ORAL at 05:38

## 2020-06-28 RX ADMIN — Medication 1 PACKET(S): at 17:01

## 2020-06-28 RX ADMIN — Medication 81 MILLIGRAM(S): at 11:57

## 2020-06-28 RX ADMIN — Medication 1 TABLET(S): at 17:01

## 2020-06-28 RX ADMIN — WARFARIN SODIUM 3 MILLIGRAM(S): 2.5 TABLET ORAL at 21:50

## 2020-06-28 RX ADMIN — Medication 1 TABLET(S): at 11:57

## 2020-06-28 RX ADMIN — Medication 5 MILLIGRAM(S): at 21:50

## 2020-06-28 RX ADMIN — ESCITALOPRAM OXALATE 20 MILLIGRAM(S): 10 TABLET, FILM COATED ORAL at 11:57

## 2020-06-28 NOTE — PROGRESS NOTE ADULT - SUBJECTIVE AND OBJECTIVE BOX
HOSPITALIST PROGRESS NOTE    LISSA MALLOY  [id number]  64yMale    Patient is a 64y old  Male who presents with a chief complaint of shortness of breath (27 Jun 2020 13:08)      SUBJECTIVE:   Chart reviewed since last visit.  Patient seen and examined at bedside for PE, Dysphagia, T2DM.  No new complaints except loose BM.  Awaiting PRUCOL appointment      OBJECTIVE:  Vital Signs Last 24 Hrs  T(C): 36.7 (28 Jun 2020 08:37), Max: 37.3 (27 Jun 2020 22:03)  T(F): 98.1 (28 Jun 2020 08:37), Max: 99.2 (27 Jun 2020 22:03)  HR: 88 (28 Jun 2020 08:37) (80 - 88)  BP: 112/66 (28 Jun 2020 08:37) (112/66 - 124/72)   RR: 18 (28 Jun 2020 08:37) (18 - 78)  SpO2: 98% (28 Jun 2020 08:37) (97% - 100%)    General: no acute distress, sitting in chair  HEENT:  EOMI  NECK:  supple, interval decannulation.  CVS: regular rate and rhythm S1 S2  RESP:  CTAB  GI:  PEG+ BS+ Soft nondistended nontender   : No suprapubic tenderness  MSK:  No edema. Bilateral offloading boots.   CNS:  generalized weakness  INTEG:  Warm dry skin. right heel DTI  PSYCH: Calm and cooperative      MONITOR:  CAPILLARY BLOOD GLUCOSE            I&O's Summary                          10.0   10.16 )-----------( 454      ( 27 Jun 2020 08:17 )             31.9     PT/INR - ( 28 Jun 2020 09:58 )   PT: 33.8 sec;   INR: 2.89 ratio         PTT - ( 27 Jun 2020 08:17 )  PTT:49.4 sec  06-27    141  |  89<L>  |  15.0  ----------------------------<  93  3.7   |  40.0<H>  |  0.25<L>    Ca    8.9      27 Jun 2020 08:17              Culture:    TTE:    RADIOLOGY        MEDICATIONS  (STANDING):  aspirin  chewable 81 milliGRAM(s) Oral daily  escitalopram 20 milliGRAM(s) Oral daily  lactobacillus acidophilus 1 Tablet(s) Oral daily  melatonin 5 milliGRAM(s) Oral at bedtime  multivitamin/minerals 1 Tablet(s) Oral daily  pantoprazole    Tablet 40 milliGRAM(s) Oral before breakfast  psyllium Powder 1 Packet(s) Oral two times a day  warfarin 3 milliGRAM(s) Oral once      MEDICATIONS  (PRN):  acetaminophen    Suspension .. 650 milliGRAM(s) Oral every 6 hours PRN Temp greater or equal to 38.5C (101.3F)  ALPRAZolam 0.25 milliGRAM(s) Oral two times a day PRN anxiety

## 2020-06-28 NOTE — PROGRESS NOTE ADULT - ASSESSMENT
64 year old male with insignificant pmh coming to hospital with worsening shortness of breath. patient found to have covid and respiratory failure subsequently with complicated icu course including leukocytosis with bacterial pna s/p abx, gi bleed and pe, s/p trach and peg. The patient was  downgraded to medicine service.     #Acute hypoxic respiratory failure 2/2 Covid 19 pneumonia - resolved  - s/p ICU and trach - decannulated 6/26  - COVID negative from 6/5 and 6/18  - monitor vitals   - PT/OT/SLP- Rehab following  - Pulmonary consult appreciated     #troponinemia  - probable 2/2 demand   - cardio consult appreciated    #SVT  - no further episodes    #PE  - Continue Coumadin, will give 3mg tonight    #hx of gib bleed w/ rectal ulcer (healing)  - s/p prbc transfusion - s/p rigid sig, clipping of vessel and epinephrine  - gi consult appreciated  - colorectal surgery consult appreciated  - avoid rectal tube for diarrhea, continue  metamucil/ Bacid     #dysphagia  - improved now on regular diet with thin liquids  - aspiration precautions   - Protonix   - will need to have peg tube in for 6 weeks prior to removal    #anxiety  - xanax  - behavioral health follow up appreciated- xanax prn    #type 2 diabetes  - a1c 6.7 on admit and repeat 5.1    #dvt prophylaxis   - Therapeutic on coumadin    Dispo- pending guardianship court date set for 09/2020

## 2020-06-28 NOTE — PROGRESS NOTE ADULT - NSHPATTENDINGPLANDISCUSS_GEN_ALL_CORE
patient, RYANNE Beltran
Dr Tinajero
ICU team
the patient.  All imaging and results of lab/other studies reviewed by me. All questions answered to their satisfaction. At this time they agree with the current plan of therapy.
the patient.  All imaging and results of lab/other studies reviewed by me. All questions answered to their satisfaction. At this time they agree with the current plan of therapy.
Dr Neumann
Dr Tinajero
[facility] Nursing Staff.
Nursing.
Resp therapy
SICU staff, all questions answered
md team,
md team, RT
3 T team
3 T team, pt bedside
Family, SICU staff, all questions answered
Patient, JannyoS
Patient, RYANEN Bañuelos, CALI Gonzalez and CCC Jordi
Patient, RYANNE Parks, CALI Gonzalez and CCC Jordi
SICU staff, all questions answered
SICU staff, all questions answered
Patient, RYANNE Kaiser and CALI Gonzalez
Patient, RYANNE Parks, CALI Gonzalez and CCC Jordi
ICU PA.

## 2020-06-29 VITALS
OXYGEN SATURATION: 97 % | TEMPERATURE: 98 F | DIASTOLIC BLOOD PRESSURE: 69 MMHG | RESPIRATION RATE: 18 BRPM | SYSTOLIC BLOOD PRESSURE: 116 MMHG | HEART RATE: 67 BPM

## 2020-06-29 LAB
INR BLD: 3.16 RATIO — HIGH (ref 0.88–1.16)
PROTHROM AB SERPL-ACNC: 37 SEC — HIGH (ref 10–12.9)

## 2020-06-29 PROCEDURE — 84295 ASSAY OF SERUM SODIUM: CPT

## 2020-06-29 PROCEDURE — 80048 BASIC METABOLIC PNL TOTAL CA: CPT

## 2020-06-29 PROCEDURE — 82803 BLOOD GASES ANY COMBINATION: CPT

## 2020-06-29 PROCEDURE — 74174 CTA ABD&PLVS W/CONTRAST: CPT

## 2020-06-29 PROCEDURE — 71045 X-RAY EXAM CHEST 1 VIEW: CPT

## 2020-06-29 PROCEDURE — 36430 TRANSFUSION BLD/BLD COMPNT: CPT

## 2020-06-29 PROCEDURE — 87186 SC STD MICRODIL/AGAR DIL: CPT

## 2020-06-29 PROCEDURE — 84132 ASSAY OF SERUM POTASSIUM: CPT

## 2020-06-29 PROCEDURE — 97167 OT EVAL HIGH COMPLEX 60 MIN: CPT

## 2020-06-29 PROCEDURE — 86900 BLOOD TYPING SEROLOGIC ABO: CPT

## 2020-06-29 PROCEDURE — 93005 ELECTROCARDIOGRAM TRACING: CPT

## 2020-06-29 PROCEDURE — 96374 THER/PROPH/DIAG INJ IV PUSH: CPT

## 2020-06-29 PROCEDURE — 94003 VENT MGMT INPAT SUBQ DAY: CPT

## 2020-06-29 PROCEDURE — 99291 CRITICAL CARE FIRST HOUR: CPT | Mod: 25

## 2020-06-29 PROCEDURE — 86850 RBC ANTIBODY SCREEN: CPT

## 2020-06-29 PROCEDURE — 82436 ASSAY OF URINE CHLORIDE: CPT

## 2020-06-29 PROCEDURE — 74018 RADEX ABDOMEN 1 VIEW: CPT

## 2020-06-29 PROCEDURE — 97110 THERAPEUTIC EXERCISES: CPT

## 2020-06-29 PROCEDURE — 83529 ASAY OF INTERLEUKIN-6 (IL-6): CPT

## 2020-06-29 PROCEDURE — 71275 CT ANGIOGRAPHY CHEST: CPT

## 2020-06-29 PROCEDURE — 92610 EVALUATE SWALLOWING FUNCTION: CPT

## 2020-06-29 PROCEDURE — 96372 THER/PROPH/DIAG INJ SC/IM: CPT | Mod: XU

## 2020-06-29 PROCEDURE — 84100 ASSAY OF PHOSPHORUS: CPT

## 2020-06-29 PROCEDURE — 84300 ASSAY OF URINE SODIUM: CPT

## 2020-06-29 PROCEDURE — 93306 TTE W/DOPPLER COMPLETE: CPT

## 2020-06-29 PROCEDURE — P9059: CPT

## 2020-06-29 PROCEDURE — 83615 LACTATE (LD) (LDH) ENZYME: CPT

## 2020-06-29 PROCEDURE — 83880 ASSAY OF NATRIURETIC PEPTIDE: CPT

## 2020-06-29 PROCEDURE — 85018 HEMOGLOBIN: CPT

## 2020-06-29 PROCEDURE — 84484 ASSAY OF TROPONIN QUANT: CPT

## 2020-06-29 PROCEDURE — 87184 SC STD DISK METHOD PER PLATE: CPT

## 2020-06-29 PROCEDURE — 85027 COMPLETE CBC AUTOMATED: CPT

## 2020-06-29 PROCEDURE — 94002 VENT MGMT INPAT INIT DAY: CPT

## 2020-06-29 PROCEDURE — 36600 WITHDRAWAL OF ARTERIAL BLOOD: CPT

## 2020-06-29 PROCEDURE — P9016: CPT

## 2020-06-29 PROCEDURE — 85730 THROMBOPLASTIN TIME PARTIAL: CPT

## 2020-06-29 PROCEDURE — 86803 HEPATITIS C AB TEST: CPT

## 2020-06-29 PROCEDURE — 80076 HEPATIC FUNCTION PANEL: CPT

## 2020-06-29 PROCEDURE — 80053 COMPREHEN METABOLIC PANEL: CPT

## 2020-06-29 PROCEDURE — L8699: CPT

## 2020-06-29 PROCEDURE — 86360 T CELL ABSOLUTE COUNT/RATIO: CPT

## 2020-06-29 PROCEDURE — 76705 ECHO EXAM OF ABDOMEN: CPT

## 2020-06-29 PROCEDURE — 81001 URINALYSIS AUTO W/SCOPE: CPT

## 2020-06-29 PROCEDURE — 82962 GLUCOSE BLOOD TEST: CPT

## 2020-06-29 PROCEDURE — 87493 C DIFF AMPLIFIED PROBE: CPT

## 2020-06-29 PROCEDURE — 83935 ASSAY OF URINE OSMOLALITY: CPT

## 2020-06-29 PROCEDURE — 99232 SBSQ HOSP IP/OBS MODERATE 35: CPT

## 2020-06-29 PROCEDURE — 96375 TX/PRO/DX INJ NEW DRUG ADDON: CPT

## 2020-06-29 PROCEDURE — 85384 FIBRINOGEN ACTIVITY: CPT

## 2020-06-29 PROCEDURE — 94799 UNLISTED PULMONARY SVC/PX: CPT

## 2020-06-29 PROCEDURE — 86923 COMPATIBILITY TEST ELECTRIC: CPT

## 2020-06-29 PROCEDURE — 84145 PROCALCITONIN (PCT): CPT

## 2020-06-29 PROCEDURE — 82570 ASSAY OF URINE CREATININE: CPT

## 2020-06-29 PROCEDURE — T1013: CPT

## 2020-06-29 PROCEDURE — 83036 HEMOGLOBIN GLYCOSYLATED A1C: CPT

## 2020-06-29 PROCEDURE — 97535 SELF CARE MNGMENT TRAINING: CPT

## 2020-06-29 PROCEDURE — 82947 ASSAY GLUCOSE BLOOD QUANT: CPT

## 2020-06-29 PROCEDURE — 82728 ASSAY OF FERRITIN: CPT

## 2020-06-29 PROCEDURE — 83605 ASSAY OF LACTIC ACID: CPT

## 2020-06-29 PROCEDURE — 93308 TTE F-UP OR LMTD: CPT

## 2020-06-29 PROCEDURE — 82435 ASSAY OF BLOOD CHLORIDE: CPT

## 2020-06-29 PROCEDURE — 87635 SARS-COV-2 COVID-19 AMP PRB: CPT

## 2020-06-29 PROCEDURE — 97530 THERAPEUTIC ACTIVITIES: CPT

## 2020-06-29 PROCEDURE — 85652 RBC SED RATE AUTOMATED: CPT

## 2020-06-29 PROCEDURE — 99239 HOSP IP/OBS DSCHRG MGMT >30: CPT

## 2020-06-29 PROCEDURE — 97112 NEUROMUSCULAR REEDUCATION: CPT

## 2020-06-29 PROCEDURE — 85379 FIBRIN DEGRADATION QUANT: CPT

## 2020-06-29 PROCEDURE — 86901 BLOOD TYPING SEROLOGIC RH(D): CPT

## 2020-06-29 PROCEDURE — 80074 ACUTE HEPATITIS PANEL: CPT

## 2020-06-29 PROCEDURE — 87040 BLOOD CULTURE FOR BACTERIA: CPT

## 2020-06-29 PROCEDURE — 85610 PROTHROMBIN TIME: CPT

## 2020-06-29 PROCEDURE — 94760 N-INVAS EAR/PLS OXIMETRY 1: CPT

## 2020-06-29 PROCEDURE — 83735 ASSAY OF MAGNESIUM: CPT

## 2020-06-29 PROCEDURE — 74230 X-RAY XM SWLNG FUNCJ C+: CPT

## 2020-06-29 PROCEDURE — 92526 ORAL FUNCTION THERAPY: CPT

## 2020-06-29 PROCEDURE — P9037: CPT

## 2020-06-29 PROCEDURE — 84134 ASSAY OF PREALBUMIN: CPT

## 2020-06-29 PROCEDURE — 36415 COLL VENOUS BLD VENIPUNCTURE: CPT

## 2020-06-29 PROCEDURE — 82330 ASSAY OF CALCIUM: CPT

## 2020-06-29 PROCEDURE — U0003: CPT

## 2020-06-29 PROCEDURE — 84478 ASSAY OF TRIGLYCERIDES: CPT

## 2020-06-29 PROCEDURE — 97163 PT EVAL HIGH COMPLEX 45 MIN: CPT

## 2020-06-29 PROCEDURE — 92523 SPEECH SOUND LANG COMPREHEN: CPT

## 2020-06-29 PROCEDURE — 87070 CULTURE OTHR SPECIMN AEROBIC: CPT

## 2020-06-29 PROCEDURE — 93970 EXTREMITY STUDY: CPT

## 2020-06-29 PROCEDURE — 82550 ASSAY OF CK (CPK): CPT

## 2020-06-29 PROCEDURE — 85014 HEMATOCRIT: CPT

## 2020-06-29 PROCEDURE — 80202 ASSAY OF VANCOMYCIN: CPT

## 2020-06-29 PROCEDURE — 86140 C-REACTIVE PROTEIN: CPT

## 2020-06-29 PROCEDURE — C1889: CPT

## 2020-06-29 PROCEDURE — 97116 GAIT TRAINING THERAPY: CPT

## 2020-06-29 PROCEDURE — 92611 MOTION FLUOROSCOPY/SWALLOW: CPT

## 2020-06-29 RX ORDER — LACTOBACILLUS ACIDOPHILUS 100MM CELL
1 CAPSULE ORAL
Qty: 0 | Refills: 0 | DISCHARGE
Start: 2020-06-29

## 2020-06-29 RX ORDER — PANTOPRAZOLE SODIUM 20 MG/1
1 TABLET, DELAYED RELEASE ORAL
Qty: 0 | Refills: 0 | DISCHARGE
Start: 2020-06-29

## 2020-06-29 RX ORDER — PSYLLIUM SEED (WITH DEXTROSE)
1 POWDER (GRAM) ORAL
Qty: 0 | Refills: 0 | DISCHARGE
Start: 2020-06-29

## 2020-06-29 RX ORDER — WARFARIN SODIUM 2.5 MG/1
1 TABLET ORAL ONCE
Refills: 0 | Status: DISCONTINUED | OUTPATIENT
Start: 2020-06-29 | End: 2020-06-29

## 2020-06-29 RX ORDER — MULTIVIT-MIN/FERROUS GLUCONATE 9 MG/15 ML
1 LIQUID (ML) ORAL
Qty: 0 | Refills: 0 | DISCHARGE
Start: 2020-06-29

## 2020-06-29 RX ORDER — LANOLIN ALCOHOL/MO/W.PET/CERES
1 CREAM (GRAM) TOPICAL
Qty: 0 | Refills: 0 | DISCHARGE
Start: 2020-06-29

## 2020-06-29 RX ORDER — ASPIRIN/CALCIUM CARB/MAGNESIUM 324 MG
1 TABLET ORAL
Qty: 0 | Refills: 0 | DISCHARGE
Start: 2020-06-29

## 2020-06-29 RX ORDER — ESCITALOPRAM OXALATE 10 MG/1
1 TABLET, FILM COATED ORAL
Qty: 0 | Refills: 0 | DISCHARGE
Start: 2020-06-29

## 2020-06-29 RX ORDER — WARFARIN SODIUM 2.5 MG/1
1 TABLET ORAL
Qty: 0 | Refills: 0 | DISCHARGE
Start: 2020-06-29

## 2020-06-29 RX ORDER — ALPRAZOLAM 0.25 MG
1 TABLET ORAL
Qty: 0 | Refills: 0 | DISCHARGE
Start: 2020-06-29

## 2020-06-29 RX ADMIN — Medication 1 TABLET(S): at 12:05

## 2020-06-29 RX ADMIN — Medication 81 MILLIGRAM(S): at 12:05

## 2020-06-29 RX ADMIN — ESCITALOPRAM OXALATE 20 MILLIGRAM(S): 10 TABLET, FILM COATED ORAL at 12:05

## 2020-06-29 RX ADMIN — Medication 1 PACKET(S): at 05:11

## 2020-06-29 RX ADMIN — PANTOPRAZOLE SODIUM 40 MILLIGRAM(S): 20 TABLET, DELAYED RELEASE ORAL at 05:11

## 2020-06-29 NOTE — DISCHARGE NOTE PROVIDER - CARE PROVIDER_API CALL
Néstor Ramirez  CRITICAL CARE MEDICINE  [address] [place] [id number]  Phone: [phone]  Fax: [phone]  Follow Up Time:

## 2020-06-29 NOTE — PROGRESS NOTE ADULT - SUBJECTIVE AND OBJECTIVE BOX
Patient states he needs to be cleaned, had a BM.  Has since been decannulated.     REVIEW OF SYSTEMS  Constitutional - No fever,  +fatigue  Neurological - +loss of strength  Psychiatric - +depression, No anxiety    FUNCTIONAL PROGRESS  6/25    Bed Mobility  Bed Mobility Training Rolling/Turning: maximum assist (25% patient effort);  1 person assist;  bed rails  Bed Mobility Training Scooting: maximum assist (25% patient effort);  1 person assist;  bed rails    6/24  Bed Mobility  Bed Mobility Training Sit-to-Supine: maximum assist (25% patient effort);  2 person assist;  nonverbal cues (demo/gestures);  verbal cues;  bed rails  Bed Mobility Training Supine-to-Sit: maximum assist (25% patient effort);  2 person assist;  verbal cues;  nonverbal cues (demo/gestures);  bed rails;  head of bed elevated  Bed Mobility Training Limitations: decreased ability to use arms for pushing/pulling;  decreased ability to use legs for bridging/pushing;  impaired ability to control trunk for mobility;  decreased strength;  impaired balance;  impaired motor control;  impaired postural control;  cognitive, decreased safety awareness    Sit-Stand Transfer Training  Sit-to-Stand Transfer Training Treatment not Performed: Patient unable to perform transfer at this time secondary to pt reporting he had a BM requiring nursing assistance for hygiene. RN made aware    Therapeutic Exercise  Therapeutic Exercise Detail: Patient performed bilateral UE therex semifowler in bed for AROM for shoulder flexion, elbow flex/ext, pronation/supination, wrist flex/ext, gross grasp and digit extension 5 reps 2 sets. ROM therex performed to maintain muscle integrity, prevent atrophy and to facilitate participation in ADLs. Patient educated in proper form and encouraged to take rest breaks between sets.     Neuromuscular Re-education  Neuromuscular Re-education Detail: Patient performed static sitting balance EOB with Max A x1 to maintain upright trunk posture. Patient able to tolerate for ~5-7 minutes. External cues including verbal instruction and visual demonstration from  required for energy conservation to educate patient on proper breathing. Patient O2 levels desaturated following supine to sit transfer to 86-87%. During session O2 gradually increased to 4L NC to aid in recovery, pt returned to 94%. Pt attempted and able to use bilateral UE +Right UE and bed rail to assist with maintaining upright posture but fatigue quickly. Patient with right lateral lean noted requiring cueing and assist to acheive midline posture and to maintain proper head alignment.     Lower Body Dressing Training  Lower Body Dressing Training Assistance: maximum assist (25% patient effort);  2 person assist;  verbal cues;  seated EOB to don socks. Additional assist needed to maintain upright trunk posture. Patient attempt to participate but fatigues easily ;  decreased strength;  impaired balance;  impaired postural control;  impaired motor control;  cognitive, decreased safety awareness    Upper Body Dressing Training  Upper Body Dressing Training Assistance: maximum assist (25% patient effort);  2 person assist;  verbal cues;  nonverbal cues (demo/gestures);  to don/doff gown seated EOB. Physical assist to maintain upright trunk posture and additional assist to provide hand over hand guidance to find arm holes and pull gown up. Patient deconditioned with poor activity tolerance. Physical assist to correct right lateral lean and weight shift in order to place UE through holes;  decreased strength;  impaired balance;  impaired motor control;  impaired postural control;  cognitive, decreased safety awareness    VITALS  T(C): 36.7 (06-29-20 @ 07:00), Max: 36.8 (06-28-20 @ 16:07)  HR: 77 (06-29-20 @ 07:00) (77 - 80)  BP: 111/65 (06-29-20 @ 07:00) (111/65 - 122/82)  RR: 18 (06-29-20 @ 07:00) (18 - 18)  SpO2: 98% (06-29-20 @ 07:00) (97% - 98%)  Wt(kg): --    MEDICATIONS   acetaminophen    Suspension .. 650 milliGRAM(s) every 6 hours PRN  ALPRAZolam 0.25 milliGRAM(s) two times a day PRN  aspirin  chewable 81 milliGRAM(s) daily  escitalopram 20 milliGRAM(s) daily  lactobacillus acidophilus 1 Tablet(s) daily  melatonin 5 milliGRAM(s) at bedtime  multivitamin/minerals 1 Tablet(s) daily  pantoprazole    Tablet 40 milliGRAM(s) before breakfast  psyllium Powder 1 Packet(s) two times a day  warfarin 1 milliGRAM(s) once      RECENT LABS - Reviewed          PT/INR - ( 29 Jun 2020 07:11 )   PT: 37.0 sec;   INR: 3.16 ratio      ------------------------------------------------------------------  PHYSICAL EXAM  Constitutional - NAD, Comfortable  HEENT - +PMV  Abdomen - Soft, +PEG  Extremities - Severe wasting of BLE  Neurologic Exam -                    Cognitive - AAOx self, part of situation, limited by participation     Motor - Diffuse weakness - unchanged                    LEFT    UE - ShAB 3/5, EF 4/5, EE 4/5, WE 3/5,  3/5                    RIGHT UE - ShAB 3/5, EF 4/5, EE 4/5, WE 3/5,  3/5                    LEFT    LE - HF 1/5, KE 1/5, DF 1/5, PF 1/5                    RIGHT LE - HF 1/5, KE 1/5, DF 1/5, PF 1/5        Sensory - Intact to LT  Psychiatric - Mood withdrawn, Flat  ----------------------------------------------------------------------------------------  ASSESSMENT/PLAN  64yMale with functional deficits after COVID related complications  PE - Coumadin  CAD - ASA  Pain - Tylenol  Sleep - Melatonin 5mg HS  Functional Quadriplegia/Atrophy/Foot Drops - PRAFOs  Mood - Lexapro 20mg (6/22), Recommend  DC Xanax   Oropharyngeal Dysphagia - Reg/Thins, RECOMMEND DC PEG TUBE  DVT PPX - SCDs  Rehab - Patient continues to make limited progress. Continues to be dependent with OOB, despite motor exam demonstrating improved strength and indicating capability to do more on own. Despite motivation, patient has no disposition plan and given he is undocumented, requires a court appointed guardianship. Given that patient has limited progress despite functional use of his UE and no disposition with family available to help upon DC, patient does not meet criteria for AR.     Patient does not meet criteria for receiving daily or BID therapy. PT/OT can follow 2-3 times per week and staff can continue Jolly OOB and assist with standing.  In addition, patient has been demonstrated multiple bedside exercises which he has not performed, as indicated by ongoing motor exam. Once he improves from a motor standpoint, will increase recommended therapeutic interventions.

## 2020-06-29 NOTE — DISCHARGE NOTE PROVIDER - NSDCPNSUBOBJ_GEN_ALL_CORE
HOSPITALIST PROGRESS NOTE        LISSA MALLOY    [id number]    64yMale        Patient is a 64y old  Male who presents with a chief complaint of shortness of breath (29 Jun 2020 12:31)            SUBJECTIVE:     Chart reviewed since last visit.    Patient seen and examined at bedside for COVID-19, PE    Comfortable, awaiting Rehab placement/PRUCOL            OBJECTIVE:    Vital Signs Last 24 Hrs    T(C): 36.7 (29 Jun 2020 07:00), Max: 36.8 (28 Jun 2020 16:07)    T(F): 98 (29 Jun 2020 07:00), Max: 98.3 (28 Jun 2020 16:07)    HR: 77 (29 Jun 2020 07:00) (77 - 80)    BP: 111/65 (29 Jun 2020 07:00) (111/65 - 122/82)     RR: 18 (29 Jun 2020 07:00) (18 - 18)    SpO2: 98% (29 Jun 2020 07:00) (97% - 98%)        General: no acute distress, sitting in chair    HEENT:  EOMI    NECK:  supple, interval decannulation.    CVS: regular rate and rhythm S1 S2    RESP:  CTAB    GI:  PEG+ BS+ Soft nondistended nontender     : No suprapubic tenderness    MSK:  No edema. Bilateral offloading boots.     CNS:  generalized weakness    INTEG:  Warm dry skin. right heel DTI    PSYCH: Calm and cooperative        MONITOR:    CAPILLARY BLOOD GLUCOSE                        I&O's Summary            PT/INR - ( 29 Jun 2020 07:11 )   PT: 37.0 sec;   INR: 3.16 ratio                                               Culture:        TTE:        RADIOLOGY                MEDICATIONS  (STANDING):    aspirin  chewable 81 milliGRAM(s) Oral daily    escitalopram 20 milliGRAM(s) Oral daily    lactobacillus acidophilus 1 Tablet(s) Oral daily    melatonin 5 milliGRAM(s) Oral at bedtime    multivitamin/minerals 1 Tablet(s) Oral daily    pantoprazole    Tablet 40 milliGRAM(s) Oral before breakfast    psyllium Powder 1 Packet(s) Oral two times a day    warfarin 1 milliGRAM(s) Oral once            MEDICATIONS  (PRN):    acetaminophen    Suspension .. 650 milliGRAM(s) Oral every 6 hours PRN Temp greater or equal to 38.5C (101.3F)    ALPRAZolam 0.25 milliGRAM(s) Oral two times a day PRN anxiety

## 2020-06-29 NOTE — DISCHARGE NOTE PROVIDER - HOSPITAL COURSE
64 year old male without any significant PMH presented to [facility] with dyspnea and was admitted for acute hypoxic respiratory failure secondary to COVID-19 pneumonia                patient found to have covid and respiratory failure subsequently with complicated icu course including leukocytosis with bacterial pna s/p abx, gi bleed and pe, s/p trach and peg. The patient was  downgraded to medicine service.         #Acute hypoxic respiratory failure 2/2 Covid 19 pneumonia - resolved    - s/p ICU and trach - decannulated 6/26    - COVID negative from 6/5 and 6/18    - monitor vitals     - PT/OT/SLP- Rehab following    - Pulmonary consult appreciated         #troponinemia    - probable 2/2 demand     - cardio consult appreciated        #SVT    - no further episodes        #PE    - Continue Coumadin, will give 3mg tonight        #hx of gib bleed w/ rectal ulcer (healing)    - s/p prbc transfusion - s/p rigid sig, clipping of vessel and epinephrine    - gi consult appreciated    - colorectal surgery consult appreciated    - avoid rectal tube for diarrhea, continue  metamucil/ Bacid         #dysphagia    - improved now on regular diet with thin liquids    - aspiration precautions     - Protonix     - will need to have peg tube in for 6 weeks prior to removal        #anxiety    - xanax    - behavioral health follow up appreciated- xanax prn        #type 2 diabetes    - a1c 6.7 on admit and repeat 5.1        #dvt prophylaxis     - Therapeutic on coumadin 64 year old male without any significant PMH presented to [facility] with dyspnea and was admitted for acute hypoxic respiratory failure secondary to COVID-19 pneumonia, which was treated with Steroids, HCQ and convalescent plasma. He was intubated for worsening respiratory failure and was eventually underwent tracheostomy and G-tube placement. Subsequently weaned off ventilator and then decannulated 6/26. No longer hypoxic. His dysphagia also improved and is now tolerating regular with thins. G-tube to be removed after 6 weeks (placed 5/26) Evaluated by PMR for critical illness polyneuropathy and accepted to Acute Rehab. Repeat COVID PCR negative from 6/5 and 6/18.        Hospital stay significant for PE for which he was started on anticoagulation which was temporarily discontinued secondary to rectal bleeding (due to solitary rectal ulcer in setting of rectal tube) causing hypotension (transiently requiring pressors),  severe acute blood loss anemia with transfusion of 15 PRBC, 10 FFP, 2 Platelets and KCentra. He was resumed on anticoagulation without any recurrent bleeding and switched to Coumadin which is now therapeutic. He did have mildly elevated Troponins - TTE with preserved LVSF, no RWMA. ASA as recommended by Cardiology, opine non ischemic.         Medically stable for discharge. 50 minutes

## 2020-06-29 NOTE — DISCHARGE NOTE NURSING/CASE MANAGEMENT/SOCIAL WORK - PATIENT PORTAL LINK FT
You can access the FollowMyHealth Patient Portal offered by [facility] by registering at the following website: [email or web address]. By joining [facility]’s FollowMyHealth portal, you will also be able to view your health information using other applications (apps) compatible with our system.

## 2020-06-29 NOTE — DISCHARGE NOTE PROVIDER - NSDCACTIVITY_GEN_ALL_CORE
Showering allowed/Walking - Indoors allowed/Do not make important decisions/Stairs allowed/Do not drive or operate machinery/Walking - Outdoors allowed

## 2020-06-29 NOTE — DISCHARGE NOTE NURSING/CASE MANAGEMENT/SOCIAL WORK - NSDCPEPTCOWAR_GEN_ALL_CORE
Warfarin/Coumadin - Compliance/Warfarin/Coumadin - Potential for adverse drug reactions and interactions/Warfarin/Coumadin - Dietary Advice/Warfarin/Coumadin - Follow up monitoring

## 2020-06-29 NOTE — PROGRESS NOTE ADULT - SUBJECTIVE AND OBJECTIVE BOX
PULMONARY PROGRESS NOTE      [id number]    Patient is a 64y old  Male who presents with a chief complaint of shortness of breath (29 Jun 2020 09:43)      INTERVAL HPI/OVERNIGHT EVENTS:  Decannulated on Friday  On nasal O2 and comfortable  Taking PO    MEDICATIONS  (STANDING):  aspirin  chewable 81 milliGRAM(s) Oral daily  escitalopram 20 milliGRAM(s) Oral daily  lactobacillus acidophilus 1 Tablet(s) Oral daily  melatonin 5 milliGRAM(s) Oral at bedtime  multivitamin/minerals 1 Tablet(s) Oral daily  pantoprazole    Tablet 40 milliGRAM(s) Oral before breakfast  psyllium Powder 1 Packet(s) Oral two times a day  warfarin 1 milliGRAM(s) Oral once      MEDICATIONS  (PRN):  acetaminophen    Suspension .. 650 milliGRAM(s) Oral every 6 hours PRN Temp greater or equal to 38.5C (101.3F)  ALPRAZolam 0.25 milliGRAM(s) Oral two times a day PRN anxiety      Allergies    No Known Allergies    Intolerances        PAST MEDICAL & SURGICAL HISTORY:  No pertinent past medical history  No significant past surgical history      SOCIAL HISTORY  Smoking History:       REVIEW OF SYSTEMS:    CONSTITUTIONAL:  No distress    HEENT:  Eyes:  No diplopia or blurred vision. ENT:  No earache, sore throat or runny nose.    CARDIOVASCULAR:  No pressure, squeezing, tightness, heaviness or aching about the chest; no palpitations.    RESPIRATORY:  No cough, shortness of breath, PND or orthopnea. Mild SOBOE    GASTROINTESTINAL:  No nausea, vomiting or diarrhea.    GENITOURINARY:  No dysuria, frequency or urgency.    MUSCULOSKELETAL:  No joint pain    SKIN:  No new lesions.    NEUROLOGIC:  No paresthesias, fasciculations, seizures or weakness.    PSYCHIATRIC:  No disorder of thought or mood.    ENDOCRINE:  No heat or cold intolerance, polyuria or polydipsia.    HEMATOLOGICAL:  No easy bruising or bleeding.     Vital Signs Last 24 Hrs  T(C): 36.7 (29 Jun 2020 07:00), Max: 36.8 (28 Jun 2020 16:07)  T(F): 98 (29 Jun 2020 07:00), Max: 98.3 (28 Jun 2020 16:07)  HR: 77 (29 Jun 2020 07:00) (77 - 80)  BP: 111/65 (29 Jun 2020 07:00) (111/65 - 122/82)  BP(mean): --  RR: 18 (29 Jun 2020 07:00) (18 - 18)  SpO2: 98% (29 Jun 2020 07:00) (97% - 98%)    PHYSICAL EXAMINATION:    GENERAL: The patient is awake and alert in no apparent distress.     HEENT: Head is normocephalic and atraumatic. Extraocular muscles are intact. Mucous membranes are moist.    NECK:Dressing overlying prior trach site>clear    LUNGS: Clear to auscultation without wheezing, rales or rhonchi; respirations unlabored    HEART: Regular rate and rhythm without murmur.    ABDOMEN: Soft, nontender, and nondistended.      EXTREMITIES: Without any cyanosis, clubbing, rash, lesions or edema.    NEUROLOGIC: Grossly intact.    SKIN: No ulceration or induration present.      LABS:          PT/INR - ( 29 Jun 2020 07:11 )   PT: 37.0 sec;   INR: 3.16 ratio                             MICROBIOLOGY:    RADIOLOGY & ADDITIONAL STUDIES:

## 2020-06-29 NOTE — DISCHARGE NOTE PROVIDER - NSDCMRMEDTOKEN_GEN_ALL_CORE_FT
ALPRAZolam 0.25 mg oral tablet: 1 tab(s) orally 2 times a day, As needed, anxiety  aspirin 81 mg oral tablet, chewable: 1 tab(s) orally once a day  escitalopram 20 mg oral tablet: 1 tab(s) orally once a day  lactobacillus acidophilus oral capsule: 1 cap(s) orally 2 times a day  melatonin 5 mg oral tablet: 1 tab(s) orally once a day (at bedtime)  Multiple Vitamins with Minerals oral tablet: 1 tab(s) orally once a day  pantoprazole 40 mg oral delayed release tablet: 1 tab(s) orally once a day (before a meal)  psyllium 3.4 g/7 g oral powder for reconstitution: 1 dose(s) orally 2 times a day  warfarin 1 mg oral tablet: 1 tab(s) orally once a day 6/29 then according to INR (target INR 2-3)    Will likely need 3 or 4 mg Coumadin daily

## 2020-06-29 NOTE — PROGRESS NOTE ADULT - ASSESSMENT
Post COVID>post mechanical ventilation and trach now decannulated and doing well at this point.      Plan:  1.Titrate O2 as able  2.Rehab  Will followup PRN at this point.

## 2020-06-29 NOTE — PROGRESS NOTE ADULT - REASON FOR ADMISSION
COVID +
COVID-19
COVID-19
shortness of breath
shortness of breath due to COVID-19
shortness of breath
shortness of breath; Rectal bleeding/ rectal ulcer
shortness of breath

## 2020-06-29 NOTE — DISCHARGE NOTE PROVIDER - NSDCCPCAREPLAN_GEN_ALL_CORE_FT
PRINCIPAL DISCHARGE DIAGNOSIS  Diagnosis: Pneumonia due to COVID-19 virus  Assessment and Plan of Treatment: Resolved      SECONDARY DISCHARGE DIAGNOSES  Diagnosis: Acute blood loss anemia  Assessment and Plan of Treatment: monitor Hgb  Continue supplements    Diagnosis: Rectal ulcer  Assessment and Plan of Treatment: Resolved    Diagnosis: Other chronic pulmonary embolism  Assessment and Plan of Treatment: Continue Coumadin    Diagnosis: Acute respiratory distress syndrome (ARDS) due to COVID-19 virus  Assessment and Plan of Treatment: Resolved

## 2021-07-15 NOTE — PROGRESS NOTE ADULT - SUBJECTIVE AND OBJECTIVE BOX
CC: Covid pneumonia in hypoxic resp failure. Right pulm embolism .  NRB.  Afebrile.     No overnight events  Pt seen and examined at bedside  Pt sitting in bed, NRB & 6L NC present  States his breathing becomes more difficult with any movement in bed. Worse with exertion  Feels comfortable at rest per patient  Has not had bowel movement, bowel regimen ordered  Remainder of ROS neg  VSS    REVIEW OF SYSTEMS:    Patient denied fever, chills, abdominal pain, nausea, vomiting, cough, shortness of breath, chest pain or palpitations    Vital Signs Last 24 Hrs  T(C): 37.1 (28 Apr 2020 08:47), Max: 37.1 (27 Apr 2020 23:18)  T(F): 98.8 (28 Apr 2020 08:47), Max: 98.8 (28 Apr 2020 08:47)  HR: 98 (28 Apr 2020 08:47) (89 - 98)  BP: 113/73 (28 Apr 2020 08:47) (113/73 - 118/78)  BP(mean): --  RR: 20 (28 Apr 2020 08:47) (20 - 20)  SpO2: 92% (27 Apr 2020 23:18) (92% - 94%) on NRB and 6l nc    PHYSICAL EXAM:  GENERAL: NAD,   HEENT: PERRL, +EOMI, anicteric, no [place]  NECK: Supple, No JVD   CHEST/LUNG: CTA bilaterally; Normal effort  HEART: S1S2 Normal intensity, no murmurs, gallops or rubs noted  ABDOMEN: Soft, BS Normoactive, NT, ND, no HSM noted  EXTREMITIES:  2+ radial and DP pulses noted, no clubbing, cyanosis, or edema noted, FROM x 4  SKIN: No rashes or lesions noted  NEURO: A&Ox3, no focal deficits noted, CN II-XII intact  PSYCH: Anxious mood and affect; insight/judgement appropriate    LAB/IMAGING: REVIEWED None Done

## 2022-02-03 NOTE — PROGRESS NOTE ADULT - SUBJECTIVE AND OBJECTIVE BOX
This is a Palliative Care Followup  24 hour events: Patient's respiratory status declined, RRT called , patient intubated, sent to ICU    HPI: This is a 64 year old male no significant medical history admitted to [facility] for SOB, fevers, dry cough x 10 days, poor PO intake. In ED patient with symptoms of tachycardia, hypoxia, ARF, now on non rebreather mask continuing to desaturate in the 80's. No sick contacts, no reports of chest pain, n/v/d in ED. Patient found to be COVID-19 positive and right pulmonary embolism. Called for palliative care consult to discuss GOC as patient is high risk for intubation. Patient is [place] speaking,  used to discuss goals at bedside. On 04.29.20 Meeting held at bedside with patient, myself, Dr. Neumann, and   Discussed goals/advance directives. Established patient desire for Full Code including CPR and intubation in the event of cardio/pulmonary compromise/arrest  Patient was explained that he is high risk for intubation given his poor pulmonary status - he is also aware he is at risk for cardiac arrest- patient was  agreeable to starting plasma, Patient started Plasma was maintaining saturation wiht non rebreather - then today on 05.01.20 patient's respiratory status decline and he was intubated at bedside after a RRT was called. Patient sent to ICU for closer monitoring, vent support.     Present Symptoms: Unable to tell due to poor mental status  Dyspnea:  vented  Nausea/Vomiting: Unable to tell due to poor mental status  Anxiety:  Unable to tell due to poor mental status   Depression: Unable to tell due to poor mental status  Fatigue: yes sedated  Loss of appetite: Unable to tell due to poor mental status    Pain: Unable to tell due to poor mental status - on dilaudid infusion            Character-            Duration-            Effect-            Factors-            Frequency-            Location-            Severity-    Review of Systems: Reviewed  Per HPI, all other ROS negative  Unable to tell due to poor mental status    MEDICATIONS  (STANDING):  cholecalciferol 1000 Unit(s) Oral daily  morphine  - Injectable 2 milliGRAM(s) IV Push once  multivitamin/minerals 1 Tablet(s) Oral daily  pantoprazole    Tablet 40 milliGRAM(s) Oral before breakfast  polyethylene glycol 3350 17 Gram(s) Oral daily  psyllium Powder 1 Packet(s) Oral two times a day  senna 2 Tablet(s) Oral at bedtime    MEDICATIONS  (PRN):  acetaminophen   Tablet .. 650 milliGRAM(s) Oral every 6 hours PRN Temp greater or equal to 38C (100.4F), Mild Pain (1 - 3), Moderate Pain (4 - 6)  bisacodyl 5 milliGRAM(s) Oral every 12 hours PRN Constipation      PHYSICAL EXAM:  Physical Exam - limited physical exam due to COVID - 19 isolation status  No Physical Exam at bedside completed in attempt to limit COVID-19   Reviewed H&P physical exam and most recent Hospitalist Physical Exam   See H&P physical exam and most recent Hospitalist Document /PA exam    Vital Signs Last 24 Hrs  T(C): 36.9 (29 Apr 2020 08:35), Max: 37.1 (28 Apr 2020 23:25)  T(F): 98.4 (29 Apr 2020 08:35), Max: 98.8 (28 Apr 2020 23:25)  HR: 110 (29 Apr 2020 08:35) (101 - 110)  BP: 127/87 (29 Apr 2020 08:35) (115/79 - 127/87)  BP(mean): --  RR: 89 (29 Apr 2020 09:17) (18 - 89)  SpO2: 93% (29 Apr 2020 04:42) (91% - 93%)    LABS:                        16.8   7.82  )-----------( 281      ( 28 Apr 2020 07:41 )             52.1     04-28    135  |  93<L>  |  12.0  ----------------------------<  106<H>  4.6   |  27.0  |  0.46<L>    Ca    8.6      28 Apr 2020 07:41    TPro  6.6  /  Alb  2.6<L>  /  TBili  0.4  /  DBili  x   /  AST  130<H>  /  ALT  114<H>  /  AlkPhos  111  04-28    PT/INR - ( 29 Apr 2020 07:48 )   PT: 29.9 sec;   INR: 2.57 ratio         PTT - ( 29 Apr 2020 07:48 )  PTT:52.3 sec    I&O's Summary      RADIOLOGY & ADDITIONAL STUDIES:  CTA chest 04.21.20  IMPRESSION:   Segmental right middle lobe pulmonary embolism.  Extensive bilateral infiltrates  Results were discussed with Dr. Jack at 5:10 PM on 4/21/2020.    ADVANCE DIRECTIVES:   Full Code Yes - the patient is able to be screened

## 2022-05-16 NOTE — SWALLOW VFSS/MBS ASSESSMENT ADULT - DIAGNOSTIC IMPRESSIONS
[Oral and pharyngeal stages of swallow are WFL. No penetration/aspiration demonstrated during study.   +cough noted after trials, however no PO observed in airway.   Pt noted with Sp02 desat to 89-90% with pt noting discomfort in upright positioning. Sp02 returned to 93-94% when positioned at 45* angle for remainder of study.

## 2023-08-08 NOTE — PROGRESS NOTE ADULT - SUBJECTIVE AND OBJECTIVE BOX
24h Events: No acute events overnight.  Patient p/f 190 this morning.  Patient with respiratory acidosis, not able to take tv ~270cc.  Vitals stable, sating well.      ICU Vital Signs Last 24 Hrs  T(C): 36.4 (06 May 2020 00:00), Max: 36.5 (05 May 2020 17:00)  T(F): 97.5 (06 May 2020 00:00), Max: 97.7 (05 May 2020 17:00)  HR: 70 (06 May 2020 03:00) (63 - 86)  BP: 132/65 (05 May 2020 20:00) (112/60 - 138/67)  BP(mean): 93 (05 May 2020 20:00) (81 - 97)  ABP: 137/54 (06 May 2020 03:00) (122/49 - 151/53)  ABP(mean): 81 (06 May 2020 03:00) (72 - 89)  RR: 34 (06 May 2020 03:00) (12 - 34)  SpO2: 99% (06 May 2020 03:00) (92% - 100%)      I&O's Detail    04 May 2020 07:01  -  05 May 2020 07:00  --------------------------------------------------------  IN:    Enteral Tube Flush: 100 mL    Free Water: 500 mL    heparin  Infusion.: 161 mL    HYDROmorphone Infusion.: 23 mL    ketamine Infusion.: 161 mL    multiple electrolytes Injection Type 1: 2300 mL    Pivot 1.5: 552 mL    rocuronium Infusion: 154.1 mL    Solution: 100 mL    Solution: 500 mL  Total IN: 4551.1 mL    OUT:    Indwelling Catheter - Urethral: 1755 mL  Total OUT: 1755 mL    Total NET: 2796.1 mL      05 May 2020 07:01  -  06 May 2020 04:20  --------------------------------------------------------  IN:    Free Water: 500 mL    heparin  Infusion.: 126 mL    HYDROmorphone Infusion.: 18 mL    ketamine Infusion.: 54 mL    multiple electrolytes Injection Type 1: 1800 mL    Pivot 1.5: 432 mL    rocuronium Infusion: 136.2 mL    Solution: 200 mL    Solution: 501 mL    Solution: 500 mL    Solution: 100 mL  Total IN: 4367.2 mL    OUT:    Indwelling Catheter - Urethral: 2575 mL  Total OUT: 2575 mL    Total NET: 1792.2 mL          ABG - ( 05 May 2020 03:53 )  pH, Arterial: 7.29  pH, Blood: x     /  pCO2: 92    /  pO2: 94    / HCO3: 38    / Base Excess: 14.1  /  SaO2: 98                  MEDICATIONS  (STANDING):  cefepime   IVPB 2000 milliGRAM(s) IV Intermittent every 8 hours  cefepime   IVPB      chlorhexidine 0.12% Liquid 15 milliLiter(s) Oral Mucosa every 12 hours  heparin  Infusion.  Unit(s)/Hr (12 mL/Hr) IV Continuous <Continuous>  HYDROmorphone Infusion. 0.5 mG/Hr (0.5 mL/Hr) IV Continuous <Continuous>  ketamine Infusion. 0.25 mG/kG/Hr (1.76 mL/Hr) IV Continuous <Continuous>  multiple electrolytes Injection Type 1 1000 milliLiter(s) (100 mL/Hr) IV Continuous <Continuous>  multivitamin/minerals 1 Tablet(s) Oral daily  pantoprazole    Tablet 40 milliGRAM(s) Oral before breakfast  psyllium Powder 1 Packet(s) Oral two times a day  rocuronium Infusion 8 MICROgram(s)/kG/Min (6.75 mL/Hr) IV Continuous <Continuous>  vancomycin  IVPB      vancomycin  IVPB 1000 milliGRAM(s) IV Intermittent every 12 hours    MEDICATIONS  (PRN):  acetaminophen   Tablet .. 650 milliGRAM(s) Oral every 6 hours PRN Temp greater or equal to 38C (100.4F), Mild Pain (1 - 3), Moderate Pain (4 - 6)  heparin   Injectable 5500 Unit(s) IV Push every 6 hours PRN For aPTT less than 40  heparin   Injectable 2500 Unit(s) IV Push every 6 hours PRN For aPTT between 40 - 57        Physical Exam:    Neurological: sedated    Pulmonary: unlabored, trachea midline    Cardiovascular: nsr    Gastrointestinal: soft, non-tender, non-distended, no rebound / guarding    : alexander in place     Skin: warm, dry, no diaphoresis, no pallor, cyanosis, or jaundice    LABS:  CBC Full  -  ( 05 May 2020 05:48 )  WBC Count : 18.55 K/uL  RBC Count : 3.86 M/uL  Hemoglobin : 11.4 g/dL  Hematocrit : 37.3 %  Platelet Count - Automated : 187 K/uL  Mean Cell Volume : 96.6 fl  Mean Cell Hemoglobin : 29.5 pg  Mean Cell Hemoglobin Concentration : 30.6 gm/dL  Auto Neutrophil # : 15.89 K/uL  Auto Lymphocyte # : 1.02 K/uL  Auto Monocyte # : 0.65 K/uL  Auto Eosinophil # : 0.64 K/uL  Auto Basophil # : 0.06 K/uL  Auto Neutrophil % : 85.6 %  Auto Lymphocyte % : 5.5 %  Auto Monocyte % : 3.5 %  Auto Eosinophil % : 3.5 %  Auto Basophil % : 0.3 %    05-05    137  |  91<L>  |  8.0  ----------------------------<  127<H>  3.3<L>   |  40.0<H>  |  0.24<L>    Ca    7.0<L>      05 May 2020 05:48  Phos  1.4     05-05  Mg     2.1     05-05      PT/INR - ( 04 May 2020 04:48 )   PT: 18.5 sec;   INR: 1.61 ratio         PTT - ( 05 May 2020 05:48 )  PTT:58.1 sec    RECENT CULTURES:  05-04 .Sputum Sputum XXXX   Few polymorphonuclear leukocytes per low power field  Few Squamous epithelial cells per low power field  Few Gram Positive Cocci in Clusters seen per oil power field  Few Gram Positive Cocci in Pairs and Chains seen per oil power field  Numerous Gram Variable Rods seen per oil power field   Moderate Staphylococcus aureus  Moderate Acinetobacter baumannii nosocomialis group  Normal Respiratory Christine present    05-03 .Blood Blood XXXX XXXX   No growth at 48 hours Terbinafine Counseling: Patient counseling regarding adverse effects of terbinafine including but not limited to headache, diarrhea, rash, upset stomach, liver function test abnormalities, itching, taste/smell disturbance, nausea, abdominal pain, and flatulence.  There is a rare possibility of liver failure that can occur when taking terbinafine.  The patient understands that a baseline LFT and kidney function test may be required. The patient verbalized understanding of the proper use and possible adverse effects of terbinafine.  All of the patient's questions and concerns were addressed.

## 2024-01-22 LAB
PH BLDA: 7.41 — SIGNIFICANT CHANGE UP (ref 7.35–7.45)
PO2 BLDA: 78 MMHG — LOW (ref 83–108)
SAO2 % BLDA: 98 % — SIGNIFICANT CHANGE UP (ref 95–99)

## 2024-10-21 NOTE — ED ADULT NURSE NOTE - CAS TRG GENERAL NORM CIRC DET
Pt called and stated that they were ill. Pt hopeful to return to pulmonary rehab Wednesday 10/23/24.     Electronically signed by Caryn Bardley on 10/21/2024 at 7:51 AM     Strong peripheral pulses

## 2024-12-01 NOTE — ED PROVIDER NOTE - CRITICAL CARE INDICATION, MLM
0 (no pain/absence of nonverbal indicators of pain) patient was critically ill... Patient was critically ill with a high probability of imminent or life threatening deterioration.

## 2025-04-15 NOTE — CONSULT NOTE ADULT - CONSULT REQUESTED DATE/TIME
LOV 1/21/24, appt is due.   Last filled on 1/21/24.  Lab work done on 12/26/24 .  Meds refilled per epic.      01-May-2020 10:43

## 2025-07-08 NOTE — PATIENT PROFILE ADULT - DISASTER - FUNCTIONAL SCREEN CURRENT LEVEL: COMMUNICATION, MLM
Is This A New Presentation, Or A Follow-Up?: Skin Lesion 0 = understands/communicates without difficulty